# Patient Record
Sex: MALE | Race: WHITE | Employment: OTHER | URBAN - METROPOLITAN AREA
[De-identification: names, ages, dates, MRNs, and addresses within clinical notes are randomized per-mention and may not be internally consistent; named-entity substitution may affect disease eponyms.]

---

## 2017-06-08 ENCOUNTER — TELEPHONE (OUTPATIENT)
Dept: INTERNAL MEDICINE CLINIC | Age: 77
End: 2017-06-08

## 2017-06-08 NOTE — TELEPHONE ENCOUNTER
Advised pt last visit was 8/25/16 - MD has ordered labs for next visit - CBC, CMP and UA - no PSA. Last PSA was done 8/15/16 - insurance covers yearly - too soon. Advised lab slip at .   Pt asked to see if MD feels he needs PSA now - reviewed with pt his last one was normal. Will forward to MD.

## 2017-06-08 NOTE — TELEPHONE ENCOUNTER
Pt requesting a labslip, to include a psa test.  He has an appt scheduled with Dr. Laurel Michel on 6/20.   Would like to come get the bloodwork on Monday or Tues am.

## 2017-06-12 ENCOUNTER — HOSPITAL ENCOUNTER (OUTPATIENT)
Dept: LAB | Age: 77
Discharge: HOME | End: 2017-06-12
Payer: MEDICARE

## 2017-06-12 PROCEDURE — 80053 COMPREHEN METABOLIC PANEL: CPT

## 2017-06-12 PROCEDURE — 85025 COMPLETE CBC W/AUTO DIFF WBC: CPT

## 2017-06-12 PROCEDURE — 81001 URINALYSIS AUTO W/SCOPE: CPT

## 2017-07-26 ENCOUNTER — HOSPITAL ENCOUNTER (OUTPATIENT)
Dept: LAB | Age: 77
Discharge: HOME | End: 2017-07-26

## 2017-09-21 ENCOUNTER — HOSPITAL ENCOUNTER (OUTPATIENT)
Dept: LAB | Age: 77
Discharge: HOME | End: 2017-09-21

## 2017-10-23 ENCOUNTER — TELEPHONE (OUTPATIENT)
Dept: INTERNAL MEDICINE CLINIC | Age: 77
End: 2017-10-23

## 2017-10-23 NOTE — TELEPHONE ENCOUNTER
Spoke with pt - advised his last PSA was back on 8/15/16 - note on labs was MD to review at next visit. He was last seen on 6/20/17 - pt does not remember if MD discussed results or not. MD had printed PSA lab slip at that visit.  It appears he was to have it repeated mid august - would be a year from last. Will forward to MD.

## 2017-10-25 ENCOUNTER — HOSPITAL ENCOUNTER (OUTPATIENT)
Dept: LAB | Age: 77
Discharge: HOME | End: 2017-10-25
Payer: MEDICARE

## 2017-10-25 PROCEDURE — 84153 ASSAY OF PSA TOTAL: CPT

## 2017-10-25 PROCEDURE — 36415 COLL VENOUS BLD VENIPUNCTURE: CPT

## 2017-10-30 ENCOUNTER — TELEPHONE (OUTPATIENT)
Dept: INTERNAL MEDICINE CLINIC | Age: 77
End: 2017-10-30

## 2017-10-30 NOTE — TELEPHONE ENCOUNTER
[phone] pt's wife calling and wonders which of the pneumonia shots dr Alvarado Media would recommend that he have done.

## 2017-11-01 DIAGNOSIS — Z23 ENCOUNTER FOR IMMUNIZATION: Primary | ICD-10-CM

## 2017-11-01 NOTE — TELEPHONE ENCOUNTER
Spoke with pt's wife Chelsea Mota (on [facility]) advised her per our records he is is up to date with pneumovax 23 but needs the Prevnar 13. Asked her if she would like us to mail a Rx to her so pt can get at his pharmacy. She said yes and also send her My Chart message with this information also - done. Printed and mailed Rx.

## 2018-01-31 ENCOUNTER — OFFICE VISIT (OUTPATIENT)
Dept: INTERNAL MEDICINE CLINIC | Age: 78
End: 2018-01-31

## 2018-01-31 VITALS
WEIGHT: 184.6 LBS | RESPIRATION RATE: 18 BRPM | HEIGHT: 72 IN | OXYGEN SATURATION: 96 % | SYSTOLIC BLOOD PRESSURE: 123 MMHG | HEART RATE: 81 BPM | DIASTOLIC BLOOD PRESSURE: 76 MMHG | BODY MASS INDEX: 25 KG/M2 | TEMPERATURE: 97.9 F

## 2018-01-31 DIAGNOSIS — R04.0 EPISTAXIS: ICD-10-CM

## 2018-01-31 DIAGNOSIS — S63.501A SPRAIN OF RIGHT WRIST, INITIAL ENCOUNTER: ICD-10-CM

## 2018-01-31 DIAGNOSIS — Z23 NEED FOR TDAP VACCINATION: ICD-10-CM

## 2018-01-31 DIAGNOSIS — R09.81 CHRONIC NASAL CONGESTION: ICD-10-CM

## 2018-01-31 DIAGNOSIS — I78.0 HHT (HEREDITARY HEMORRHAGIC TELANGIECTASIA) (HCC): ICD-10-CM

## 2018-01-31 DIAGNOSIS — Z23 ENCOUNTER FOR IMMUNIZATION: ICD-10-CM

## 2018-01-31 DIAGNOSIS — D50.0 IRON DEFICIENCY ANEMIA DUE TO CHRONIC BLOOD LOSS: ICD-10-CM

## 2018-01-31 DIAGNOSIS — H90.3 SENSORINEURAL HEARING LOSS (SNHL) OF BOTH EARS: ICD-10-CM

## 2018-01-31 DIAGNOSIS — Z13.31 SCREENING FOR DEPRESSION: ICD-10-CM

## 2018-01-31 DIAGNOSIS — Z00.00 MEDICARE ANNUAL WELLNESS VISIT, SUBSEQUENT: Primary | ICD-10-CM

## 2018-01-31 DIAGNOSIS — Z78.9 ACTIVE ADVANCE DIRECTIVE ON FILE: ICD-10-CM

## 2018-01-31 NOTE — MR AVS SNAPSHOT
[address] [address] 
[phone] Patient: Letitia Prabhakar MRN: [id number] YWS:[birth year] Visit Information Date & Time Provider Department Dept. Phone Encounter #  
 1/31/2018  3:45 PM Acosta Hough, [address] [place] [address] Internal Medicine [phone] [id number] Follow-up Instructions Return in about 6 months (around 7/31/2018), or if symptoms worsen or fail to improve. Upcoming Health Maintenance Date Due DTaP/Tdap/Td series (1 - Tdap) 5/6/1960 GLAUCOMA SCREENING Q2Y 12/11/2017 MEDICARE YEARLY EXAM 2/1/2019 Allergies as of 1/31/2018  Review Complete On: 1/31/2018 By: Acosta Hough MD  
 No Known Allergies Current Immunizations  Reviewed on 1/31/2018 Name Date Influenza High Dose Vaccine PF 11/6/2017, 10/15/2015 Pneumococcal Conjugate (PCV-13) 11/6/2017 Pneumococcal Vaccine (Unspecified Type) 1/1/2010 Zoster Vaccine, Live 1/1/2010 Reviewed by Sunday Chappell on 1/31/2018 at  4:27 PM  
 Reviewed by Sunday Chappell on 1/31/2018 at  4:27 PM  
 Reviewed by Sunday Chappell on 1/31/2018 at  4:28 PM  
 Reviewed by Acosta Hough MD on 1/31/2018 at  4:51 PM  
You Were Diagnosed With   
  
 Codes Comments Medicare annual wellness visit, subsequent    -  Primary ICD-10-CM: Z00.00 ICD-9-CM: V70.0 Need for Tdap vaccination     ICD-10-CM: L95 ICD-9-CM: V06.1 Active advance directive on file     ICD-10-CM: Z78.9 ICD-9-CM: V49.89 HHT (hereditary hemorrhagic telangiectasia) ([facility] [address]     ICD-10-CM: I78.0 ICD-9-CM: 448.0 Epistaxis     ICD-10-CM: R04.0 ICD-9-CM: 784.7 Iron deficiency anemia due to chronic blood loss     ICD-10-CM: D50.0 ICD-9-CM: 280.0 Chronic nasal congestion     ICD-10-CM: R09.81 ICD-9-CM: 478.19 Sensorineural hearing loss (SNHL) of both ears     ICD-10-CM: H90.3 ICD-9-CM: 389.18   
 Sprain of right wrist, initial encounter     ICD-10-CM: P59.024E ICD-9-CM: 842.00 Encounter for immunization     ICD-10-CM: S54 ICD-9-CM: V03.89 Screening for depression     ICD-10-CM: Z13.89 ICD-9-CM: V79.0 Vitals BP Pulse Temp Resp Height(growth percentile) Weight(growth percentile) 123/76 (BP 1 Location: Right arm, BP Patient Position: Sitting) 81 97.9 °F (36.6 °C) (Oral) 18 5' 11.5\" (1.816 m) 184 lb 9.6 oz (83.7 kg) SpO2 BMI Smoking Status 96% 25.39 kg/m2 Former Smoker BMI and BSA Data Body Mass Index Body Surface Area  
 25.39 kg/m 2 2.05 m 2 Preferred Pharmacy Pharmacy Name Phone [address] [address] [phone] Your Updated Medication List  
  
   
This list is accurate as of: 18  5:07 PM.  Always use your most recent med list.  
  
  
  
  
 diph,Pertuss(Acell),Tet Vac-PF 2 Lf-(2.5-5-3-5 mcg)-5Lf/0.5 mL susp Commonly known as:  ADACEL  
0.5 mL by IntraMUSCular route once for 1 dose. ferrous sulfate 325 mg (65 mg iron) tablet Take 1 Tab by mouth Daily (before breakfast). mineral oil-hydrophil petrolat ointment Commonly known as:  AQUAPHOR Apply  to affected area as needed for Dry Skin. MULTIVITAMIN PO Take  by mouth daily. TIMOLOL MALEATE OP Apply  to eye. 1 drop to each eye daily  
  
 varicella-zoster recombinant (PF) 50 mcg/0.5 mL Susr injection Commonly known as:  SHINGRIX (PF)  
0.5 mL by IntraMUSCular route once for 1 dose. Repeat in 2 to 6 months Prescriptions Printed Refills diph,Pertuss,Acell,,Tet Vac-PF (ADACEL) 2 Lf-(2.5-5-3-5 mcg)-5Lf/0.5 mL susp 0 Si.5 mL by IntraMUSCular route once for 1 dose. Class: Print Route: IntraMUSCular  
 varicella-zoster recombinant, PF, (SHINGRIX, PF,) 50 mcg/0.5 mL susr injection 1 Si.5 mL by IntraMUSCular route once for 1 dose. Repeat in 2 to 6 months Class: Print Route: IntraMUSCular We Performed the Following [facility] [[id number] [facility]] REFERRAL TO ENT-OTOLARYNGOLOGY [XIB56 Custom] Follow-up Instructions Return in about 6 months (around 7/31/2018), or if symptoms worsen or fail to improve. Referral Information Referral ID Referred By Referred To [id number] Mil JACOME MD [facility] [address] [place] [address] Phone: [phone] Fax: [phone] Visits Status Start Date End Date 1 New Request 1/31/18 1/31/19 If your referral has a status of pending review or denied, additional information will be sent to support the outcome of this decision. Patient Instructions Wrist Sprain: Care Instructions Your Care Instructions Your wrist hurts because you have stretched or torn ligaments, which connect the bones in your wrist. 
Wrist sprains usually take from 2 to 10 weeks to heal, but some take longer. Usually, the more pain you have, the more severe your wrist sprain is and the longer it will take to heal. You can heal faster and regain strength in your wrist with good home treatment. Follow-up care is a key part of your treatment and safety. Be sure to make and go to all appointments, and call your doctor if you are having problems. It's also a good idea to know your test results and keep a list of the medicines you take. How can you care for yourself at home? · Prop up your arm on a pillow when you ice it or anytime you sit or lie down for the next 3 days. Try to keep your wrist above the level of your heart. This will help reduce swelling. · Put ice or cold packs on your wrist for 10 to 20 minutes at a time. Try to do this every 1 to 2 hours for the next 3 days (when you are awake) or until the swelling goes down. Put a thin cloth between the ice pack and your skin.  
· After 2 or 3 days, if your swelling is gone, apply a heating pad set on low or a warm cloth to your wrist. This helps keep your wrist flexible. Some doctors suggest that you go back and forth between hot and cold. · If you have an elastic bandage, keep it on for the next 24 to 36 hours. The bandage should be snug but not so tight that it causes numbness or tingling. To rewrap the wrist, wrap the bandage around the hand a few times, beginning at the fingers. Then wrap it around the hand between the thumb and index finger, ending by circling the wrist several times. · If your doctor gave you a splint or brace, wear it as directed to protect your wrist until it has healed. · Take pain medicines exactly as directed. ¨ If the doctor gave you a prescription medicine for pain, take it as prescribed. ¨ If you are not taking a prescription pain medicine, ask your doctor if you can take an over-the-counter medicine. · Try not to use your injured wrist and hand. When should you call for help? Call your doctor now or seek immediate medical care if: 
? · Your hand or fingers are cool or pale or change color. ? Watch closely for changes in your health, and be sure to contact your doctor if: 
? · Your pain gets worse. ? · Your wrist has not improved after 1 week. Where can you learn more? Go to http://rhonda-win.info/. Enter G541 in the search box to learn more about \"Wrist Sprain: Care Instructions. \" Current as of: March 21, 2017 Content Version: 11.4 © 2256-9527 [employer]. Care instructions adapted under license by [employer] (which disclaims liability or warranty for this information). If you have questions about a medical condition or this instruction, always ask your healthcare professional. [address] [address] any warranty or liability for your use of this information. Wrist Sprain: Rehab Exercises Your Care Instructions Here are some examples of typical rehabilitation exercises for your condition. Start each exercise slowly. Ease off the exercise if you start to have pain. Your doctor or your physical or occupational therapist will tell you when you can start these exercises and which ones will work best for you. How to do the exercises Resisted wrist extension 1. Sit leaning forward with your legs slightly spread. Then place your forearm on your thigh with your affected hand and wrist in front of your knee. 2. Grasp one end of an exercise band with your palm down. Step on the other end. 
3. Slowly bend your wrist upward for a count of 2. Then lower your wrist slowly to a count of 5. 
4. Repeat 8 to 12 times. Resisted wrist flexion 1. Sit leaning forward with your legs slightly spread. Then place your forearm on your thigh with your affected hand and wrist in front of your knee. 2. Grasp one end of an exercise band with your palm up. Step on the other end. 
3. Slowly bend your wrist upward for a count of 2. Then lower your wrist slowly to a count of 5. 
4. Repeat 8 to 12 times. Resisted radial deviation 1. Sit leaning forward with your legs slightly spread. Then place your forearm on your thigh with your affected hand and wrist in front of your knee. 2. Grasp one end of an exercise band with your hand facing toward your other thigh. Step on the other end. 
3. Slowly bend your wrist upward for a count of 2. Then lower your wrist slowly to a count of 5. 
4. Repeat 8 to 12 times. Resisted ulnar deviation 1. Sit leaning forward with your legs slightly spread. Then place your forearm on your thigh with your affected hand and wrist by the inside of your knee. 2. Grasp one end of an exercise band with your palm down. Step on the other end with the foot opposite the hand holding the band. 3. Slowly bend your wrist outward and toward your knee for a count of 2. Then slowly move your wrist back to the starting position to a count of 5. 
4. Repeat 8 to 12 times. Resisted forearm pronation 1. Sit leaning forward with your legs slightly spread. Then place your forearm on your thigh with your affected hand and wrist in front of your knee. 2. Grasp one end of an exercise band with your palm up. Step on the other end. 3. Keeping your wrist straight, roll your palm inward toward your thigh for a count of 2. Then slowly move your wrist back to the starting position to a count of 5. 
4. Repeat 8 to 12 times. Resisted supination 1. Sit leaning forward with your legs slightly spread. Then place your forearm on your thigh with your affected hand and wrist in front of your knee. 2. Grasp one end of an exercise band with your palm down. Step on the other end. 3. Keeping your wrist straight, roll your palm outward and away from your thigh for a count of 2. Then slowly move your wrist back to the starting position to a count of 5. 
4. Repeat 8 to 12 times. Follow-up care is a key part of your treatment and safety. Be sure to make and go to all appointments, and call your doctor if you are having problems. It's also a good idea to know your test results and keep a list of the medicines you take. Where can you learn more? Go to http://rhonda-win.info/. Enter S110 in the search box to learn more about \"Wrist Sprain: Rehab Exercises. \" Current as of: March 21, 2017 Content Version: 11.4 © 9711-1452 [employer]. Care instructions adapted under license by [employer] (which disclaims liability or warranty for this information). If you have questions about a medical condition or this instruction, always ask your healthcare professional. [address] [address] any warranty or liability for your use of this information. Medicare Wellness Visit, Male The best way to live healthy is to have a healthy lifestyle by eating a well-balanced diet, exercising regularly, limiting alcohol and stopping smoking. Regular physical exams and screening tests are another way to keep healthy. Preventive exams provided by your health care provider can find health problems before they become diseases or illnesses. Preventive services including immunizations, screening tests, monitoring and exams can help you take care of your own health. All people over age 72 should have a pneumovax  and and a prevnar shot to prevent pneumonia. These are once in a lifetime unless you and your provider decide differently. All people over 65 should have a yearly flu shot and a tetanus vaccine every 10 years. Screening for diabetes mellitus with a blood sugar test should be done every year. Glaucoma is a disease of the eye due to increased ocular pressure that can lead to blindness and it should be done every year by an eye professional. 
 
Cardiovascular screening tests that check for elevated lipids (fatty part of blood) which can lead to heart disease and strokes should be done every 5 years. Colorectal screening that evaluates for blood or polyps in your colon should be done yearly as a stool test or every five years as a flexible sigmoidoscope or every 10 years as a colonoscopy up to age 76. Men up to age 76 may need a screening blood test for prostate cancer at certain intervals, depending on their personal and family history. This decision is between the patient and his provider. If you have been a smoker or had family history of abdominal aortic aneurysms, you and your provider may decide to schedule an ultrasound test of your aorta. Hepatitis C screening is also recommended for anyone born between [birth year] through [address]. A shingles vaccine is also recommended once in a lifetime after age 61. Your Medicare Wellness Exam is recommended annually. Here is a list of your current Health Maintenance items with a due date: 
Health Maintenance Due Topic Date Due  
 DTaP/Tdap/Td  (1 - Tdap) 05[birth year]06/1960  Glaucoma Screening   12/11/2017 Introducing [facility]! Dear Cordell Morris: Thank you for requesting a Force-A account. Our records indicate that you already have an active Force-A account. You can access your account anytime at [email or web address] Did you know that you can access your hospital and ER discharge instructions at any time in Force-A? You can also review all of your test results from your hospital stay or ER visit. Additional Information If you have questions, please visit the Frequently Asked Questions section of the Force-A website at [email or web address] Remember, Force-A is NOT to be used for urgent needs. For medical emergencies, dial 911. Now available from your iPhone and Android! Please provide this summary of care documentation to your next provider. Your primary care clinician is listed as GIANFRANCO JACOME. If you have any questions after today's visit, please call [phone].

## 2018-01-31 NOTE — PATIENT INSTRUCTIONS
Wrist Sprain: Care Instructions  Your Care Instructions    Your wrist hurts because you have stretched or torn ligaments, which connect the bones in your wrist.  Wrist sprains usually take from 2 to 10 weeks to heal, but some take longer. Usually, the more pain you have, the more severe your wrist sprain is and the longer it will take to heal. You can heal faster and regain strength in your wrist with good home treatment. Follow-up care is a key part of your treatment and safety. Be sure to make and go to all appointments, and call your doctor if you are having problems. It's also a good idea to know your test results and keep a list of the medicines you take. How can you care for yourself at home? · Prop up your arm on a pillow when you ice it or anytime you sit or lie down for the next 3 days. Try to keep your wrist above the level of your heart. This will help reduce swelling. · Put ice or cold packs on your wrist for 10 to 20 minutes at a time. Try to do this every 1 to 2 hours for the next 3 days (when you are awake) or until the swelling goes down. Put a thin cloth between the ice pack and your skin. · After 2 or 3 days, if your swelling is gone, apply a heating pad set on low or a warm cloth to your wrist. This helps keep your wrist flexible. Some doctors suggest that you go back and forth between hot and cold. · If you have an elastic bandage, keep it on for the next 24 to 36 hours. The bandage should be snug but not so tight that it causes numbness or tingling. To rewrap the wrist, wrap the bandage around the hand a few times, beginning at the fingers. Then wrap it around the hand between the thumb and index finger, ending by circling the wrist several times. · If your doctor gave you a splint or brace, wear it as directed to protect your wrist until it has healed. · Take pain medicines exactly as directed. ¨ If the doctor gave you a prescription medicine for pain, take it as prescribed.   ¨ If you are not taking a prescription pain medicine, ask your doctor if you can take an over-the-counter medicine. · Try not to use your injured wrist and hand. When should you call for help? Call your doctor now or seek immediate medical care if:  ? · Your hand or fingers are cool or pale or change color. ? Watch closely for changes in your health, and be sure to contact your doctor if:  ? · Your pain gets worse. ? · Your wrist has not improved after 1 week. Where can you learn more? Go to http://rhonda-win.info/. Enter G541 in the search box to learn more about \"Wrist Sprain: Care Instructions. \"  Current as of: March 21, 2017  Content Version: 11.4  © 3539-9906 [employer]. Care instructions adapted under license by [employer] (which disclaims liability or warranty for this information). If you have questions about a medical condition or this instruction, always ask your healthcare professional. [place] [address] any warranty or liability for your use of this information. Wrist Sprain: Rehab Exercises  Your Care Instructions  Here are some examples of typical rehabilitation exercises for your condition. Start each exercise slowly. Ease off the exercise if you start to have pain. Your doctor or your physical or occupational therapist will tell you when you can start these exercises and which ones will work best for you. How to do the exercises  Resisted wrist extension    1. Sit leaning forward with your legs slightly spread. Then place your forearm on your thigh with your affected hand and wrist in front of your knee. 2. Grasp one end of an exercise band with your palm down. Step on the other end.  3. Slowly bend your wrist upward for a count of 2. Then lower your wrist slowly to a count of 5.  4. Repeat 8 to 12 times. Resisted wrist flexion    1. Sit leaning forward with your legs slightly spread.  Then place your forearm on your thigh with your affected hand and wrist in front of your knee. 2. Grasp one end of an exercise band with your palm up. Step on the other end.  3. Slowly bend your wrist upward for a count of 2. Then lower your wrist slowly to a count of 5.  4. Repeat 8 to 12 times. Resisted radial deviation    1. Sit leaning forward with your legs slightly spread. Then place your forearm on your thigh with your affected hand and wrist in front of your knee. 2. Grasp one end of an exercise band with your hand facing toward your other thigh. Step on the other end.  3. Slowly bend your wrist upward for a count of 2. Then lower your wrist slowly to a count of 5.  4. Repeat 8 to 12 times. Resisted ulnar deviation    1. Sit leaning forward with your legs slightly spread. Then place your forearm on your thigh with your affected hand and wrist by the inside of your knee. 2. Grasp one end of an exercise band with your palm down. Step on the other end with the foot opposite the hand holding the band. 3. Slowly bend your wrist outward and toward your knee for a count of 2. Then slowly move your wrist back to the starting position to a count of 5.  4. Repeat 8 to 12 times. Resisted forearm pronation    1. Sit leaning forward with your legs slightly spread. Then place your forearm on your thigh with your affected hand and wrist in front of your knee. 2. Grasp one end of an exercise band with your palm up. Step on the other end. 3. Keeping your wrist straight, roll your palm inward toward your thigh for a count of 2. Then slowly move your wrist back to the starting position to a count of 5.  4. Repeat 8 to 12 times. Resisted supination    1. Sit leaning forward with your legs slightly spread. Then place your forearm on your thigh with your affected hand and wrist in front of your knee. 2. Grasp one end of an exercise band with your palm down. Step on the other end.   3. Keeping your wrist straight, roll your palm outward and away from your thigh for a count of 2. Then slowly move your wrist back to the starting position to a count of 5.  4. Repeat 8 to 12 times. Follow-up care is a key part of your treatment and safety. Be sure to make and go to all appointments, and call your doctor if you are having problems. It's also a good idea to know your test results and keep a list of the medicines you take. Where can you learn more? Go to http://rhonda-win.info/. Enter S110 in the search box to learn more about \"Wrist Sprain: Rehab Exercises. \"  Current as of: March 21, 2017  Content Version: 11.4  © 5719-0687 [employer]. Care instructions adapted under license by [employer] (which disclaims liability or warranty for this information). If you have questions about a medical condition or this instruction, always ask your healthcare professional. [place] [address] any warranty or liability for your use of this information. Medicare Wellness Visit, Male    The best way to live healthy is to have a healthy lifestyle by eating a well-balanced diet, exercising regularly, limiting alcohol and stopping smoking. Regular physical exams and screening tests are another way to keep healthy. Preventive exams provided by your health care provider can find health problems before they become diseases or illnesses. Preventive services including immunizations, screening tests, monitoring and exams can help you take care of your own health. All people over age 72 should have a pneumovax  and and a prevnar shot to prevent pneumonia. These are once in a lifetime unless you and your provider decide differently. All people over 65 should have a yearly flu shot and a tetanus vaccine every 10 years. Screening for diabetes mellitus with a blood sugar test should be done every year.     Glaucoma is a disease of the eye due to increased ocular pressure that can lead to blindness and it should be done every year by an eye professional.    Cardiovascular screening tests that check for elevated lipids (fatty part of blood) which can lead to heart disease and strokes should be done every 5 years. Colorectal screening that evaluates for blood or polyps in your colon should be done yearly as a stool test or every five years as a flexible sigmoidoscope or every 10 years as a colonoscopy up to age 76. Men up to age 76 may need a screening blood test for prostate cancer at certain intervals, depending on their personal and family history. This decision is between the patient and his provider. If you have been a smoker or had family history of abdominal aortic aneurysms, you and your provider may decide to schedule an ultrasound test of your aorta. Hepatitis C screening is also recommended for anyone born between [birth year] through [address]. A shingles vaccine is also recommended once in a lifetime after age 61. Your Medicare Wellness Exam is recommended annually.     Here is a list of your current Health Maintenance items with a due date:  Health Maintenance Due   Topic Date Due    DTaP/Tdap/Td  (1 - Tdap) 05/06/1960    Glaucoma Screening   12/11/2017

## 2018-01-31 NOTE — PROGRESS NOTES
This is a Subsequent Medicare Annual Wellness Exam (AWV) (Performed 12 months after IPPE or effective date of Medicare Part B enrollment)    I have reviewed the patient's medical history in detail and updated the computerized patient record. History     Past Medical History:   Diagnosis Date    HHT (hereditary hemorrhagic telangiectasia) ([facility] [address]       Past Surgical History:   Procedure Laterality Date    CHEST SURGERY PROCEDURE UNLISTED      excision of AVM    HX APPENDECTOMY      HX COLONOSCOPY  2014    due 23    HX TONSILLECTOMY       Current Outpatient Prescriptions   Medication Sig Dispense Refill    diph,Pertuss,Acell,,Tet Vac-PF (ADACEL) 2 Lf-(2.5-5-3-5 mcg)-5Lf/0.5 mL susp 0.5 mL by IntraMUSCular route once for 1 dose. 0.5 mL 0    varicella-zoster recombinant, PF, (SHINGRIX, PF,) 50 mcg/0.5 mL susr injection 0.5 mL by IntraMUSCular route once for 1 dose. Repeat in 2 to 6 months 0.5 mL 1    MULTIVITAMIN PO Take  by mouth daily.  mineral oil-hydrophil petrolat (AQUAPHOR) ointment Apply  to affected area as needed for Dry Skin.  TIMOLOL MALEATE OP Apply  to eye. 1 drop to each eye daily      ferrous sulfate 325 mg (65 mg iron) tablet Take 1 Tab by mouth Daily (before breakfast).  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 Functional Ability and Level of Safety:   Hearing Loss  The patient needs further evaluation. Activities of Daily Living  The home contains: no safety equipment. Patient does total self care    Fall Risk  Fall Risk Assessment, last 12 mths 1/31/2018   Able to walk? Yes   Fall in past 12 months? No       Abuse Screen  Patient is not abused    Cognitive Screening   Evaluation of Cognitive Function:  Has your family/caregiver stated any concerns about your memory: no  Normal    Patient Care Team   Patient Care Team:  Moriah Paul MD as PCP - General (Internal Medicine)  Margaret Brooke MD (Ophthalmology)    Assessment/Plan   Education and counseling provided:  Are appropriate based on today's review and evaluation    Diagnoses and all orders for this visit:    1. Need for Tdap vaccination  -     diph,Pertuss,Acell,,Tet Vac-PF (ADACEL) 2 Lf-(2.5-5-3-5 mcg)-5Lf/0.5 mL susp; 0.5 mL by IntraMUSCular route once for 1 dose. 2. Active advance directive on file    3. HHT (hereditary hemorrhagic telangiectasia) ([facility])    4. Epistaxis  -     REFERRAL TO ENT-OTOLARYNGOLOGY    5. Iron deficiency anemia due to chronic blood loss    6. Chronic nasal congestion    7. Sensorineural hearing loss (SNHL) of both ears  -     REFERRAL TO ENT-OTOLARYNGOLOGY    8. Sprain of right wrist, initial encounter    9. Encounter for immunization  -     varicella-zoster recombinant, PF, (SHINGRIX, PF,) 50 mcg/0.5 mL susr injection; 0.5 mL by IntraMUSCular route once for 1 dose. Repeat in 2 to 6 months    10. Medicare annual wellness visit, subsequent    11.  Screening for depression  -     Depression Screen Annual        Health Maintenance Due   Topic Date Due    DTaP/Tdap/Td series (1 - Tdap) 05/06/1960    GLAUCOMA SCREENING Q2Y  12/11/2017

## 2018-01-31 NOTE — PROGRESS NOTES
HISTORY OF PRESENT ILLNESS  Any Schmidt is a 65 y.o. male. CHANTELL Donato is seen today for a Medicare Wellness Visit, CPE and follow up of chronic problems. Preventive medicine. Fully reviewed today. He is due for a complete physical examination and select laboratory studies. He is up to date with PSA and most vaccinations. See Assessment & Plan for full update. For Medicare Wellness Visit, see attached note. Chronic medical problems are reviewed. 1. Nosebleeds. Pending consultation with ENT specialist down in [facility] who specializes in his unique condition, HHT. See below. 2. Anemia. Check CBC. 3. Hearing loss. See above regarding ENT referral.   4. HHT. He has seen a pulmonary specialist at the [address] [place] [address] who specializes in this condition. He has had a full evaluation and we put a request in for records. He will return in March. New problem reviewed, right hand injury. He slipped and had an extension type injury a day ago. He has full range of motion but I suspect he has a sprain. I gave him some information regarding exercises. If symptoms should persist, refer to ortho. Upper respiratory infection. This sounds to have been viral and is resolving. MedDATA/gwo           Past Medical History:   Diagnosis Date    HHT (hereditary hemorrhagic telangiectasia) ([facility] [address]      Current Outpatient Prescriptions   Medication Sig    MULTIVITAMIN PO Take  by mouth daily.  mineral oil-hydrophil petrolat (AQUAPHOR) ointment Apply  to affected area as needed for Dry Skin.  TIMOLOL MALEATE OP Apply  to eye. 1 drop to each eye daily    ferrous sulfate 325 mg (65 mg iron) tablet Take 1 Tab by mouth Daily (before breakfast). No current facility-administered medications for this visit. Review of Systems   Constitutional: Negative for weight loss. HENT: Positive for congestion, hearing loss and nosebleeds. Respiratory: Negative.     Cardiovascular: Negative for chest pain, palpitations, leg swelling and PND. Gastrointestinal: Negative. Genitourinary: Negative. Musculoskeletal: Positive for joint pain. Negative for myalgias. Neurological: Negative for tremors and focal weakness. Physical Exam   Constitutional: He is oriented to person, place, and time. He appears well-developed and well-nourished. No distress. HENT:   Head: Normocephalic and atraumatic. Right Ear: Tympanic membrane, external ear and ear canal normal.   Left Ear: Tympanic membrane, external ear and ear canal normal.   Eyes: EOM are normal. Pupils are equal, round, and reactive to light. Right eye exhibits no discharge. Left eye exhibits no discharge. Neck: Normal range of motion. Neck supple. Carotid bruit is not present. No thyromegaly present. Cardiovascular: Normal rate, regular rhythm, normal heart sounds and intact distal pulses. Exam reveals no gallop and no friction rub. No murmur heard. Pulmonary/Chest: Effort normal and breath sounds normal. No respiratory distress. He has no wheezes. He has no rales. Abdominal: Soft. Bowel sounds are normal. He exhibits no distension and no mass. There is no tenderness. There is no rebound and no guarding. Genitourinary: Rectal exam shows no mass and no tenderness. Prostate is enlarged. Prostate is not tender. Musculoskeletal: Normal range of motion. He exhibits no edema or tenderness. Lymphadenopathy:     He has no cervical adenopathy. Neurological: He is alert and oriented to person, place, and time. He has normal reflexes. Skin: Skin is warm and dry. No rash noted. Psychiatric: He has a normal mood and affect. His behavior is normal.   Nursing note and vitals reviewed. ASSESSMENT and PLAN  Diagnoses and all orders for this visit:    1. Medicare annual wellness visit, subsequent    2.  Need for Tdap vaccination  -     diph,Pertuss,Acell,,Tet Vac-PF (ADACEL) 2 Lf-(2.5-5-3-5 mcg)-5Lf/0.5 mL susp; 0.5 mL by IntraMUSCular route once for 1 dose. 3. Active advance directive on file    4. HHT (hereditary hemorrhagic telangiectasia) ([facility] [address]- See pulmonologist as directed/discussed     5. Epistaxis  -     REFERRAL TO ENT-OTOLARYNGOLOGY    6. Iron deficiency anemia due to chronic blood loss- follow    7. Chronic nasal congestion- See specialists  as directed. 8. Sensorineural hearing loss (SNHL) of both ears  -     REFERRAL TO ENT-OTOLARYNGOLOGY    9. Sprain of right wrist, initial encounter- stretching, ACE wrap, See patient instructions     10. Encounter for immunization  -     varicella-zoster recombinant, PF, (SHINGRIX, PF,) 50 mcg/0.5 mL susr injection; 0.5 mL by IntraMUSCular route once for 1 dose. Repeat in 2 to 6 months    11.  Screening for depression  -     Depression Screen Annual

## 2018-02-02 ENCOUNTER — TELEPHONE (OUTPATIENT)
Dept: INTERNAL MEDICINE CLINIC | Age: 78
End: 2018-02-02

## 2018-02-06 NOTE — TELEPHONE ENCOUNTER
Pt states he has \"taken care of it. \" Asked if there was anything else I could help him with - he said no.

## 2018-03-14 ENCOUNTER — TELEPHONE (OUTPATIENT)
Dept: INTERNAL MEDICINE CLINIC | Age: 78
End: 2018-03-14

## 2018-03-14 NOTE — TELEPHONE ENCOUNTER
Please call to discuss lab work
Pt. Asked if Dr Anthony Arango has received records from Dr Albino Moscoso. , states he has been treating him since Jan and has done a brain scan and labs. Informed I do not see any records and will have Linda contact his office again for records.
none

## 2018-05-01 ENCOUNTER — HOSPITAL ENCOUNTER (OUTPATIENT)
Dept: LAB | Age: 78
Discharge: HOME | End: 2018-05-01

## 2018-05-23 ENCOUNTER — HOSPITAL ENCOUNTER (OUTPATIENT)
Dept: LAB | Age: 78
Discharge: HOME | End: 2018-05-23

## 2018-05-23 ENCOUNTER — TELEPHONE (OUTPATIENT)
Dept: INTERNAL MEDICINE CLINIC | Age: 78
End: 2018-05-23

## 2018-05-23 DIAGNOSIS — G47.30 SLEEP APNEA, UNSPECIFIED TYPE: Primary | ICD-10-CM

## 2018-05-23 NOTE — TELEPHONE ENCOUNTER
Lisset Alegria (Self) [phone]     Phone# above is wife, Cristin's #. Patient requesting callback concerning pneumonia vaccine and shingles vaccine.

## 2018-05-23 NOTE — TELEPHONE ENCOUNTER
Spoke with pt's wife Sreekanth Ask (on HIPAA) she requesting MD send in new shingles vaccine to pharmacy. She also called to get appt with Dr Vicente Wooten for sleep study (pt's snoring has increased) but no appt available until September. She wants pt to see this doctor - wants to see if MD can give referral and we call to get him in sooner?  Will forward to MD.

## 2018-05-23 NOTE — TELEPHONE ENCOUNTER
Called Dr Jayla Wilson office - spoke with Zen Leon - scheduled appt for 8/28/18 at 11 am (first available) - [facility] location. They will mail pt a welcome packet with all information on appt. Also placed pt on their cancellation list.     Advised pt's wife Giovanni Cheema that MD has approved both request. Sent Shigrix to pharmacy. Also advised her that Dr Daron Mann no longer works there. Advised of appt scheduled. She feels he needs sooner appt. Who else does MD recommend? Advised her to call [facility] - number given. Advised her to cancel appt with Dr Jayla Wilson if she chooses another practice.

## 2018-06-19 ENCOUNTER — HOSPITAL ENCOUNTER (EMERGENCY)
Age: 78
Discharge: HOME | End: 2018-06-19
Attending: EMERGENCY MEDICINE
Payer: MEDICARE

## 2018-06-19 VITALS
BODY MASS INDEX: 24.52 KG/M2 | WEIGHT: 181 LBS | SYSTOLIC BLOOD PRESSURE: 124 MMHG | HEIGHT: 72 IN | DIASTOLIC BLOOD PRESSURE: 68 MMHG | HEART RATE: 84 BPM | OXYGEN SATURATION: 97 % | RESPIRATION RATE: 16 BRPM | TEMPERATURE: 98.3 F

## 2018-06-19 DIAGNOSIS — S81.802S WOUND OF LEFT LOWER EXTREMITY, SEQUELA: Primary | ICD-10-CM

## 2018-06-19 PROCEDURE — 99282 EMERGENCY DEPT VISIT SF MDM: CPT

## 2018-06-19 RX ORDER — CIPROFLOXACIN 500 MG/1
TABLET ORAL 2 TIMES DAILY
COMMUNITY
End: 2018-07-17 | Stop reason: ALTCHOICE

## 2018-06-19 NOTE — ED TRIAGE NOTES
Pt reports have skin cancer removed from the left mid shin on May 23rd. Presents with redness, swelling, open wound with discolored drainage.

## 2018-06-19 NOTE — ED PROVIDER NOTES
HPI Comments: Patient is a very pleasant 76-year-old male who presents the emergency department with a chronic wound on his left shin. Patient had an area of carcinoma surgically removed from that area with an elliptical incision that was subsequently sutured closed. After removal of the stitches approximately a week later the wound opened up and since that time he has had a chronic wound. He has been placed on antibiotics initially, doxycycline, and subsequently Cipro for a cellulitis that he developed after wound dehiscence. The wound has been healing adequately. Patient not only does local wound care, takes antibiotics, but is also using Silvadene cream as prescribed by his physician. He has no acute complaints, he simply wanted a wound check. He denies fever or chills. There is been no increased drainage. There is no increased pain. He has no symptoms distal to the wound. The history is provided by the patient. Past Medical History:   Diagnosis Date    HHT (hereditary hemorrhagic telangiectasia) ([facility] [address]        Past Surgical History:   Procedure Laterality Date    CHEST SURGERY PROCEDURE UNLISTED      excision of AVM    HX APPENDECTOMY      HX COLONOSCOPY  2014    due 22    HX TONSILLECTOMY           Family History:   Problem Relation Age of Onset    Heart Disease Father     Cancer Other      colon, lung       Social History     Social History    Marital status:      Spouse name: N/A    Number of children: N/A    Years of education: N/A     Occupational History    Not on file. Social History Main Topics    Smoking status: Former Smoker    Smokeless tobacco: Never Used    Alcohol use 0.0 oz/week     0 Standard drinks or equivalent per week      Comment: one drink per week    Drug use: No    Sexual activity: Not on file     Other Topics Concern    Not on file     Social History Narrative         ALLERGIES: Review of patient's allergies indicates no known allergies.     Review of Systems   Constitutional: Negative. Negative for appetite change, fever and unexpected weight change. HENT: Negative. Negative for ear pain, hearing loss, nosebleeds, rhinorrhea, sore throat and trouble swallowing. Respiratory: Negative. Negative for cough, chest tightness and shortness of breath. Cardiovascular: Negative. Negative for chest pain and palpitations. Gastrointestinal: Negative. Negative for abdominal distention, abdominal pain, blood in stool and vomiting. Endocrine: Negative. Genitourinary: Negative for dysuria and hematuria. Musculoskeletal: Negative. Negative for back pain and myalgias. Skin: Negative. Negative for rash. Allergic/Immunologic: Negative. Neurological: Negative. Negative for dizziness, syncope, weakness and numbness. Hematological: Negative. Psychiatric/Behavioral: Negative. All other systems reviewed and are negative. Vitals:    06/19/18 1619   BP: 124/68   Pulse: 84   Resp: 16   Temp: (P) 98.3 °F (36.8 °C)   SpO2: 97%   Weight: 82.1 kg (181 lb)   Height: 6' (1.829 m)            Physical Exam   Constitutional: He is oriented to person, place, and time. He appears well-developed and well-nourished. No distress. HENT:   Head: Normocephalic and atraumatic. Right Ear: External ear normal.   Left Ear: External ear normal.   Nose: Nose normal.   Mouth/Throat: Oropharynx is clear and moist.   Eyes: Conjunctivae and EOM are normal. Pupils are equal, round, and reactive to light. Neck: Normal range of motion. Neck supple. No JVD present. No thyromegaly present. Cardiovascular: Normal rate, regular rhythm, normal heart sounds and intact distal pulses. No murmur heard. Pulmonary/Chest: Effort normal and breath sounds normal. No respiratory distress. He has no wheezes. He has no rales. Abdominal: Soft. Bowel sounds are normal. He exhibits no distension. There is no tenderness. Musculoskeletal: Normal range of motion.  He exhibits no edema. Neurological: He is alert and oriented to person, place, and time. No cranial nerve deficit. Skin: Skin is warm and dry. No rash noted. Left shin wound is 2 cm x 1 cm. It is well granulated. The tissue margins appear healthy. There is no surrounding cellulitis. There is no induration or abscess. He is neurovascularly intact distally. Psychiatric: He has a normal mood and affect. His behavior is normal. Thought content normal.   Vitals reviewed. MDM  Number of Diagnoses or Management Options  Diagnosis management comments: Assessment and plan: Healing wound. No acute signs of infection or complication. I recommend that he contact his primary care physician and get a referral to a wound healing clinic for further management of his chronic wound. There is no acute complication. Will discharge home. The patient understands and agrees with plan. He is to continue current care.       Risk of Complications, Morbidity, and/or Mortality  Presenting problems: low  Diagnostic procedures: low  Management options: low    Patient Progress  Patient progress: stable        ED Course       Procedures

## 2018-06-21 ENCOUNTER — TELEPHONE (OUTPATIENT)
Dept: INTERNAL MEDICINE CLINIC | Age: 78
End: 2018-06-21

## 2018-06-21 NOTE — TELEPHONE ENCOUNTER
Kermit Nino (Self) [phone]     Pt has app[facility]ng [facility]/18 and they need a copy of last office notes faxed to them. Fax number is [phone]) and phone is [phone].

## 2018-06-22 ENCOUNTER — TELEPHONE (OUTPATIENT)
Dept: INTERNAL MEDICINE CLINIC | Age: 78
End: 2018-06-22

## 2018-06-22 NOTE — TELEPHONE ENCOUNTER
Pt had some medical test done in [place] - Dr Xiomy Doe,   [address] wants to know if you have received the records for these test.  Advise - [phone]

## 2018-06-26 NOTE — TELEPHONE ENCOUNTER
Spoke with pt to find out when test were done. He states back in January 2018. Advised him records are scanned in chart back in April. He just wanted to make sure MD had them.

## 2018-07-17 ENCOUNTER — HOSPITAL ENCOUNTER (OUTPATIENT)
Dept: LAB | Age: 78
Discharge: HOME | End: 2018-07-17
Payer: MEDICARE

## 2018-07-17 ENCOUNTER — OFFICE VISIT (OUTPATIENT)
Dept: INTERNAL MEDICINE CLINIC | Age: 78
End: 2018-07-17

## 2018-07-17 VITALS
OXYGEN SATURATION: 96 % | HEIGHT: 73 IN | DIASTOLIC BLOOD PRESSURE: 70 MMHG | SYSTOLIC BLOOD PRESSURE: 118 MMHG | BODY MASS INDEX: 23.86 KG/M2 | RESPIRATION RATE: 17 BRPM | HEART RATE: 65 BPM | TEMPERATURE: 97.9 F | WEIGHT: 180 LBS

## 2018-07-17 DIAGNOSIS — I78.0 HHT (HEREDITARY HEMORRHAGIC TELANGIECTASIA) (HCC): Primary | ICD-10-CM

## 2018-07-17 DIAGNOSIS — R04.0 EPISTAXIS: ICD-10-CM

## 2018-07-17 DIAGNOSIS — D50.0 IRON DEFICIENCY ANEMIA DUE TO CHRONIC BLOOD LOSS: ICD-10-CM

## 2018-07-17 DIAGNOSIS — C44.92 SCCA (SQUAMOUS CELL CARCINOMA) OF SKIN: ICD-10-CM

## 2018-07-17 PROCEDURE — 85025 COMPLETE CBC W/AUTO DIFF WBC: CPT

## 2018-07-17 PROCEDURE — 80053 COMPREHEN METABOLIC PANEL: CPT

## 2018-07-17 PROCEDURE — 36415 COLL VENOUS BLD VENIPUNCTURE: CPT

## 2018-07-17 NOTE — PROGRESS NOTES
HISTORY OF PRESENT ILLNESS  Giovanna Sarkar is a 77 y.o. male. HPI Damian Hinson is seen today for follow up of nosebleeds and other concerns. 1. HHT. He has had some increase in nose bleeds. He sees an ENT specialist down in [facility]. He has a visit coming up there and he would like to get a baseline hemoglobin which I think is a good idea. 2. Skin cancer. This was over the left shin. It has been treated and gradually resolving. Review of Systems notable for some left hip pain secondary to gait changes. He does not feel the need for intervention at this time. Review of Systems   HENT: Positive for nosebleeds. Gastrointestinal: Negative for blood in stool and melena. Skin: Positive for rash. Endo/Heme/Allergies: Bruises/bleeds easily. Physical Exam   Constitutional: No distress. Neck: Carotid bruit is not present. Cardiovascular: Normal rate and regular rhythm. Exam reveals no gallop and no friction rub. No murmur heard. Pulmonary/Chest: Effort normal and breath sounds normal. No respiratory distress. Musculoskeletal: He exhibits no edema. Skin:        Nursing note and vitals reviewed. ASSESSMENT and PLAN  Diagnoses and all orders for this visit:    1. HHT (hereditary hemorrhagic telangiectasia) ([facility] [address]  -     CBC WITH AUTOMATED DIFF  -     METABOLIC PANEL, COMPREHENSIVE    2. Iron deficiency anemia due to chronic blood loss  -     CBC WITH AUTOMATED DIFF  -     METABOLIC PANEL, COMPREHENSIVE    3. Epistaxis  -     CBC WITH AUTOMATED DIFF  -     METABOLIC PANEL, COMPREHENSIVE    4.  SCCA (squamous cell carcinoma) of skin- See dermatologist as discussed/directed

## 2018-07-17 NOTE — MR AVS SNAPSHOT
[address] 
[phone] Patient: Cullen Parker MRN: [id number] WIR:[birth year] Visit Information Date & Time Provider Department Dept. Phone Encounter #  
 7/17/2018  9:35 AM Siobhan Watson MD [facility] Internal Medicine [phone] [id number] Follow-up Instructions Return if symptoms worsen or fail to improve. Upcoming Health Maintenance Date Due DTaP/Tdap/Td series (1 - Tdap) 5/6/1960 Influenza Age 5 to Adult 8/1/2018 MEDICARE YEARLY EXAM 2/1/2019 GLAUCOMA SCREENING Q2Y 10/26/2019 Allergies as of 7/17/2018  Review Complete On: 7/17/2018 By: Jorge Hartmann No Known Allergies Current Immunizations  Reviewed on 1/31/2018 Name Date Influenza High Dose Vaccine PF 11/6/2017, 10/15/2015 Pneumococcal Conjugate (PCV-13) 11/6/2017 Pneumococcal Vaccine (Unspecified Type) 1/1/2010 Zoster Vaccine, Live 1/1/2010 Not reviewed this visit You Were Diagnosed With   
  
 Codes Comments [facility]    -  Primary ICD-10-CM: I78.0 ICD-9-CM: 448.0 Iron deficiency anemia due to chronic blood loss     ICD-10-CM: D50.0 ICD-9-CM: 280.0 Epistaxis     ICD-10-CM: R04.0 ICD-9-CM: 784.7 SCCA (squamous cell carcinoma) of skin     ICD-10-CM: C44.92 
ICD-9-CM: 173.92 Vitals BP Pulse Temp Resp Height(growth percentile) Weight(growth percentile) 118/70 (BP 1 Location: Right arm, BP Patient Position: Sitting) 65 97.9 °F (36.6 °C) (Oral) 17 6' 1\" (1.854 m) 180 lb (81.6 kg) SpO2 BMI Smoking Status 96% 23.75 kg/m2 Former Smoker Vitals History BMI and BSA Data Body Mass Index Body Surface Area  
 23.75 kg/m 2 2.05 m 2 Preferred Pharmacy Pharmacy Name Phone [id number] [place] [address] [phone] Your Updated Medication List  
  
   
 This list is accurate as of 7/17/18 10:04 AM.  Always use your most recent med list.  
  
  
  
  
 ferrous sulfate 325 mg (65 mg iron) tablet Take 1 Tab by mouth Daily (before breakfast). mineral oil-hydrophil petrolat ointment Commonly known as:  AQUAPHOR Apply  to affected area as needed for Dry Skin. MULTIVITAMIN PO Take  by mouth daily. TIMOLOL MALEATE OP Apply  to eye. 1 drop to each eye daily We Performed the Following CBC WITH AUTOMATED DIFF [69463 CPT(R)] METABOLIC PANEL, COMPREHENSIVE [42333 CPT(R)] Follow-up Instructions Return if symptoms worsen or fail to improve. Introducing [facility] & [facility] SERVICES! Dear Chante Rangel: Thank you for requesting a [employer] account. Our records indicate that you already have an active [employer] account. You can access your account anytime at [email or web address] Did you know that you can access your hospital and ER discharge instructions at any time in [employer]? You can also review all of your test results from your hospital stay or ER visit. Additional Information If you have questions, please visit the Frequently Asked Questions section of the [employer] website at [email or web address] Remember, [employer] is NOT to be used for urgent needs. For medical emergencies, dial 911. Now available from your iPhone and Android! Please provide this summary of care documentation to your next provider. Your primary care clinician is listed as GIANFRANCO JACOME. If you have any questions after today's visit, please call [phone].

## 2018-07-18 LAB
ALBUMIN SERPL-MCNC: 4.1 G/DL (ref 3.5–4.8)
ALBUMIN/GLOB SERPL: 1.5 {RATIO} (ref 1.2–2.2)
ALP SERPL-CCNC: 59 IU/L (ref 39–117)
ALT SERPL-CCNC: 13 IU/L (ref 0–44)
AST SERPL-CCNC: 16 IU/L (ref 0–40)
BASOPHILS # BLD AUTO: 0 X10E3/UL (ref 0–0.2)
BASOPHILS NFR BLD AUTO: 1 %
BILIRUB SERPL-MCNC: 0.4 MG/DL (ref 0–1.2)
BUN SERPL-MCNC: 15 MG/DL (ref 8–27)
BUN/CREAT SERPL: 15 (ref 10–24)
CALCIUM SERPL-MCNC: 9 MG/DL (ref 8.6–10.2)
CHLORIDE SERPL-SCNC: 102 MMOL/L (ref 96–106)
CO2 SERPL-SCNC: 26 MMOL/L (ref 20–29)
CREAT SERPL-MCNC: 1 MG/DL (ref 0.76–1.27)
EOSINOPHIL # BLD AUTO: 0.2 X10E3/UL (ref 0–0.4)
EOSINOPHIL NFR BLD AUTO: 4 %
ERYTHROCYTE [DISTWIDTH] IN BLOOD BY AUTOMATED COUNT: 14.9 % (ref 12.3–15.4)
GLOBULIN SER CALC-MCNC: 2.7 G/DL (ref 1.5–4.5)
GLUCOSE SERPL-MCNC: 115 MG/DL (ref 65–99)
HCT VFR BLD AUTO: 38.8 % (ref 37.5–51)
HGB BLD-MCNC: 12 G/DL (ref 13–17.7)
IMM GRANULOCYTES # BLD: 0 X10E3/UL (ref 0–0.1)
IMM GRANULOCYTES NFR BLD: 0 %
LYMPHOCYTES # BLD AUTO: 0.7 X10E3/UL (ref 0.7–3.1)
LYMPHOCYTES NFR BLD AUTO: 13 %
MCH RBC QN AUTO: 26.1 PG (ref 26.6–33)
MCHC RBC AUTO-ENTMCNC: 30.9 G/DL (ref 31.5–35.7)
MCV RBC AUTO: 85 FL (ref 79–97)
MONOCYTES # BLD AUTO: 0.5 X10E3/UL (ref 0.1–0.9)
MONOCYTES NFR BLD AUTO: 9 %
NEUTROPHILS # BLD AUTO: 3.7 X10E3/UL (ref 1.4–7)
NEUTROPHILS NFR BLD AUTO: 73 %
PLATELET # BLD AUTO: 241 X10E3/UL (ref 150–379)
POTASSIUM SERPL-SCNC: 4.5 MMOL/L (ref 3.5–5.2)
PROT SERPL-MCNC: 6.8 G/DL (ref 6–8.5)
RBC # BLD AUTO: 4.59 X10E6/UL (ref 4.14–5.8)
SODIUM SERPL-SCNC: 141 MMOL/L (ref 134–144)
WBC # BLD AUTO: 5 X10E3/UL (ref 3.4–10.8)

## 2018-09-05 ENCOUNTER — OFFICE VISIT (OUTPATIENT)
Dept: INTERNAL MEDICINE CLINIC | Age: 78
End: 2018-09-05

## 2018-09-05 VITALS
DIASTOLIC BLOOD PRESSURE: 74 MMHG | BODY MASS INDEX: 24.28 KG/M2 | SYSTOLIC BLOOD PRESSURE: 138 MMHG | HEIGHT: 73 IN | TEMPERATURE: 97.9 F | HEART RATE: 72 BPM | WEIGHT: 183.2 LBS | OXYGEN SATURATION: 98 % | RESPIRATION RATE: 18 BRPM

## 2018-09-05 DIAGNOSIS — I87.303 STASIS EDEMA OF BOTH LOWER EXTREMITIES: ICD-10-CM

## 2018-09-05 DIAGNOSIS — R04.0 EPISTAXIS: ICD-10-CM

## 2018-09-05 DIAGNOSIS — I78.0 HHT (HEREDITARY HEMORRHAGIC TELANGIECTASIA) (HCC): Primary | ICD-10-CM

## 2018-09-05 DIAGNOSIS — C44.92 SCCA (SQUAMOUS CELL CARCINOMA) OF SKIN: ICD-10-CM

## 2018-09-05 NOTE — MR AVS SNAPSHOT
[address] [place] [address] 
[phone] Patient: Saúl Ferguson MRN: [id number] ASU:[birth year] Visit Information Date & Time Provider Department Dept. Phone Encounter #  
 9/5/2018  1:40 PM Uriah Crespo, [address] [facility] Internal Medicine [phone] [id number] Follow-up Instructions Return if symptoms worsen or fail to improve. Upcoming Health Maintenance Date Due DTaP/Tdap/Td series (1 - Tdap) 5/6/1960 Influenza Age 5 to Adult 8/1/2018 MEDICARE YEARLY EXAM 2/1/2019 GLAUCOMA SCREENING Q2Y 10/26/2019 Allergies as of 9/5/2018  Review Complete On: 9/5/2018 By: Uriah Crespo MD  
 No Known Allergies Current Immunizations  Reviewed on 1/31/2018 Name Date Influenza High Dose Vaccine PF 11/6/2017, 10/15/2015 Pneumococcal Conjugate (PCV-13) 11/6/2017 Pneumococcal Vaccine (Unspecified Type) 1/1/2010 Zoster Vaccine, Live 1/1/2010 Not reviewed this visit You Were Diagnosed With   
  
 Codes Comments [facility]    -  Primary ICD-10-CM: I78.0 ICD-9-CM: 448.0 SCCA (squamous cell carcinoma) of skin     ICD-10-CM: C44.92 
ICD-9-CM: 173.92 Epistaxis     ICD-10-CM: R04.0 ICD-9-CM: 784.7 Stasis edema of both lower extremities     ICD-10-CM: I87.303 ICD-9-CM: 459.30 Vitals BP Pulse Temp Resp Height(growth percentile) Weight(growth percentile) 138/74 (BP 1 Location: Right arm, BP Patient Position: Sitting) 72 97.9 °F (36.6 °C) (Oral) 18 6' 1\" (1.854 m) 183 lb 3.2 oz (83.1 kg) SpO2 BMI Smoking Status 98% 24.17 kg/m2 Former Smoker Vitals History BMI and BSA Data Body Mass Index Body Surface Area  
 24.17 kg/m 2 2.07 m 2 Preferred Pharmacy Pharmacy Name Phone [address] [place] [address] [phone] Your Updated Medication List  
  
   
 This list is accurate as of 9/5/18  2:33 PM.  Always use your most recent med list.  
  
  
  
  
 ferrous sulfate 325 mg (65 mg iron) tablet Take 1 Tab by mouth Daily (before breakfast). mineral oil-hydrophil petrolat ointment Commonly known as:  AQUAPHOR Apply  to affected area as needed for Dry Skin. MULTIVITAMIN PO Take  by mouth as needed. TIMOLOL MALEATE OP Apply  to eye. 1 drop to each eye daily Follow-up Instructions Return if symptoms worsen or fail to improve. Introducing hospitals & [facility] SERVICES! Dear Viet Parks: Thank you for requesting a [employer] account. Our records indicate that you already have an active [employer] account. You can access your account anytime at [email or web address] Did you know that you can access your hospital and ER discharge instructions at any time in [employer]? You can also review all of your test results from your hospital stay or ER visit. Additional Information If you have questions, please visit the Frequently Asked Questions section of the [employer] website at [email or web address]/. Remember, [employer] is NOT to be used for urgent needs. For medical emergencies, dial 911. Now available from your iPhone and Android! Please provide this summary of care documentation to your next provider. Your primary care clinician is listed as GIANFRANCO JACOME. If you have any questions after today's visit, please call [phone].

## 2018-09-05 NOTE — PROGRESS NOTES
HISTORY OF PRESENT ILLNESS  Gatito Isbell is a 77 y.o. male. CHANTELL Pierson is seen today accompanied by his wife for follow up of MRI abnormalities found by specialists as well as review of leg swelling. White matter changes on brain MRI. I have reviewed the MRI report which they kindly brought with them. I reassured them this is a fairly typical finding in older individuals and it does not reflect dementia changes or actual strokes. Reviewed with him prevention of worsening neurologic changes is based in part of a healthy lifestyle, control of blood pressure and other factors. Wound. He has a slow healing wound from a skin biopsy site. He will follow up with dermatology. It does appear to be slowly healing, not actively infected. Leg edema. Advised compression hose. I wanted him to check with his dermatologist first before putting hose on the left lower extremity where the wound is. The wound would have to be covered with a band-aid. HHT. 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 Past Medical History:   Diagnosis Date    HHT (hereditary hemorrhagic telangiectasia) ([facility] [address]      Current Outpatient Prescriptions   Medication Sig    MULTIVITAMIN PO Take  by mouth as needed.  mineral oil-hydrophil petrolat (AQUAPHOR) ointment Apply  to affected area as needed for Dry Skin.  TIMOLOL MALEATE OP Apply  to eye. 1 drop to each eye daily    ferrous sulfate 325 mg (65 mg iron) tablet Take 1 Tab by mouth Daily (before breakfast). (Patient taking differently: Take 325 mg by mouth as needed.)     No current facility-administered medications for this visit. Review of Systems   Constitutional: Negative for weight loss. Respiratory: Negative. Cardiovascular: Positive for leg swelling. Negative for chest pain, palpitations and PND. Musculoskeletal: Negative for myalgias. Neurological: Negative for focal weakness. Physical Exam   Constitutional: No distress. Cardiovascular: Normal rate and regular rhythm. Exam reveals no gallop and no friction rub. No murmur heard. Pulmonary/Chest: Effort normal and breath sounds normal.   Musculoskeletal: He exhibits edema. Mild bilateral lower extremity edema - stasis   Skin:        Nursing note and vitals reviewed. ASSESSMENT and PLAN  Diagnoses and all orders for this visit:    1. HHT (hereditary hemorrhagic telangiectasia) ([facility] [address]- See specialists  as directed. 2. SCCA (squamous cell carcinoma) of skin- Proceed with plan as discussed , See dermatologist as discussed/directed     3. Epistaxis- See specialists  as directed.      4. Stasis edema of both lower extremities- Proceed with plan as discussed     Plan was reviewed with patient and family, understanding expressed

## 2018-09-05 NOTE — PROGRESS NOTES
Chief Complaint   Patient presents with    Results     MRI results. from [facility], wants to know if he needs a follow up     Wound Check     left lower leg. had squamous cell removed and it is not healing    Leg Swelling     fabby. lower legs     Visit Vitals    /74 (BP 1 Location: Right arm, BP Patient Position: Sitting)    Pulse 72    Temp 97.9 °F (36.6 °C) (Oral)    Resp 18    Ht 6' 1\" (1.854 m)    Wt 183 lb 3.2 oz (83.1 kg)    SpO2 98%    BMI 24.17 kg/m2     1. Have you been to the ER, urgent care clinic since your last visit? Hospitalized since your last visit? No    2. Have you seen or consulted any other health care providers outside of the [address] [place] [address] since your last visit? Include any pap smears or colon screening. Yes. Dr. Akua Mi worked on nose in July.

## 2018-09-21 ENCOUNTER — TELEPHONE (OUTPATIENT)
Dept: WOUND CARE | Age: 78
End: 2018-09-21

## 2018-09-24 ENCOUNTER — HOSPITAL ENCOUNTER (OUTPATIENT)
Dept: WOUND CARE | Age: 78
Discharge: HOME | End: 2018-09-24
Payer: MEDICARE

## 2018-09-24 VITALS
HEIGHT: 72 IN | WEIGHT: 180 LBS | SYSTOLIC BLOOD PRESSURE: 137 MMHG | BODY MASS INDEX: 24.38 KG/M2 | TEMPERATURE: 97.8 F | RESPIRATION RATE: 16 BRPM | DIASTOLIC BLOOD PRESSURE: 81 MMHG | HEART RATE: 71 BPM

## 2018-09-24 PROBLEM — S81.802A WOUND OF LOWER EXTREMITY, LEFT, INITIAL ENCOUNTER: Chronic | Status: ACTIVE | Noted: 2018-09-24

## 2018-09-24 PROCEDURE — 11042 DBRDMT SUBQ TIS 1ST 20SQCM/<: CPT

## 2018-09-24 PROCEDURE — 74011000250 HC RX REV CODE- 250: Performed by: SURGERY

## 2018-09-24 PROCEDURE — 99204 OFFICE O/P NEW MOD 45 MIN: CPT

## 2018-09-24 RX ORDER — LIDOCAINE HYDROCHLORIDE 20 MG/ML
JELLY TOPICAL AS NEEDED
Status: DISCONTINUED | OUTPATIENT
Start: 2018-09-24 | End: 2018-09-28 | Stop reason: HOSPADM

## 2018-09-24 RX ORDER — SILVER SULFADIAZINE 10 G/1000G
CREAM TOPICAL DAILY
COMMUNITY
End: 2019-03-22 | Stop reason: ALTCHOICE

## 2018-09-24 RX ADMIN — LIDOCAINE HYDROCHLORIDE: 20 JELLY TOPICAL at 13:12

## 2018-09-24 NOTE — H&P
Assessment:  
 
Left lower extremity wound Plan:  
 
Debridement performed in the office Dressing: Promogran, Aquacel, roll gauze, tape. Change every other day. Stop soaking wound Return to clinic 1 week Signed By: Deangelo Figueredo MD 
[facility] Office:  [phone] September 24, 2018 Wound Care H&P Subjective:  
  
I was asked to see Mirza Liu by Dr. Francis Kapadia for management of a chronic left lower extremity wound. The patient is a 77 y.o. 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 He underwent local excision 5/2018 and had a dehiscence of the incision when the sutures were removed. He was told his margins were negative. The wound has not healed. Current wound care includes silvadene and Aquaphor and soaking the wound in a water vinegar solution. Past Medical History:  
Diagnosis Date  HHT (hereditary hemorrhagic telangiectasia) ([facility] [address] Past Surgical History:  
Procedure Laterality Date  CHEST SURGERY PROCEDURE UNLISTED    
 excision of AVM  HX APPENDECTOMY  HX COLONOSCOPY  2014  
 due 23  
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  
 sclerotheropy  HX TONSILLECTOMY Family History Problem Relation Age of Onset  Heart Disease Father  Cancer Other   
  colon, lung Social History Social History  Marital status:  Spouse name: N/A  
 Number of children: N/A  
 Years of education: N/A Social History Main Topics  Smoking status: Former Smoker  Smokeless tobacco: Never Used  Alcohol use 0.0 oz/week  
  0 Standard drinks or equivalent per week Comment: one drink per week  Drug use: No  
 Sexual activity: Yes  
  Partners: Female Other Topics Concern  None Social History Narrative Current Outpatient Prescriptions Medication Sig  
 silver sulfADIAZINE (SILVADENE) 1 % topical cream Apply  to affected area daily.  MULTIVITAMIN PO Take  by mouth as needed.  mineral oil-hydrophil petrolat (AQUAPHOR) ointment Apply  to affected area as needed for Dry Skin.  TIMOLOL MALEATE OP Apply  to eye. 1 drop to each eye daily  ferrous sulfate 325 mg (65 mg iron) tablet Take 1 Tab by mouth Daily (before breakfast). (Patient taking differently: Take 325 mg by mouth as needed.) Current Facility-Administered Medications Medication Dose Route Frequency  lidocaine (XYLOCAINE) 2 % jelly   Topical PRN No Known Allergies Review of Systems:   
 []     Unable to obtain  ROS due to  []    mental status change  []    sedated   []    intubated 
 [x]    Total of 12 system negative, unless specified below or in HPI: 
Constitutional: negative fever, negative chills, negative weight loss Eyes:   negative visual changes ENT:   negative sore throat, tongue or lip swelling Respiratory:  negative cough, negative dyspnea Cards:  negative for chest pain, palpitations, lower extremity edema GI:   negative for nausea, vomiting, diarrhea, and abdominal pain :  negative for frequency, dysuria Integument:  negative for rash and pruritus Heme:  negative for easy bruising and gum/nose bleeding Musculoskel: negative for myalgias,  back pain and muscle weakness Neuro:  negative for headaches, dizziness, vertigo Psych:  negative for feelings of anxiety, depression Objective:  
 
 Patient Vitals for the past 8 hrs: 
 BP Temp Pulse Resp Height Weight  
18 1309 137/81 97.8 °F (36.6 °C) 71 16 6' (1.829 m) 81.6 kg (180 lb) Temp (24hrs), Av.8 °F (36.6 °C), Min:97.8 °F (36.6 °C), Max:97.8 °F (36.6 °C) Physical Exam: 
General:  Alert, cooperative, no distress, appears stated age. Eyes:  Conjunctivae/corneas clear. PERRL, EOMs intact. Nose: Nares normal. Septum midline. Mucosa normal. No drainage or sinus tenderness.   
Mouth/Throat: Lips, mucosa, and tongue normal. Teeth and gums normal.  
 Neck: Supple, symmetrical, trachea midline, no adenopathy, thyroid: no enlargment/tenderness/nodules, no carotid bruit and no JVD. Back:   Symmetric, no curvature. ROM normal. No CVA tenderness. Lungs:   Clear to auscultation bilaterally. Heart:  Regular rate and rhythm, S1, S2 normal, no murmur, click, rub or gallop. Abdomen:   Soft, non-tender. Bowel sounds normal. No masses,  No organomegaly. Extremities: Extremities normal, atraumatic, no cyanosis or edema. Pulses: 2+ and symmetric all extremities. Skin:  Left anterior shin: 1.7 cm x 1.3 cm x 0.1 cm ulcer with slough at the base WOUND POA CONDITIONS Wound Leg Lower Left; Anterior (Active) DRESSING STATUS Clean, dry, and intact 9/24/2018  1:12 PM  
Wound Length (cm) 1.7 cm 9/24/2018  1:12 PM  
Wound Width (cm) 1.3 cm 9/24/2018  1:12 PM  
Wound Depth (cm) 0.1 9/24/2018  1:12 PM  
Wound Surface area (cm^2) 2.21 cm^2 9/24/2018  1:12 PM  
Condition of [facility] 9/24/2018  1:12 PM  
Drainage Amount  None 9/24/2018  1:12 PM  
Wound Odor None 9/24/2018  1:12 PM  
Cleansing and Cleansing Agents  Normal saline 9/24/2018  1:12 PM  
Number of days:0 Lymph nodes: Cervical, supraclavicular, and axillary nodes normal.  
 
Labs: No results for input(s): WBC, HGB, HCT, PLT, HGBEXT, HCTEXT, PLTEXT in the last 72 hours. No results for input(s): NA, K, CL, CO2, GLU, BUN, CREA, CA, MG, PHOS, ALB, TBIL, TBILI, SGOT, ALT in the last 72 hours. No results for input(s): INR in the last 72 hours. No lab exists for component: INREXT Date: 09/24/18 
   
Pre-operative Diagnosis: Chronic left lower extremity wound 
   
Post-operative Diagnosis: Chronic left lower extremity wound 
   
Procedure: Sharp Excisional Debridement of chronic left lower extremity wound through the subcutaneous layer (CPT 40733) 
   
Surgeon: Ko Crain MD 
   
Assistant: None 
   
Anesthesia: topical  
   
Estimated Blood Loss: 2 cc Complication: None 
   
 Findings: 1.7 cm x 1.3 cm x 0.1 cm. Debridement was down to the level of the subcutaneous layer. Post-debridement wound measurements were 1.8 cm x 1.4 cm x 0.2 cm.  
   
Specimen: None 
   
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   
   
Procedure: After informed consent was obtained. A time-out was performed verifying the correct patient, procedure, allergies, laterality 
   
Using sharp dissection with a #5 currette scalpel the ulcer was excised down to healthy bleeding tissue. The ulcer was debrided down to the level of the subcutaneous layer. The total area debrided measured were 1.8 cm x 1.4 cm x 0.2 cm down to the level of the subcutaneous layer. The cavity was irrigated with sterile saline. Hemostasis was obtained with pressure. An appropriate dressing was applied. 
   
All sponge and instrument counts were correct x 2. The patient tolerated the procedure well.  There were no immediate complications. 
Lenore Rodriguez MD

## 2018-09-24 NOTE — WOUND CARE
09/24/18 1312 Wound Leg Lower Left; Anterior Date First Assessed/Time First Assessed: 09/24/18 1311   POA: Yes  Wound Type: Other  Location: Leg Lower  Orientation: Left; Anterior DRESSING STATUS Clean, dry, and intact Wound Length (cm) 1.7 cm Wound Width (cm) 1.3 cm Wound Depth (cm) 0.1 Wound Surface area (cm^2) 2.21 cm^2 Condition of [facility] Drainage Amount  None Wound Odor None Cleansing and Cleansing Agents  Normal saline

## 2018-10-01 ENCOUNTER — HOSPITAL ENCOUNTER (OUTPATIENT)
Dept: WOUND CARE | Age: 78
Discharge: HOME | End: 2018-10-01
Payer: MEDICARE

## 2018-10-01 VITALS
DIASTOLIC BLOOD PRESSURE: 81 MMHG | SYSTOLIC BLOOD PRESSURE: 131 MMHG | TEMPERATURE: 98.4 F | RESPIRATION RATE: 16 BRPM | HEART RATE: 64 BPM

## 2018-10-01 PROCEDURE — 11042 DBRDMT SUBQ TIS 1ST 20SQCM/<: CPT

## 2018-10-01 PROCEDURE — 74011000250 HC RX REV CODE- 250: Performed by: SURGERY

## 2018-10-01 RX ORDER — LIDOCAINE 40 MG/G
CREAM TOPICAL
Status: COMPLETED | OUTPATIENT
Start: 2018-10-01 | End: 2018-10-01

## 2018-10-01 RX ADMIN — LIDOCAINE: 4 CREAM TOPICAL at 13:16

## 2018-10-01 NOTE — PROGRESS NOTES
Wound Center Progress Note Subjective: Chief Complaint: 
Julien Candelaria is a 77 y.o. 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 Referred by: HPI:   Patient doing well, wound care going well. Has stopped soaking wound in vinegar. He denies fevers, chills, nausea, emesis, wound drainage or wound pain. Review of Systems: A comprehensive review of systems was negative except for that written in the History of Present Illness. Objective:  
 
Physical Exam:  
General: Alert, NAD, Psych: Oriented, calm, cooperative Visit Vitals  /81 (BP 1 Location: Right arm)  Pulse 64  Temp 98.4 °F (36.9 °C)  Resp 16 WOUND POA CONDITIONS Wound Leg Lower Left; Anterior (Active) Wound Length (cm) 1.4 cm 10/1/2018  1:13 PM  
Wound Width (cm) 0.9 cm 10/1/2018  1:13 PM  
Wound Depth (cm) 0.2 10/1/2018  1:13 PM  
Wound Surface area (cm^2) 1.26 cm^2 10/1/2018  1:13 PM  
Change in Wound Size % 42.99 10/1/2018  1:13 PM  
Condition of Base Slough;[place] 10/1/2018  1:13 PM  
Condition of Edges Open 10/1/2018  1:13 PM  
Drainage Amount  Small  10/1/2018  1:13 PM  
Drainage Color Serosanguinous 10/1/2018  1:13 PM  
Wound Odor None 10/1/2018  1:13 PM  
Periwound Skin Condition Intact 10/1/2018  1:13 PM  
Cleansing and Cleansing Agents  Normal saline 10/1/2018  1:13 PM  
Number of days:7  
   
   
 
 
Skin: Slough at wound base, no cellulitis or erythema Data Review: No results found for this or any previous visit (from the past 24 hour(s)). Assessment:  
 
77 y.o. male with a left lower extremity wound Plan:  
 
Dressing: endoform, gauze, single tubi Frequency: every other day Patient understood and agrees with plan. Questions answered. Weekly visits and serial debridements also discussed. Follow up in 2 weeks. Past Medical History:  
Diagnosis Date  HHT (hereditary hemorrhagic telangiectasia) ([facility] [address] Past Surgical History: Procedure Laterality Date  CHEST SURGERY PROCEDURE UNLISTED    
 excision of AVM  HX APPENDECTOMY  HX COLONOSCOPY  2014  
 due 23  
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  
 sclerotheropy  HX TONSILLECTOMY Family History Problem Relation Age of Onset  Heart Disease Father  Cancer Other   
  colon, lung Social History Substance Use Topics  Smoking status: Former Smoker  Smokeless tobacco: Never Used  Alcohol use 0.0 oz/week  
  0 Standard drinks or equivalent per week Comment: one drink per week Prior to Admission medications Medication Sig Start Date End Date Taking? Authorizing Provider  
silver sulfADIAZINE (SILVADENE) 1 % topical cream Apply  to affected area daily. Historical Provider MULTIVITAMIN PO Take  by mouth as needed. Historical Provider  
mineral oil-hydrophil petrolat (AQUAPHOR) ointment Apply  to affected area as needed for Dry Skin. Historical Provider TIMOLOL MALEATE OP Apply  to eye. 1 drop to each eye daily    Historical Provider  
ferrous sulfate 325 mg (65 mg iron) tablet Take 1 Tab by mouth Daily (before breakfast). Patient taking differently: Take 325 mg by mouth as needed. 2/1/16   Renee Chavarria MD  
 
No Known Allergies Date: 10/01/18 
   
Pre-operative Diagnosis: Chronic left lower extremity wound 
   
Post-operative Diagnosis: Chronic left lower extremity wound 
   
Procedure: Sharp Excisional Debridement of chronic left lower extremity wound through the subcutaneous layer (CPT 21054) 
   
Surgeon: Bernie Oliver MD 
   
Assistant: None 
   
Anesthesia: topical  
   
Estimated Blood Loss: 2 cc 
  
Complication: None 
   
Findings: 1.4 cm x 0.9 cm x 0.2 cm. Debridement was down to the level of the subcutaneous layer.  Post-debridement wound measurements were 1.5 cm x 1.0 cm x 0.3 cm.  
   
Specimen: None 
   
Indication: This is a 77year-old male with a chronic left lower extremity ulcer with slough at the base.   
    
Procedure: After informed consent was obtained. A time-out was performed verifying the correct patient, procedure, allergies, laterality 
   
Using sharp dissection with a #5 currette scalpel the ulcer was excised down to healthy bleeding tissue. The ulcer was debrided down to the level of the subcutaneous layer. The total area debrided measured were 1.5 cm x 1.0 cm x 0.3 down to the level of the subcutaneous layer. The cavity was irrigated with sterile saline. Hemostasis was obtained with pressure. An appropriate dressing was applied. 
   
All sponge and instrument counts were correct x 2. The patient tolerated the procedure well. There were no immediate complications. 
Navi Major MD 
Signed By: Mell Johnson MD   
 October 1, 2018

## 2018-10-01 NOTE — WOUND CARE
10/01/18 1313 Wound Leg Lower Left; Anterior Date First Assessed/Time First Assessed: 09/24/18 1311   POA: Yes  Wound Type: Other  Location: Leg Lower  Orientation: Left; Anterior Wound Length (cm) 1.4 cm Wound Width (cm) 0.9 cm Wound Depth (cm) 0.2 Condition of Base Slough;Pink Condition of Edges Open Drainage Amount  Small Drainage Color Serosanguinous Wound Odor None Periwound Skin Condition Intact Cleansing and Cleansing Agents  Normal saline Visit Vitals  /81 (BP 1 Location: Right arm)  Pulse 64  Temp 98.4 °F (36.9 °C)  Resp 16

## 2018-10-15 ENCOUNTER — HOSPITAL ENCOUNTER (OUTPATIENT)
Dept: WOUND CARE | Age: 78
Discharge: HOME | End: 2018-10-15
Payer: MEDICARE

## 2018-10-15 VITALS
TEMPERATURE: 98.1 F | DIASTOLIC BLOOD PRESSURE: 66 MMHG | RESPIRATION RATE: 16 BRPM | HEART RATE: 72 BPM | SYSTOLIC BLOOD PRESSURE: 113 MMHG

## 2018-10-15 PROCEDURE — 11042 DBRDMT SUBQ TIS 1ST 20SQCM/<: CPT

## 2018-10-15 PROCEDURE — 74011000250 HC RX REV CODE- 250: Performed by: SURGERY

## 2018-10-15 RX ORDER — LIDOCAINE HYDROCHLORIDE 20 MG/ML
JELLY TOPICAL
Status: COMPLETED | OUTPATIENT
Start: 2018-10-15 | End: 2018-10-15

## 2018-10-15 RX ADMIN — LIDOCAINE HYDROCHLORIDE: 20 JELLY TOPICAL at 13:22

## 2018-10-15 NOTE — PROGRESS NOTES
Wound CenterProgress Note Subjective: Chief Complaint: 
Aubrey Webster is a 77 y.o. 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 Referred by: HPI:   Patient doing well, wound care going well. He denies fevers, chills, nausea, emesis, wound drainage or wound pain. Review of Systems: A comprehensive review of systems was negative except for that written in the History of Present Illness. Objective:  
 
Physical Exam:  
General: Alert, NAD, Psych: Oriented, calm, cooperative Visit Vitals  /66  Pulse 72  Temp 98.1 °F (36.7 °C)  Resp 16 WOUND POA CONDITIONS Wound Leg Lower Left; Anterior (Active) Wound Length (cm) 1.2 cm 10/15/2018  1:15 PM  
Wound Width (cm) 0.8 cm 10/15/2018  1:15 PM  
Wound Depth (cm) 0.1 10/15/2018  1:15 PM  
Wound Surface area (cm^2) 0.96 cm^2 10/15/2018  1:15 PM  
Change in Wound Size % 56.56 10/15/2018  1:15 PM  
Condition of Base [place];Slough 10/15/2018  1:15 PM  
Condition of Edges Rolled/curled 10/15/2018  1:15 PM  
Drainage Amount  Small  10/15/2018  1:15 PM  
Drainage Color Serous; Yellow 10/15/2018  1:15 PM  
Wound Odor None 10/15/2018  1:15 PM  
Periwound Skin Condition Intact 10/15/2018  1:15 PM  
Cleansing and Cleansing Agents  Normal saline 10/15/2018  1:15 PM  
Dressing Type Applied Collagens/cell matrix 10/1/2018  1:15 PM  
Procedure Tolerated Well 10/1/2018  1:15 PM  
Number of days:21 Skin: Slough at wound base, no cellulitis or erythema Data Review: No results found for this or any previous visit (from the past 24 hour(s)). Assessment:  
 
77 y.o. male with a left lower extremity wound Plan:  
 
Dressing: Iodoform, gauze, roll gauze single tubi Frequency: every other day Patient understood and agrees with plan. Questions answered. Weekly visits and serial debridements also discussed. Follow up in 2 weeks. Past Medical History:  
Diagnosis Date  HHT (hereditary hemorrhagic telangiectasia) ([facility] [address] Past Surgical History:  
Procedure Laterality Date  CHEST SURGERY PROCEDURE UNLISTED    
 excision of AVM  HX APPENDECTOMY  HX COLONOSCOPY  2014  
 due 23  
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  
 sclerotheropy  HX TONSILLECTOMY Family History Problem Relation Age of Onset  Heart Disease Father  Cancer Other   
  colon, lung Social History Substance Use Topics  Smoking status: Former Smoker  Smokeless tobacco: Never Used  Alcohol use 0.0 oz/week  
  0 Standard drinks or equivalent per week Comment: one drink per week Prior to Admission medications Medication Sig Start Date End Date Taking? Authorizing Provider  
silver sulfADIAZINE (SILVADENE) 1 % topical cream Apply  to affected area daily. Historical Provider MULTIVITAMIN PO Take  by mouth as needed. Historical Provider  
mineral oil-hydrophil petrolat (AQUAPHOR) ointment Apply  to affected area as needed for Dry Skin. Historical Provider TIMOLOL MALEATE OP Apply  to eye. 1 drop to each eye daily    Historical Provider  
ferrous sulfate 325 mg (65 mg iron) tablet Take 1 Tab by mouth Daily (before breakfast). Patient taking differently: Take 325 mg by mouth as needed. 2/1/16   Kaitlin Floyd MD  
 
No Known Allergies Date: 10/15/18 
   
Pre-operative Diagnosis: Chronic left lower extremity wound 
   
Post-operative Diagnosis: Chronic left lower extremity wound 
   
Procedure: Sharp Excisional Debridement of chronic left lower extremity wound through the subcutaneous layer (CPT 16649) 
   
Surgeon: Daphnie Christiansen MD 
   
Assistant: None 
   
Anesthesia: topical  
   
Estimated Blood Loss: 2 cc 
  
Complication: None 
   
Findings: 1.2 cm x 0.8 cm x 0.1 cm left anterior shin ulcer with slough. Debridement was down to the level of the subcutaneous layer.  Post-debridement wound measurements were 1.2 cm x 0.8 cm x 0.2 cm.  
   
 Arley  is a 77year-old male with a chronic left lower extremity ulcer with slough at the base.   
   
Procedure: After informed consent was obtained. A time-out was performed verifying the correct patient, procedure, allergies, laterality 
   
Using sharp dissection with a #5 currette scalpel the ulcer was excised down to healthy bleeding tissue. The ulcer was debrided down to the level of the subcutaneous layer. The total area debrided measured were 1.2 cm x 0.8 cm x 0.2 down to the level of the subcutaneous layer. The cavity was irrigated with sterile saline. Hemostasis was obtained with pressure. An appropriate dressing was applied. 
   
All sponge and instrument counts were correct x 2. The patient tolerated the procedure well. There were no immediate complications. 
Desi Bro MD 
Signed By: Armando Wolfe MD   
 October 15, 2018

## 2018-10-15 NOTE — WOUND CARE
10/15/18 1315 Wound Leg Lower Left; Anterior Date First Assessed/Time First Assessed: 09/24/18 1311   POA: Yes  Wound Type: Other  Location: Leg Lower  Orientation: Left; Anterior Wound Length (cm) 1.2 cm Wound Width (cm) 0.8 cm Wound Depth (cm) 0.1 Wound Surface area (cm^2) 0.96 cm^2 Change in Wound Size % 56.56 Condition of Base Pink;Slough Condition of Edges Rolled/curled Drainage Amount  Small Drainage Color Serous; Yellow Wound Odor None Periwound Skin Condition Intact Cleansing and Cleansing Agents  Normal saline Visit Vitals  /66  Pulse 72  Temp 98.1 °F (36.7 °C)  Resp 16

## 2018-10-22 ENCOUNTER — TELEPHONE (OUTPATIENT)
Dept: INTERNAL MEDICINE CLINIC | Age: 78
End: 2018-10-22

## 2018-10-22 NOTE — TELEPHONE ENCOUNTER
Pt states he is going to [facility] on 10/29/18 for 2 procedures. States Dr Gerardo Sanchez know about this. He wanted to have is A1C checked - requesting lab slip. He states he did not need any other labs.  Will forward to MD.

## 2018-10-23 DIAGNOSIS — R73.01 IFG (IMPAIRED FASTING GLUCOSE): ICD-10-CM

## 2018-10-23 DIAGNOSIS — D50.8 OTHER IRON DEFICIENCY ANEMIA: Primary | ICD-10-CM

## 2018-10-24 ENCOUNTER — TELEPHONE (OUTPATIENT)
Dept: WOUND CARE | Age: 78
End: 2018-10-24

## 2018-10-25 ENCOUNTER — HOSPITAL ENCOUNTER (OUTPATIENT)
Dept: LAB | Age: 78
Discharge: HOME | End: 2018-10-25
Payer: MEDICARE

## 2018-10-25 ENCOUNTER — HOSPITAL ENCOUNTER (OUTPATIENT)
Dept: WOUND CARE | Age: 78
Discharge: HOME | End: 2018-10-25
Payer: MEDICARE

## 2018-10-25 VITALS
TEMPERATURE: 98 F | DIASTOLIC BLOOD PRESSURE: 67 MMHG | HEART RATE: 71 BPM | SYSTOLIC BLOOD PRESSURE: 118 MMHG | RESPIRATION RATE: 16 BRPM

## 2018-10-25 PROBLEM — L97.926 NON-PRESSURE CHRONIC ULCER OF LEFT LOWER LEG WITH BONE INVOLVEMENT WITHOUT EVIDENCE OF NECROSIS (HCC): Status: ACTIVE | Noted: 2018-10-25

## 2018-10-25 PROCEDURE — 74011000250 HC RX REV CODE- 250: Performed by: FAMILY MEDICINE

## 2018-10-25 PROCEDURE — 11044 DBRDMT BONE 1ST 20 SQ CM/<: CPT

## 2018-10-25 PROCEDURE — 88304 TISSUE EXAM BY PATHOLOGIST: CPT

## 2018-10-25 RX ORDER — LIDOCAINE HYDROCHLORIDE 20 MG/ML
JELLY TOPICAL
Status: COMPLETED | OUTPATIENT
Start: 2018-10-25 | End: 2018-10-25

## 2018-10-25 RX ADMIN — LIDOCAINE HYDROCHLORIDE: 20 JELLY TOPICAL at 10:33

## 2018-10-25 NOTE — WOUND CARE
10/25/18 1030 Wound Leg Lower Left; Anterior Date First Assessed/Time First Assessed: 09/24/18 1311   POA: Yes  Wound Type: Other  Location: Leg Lower  Orientation: Left; Anterior DRESSING STATUS Clean, dry, and intact DRESSING TYPE Collagens/cell matrix;Gauze Wound Length (cm) 1.2 cm Wound Width (cm) 0.6 cm Wound Depth (cm) 0.2 Wound Surface area (cm^2) 0.72 cm^2 Change in Wound Size % 67.42 Condition of Base Pink;Slough Condition of Edges Rolled/curled Drainage Amount  Small Drainage Color Serous Wound Odor None Periwound Skin Condition Intact Cleansing and Cleansing Agents  Normal saline Visit Vitals /67 Pulse 71 Temp 98 °F (36.7 °C) Resp 16

## 2018-10-25 NOTE — PROGRESS NOTES
Wound Center Progress Note / Procedure Note Chief Complaint: 
Kittie Severance is a 77 y.o. 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 
  
  
HPI:   Patient doing well, wound care going well. He denies fevers, chills, nausea, emesis, wound drainage or wound pain. Out of town next week so seeing me for a couple weeks, otherwise seen by Dr Mendoza Sites 
  
 
Review of Systems: A comprehensive review of systems was negative except for that written in the History of Present Illness. Wound caused by: post surgical 
Current wound care:iodoform / Gordan Glance Offloading wound: n/a Appetite: good Wound associated pain:no Diabetic: no 
Smoker: no 
 
Past Medical History:  
Diagnosis Date  HHT (hereditary hemorrhagic telangiectasia) ([facility] [address] Past Surgical History:  
Procedure Laterality Date  CHEST SURGERY PROCEDURE UNLISTED    
 excision of AVM  HX APPENDECTOMY  HX COLONOSCOPY  2014  
 due 23  
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  
 sclerotheropy  HX TONSILLECTOMY Family History Problem Relation Age of Onset  Heart Disease Father  Cancer Other   
     colon, lung Social History Tobacco Use  Smoking status: Former Smoker  Smokeless tobacco: Never Used Substance Use Topics  Alcohol use: Yes Alcohol/week: 0.0 oz  
  Comment: one drink per week Prior to Admission medications Medication Sig Start Date End Date Taking? Authorizing Provider  
silver sulfADIAZINE (SILVADENE) 1 % topical cream Apply  to affected area daily. Provider, Historical  
MULTIVITAMIN PO Take  by mouth as needed. Provider, Historical  
mineral oil-hydrophil petrolat (AQUAPHOR) ointment Apply  to affected area as needed for Dry Skin. Provider, Historical  
TIMOLOL MALEATE OP Apply  to eye. 1 drop to each eye daily    Provider, Historical  
ferrous sulfate 325 mg (65 mg iron) tablet Take 1 Tab by mouth Daily (before breakfast). Patient taking differently: Take 325 mg by mouth as needed. 2/1/16   Rex Izquierdo MD  
 
No Known Allergies HgbA1c:n/a Advance Care plan: discussed, has Pneumonia vaccine:yes BMI:nml 
Current meds documented in chart Pain: 0 Elder maltreatment screen: neg;  
Smoking: no 
Blood pressure: noted below Objective:  
 
Physical Exam:  
 
Visit Vitals /67 Pulse 71 Temp 98 °F (36.7 °C) Resp 16 General: well developed, well nourished,  NAD. Hygiene good Psych: cooperative. No anxiety or depression. Normal mood and affect. Neuro: alert and oriented to person/place/situation. Otherwise nonfocal. 
Derm: Normal  turgor for age, dry skin HEENT: Normocephalic, atraumatic. EOMI. Conjunctiva clear. No scleral icterus. Neck: Normal range of motion. No masses. Lower extremities: color normal; temperature normal. Hair growth is  present. Calves are supple, nontender, approximately equally sized in comparison. Capillary refill <3 sec Focussed Lower Extremity Exam: 
Vascular exam: 
Left lower extremity: mild  edema, foot warm, (edema due to folding sock down to near ankle DP pulse : 1+ 
PT pulse: 1+ Right lower extremity: no  edema, foot warm,  
DP pulse : 1+ 
PT pulse: 1+ Ulcer Description:  
Etiology: post surgical 
Location: L lower leg anterior Measurement: see nursing notes 
full thickness, small granular, mostly necrotic Data Review: No results found for this or any previous visit (from the past 24 hour(s)). Assessment/Plan  
 
77 y.o. male with chronic left lower extremity anterior ulcer. S/p excision SCC/ wound dehiscence Necrotic 
- debrided as noted below Post debridement bone visible Needs : 
Serial debridement- debrided today- see note below Good local wound care - change to endoform, dry gauze, roll gauze Leave on all week if possible. Do not get wet Edema management - tubigrip x 1 layer Nutrition optimization - discussed increasing protein and  
 Good Diabetic control Follow up with me in 1 week if poss or 2 weeks with Keyona Arthurrs - 
 
Ulcer assessment: Due to presence of necrotic tissue/slough within the wound bed, ulcer requires debridement. 
  
Procedure: Debridement:  
The indication for debridement was reviewed with patient. Risks of procedure (bleeding, infection, pain) were discussed with patient and consent signed once a month. Questions were answered 
  
Periosteal and bone excisional debridement Indication: to remove necrotic tissue/ vitalized &devitalized tissue/ through skin &subcutaneous and muscle/fascia/periosteal/bone  layer of wound bed;  
Consent in chart Anesthesia: Topical 2% lidocaine jelly Instrument:  curette Residual Necrosis: Present and scored Bleeding: <1ml Hemostasis: Pressure ; Patient tolerated procedure well Procedural Pain: 0 Post - procedural pain:0 
  
Post debridement measurements: 1.3x 0.7 x 0.5 cm Surface area debrided: <20 sq. Cm Sample sent to pathology Signed By: Nader Hernandez MD   
 October 25, 2018

## 2018-10-31 ENCOUNTER — TELEPHONE (OUTPATIENT)
Dept: WOUND CARE | Age: 78
End: 2018-10-31

## 2018-11-01 ENCOUNTER — HOSPITAL ENCOUNTER (OUTPATIENT)
Dept: WOUND CARE | Age: 78
Discharge: HOME | End: 2018-11-01
Payer: MEDICARE

## 2018-11-01 VITALS
RESPIRATION RATE: 16 BRPM | SYSTOLIC BLOOD PRESSURE: 94 MMHG | HEART RATE: 102 BPM | DIASTOLIC BLOOD PRESSURE: 58 MMHG | TEMPERATURE: 98.6 F

## 2018-11-01 PROCEDURE — 74011000250 HC RX REV CODE- 250: Performed by: FAMILY MEDICINE

## 2018-11-01 PROCEDURE — 11043 DBRDMT MUSC&/FSCA 1ST 20/<: CPT

## 2018-11-01 RX ORDER — LIDOCAINE HYDROCHLORIDE 20 MG/ML
JELLY TOPICAL ONCE
Status: COMPLETED | OUTPATIENT
Start: 2018-11-01 | End: 2018-11-01

## 2018-11-01 RX ADMIN — LIDOCAINE HYDROCHLORIDE: 20 JELLY TOPICAL at 15:50

## 2018-11-01 NOTE — PROGRESS NOTES
Wound CenterProgress Note / Procedure Note Chief Complaint: 
Carisa Edwards is a 77 y.o. 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 
  
  
HPI:   Patient doing well, wound care going well. He denies fevers, chills, nausea, emesis, wound pain. Review of Systems: A comprehensive review of systems was negative except for that written in the History of Present Illness. Wound caused by: post surgical 
Offloading wound: n/a Appetite: good Wound associated pain:no Diabetic: no 
Smoker: no 
 
Past Medical History:  
Diagnosis Date  HHT (hereditary hemorrhagic telangiectasia) ([facility] [address] Past Surgical History:  
Procedure Laterality Date  CHEST SURGERY PROCEDURE UNLISTED    
 excision of AVM  HX APPENDECTOMY  HX COLONOSCOPY  2014  
 due 22  
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  
 sclerotheropy  HX TONSILLECTOMY Family History Problem Relation Age of Onset  Heart Disease Father  Cancer Other   
     colon, lung Social History Tobacco Use  Smoking status: Former Smoker  Smokeless tobacco: Never Used Substance Use Topics  Alcohol use: Yes Alcohol/week: 0.0 oz  
  Comment: one drink per week Prior to Admission medications Medication Sig Start Date End Date Taking? Authorizing Provider  
silver sulfADIAZINE (SILVADENE) 1 % topical cream Apply  to affected area daily. Provider, Historical  
MULTIVITAMIN PO Take  by mouth as needed. Provider, Historical  
mineral oil-hydrophil petrolat (AQUAPHOR) ointment Apply  to affected area as needed for Dry Skin. Provider, Historical  
TIMOLOL MALEATE OP Apply  to eye. 1 drop to each eye daily    Provider, Historical  
ferrous sulfate 325 mg (65 mg iron) tablet Take 1 Tab by mouth Daily (before breakfast). Patient taking differently: Take 325 mg by mouth as needed. 2/1/16   Lashawn Izquierdo MD  
 
No Known Allergies HgbA1c:n/a 
 Advance Care plan: discussed, has Pneumonia vaccine:yes BMI:nml 
Current meds documented in chart Pain: 0 Elder maltreatment screen: neg;  
Smoking: no 
Blood pressure: noted below Objective:  
 
Physical Exam:  
 
Visit Vitals BP 94/58 (BP 1 Location: Right arm, BP Patient Position: At rest;Sitting) Pulse (!) 102 Temp 98.6 °F (37 °C) Resp 16 General: well developed, well nourished,  NAD. Hygiene good Psych: cooperative. No anxiety or depression. Normal mood and affect. Neuro: alert and oriented to person/place/situation. Otherwise nonfocal. 
Derm: Normal  turgor for age, dry skin HEENT: Normocephalic, atraumatic. EOMI. Conjunctiva clear. No scleral icterus. Neck: Normal range of motion. No masses. Lower extremities: color normal; temperature normal. Hair growth is  present. Calves are supple, nontender, approximately equally sized in comparison. Capillary refill <3 sec Focussed Lower Extremity Exam: 
Vascular exam: 
Left lower extremity: mild  edema, foot warm, (edema due to folding sock down to near ankle DP pulse : 1+ 
PT pulse: 1+ Right lower extremity: no  edema, foot warm,  
DP pulse : 1+ 
PT pulse: 1+ Ulcer Description:  
Etiology: post surgical 
Location: L lower leg anterior Measurement: see nursing notes 
full thickness, small granular, mostly necrotic Data Review:  
Biopsy- non specific 
fibrcollageanous tissue Assessment/Plan  
 
77 y.o. male with chronic left lower extremity anterior ulcer. S/p excision SCC/ wound dehiscence Necrotic 
- debrided as noted below Post debridement bone visible Needs : 
Serial debridement- debrided today- see note below Good local wound care - hydrogel/ endoform, HBR dry gauze Leave on all week if possible. Do not get wet Edema management - tubigrip x 1 layer Nutrition optimization - discussed increasing protein and  
Good Diabetic control Follow up with me in 1 week - 
 
 Ulcer assessment: Due to presence of necrotic tissue/slough within the wound bed, ulcer requires debridement. 
  
Procedure: Debridement:  
The indication for debridement was reviewed with patient. Risks of procedure (bleeding, infection, pain) were discussed with patient and consent signed once a month. Questions were answered 
  
Periosteal /muscle/fascua excisional debridement Indication: to remove necrotic tissue/ vitalized &devitalized tissue/ through skin &subcutaneous and muscle/fascia/periosteal/  layer of wound bed;  
Consent in chart Anesthesia: Topical 2% lidocaine jelly Instrument:  curette Residual Necrosis: Present and scored Bleeding: <1ml Hemostasis: Pressure ; Patient tolerated procedure well Procedural Pain: 0 Post - procedural pain:0 
  
Post debridement measurements: 1.2x 0.6 x 0.5 cm Surface area debrided: <20 sq. Cm Signed By: Marilou Ramos MD   
 November 1, 2018

## 2018-11-05 ENCOUNTER — HOSPITAL ENCOUNTER (OUTPATIENT)
Dept: LAB | Age: 78
Discharge: HOME | End: 2018-11-05
Payer: MEDICARE

## 2018-11-05 PROCEDURE — 85025 COMPLETE CBC W/AUTO DIFF WBC: CPT

## 2018-11-05 PROCEDURE — 83036 HEMOGLOBIN GLYCOSYLATED A1C: CPT

## 2018-11-05 PROCEDURE — 36415 COLL VENOUS BLD VENIPUNCTURE: CPT

## 2018-11-06 LAB
BASOPHILS # BLD AUTO: 0.1 X10E3/UL (ref 0–0.2)
BASOPHILS NFR BLD AUTO: 1 %
EOSINOPHIL # BLD AUTO: 0.4 X10E3/UL (ref 0–0.4)
EOSINOPHIL NFR BLD AUTO: 7 %
ERYTHROCYTE [DISTWIDTH] IN BLOOD BY AUTOMATED COUNT: 17 % (ref 12.3–15.4)
EST. AVERAGE GLUCOSE BLD GHB EST-MCNC: 114 MG/DL
HBA1C MFR BLD: 5.6 % (ref 4.8–5.6)
HCT VFR BLD AUTO: 36.8 % (ref 37.5–51)
HGB BLD-MCNC: 11.6 G/DL (ref 13–17.7)
IMM GRANULOCYTES # BLD: 0 X10E3/UL (ref 0–0.1)
IMM GRANULOCYTES NFR BLD: 0 %
LYMPHOCYTES # BLD AUTO: 0.8 X10E3/UL (ref 0.7–3.1)
LYMPHOCYTES NFR BLD AUTO: 16 %
MCH RBC QN AUTO: 25.8 PG (ref 26.6–33)
MCHC RBC AUTO-ENTMCNC: 31.5 G/DL (ref 31.5–35.7)
MCV RBC AUTO: 82 FL (ref 79–97)
MONOCYTES # BLD AUTO: 0.6 X10E3/UL (ref 0.1–0.9)
MONOCYTES NFR BLD AUTO: 12 %
NEUTROPHILS # BLD AUTO: 3.2 X10E3/UL (ref 1.4–7)
NEUTROPHILS NFR BLD AUTO: 64 %
PLATELET # BLD AUTO: 223 X10E3/UL (ref 150–379)
RBC # BLD AUTO: 4.5 X10E6/UL (ref 4.14–5.8)
WBC # BLD AUTO: 5 X10E3/UL (ref 3.4–10.8)

## 2018-11-07 ENCOUNTER — TELEPHONE (OUTPATIENT)
Dept: WOUND CARE | Age: 78
End: 2018-11-07

## 2018-11-09 ENCOUNTER — HOSPITAL ENCOUNTER (OUTPATIENT)
Dept: WOUND CARE | Age: 78
Discharge: HOME | End: 2018-11-09
Payer: MEDICARE

## 2018-11-09 VITALS
RESPIRATION RATE: 16 BRPM | HEART RATE: 80 BPM | SYSTOLIC BLOOD PRESSURE: 122 MMHG | TEMPERATURE: 97.7 F | DIASTOLIC BLOOD PRESSURE: 70 MMHG

## 2018-11-09 PROCEDURE — 11043 DBRDMT MUSC&/FSCA 1ST 20/<: CPT

## 2018-11-09 PROCEDURE — 74011000250 HC RX REV CODE- 250: Performed by: EMERGENCY MEDICINE

## 2018-11-09 RX ADMIN — Medication: at 17:00

## 2018-11-09 NOTE — WOUND CARE
11/09/18 1558 Wound Leg Lower Left; Anterior Date First Assessed/Time First Assessed: 09/24/18 1311   POA: Yes  Wound Type: Other  Location: Leg Lower  Orientation: Left; Anterior DRESSING STATUS Removed Wound Length (cm) 1.2 cm Wound Width (cm) 0.6 cm Wound Depth (cm) 0.2 Wound Surface area (cm^2) 0.72 cm^2 Change in Wound Size % 67.42 Condition of [facility] Condition of Edges Rolled/curled Drainage Amount  Scant Drainage Color Serous Wound Odor None Periwound Skin Condition Intact Cleansing and Cleansing Agents  Normal saline Visit Vitals /70 (BP 1 Location: Right leg, BP Patient Position: At rest;Sitting) Pulse 80 Temp 97.7 °F (36.5 °C) Resp 16

## 2018-11-09 NOTE — PROGRESS NOTES
I have noted, and reviewed today's data for this patient in [facility] and concur with same. The focused physical exam, other physical findings, Medical history, Review of Symptoms and Medications today remains unchanged except as noted below. Patient notes today: I'm ok, getting better than I was. Doing dressings still once per week. . 
Lesion/Wound, focused exam on Presentation today: LLE pretib ulcer with pink border, central dense exudate, slough through to fascial layer. Procedure: Wound # wound dehiscence. . 
Procedure name: sharp debridement. Anaesthesia: Lidocaine; topical   
Description: using a sharp curette I removed all adherent debris and slough to expose a clean bleeding fascial base. Yolanda Blackwood Maximum Level/depth of debridement: fascia. Post debridement dimensions changed as noted:  
 Depth, add 3.0 mm;  
 Width, add 0.0 mm Length, add 0.0 mm. Blood Loss: 2 CCs. Bleeding abated post treatment . Post Procedure Condition/ Diagnosis: healing. Follow up and Future plans today: return 2-3 weeks, . Specimens: 0. Patient Counseled regarding/Discussed: as above, good progress. Clinical Considerations: alert to infection, trauma but otherwise just protect and continue.

## 2018-11-13 ENCOUNTER — HOSPITAL ENCOUNTER (OUTPATIENT)
Dept: WOUND CARE | Age: 78
Discharge: HOME | End: 2018-11-13
Payer: MEDICARE

## 2018-11-13 VITALS — SYSTOLIC BLOOD PRESSURE: 136 MMHG | TEMPERATURE: 97.9 F | DIASTOLIC BLOOD PRESSURE: 70 MMHG | RESPIRATION RATE: 16 BRPM

## 2018-11-13 PROCEDURE — 11043 DBRDMT MUSC&/FSCA 1ST 20/<: CPT

## 2018-11-13 PROCEDURE — 74011000250 HC RX REV CODE- 250: Performed by: EMERGENCY MEDICINE

## 2018-11-13 RX ADMIN — Medication: at 16:00

## 2018-11-13 NOTE — WOUND CARE
11/13/18 1529 Wound Leg Lower Left; Anterior Date First Assessed/Time First Assessed: 09/24/18 1311   POA: Yes  Wound Type: Other  Location: Leg Lower  Orientation: Left; Anterior DRESSING STATUS Removed DRESSING TYPE (aquacel ag gauze diamante) Wound Length (cm) 1.2 cm Wound Width (cm) 0.6 cm Wound Depth (cm) 0.21 Wound Surface area (cm^2) 0.72 cm^2 Change in Wound Size % 67.42 Drainage Amount  Scant Drainage Color Serous Wound Odor None Periwound Skin Condition Intact Cleansing and Cleansing Agents  Normal saline Visit Vitals /70 (BP 1 Location: Left arm, BP Patient Position: At rest;Sitting) Temp 97.9 °F (36.6 °C) Resp 16

## 2018-11-13 NOTE — PROGRESS NOTES
I have noted, and reviewed today's data for this patient in [facility] and concur with same. The focused physical exam, other physical findings, Medical history, Review of Symptoms and Medications today remains unchanged except as noted below. Patient notes today: I'm doing Ok. . 
Lesion/Wound, focused exam on Presentation today: LLE pretib ulcer with slough in the base, pink edges, . Procedure: Wound # RLE wound ulcer. Procedure name: sharp debridement. Anaesthesia: Lidocaine; topical   
Description: using a sharp curette I removed adherent debris and slough from the ulcer base to effect a clean bleeding base . Maximum Level/depth of debridement: fascia. Post debridement dimensions changed as noted:  
 Depth, add 1.0 mm;  
 Width, add 0.0 mm Length, add 0.0 mm. Blood Loss: 1 CCs. Bleeding abated post treatment . Post Procedure Condition/ Diagnosis: good progress at skin edges. . 
Follow up and Future plans today: return 3 weeks, continue dressing, may hold off the Hydrafera if no drainage, use Baby shampoo to clean wound. Specimens: 0. Patient Counseled regarding/Discussed: good progress, . Clinical Considerations: alert to pressure effect continue to stir with collagen.

## 2018-11-14 NOTE — WOUND CARE
Discharge Condition: stable Ambulatory Status: ambulatory Discharge Destination: home Transportation: personal car Accompanied by:  self

## 2018-11-16 ENCOUNTER — TELEPHONE (OUTPATIENT)
Dept: INTERNAL MEDICINE CLINIC | Age: 78
End: 2018-11-16

## 2018-11-16 NOTE — TELEPHONE ENCOUNTER
Florian Acevedo (Self) [phone] (M)     Pt calling for lab results.   Would like a call, but wants a copy mailed as well

## 2018-11-27 ENCOUNTER — HOSPITAL ENCOUNTER (OUTPATIENT)
Dept: WOUND CARE | Age: 78
Discharge: HOME | End: 2018-11-27
Payer: MEDICARE

## 2018-11-27 ENCOUNTER — TELEPHONE (OUTPATIENT)
Dept: INTERNAL MEDICINE CLINIC | Age: 78
End: 2018-11-27

## 2018-11-27 VITALS
HEART RATE: 79 BPM | RESPIRATION RATE: 16 BRPM | DIASTOLIC BLOOD PRESSURE: 63 MMHG | SYSTOLIC BLOOD PRESSURE: 104 MMHG | TEMPERATURE: 97.9 F

## 2018-11-27 PROCEDURE — 11042 DBRDMT SUBQ TIS 1ST 20SQCM/<: CPT

## 2018-11-27 PROCEDURE — 74011000250 HC RX REV CODE- 250: Performed by: EMERGENCY MEDICINE

## 2018-11-27 RX ADMIN — Medication: at 09:00

## 2018-11-27 NOTE — WOUND CARE
11/27/18 7524 Wound Leg Lower Left; Anterior Date First Assessed/Time First Assessed: 09/24/18 1311   POA: Yes  Wound Type: Other  Location: Leg Lower  Orientation: Left; Anterior DRESSING STATUS Removed Wound Length (cm) 0.9 cm Wound Width (cm) 0.5 cm Wound Depth (cm) 0.2 Wound Surface area (cm^2) 0.45 cm^2 Change in Wound Size % 79.64 Condition of Base Pink 
(yellow fibrinous membrane) Condition of Edges Rolled/curled Drainage Amount  Scant Drainage Color Serous Wound Odor None Periwound Skin Condition Erythema, blanchable Cleansing and Cleansing Agents  Normal saline Visit Vitals /63 Pulse 79 Temp 97.9 °F (36.6 °C) Resp 16

## 2018-11-27 NOTE — PROGRESS NOTES
I have noted, and reviewed today's data for this patient in [facility] and concur with same. The focused physical exam, other physical findings, Medical history, Review of Symptoms and Medications today remains unchanged except as noted below. Patient notes today: I'm better. Lesion/Wound, focused exam on Presentation today: LLE anterior mid pretib ulcer with slough and biofilm adherent to ulcer bed, edges are pink and showing advancing edge. . 
 
Procedure: Wound # LLE ulcer. Procedure name: sharp debridement. Anaesthesia: Lidocaine; topical   
Description: using a sharp curette I removed all adherent debris and slough to effect a clean bleeding base. Maximum Level/depth of debridement: subQ. Post debridement dimensions changed as noted:  
 Depth, add 1.0 mm;  
 Width, add 0.0 mm Length, add 0.0 mm. Blood Loss: 1 CCs. Bleeding abated post treatment . Post Procedure Condition/ Diagnosis: good progress now, . Follow up and Future plans today: continue same X 2 weeks, then will begin to remove the collagen dressing. Specimens: 0. Patient Counseled regarding/Discussed: good progress now. Clinical Considerations: alert to pressure and nutrition . Verito Boland

## 2018-11-30 NOTE — TELEPHONE ENCOUNTER
Spoke with pt - requesting copy of immunization records. He knows he needs to get flu and shingles vaccines. Mailed records to pt.

## 2018-12-05 ENCOUNTER — TELEPHONE (OUTPATIENT)
Dept: WOUND CARE | Age: 78
End: 2018-12-05

## 2018-12-06 ENCOUNTER — APPOINTMENT (OUTPATIENT)
Dept: WOUND CARE | Age: 78
End: 2018-12-06

## 2018-12-11 ENCOUNTER — HOSPITAL ENCOUNTER (OUTPATIENT)
Dept: WOUND CARE | Age: 78
Discharge: HOME | End: 2018-12-11
Payer: MEDICARE

## 2018-12-11 VITALS
RESPIRATION RATE: 18 BRPM | TEMPERATURE: 98.4 F | SYSTOLIC BLOOD PRESSURE: 102 MMHG | HEART RATE: 66 BPM | DIASTOLIC BLOOD PRESSURE: 63 MMHG

## 2018-12-11 PROCEDURE — 11042 DBRDMT SUBQ TIS 1ST 20SQCM/<: CPT

## 2018-12-11 PROCEDURE — 74011000250 HC RX REV CODE- 250: Performed by: EMERGENCY MEDICINE

## 2018-12-11 RX ADMIN — Medication: at 09:00

## 2018-12-21 ENCOUNTER — HOSPITAL ENCOUNTER (OUTPATIENT)
Dept: WOUND CARE | Age: 78
Discharge: HOME | End: 2018-12-21
Payer: MEDICARE

## 2018-12-21 VITALS
RESPIRATION RATE: 16 BRPM | HEART RATE: 70 BPM | DIASTOLIC BLOOD PRESSURE: 72 MMHG | TEMPERATURE: 97.7 F | SYSTOLIC BLOOD PRESSURE: 121 MMHG

## 2018-12-21 PROCEDURE — 11042 DBRDMT SUBQ TIS 1ST 20SQCM/<: CPT

## 2018-12-21 PROCEDURE — 74011000250 HC RX REV CODE- 250: Performed by: EMERGENCY MEDICINE

## 2018-12-21 RX ORDER — LIDOCAINE HYDROCHLORIDE 20 MG/ML
JELLY TOPICAL
Status: COMPLETED | OUTPATIENT
Start: 2018-12-21 | End: 2018-12-21

## 2018-12-21 RX ADMIN — LIDOCAINE HYDROCHLORIDE: 20 JELLY TOPICAL at 08:19

## 2018-12-21 NOTE — PROGRESS NOTES
I have noted, and reviewed today's data for this patient in [facility] and concur with same. The focused physical exam, other physical findings, Medical history, Review of Symptoms and Medications today remains unchanged except as noted below. Patient notes today: doing [id number] Sudha Rodriges getting better  . Lesion/Wound, focused exam on Presentation today: LLE pretib ulcer much smaller, slough over central ulcer base pink edges  . Procedure:   Wound # LLE radiation injury. Procedure name: sharp debridement. Anaesthesia: Lidocaine; topical    Description: using a sharp curette I removed all adherent debris and slough to effect a clean bleeding base. Maximum Level/depth of debridement: subQ. Post debridement dimensions changed as noted:    Depth, add 1.0 mm;    Width, add 0.0 mm   Length, add 0.0 mm. Blood Loss: 1 CCs. Bleeding abated post treatment . Post Procedure Condition/ Diagnosis: very good progress now. Follow up and Future plans today: continue ibrahima, . Specimens: 0. Patient Counseled regarding/Discussed: good progress. .  Clinical Considerations: alert to pressure, .

## 2018-12-21 NOTE — WOUND CARE
12/21/18 0813   Wound Leg Lower Left; Anterior   Date First Assessed/Time First Assessed: 09/24/18 1311   POA: Yes  Wound Type: Other  Location: Leg Lower  Orientation: Left; Anterior   DRESSING STATUS Clean, dry, and intact   Wound Length (cm) 1 cm   Wound Width (cm) 0.5 cm   Wound Depth (cm) 0.1   Wound Surface area (cm^2) 0.5 cm^2   Change in Wound Size % 77.38   Condition of Base Granulation   Condition of Edges Rolled/curled   Drainage Amount  Scant   Drainage Color Serous   Wound Odor None   Periwound Skin Condition Intact   Cleansing and Cleansing Agents  Normal saline       Visit Vitals  /72   Pulse 70   Temp 97.7 °F (36.5 °C)   Resp 16

## 2019-01-11 ENCOUNTER — HOSPITAL ENCOUNTER (OUTPATIENT)
Dept: WOUND CARE | Age: 79
Discharge: HOME | End: 2019-01-11
Payer: MEDICARE

## 2019-01-11 VITALS
TEMPERATURE: 98.4 F | SYSTOLIC BLOOD PRESSURE: 110 MMHG | HEART RATE: 78 BPM | RESPIRATION RATE: 16 BRPM | DIASTOLIC BLOOD PRESSURE: 72 MMHG

## 2019-01-11 PROCEDURE — 74011000250 HC RX REV CODE- 250: Performed by: EMERGENCY MEDICINE

## 2019-01-11 PROCEDURE — 11042 DBRDMT SUBQ TIS 1ST 20SQCM/<: CPT

## 2019-01-11 RX ORDER — LIDOCAINE HYDROCHLORIDE 20 MG/ML
JELLY TOPICAL
Status: COMPLETED | OUTPATIENT
Start: 2019-01-11 | End: 2019-01-11

## 2019-01-11 RX ADMIN — LIDOCAINE HYDROCHLORIDE: 20 JELLY TOPICAL at 08:33

## 2019-01-11 NOTE — PROGRESS NOTES
I have noted, and reviewed today's data for this patient in [facility] and concur with same. The focused physical exam, other physical findings, Medical history, Review of Symptoms and Medications today remains unchanged except as noted below. Patient notes today: I'm better. . 
Lesion/Wound, focused exam on Presentation today: LLE ulcer much smaller, edges pink, but supple, slough over ulcer base. Procedure: Wound # post op wound. Procedure name: sharp debridement. Anaesthesia: Lidocaine; topical   
Description: using a sharp curette I removed all adherent debris and slough to effect a clean bleeding base. Maximum Level/depth of debridement: subQ. Post debridement dimensions changed as noted:  
 Depth, add 1.0 mm;  
 Width, add 0.0 mm Length, add 0.0 mm. Blood Loss: 1 CCs. Bleeding abated post treatment . Post Procedure Condition/ Diagnosis: good progress, no pain. Follow up and Future plans today: RTC 3 weeks. Specimens: 0. Patient Counseled regarding/Discussed: as above, very close to closed but this has been slow and tenuous, so will continue with collagen and local care. Clinical Considerations: alert to any trauma.

## 2019-01-11 NOTE — WOUND CARE
01/11/19 0830 Wound Leg Lower Left; Anterior Date First Assessed/Time First Assessed: 09/24/18 1311   POA: Yes  Wound Type: Other  Location: Leg Lower  Orientation: Left; Anterior DRESSING STATUS Clean, dry, and intact DRESSING TYPE Collagens/cell matrix;Dry dressing Wound Length (cm) 0.7 cm Wound Width (cm) 0.4 cm Wound Depth (cm) 0.2 Wound Surface area (cm^2) 0.28 cm^2 Change in Wound Size % 87.33 Condition of Base Granulation Condition of Edges Rolled/curled Drainage Amount  Scant Drainage Color Serous Wound Odor None Periwound Skin Condition Intact Cleansing and Cleansing Agents  Normal saline Visit Vitals /72 (BP 1 Location: Right arm) Pulse 78 Temp 98.4 °F (36.9 °C) Resp 16

## 2019-02-01 ENCOUNTER — HOSPITAL ENCOUNTER (OUTPATIENT)
Dept: WOUND CARE | Age: 79
Discharge: HOME | End: 2019-02-01
Payer: MEDICARE

## 2019-02-01 VITALS
SYSTOLIC BLOOD PRESSURE: 137 MMHG | TEMPERATURE: 98.5 F | RESPIRATION RATE: 16 BRPM | DIASTOLIC BLOOD PRESSURE: 84 MMHG | HEART RATE: 65 BPM

## 2019-02-01 PROCEDURE — 74011000250 HC RX REV CODE- 250: Performed by: EMERGENCY MEDICINE

## 2019-02-01 RX ORDER — LIDOCAINE HYDROCHLORIDE 20 MG/ML
JELLY TOPICAL ONCE
Status: COMPLETED | OUTPATIENT
Start: 2019-02-01 | End: 2019-02-01

## 2019-02-01 RX ADMIN — LIDOCAINE HYDROCHLORIDE: 20 JELLY TOPICAL at 08:25

## 2019-02-01 NOTE — WOUND CARE
02/01/19 0820 Wound Leg Lower Left; Anterior Date First Assessed/Time First Assessed: 09/24/18 1311   POA: Yes  Wound Type: Other  Location: Leg Lower  Orientation: Left; Anterior Dressing Status  Clean, dry, and intact Wound Length (cm) 0.1 cm Wound Width (cm) 0.1 cm Wound Depth (cm) 0.1 Wound Surface area (cm^2) 0.01 cm^2 Change in Wound Size % 99.55 Condition of Base Epithelializing Drainage Amount  None Wound Odor None Cleansing and Cleansing Agents  Normal saline Blood pressure 137/84, pulse 65, temperature 98.5 °F (36.9 °C), resp. rate 16.

## 2019-03-22 ENCOUNTER — OFFICE VISIT (OUTPATIENT)
Dept: INTERNAL MEDICINE CLINIC | Age: 79
End: 2019-03-22

## 2019-03-22 VITALS
SYSTOLIC BLOOD PRESSURE: 117 MMHG | RESPIRATION RATE: 19 BRPM | OXYGEN SATURATION: 99 % | WEIGHT: 186.2 LBS | HEART RATE: 67 BPM | DIASTOLIC BLOOD PRESSURE: 74 MMHG | TEMPERATURE: 97.7 F | BODY MASS INDEX: 25.22 KG/M2 | HEIGHT: 72 IN

## 2019-03-22 DIAGNOSIS — E78.3 MIXED HYPERGLYCERIDEMIA: ICD-10-CM

## 2019-03-22 DIAGNOSIS — D50.0 IRON DEFICIENCY ANEMIA DUE TO CHRONIC BLOOD LOSS: ICD-10-CM

## 2019-03-22 DIAGNOSIS — Z23 ENCOUNTER FOR IMMUNIZATION: ICD-10-CM

## 2019-03-22 DIAGNOSIS — Z13.31 SCREENING FOR DEPRESSION: ICD-10-CM

## 2019-03-22 DIAGNOSIS — Z00.00 MEDICARE ANNUAL WELLNESS VISIT, SUBSEQUENT: Primary | ICD-10-CM

## 2019-03-22 DIAGNOSIS — Z12.5 SCREENING FOR PROSTATE CANCER: ICD-10-CM

## 2019-03-22 DIAGNOSIS — I78.0 HHT (HEREDITARY HEMORRHAGIC TELANGIECTASIA) (HCC): ICD-10-CM

## 2019-03-22 DIAGNOSIS — R73.01 IFG (IMPAIRED FASTING GLUCOSE): ICD-10-CM

## 2019-03-22 RX ORDER — DOXYCYCLINE 100 MG/1
100 CAPSULE ORAL 2 TIMES DAILY
COMMUNITY
End: 2019-05-16 | Stop reason: ALTCHOICE

## 2019-03-22 NOTE — PROGRESS NOTES
This is the Subsequent Medicare Annual Wellness Exam, performed 12 months or more after the Initial AWV or the last Subsequent AWV I have reviewed the patient's medical history in detail and updated the computerized patient record. History Past Medical History:  
Diagnosis Date  HHT (hereditary hemorrhagic telangiectasia) ([facility] [address] Past Surgical History:  
Procedure Laterality Date  CHEST SURGERY PROCEDURE UNLISTED    
 excision of AVM  HX APPENDECTOMY  HX COLONOSCOPY  2014  
 due 23  
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  
 sclerotheropy  HX TONSILLECTOMY Current Outpatient Medications Medication Sig Dispense Refill  doxycycline (MONODOX) 100 mg capsule Take 100 mg by mouth two (2) times a day.  mineral oil-hydrophil petrolat (AQUAPHOR) ointment Apply  to affected area as needed for Dry Skin.  TIMOLOL MALEATE OP Apply  to eye. 1 drop to each eye daily  ferrous sulfate 325 mg (65 mg iron) tablet Take 1 Tab by mouth Daily (before breakfast). (Patient taking differently: Take 325 mg by mouth as needed.) 30 Tab 11  
 silver sulfADIAZINE (SILVADENE) 1 % topical cream Apply  to affected area daily.  MULTIVITAMIN PO Take  by mouth as needed. No Known Allergies Family History Problem Relation Age of Onset  Heart Disease Mother  Cancer Other   
     colon, lung Social History Tobacco Use  Smoking status: Former Smoker  Smokeless tobacco: Never Used Substance Use Topics  Alcohol use: Yes Alcohol/week: 0.0 oz  
  Comment: one drink per week Patient Active Problem List  
Diagnosis Code  HHT (hereditary hemorrhagic telangiectasia) (HCC) I78.0  Benign neoplasm of colon D12.6  Epistaxis R04.0  Iron deficiency anemia D50.9  Chronic nasal congestion R09.81  
 Advance directive discussed with patient Z70.80  
 Active advance directive on file Z78.9  SCCA (squamous cell carcinoma) of skin C44.92  
 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  
 
3 most recent PHQ Screens 3/22/2019 Little interest or pleasure in doing things Not at all Feeling down, depressed, irritable, or hopeless Not at all Total Score PHQ 2 0 Alcohol Risk Factor Screening:  
One per wk Functional Ability and Level of Safety:  
Hearing Loss The patient wears hearing aids. Activities of Daily Living The home contains: no safety equipment. Patient does total self care Fall Risk Fall Risk Assessment, last 12 mths 3/22/2019 Able to walk? Yes Fall in past 12 months? No  
 
 
Abuse Screen Patient is not abused Cognitive Screening Evaluation of Cognitive Function: 
Has your family/caregiver stated any concerns about your memory: no 
Normal 
 
Patient Care Team  
Patient Care Team: 
Meagan Fernando MD as PCP - General (Internal Medicine) Caitlin Navas MD (Ophthalmology) Assessment/Plan Education and counseling provided: 
Are appropriate based on today's review and evaluation Diagnoses and all orders for this visit: 
 
1. Medicare annual wellness visit, subsequent 2. Encounter for immunization 
-     diphtheria-pertussis, acellular,-tetanus (BOOSTRIX TDAP) 2.5-8-5 Lf-mcg-Lf/0.5mL susp susp; 0.5 mL by IntraMUSCular route once for 1 dose. 
-     varicella-zoster recombinant, PF, (SHINGRIX, PF,) 50 mcg/0.5 mL susr injection; 0.5 mL by IntraMUSCular route once for 1 dose. 3. Screening for depression 
-     Jesus Hathaway Other orders 
-     CBC WITH AUTOMATED DIFF 
-     METABOLIC PANEL, COMPREHENSIVE 
-     PSA SCREENING (SCREENING) -     LIPID PANEL 
-     UA/M W/RFLX CULTURE, ROUTINE 
-     HEMOGLOBIN A1C WITH EAG Health Maintenance Due Topic Date Due  
 DTaP/Tdap/Td series (1 - Tdap) 05/06/1960  Shingrix Vaccine Age 50> (1 of 2) 05/06/1989  Influenza Age 5 to Adult  08/01/2018

## 2019-03-22 NOTE — PATIENT INSTRUCTIONS
Medicare Wellness Visit, Male The best way to live healthy is to have a lifestyle where you eat a well-balanced diet, exercise regularly, limit alcohol use, and quit all forms of tobacco/nicotine, if applicable. Regular preventive services are another way to keep healthy. Preventive services (vaccines, screening tests, monitoring & exams) can help personalize your care plan, which helps you manage your own care. Screening tests can find health problems at the earliest stages, when they are easiest to treat. [facility] follows the current, evidence-based guidelines published by the [employer] Hector Naveen ([facility]) when recommending preventive services for our patients. Because we follow these guidelines, sometimes recommendations change over time as research supports it. (For example, a prostate screening blood test is no longer routinely recommended for men with no symptoms.) Of course, you and your doctor may decide to screen more often for some diseases, based on your risk and co-morbidities (chronic disease you are already diagnosed with). Preventive services for you include: - Medicare offers their members a free annual wellness visit, which is time for you and your primary care provider to discuss and plan for your preventive service needs. Take advantage of this benefit every year! 
-All adults over age 72 should receive the recommended pneumonia vaccines. Current USPSTF guidelines recommend a series of two vaccines for the best pneumonia protection.  
-All adults should have a flu vaccine yearly and an ECG.  All adults age 61 and older should receive a shingles vaccine once in their lifetime.   
-All adults age 38-68 who are overweight should have a diabetes screening test once every three years.  
-Other screening tests & preventive services for persons with diabetes include: an eye exam to screen for diabetic retinopathy, a kidney function test, a foot exam, and stricter control over your cholesterol.  
-Cardiovascular screening for adults with routine risk involves an electrocardiogram (ECG) at intervals determined by the provider.  
-Colorectal cancer screening should be done for adults age 54-65 with no increased risk factors for colorectal cancer. There are a number of acceptable methods of screening for this type of cancer. Each test has its own benefits and drawbacks. Discuss with your provider what is most appropriate for you during your annual wellness visit. The different tests include: colonoscopy (considered the best screening method), a fecal occult blood test, a fecal DNA test, and sigmoidoscopy. 
-All adults born between [facility] should be screened once for Hepatitis C. 
-An Abdominal Aortic Aneurysm (AAA) Screening is recommended for men age 73-68 who has ever smoked in their lifetime. Here is a list of your current Health Maintenance items (your personalized list of preventive services) with a due date: 
Health Maintenance Due Topic Date Due  
 DTaP/Tdap/Td  (1 - Tdap) 05/06/1960  Shingles Vaccine (1 of 2) 05/06/1989  Flu Vaccine  08/01/2018

## 2019-03-22 NOTE — PROGRESS NOTES
HISTORY OF PRESENT ILLNESS Letitia Edwards is a 77 y.o. male. HPI Subjective:  Waylon Herrera is seen today for a Medicare wellness visit and follow up of chronic problems. 1. Medicare wellness visit. See attached note. He is due for labs, shingles vaccine and TDAP booster. He is up to date with other vaccinations, including flu vaccine. Colonoscopy is not indicated due to age. 2. Chronic problems are reviewed. a. HHT. Up to date with specialist follow up in August, [address]. He denies any inappropriate bleeding. He does have chronic anemia, however, and is due for recheck. Review of Systems:  Notable for healing of the left lower extremity wound following skin cancer surgery. Review of Systems Constitutional: Negative for weight loss. HENT: Positive for hearing loss and nosebleeds. Respiratory: Negative. Cardiovascular: Negative for chest pain, palpitations, leg swelling and PND. Gastrointestinal: Negative. Genitourinary: Negative. Musculoskeletal: Negative for myalgias. Neurological: Negative for focal weakness. Physical Exam  
Constitutional: He is oriented to person, place, and time. He appears well-developed and well-nourished. No distress. HENT:  
Head: Normocephalic and atraumatic. Right Ear: Tympanic membrane, external ear and ear canal normal.  
Left Ear: Tympanic membrane, external ear and ear canal normal.  
Eyes: Pupils are equal, round, and reactive to light. EOM are normal. Right eye exhibits no discharge. Left eye exhibits no discharge. Neck: Normal range of motion. Neck supple. Carotid bruit is not present. No thyromegaly present. Cardiovascular: Normal rate, regular rhythm, normal heart sounds and intact distal pulses. Exam reveals no gallop and no friction rub. No murmur heard. Pulmonary/Chest: Effort normal and breath sounds normal. No respiratory distress. He has no wheezes. He has no rales. Abdominal: Soft. Bowel sounds are normal. He exhibits no distension and no mass. There is no tenderness. There is no rebound and no guarding. Musculoskeletal: Normal range of motion. He exhibits edema. He exhibits no tenderness. Mild bilateral lower extremity edema Lymphadenopathy:  
  He has no cervical adenopathy. Neurological: He is alert and oriented to person, place, and time. He has normal reflexes. Skin: Skin is warm and dry. Petechiae noted. No rash noted. Psychiatric: He has a normal mood and affect. His behavior is normal.  
Nursing note and vitals reviewed. ASSESSMENT and PLAN Diagnoses and all orders for this visit: 
 
1. Medicare annual wellness visit, subsequent 2. Encounter for immunization 
-     diphtheria-pertussis, acellular,-tetanus (BOOSTRIX TDAP) 2.5-8-5 Lf-mcg-Lf/0.5mL susp susp; 0.5 mL by IntraMUSCular route once for 1 dose. 
-     varicella-zoster recombinant, PF, (SHINGRIX, PF,) 50 mcg/0.5 mL susr injection; 0.5 mL by IntraMUSCular route once for 1 dose. 3. Screening for depression 
-     [facility]HT (hereditary hemorrhagic telangiectasia) ([facility] [address] 
-     CBC WITH AUTOMATED DIFF 
-     METABOLIC PANEL, COMPREHENSIVE 5. Iron deficiency anemia due to chronic blood loss- follow 6. Mixed hyperglyceridemia -     LIPID PANEL 7. Screening for prostate cancer -     PSA SCREENING (SCREENING) 8.  IFG (impaired fasting glucose) 
-     UA/M W/RFLX CULTURE, ROUTINE 
-     HEMOGLOBIN A1C WITH EAG

## 2019-04-05 ENCOUNTER — TELEPHONE (OUTPATIENT)
Dept: INTERNAL MEDICINE CLINIC | Age: 79
End: 2019-04-05

## 2019-04-05 ENCOUNTER — HOSPITAL ENCOUNTER (OUTPATIENT)
Dept: LAB | Age: 79
Discharge: HOME | End: 2019-04-05
Payer: MEDICARE

## 2019-04-05 PROCEDURE — 87086 URINE CULTURE/COLONY COUNT: CPT

## 2019-04-05 PROCEDURE — 80061 LIPID PANEL: CPT

## 2019-04-05 PROCEDURE — 84153 ASSAY OF PSA TOTAL: CPT

## 2019-04-05 PROCEDURE — 36415 COLL VENOUS BLD VENIPUNCTURE: CPT

## 2019-04-05 PROCEDURE — 85025 COMPLETE CBC W/AUTO DIFF WBC: CPT

## 2019-04-05 PROCEDURE — 80053 COMPREHEN METABOLIC PANEL: CPT

## 2019-04-05 PROCEDURE — 83036 HEMOGLOBIN GLYCOSYLATED A1C: CPT

## 2019-04-05 PROCEDURE — 81001 URINALYSIS AUTO W/SCOPE: CPT

## 2019-04-05 NOTE — TELEPHONE ENCOUNTER
Spoke with pt -wanted to know when he had labs done? He said today. Advised him to let me know when I call him with results next week to mail them.

## 2019-04-07 LAB
ALBUMIN SERPL-MCNC: 4.3 G/DL (ref 3.5–4.8)
ALBUMIN/GLOB SERPL: 1.8 {RATIO} (ref 1.2–2.2)
ALP SERPL-CCNC: 56 IU/L (ref 39–117)
ALT SERPL-CCNC: 14 IU/L (ref 0–44)
APPEARANCE UR: CLEAR
AST SERPL-CCNC: 17 IU/L (ref 0–40)
BACTERIA #/AREA URNS HPF: NORMAL /[HPF]
BACTERIA UR CULT: NORMAL
BASOPHILS # BLD AUTO: 0 X10E3/UL (ref 0–0.2)
BASOPHILS NFR BLD AUTO: 1 %
BILIRUB SERPL-MCNC: 0.5 MG/DL (ref 0–1.2)
BILIRUB UR QL STRIP: NEGATIVE
BUN SERPL-MCNC: 15 MG/DL (ref 8–27)
BUN/CREAT SERPL: 14 (ref 10–24)
CALCIUM SERPL-MCNC: 9.3 MG/DL (ref 8.6–10.2)
CASTS URNS QL MICRO: NORMAL /LPF
CHLORIDE SERPL-SCNC: 105 MMOL/L (ref 96–106)
CHOLEST SERPL-MCNC: 152 MG/DL (ref 100–199)
CO2 SERPL-SCNC: 27 MMOL/L (ref 20–29)
COLOR UR: YELLOW
CREAT SERPL-MCNC: 1.07 MG/DL (ref 0.76–1.27)
EOSINOPHIL # BLD AUTO: 0.2 X10E3/UL (ref 0–0.4)
EOSINOPHIL NFR BLD AUTO: 4 %
EPI CELLS #/AREA URNS HPF: NORMAL /HPF (ref 0–10)
ERYTHROCYTE [DISTWIDTH] IN BLOOD BY AUTOMATED COUNT: 16 % (ref 12.3–15.4)
EST. AVERAGE GLUCOSE BLD GHB EST-MCNC: 114 MG/DL
GLOBULIN SER CALC-MCNC: 2.4 G/DL (ref 1.5–4.5)
GLUCOSE SERPL-MCNC: 86 MG/DL (ref 65–99)
GLUCOSE UR QL: NEGATIVE
HBA1C MFR BLD: 5.6 % (ref 4.8–5.6)
HCT VFR BLD AUTO: 38 % (ref 37.5–51)
HDLC SERPL-MCNC: 47 MG/DL
HGB BLD-MCNC: 12 G/DL (ref 13–17.7)
HGB UR QL STRIP: NEGATIVE
IMM GRANULOCYTES # BLD AUTO: 0 X10E3/UL (ref 0–0.1)
IMM GRANULOCYTES NFR BLD AUTO: 0 %
KETONES UR QL STRIP: NEGATIVE
LDLC SERPL CALC-MCNC: 86 MG/DL (ref 0–99)
LEUKOCYTE ESTERASE UR QL STRIP: ABNORMAL
LYMPHOCYTES # BLD AUTO: 0.8 X10E3/UL (ref 0.7–3.1)
LYMPHOCYTES NFR BLD AUTO: 17 %
MCH RBC QN AUTO: 26 PG (ref 26.6–33)
MCHC RBC AUTO-ENTMCNC: 31.6 G/DL (ref 31.5–35.7)
MCV RBC AUTO: 82 FL (ref 79–97)
MICRO URNS: ABNORMAL
MONOCYTES # BLD AUTO: 0.5 X10E3/UL (ref 0.1–0.9)
MONOCYTES NFR BLD AUTO: 10 %
MUCOUS THREADS URNS QL MICRO: PRESENT
NEUTROPHILS # BLD AUTO: 3.2 X10E3/UL (ref 1.4–7)
NEUTROPHILS NFR BLD AUTO: 68 %
NITRITE UR QL STRIP: NEGATIVE
PH UR STRIP: 6.5 [PH] (ref 5–7.5)
PLATELET # BLD AUTO: 189 X10E3/UL (ref 150–379)
POTASSIUM SERPL-SCNC: 5.2 MMOL/L (ref 3.5–5.2)
PROT SERPL-MCNC: 6.7 G/DL (ref 6–8.5)
PROT UR QL STRIP: NEGATIVE
PSA SERPL-MCNC: 2.5 NG/ML (ref 0–4)
RBC # BLD AUTO: 4.61 X10E6/UL (ref 4.14–5.8)
RBC #/AREA URNS HPF: NORMAL /HPF (ref 0–2)
SODIUM SERPL-SCNC: 144 MMOL/L (ref 134–144)
SP GR UR: 1.01 (ref 1–1.03)
TRIGL SERPL-MCNC: 96 MG/DL (ref 0–149)
URINALYSIS REFLEX, 377202: ABNORMAL
UROBILINOGEN UR STRIP-MCNC: 0.2 MG/DL (ref 0.2–1)
VLDLC SERPL CALC-MCNC: 19 MG/DL (ref 5–40)
WBC # BLD AUTO: 4.8 X10E3/UL (ref 3.4–10.8)
WBC #/AREA URNS HPF: NORMAL /HPF (ref 0–5)

## 2019-04-08 ENCOUNTER — TELEPHONE (OUTPATIENT)
Dept: INTERNAL MEDICINE CLINIC | Age: 79
End: 2019-04-08

## 2019-04-09 NOTE — TELEPHONE ENCOUNTER
Advised pt will mail his lab results to him as requested. He states wanted to know his results. Advised him MD sent him a My Chart message back on 4/7/19. He has not seen it. Read to him. He also wanted to schedule an appt to see MD. Has had a knot over his right eyebrow \"that looks veiny\" for about a month. Concerned with it now.  Scheduled him on 4/11/19 at 4:50 pm. Will forward to MD.

## 2019-04-11 ENCOUNTER — OFFICE VISIT (OUTPATIENT)
Dept: INTERNAL MEDICINE CLINIC | Age: 79
End: 2019-04-11

## 2019-04-11 VITALS
WEIGHT: 186.25 LBS | OXYGEN SATURATION: 98 % | TEMPERATURE: 98.3 F | HEART RATE: 69 BPM | HEIGHT: 72 IN | SYSTOLIC BLOOD PRESSURE: 115 MMHG | BODY MASS INDEX: 25.23 KG/M2 | DIASTOLIC BLOOD PRESSURE: 75 MMHG | RESPIRATION RATE: 18 BRPM

## 2019-04-11 DIAGNOSIS — L98.9 FACIAL SKIN LESION: Primary | ICD-10-CM

## 2019-04-11 NOTE — PROGRESS NOTES
HISTORY OF PRESENT ILLNESS  Rex Bob is a 77 y.o. male. Rash    The history is provided by the patient (has a new facial lesion, right temple). This is a new problem. Episode onset: noticed one month ago. The problem has not changed since onset. There has been no fever. The rash is present on the face. The patient is experiencing no pain. Pertinent negatives include no blisters, no itching, no pain and no weeping. He has tried nothing for the symptoms. Other   The history is provided by the patient (requests referrals for ADD assessment, nutritional counseling- names provided for each.). Review of Systems   Skin: Positive for rash. Negative for itching. Physical Exam   HENT:   Head:           ASSESSMENT and PLAN  Diagnoses and all orders for this visit:    1.  Facial skin lesion  -     REFERRAL TO PLASTIC SURGERY

## 2019-04-12 ENCOUNTER — TELEPHONE (OUTPATIENT)
Dept: INTERNAL MEDICINE CLINIC | Age: 79
End: 2019-04-12

## 2019-04-12 NOTE — TELEPHONE ENCOUNTER
Faxed pt's office note and labs to Dr PICKERING [facility] office [phone]. Confirmation received.

## 2019-04-26 ENCOUNTER — TELEPHONE (OUTPATIENT)
Dept: INTERNAL MEDICINE CLINIC | Age: 79
End: 2019-04-26

## 2019-04-26 NOTE — TELEPHONE ENCOUNTER
Pt wants to talk with Nurse -  Subject is ankle and proceed your having on your forehead with Dr Jeffrey Erickson.   Has questions - [phone]

## 2019-04-28 ENCOUNTER — TELEPHONE (OUTPATIENT)
Dept: INTERNAL MEDICINE CLINIC | Age: 79
End: 2019-04-28

## 2019-04-30 NOTE — TELEPHONE ENCOUNTER
Spoke with pt - we had discussed symptoms of chest pain and sob in prior call - he does not have any symptoms but swollen legs. MD recommends he elevate legs when possible, wear compression hose - he states he has a pair, avoid salt intake and ov if worsens. He states he will come in if worsens.

## 2019-04-30 NOTE — TELEPHONE ENCOUNTER
Spoke with pt - states he has bilateral swelling in lower extremities - more on left leg x1 year. Saw he has cpe back in March - asked if he discussed this with MD at appt? He did not think about it. Offered to schedule an appt - he declined. Just wanted MD to know about it. He has appt with Dr Lizandro Mckeon on 5/20/19 for for biopsy of lesion on forehead.  Will forward to MD.

## 2019-05-16 ENCOUNTER — TELEPHONE (OUTPATIENT)
Dept: INTERNAL MEDICINE CLINIC | Age: 79
End: 2019-05-16

## 2019-05-16 ENCOUNTER — OFFICE VISIT (OUTPATIENT)
Dept: INTERNAL MEDICINE CLINIC | Age: 79
End: 2019-05-16

## 2019-05-16 VITALS
DIASTOLIC BLOOD PRESSURE: 64 MMHG | SYSTOLIC BLOOD PRESSURE: 126 MMHG | TEMPERATURE: 98.8 F | OXYGEN SATURATION: 96 % | BODY MASS INDEX: 24.58 KG/M2 | WEIGHT: 181.5 LBS | RESPIRATION RATE: 18 BRPM | HEIGHT: 72 IN | HEART RATE: 85 BPM

## 2019-05-16 DIAGNOSIS — R94.31 ABNORMAL EKG: Primary | ICD-10-CM

## 2019-05-16 NOTE — TELEPHONE ENCOUNTER
Dr Dayron Brenner - [phone]  FAX  [phone]  Attn: [address] for nurse  Have sent onver EKG for Dr Georgia Pope to look at - Needs to know if Pt is okay for surgery Monday 5/20  Advise ASAP

## 2019-05-16 NOTE — TELEPHONE ENCOUNTER
Spoke with patient per staff message from Dr. Snehal Azar. Appointment was scheduled for tomorrow Friday, May 17, 2019 11:40 AM. Patient thanked me and stated he would come in tomorrow.      Called back Dr. Johnson Staff office and left a  for a call back from Sangeetha Arnold

## 2019-05-16 NOTE — PROGRESS NOTES
HISTORY OF PRESENT ILLNESS  Maddie Kat is a [de-identified] y.o. male. Irregular Heart Beat    The history is provided by the patient (possible A-fib seen on preop ecg done at [facility]). This is a new problem. The problem has been resolved (ecg here is normal). Pertinent negatives include no chest pain, no exertional chest pressure, no irregular heartbeat, no PND and no dizziness. His past medical history does not include heart disease, atrial fibrillation or SVT. Review of Systems   Constitutional: Negative for weight loss. Respiratory: Negative. Cardiovascular: Negative for chest pain, palpitations, leg swelling and PND. Musculoskeletal: Negative for myalgias. Neurological: Negative for dizziness and focal weakness. Physical Exam   Constitutional: No distress. Cardiovascular: Normal rate and regular rhythm. Exam reveals no gallop and no friction rub. No murmur heard. Pulmonary/Chest: Effort normal and breath sounds normal.   Musculoskeletal: He exhibits edema. Mild bilateral lower extremity edema    Nursing note and vitals reviewed. ASSESSMENT and PLAN  Diagnoses and all orders for this visit:    1.  Abnormal EKG - suspect artifact  -     AMB POC EKG ROUTINE W/ 12 LEADS, INTER & REP- essentially normal, his baseline  - No risk for surgery based on ecg status

## 2019-05-16 NOTE — TELEPHONE ENCOUNTER
Aimee/Dr. Di Monsalve office (Self) [phone]     Please call (extension [phone]) and let Dr. Di Monsalve office know if pt is cleared or what the plan is- pt and hospital \"have been calling repeatedly threatening to cancel surgery\"

## 2019-05-16 NOTE — TELEPHONE ENCOUNTER
Called and spoke with Abhijeet Carrillo, let her know that the patient was just seen and had a re-peat EKG done and that Dr. Yolis Ferrer stated it looked normal. I let her know I would be faxing this over asap. She thanked me and had no further questions.

## 2019-12-10 ENCOUNTER — TELEPHONE (OUTPATIENT)
Dept: INTERNAL MEDICINE CLINIC | Age: 79
End: 2019-12-10

## 2019-12-10 NOTE — TELEPHONE ENCOUNTER
Patient explained he has a place on left hand middle finger near nail. Looks like a boil, red, very slight drainage, sensitive. Antibiotic was not effective. Seen by dermatologist, cryotherapy. Patient would like recommendation to hand specialist for further eval, discomfort in finger.

## 2019-12-10 NOTE — TELEPHONE ENCOUNTER
Informed patient per provider recommendation Dr Marlon Leon and Dr Елена Ghotra, hand specialist. Pt verbalized understanding.

## 2019-12-10 NOTE — TELEPHONE ENCOUNTER
Pt wants a dr's referral for a hand dr mancilla said when you call him   [phone] he will explain

## 2020-01-29 ENCOUNTER — TELEPHONE (OUTPATIENT)
Dept: INTERNAL MEDICINE CLINIC | Age: 80
End: 2020-01-29

## 2020-01-29 ENCOUNTER — ANESTHESIA EVENT (OUTPATIENT)
Dept: ENDOSCOPY | Age: 80
End: 2020-01-29
Payer: MEDICARE

## 2020-01-29 ENCOUNTER — HOSPITAL ENCOUNTER (OUTPATIENT)
Age: 80
Setting detail: OUTPATIENT SURGERY
Discharge: HOME | End: 2020-01-29
Attending: SPECIALIST | Admitting: SPECIALIST
Payer: MEDICARE

## 2020-01-29 ENCOUNTER — ANESTHESIA (OUTPATIENT)
Dept: ENDOSCOPY | Age: 80
End: 2020-01-29
Payer: MEDICARE

## 2020-01-29 VITALS
HEART RATE: 77 BPM | TEMPERATURE: 97.7 F | BODY MASS INDEX: 24.52 KG/M2 | HEIGHT: 72 IN | OXYGEN SATURATION: 98 % | SYSTOLIC BLOOD PRESSURE: 109 MMHG | DIASTOLIC BLOOD PRESSURE: 59 MMHG | WEIGHT: 181 LBS | RESPIRATION RATE: 14 BRPM

## 2020-01-29 PROCEDURE — 76040000019: Performed by: SPECIALIST

## 2020-01-29 PROCEDURE — 88305 TISSUE EXAM BY PATHOLOGIST: CPT

## 2020-01-29 PROCEDURE — 76060000031 HC ANESTHESIA FIRST 0.5 HR: Performed by: SPECIALIST

## 2020-01-29 PROCEDURE — 74011250636 HC RX REV CODE- 250/636: Performed by: NURSE ANESTHETIST, CERTIFIED REGISTERED

## 2020-01-29 PROCEDURE — 74011000250 HC RX REV CODE- 250: Performed by: NURSE ANESTHETIST, CERTIFIED REGISTERED

## 2020-01-29 PROCEDURE — 74011250637 HC RX REV CODE- 250/637: Performed by: SPECIALIST

## 2020-01-29 PROCEDURE — 77030021593 HC FCPS BIOP ENDOSC BSC -A: Performed by: SPECIALIST

## 2020-01-29 RX ORDER — LIDOCAINE HYDROCHLORIDE 20 MG/ML
INJECTION, SOLUTION EPIDURAL; INFILTRATION; INTRACAUDAL; PERINEURAL AS NEEDED
Status: DISCONTINUED | OUTPATIENT
Start: 2020-01-29 | End: 2020-01-29 | Stop reason: HOSPADM

## 2020-01-29 RX ORDER — FLUMAZENIL 0.1 MG/ML
0.2 INJECTION INTRAVENOUS
Status: DISCONTINUED | OUTPATIENT
Start: 2020-01-29 | End: 2020-01-29 | Stop reason: HOSPADM

## 2020-01-29 RX ORDER — SODIUM CHLORIDE 0.9 % (FLUSH) 0.9 %
5-40 SYRINGE (ML) INJECTION EVERY 8 HOURS
Status: DISCONTINUED | OUTPATIENT
Start: 2020-01-29 | End: 2020-01-29 | Stop reason: HOSPADM

## 2020-01-29 RX ORDER — SODIUM CHLORIDE 9 MG/ML
INJECTION, SOLUTION INTRAVENOUS
Status: DISCONTINUED | OUTPATIENT
Start: 2020-01-29 | End: 2020-01-29 | Stop reason: HOSPADM

## 2020-01-29 RX ORDER — MIDAZOLAM HYDROCHLORIDE 1 MG/ML
.25-5 INJECTION, SOLUTION INTRAMUSCULAR; INTRAVENOUS
Status: DISCONTINUED | OUTPATIENT
Start: 2020-01-29 | End: 2020-01-29 | Stop reason: HOSPADM

## 2020-01-29 RX ORDER — EPINEPHRINE 0.1 MG/ML
1 INJECTION INTRACARDIAC; INTRAVENOUS
Status: DISCONTINUED | OUTPATIENT
Start: 2020-01-29 | End: 2020-01-29 | Stop reason: HOSPADM

## 2020-01-29 RX ORDER — NALOXONE HYDROCHLORIDE 0.4 MG/ML
0.4 INJECTION, SOLUTION INTRAMUSCULAR; INTRAVENOUS; SUBCUTANEOUS
Status: DISCONTINUED | OUTPATIENT
Start: 2020-01-29 | End: 2020-01-29 | Stop reason: HOSPADM

## 2020-01-29 RX ORDER — PROPOFOL 10 MG/ML
INJECTION, EMULSION INTRAVENOUS AS NEEDED
Status: DISCONTINUED | OUTPATIENT
Start: 2020-01-29 | End: 2020-01-29 | Stop reason: HOSPADM

## 2020-01-29 RX ORDER — SODIUM CHLORIDE 0.9 % (FLUSH) 0.9 %
5-40 SYRINGE (ML) INJECTION AS NEEDED
Status: DISCONTINUED | OUTPATIENT
Start: 2020-01-29 | End: 2020-01-29 | Stop reason: HOSPADM

## 2020-01-29 RX ORDER — FENTANYL CITRATE 50 UG/ML
12.5-5 INJECTION, SOLUTION INTRAMUSCULAR; INTRAVENOUS
Status: DISCONTINUED | OUTPATIENT
Start: 2020-01-29 | End: 2020-01-29 | Stop reason: HOSPADM

## 2020-01-29 RX ORDER — DEXTROMETHORPHAN/PSEUDOEPHED 2.5-7.5/.8
1.2 DROPS ORAL
Status: DISCONTINUED | OUTPATIENT
Start: 2020-01-29 | End: 2020-01-29 | Stop reason: HOSPADM

## 2020-01-29 RX ORDER — ATROPINE SULFATE 0.1 MG/ML
0.5 INJECTION INTRAVENOUS
Status: DISCONTINUED | OUTPATIENT
Start: 2020-01-29 | End: 2020-01-29 | Stop reason: HOSPADM

## 2020-01-29 RX ORDER — SODIUM CHLORIDE 9 MG/ML
50 INJECTION, SOLUTION INTRAVENOUS CONTINUOUS
Status: DISCONTINUED | OUTPATIENT
Start: 2020-01-29 | End: 2020-01-29 | Stop reason: HOSPADM

## 2020-01-29 RX ADMIN — SODIUM CHLORIDE: 900 INJECTION, SOLUTION INTRAVENOUS at 08:21

## 2020-01-29 RX ADMIN — PROPOFOL 20 MG: 10 INJECTION, EMULSION INTRAVENOUS at 08:58

## 2020-01-29 RX ADMIN — LIDOCAINE HYDROCHLORIDE 50 MG: 20 INJECTION, SOLUTION EPIDURAL; INFILTRATION; INTRACAUDAL; PERINEURAL at 08:43

## 2020-01-29 RX ADMIN — PROPOFOL 80 MG: 10 INJECTION, EMULSION INTRAVENOUS at 08:43

## 2020-01-29 RX ADMIN — PROPOFOL 20 MG: 10 INJECTION, EMULSION INTRAVENOUS at 08:47

## 2020-01-29 RX ADMIN — PROPOFOL 20 MG: 10 INJECTION, EMULSION INTRAVENOUS at 08:44

## 2020-01-29 RX ADMIN — PROPOFOL 30 MG: 10 INJECTION, EMULSION INTRAVENOUS at 08:52

## 2020-01-29 RX ADMIN — PROPOFOL 20 MG: 10 INJECTION, EMULSION INTRAVENOUS at 08:55

## 2020-01-29 RX ADMIN — PROPOFOL 20 MG: 10 INJECTION, EMULSION INTRAVENOUS at 08:49

## 2020-01-29 RX ADMIN — PROPOFOL 30 MG: 10 INJECTION, EMULSION INTRAVENOUS at 08:45

## 2020-01-29 NOTE — ROUTINE PROCESS
Ander Cast 
1940 
[id number] Situation: 
Verbal report received from: HOME Veronica, RN Procedure: Procedure(s): 
COLONOSCOPY 
ENDOSCOPIC POLYPECTOMY Background: 
 
Preoperative diagnosis: FAMILY HISTORY OF COLON CANCER, PERSONAL HISTORY OF COLONIC POLYPS Postoperative diagnosis: 1. 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  
2. Severe Diverticulosis 3. 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 Beryl Sacks Assistant(s): Endoscopy Technician-1: Leobardo Broderick Endoscopy RN-1: Sujit Salter RN Specimens:  
ID Type Source Tests Collected by Time Destination 1 : Cecal polyp Preservative Cecum  Alexander Jackson MD 1/29/2020 7712 Pathology H. Pylori  no Assessment: 
Intra-procedure medications Anesthesia gave intra-procedure sedation and medications, see anesthesia flow sheet yes Intravenous fluids: NS@ Victor Manuel File Vital signs stable Abdominal assessment: round and soft Recommendation: 
Discharge patient per MD order. Family or Friend Permission to share finding with family or friend yes

## 2020-01-29 NOTE — ANESTHESIA POSTPROCEDURE EVALUATION
Post-Anesthesia Evaluation and Assessment    Patient: Arturo Pollock MRN: [id number]  SSN: [id number]    YOB: 1940  Age: [de-identified] y.o. Sex: male      I have evaluated the patient and they are stable and ready for discharge from the PACU. Cardiovascular Function/Vital Signs  Visit Vitals  BP 93/62   Pulse 79   Temp 36.5 °C (97.7 °F)   Resp 17   Ht 6' (1.829 m)   Wt 82.1 kg (181 lb)   SpO2 99%   BMI 24.55 kg/m²       Patient is status post MAC anesthesia for Procedure(s):  COLONOSCOPY  ENDOSCOPIC POLYPECTOMY. Nausea/Vomiting: None    Postoperative hydration reviewed and adequate. Pain:  Pain Scale 1: Numeric (0 - 10) (01/29/20 0920)  Pain Intensity 1: 0 (01/29/20 0920)   Managed    Neurological Status: At baseline    Mental Status, Level of Consciousness: Alert and  oriented to person, place, and time    Pulmonary Status:   O2 Device: Room air (01/29/20 0920)   Adequate oxygenation and airway patent    Complications related to anesthesia: None    Post-anesthesia assessment completed. No concerns    Signed By: Kassy Lamb MD     January 29, 2020              Procedure(s):  COLONOSCOPY  ENDOSCOPIC POLYPECTOMY. MAC    <BSHSIANPOST>    Vitals Value Taken Time   /60 1/29/2020  9:30 AM   Temp 36.5 °C (97.7 °F) 1/29/2020  9:17 AM   Pulse 77 1/29/2020  9:30 AM   Resp 15 1/29/2020  9:30 AM   SpO2 99 % 1/29/2020  9:30 AM   Vitals shown include unvalidated device data.

## 2020-01-29 NOTE — ANESTHESIA PREPROCEDURE EVALUATION
Relevant Problems   No relevant active problems       Anesthetic History   No history of anesthetic complications            Review of Systems / Medical History  Patient summary reviewed, nursing notes reviewed and pertinent labs reviewed    Pulmonary  Within defined limits                 Neuro/Psych   Within defined limits           Cardiovascular                  Exercise tolerance: >4 METS     GI/Hepatic/Renal                Endo/Other             Other Findings              Physical Exam    Airway  Mallampati: I  TM Distance: > 6 cm  Neck ROM: normal range of motion   Mouth opening: Normal     Cardiovascular    Rhythm: regular  Rate: normal         Dental  No notable dental hx       Pulmonary  Breath sounds clear to auscultation               Abdominal         Other Findings            Anesthetic Plan    ASA: 2  Anesthesia type: MAC          Induction: Intravenous  Anesthetic plan and risks discussed with: Patient

## 2020-01-29 NOTE — PROGRESS NOTES

## 2020-01-29 NOTE — DISCHARGE INSTRUCTIONS
Tito Deleon  [id number]  1940    COLON DISCHARGE INSTRUCTIONS  Discomfort:  Redness at IV site- apply warm compress to area; if redness or soreness persist- contact your physician  There may be a slight amount of blood passed from the rectum  Gaseous discomfort- walking, belching will help relieve any discomfort  You may not operate a vehicle for 12 hours  You may not engage in an occupation involving machinery or appliances for rest of today  You may not drink alcoholic beverages for at least 12 hours  Avoid making any critical decisions for at least 24 hour  DIET:   Regular diet. - however -  remember your colon is empty and a heavy meal will produce gas. Avoid these foods:  vegetables, fried / greasy foods, carbonated drinks for today. ACTIVITY:  You may resume your normal daily activities it is recommended that you spend the remainder of the day resting -  avoid any strenuous activity. CALL M.D. ANY SIGN OF:  Increasing pain, nausea, vomiting  Abdominal distension (swelling)  New increased bleeding (oral or rectal)  Fever (chills)  Pain in chest area  Bloody discharge from nose or mouth  Shortness of breath    You may  take any Advil, Aspirin, Ibuprofen, Motrin, Aleve, Goodys, or any similar pain or arthritis products or Tylenol as needed for pain. Follow-up Instructions:   Call Dr. Chapin Spencer  Results of procedure / biopsy in 10-14days   Office telephone for problems or questions [phone]      - Await pathology.      - Due to age and benign findings no need brown routine surveillance colon exam in the future     - Please see Dr. Stephanie Lind or urology about enlarged nodular prostate

## 2020-01-29 NOTE — PROCEDURES
Colonoscopy Procedure Note    Indications:   Family history of coloretal cancer (screening only), Personal history of colon polyps (screening only)  Referring Physician: Urmila Garcia MD   Anesthesia/Sedation:MAC  Endoscopist:  Dr. Teresa Alonzo  Assistant:  Endoscopy Technician-1: Jeanna Regalado  Endoscopy RN-1: Crystal Holland RN  Surgical Assistant: None  Implants: None    Preoperative diagnosis: FAMILY HISTORY OF COLON CANCER, PERSONAL HISTORY OF COLONIC POLYPS    Postoperative diagnosis: 1. 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 Severe Diverticulosis  3. 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 Risks of perforation, hemorrhage, adverse drug reaction, and aspiration were discussed. The patient was placed in the left lateral decubitus position. Based on the pre-procedure assessment, including review of the patient's medical history, medications, allergies, and review of systems, he had been deemed to be an appropriate candidate for moderate sedation; he was therefore sedated with the medications listed above. The patient was monitored continuously with ECG tracing, pulse oximetry, blood pressure monitoring, and direct observations. A rectal examination was performed. The [id number] was inserted into the rectum and advanced under direct vision to the cecum, which was identified by the ileocecal valve and appendiceal orifice. The quality of the colonic preparation was excellent. A careful inspection was made as the colonoscope was withdrawn, including a retroflexed view of the rectum; findings and interventions are described below. Findings:   Rectum: 3/4 nodular prostate  Sigmoid: severe diverticulosis;   Descending Colon: normal  Transverse Colon: normal  Ascending Colon: normal  Cecum: 2 mm polyp removed with cold forceps  Terminal Ileum: not intubated    Specimens:     see above    EBL: None    Complications: None; patient tolerated the procedure well. Recommendations:     - Await pathology.      - Due to age and benign findings no need brown routine surveillance colon exam in the future     - Please see Dr. Kumar Huber or urology about enlarged nodular prostate    Signed By: Laura Jarquin MD                        January 29, 2020

## 2020-01-30 NOTE — TELEPHONE ENCOUNTER
Patient went for colonoscopy yesterday. Wanted provider to know results. Was told to see PCP about enlarged nodular prostate. Would like to discuss in person.

## 2020-01-31 ENCOUNTER — OFFICE VISIT (OUTPATIENT)
Dept: INTERNAL MEDICINE CLINIC | Age: 80
End: 2020-01-31

## 2020-01-31 ENCOUNTER — HOSPITAL ENCOUNTER (OUTPATIENT)
Dept: LAB | Age: 80
Discharge: HOME | End: 2020-01-31
Payer: MEDICARE

## 2020-01-31 VITALS
TEMPERATURE: 97.9 F | HEART RATE: 68 BPM | RESPIRATION RATE: 16 BRPM | OXYGEN SATURATION: 97 % | HEIGHT: 72 IN | BODY MASS INDEX: 24.73 KG/M2 | WEIGHT: 182.6 LBS | SYSTOLIC BLOOD PRESSURE: 130 MMHG | DIASTOLIC BLOOD PRESSURE: 70 MMHG

## 2020-01-31 DIAGNOSIS — R39.89 ABNORMAL PROSTATE EXAM: Primary | ICD-10-CM

## 2020-01-31 DIAGNOSIS — R97.20 INCREASED PROSTATE SPECIFIC ANTIGEN (PSA) VELOCITY: ICD-10-CM

## 2020-01-31 DIAGNOSIS — I78.0 HHT (HEREDITARY HEMORRHAGIC TELANGIECTASIA) (HCC): ICD-10-CM

## 2020-01-31 PROCEDURE — 85025 COMPLETE CBC W/AUTO DIFF WBC: CPT

## 2020-01-31 PROCEDURE — 84153 ASSAY OF PSA TOTAL: CPT

## 2020-01-31 PROCEDURE — 36415 COLL VENOUS BLD VENIPUNCTURE: CPT

## 2020-01-31 NOTE — PROGRESS NOTES
Chief Complaint   Patient presents with    Results     discuss colonscopy     Reviewed record in preparation for visit and have obtained necessary documentation. Identified pt with two pt identifiers(name and ). Health Maintenance Due   Topic    DTaP/Tdap/Td series (1 - Tdap)    Shingrix Vaccine Age 49> (1 of 2)    Influenza Age 5 to Adult     GLAUCOMA SCREENING Q2Y          Chief Complaint   Patient presents with    Results     discuss colonscopy        Wt Readings from Last 3 Encounters:   20 182 lb 9.6 oz (82.8 kg)   20 181 lb (82.1 kg)   19 181 lb 8 oz (82.3 kg)     Temp Readings from Last 3 Encounters:   20 97.9 °F (36.6 °C) (Oral)   20 97.7 °F (36.5 °C)   19 98.8 °F (37.1 °C) (Oral)     BP Readings from Last 3 Encounters:   20 130/70   20 109/59   19 126/64     Pulse Readings from Last 3 Encounters:   20 68   20 77   19 85           Learning Assessment:  :     Learning Assessment 2018   PRIMARY LEARNER Patient Patient   PRIMARY LANGUAGE ENGLISH ENGLISH   LEARNER PREFERENCE PRIMARY DEMONSTRATION DEMONSTRATION   ANSWERED BY patient self   RELATIONSHIP SELF SELF       Depression Screening:  :     3 most recent PHQ Screens 2020   Little interest or pleasure in doing things Not at all   Feeling down, depressed, irritable, or hopeless Not at all   Total Score PHQ 2 0       Fall Risk Assessment:  :     Fall Risk Assessment, last 12 mths 2020   Able to walk? Yes   Fall in past 12 months? No       Abuse Screening:  :     Abuse Screening Questionnaire 3/22/2019   Do you ever feel afraid of your partner? N   Are you in a relationship with someone who physically or mentally threatens you? N   Is it safe for you to go home?  Y       Coordination of Care Questionnaire:  :     1) Have you been to an emergency room, urgent care clinic since your last visit? no   Hospitalized since your last visit? no             2) Have you seen or consulted any other health care providers outside of [address] [place] [address] since your last visit? yes  (Include any pap smears or colon screenings in this section.)    3) Do you have an Advance Directive on file? yes    4) Are you interested in receiving information on Advance Directives? NO      Patient is accompanied by spouse I have received verbal consent from Ander Wallis to discuss any/all medical information while they are present in the room. Reviewed record  In preparation for visit and have obtained necessary documentation.

## 2020-02-01 LAB
BASOPHILS # BLD AUTO: 0 X10E3/UL (ref 0–0.2)
BASOPHILS NFR BLD AUTO: 1 %
EOSINOPHIL # BLD AUTO: 0.3 X10E3/UL (ref 0–0.4)
EOSINOPHIL NFR BLD AUTO: 5 %
ERYTHROCYTE [DISTWIDTH] IN BLOOD BY AUTOMATED COUNT: 13 % (ref 11.6–15.4)
HCT VFR BLD AUTO: 39.7 % (ref 37.5–51)
HGB BLD-MCNC: 13.3 G/DL (ref 13–17.7)
IMM GRANULOCYTES # BLD AUTO: 0 X10E3/UL (ref 0–0.1)
IMM GRANULOCYTES NFR BLD AUTO: 0 %
LYMPHOCYTES # BLD AUTO: 0.7 X10E3/UL (ref 0.7–3.1)
LYMPHOCYTES NFR BLD AUTO: 14 %
MCH RBC QN AUTO: 30.6 PG (ref 26.6–33)
MCHC RBC AUTO-ENTMCNC: 33.5 G/DL (ref 31.5–35.7)
MCV RBC AUTO: 92 FL (ref 79–97)
MONOCYTES # BLD AUTO: 0.6 X10E3/UL (ref 0.1–0.9)
MONOCYTES NFR BLD AUTO: 11 %
NEUTROPHILS # BLD AUTO: 3.7 X10E3/UL (ref 1.4–7)
NEUTROPHILS NFR BLD AUTO: 69 %
PLATELET # BLD AUTO: 193 X10E3/UL (ref 150–450)
PSA SERPL-MCNC: 2.7 NG/ML (ref 0–4)
RBC # BLD AUTO: 4.34 X10E6/UL (ref 4.14–5.8)
WBC # BLD AUTO: 5.3 X10E3/UL (ref 3.4–10.8)

## 2020-02-01 NOTE — PROGRESS NOTES
Send all psa measurements to Dr Omar Rush at [facility]

## 2020-02-02 NOTE — PROGRESS NOTES
HISTORY OF PRESENT ILLNESS  Nida Zamora is a [de-identified] y.o. male. HPI Subjective:  Elmer Drew is seen today for evaluation of an abnormal prostate exam.  He is accompanied by his wife. When he had his colonoscopy with Dr. Zakiya Kellogg an abnormal prostate exam was reported to Ms. Willard. He suggested further evaluation. Elmer Drew has had a fairly normal prostate lab test for his age. Will plan on rechecking this today. He has been referred to Dr. Hugo Frederick. Review of Systems:  Notable for nosebleeds and he does have a history of HHT. Will check a CBC with his PSA. We counseled for 15 minutes regarding possible prostate cancer and the need for follow up with a specialist.  Decisions on treatment would depend on the outcome of the evaluation. They seem comfortable with this, as well as being somewhat reassured. We counseled for 15 minutes regarding above, including further evaluation for prostate abnormalities. 15-minute visit overall. Greater than 50% of the visit was spent in counseling. Review of Systems   Constitutional: Negative for weight loss. HENT: Positive for nosebleeds. Genitourinary: Positive for frequency. Negative for dysuria and hematuria. Musculoskeletal:        Negative for bone pain        Physical Exam  Vitals signs and nursing note reviewed. Skin:     General: Skin is warm and dry. Neurological:      Mental Status: He is alert. Psychiatric:         Behavior: Behavior normal.         ASSESSMENT and PLAN  Diagnoses and all orders for this visit:    1. Abnormal prostate exam  -     PSA, DIAGNOSTIC (PROSTATE SPECIFIC AG)    2. HHT (hereditary hemorrhagic telangiectasia) ([facility] [address]  -     CBC WITH AUTOMATED DIFF    3.  Increased prostate specific antigen (PSA) velocity  -     PSA, DIAGNOSTIC (PROSTATE SPECIFIC AG)    Plan was reviewed with patient and family, understanding expressed

## 2020-02-03 ENCOUNTER — DOCUMENTATION ONLY (OUTPATIENT)
Dept: INTERNAL MEDICINE CLINIC | Age: 80
End: 2020-02-03

## 2020-02-03 ENCOUNTER — TELEPHONE (OUTPATIENT)
Dept: INTERNAL MEDICINE CLINIC | Age: 80
End: 2020-02-03

## 2020-02-03 NOTE — TELEPHONE ENCOUNTER
Informed patient per provider result note. Understanding verbalized. Patient states Dr Vijay Vieyra told wife diverticulitis in intestines. Last flare up, prescribed dicyclomine 20 mg. Would like a refill to have on hand.

## 2020-02-03 NOTE — TELEPHONE ENCOUNTER
----- Message from Nico Quintanilla MD sent at 2/1/2020  3:01 PM EST -----  Call- PSA has gone up just a bit, cbc is normal  See letter

## 2020-02-04 RX ORDER — DICYCLOMINE HYDROCHLORIDE 10 MG/1
20 CAPSULE ORAL
Qty: 30 CAP | Refills: 1 | Status: SHIPPED | OUTPATIENT
Start: 2020-02-04 | End: 2020-06-27

## 2020-02-04 NOTE — H&P
Pre-endoscopy H and P for Colonoscopy    The patient was seen and examined. Date of last colonoscopy: 2014, Polyps  Yes      The airway was assessed and documented. The problem list, past medical history, and medications were reviewed. 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 ([facility] L97.925     Social History     Socioeconomic History    Marital status:      Spouse name: Not on file    Number of children: Not on file    Years of education: Not on file    Highest education level: Not on file   Occupational History    Not on file   Social Needs    Financial resource strain: Not on file    Food insecurity:     Worry: Not on file     Inability: Not on file    Transportation needs:     Medical: Not on file     Non-medical: Not on file   Tobacco Use    Smoking status: Former Smoker    Smokeless tobacco: Never Used   Substance and Sexual Activity    Alcohol use:  Yes     Alcohol/week: 0.0 standard drinks     Comment: one drink per week    Drug use: No    Sexual activity: Yes     Partners: Female   Lifestyle    Physical activity:     Days per week: Not on file     Minutes per session: Not on file    Stress: Not on file   Relationships    Social connections:     Talks on phone: Not on file     Gets together: Not on file     Attends Mandaen service: Not on file     Active member of club or organization: Not on file     Attends meetings of clubs or organizations: Not on file     Relationship status: Not on file  Intimate partner violence:     Fear of current or ex partner: Not on file     Emotionally abused: Not on file     Physically abused: Not on file     Forced sexual activity: Not on file   Other Topics Concern    Not on file   Social History Narrative    Not on file     Past Medical History:   Diagnosis Date    HHT (hereditary hemorrhagic telangiectasia) ([facility] [address]      The patient has a family history of colon ca    Prior to Admission Medications   Prescriptions Last Dose Informant Patient Reported? Taking? TIMOLOL MALEATE OP   Yes No   Sig: Apply  to eye. 1 drop to each eye daily   ferrous sulfate 325 mg (65 mg iron) tablet 1/22/2020 at Unknown time  No Yes   Sig: Take 1 Tab by mouth Daily (before breakfast). Patient taking differently: Take 325 mg by mouth as needed. Facility-Administered Medications: None         The review of systems is:  negative for shortness of breath or chest pain      The heart, lungs and mental status were satisfactory for the administration of MAC sedation and for the procedure. Mallampati score: See Anesthesia. I discussed with the patient the objectives, risks, consequences and alternatives to the procedure. Plan: Endoscopic procedure with MAC sedation.     Edgar Ponce MD  2/4/2020  1:09 PM

## 2020-02-04 NOTE — TELEPHONE ENCOUNTER
Left message for patient to return call, and let then know medication they requested has been sent to their pharmacy.

## 2020-05-18 ENCOUNTER — TELEPHONE (OUTPATIENT)
Dept: INTERNAL MEDICINE CLINIC | Age: 80
End: 2020-05-18

## 2020-05-18 DIAGNOSIS — I78.0 HHT (HEREDITARY HEMORRHAGIC TELANGIECTASIA) (HCC): ICD-10-CM

## 2020-05-18 DIAGNOSIS — R39.89 ABNORMAL PROSTATE EXAM: Primary | ICD-10-CM

## 2020-05-18 DIAGNOSIS — R97.20 INCREASED PROSTATE SPECIFIC ANTIGEN (PSA) VELOCITY: ICD-10-CM

## 2020-05-18 DIAGNOSIS — E78.3 MIXED HYPERGLYCERIDEMIA: ICD-10-CM

## 2020-05-21 ENCOUNTER — HOSPITAL ENCOUNTER (OUTPATIENT)
Dept: LAB | Age: 80
Discharge: HOME | End: 2020-05-21
Payer: MEDICARE

## 2020-05-21 ENCOUNTER — TELEPHONE (OUTPATIENT)
Dept: INTERNAL MEDICINE CLINIC | Age: 80
End: 2020-05-21

## 2020-05-21 DIAGNOSIS — N40.0 BPH WITHOUT OBSTRUCTION/LOWER URINARY TRACT SYMPTOMS: Primary | ICD-10-CM

## 2020-05-21 PROCEDURE — 83036 HEMOGLOBIN GLYCOSYLATED A1C: CPT

## 2020-05-21 NOTE — TELEPHONE ENCOUNTER
Pt went to [facility] to have his lab done today and the tech told him to have a urine test at the same time  A sample was given and  Was told to call the office to fax a lab order for the urine test fax # [phone] any question call pt  At [phone]

## 2020-05-22 ENCOUNTER — TELEPHONE (OUTPATIENT)
Dept: INTERNAL MEDICINE CLINIC | Age: 80
End: 2020-05-22

## 2020-05-22 LAB
ALBUMIN SERPL-MCNC: 4.2 G/DL (ref 3.6–4.6)
ALBUMIN/GLOB SERPL: 1.8 {RATIO} (ref 1.2–2.2)
ALP SERPL-CCNC: 51 IU/L (ref 39–117)
ALT SERPL-CCNC: 15 IU/L (ref 0–44)
APPEARANCE UR: CLEAR
AST SERPL-CCNC: 16 IU/L (ref 0–40)
BASOPHILS # BLD AUTO: 0 X10E3/UL (ref 0–0.2)
BASOPHILS NFR BLD AUTO: 1 %
BILIRUB SERPL-MCNC: 0.5 MG/DL (ref 0–1.2)
BILIRUB UR QL STRIP: NEGATIVE
BUN SERPL-MCNC: 14 MG/DL (ref 8–27)
BUN/CREAT SERPL: 13 (ref 10–24)
CALCIUM SERPL-MCNC: 8.8 MG/DL (ref 8.6–10.2)
CHLORIDE SERPL-SCNC: 103 MMOL/L (ref 96–106)
CHOLEST SERPL-MCNC: 142 MG/DL (ref 100–199)
CO2 SERPL-SCNC: 25 MMOL/L (ref 20–29)
COLOR UR: YELLOW
CREAT SERPL-MCNC: 1.08 MG/DL (ref 0.76–1.27)
EOSINOPHIL # BLD AUTO: 0.2 X10E3/UL (ref 0–0.4)
EOSINOPHIL NFR BLD AUTO: 5 %
ERYTHROCYTE [DISTWIDTH] IN BLOOD BY AUTOMATED COUNT: 16.1 % (ref 11.6–15.4)
GLOBULIN SER CALC-MCNC: 2.4 G/DL (ref 1.5–4.5)
GLUCOSE SERPL-MCNC: 103 MG/DL (ref 65–99)
GLUCOSE UR QL: NEGATIVE
HCT VFR BLD AUTO: 35.7 % (ref 37.5–51)
HDLC SERPL-MCNC: 46 MG/DL
HGB BLD-MCNC: 11.1 G/DL (ref 13–17.7)
HGB UR QL STRIP: NEGATIVE
IMM GRANULOCYTES # BLD AUTO: 0 X10E3/UL (ref 0–0.1)
IMM GRANULOCYTES NFR BLD AUTO: 0 %
KETONES UR QL STRIP: NEGATIVE
LDLC SERPL CALC-MCNC: 86 MG/DL (ref 0–99)
LEUKOCYTE ESTERASE UR QL STRIP: NEGATIVE
LYMPHOCYTES # BLD AUTO: 0.5 X10E3/UL (ref 0.7–3.1)
LYMPHOCYTES NFR BLD AUTO: 13 %
MCH RBC QN AUTO: 27 PG (ref 26.6–33)
MCHC RBC AUTO-ENTMCNC: 31.1 G/DL (ref 31.5–35.7)
MCV RBC AUTO: 87 FL (ref 79–97)
MICRO URNS: NORMAL
MONOCYTES # BLD AUTO: 0.5 X10E3/UL (ref 0.1–0.9)
MONOCYTES NFR BLD AUTO: 11 %
NEUTROPHILS # BLD AUTO: 2.8 X10E3/UL (ref 1.4–7)
NEUTROPHILS NFR BLD AUTO: 70 %
NITRITE UR QL STRIP: NEGATIVE
PH UR STRIP: 6.5 [PH] (ref 5–7.5)
PLATELET # BLD AUTO: 220 X10E3/UL (ref 150–450)
POTASSIUM SERPL-SCNC: 4.4 MMOL/L (ref 3.5–5.2)
PROT SERPL-MCNC: 6.6 G/DL (ref 6–8.5)
PROT UR QL STRIP: NEGATIVE
PSA SERPL-MCNC: 2.7 NG/ML (ref 0–4)
RBC # BLD AUTO: 4.11 X10E6/UL (ref 4.14–5.8)
SODIUM SERPL-SCNC: 139 MMOL/L (ref 134–144)
SP GR UR: 1.01 (ref 1–1.03)
TRIGL SERPL-MCNC: 52 MG/DL (ref 0–149)
UROBILINOGEN UR STRIP-MCNC: 0.2 MG/DL (ref 0.2–1)
VLDLC SERPL CALC-MCNC: 10 MG/DL (ref 5–40)
WBC # BLD AUTO: 4.1 X10E3/UL (ref 3.4–10.8)

## 2020-05-22 NOTE — TELEPHONE ENCOUNTER
Spoke with Zaria Schultz at [facility], adding test to existing order per provider.     ADD ON A1C- IFG

## 2020-05-26 LAB
EST. AVERAGE GLUCOSE BLD GHB EST-MCNC: 103 MG/DL
HBA1C MFR BLD: 5.2 % (ref 4.8–5.6)
SPECIMEN STATUS REPORT, ROLRST: NORMAL

## 2020-06-27 RX ORDER — DICYCLOMINE HYDROCHLORIDE 10 MG/1
CAPSULE ORAL
Qty: 30 CAP | Refills: 11 | Status: SHIPPED | OUTPATIENT
Start: 2020-06-27 | End: 2020-07-13 | Stop reason: SDUPTHER

## 2020-07-14 RX ORDER — DICYCLOMINE HYDROCHLORIDE 10 MG/1
CAPSULE ORAL
Qty: 30 CAP | Refills: 11 | Status: SHIPPED | OUTPATIENT
Start: 2020-07-14 | End: 2020-11-05 | Stop reason: DRUGHIGH

## 2020-07-27 ENCOUNTER — OFFICE VISIT (OUTPATIENT)
Dept: INTERNAL MEDICINE CLINIC | Age: 80
End: 2020-07-27

## 2020-07-27 VITALS
HEART RATE: 75 BPM | SYSTOLIC BLOOD PRESSURE: 107 MMHG | RESPIRATION RATE: 20 BRPM | OXYGEN SATURATION: 99 % | DIASTOLIC BLOOD PRESSURE: 66 MMHG | HEIGHT: 73 IN | BODY MASS INDEX: 24.18 KG/M2 | TEMPERATURE: 98.2 F | WEIGHT: 182.4 LBS

## 2020-07-27 DIAGNOSIS — M79.89 LEG SWELLING: Primary | ICD-10-CM

## 2020-07-27 NOTE — PROGRESS NOTES
Pt states has been experiencing bilateral lower leg swelling for 3-4 months. More in left leg. States has worsened in past 2 months. He uses compression stockings at night. Here for further evaluation.

## 2020-07-27 NOTE — PROGRESS NOTES
HISTORY OF PRESENT ILLNESS  Kavita Rothman is a 79 y.o. male. Leg Swelling   The history is provided by the patient and spouse. This is a chronic problem. Episode onset: worsenening gradually past 5 mos. The problem occurs daily. The problem has been gradually worsening. Pertinent negatives include no chest pain and no shortness of breath. The symptoms are aggravated by standing (sitting, tends to sit at the computer a lot during the day. ). Relieved by: improved after sleep. He has tried nothing for the symptoms. Review of Systems   Constitutional: Negative for chills and fever. Respiratory: Negative for shortness of breath. Cardiovascular: Positive for leg swelling. Negative for chest pain. Physical Exam  Vitals signs and nursing note reviewed. Constitutional:       General: He is not in acute distress. Cardiovascular:      Rate and Rhythm: Normal rate and regular rhythm. Pulses:           Dorsalis pedis pulses are 2+ on the right side and 2+ on the left side. Posterior tibial pulses are 2+ on the right side and 2+ on the left side. Heart sounds: No murmur. No friction rub. No gallop. Comments: Stasis changes  Pulmonary:      Effort: Pulmonary effort is normal.      Breath sounds: Normal breath sounds. Musculoskeletal:      Right lower le+ Edema present. Left lower le+ Edema present. ASSESSMENT and PLAN  Diagnoses and all orders for this visit:    1. Leg swelling  - Conservative measures reviewed. See patient instructions   - Advised frequent walk breaks and some regular exercise.   - If worsening consider mild diuretic    Plan was reviewed with patient and family, understanding expressed

## 2020-11-05 ENCOUNTER — OFFICE VISIT (OUTPATIENT)
Dept: INTERNAL MEDICINE CLINIC | Age: 80
End: 2020-11-05
Payer: MEDICARE

## 2020-11-05 VITALS
HEART RATE: 65 BPM | BODY MASS INDEX: 24.01 KG/M2 | SYSTOLIC BLOOD PRESSURE: 130 MMHG | RESPIRATION RATE: 16 BRPM | WEIGHT: 182 LBS | OXYGEN SATURATION: 98 % | DIASTOLIC BLOOD PRESSURE: 70 MMHG | TEMPERATURE: 98 F

## 2020-11-05 DIAGNOSIS — G47.10 HYPERSOMNOLENCE: ICD-10-CM

## 2020-11-05 DIAGNOSIS — K58.1 IRRITABLE BOWEL SYNDROME WITH CONSTIPATION: Primary | ICD-10-CM

## 2020-11-05 DIAGNOSIS — H61.23 HEARING LOSS OF BOTH EARS DUE TO CERUMEN IMPACTION: ICD-10-CM

## 2020-11-05 DIAGNOSIS — I78.0 HHT (HEREDITARY HEMORRHAGIC TELANGIECTASIA) (HCC): ICD-10-CM

## 2020-11-05 PROBLEM — L97.926 NON-PRESSURE CHRONIC ULCER OF LEFT LOWER LEG WITH BONE INVOLVEMENT WITHOUT EVIDENCE OF NECROSIS (HCC): Status: RESOLVED | Noted: 2018-10-25 | Resolved: 2020-11-05

## 2020-11-05 PROCEDURE — G8427 DOCREV CUR MEDS BY ELIG CLIN: HCPCS | Performed by: INTERNAL MEDICINE

## 2020-11-05 PROCEDURE — G8536 NO DOC ELDER MAL SCRN: HCPCS | Performed by: INTERNAL MEDICINE

## 2020-11-05 PROCEDURE — 99214 OFFICE O/P EST MOD 30 MIN: CPT | Performed by: INTERNAL MEDICINE

## 2020-11-05 PROCEDURE — 1101F PT FALLS ASSESS-DOCD LE1/YR: CPT | Performed by: INTERNAL MEDICINE

## 2020-11-05 PROCEDURE — G8420 CALC BMI NORM PARAMETERS: HCPCS | Performed by: INTERNAL MEDICINE

## 2020-11-05 PROCEDURE — G8510 SCR DEP NEG, NO PLAN REQD: HCPCS | Performed by: INTERNAL MEDICINE

## 2020-11-05 PROCEDURE — G0463 HOSPITAL OUTPT CLINIC VISIT: HCPCS | Performed by: INTERNAL MEDICINE

## 2020-11-05 PROCEDURE — 69209 REMOVE IMPACTED EAR WAX UNI: CPT | Performed by: INTERNAL MEDICINE

## 2020-11-05 RX ORDER — DICYCLOMINE HYDROCHLORIDE 20 MG/1
20 TABLET ORAL
Qty: 30 TAB | Refills: 3 | Status: SHIPPED | OUTPATIENT
Start: 2020-11-05 | End: 2021-03-16

## 2020-11-05 NOTE — PROGRESS NOTES
HISTORY OF PRESENT ILLNESS  Nathalie Silveira is a 79 y.o. male. HPI Sixto Wooten is seen today for evaluation of ear fullness as well as for follow up of some chronic problems. 1. Ear fullness. He went to his audiologist who diagnosed wax impaction and he was referred here for treatment. He has had decreased hearing. Of note, he had wax impaction but also a foreign body in one ear, it was the tip of a hearing aid ear piece. He tolerated treatment for all this well. 2. Chronic problems reviewed. IBS. He requests a change of his Bentyl to 20 mg qd as it seems to be more helpful. Anemia. Due for recheck of labs. Prostate abnormality. MRI is pending. He follows up with Dr. Jermaine Welch. Hypersomnia. This is a new problem. I wonder if he might have sleep apnea. Symptoms have been present for three to four months where he will feel very fatigued later in the day. Past Medical History:   Diagnosis Date    HHT (hereditary hemorrhagic telangiectasia) ([facility]          Review of Systems   Constitutional: Positive for malaise/fatigue. Negative for chills and fever. HENT: Positive for hearing loss and nosebleeds. Gastrointestinal: Positive for abdominal pain. Physical Exam  Vitals signs and nursing note reviewed. HENT:      Right Ear: There is impacted cerumen. Left Ear: There is impacted cerumen. Ears:     Skin:     General: Skin is warm and dry. Neurological:      Mental Status: He is alert. Psychiatric:         Behavior: Behavior normal.         ASSESSMENT and PLAN  Diagnoses and all orders for this visit:    1. Irritable bowel syndrome with constipation  -     dicyclomine (BENTYL) 20 mg tablet; Take 1 Tab by mouth three (3) times daily as needed for Abdominal Cramps. New dose , new directions    2. HHT (hereditary hemorrhagic telangiectasia) ([facility]  -     CBC WITH AUTOMATED DIFF; Future    3. Hypersomnolence  -     SLEEP MEDICINE REFERRAL    4.  Hearing loss of both ears due to cerumen impaction  -     REMOVAL IMPACTED CERUMEN IRRIGATION/LVG FIDENCIOAT

## 2020-11-05 NOTE — PROGRESS NOTES
Verified name and birth date for privacy precautions. Chart reviewed in preparation for today's visit. Chief Complaint   Patient presents with    Ear Fullness          Health Maintenance Due   Topic    DTaP/Tdap/Td series (1 - Tdap)    Shingrix Vaccine Age 49> (1 of 2)    GLAUCOMA SCREENING Q2Y     Medicare Yearly Exam     Flu Vaccine (1)         Wt Readings from Last 3 Encounters:   11/05/20 182 lb (82.6 kg)   07/27/20 182 lb 6.4 oz (82.7 kg)   01/31/20 182 lb 9.6 oz (82.8 kg)     Temp Readings from Last 3 Encounters:   11/05/20 98 °F (36.7 °C) (Temporal)   07/27/20 98.2 °F (36.8 °C) (Temporal)   01/31/20 97.9 °F (36.6 °C) (Oral)     BP Readings from Last 3 Encounters:   11/05/20 130/70   07/27/20 107/66   01/31/20 130/70     Pulse Readings from Last 3 Encounters:   11/05/20 65   07/27/20 75   01/31/20 68         Learning Assessment:  :     Learning Assessment 1/31/2018 9/18/2014   PRIMARY LEARNER Patient Patient   PRIMARY LANGUAGE ENGLISH ENGLISH   LEARNER PREFERENCE PRIMARY DEMONSTRATION DEMONSTRATION   ANSWERED BY patient self   RELATIONSHIP SELF SELF       Depression Screening:  :     3 most recent PHQ Screens 11/5/2020   Little interest or pleasure in doing things Not at all   Feeling down, depressed, irritable, or hopeless Not at all   Total Score PHQ 2 0       Fall Risk Assessment:  :     Fall Risk Assessment, last 12 mths 11/5/2020   Able to walk? Yes   Fall in past 12 months? No   Fall with injury? -   Number of falls in past 12 months -   Fall Risk Score -       Abuse Screening:  :     Abuse Screening Questionnaire 11/5/2020 3/22/2019   Do you ever feel afraid of your partner? N N   Are you in a relationship with someone who physically or mentally threatens you? N N   Is it safe for you to go home?  Gregg Galloway

## 2020-11-24 ENCOUNTER — OFFICE VISIT (OUTPATIENT)
Dept: INTERNAL MEDICINE CLINIC | Age: 80
End: 2020-11-24
Payer: MEDICARE

## 2020-11-24 VITALS
HEIGHT: 73 IN | BODY MASS INDEX: 24.01 KG/M2 | RESPIRATION RATE: 16 BRPM | TEMPERATURE: 97.6 F | HEART RATE: 64 BPM | SYSTOLIC BLOOD PRESSURE: 120 MMHG | OXYGEN SATURATION: 97 % | WEIGHT: 181.2 LBS | DIASTOLIC BLOOD PRESSURE: 78 MMHG

## 2020-11-24 DIAGNOSIS — H61.23 IMPACTED CERUMEN OF BOTH EARS: ICD-10-CM

## 2020-11-24 DIAGNOSIS — I78.0 HHT (HEREDITARY HEMORRHAGIC TELANGIECTASIA) (HCC): Primary | ICD-10-CM

## 2020-11-24 LAB
BASOPHILS # BLD: 0.1 K/UL (ref 0–0.1)
BASOPHILS NFR BLD: 2 % (ref 0–1)
DIFFERENTIAL METHOD BLD: ABNORMAL
EOSINOPHIL # BLD: 0.2 K/UL (ref 0–0.4)
EOSINOPHIL NFR BLD: 6 % (ref 0–7)
ERYTHROCYTE [DISTWIDTH] IN BLOOD BY AUTOMATED COUNT: 19.7 % (ref 11.5–14.5)
HCT VFR BLD AUTO: 34.1 % (ref 36.6–50.3)
HGB BLD-MCNC: 10.3 G/DL (ref 12.1–17)
IMM GRANULOCYTES # BLD AUTO: 0 K/UL (ref 0–0.04)
IMM GRANULOCYTES NFR BLD AUTO: 0 % (ref 0–0.5)
LYMPHOCYTES # BLD: 0.6 K/UL (ref 0.8–3.5)
LYMPHOCYTES NFR BLD: 16 % (ref 12–49)
MCH RBC QN AUTO: 24.8 PG (ref 26–34)
MCHC RBC AUTO-ENTMCNC: 30.2 G/DL (ref 30–36.5)
MCV RBC AUTO: 82 FL (ref 80–99)
MONOCYTES # BLD: 0.5 K/UL (ref 0–1)
MONOCYTES NFR BLD: 12 % (ref 5–13)
NEUTS SEG # BLD: 2.4 K/UL (ref 1.8–8)
NEUTS SEG NFR BLD: 64 % (ref 32–75)
NRBC # BLD: 0 K/UL (ref 0–0.01)
NRBC BLD-RTO: 0 PER 100 WBC
PLATELET # BLD AUTO: 190 K/UL (ref 150–400)
PMV BLD AUTO: 10.3 FL (ref 8.9–12.9)
RBC # BLD AUTO: 4.16 M/UL (ref 4.1–5.7)
RBC MORPH BLD: ABNORMAL
WBC # BLD AUTO: 3.8 K/UL (ref 4.1–11.1)

## 2020-11-24 PROCEDURE — 99213 OFFICE O/P EST LOW 20 MIN: CPT | Performed by: NURSE PRACTITIONER

## 2020-11-24 PROCEDURE — 1101F PT FALLS ASSESS-DOCD LE1/YR: CPT | Performed by: NURSE PRACTITIONER

## 2020-11-24 PROCEDURE — G8432 DEP SCR NOT DOC, RNG: HCPCS | Performed by: NURSE PRACTITIONER

## 2020-11-24 PROCEDURE — G8427 DOCREV CUR MEDS BY ELIG CLIN: HCPCS | Performed by: NURSE PRACTITIONER

## 2020-11-24 PROCEDURE — G8536 NO DOC ELDER MAL SCRN: HCPCS | Performed by: NURSE PRACTITIONER

## 2020-11-24 PROCEDURE — G0463 HOSPITAL OUTPT CLINIC VISIT: HCPCS | Performed by: NURSE PRACTITIONER

## 2020-11-24 PROCEDURE — G8420 CALC BMI NORM PARAMETERS: HCPCS | Performed by: NURSE PRACTITIONER

## 2020-11-24 NOTE — PROGRESS NOTES
HISTORY OF PRESENT ILLNESS  Valarie Suggs is a 79 y.o. male. Patient presents for impacted cerumen. He was advised by audiologist to get ears cleaned out. Patient also developed severe nosebleed while at office. Patient states it is common for him to have one nosebleed per day due to [address]. He has had many treatments in the past for prevention of nosebleeds. No ear pain. Visit Vitals  /78 (BP 1 Location: Right arm, BP Patient Position: Sitting)   Pulse 64   Temp 97.6 °F (36.4 °C) (Temporal)   Resp 16   Ht 6' 1\" (1.854 m)   Wt 181 lb 3.2 oz (82.2 kg)   SpO2 97%   BMI 23.91 kg/m²       HPI    Review of Systems   HENT: Positive for hearing loss and nosebleeds. Physical Exam  Constitutional:       Appearance: Normal appearance. HENT:      Right Ear: Decreased hearing noted. Left Ear: Decreased hearing noted. Ears:      Comments: Moderate cerumen of left ear-easily removed by lighted curette, very mild bleeding noted to canal after procedure    Mild cerumen of right ear- removed easily with lighted curette     Nose:      Right Nostril: Epistaxis present. Comments: Contained with gauze and pressure applied at bridge of nose after 20 minutes  Neurological:      General: No focal deficit present. Mental Status: He is alert and oriented to person, place, and time. Psychiatric:         Mood and Affect: Mood normal.         Behavior: Behavior normal.         ASSESSMENT and PLAN    ICD-10-CM ICD-9-CM    1. HHT (hereditary hemorrhagic telangiectasia) ([facility])  I78.0 448.0 CBC WITH AUTOMATED DIFF      CBC WITH AUTOMATED DIFF      CBC WITH AUTOMATED DIFF   2.  Impacted cerumen of both ears  H61.23 380.4      Orders Placed This Encounter    CBC WITH AUTOMATED DIFF   lab reordered for PCP  Follow up with ENT if nosebleeds worsen or do not improve

## 2020-11-29 NOTE — PROGRESS NOTES
Call- his red cell count is down a bit further. If he is taking iron every day , increase to bid. If not taking, then restart every day . Melanie cbc in one month.  Find out where he'd like to do lab and mail/leave at [facility] appropriate form

## 2020-11-30 DIAGNOSIS — D50.0 IRON DEFICIENCY ANEMIA DUE TO CHRONIC BLOOD LOSS: Primary | ICD-10-CM

## 2020-11-30 NOTE — PROGRESS NOTES
Spoke with patient and gave him his lab results. Patient will come in in 1 month to recheck the lab.

## 2020-12-11 ENCOUNTER — TELEPHONE (OUTPATIENT)
Dept: INTERNAL MEDICINE CLINIC | Age: 80
End: 2020-12-11

## 2020-12-11 NOTE — TELEPHONE ENCOUNTER
Gave patient the name of OTC Debrox ear drops, use per package instructions. Patient verbalized understanding.

## 2021-02-01 ENCOUNTER — HOSPITAL ENCOUNTER (OUTPATIENT)
Dept: LAB | Age: 81
Discharge: HOME | End: 2021-02-01
Payer: MEDICARE

## 2021-02-01 PROCEDURE — 36415 COLL VENOUS BLD VENIPUNCTURE: CPT

## 2021-02-01 PROCEDURE — 85025 COMPLETE CBC W/AUTO DIFF WBC: CPT

## 2021-02-02 LAB
BASOPHILS # BLD AUTO: 0.1 X10E3/UL (ref 0–0.2)
BASOPHILS NFR BLD AUTO: 1 %
EOSINOPHIL # BLD AUTO: 0.2 X10E3/UL (ref 0–0.4)
EOSINOPHIL NFR BLD AUTO: 3 %
ERYTHROCYTE [DISTWIDTH] IN BLOOD BY AUTOMATED COUNT: 14.9 % (ref 11.6–15.4)
HCT VFR BLD AUTO: 31.3 % (ref 37.5–51)
HGB BLD-MCNC: 9.7 G/DL (ref 13–17.7)
IMM GRANULOCYTES # BLD AUTO: 0 X10E3/UL (ref 0–0.1)
IMM GRANULOCYTES NFR BLD AUTO: 0 %
LYMPHOCYTES # BLD AUTO: 0.7 X10E3/UL (ref 0.7–3.1)
LYMPHOCYTES NFR BLD AUTO: 11 %
MCH RBC QN AUTO: 25.2 PG (ref 26.6–33)
MCHC RBC AUTO-ENTMCNC: 31 G/DL (ref 31.5–35.7)
MCV RBC AUTO: 81 FL (ref 79–97)
MONOCYTES # BLD AUTO: 0.6 X10E3/UL (ref 0.1–0.9)
MONOCYTES NFR BLD AUTO: 10 %
NEUTROPHILS # BLD AUTO: 4.7 X10E3/UL (ref 1.4–7)
NEUTROPHILS NFR BLD AUTO: 75 %
PLATELET # BLD AUTO: 219 X10E3/UL (ref 150–450)
RBC # BLD AUTO: 3.85 X10E6/UL (ref 4.14–5.8)
WBC # BLD AUTO: 6.2 X10E3/UL (ref 3.4–10.8)

## 2021-02-02 NOTE — PROGRESS NOTES
Advised pt his blood count is still low. MD wants him to schedule telemedicine visit. Pt is not able to do virtual visit.  Scheduled for telephone encounter on 2/4/21 at 1 pm. Will forward to MD.

## 2021-02-04 ENCOUNTER — VIRTUAL VISIT (OUTPATIENT)
Dept: INTERNAL MEDICINE CLINIC | Age: 81
End: 2021-02-04
Payer: MEDICARE

## 2021-02-04 DIAGNOSIS — I78.0 HHT (HEREDITARY HEMORRHAGIC TELANGIECTASIA) (HCC): ICD-10-CM

## 2021-02-04 DIAGNOSIS — C61 PROSTATE CANCER (HCC): ICD-10-CM

## 2021-02-04 DIAGNOSIS — E53.8 B12 DEFICIENCY: ICD-10-CM

## 2021-02-04 DIAGNOSIS — D50.0 IRON DEFICIENCY ANEMIA DUE TO CHRONIC BLOOD LOSS: Primary | ICD-10-CM

## 2021-02-04 PROCEDURE — 99442 PR PHYS/QHP TELEPHONE EVALUATION 11-20 MIN: CPT | Performed by: INTERNAL MEDICINE

## 2021-02-04 NOTE — PROGRESS NOTES
HISTORY OF PRESENT ILLNESS Lexie Concepcion is a 79 y.o. male. HPI Dictation on: 02/04/2021  1:40 PM by: Josefa Manzanares [[id number]] Review of Systems Constitutional: Negative for chills, fever and weight loss. HENT: Positive for nosebleeds. Respiratory: Negative. Cardiovascular: Negative for chest pain, palpitations, leg swelling and PND. Gastrointestinal: Negative for blood in stool and melena. Musculoskeletal: Negative for myalgias. Neurological: Negative for focal weakness. Physical Exam- NA, sounds comfortable ASSESSMENT and PLAN Diagnoses and all orders for this visit: 
 
1. Iron deficiency anemia due to chronic blood loss 
-     ferrous sulfate (SLOW FE) 142 mg (45 mg iron) ER tablet; Take one every other day, a/w bid every other day 
-     IRON PROFILE; Future -     CBC WITH AUTOMATED DIFF; Future 2. Prostate cancer ([facility] [address] 3. [facility] 4. B12 deficiency 
-     VITAMIN B12; Future Lexie Concepcion is a 79 y.o. male evaluated via telephone on 2/4/2021. Assessment & Plan:  
 
 
Subjective:  
 
Since last visit: Yes No: N/A. I communicated with the patient and/or health care decision maker today regarding: anemia Consent: 
Lexie Concepcion, who was evaluated through a synchronous (real-time) audio only encounter, and/or his healthcare decision maker, is aware that it is a billable service, with coverage as determined by his insurance carrier. He provided verbal consent to proceed: Yes, and patient identification was verified. It was conducted pursuant to the emergency declaration under the [address] [place] [address], [address] [place] [address] authority and the [employer] General Act. A caregiver was present when appropriate. Ability to conduct physical exam was limited. I was in the office. The patient was at home. I affirm this is a Patient Initiated Episode with an Established Patient who has not had a related appointment within my department in the past 7 days or scheduled within the next 24 hours. Total Time: minutes: 11-20 minutes Lashaun Regalado MD

## 2021-02-04 NOTE — Clinical Note
HISTORY OF PRESENT ILLNESS Julia Carrion is a 79 y.o. male. HPI Laith Arvizu is seen today, a telephone encounter to review recent lab work showing worsening anemia. His wife is present for the telephone encounter. Laith Arvizu has longstanding anemia of iron deficiency felt to be secondary to nose bleeds. These nose bleeds have worsened. They occur in the setting of his genetic disease HHT. He follows up with an ENT specialist at the [address] [place] [address] and will be seeing him for more treatments later this month. He is taking iron daily. If he takes bid for too long, he will get diarrhea. We have decided on a plan of alternating one every other day alternating with bid every other day. He will let me know if this is not tolerated. We will also get him in for some labs to confirm the level of iron deficiency as well as a rule out of B-12 deficiency. He denies any significant shortness of breath or any real symptoms associated with anemia. He denies any GI symptoms such as rectal bleeding. Of note, his colonoscopy in January 2020 was unremarkable. We counseled regarding treatment and workup of the anemia as above. We will get iron and B-12 levels now and a CBC in one month. He also notes that he has been diagnosed with prostate cancer and will be starting radiation therapy later this month. He will let me know if he needs anything else and we will decide on how to proceed regarding his anemia based on follow up lab tests. We did briefly discuss the possibility of IV iron if he cannot take enough iron by mouth. Review of Systems Constitutional: Negative for chills, fever and weight loss. HENT: Positive for nosebleeds. Respiratory: Negative. Cardiovascular: Negative for chest pain, palpitations, leg swelling and PND. Gastrointestinal: Negative for blood in stool and melena. Musculoskeletal: Negative for myalgias. Neurological: Negative for focal weakness. Physical Exam- NA, sounds comfortable ASSESSMENT and PLAN Diagnoses and all orders for this visit: 
 
1. Iron deficiency anemia due to chronic blood loss 
-     ferrous sulfate (SLOW FE) 142 mg (45 mg iron) ER tablet; Take one every other day, a/w bid every other day 
-     IRON PROFILE; Future -     CBC WITH AUTOMATED DIFF; Future 2. Prostate cancer ([facility] [address] 3. HHT (hereditary hemorrhagic telangiectasia) ([facility] [address] [facility] 4. B12 deficiency 
-     VITAMIN B12; Future Lexie Concepcion is a 79 y.o. male evaluated via telephone on 2/4/2021. Assessment & Plan:  
 
 
Subjective:  
 
Since last visit: Yes No: N/A. I communicated with the patient and/or health care decision maker today regarding: anemia Consent: 
Lexie Concepcion, who was evaluated through a synchronous (real-time) audio only encounter, and/or his healthcare decision maker, is aware that it is a billable service, with coverage as determined by his insurance carrier. He provided verbal consent to proceed: Yes, and patient identification was verified. It was conducted pursuant to the emergency declaration under the [facility], [address] [place] [address] authority and the [facility] and [facility] General Act. A caregiver was present when appropriate. Ability to conduct physical exam was limited. I was in the office. The patient was at home. I affirm this is a Patient Initiated Episode with an Established Patient who has not had a related appointment within my department in the past 7 days or scheduled within the next 24 hours. Total Time: minutes: 11-20 minutes Anna Barajas MD

## 2021-02-05 ENCOUNTER — TELEPHONE (OUTPATIENT)
Dept: INTERNAL MEDICINE CLINIC | Age: 81
End: 2021-02-05

## 2021-02-05 NOTE — TELEPHONE ENCOUNTER
Patient has a question about his Iron Supplement. He asks that Dr. Olive Payne or nurse please give him a call to discuss to make sure he is taking it properly.                Kirstin Mckee (Self) [phone] Angel Williamson

## 2021-02-05 NOTE — PROGRESS NOTES
Nigel Yee is seen today, a telephone encounter to review recent lab work showing worsening anemia. His wife is present for the telephone encounter. Nigel Yee has longstanding anemia of iron deficiency felt to be secondary to nose bleeds. These nose bleeds have worsened. They occur in the setting of his genetic disease HHT. He follows up with an ENT specialist at the [address] [place] [address] and will be seeing him for more treatments later this month. He is taking iron daily. If he takes bid for too long, he will get diarrhea. We have decided on a plan of alternating one every other day alternating with bid every other day. He will let me know if this is not tolerated. We will also get him in for some labs to confirm the level of iron deficiency as well as a rule out of B-12 deficiency. He denies any significant shortness of breath or any real symptoms associated with anemia. He denies any GI symptoms such as rectal bleeding. Of note, his colonoscopy in January 2020 was unremarkable. We counseled regarding treatment and workup of the anemia as above. We will get iron and B-12 levels now and a CBC in one month. He also notes that he has been diagnosed with prostate cancer and will be starting radiation therapy later this month. He will let me know if he needs anything else and we will decide on how to proceed regarding his anemia based on follow up lab tests. We did briefly discuss the possibility of IV iron if he cannot take enough iron by mouth.

## 2021-02-09 NOTE — TELEPHONE ENCOUNTER
Advised pt MD said the instructions I gave him is correct. He states he understand how to take it now. MD has also added extra testing to his original lab order. monthly or less

## 2021-02-09 NOTE — TELEPHONE ENCOUNTER
Chief complaint:  Follow-up for acute kidney injury on a baseline of stage 4 chronic kidney disease.    Subjective:   He was admitted for worsening edema and shortness of breath and 7 lb of fluid weight gain.  He was treated with IV Lasix over the past 2 days with good improvement in his symptoms.  He is less short of breath.  He thinks his edema has improved.    Reviewed:  Labs, notes, vital signs, procedures    Visit Vitals  /60 (BP Location: RUE - Right upper extremity, Patient Position: Semi-Dorado's)   Pulse (!) 56   Temp 97.8 °F (36.6 °C) (Oral)   Resp 16   Ht 5' 8\" (1.727 m)   Wt 74.2 kg   SpO2 97%   BMI 24.87 kg/m²         Intake/Output Summary (Last 24 hours) at 7/15/2020 0923  Last data filed at 7/15/2020 0824  Gross per 24 hour   Intake 790 ml   Output 1050 ml   Net -260 ml       Physical Exam:  Alert, in no distress  No JVD  Lungs clear, without wheezes or rales  CV RRR  Without M/G/R  abd soft, NT, normal BS  Legs without edema  Skin: no rash    Recent Labs   Lab 07/15/20  0542 07/14/20  0405 07/13/20  1425 07/13/20  1417   SODIUM 141 141  --  140   POTASSIUM 3.8 3.7  --  4.1   CHLORIDE 102 105  --  106   CO2 31 31  --  31   BUN 74* 79*  --  81*   CREATININE 1.91* 2.19* 2.30* 2.25*   GLUCOSE 90 95  --  94   ALBUMIN  --   --   --  3.0*   AST  --   --   --  24   GPT  --   --   --  19   ALKPT  --   --   --  222*   BILIRUBIN  --   --   --  1.1*     Recent Labs   Lab 07/15/20  0542 07/14/20  0405 07/13/20  1417   WBC 4.7 4.6 4.6   HGB 11.0* 10.9* 10.5*   HCT 35.3* 35.0* 33.3*   * 134* 133*     Recent Labs   Lab 07/14/20  0405 07/13/20  1417   PT 12.7* 12.3*   PTT  --  31   INR 1.2 1.2       Urine Panel  Lab Results   Component Value Date    UKET Negative 07/13/2020    USPG 1.011 07/13/2020    UPROT Negative 07/13/2020    UWBC Small (A) 07/13/2020    URBC Negative 07/13/2020    UBILI Negative 07/13/2020    UPH 6.5 07/13/2020    UBACTR NONE SEEN 09/30/2013       Impression:   1. Stage 4 chronic  Spoke with pt - he wanted to clarify how to take his iron supplement. He was confused. The directions have take one every other day, a/w bid every other day. I told pt this meant he would take one tab every other day and on the other days he would take 2 tabs. Example J-K-D-Sunday take one tab and Tue-Thur- Sat take 2 tabs. This was since he had issues taking 2 daily. Will forward to MD for clarification. kidney disease due to aging and hypertensive nephrosclerosis and a component of cardiorenal syndrome.  2. Acute kidney injury:  This has improved and he is basically back to his baseline creatinine of 1.9.  3. Heart failure and pulmonary hypertension as well as cardiac amyloidosis.  He has symptomatically improved on the higher dose diuretic, although his weight has not significantly changed in his urine output was a under one liter.     Plan:  Continue the current dose of IV Lasix, consider transitioning to oral Bumetanide by tomorrow with a dose of 2 mg in the morning 1 mg in the evening.  On 2 mg b.i.d. his renal function worsened, and on 2 mg once a day he gained fluid weight.  Reinforce fluid restriction  Low-salt diet  No need for dialysis at this point

## 2021-02-10 ENCOUNTER — TELEPHONE (OUTPATIENT)
Dept: NEUROLOGY | Age: 81
End: 2021-02-10

## 2021-02-15 ENCOUNTER — OFFICE VISIT (OUTPATIENT)
Dept: NEUROLOGY | Age: 81
End: 2021-02-15
Payer: MEDICARE

## 2021-02-15 DIAGNOSIS — F43.21 ADJUSTMENT DISORDER WITH DEPRESSED MOOD: ICD-10-CM

## 2021-02-15 DIAGNOSIS — F02.80 LATE ONSET ALZHEIMER'S DISEASE WITHOUT BEHAVIORAL DISTURBANCE (HCC): Primary | ICD-10-CM

## 2021-02-15 DIAGNOSIS — F09 MILD COGNITIVE DISORDER: Primary | ICD-10-CM

## 2021-02-15 DIAGNOSIS — R41.89 COGNITIVE DECLINE: ICD-10-CM

## 2021-02-15 DIAGNOSIS — Z85.46 HISTORY OF PROSTATE CANCER: ICD-10-CM

## 2021-02-15 DIAGNOSIS — G30.1 LATE ONSET ALZHEIMER'S DISEASE WITHOUT BEHAVIORAL DISTURBANCE (HCC): Primary | ICD-10-CM

## 2021-02-15 DIAGNOSIS — R41.3 SHORT-TERM MEMORY LOSS: ICD-10-CM

## 2021-02-15 DIAGNOSIS — F32.0 MILD MAJOR DEPRESSION (HCC): ICD-10-CM

## 2021-02-15 DIAGNOSIS — F41.8 ANXIETY ABOUT HEALTH: ICD-10-CM

## 2021-02-15 DIAGNOSIS — R90.89 ABNORMAL BRAIN MRI: ICD-10-CM

## 2021-02-15 PROCEDURE — 96137 PSYCL/NRPSYC TST PHY/QHP EA: CPT | Performed by: CLINICAL NEUROPSYCHOLOGIST

## 2021-02-15 PROCEDURE — 96132 NRPSYC TST EVAL PHYS/QHP 1ST: CPT | Performed by: CLINICAL NEUROPSYCHOLOGIST

## 2021-02-15 PROCEDURE — 96139 PSYCL/NRPSYC TST TECH EA: CPT | Performed by: CLINICAL NEUROPSYCHOLOGIST

## 2021-02-15 PROCEDURE — 96133 NRPSYC TST EVAL PHYS/QHP EA: CPT | Performed by: CLINICAL NEUROPSYCHOLOGIST

## 2021-02-15 PROCEDURE — 96136 PSYCL/NRPSYC TST PHY/QHP 1ST: CPT | Performed by: CLINICAL NEUROPSYCHOLOGIST

## 2021-02-15 PROCEDURE — 90791 PSYCH DIAGNOSTIC EVALUATION: CPT | Performed by: CLINICAL NEUROPSYCHOLOGIST

## 2021-02-15 PROCEDURE — 96138 PSYCL/NRPSYC TECH 1ST: CPT | Performed by: CLINICAL NEUROPSYCHOLOGIST

## 2021-02-15 NOTE — LETTER
2/16/2021 Patient: Chrissy Calix YOB: 1940 Date of Visit: 2/15/2021 Samantha Anand MD 
[address] [facility] [address] [facility] [address] [id number] [address] [employer] Dear Samantha Anand MD, Thank you for referring Mr. Shanda Farah to [facility] [address] for evaluation. My notes for this consultation are attached. If you have questions, please do not hesitate to call me. I look forward to following your patient along with you. Sincerely, Bibi Campbell PsyD

## 2021-02-15 NOTE — PROGRESS NOTES
[address] [facility],5Th Floor  [address]   [place] [address] [facility]   [place]   [phone] Office   [phone] Fax      Neuropsychology    Initial Diagnostic Interview Note      Referral:  Charmaine Duong MD    Isidoro Phillip is a 79 y.o. right handed   male  who was unaccompanied to the initial clinical interview on 2/15/21 . Please refer to his medical records for details pertaining to his history. At the start of the appointment, I reviewed the patient's [facility] Epic Chart (including Media scanned in from previous providers) for the active Problem List, all pertinent Past Medical Hx, medications, recent radiologic and laboratory findings. In addition, I reviewed pt's documented Immunization Record and Encounter History. He completed college at [employer] in [address] [place] [address] without history of previously diagnosed LD and/or receipt of special education services. 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 No known stroke, meningitis/encephalitis, BRIAN Fever, Lupus, Lyme, TBI, sz, etc.  He has noticed a progressive decline in short term memory over about the last year. He will see a person's face and can't remember their name. Things like that. Forgets the content of conversations. Misplaces things. Wife notices. She is being evaluated for memory problems, also. Stays sharp but feels the decline. No known stroke, meningitis/encephalitis, BRIAN Fever, Lupus, Lyme, TBI. No problems with driving. He is independent for medications, finances, day-to-day chores, etc. He has [address]  Sleep is very good. Appetite is fine. He does have iron deficiency also. No depression in the history. As he gets older, though, he has noticed he is not contributing as much and some mild mood changes - anxiety, low energy level. Hemoglobin below 10 .  Never had a transfusion or infusion. No counseling or psychiatrist.     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 Neuropsychological Mental Status Exam (NMSE):      Historian: Good  Praxis: No UE apraxia  R/L Orientation: Intact to self and to other  Dress: within normal limits   Weight: within normal limits   Appearance/Hygiene: within normal limits   Gait: within normal limits   Assistive Devices: hearing aid  Mood: within normal limits   Affect: within normal limits   Comprehension: within normal limits   Thought Process: within normal limits   Expressive Language: within normal limits   Receptive Language: within normal limits   Motor:  No cognitive or motor perseveration  ETOH: Denied  Tobacco: Denied  Illicit: Denied  SI/HI: Denied  Psychosis: Denied  Insight: Within normal limits  Judgment: Within normal limits  Other Psych:      Past Medical History:   Diagnosis Date    HHT (hereditary hemorrhagic telangiectasia) (HCC)        Past Surgical History:   Procedure Laterality Date    CHEST SURGERY PROCEDURE UNLISTED      excision of AVM    COLONOSCOPY N/A 1/29/2020    COLON[facility]COPY    HX APPENDECTOMY      HX COLONOSCOPY  2014    due 22    HX OTHER SURGICAL  07/2018    sclerotheropy    HX TONSILLECTOMY         No Known Allergies    Family History   Problem Relation Age of Onset    Heart Disease Mother     Cancer Other         colon, lung       Social History     Tobacco Use    Smoking status: Former Smoker    Smokeless tobacco: Never Used   Substance Use Topics    Alcohol use:  Yes     Alcohol/week: 0.0 standard drinks     Comment: one drink per week    Drug use: No       Current Outpatient Medications   Medication Sig Dispense Refill    ferrous sulfate (SLOW FE) 142 mg (45 mg iron) ER tablet Take one every other day, a/w bid every other day 45 Tab 5    dicyclomine (BENTYL) 20 mg tablet Take 1 Tab by mouth three (3) times daily as needed for Abdominal Cramps. New dose , new directions 30 Tab 3    TIMOLOL MALEATE OP Apply  to eye. 1 drop to each eye twice daily. Plan:  Obtain authorization for testing from insurance company. Report to follow once testing, scoring, and interpretation completed. ? Organic based neurocognitive issues versus mood disorder or combination of same. ? Problems organic, functional, or both? This note will not be viewable in [address]

## 2021-02-16 ENCOUNTER — TELEPHONE (OUTPATIENT)
Dept: INTERNAL MEDICINE CLINIC | Age: 81
End: 2021-02-16

## 2021-02-16 NOTE — PROGRESS NOTES
[address] [facility],5Th Floor  [address]   [place] [address] [facility], [facility]   [phone] Office   [phone] Fax      Neuropsychological Evaluation Report    Referral:  Mack Caal MD    Nemesio Roman is a 79 y.o. right handed   male  who was unaccompanied to the initial clinical interview on 2/15/21 . Please refer to his medical records for details pertaining to his history. At the start of the appointment, I reviewed the patient's [facility] Epic Chart (including Media scanned in from previous providers) for the active Problem List, all pertinent Past Medical Hx, medications, recent radiologic and laboratory findings. In addition, I reviewed pt's documented Immunization Record and Encounter History. He completed college at [employer] in [address] [place] [address] without history of previously diagnosed LD and/or receipt of special education services. 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 No known stroke, meningitis/encephalitis, BRIAN Fever, Lupus, Lyme, TBI, sz, etc.  He has noticed a progressive decline in short term memory over about the last year. He will see a person's face and can't remember their name. Things like that. Forgets the content of conversations. Misplaces things. Wife notices. She is being evaluated for memory problems, also. Stays sharp but feels the decline. No known stroke, meningitis/encephalitis, BRIAN Fever, Lupus, Lyme, TBI. No problems with driving. He is independent for medications, finances, day-to-day chores, etc. He has [address]  Sleep is very good. Appetite is fine. He does have iron deficiency also. No depression in the history. As he gets older, though, he has noticed he is not contributing as much and some mild mood changes - anxiety, low energy level. Hemoglobin below 10 . Never had a transfusion or infusion.   No counseling or psychiatrist.     Reports feeling very frustrated and concerned with \"the way this country is going\" referring to a trend towards the left politically and this is quite bothersome and emotionally detrimental for the patient. 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 Neuropsychological Mental Status Exam (NMSE):      Historian: Good  Praxis: No UE apraxia  R/L Orientation: Intact to self and to other  Dress: within normal limits   Weight: within normal limits   Appearance/Hygiene: within normal limits   Gait: within normal limits   Assistive Devices: hearing aid  Mood: within normal limits   Affect: within normal limits   Comprehension: within normal limits   Thought Process: within normal limits   Expressive Language: within normal limits   Receptive Language: within normal limits   Motor:  No cognitive or motor perseveration  ETOH: Denied  Tobacco: Denied  Illicit: Denied  SI/HI: Denied  Psychosis: Denied  Insight: Within normal limits  Judgment: Within normal limits  Other Psych:      Past Medical History:   Diagnosis Date    HHT (hereditary hemorrhagic telangiectasia) (HCC)        Past Surgical History:   Procedure Laterality Date    CHEST SURGERY PROCEDURE UNLISTED      excision of AVM    COLONOSCOPY N/A 1/29/2020    COLON[facility]COPY performed by Mili Johnson MD at [facility] ENDOSCOPY    HX APPENDECTOMY      HX COLONOSCOPY  2014    due 22    HX OTHER SURGICAL  07/2018    sclerotheropy    HX TONSILLECTOMY         No Known Allergies    Family History   Problem Relation Age of Onset    Heart Disease Mother     Cancer Other         colon, lung       Social History     Tobacco Use    Smoking status: Former Smoker    Smokeless tobacco: Never Used   Substance Use Topics    Alcohol use:  Yes     Alcohol/week: 0.0 standard drinks     Comment: one drink per week    Drug use: No       Current Outpatient Medications   Medication Sig Dispense Refill    ferrous sulfate (SLOW FE) 142 mg (45 mg iron) ER tablet Take one every other day, a/w bid every other day 45 Tab 5    dicyclomine (BENTYL) 20 mg tablet Take 1 Tab by mouth three (3) times daily as needed for Abdominal Cramps. New dose , new directions 30 Tab 3    TIMOLOL MALEATE OP Apply  to eye. 1 drop to each eye twice daily. Plan:  Obtain authorization for testing from insurance company. Report to follow once testing, scoring, and interpretation completed. ? Organic based neurocognitive issues versus mood disorder or combination of same. ? Problems organic, functional, or both? This note will not be viewable in [address] Neuropsychological Test Results  Patient Testing 2/15/21 Report Completed 2/16/21  A Psychometrist Assisted w/ portions of this evaluation while under my direct  supervision    The following evaluation procedures/tests were administered:      Neuropsychologist Administered/Interpreted:  Neuropsychological Mental Status Exam, Revised Memory & Behavior Checklist,  Mini Mental Status Exam, Clock Drawing Test, Test Of Premorbid Functioning, Lorraine-Melzack Pain Questionnaire, History Taking  & Clinical Interview With The Patient, , NAZ, CPT-III, Review Of Available Records. Psychometrist Administered under Neuropsychologist Supervision & Neuropsychologist Interpreted:  Verbal Fluency Tests, Zachary & Zachary  Revised, Trailmaking Test Parts A & B, Wechsler Adult Intelligence Scale - IV, [place] [employer]  3, Grooved Pegboard, Beck Depression Inventory  II, Beck Anxiety Inventory. Test Findings:  Test Findings:  Note:  The patients raw data have been compared with currently available norms which include demographic corrections for age, gender, and/or education. Sometimes, the patients scores are compared to demographically similar individuals as close to the patients age, education level, etc., as possible.   \"Average\" is viewed as being +/- 1 standard deviation (SD) from the stated mean for a particular test score. \"Low average\" is viewed as being between 1 and 2 SD below the mean, and above average is viewed as being 1 and 2 SD above the mean. Scores falling in the borderline range (between 1-1/2 and 2 SD below the mean) are viewed with particular attention as to whether they are normal or abnormal neurocognitive test scores. Other methods of inference in analyzing the test data are also utilized, including the pattern and range of scores in the profile, bilateral motor functions, and the presence, if any, of pathognomonic signs. Behaviorally, the patient was friendly and cooperative and appeared motivated to perform well during this examination. Within this context, the results of this evaluation are viewed as a valid reflection of the patients actual neurocognitive and emotional status. His MMSE score of 28/30 correct was normal.  In this regard,backwards spelling was 4/5 correct. Recall 2/3. Clock drawing was normal.        His structured word list fluency, as assessed by the FAS Test, was within the moderately to severely impaired range with a T score 23. Category fluency was mildly impaired with a T score 36. Confrontation naming, as assessed by the [facility], was within the below average range with a T score of 40. This pattern of performance is indicative of a patient who is at increased risk for day-to-day problems with verbal fluency and confrontation naming is normal.     The patient was administered the [facility] Continuous Performance Test  III,a computer administered test of sustained attention, and review of the subscales within this instrument revealed mild concerns for inattentiveness without impulsivity. This pattern of performance is indicative of a patient who is at increased risk for day-to-day problems with sustained visual attention/concentration.        The patient is not showing problems with working memory capacity (70th %ile) or processing speed (30th %ile) on the WAIS-IV. His Verbal Comprehension Index score of 105 was within the average range. His Perceptual Reasoning Index score of 90 was average. These scores do not reflect a decline in functioning based on an assessment of premorbid functioning. The patient was administered the [place] Verbal Learning Test  - 3 and generated an impaired range (and flat) learning curve over five repeated auditory word list learning trials. An interference trial was within the borderline range. Free and cued, short and long delayed recall were  impaired. Recognition recall was low average. Forced choice recall was normal, suggesting good effort. This pattern of performance is indicative of a patient who is at increased risk for day-to-day problems with auditory memory. Simple timed visual motor sequencing (Trailmaking Test Part A) was within the above average range with a T score of 55. His performance on a similar, but more complex task of timed visual motor sequencing (Trailmaking Test Part B) was within the below average range with a T score of 41. He made only 1 sequencing error on this latter completed test.  Taken together, this pattern performance is not indicative of a patient who is at increased risk for day-to-day problems with executive functioning. Fine motor dexterity was within the mildly to moderately impaired range for his dominant hand (T = 32) and average for his nondominant hand (T = 46). Neurologic correlation is indicated. The patient rated his current level of pain as \"0/5- No Pain\" on the Lorraine-Melzack Pain Questionnaire. His Ibarra Depression Inventory- II score of 16 was within the mildly depressed range. His Ibarra Anxiety Inventory score of 3 reflected minimal anxiety. The patient was administered the Personality Assessment Inventory and generated a valid profile for interpretation.   Within this context, this is a fairly normal profile, though he has modest, unpretentious, and retiring. Others may view him as passive, [place], and unassuming. He is likely to be self-conscious in social interactions. Impressions & Recommendations: This patient generated an abnormal range Neuropsychological Evaluation with respect to neurocognitive functioning. In this regard, he is showing problems with verbal fluency,  sustained attention, auditory memory, and dominant handed motor dexterity. The latter potentially reflects a lateralized issue for which neurologic correlation is indicated. Concurrently, his performances across all other neurocognitive domains assessed, including mental status, confrontation naming, verbal comprehension, perceptual reasoning, working memory, processing speed, auditory learning, executive functioning, and nondominant handed fine motor dexterity remain normal.  These important neurocognitive strengths will serve to mask underlying neurocognitive deficits at times. From an emotional standpoint, the patient reports a mild level of depression. He also reports stress related to politics. In my opinion, this profile is consistent with an evolving organic process that is currently at a mildlevel of severity. This is likely AD but a combination of AD and vascular dementia is also possible. On a scale of 1-10, this is a 1.5. In addition to continued medical care, my recommendations include consideration for memory management medication as well as consideration for an appropriate medication for attention if this is not medically contraindicated.   From an emotional standpoint, there is mild depression and anxiety/stressthis appears more so functional than organic in etiology to mefor which I recommend a referral to psychotherapy/counseling to assist.  Should psychotherapy not assessed in mitigating significantly the mood concerns, then consider psychiatric medication management if this is not medically contraindicated. Certainly, we do not wish to inundate him with pills. At the same time, this memory issue has been caught relatively early on, and I hope that efforts now will help prevent, reduce a more rapid deterioration of neurocognitive functioning over time. In the interim, the patient should be encouraged to remain as mentally, socially, and physically active as possible. The patient's generated cognitive difficulties are significant to the degree whereby it is my opinion that he should not live independently without appropriate supervision for those domains with a heavy memory emphasis. This includes medication management supervision and supervision of financial dealings. I am not currently concerned about competency/capacity nor my concerns about driving safety, but we will need to monitor these issues over time. Yearly neurocognitive evaluations will be a wise idea. Consider updated neuroimaging to rule out a left temporal lobe issue or changes in previously seen white matter disease, or other issue. Baseline now established. Clinical correlation is, of course, indicated. I will discuss these findings with the patient and family when they follow up with me in the near future. A follow up Neuropsychological Evaluation is indicated on a prn basis. DIAGNOSES: Dementia - Mild      Depression - Mild     The above information is based upon information currently available to me. If there is any additional information of which I am currently unaware, I would be more than happy to review it upon having it made available to me. Thank you for the opportunity to see this interesting individual.     Sincerely,       Neville Brice. Lizzie Lackey PsyD, EdS      Attachments:  IQ Test Results (In Media Section Of This EMR)    Cc: Max Bhardwaj MD    Time Documentation:    96076*4 31120*2     80417 x 1  22821 x 5 Test Administration/Data Gathering By Technician: (3 hours).  51108 x 1 (first 30 minutes), 29555 x 5 (each additional 30 minutes)    96132 x 1  96133 x 1 Testing Evaluation Services by Neuropsychologist (1 hour, 50 minutes) 96132 x 1 (first hour), 96133 x 1 (50 minutes)    Definitions:      34870/77056:  Neurobehavioral Status Exam, Clinical interview. Clinical assessment of thinking, reasoning and judgment, by neuropsychologist, both face to face time with patient and time interpreting those test results and reporting, first and subsequent hours)    19141/15919: Neuropsychological Test Administration by Technician/Psychometrist, first 30 minutes and each additional 30 minutes. The above includes: Record review. Review of history provided by patient. Review of collaborative information. Testing by Clinician. Review of raw data. Scoring. Report writing of individual tests administered by Clinician. Integration of individual tests administered by psychometrist with NSE/testing by clinician, review of records/history/collaborative information, case Conceptualization, treatment planning, clinical decision making, report writing, coordination Of Care. Psychometry test codes as time spent by psychometrist administering and scoring neurocognitive/psychological tests under supervision of neuropsychologist.  Integral services including scoring of raw data, data interpretation, case conceptualization, report writing etcetera were initiated after the patient finished testing/raw data collected and was completed on the date the report was signed.

## 2021-02-16 NOTE — TELEPHONE ENCOUNTER
Spoke with pt's wife Kate Espinal. Advised returning call but did not see where wee called pt today. She states Dr Teresa Cruz called pt last evening. They missed his call. Advised MD of this. Dr Teresa Cruz wants to know which number is best to reach them this evening between 5:30-7 pm? She states their home phone.  Will forward to MD.

## 2021-02-17 ENCOUNTER — TELEPHONE (OUTPATIENT)
Dept: INTERNAL MEDICINE CLINIC | Age: 81
End: 2021-02-17

## 2021-02-17 DIAGNOSIS — D46.4 REFRACTORY ANEMIA (HCC): Primary | ICD-10-CM

## 2021-02-17 NOTE — TELEPHONE ENCOUNTER
Attempted to reach patient at both home and cell phone, unable to reach him.  Left voicemail for him to return my call regarding referral.

## 2021-02-17 NOTE — TELEPHONE ENCOUNTER
Reviewed labs, nl Fe and B 12. Advise See hematologist as directed to see if additional therapy is needed.  They will call to schedule

## 2021-02-17 NOTE — TELEPHONE ENCOUNTER
Patient verified by two patient identifiers called and states that he spoke with specialists's office this morning to schedule and appointment per Dr. Lisa Rust, and in order to be scheduled with Dr. Mahin Grey office, PCP must put in a referral to Dr. Jessica Hart. Explained to patient I will forward to PCP since I am unable to place these orders. Since specialist is in the [facility], he should hear from specialist once PCP has placed order, and if not he can follow up with specialist and our office. He expressed understanding and gratitude for this information.

## 2021-02-19 ENCOUNTER — TELEPHONE (OUTPATIENT)
Dept: INTERNAL MEDICINE CLINIC | Age: 81
End: 2021-02-19

## 2021-02-19 NOTE — PROGRESS NOTES
[facility] [place] at [facility]  [address] [facility], [address] [facility]: [phone]  F: [phone]      Reason for Visit:   Chrissy Calix is a 79 y.o. male who is seen in consultation at the request of Dr. Efrain Jorgensen for evaluation of anemia. History of Present Illness:   Chrissy Calix is a pleasant 79 y.o. male who presents today for evaluation of anemia. He has a recent history of prostate cancer and is planning to start definitive radiation with Dr. Efrain Jorgensen in a few weeks. He has a history of HHT, following with specialists at [facility]. Had embolization for a pulmonary AVM several years ago. Sees ENT there several times a year for procedures to address epistaxis, most recently on 2/12/2021. He reports increased fatigue recently, in the past several months. No dizziness or dyspnea. He has also had worsening epistaxis in the past few months, lasting up to 40 minutes. Much improved since his recent procedure. He uses topical abx as well as Aquaphor on his nose. Takes dicyclomine as well. Takes oral iron about 3 times a week, causes some diarrhea. Stopped this about 3-4 days ago. He has never required IV iron or blood transfusions. He is accompanied by his wife. His wife is Mahesh Watkins, a realtor who helped me with the purchase of my current home. Three of his four children have HHT. Review of systems was obtained and pertinent findings reviewed above. Past medical history, social history, family history, medications, and allergies are located in the electronic medical record. Physical Exam:     Visit Vitals  Pulse 85   Temp 97.4 °F (36.3 °C) (Oral)   Resp 16   Ht 6' 1\" (1.854 m)   Wt 186 lb (84.4 kg)   SpO2 99%   BMI 24.54 kg/m²     General: no distress  Eyes: anicteric sclerae  HENT: oropharynx clear.   Numerous telangiectasias over lips and gums  Neck: supple  Lymphatic: no cervical, supraclavicular, or inguinal adenopathy  Respiratory: normal respiratory effort  CV: no peripheral edema  GI: soft, nontender, nondistended, no masses  Skin: no rashes, no ecchymoses, no petechiae    Results:     Lab Results   Component Value Date/Time    WBC 4.6 02/05/2021 12:50 PM    HGB 9.3 (L) 02/05/2021 12:50 PM    HCT 31.7 (L) 02/05/2021 12:50 PM    PLATELET 817 14/57/6402 12:50 PM    MCV 86.6 02/05/2021 12:50 PM    ABS. NEUTROPHILS 3.3 02/05/2021 12:50 PM     Lab Results   Component Value Date/Time    Sodium 139 05/21/2020 08:15 AM    Potassium 4.4 05/21/2020 08:15 AM    Chloride 103 05/21/2020 08:15 AM    CO2 25 05/21/2020 08:15 AM    Glucose 103 (H) 05/21/2020 08:15 AM    BUN 14 05/21/2020 08:15 AM    Creatinine 1.08 05/21/2020 08:15 AM    GFR est AA 74 05/21/2020 08:15 AM    GFR est non-AA 64 05/21/2020 08:15 AM    Calcium 8.8 05/21/2020 08:15 AM     Lab Results   Component Value Date/Time    Bilirubin, total 0.5 05/21/2020 08:15 AM    ALT (SGPT) 15 05/21/2020 08:15 AM    Alk. phosphatase 51 05/21/2020 08:15 AM    Protein, total 6.6 05/21/2020 08:15 AM    Albumin 4.2 05/21/2020 08:15 AM     Lab Results   Component Value Date/Time    Iron % saturation 57 (H) 02/05/2021 12:50 PM    TIBC 388 02/05/2021 12:50 PM    Vitamin B12 355 02/05/2021 12:50 PM    Methylmalonic acid 300 02/05/2021 12:50 PM    TSH 3.040 08/20/2014 08:46 AM       Records from radiation oncology reviewed and summarized above. Test results above have been reviewed. Assessment:   1) Anemia, normocytic  Presumably due to iron deficiency based on his history of epistaxis and HHT. However, recent iron saturation was high at 57%. I recommend repeating this fasting, and checking ferritin. I will also evaluate for other causes of anemia. If we confirm iron deficiency, then he has failed PO iron and I will set him up for injectafer 750mg IV weekly x2 doses. 2) HHT  Managed at [facility]. I will request records.   He is s/p treatment for pulmonary AVMs as well as epistaxis. He is receiving regular imaging surveillance there. I discussed systemic therapy options such as tamoxifen or Avastin, as well as topical therapy such as estradiol, and encouraged him to discuss these options with his physicians in [place], given his recent worsening symptoms. 3) Epistaxis  Secondary to HHT. Recently followed up with ENT in [place], s/p local therapy. 4) Prostate cancer  Bone scan pending. Tentative plan is for definitive radiation with Dr. Carolyn Burnett. Ok to proceed from hematology perspective.     Plan:     · Fasting labs tomorrow: CBC, CMP, retic, iron profile, ferritin, folate, LD, haptoglobin, SPEP  · I will call with results    Signed By: Saim Schwartz MD

## 2021-02-19 NOTE — TELEPHONE ENCOUNTER
Pt called back - I believe he needs the referral? I do not see how to print. ADV I would let Soo Wheat know and call him back.

## 2021-02-19 NOTE — TELEPHONE ENCOUNTER
----- Message from Sanjeev Metz sent at 2/18/2021  3:25 PM EST -----  Regarding: Soniya/Telephone  General Message/Vendor Calls    Caller's first and last name: Keesha Veronica      Reason for call: health questions      Callback required yes/no and why:  yes    Best contact number(s):[phone]      Details to clarify the request: please call patient back as soon as possible      Sanjeev Metz

## 2021-02-22 ENCOUNTER — TELEPHONE (OUTPATIENT)
Dept: INTERNAL MEDICINE CLINIC | Age: 81
End: 2021-02-22

## 2021-02-22 NOTE — TELEPHONE ENCOUNTER
Patient returned call from Friday, but said he has gotten things figured out.   He has an appt with Dr. Fadi Nguyen, hematologist, tomorrow at 3pm.

## 2021-02-23 ENCOUNTER — OFFICE VISIT (OUTPATIENT)
Dept: ONCOLOGY | Age: 81
End: 2021-02-23
Payer: MEDICARE

## 2021-02-23 VITALS
OXYGEN SATURATION: 99 % | TEMPERATURE: 97.4 F | RESPIRATION RATE: 16 BRPM | BODY MASS INDEX: 24.65 KG/M2 | WEIGHT: 186 LBS | HEIGHT: 73 IN | HEART RATE: 85 BPM

## 2021-02-23 DIAGNOSIS — R68.89 OTHER GENERAL SYMPTOMS AND SIGNS: ICD-10-CM

## 2021-02-23 DIAGNOSIS — D64.9 ANEMIA, UNSPECIFIED TYPE: Primary | ICD-10-CM

## 2021-02-23 PROCEDURE — 99204 OFFICE O/P NEW MOD 45 MIN: CPT | Performed by: INTERNAL MEDICINE

## 2021-02-23 PROCEDURE — G8536 NO DOC ELDER MAL SCRN: HCPCS | Performed by: INTERNAL MEDICINE

## 2021-02-23 PROCEDURE — G0463 HOSPITAL OUTPT CLINIC VISIT: HCPCS | Performed by: INTERNAL MEDICINE

## 2021-02-23 PROCEDURE — G8432 DEP SCR NOT DOC, RNG: HCPCS | Performed by: INTERNAL MEDICINE

## 2021-02-23 PROCEDURE — G8420 CALC BMI NORM PARAMETERS: HCPCS | Performed by: INTERNAL MEDICINE

## 2021-02-23 PROCEDURE — G8427 DOCREV CUR MEDS BY ELIG CLIN: HCPCS | Performed by: INTERNAL MEDICINE

## 2021-02-23 PROCEDURE — 1101F PT FALLS ASSESS-DOCD LE1/YR: CPT | Performed by: INTERNAL MEDICINE

## 2021-02-23 RX ORDER — DOXYCYCLINE 100 MG/1
CAPSULE ORAL
COMMUNITY
Start: 2020-12-16 | End: 2021-02-23 | Stop reason: ALTCHOICE

## 2021-02-24 ENCOUNTER — HOSPITAL ENCOUNTER (OUTPATIENT)
Dept: LAB | Age: 81
Discharge: HOME | End: 2021-02-24
Payer: MEDICARE

## 2021-02-24 PROCEDURE — 83010 ASSAY OF HAPTOGLOBIN QUANT: CPT

## 2021-02-24 PROCEDURE — 36415 COLL VENOUS BLD VENIPUNCTURE: CPT

## 2021-02-24 PROCEDURE — 86334 IMMUNOFIX E-PHORESIS SERUM: CPT

## 2021-02-24 PROCEDURE — 84443 ASSAY THYROID STIM HORMONE: CPT

## 2021-02-24 PROCEDURE — 85045 AUTOMATED RETICULOCYTE COUNT: CPT

## 2021-02-24 PROCEDURE — 82746 ASSAY OF FOLIC ACID SERUM: CPT

## 2021-02-24 PROCEDURE — 83615 LACTATE (LD) (LDH) ENZYME: CPT

## 2021-02-24 PROCEDURE — 80053 COMPREHEN METABOLIC PANEL: CPT

## 2021-02-24 PROCEDURE — 82607 VITAMIN B-12: CPT

## 2021-02-24 PROCEDURE — 82728 ASSAY OF FERRITIN: CPT

## 2021-02-24 PROCEDURE — 83550 IRON BINDING TEST: CPT

## 2021-02-26 ENCOUNTER — TELEPHONE (OUTPATIENT)
Dept: ONCOLOGY | Age: 81
End: 2021-02-26

## 2021-02-26 DIAGNOSIS — I78.0 HHT (HEREDITARY HEMORRHAGIC TELANGIECTASIA) (HCC): Primary | ICD-10-CM

## 2021-02-26 DIAGNOSIS — D50.8 OTHER IRON DEFICIENCY ANEMIA: ICD-10-CM

## 2021-02-26 RX ORDER — SODIUM CHLORIDE 9 MG/ML
10 INJECTION INTRAMUSCULAR; INTRAVENOUS; SUBCUTANEOUS AS NEEDED
Status: CANCELLED | OUTPATIENT
Start: 2021-03-10

## 2021-02-26 RX ORDER — SODIUM CHLORIDE 0.9 % (FLUSH) 0.9 %
10 SYRINGE (ML) INJECTION AS NEEDED
Status: CANCELLED | OUTPATIENT
Start: 2021-03-10

## 2021-02-26 RX ORDER — HEPARIN 100 UNIT/ML
300-500 SYRINGE INTRAVENOUS AS NEEDED
Status: CANCELLED
Start: 2021-03-17

## 2021-02-26 RX ORDER — EPINEPHRINE 1 MG/ML
0.3 INJECTION, SOLUTION, CONCENTRATE INTRAVENOUS AS NEEDED
Status: CANCELLED | OUTPATIENT
Start: 2021-03-10

## 2021-02-26 RX ORDER — DIPHENHYDRAMINE HYDROCHLORIDE 50 MG/ML
50 INJECTION, SOLUTION INTRAMUSCULAR; INTRAVENOUS AS NEEDED
Status: CANCELLED
Start: 2021-03-10

## 2021-02-26 RX ORDER — DIPHENHYDRAMINE HYDROCHLORIDE 50 MG/ML
50 INJECTION, SOLUTION INTRAMUSCULAR; INTRAVENOUS AS NEEDED
Status: CANCELLED
Start: 2021-03-17

## 2021-02-26 RX ORDER — ACETAMINOPHEN 325 MG/1
650 TABLET ORAL AS NEEDED
Status: CANCELLED
Start: 2021-03-17

## 2021-02-26 RX ORDER — HEPARIN 100 UNIT/ML
300-500 SYRINGE INTRAVENOUS AS NEEDED
Status: CANCELLED
Start: 2021-03-10

## 2021-02-26 RX ORDER — ONDANSETRON 2 MG/ML
8 INJECTION INTRAMUSCULAR; INTRAVENOUS AS NEEDED
Status: CANCELLED | OUTPATIENT
Start: 2021-03-17

## 2021-02-26 RX ORDER — HYDROCORTISONE SODIUM SUCCINATE 100 MG/2ML
100 INJECTION, POWDER, FOR SOLUTION INTRAMUSCULAR; INTRAVENOUS AS NEEDED
Status: CANCELLED | OUTPATIENT
Start: 2021-03-10

## 2021-02-26 RX ORDER — ALBUTEROL SULFATE 0.83 MG/ML
2.5 SOLUTION RESPIRATORY (INHALATION) AS NEEDED
Status: CANCELLED
Start: 2021-03-17

## 2021-02-26 RX ORDER — DIPHENHYDRAMINE HYDROCHLORIDE 50 MG/ML
25 INJECTION, SOLUTION INTRAMUSCULAR; INTRAVENOUS AS NEEDED
Status: CANCELLED
Start: 2021-03-10

## 2021-02-26 RX ORDER — HYDROCORTISONE SODIUM SUCCINATE 100 MG/2ML
100 INJECTION, POWDER, FOR SOLUTION INTRAMUSCULAR; INTRAVENOUS AS NEEDED
Status: CANCELLED | OUTPATIENT
Start: 2021-03-17

## 2021-02-26 RX ORDER — ACETAMINOPHEN 325 MG/1
650 TABLET ORAL AS NEEDED
Status: CANCELLED
Start: 2021-03-10

## 2021-02-26 RX ORDER — EPINEPHRINE 1 MG/ML
0.3 INJECTION, SOLUTION, CONCENTRATE INTRAVENOUS AS NEEDED
Status: CANCELLED | OUTPATIENT
Start: 2021-03-17

## 2021-02-26 RX ORDER — ONDANSETRON 2 MG/ML
8 INJECTION INTRAMUSCULAR; INTRAVENOUS AS NEEDED
Status: CANCELLED | OUTPATIENT
Start: 2021-03-10

## 2021-02-26 RX ORDER — SODIUM CHLORIDE 0.9 % (FLUSH) 0.9 %
10 SYRINGE (ML) INJECTION AS NEEDED
Status: CANCELLED | OUTPATIENT
Start: 2021-03-17

## 2021-02-26 RX ORDER — ALBUTEROL SULFATE 0.83 MG/ML
2.5 SOLUTION RESPIRATORY (INHALATION) AS NEEDED
Status: CANCELLED
Start: 2021-03-10

## 2021-02-26 RX ORDER — SODIUM CHLORIDE 9 MG/ML
10 INJECTION INTRAMUSCULAR; INTRAVENOUS; SUBCUTANEOUS AS NEEDED
Status: CANCELLED | OUTPATIENT
Start: 2021-03-17

## 2021-02-26 RX ORDER — DIPHENHYDRAMINE HYDROCHLORIDE 50 MG/ML
25 INJECTION, SOLUTION INTRAMUSCULAR; INTRAVENOUS AS NEEDED
Status: CANCELLED
Start: 2021-03-17

## 2021-02-26 NOTE — TELEPHONE ENCOUNTER
[address] at [facility] [address]  [phone]    02/26/21- Informed patient's wife of lab results and Dr. Mone Oliveira recommendation to proceed with 2 weekly doses of IV iron. Will repeat labs in 2-3 months and follow up with Dr. Khurram Muhammad at that time. Patient's wife verbalized understanding, no further questions or concerns.

## 2021-02-26 NOTE — TELEPHONE ENCOUNTER
[employer] at [facility]  [phone]    Labs reviewed, scanned into EMR. HGB is 10. Iron sat and ferritin are low, confirming iron deficiency. I recommend we move forward with Injectafer 750mg IV weekly x2 doses, as discussed at his recent appt. Repeat labs in 2-3 months and follow up with me after.

## 2021-03-01 LAB
ALBUMIN SERPL ELPH-MCNC: 3.8 G/DL (ref 2.9–4.4)
ALBUMIN SERPL-MCNC: 4.1 G/DL (ref 3.6–4.6)
ALBUMIN/GLOB SERPL: 1.6 {RATIO} (ref 0.7–1.7)
ALBUMIN/GLOB SERPL: 1.9 {RATIO} (ref 1.2–2.2)
ALP SERPL-CCNC: 59 IU/L (ref 39–117)
ALPHA1 GLOB SERPL ELPH-MCNC: 0.2 G/DL (ref 0–0.4)
ALPHA2 GLOB SERPL ELPH-MCNC: 0.5 G/DL (ref 0.4–1)
ALT SERPL-CCNC: 18 IU/L (ref 0–44)
AST SERPL-CCNC: 19 IU/L (ref 0–40)
B-GLOBULIN SERPL ELPH-MCNC: 0.8 G/DL (ref 0.7–1.3)
BASOPHILS # BLD AUTO: 0.1 X10E3/UL (ref 0–0.2)
BASOPHILS NFR BLD AUTO: 2 %
BILIRUB SERPL-MCNC: 0.4 MG/DL (ref 0–1.2)
BUN SERPL-MCNC: 17 MG/DL (ref 8–27)
BUN/CREAT SERPL: 16 (ref 10–24)
CALCIUM SERPL-MCNC: 8.8 MG/DL (ref 8.6–10.2)
CHLORIDE SERPL-SCNC: 104 MMOL/L (ref 96–106)
CO2 SERPL-SCNC: 21 MMOL/L (ref 20–29)
CREAT SERPL-MCNC: 1.07 MG/DL (ref 0.76–1.27)
EOSINOPHIL # BLD AUTO: 0.2 X10E3/UL (ref 0–0.4)
EOSINOPHIL NFR BLD AUTO: 6 %
ERYTHROCYTE [DISTWIDTH] IN BLOOD BY AUTOMATED COUNT: 15.3 % (ref 11.6–15.4)
FERRITIN SERPL-MCNC: 13 NG/ML (ref 30–400)
FOLATE SERPL-MCNC: 14 NG/ML
GAMMA GLOB SERPL ELPH-MCNC: 1 G/DL (ref 0.4–1.8)
GLOBULIN SER CALC-MCNC: 2.2 G/DL (ref 1.5–4.5)
GLOBULIN SER-MCNC: 2.5 G/DL (ref 2.2–3.9)
GLUCOSE SERPL-MCNC: 104 MG/DL (ref 65–99)
HAPTOGLOB SERPL-MCNC: 144 MG/DL (ref 38–329)
HCT VFR BLD AUTO: 32.9 % (ref 37.5–51)
HGB BLD-MCNC: 10 G/DL (ref 13–17.7)
IGA SERPL-MCNC: 163 MG/DL (ref 61–437)
IGG SERPL-MCNC: 926 MG/DL (ref 603–1613)
IGM SERPL-MCNC: 73 MG/DL (ref 15–143)
IMM GRANULOCYTES # BLD AUTO: 0 X10E3/UL (ref 0–0.1)
IMM GRANULOCYTES NFR BLD AUTO: 0 %
INTERPRETATION SERPL IEP-IMP: NORMAL
IRON SATN MFR SERPL: 5 % (ref 15–55)
IRON SERPL-MCNC: 18 UG/DL (ref 38–169)
LDH SERPL-CCNC: 175 IU/L (ref 121–224)
LYMPHOCYTES # BLD AUTO: 0.6 X10E3/UL (ref 0.7–3.1)
LYMPHOCYTES NFR BLD AUTO: 16 %
M PROTEIN SERPL ELPH-MCNC: NORMAL G/DL
MCH RBC QN AUTO: 25.2 PG (ref 26.6–33)
MCHC RBC AUTO-ENTMCNC: 30.4 G/DL (ref 31.5–35.7)
MCV RBC AUTO: 83 FL (ref 79–97)
MONOCYTES # BLD AUTO: 0.5 X10E3/UL (ref 0.1–0.9)
MONOCYTES NFR BLD AUTO: 13 %
NEUTROPHILS # BLD AUTO: 2.3 X10E3/UL (ref 1.4–7)
NEUTROPHILS NFR BLD AUTO: 63 %
PATH INTERP BLD-IMP: ABNORMAL
PATH REV BLD -IMP: ABNORMAL
PATHOLOGIST NAME: ABNORMAL
PLATELET # BLD AUTO: 221 X10E3/UL (ref 150–450)
PLEASE NOTE:, 149534: NORMAL
POTASSIUM SERPL-SCNC: 4.5 MMOL/L (ref 3.5–5.2)
PROT SERPL-MCNC: 6.3 G/DL (ref 6–8.5)
RBC # BLD AUTO: 3.97 X10E6/UL (ref 4.14–5.8)
RETICS/RBC NFR AUTO: 1.3 % (ref 0.6–2.6)
SODIUM SERPL-SCNC: 139 MMOL/L (ref 134–144)
TIBC SERPL-MCNC: 357 UG/DL (ref 250–450)
TSH SERPL DL<=0.005 MIU/L-ACNC: 1.76 UIU/ML (ref 0.45–4.5)
UIBC SERPL-MCNC: 339 UG/DL (ref 111–343)
VIT B12 SERPL-MCNC: 391 PG/ML (ref 232–1245)
WBC # BLD AUTO: 3.7 X10E3/UL (ref 3.4–10.8)

## 2021-03-02 ENCOUNTER — HOSPITAL ENCOUNTER (OUTPATIENT)
Dept: NUCLEAR MEDICINE | Age: 81
Discharge: HOME | End: 2021-03-02
Attending: RADIOLOGY
Payer: MEDICARE

## 2021-03-02 PROCEDURE — 78306 BONE IMAGING WHOLE BODY: CPT

## 2021-03-10 ENCOUNTER — HOSPITAL ENCOUNTER (OUTPATIENT)
Dept: INFUSION THERAPY | Age: 81
Discharge: HOME | End: 2021-03-10
Payer: MEDICARE

## 2021-03-10 VITALS
RESPIRATION RATE: 18 BRPM | DIASTOLIC BLOOD PRESSURE: 50 MMHG | HEIGHT: 72 IN | SYSTOLIC BLOOD PRESSURE: 93 MMHG | HEART RATE: 58 BPM | WEIGHT: 187.6 LBS | OXYGEN SATURATION: 100 % | TEMPERATURE: 97.9 F | BODY MASS INDEX: 25.41 KG/M2

## 2021-03-10 DIAGNOSIS — D50.8 OTHER IRON DEFICIENCY ANEMIA: Primary | ICD-10-CM

## 2021-03-10 PROCEDURE — 74011250636 HC RX REV CODE- 250/636: Performed by: NURSE PRACTITIONER

## 2021-03-10 PROCEDURE — 96365 THER/PROPH/DIAG IV INF INIT: CPT

## 2021-03-10 RX ADMIN — SODIUM CHLORIDE 250 ML: 900 INJECTION, SOLUTION INTRAVENOUS at 11:00

## 2021-03-10 RX ADMIN — SODIUM CHLORIDE 750 MG: 900 INJECTION, SOLUTION INTRAVENOUS at 10:30

## 2021-03-10 NOTE — PROGRESS NOTES
OUTPATIENT INFUSION CENTER    DISCHARGE INSTRUCTIONS FOR:    IRON INFUSIONS - INCLUDING VENOFER, FERRLECIT, AND INFED    You should continue to take your usual home medications unless otherwise instructed by your physician. Drink plenty of fluids and eat your usual diet. All medications have the potential to cause side effects. Your physician can instruct you regarding any necessary treatment for side effects. Some possible side effects of iron infusions may include the following:     - Urinary changes;   - Mild muscle cramping;   - Mild nausea, stomach pain, diarrhea or constipation;   - Mild skin itching, mild pain at IV site. Signs/Symptoms of an allergic reaction may require immediate medical attention. These may include one or more of the following:       Skin redness, itching, swelling, blistering, weeping, crusting, rash or hives. Wheezing, chest tightness, cough, or shortness of breath;   Swelling of the face, eyelids, lips, tongue, or throat;  Severe headache, seizures or tremor;  Stuffy nose, runny nose, sneezing;   Red (bloodshot), itchy, swollen, or watery eyes;  Stomach  pain, nausea, vomiting, diarrhea or bloody diarrhea; Chest pain or tightness, increased heart rate, palpitations, changes in blood pressure which can cause dizziness, unusual feelings of weakness or fatigue;  Back ache or pain around waist;  Painful urination, increase or decrease in amount of urine, blood in urine. Contact your physicians office with any questions or concerns regarding your treatment.     Julia Carrion, Signature: _____________________________________ 3/10/2021  Julio Cruz RN

## 2021-03-10 NOTE — PROGRESS NOTES
[facility] Progress Note    Date: March 10, 2021    Name: Lanre Martin    MRN: [id number]         : 1940    Mr. Bharti Camacho Arrived ambulatory and in no distress for [facility]. Assessment was completed, no acute issues at this time, no new complaints voiced. 24 G PIV established to right arm, + blood return. Covid questionnaire completed. 1. Do you have any symptoms of COVID-19? SOB, coughing, fever, or generally not feeling well - no    2. Have you been exposed to COVID-19 recently? - no    3. Have you had any recent contact with family/friend that has a pending COVID test? - no      Mr. Nallely Kat vitals were reviewed. Visit Vitals  BP (!) 90/55 (BP 1 Location: Right arm, BP Patient Position: Sitting)   Pulse 65   Temp 97.9 °F (36.6 °C)   Resp 18   Ht 6' (1.829 m)   Wt 85.1 kg (187 lb 9.6 oz)   SpO2 100%   BMI 25.44 kg/m²     Medications:  Medications Administered     ferric carboxymaltose (INJECTAFER) 750 mg in 0.9% sodium chloride 250 mL, overfill volume 25 mL IVPB     Admin Date  03/10/2021 Action  Given Dose  750 mg Rate  870 mL/hr Route  IntraVENous Administered By  Clayton Franklin RN          sodium chloride 0.9 % bolus infusion 250 mL     Admin Date  03/10/2021 Action  New Bag Dose  250 mL Rate  500 mL/hr Route  IntraVENous Administered By  Clayton Franklin RN                Patient monitored for 30 minutes post transfusion. Information regarding Iron infusions and discharge instructions given to patient. Patient's blood pressure 90/55 on admission. After iron infusion Blood pressure 88/52. MD office notified of low blood pressure. Patient received a 250cc Bolus due to low Blood pressure. At discharge, blood pressure 93/50. Called MD office. MD office said patient can be discharged and to stay hydrated at home. Patient asymptomatic.      Mr. Bharti Camacho tolerated treatment well and was discharged from [place] [address] in stable condition at 1200pm.   PIV flushed & removed. He is to return on March 17 at 1130am for his next appointment.     Alda Coats RN  March 10, 2021

## 2021-03-16 ENCOUNTER — HOSPITAL ENCOUNTER (OUTPATIENT)
Dept: RADIATION THERAPY | Age: 81
Discharge: HOME | End: 2021-03-16

## 2021-03-16 DIAGNOSIS — K58.1 IRRITABLE BOWEL SYNDROME WITH CONSTIPATION: ICD-10-CM

## 2021-03-16 RX ORDER — DICYCLOMINE HYDROCHLORIDE 20 MG/1
TABLET ORAL
Qty: 30 TAB | Refills: 3 | Status: SHIPPED | OUTPATIENT
Start: 2021-03-16 | End: 2021-07-22

## 2021-03-17 ENCOUNTER — HOSPITAL ENCOUNTER (OUTPATIENT)
Dept: INFUSION THERAPY | Age: 81
Discharge: HOME | End: 2021-03-17
Payer: MEDICARE

## 2021-03-17 VITALS
HEART RATE: 94 BPM | SYSTOLIC BLOOD PRESSURE: 124 MMHG | DIASTOLIC BLOOD PRESSURE: 64 MMHG | RESPIRATION RATE: 18 BRPM | TEMPERATURE: 97.3 F | OXYGEN SATURATION: 100 %

## 2021-03-17 DIAGNOSIS — D50.8 OTHER IRON DEFICIENCY ANEMIA: Primary | ICD-10-CM

## 2021-03-17 PROCEDURE — 74011250636 HC RX REV CODE- 250/636: Performed by: NURSE PRACTITIONER

## 2021-03-17 PROCEDURE — 96365 THER/PROPH/DIAG IV INF INIT: CPT

## 2021-03-17 RX ORDER — SODIUM CHLORIDE 0.9 % (FLUSH) 0.9 %
10 SYRINGE (ML) INJECTION AS NEEDED
Status: DISPENSED | OUTPATIENT
Start: 2021-03-17 | End: 2021-03-17

## 2021-03-17 RX ORDER — SODIUM CHLORIDE 9 MG/ML
10 INJECTION INTRAMUSCULAR; INTRAVENOUS; SUBCUTANEOUS AS NEEDED
Status: ACTIVE | OUTPATIENT
Start: 2021-03-17 | End: 2021-03-17

## 2021-03-17 RX ORDER — HEPARIN 100 UNIT/ML
300-500 SYRINGE INTRAVENOUS AS NEEDED
Status: ACTIVE | OUTPATIENT
Start: 2021-03-17 | End: 2021-03-17

## 2021-03-17 RX ADMIN — SODIUM CHLORIDE 750 MG: 900 INJECTION, SOLUTION INTRAVENOUS at 12:20

## 2021-03-17 NOTE — PROGRESS NOTES
[facility] Progress Note    Date: 2021    Name: Leslye Hinojosa    MRN: [id number]         : 1940    Mr. Ladonna Cali Arrived ambulatory and in no distress for Dose 2 of 2  for Injectafer. Assessment was completed, patient reports fatigue. 24 G PIV established to L arm without difficulty. Upon starting saline, IV infiltrated and new 24 G IV placed to R arm, + blood return. .      Mr. Ajit Rangel vitals were reviewed. Patient Vitals for the past 12 hrs:   Temp Pulse Resp BP SpO2   21 1237 97.3 °F (36.3 °C) 94 18 124/64 100 %   21 1151 98.5 °F (36.9 °C) 96 18 109/68 100 %         Medications:  NS Kriste Caroli IV      Mr. Ladonna Cali tolerated treatment well and was discharged from [place] [address] in stable condition . PIV flushed & removed. Discharge instructions reviewed with patient, verbalized understanding. Patient politely declined to stay 30 minutes post infusion for monitoring. No further appointments needed at this time.      Dianelys Reis RN  2021

## 2021-03-23 ENCOUNTER — OFFICE VISIT (OUTPATIENT)
Dept: NEUROLOGY | Age: 81
End: 2021-03-23
Payer: MEDICARE

## 2021-03-23 ENCOUNTER — HOSPITAL ENCOUNTER (OUTPATIENT)
Dept: RADIATION THERAPY | Age: 81
Discharge: HOME | End: 2021-03-23
Payer: MEDICARE

## 2021-03-23 DIAGNOSIS — F02.80 LATE ONSET ALZHEIMER'S DISEASE WITHOUT BEHAVIORAL DISTURBANCE (HCC): Primary | ICD-10-CM

## 2021-03-23 DIAGNOSIS — F32.0 MILD MAJOR DEPRESSION (HCC): ICD-10-CM

## 2021-03-23 DIAGNOSIS — R90.89 ABNORMAL BRAIN MRI: ICD-10-CM

## 2021-03-23 DIAGNOSIS — Z85.46 HISTORY OF PROSTATE CANCER: ICD-10-CM

## 2021-03-23 DIAGNOSIS — G30.1 LATE ONSET ALZHEIMER'S DISEASE WITHOUT BEHAVIORAL DISTURBANCE (HCC): Primary | ICD-10-CM

## 2021-03-23 PROCEDURE — 77300 RADIATION THERAPY DOSE PLAN: CPT

## 2021-03-23 PROCEDURE — 90832 PSYTX W PT 30 MINUTES: CPT | Performed by: CLINICAL NEUROPSYCHOLOGIST

## 2021-03-23 PROCEDURE — 77338 DESIGN MLC DEVICE FOR IMRT: CPT

## 2021-03-23 PROCEDURE — 77301 RADIOTHERAPY DOSE PLAN IMRT: CPT

## 2021-03-23 NOTE — PROGRESS NOTES
Prior to seeing the patient I reviewed the records, including the previously completed report, the records in [place], and any updated visits from other providers since I saw the patient last.      Today, I engaged in a psychoeducational and supportive and cognitive/behavioral psychotherapy session with the patient and spouse. I provided psychotherapy in the form of psychoeducation and support with respect to the results of the recent Neuropsychological Evaluation, including discussing individual tests as well as patient's areas of neurocognitive strength versus weakness. We discussed, in detail, the following: This patient generated an abnormal range Neuropsychological Evaluation with respect to neurocognitive functioning. In this regard, he is showing problems with verbal fluency,  sustained attention, auditory memory, and dominant handed motor dexterity. The latter potentially reflects a lateralized issue for which neurologic correlation is indicated. Concurrently, his performances across all other neurocognitive domains assessed, including mental status, confrontation naming, verbal comprehension, perceptual reasoning, working memory, processing speed, auditory learning, executive functioning, and nondominant handed fine motor dexterity remain normal.  These important neurocognitive strengths will serve to mask underlying neurocognitive deficits at times. From an emotional standpoint, the patient reports a mild level of depression. He also reports stress related to politics.                 In my opinion, this profile is consistent with an evolving organic process that is currently at a mildlevel of severity. This is likely AD but a combination of AD and vascular dementia is also possible. On a scale of 1-10, this is a 1.5.   In addition to continued medical care, my recommendations include consideration for memory management medication as well as consideration for an appropriate medication for attention if this is not medically contraindicated. From an emotional standpoint, there is mild depression and anxiety/stressthis appears more so functional than organic in etiology to mefor which I recommend a referral to psychotherapy/counseling to assist.  Should psychotherapy not assessed in mitigating significantly the mood concerns, then consider psychiatric medication management if this is not medically contraindicated. Certainly, we do not wish to inundate him with pills. At the same time, this memory issue has been caught relatively early on, and I hope that efforts now will help prevent, reduce a more rapid deterioration of neurocognitive functioning over time. In the interim, the patient should be encouraged to remain as mentally, socially, and physically active as possible.                   The patient's generated cognitive difficulties are significant to the degree whereby it is my opinion that he should not live independently without appropriate supervision for those domains with a heavy memory emphasis. This includes medication management supervision and supervision of financial dealings. I am not currently concerned about competency/capacity nor my concerns about driving safety, but we will need to monitor these issues over time. Yearly neurocognitive evaluations will be a wise idea. Consider updated neuroimaging to rule out a left temporal lobe issue or changes in previously seen white matter disease, or other issue. Baseline now established. Clinical correlation is, of course, indicated.                 I will discuss these findings with the patient and family when they follow up with me in the near future.   A follow up Neuropsychological Evaluation is indicated on a prn basis.       DIAGNOSES: Dementia - Mild                                                 Depression - Mild      Education was provided regarding my diagnostic impressions, and we discussed treatment plan/options. I also answered numerous questions related to the clinical findings, including discussing various methods to improve cognition and mood. Counseling provided regarding mood and cognition. CBT and supportive psychotherapy techniques were utilized. Supportive/Cognitive Behavioral/Solution Focused psychotherapy provided  Discussed rational versus irrational thinking patterns and their consequences. Discussed healthy/adaptive and unhealthy/maladaptive coping. Starts radiation tomorrow, stage 3 starts radiation. Discussed dementia, and mood and encouraged to stop watching [place] news and getting wound up and go take a walk and calm down as much as possible to protect memory and also to talk to PCP about medication for memory and will follow yearly. Tips and stratgies discussed and emotional support provided.       The patient had the following concerns which I deferred to their referring provider: meds      Time spent today: 20

## 2021-03-24 ENCOUNTER — HOSPITAL ENCOUNTER (OUTPATIENT)
Dept: RADIATION THERAPY | Age: 81
Discharge: HOME | End: 2021-03-24
Payer: MEDICARE

## 2021-03-24 PROCEDURE — 77385 HC IMRT TRMT DLVR SMPL: CPT

## 2021-03-25 ENCOUNTER — HOSPITAL ENCOUNTER (OUTPATIENT)
Dept: RADIATION THERAPY | Age: 81
Discharge: HOME | End: 2021-03-25
Payer: MEDICARE

## 2021-03-25 PROCEDURE — 77385 HC IMRT TRMT DLVR SMPL: CPT

## 2021-03-26 ENCOUNTER — HOSPITAL ENCOUNTER (OUTPATIENT)
Dept: RADIATION THERAPY | Age: 81
Discharge: HOME | End: 2021-03-26
Payer: MEDICARE

## 2021-03-26 PROCEDURE — 77385 HC IMRT TRMT DLVR SMPL: CPT

## 2021-03-29 ENCOUNTER — HOSPITAL ENCOUNTER (OUTPATIENT)
Dept: RADIATION THERAPY | Age: 81
Discharge: HOME | End: 2021-03-29
Payer: MEDICARE

## 2021-03-29 PROCEDURE — 77385 HC IMRT TRMT DLVR SMPL: CPT

## 2021-03-30 ENCOUNTER — HOSPITAL ENCOUNTER (OUTPATIENT)
Dept: RADIATION THERAPY | Age: 81
Discharge: HOME | End: 2021-03-30
Payer: MEDICARE

## 2021-03-30 PROCEDURE — 77385 HC IMRT TRMT DLVR SMPL: CPT

## 2021-03-30 PROCEDURE — 77336 RADIATION PHYSICS CONSULT: CPT

## 2021-03-31 ENCOUNTER — HOSPITAL ENCOUNTER (OUTPATIENT)
Dept: RADIATION THERAPY | Age: 81
Discharge: HOME | End: 2021-03-31
Payer: MEDICARE

## 2021-03-31 PROCEDURE — 77385 HC IMRT TRMT DLVR SMPL: CPT

## 2021-04-01 ENCOUNTER — HOSPITAL ENCOUNTER (OUTPATIENT)
Dept: RADIATION THERAPY | Age: 81
Discharge: HOME | End: 2021-04-01
Payer: MEDICARE

## 2021-04-01 PROCEDURE — 77385 HC IMRT TRMT DLVR SMPL: CPT

## 2021-04-02 ENCOUNTER — HOSPITAL ENCOUNTER (OUTPATIENT)
Dept: RADIATION THERAPY | Age: 81
Discharge: HOME | End: 2021-04-02
Payer: MEDICARE

## 2021-04-02 PROCEDURE — 77385 HC IMRT TRMT DLVR SMPL: CPT

## 2021-04-05 ENCOUNTER — HOSPITAL ENCOUNTER (OUTPATIENT)
Dept: RADIATION THERAPY | Age: 81
Discharge: HOME | End: 2021-04-05
Payer: MEDICARE

## 2021-04-05 PROCEDURE — 77385 HC IMRT TRMT DLVR SMPL: CPT

## 2021-04-06 ENCOUNTER — HOSPITAL ENCOUNTER (OUTPATIENT)
Dept: RADIATION THERAPY | Age: 81
Discharge: HOME | End: 2021-04-06
Payer: MEDICARE

## 2021-04-06 PROCEDURE — 77336 RADIATION PHYSICS CONSULT: CPT

## 2021-04-06 PROCEDURE — 77385 HC IMRT TRMT DLVR SMPL: CPT

## 2021-04-07 ENCOUNTER — HOSPITAL ENCOUNTER (OUTPATIENT)
Dept: RADIATION THERAPY | Age: 81
Discharge: HOME | End: 2021-04-07
Payer: MEDICARE

## 2021-04-07 PROCEDURE — 77385 HC IMRT TRMT DLVR SMPL: CPT

## 2021-04-08 ENCOUNTER — TELEPHONE (OUTPATIENT)
Dept: INTERNAL MEDICINE CLINIC | Age: 81
End: 2021-04-08

## 2021-04-08 ENCOUNTER — HOSPITAL ENCOUNTER (OUTPATIENT)
Dept: RADIATION THERAPY | Age: 81
Discharge: HOME | End: 2021-04-08
Payer: MEDICARE

## 2021-04-08 PROCEDURE — 77385 HC IMRT TRMT DLVR SMPL: CPT

## 2021-04-08 NOTE — TELEPHONE ENCOUNTER
Spoke with pt - he wanted to see if Dr Climmie Najjar is up to date with his care with his other doctors - Dr Shannon Chen, Dr Jahaira Weiss, Dr Tonia Sena and Dr Hermes Liao?  Pt requested appt to talk with MD. Schedule appt 4/13/21 at 8:10 am. Will forward to MD.

## 2021-04-09 ENCOUNTER — HOSPITAL ENCOUNTER (OUTPATIENT)
Dept: RADIATION THERAPY | Age: 81
Discharge: HOME | End: 2021-04-09
Payer: MEDICARE

## 2021-04-09 PROCEDURE — 77385 HC IMRT TRMT DLVR SMPL: CPT

## 2021-04-12 ENCOUNTER — HOSPITAL ENCOUNTER (OUTPATIENT)
Dept: RADIATION THERAPY | Age: 81
Discharge: HOME | End: 2021-04-12
Payer: MEDICARE

## 2021-04-12 PROCEDURE — 77385 HC IMRT TRMT DLVR SMPL: CPT

## 2021-04-13 ENCOUNTER — HOSPITAL ENCOUNTER (OUTPATIENT)
Dept: RADIATION THERAPY | Age: 81
Discharge: HOME | End: 2021-04-13
Payer: MEDICARE

## 2021-04-13 ENCOUNTER — OFFICE VISIT (OUTPATIENT)
Dept: INTERNAL MEDICINE CLINIC | Age: 81
End: 2021-04-13
Payer: MEDICARE

## 2021-04-13 VITALS
TEMPERATURE: 97.6 F | DIASTOLIC BLOOD PRESSURE: 69 MMHG | RESPIRATION RATE: 20 BRPM | BODY MASS INDEX: 23.94 KG/M2 | HEART RATE: 65 BPM | OXYGEN SATURATION: 99 % | WEIGHT: 180.6 LBS | SYSTOLIC BLOOD PRESSURE: 118 MMHG | HEIGHT: 73 IN

## 2021-04-13 DIAGNOSIS — G31.84 MCI (MILD COGNITIVE IMPAIRMENT): Primary | ICD-10-CM

## 2021-04-13 DIAGNOSIS — C61 PROSTATE CANCER (HCC): ICD-10-CM

## 2021-04-13 DIAGNOSIS — D46.4 REFRACTORY ANEMIA (HCC): ICD-10-CM

## 2021-04-13 PROCEDURE — 77385 HC IMRT TRMT DLVR SMPL: CPT

## 2021-04-13 PROCEDURE — 99214 OFFICE O/P EST MOD 30 MIN: CPT | Performed by: INTERNAL MEDICINE

## 2021-04-13 PROCEDURE — G8427 DOCREV CUR MEDS BY ELIG CLIN: HCPCS | Performed by: INTERNAL MEDICINE

## 2021-04-13 PROCEDURE — G8420 CALC BMI NORM PARAMETERS: HCPCS | Performed by: INTERNAL MEDICINE

## 2021-04-13 PROCEDURE — G8510 SCR DEP NEG, NO PLAN REQD: HCPCS | Performed by: INTERNAL MEDICINE

## 2021-04-13 PROCEDURE — 77336 RADIATION PHYSICS CONSULT: CPT

## 2021-04-13 PROCEDURE — G0463 HOSPITAL OUTPT CLINIC VISIT: HCPCS | Performed by: INTERNAL MEDICINE

## 2021-04-13 PROCEDURE — 1101F PT FALLS ASSESS-DOCD LE1/YR: CPT | Performed by: INTERNAL MEDICINE

## 2021-04-13 PROCEDURE — G8536 NO DOC ELDER MAL SCRN: HCPCS | Performed by: INTERNAL MEDICINE

## 2021-04-13 RX ORDER — DOXYCYCLINE 100 MG/1
100 CAPSULE ORAL DAILY
COMMUNITY
Start: 2021-03-16 | End: 2021-10-07 | Stop reason: ALTCHOICE

## 2021-04-13 RX ORDER — DONEPEZIL HYDROCHLORIDE 5 MG/1
5 TABLET, FILM COATED ORAL
Qty: 30 TAB | Refills: 5 | Status: SHIPPED | OUTPATIENT
Start: 2021-04-13 | End: 2021-08-02 | Stop reason: SINTOL

## 2021-04-13 NOTE — PROGRESS NOTES
HISTORY OF PRESENT ILLNESS  Stephany Pinto is a 79 y.o. male. HPI  Assessment. Glen Mendoza is seen today to review several ongoing medical problems. 1. Prostate cancer. Up to date with specialist follow up. 2. Memory loss. He self-referred himself to Dr. Valerie Sharma. I reviewed the evaluation which revealed mild cognitive impairment. It was advised that he try some treatment. I reviewed the treatment options and I think he is a candidate for Aricept. Given ongoing radiation therapy and possible associated bowel issues, I have advised he start the medication in one month and come to see me in two months. He agrees with this plan. 3. Anemia. He is up to date with hematology follow up. Review of Systems   Constitutional: Negative for chills and fever. Gastrointestinal: Positive for diarrhea. At times   Psychiatric/Behavioral: Positive for memory loss. Physical Exam  Vitals signs and nursing note reviewed. Constitutional:       General: He is not in acute distress. Cardiovascular:      Rate and Rhythm: Normal rate and regular rhythm. Heart sounds: No murmur. No friction rub. No gallop. Pulmonary:      Effort: Pulmonary effort is normal.      Breath sounds: Normal breath sounds. ASSESSMENT and PLAN  Diagnoses and all orders for this visit:    1. MCI (mild cognitive impairment)  -     donepeziL (ARICEPT) 5 mg tablet; Take 1 Tab by mouth nightly. 2. Prostate cancer ([address]    3.  Refractory anemia (HCC)

## 2021-04-14 ENCOUNTER — HOSPITAL ENCOUNTER (OUTPATIENT)
Dept: RADIATION THERAPY | Age: 81
Discharge: HOME | End: 2021-04-14
Payer: MEDICARE

## 2021-04-14 PROCEDURE — 77385 HC IMRT TRMT DLVR SMPL: CPT

## 2021-04-14 RX ORDER — TAMSULOSIN HYDROCHLORIDE 0.4 MG/1
0.4 CAPSULE ORAL
Qty: 90 CAP | Refills: 5 | Status: SHIPPED | OUTPATIENT
Start: 2021-04-14 | End: 2021-08-02

## 2021-04-15 ENCOUNTER — HOSPITAL ENCOUNTER (OUTPATIENT)
Dept: RADIATION THERAPY | Age: 81
Discharge: HOME | End: 2021-04-15
Payer: MEDICARE

## 2021-04-15 PROCEDURE — 77385 HC IMRT TRMT DLVR SMPL: CPT

## 2021-04-16 ENCOUNTER — HOSPITAL ENCOUNTER (OUTPATIENT)
Dept: RADIATION THERAPY | Age: 81
Discharge: HOME | End: 2021-04-16
Payer: MEDICARE

## 2021-04-16 PROCEDURE — 77385 HC IMRT TRMT DLVR SMPL: CPT

## 2021-04-19 ENCOUNTER — HOSPITAL ENCOUNTER (OUTPATIENT)
Dept: RADIATION THERAPY | Age: 81
Discharge: HOME | End: 2021-04-19
Payer: MEDICARE

## 2021-04-19 PROCEDURE — 77385 HC IMRT TRMT DLVR SMPL: CPT

## 2021-04-20 ENCOUNTER — HOSPITAL ENCOUNTER (OUTPATIENT)
Dept: RADIATION THERAPY | Age: 81
Discharge: HOME | End: 2021-04-20
Payer: MEDICARE

## 2021-04-20 PROCEDURE — 77385 HC IMRT TRMT DLVR SMPL: CPT

## 2021-04-20 PROCEDURE — 77336 RADIATION PHYSICS CONSULT: CPT

## 2021-04-21 ENCOUNTER — HOSPITAL ENCOUNTER (OUTPATIENT)
Dept: RADIATION THERAPY | Age: 81
Discharge: HOME | End: 2021-04-21
Payer: MEDICARE

## 2021-04-21 PROCEDURE — 77385 HC IMRT TRMT DLVR SMPL: CPT

## 2021-04-22 ENCOUNTER — HOSPITAL ENCOUNTER (OUTPATIENT)
Dept: RADIATION THERAPY | Age: 81
Discharge: HOME | End: 2021-04-22
Payer: MEDICARE

## 2021-04-22 PROCEDURE — 77385 HC IMRT TRMT DLVR SMPL: CPT

## 2021-04-23 ENCOUNTER — HOSPITAL ENCOUNTER (OUTPATIENT)
Dept: RADIATION THERAPY | Age: 81
Discharge: HOME | End: 2021-04-23
Payer: MEDICARE

## 2021-04-23 PROCEDURE — 77385 HC IMRT TRMT DLVR SMPL: CPT

## 2021-04-26 ENCOUNTER — HOSPITAL ENCOUNTER (OUTPATIENT)
Dept: RADIATION THERAPY | Age: 81
Discharge: HOME | End: 2021-04-26
Payer: MEDICARE

## 2021-04-26 PROCEDURE — 77385 HC IMRT TRMT DLVR SMPL: CPT

## 2021-04-27 ENCOUNTER — HOSPITAL ENCOUNTER (OUTPATIENT)
Dept: RADIATION THERAPY | Age: 81
Discharge: HOME | End: 2021-04-27
Payer: MEDICARE

## 2021-04-27 PROCEDURE — 77336 RADIATION PHYSICS CONSULT: CPT

## 2021-04-27 PROCEDURE — 77385 HC IMRT TRMT DLVR SMPL: CPT

## 2021-04-28 ENCOUNTER — HOSPITAL ENCOUNTER (OUTPATIENT)
Dept: RADIATION THERAPY | Age: 81
Discharge: HOME | End: 2021-04-28
Payer: MEDICARE

## 2021-04-28 PROCEDURE — 77385 HC IMRT TRMT DLVR SMPL: CPT

## 2021-04-29 ENCOUNTER — HOSPITAL ENCOUNTER (OUTPATIENT)
Dept: RADIATION THERAPY | Age: 81
Discharge: HOME | End: 2021-04-29
Payer: MEDICARE

## 2021-04-29 PROCEDURE — 77385 HC IMRT TRMT DLVR SMPL: CPT

## 2021-04-30 ENCOUNTER — HOSPITAL ENCOUNTER (OUTPATIENT)
Dept: RADIATION THERAPY | Age: 81
Discharge: HOME | End: 2021-04-30
Payer: MEDICARE

## 2021-04-30 PROCEDURE — 77385 HC IMRT TRMT DLVR SMPL: CPT

## 2021-05-04 ENCOUNTER — TELEPHONE (OUTPATIENT)
Dept: INTERNAL MEDICINE CLINIC | Age: 81
End: 2021-05-04

## 2021-05-04 NOTE — TELEPHONE ENCOUNTER
Spoke with pt's wife - she states pt has appt with Dr Anshul Cazares on 5/19/21. Wants to schedule an appt with Dr Wilbur Yin after for CPE. Transferred to Romina Nieto  To schedule.

## 2021-05-13 LAB
BASOPHILS # BLD AUTO: 0 X10E3/UL (ref 0–0.2)
BASOPHILS NFR BLD AUTO: 1 %
EOSINOPHIL # BLD AUTO: 0.1 X10E3/UL (ref 0–0.4)
EOSINOPHIL NFR BLD AUTO: 3 %
ERYTHROCYTE [DISTWIDTH] IN BLOOD BY AUTOMATED COUNT: 16.8 % (ref 11.6–15.4)
FERRITIN SERPL-MCNC: 22 NG/ML (ref 30–400)
HCT VFR BLD AUTO: 31.4 % (ref 37.5–51)
HGB BLD-MCNC: 10.3 G/DL (ref 13–17.7)
IMM GRANULOCYTES # BLD AUTO: 0 X10E3/UL (ref 0–0.1)
IMM GRANULOCYTES NFR BLD AUTO: 0 %
IRON SATN MFR SERPL: 12 % (ref 15–55)
IRON SERPL-MCNC: 41 UG/DL (ref 38–169)
LYMPHOCYTES # BLD AUTO: 0.5 X10E3/UL (ref 0.7–3.1)
LYMPHOCYTES NFR BLD AUTO: 13 %
MCH RBC QN AUTO: 29.1 PG (ref 26.6–33)
MCHC RBC AUTO-ENTMCNC: 32.8 G/DL (ref 31.5–35.7)
MCV RBC AUTO: 89 FL (ref 79–97)
MONOCYTES # BLD AUTO: 0.5 X10E3/UL (ref 0.1–0.9)
MONOCYTES NFR BLD AUTO: 14 %
NEUTROPHILS # BLD AUTO: 2.6 X10E3/UL (ref 1.4–7)
NEUTROPHILS NFR BLD AUTO: 69 %
PLATELET # BLD AUTO: 174 X10E3/UL (ref 150–450)
RBC # BLD AUTO: 3.54 X10E6/UL (ref 4.14–5.8)
TIBC SERPL-MCNC: 347 UG/DL (ref 250–450)
UIBC SERPL-MCNC: 306 UG/DL (ref 111–343)
WBC # BLD AUTO: 3.7 X10E3/UL (ref 3.4–10.8)

## 2021-05-13 NOTE — PROGRESS NOTES
[facility] [place] at [facility]  [address] [facility], [address] [facility] [address] [place]: [phone]  F: [phone]      Reason for Visit:   Dayle Bamberger is a 79 y.o. male who is seen for follow up of anemia. History of Present Illness:   He received injectafer x2 doses in 3/2021 and tolerated this well. Energy level has improved quite a bit after this. Perhaps worsening in the past few days. He continues with significant epistaxis issues. Some gum/tongue bleeding. No blood in stool. No dizziness or dyspnea. He has a history of HHT, following with specialists at [facility]). Had embolization for a pulmonary AVM several years ago. Sees ENT there several times a year for procedures to address epistaxis, most recently on 2/12/2021. He is accompanied by his wife. His wife is James Paredes, a realtor who helped me with the purchase of my current home. Three of his four children have HHT. Review of systems was obtained and pertinent findings reviewed above. Past medical history, social history, family history, medications, and allergies are located in the electronic medical record. Physical Exam:     Visit Vitals  /65 (BP 1 Location: Right arm, BP Patient Position: Sitting, BP Cuff Size: Large adult)   Pulse 75   Temp 97 °F (36.1 °C) (Temporal)   Resp 20   SpO2 100%     General: no distress  Respiratory: normal respiratory effort  CV: no peripheral edema  Skin: no rashes; no ecchymoses; no petechiae      Results:     Lab Results   Component Value Date/Time    WBC 3.7 05/12/2021 02:00 PM    HGB 10.3 (L) 05/12/2021 02:00 PM    HCT 31.4 (L) 05/12/2021 02:00 PM    PLATELET 672 48/79/5530 02:00 PM    MCV 89 05/12/2021 02:00 PM    ABS.  NEUTROPHILS 2.6 05/12/2021 02:00 PM     Lab Results   Component Value Date/Time    Sodium 139 02/24/2021 08:18 AM    Potassium 4.5 02/24/2021 08:18 AM    Chloride 104 02/24/2021 08:18 AM CO2 21 02/24/2021 08:18 AM    Glucose 104 (H) 02/24/2021 08:18 AM    BUN 17 02/24/2021 08:18 AM    Creatinine 1.07 02/24/2021 08:18 AM    GFR est AA 75 02/24/2021 08:18 AM    GFR est non-AA 65 02/24/2021 08:18 AM    Calcium 8.8 02/24/2021 08:18 AM     Lab Results   Component Value Date/Time    Bilirubin, total 0.4 02/24/2021 08:18 AM    ALT (SGPT) 18 02/24/2021 08:18 AM    Alk. phosphatase 59 02/24/2021 08:18 AM    Protein, total 6.3 02/24/2021 08:18 AM    Albumin 4.1 02/24/2021 08:18 AM     Lab Results   Component Value Date/Time    Reticulocyte count 1.3 02/24/2021 08:18 AM    Iron % saturation 12 (L) 05/12/2021 02:00 PM    TIBC 347 05/12/2021 02:00 PM    Ferritin 22 (L) 05/12/2021 02:00 PM    Vitamin B12 391 02/24/2021 08:18 AM    Folate 14.0 02/24/2021 08:18 AM    Methylmalonic acid 300 02/05/2021 12:50 PM    Haptoglobin 144 02/24/2021 08:18 AM     02/24/2021 08:18 AM    TSH 1.760 02/24/2021 08:18 AM    M-Terell Not Observed 02/24/2021 08:18 AM     HGB (g/dL)   Date Value   05/12/2021 10.3 (L)   02/24/2021 10.0 (L)   02/05/2021 9.3 (L)   02/01/2021 9.7 (L)   11/24/2020 10.3 (L)   05/21/2020 11.1 (L)   01/31/2020 13.3   04/05/2019 12.0 (L)   11/05/2018 11.6 (L)   07/17/2018 12.0 (L)   06/12/2017 14.7   08/15/2016 12.1 (L)   01/26/2016 11.4 (L)   10/30/2015 11.9 (L)   04/02/2015 12.7   10/07/2014 12.1 (L)   08/20/2014 10.1 (L)     Ferritin (ng/mL)   Date Value   05/12/2021 22 (L)   02/24/2021 13 (L)           Records from radiation oncology reviewed and summarized above. Test results above have been reviewed. Assessment:   1) Iron deficiency anemia  He is s/p injectafer x2 doses in 3/2021. Unfortunately, we have seen minimal improvement in his anemia or his iron deficiency with treatment. This is concerning for ongoing blood loss, likely due to his HHT. I recommend we repeat injectafer now and continue to monitor. He may need to have this on a frequent basis as long as his bleeding persists.   He should continue PO iron as tolerated. 2) HHT  Managed at [facility]. He is s/p treatment for pulmonary AVMs as well as epistaxis. He is receiving regular imaging surveillance there. I have discussed systemic therapy options such as tamoxifen or Avastin, as well as topical therapy such as estradiol. Defer management to his physicians in [place]. 3) Epistaxis  Secondary to HHT. Recently followed up with ENT in [place], s/p local therapy. 4) Prostate cancer  He is now s/p radiation with Dr. Tana Khanna completed 4/2021. He will follow with radiation oncology and urology, and I remain available if needed should he develop recurrence.     Plan:     Injectafer 750mg IV weekly for 2 doses  Labs in 2 months: CBC, iron profile, ferritin ([facility])  Return to see me in 2 months    Signed By: Fletcher Templeton MD

## 2021-05-18 ENCOUNTER — TELEPHONE (OUTPATIENT)
Dept: ONCOLOGY | Age: 81
End: 2021-05-18

## 2021-05-19 ENCOUNTER — OFFICE VISIT (OUTPATIENT)
Dept: ONCOLOGY | Age: 81
End: 2021-05-19
Payer: MEDICARE

## 2021-05-19 VITALS
SYSTOLIC BLOOD PRESSURE: 101 MMHG | HEART RATE: 75 BPM | OXYGEN SATURATION: 100 % | RESPIRATION RATE: 20 BRPM | DIASTOLIC BLOOD PRESSURE: 65 MMHG | TEMPERATURE: 97 F

## 2021-05-19 DIAGNOSIS — I78.0 HHT (HEREDITARY HEMORRHAGIC TELANGIECTASIA) (HCC): Primary | ICD-10-CM

## 2021-05-19 DIAGNOSIS — D50.8 OTHER IRON DEFICIENCY ANEMIA: ICD-10-CM

## 2021-05-19 PROCEDURE — G8427 DOCREV CUR MEDS BY ELIG CLIN: HCPCS | Performed by: INTERNAL MEDICINE

## 2021-05-19 PROCEDURE — 99214 OFFICE O/P EST MOD 30 MIN: CPT | Performed by: INTERNAL MEDICINE

## 2021-05-19 PROCEDURE — G8432 DEP SCR NOT DOC, RNG: HCPCS | Performed by: INTERNAL MEDICINE

## 2021-05-19 PROCEDURE — G8420 CALC BMI NORM PARAMETERS: HCPCS | Performed by: INTERNAL MEDICINE

## 2021-05-19 PROCEDURE — 1101F PT FALLS ASSESS-DOCD LE1/YR: CPT | Performed by: INTERNAL MEDICINE

## 2021-05-19 PROCEDURE — G8536 NO DOC ELDER MAL SCRN: HCPCS | Performed by: INTERNAL MEDICINE

## 2021-05-19 PROCEDURE — G0463 HOSPITAL OUTPT CLINIC VISIT: HCPCS | Performed by: INTERNAL MEDICINE

## 2021-05-19 RX ORDER — GLUCOSAMINE SULFATE 1500 MG
POWDER IN PACKET (EA) ORAL DAILY
COMMUNITY

## 2021-05-19 RX ORDER — ASCORBIC ACID 250 MG
TABLET ORAL
COMMUNITY

## 2021-05-19 NOTE — PROGRESS NOTES
Cara Davila is a 79 y.o. male follow up for anemia. 1. Have you been to the ER, urgent care clinic since your last visit? Hospitalized since your last visit?no     2. Have you seen or consulted any other health care providers outside of the [address] [place] [address] since your last visit? Include any pap smears or colon screening.  no

## 2021-05-25 ENCOUNTER — OFFICE VISIT (OUTPATIENT)
Dept: INTERNAL MEDICINE CLINIC | Age: 81
End: 2021-05-25
Payer: MEDICARE

## 2021-05-25 VITALS
WEIGHT: 182 LBS | OXYGEN SATURATION: 99 % | HEART RATE: 47 BPM | HEIGHT: 73 IN | TEMPERATURE: 98.2 F | DIASTOLIC BLOOD PRESSURE: 65 MMHG | SYSTOLIC BLOOD PRESSURE: 104 MMHG | BODY MASS INDEX: 24.12 KG/M2 | RESPIRATION RATE: 16 BRPM

## 2021-05-25 DIAGNOSIS — C61 PROSTATE CANCER (HCC): ICD-10-CM

## 2021-05-25 DIAGNOSIS — E53.8 B12 DEFICIENCY: ICD-10-CM

## 2021-05-25 DIAGNOSIS — Z13.31 SCREENING FOR DEPRESSION: ICD-10-CM

## 2021-05-25 DIAGNOSIS — Z00.00 MEDICARE ANNUAL WELLNESS VISIT, SUBSEQUENT: Primary | ICD-10-CM

## 2021-05-25 DIAGNOSIS — I78.0 HHT (HEREDITARY HEMORRHAGIC TELANGIECTASIA) (HCC): ICD-10-CM

## 2021-05-25 DIAGNOSIS — D46.4 REFRACTORY ANEMIA (HCC): ICD-10-CM

## 2021-05-25 DIAGNOSIS — E78.00 ELEVATED LDL CHOLESTEROL LEVEL: ICD-10-CM

## 2021-05-25 DIAGNOSIS — G31.84 MCI (MILD COGNITIVE IMPAIRMENT): ICD-10-CM

## 2021-05-25 PROCEDURE — G8510 SCR DEP NEG, NO PLAN REQD: HCPCS | Performed by: INTERNAL MEDICINE

## 2021-05-25 PROCEDURE — G8536 NO DOC ELDER MAL SCRN: HCPCS | Performed by: INTERNAL MEDICINE

## 2021-05-25 PROCEDURE — G8427 DOCREV CUR MEDS BY ELIG CLIN: HCPCS | Performed by: INTERNAL MEDICINE

## 2021-05-25 PROCEDURE — 1101F PT FALLS ASSESS-DOCD LE1/YR: CPT | Performed by: INTERNAL MEDICINE

## 2021-05-25 PROCEDURE — G8420 CALC BMI NORM PARAMETERS: HCPCS | Performed by: INTERNAL MEDICINE

## 2021-05-25 PROCEDURE — G0439 PPPS, SUBSEQ VISIT: HCPCS | Performed by: INTERNAL MEDICINE

## 2021-05-25 RX ORDER — FERROUS SULFATE 137(45) MG
TABLET, EXTENDED RELEASE ORAL
COMMUNITY

## 2021-05-25 NOTE — PROGRESS NOTES
HISTORY OF PRESENT ILLNESS  Mitchael Bloch is a 79 y.o. male. HPI  Assessment: Heidi Patel is seen today for a Medicare Wellness Visit and review of chronic problems. He is due for labs and Tdap booster. He is up to date with most vaccinations. See attached Wellness Visit Note for further details. Chronic problems are reviewed. He is up to date with specialists for radiation oncology, anemia and HHT. He is interested in starting Aricept for mild cognitive impairment. Review of Systems   Constitutional: Negative for chills, fever and weight loss. HENT: Positive for nosebleeds. Respiratory: Negative. Cardiovascular: Negative for chest pain, palpitations, leg swelling and PND. Gastrointestinal: Negative. Genitourinary: Positive for frequency. Musculoskeletal: Negative for myalgias. Neurological: Negative for focal weakness. Psychiatric/Behavioral: Positive for memory loss. Physical Exam  Vitals and nursing note reviewed. Constitutional:       General: He is not in acute distress. Appearance: He is well-developed. HENT:      Head: Normocephalic and atraumatic. Right Ear: Tympanic membrane, ear canal and external ear normal.      Left Ear: Tympanic membrane, ear canal and external ear normal.   Eyes:      General:         Right eye: No discharge. Left eye: No discharge. Pupils: Pupils are equal, round, and reactive to light. Neck:      Thyroid: No thyromegaly. Vascular: No carotid bruit. Cardiovascular:      Rate and Rhythm: Normal rate and regular rhythm. Heart sounds: Normal heart sounds. No murmur heard. No friction rub. No gallop. Pulmonary:      Effort: Pulmonary effort is normal. No respiratory distress. Breath sounds: Normal breath sounds. No wheezing or rales. Abdominal:      General: Bowel sounds are normal. There is no distension. Palpations: Abdomen is soft. There is no mass. Tenderness:  There is no abdominal tenderness. There is no guarding or rebound. Musculoskeletal:         General: No tenderness. Normal range of motion. Cervical back: Normal range of motion and neck supple. Right lower le+ Edema present. Left lower le+ Edema present. Lymphadenopathy:      Cervical: No cervical adenopathy. Skin:     General: Skin is warm and dry. Findings: No rash. Neurological:      Mental Status: He is alert and oriented to person, place, and time. Deep Tendon Reflexes: Reflexes are normal and symmetric. Psychiatric:         Behavior: Behavior normal.         ASSESSMENT and PLAN  Diagnoses and all orders for this visit:    1. Medicare annual wellness visit, subsequent    2. Screening for depression  -     DEPRESSION SCREEN ANNUAL    3. [facility]  -     METABOLIC PANEL, BASIC; Future  -     HEPATIC FUNCTION PANEL; Future  -     METABOLIC PANEL, COMPREHENSIVE; Future    4. Refractory anemia (HCC)    5. MCI (mild cognitive impairment)    6. HHT (hereditary hemorrhagic telangiectasia) ([facility]    7. B12 deficiency    8. Elevated LDL cholesterol level  -     LIPID PANEL; Future  -     TSH 3RD GENERATION; Future  -     LIPID PANEL; Future  -     TSH 3RD GENERATION;  Future

## 2021-05-25 NOTE — PROGRESS NOTES
This is the Subsequent Medicare Annual Wellness Exam, performed 12 months or more after the Initial AWV or the last Subsequent AWV    I have reviewed the patient's medical history in detail and updated the computerized patient record. Assessment/Plan   Education and counseling provided:  Are appropriate based on today's review and evaluation    1. Medicare annual wellness visit, subsequent  2. Screening for depression  -     DEPRESSION SCREEN ANNUAL       Depression Risk Factor Screening     3 most recent PHQ Screens 5/25/2021   Little interest or pleasure in doing things Not at all   Feeling down, depressed, irritable, or hopeless Not at all   Total Score PHQ 2 0       Alcohol Risk Screen    Do you average more than 1 drink per night or more than 7 drinks a week: No- rare    In the past three months have you have had more than 4 drinks containing alcohol on one occasion: No        Functional Ability and Level of Safety    Hearing: The patient wears hearing aids. Activities of Daily Living: The home contains: no safety equipment. Patient does total self care      Ambulation: with mild difficulty     Fall Risk:  Fall Risk Assessment, last 12 mths 5/25/2021   Able to walk? Yes   Fall in past 12 months? 0   Do you feel unsteady? 0   Are you worried about falling 0   Number of falls in past 12 months -   Fall with injury?  -      Abuse Screen:  Patient is not abused       Cognitive Screening    Has your family/caregiver stated any concerns about your memory: yes - wife     Cognitive Screening: Abnormal - MMSE (Mini Mental Status Exam)  Previous visit  Health Maintenance Due     Health Maintenance Due   Topic Date Due    DTaP/Tdap/Td series (1 - Tdap) Never done       Patient Care Team   Patient Care Team:  Hanna Emery MD as PCP - General (Internal Medicine)  Hanna Emery MD as PCP - REHABILITATION [facility] Empaneled Provider  Kerrie Escalona MD (Ophthalmology)  Lucila Oneil MD (Urology)  Jessica Deni Mccormack MD (Hematology and Oncology)  Betina Fair MD (Radiation Oncology)    History     Patient Active Problem List   Diagnosis Code    HHT (hereditary hemorrhagic telangiectasia) ([facility] I78.0    Benign neoplasm of colon D12.6    Epistaxis R04.0    Iron deficiency anemia D50.9    Chronic nasal congestion R09.81    Advance directive discussed with patient Z70.80    Active advance directive on file Z78.9    SCCA (squamous cell carcinoma) of skin C44.92    Wound of lower extremity, left, initial encounter S81.942O     Past Medical History:   Diagnosis Date    HHT (hereditary hemorrhagic telangiectasia) ([facility]       Past Surgical History:   Procedure Laterality Date    COLONOSCOPY N/A 1/29/2020    COLONOSCOPY performed [facility]eban [facility]ajal [facility] at [facility]    HX APPENDECTOMY      HX COLONOSCOPY  2014    due 19    HX OTHER SURGICAL  07/2018    sclerotheropy    HX TONSILLECTOMY      ND CHEST SURGERY PROCEDURE UNLISTED      excision of AVM     Current Outpatient Medications   Medication Sig Dispense Refill    ferrous sulfate (Slow Fe) 142 mg (45 mg iron) ER tablet Take  by mouth Daily (before breakfast).  cholecalciferol (Vitamin D3) 25 mcg (1,000 unit) cap Take  by mouth daily.  ascorbic acid, vitamin C, (Vitamin C) 250 mg tablet Take  by mouth.  doxycycline (VIBRAMYCIN) 100 mg capsule Take 100 mg by mouth daily.  dicyclomine (BENTYL) 20 mg tablet TAKE 1 TABLET BY MOUTH 3 TIMES DAILY AS NEEDED FOR ABDOMINAL CRAMPS (NEW DOSE/NEW DIRECTIONS) 30 Tab 3    TIMOLOL MALEATE OP Apply  to eye. 1 drop to each eye twice daily.  tamsulosin (Flomax) 0.4 mg capsule Take 1 Cap by mouth daily (after dinner). (Patient not taking: Reported on 5/25/2021) 90 Cap 5    donepeziL (ARICEPT) 5 mg tablet Take 1 Tab by mouth nightly.  (Patient not taking: Reported on 5/25/2021) 30 Tab 5     No Known Allergies    Family History   Problem Relation Age of Onset    Heart Disease Mother    Rahel Noe Cancer Other         colon, lung     Social History     Tobacco Use    Smoking status: Former Smoker    Smokeless tobacco: Never Used   Substance Use Topics    Alcohol use:  Yes     Alcohol/week: 0.0 standard drinks     Comment: one drink per week         Librado Black MD

## 2021-05-25 NOTE — PROGRESS NOTES
Verified name and birth date for privacy precautions. Chart reviewed in preparation for today's visit. Chief Complaint   Patient presents with   [facility] Maintenance Due   Topic    DTaP/Tdap/Td series (1 - Tdap)    Medicare Yearly Exam          Wt Readings from Last 3 Encounters:   05/25/21 182 lb (82.6 kg)   04/13/21 180 lb 9.6 oz (81.9 kg)   03/10/21 187 lb 9.6 oz (85.1 kg)     Temp Readings from Last 3 Encounters:   05/25/21 98.2 °F (36.8 °C) (Temporal)   05/19/21 97 °F (36.1 °C) (Temporal)   04/13/21 97.6 °F (36.4 °C) (Temporal)     BP Readings from Last 3 Encounters:   05/25/21 104/65   05/19/21 101/65   04/13/21 118/69     Pulse Readings from Last 3 Encounters:   05/25/21 (!) 47   05/19/21 75   04/13/21 65         Learning Assessment:  :     Learning Assessment 1/31/2018 9/18/2014   PRIMARY LEARNER Patient Patient   PRIMARY LANGUAGE ENGLISH ENGLISH   LEARNER PREFERENCE PRIMARY DEMONSTRATION DEMONSTRATION   ANSWERED BY patient self   RELATIONSHIP SELF SELF       Depression Screening:  :     3 most recent PHQ Screens 5/25/2021   Little interest or pleasure in doing things Not at all   Feeling down, depressed, irritable, or hopeless Not at all   Total Score PHQ 2 0       Fall Risk Assessment:  :     Fall Risk Assessment, last 12 mths 5/25/2021   Able to walk? Yes   Fall in past 12 months? 0   Do you feel unsteady? 0   Are you worried about falling 0   Number of falls in past 12 months -   Fall with injury? -       Abuse Screening:  :     Abuse Screening Questionnaire 5/25/2021 11/5/2020 3/22/2019   Do you ever feel afraid of your partner? N N N   Are you in a relationship with someone who physically or mentally threatens you? N N N   Is it safe for you to go home?  Rip

## 2021-05-25 NOTE — PATIENT INSTRUCTIONS
Medicare Wellness Visit, Male The best way to live healthy is to have a lifestyle where you eat a well-balanced diet, exercise regularly, limit alcohol use, and quit all forms of tobacco/nicotine, if applicable. Regular preventive services are another way to keep healthy. Preventive services (vaccines, screening tests, monitoring & exams) can help personalize your care plan, which helps you manage your own care. Screening tests can find health problems at the earliest stages, when they are easiest to treat. [facility] follows the current, evidence-based guidelines published by the [facility] Naveen ([facility]) when recommending preventive services for our patients. Because we follow these guidelines, sometimes recommendations change over time as research supports it. (For example, a prostate screening blood test is no longer routinely recommended for men with no symptoms). Of course, you and your doctor may decide to screen more often for some diseases, based on your risk and co-morbidities (chronic disease you are already diagnosed with). Preventive services for you include: - Medicare offers their members a free annual wellness visit, which is time for you and your primary care provider to discuss and plan for your preventive service needs. Take advantage of this benefit every year! 
-All adults over age 72 should receive the recommended pneumonia vaccines. Current USPSTF guidelines recommend a series of two vaccines for the best pneumonia protection.  
-All adults should have a flu vaccine yearly and tetanus vaccine every 10 years. 
-All adults age 48 and older should receive the shingles vaccines (series of two vaccines).       
-All adults age 38-68 who are overweight should have a diabetes screening test once every three years.  
-Other screening tests & preventive services for persons with diabetes include: an eye exam to screen for diabetic retinopathy, a kidney function test, a foot exam, and stricter control over your cholesterol.  
-Cardiovascular screening for adults with routine risk involves an electrocardiogram (ECG) at intervals determined by the provider.  
-Colorectal cancer screening should be done for adults age 54-65 with no increased risk factors for colorectal cancer. There are a number of acceptable methods of screening for this type of cancer. Each test has its own benefits and drawbacks. Discuss with your provider what is most appropriate for you during your annual wellness visit. The different tests include: colonoscopy (considered the best screening method), a fecal occult blood test, a fecal DNA test, and sigmoidoscopy. 
-All adults born between [facility] should be screened once for Hepatitis C. 
-An Abdominal Aortic Aneurysm (AAA) Screening is recommended for men age 73-68 who has ever smoked in their lifetime. Here is a list of your current Health Maintenance items (your personalized list of preventive services) with a due date: 
Health Maintenance Due Topic Date Due  
 DTaP/Tdap/Td  (1 - Tdap) Never done

## 2021-05-27 RX ORDER — EPINEPHRINE 1 MG/ML
0.3 INJECTION, SOLUTION, CONCENTRATE INTRAVENOUS AS NEEDED
Status: CANCELLED | OUTPATIENT
Start: 2021-06-11

## 2021-05-27 RX ORDER — ALBUTEROL SULFATE 0.83 MG/ML
2.5 SOLUTION RESPIRATORY (INHALATION) AS NEEDED
Status: CANCELLED
Start: 2021-06-04

## 2021-05-27 RX ORDER — SODIUM CHLORIDE 9 MG/ML
10 INJECTION INTRAMUSCULAR; INTRAVENOUS; SUBCUTANEOUS AS NEEDED
Status: CANCELLED | OUTPATIENT
Start: 2021-06-11

## 2021-05-27 RX ORDER — SODIUM CHLORIDE 0.9 % (FLUSH) 0.9 %
10 SYRINGE (ML) INJECTION AS NEEDED
Status: CANCELLED | OUTPATIENT
Start: 2021-06-04

## 2021-05-27 RX ORDER — ACETAMINOPHEN 325 MG/1
650 TABLET ORAL AS NEEDED
Status: CANCELLED
Start: 2021-06-04

## 2021-05-27 RX ORDER — HYDROCORTISONE SODIUM SUCCINATE 100 MG/2ML
100 INJECTION, POWDER, FOR SOLUTION INTRAMUSCULAR; INTRAVENOUS AS NEEDED
Status: CANCELLED | OUTPATIENT
Start: 2021-06-11

## 2021-05-27 RX ORDER — SODIUM CHLORIDE 9 MG/ML
10 INJECTION INTRAMUSCULAR; INTRAVENOUS; SUBCUTANEOUS AS NEEDED
Status: CANCELLED | OUTPATIENT
Start: 2021-06-04

## 2021-05-27 RX ORDER — HYDROCORTISONE SODIUM SUCCINATE 100 MG/2ML
100 INJECTION, POWDER, FOR SOLUTION INTRAMUSCULAR; INTRAVENOUS AS NEEDED
Status: CANCELLED | OUTPATIENT
Start: 2021-06-04

## 2021-05-27 RX ORDER — SODIUM CHLORIDE 0.9 % (FLUSH) 0.9 %
10 SYRINGE (ML) INJECTION AS NEEDED
Status: CANCELLED | OUTPATIENT
Start: 2021-06-11

## 2021-05-27 RX ORDER — DIPHENHYDRAMINE HYDROCHLORIDE 50 MG/ML
25 INJECTION, SOLUTION INTRAMUSCULAR; INTRAVENOUS AS NEEDED
Status: CANCELLED
Start: 2021-06-04

## 2021-05-27 RX ORDER — ACETAMINOPHEN 325 MG/1
650 TABLET ORAL AS NEEDED
Status: CANCELLED
Start: 2021-06-11

## 2021-05-27 RX ORDER — DIPHENHYDRAMINE HYDROCHLORIDE 50 MG/ML
50 INJECTION, SOLUTION INTRAMUSCULAR; INTRAVENOUS AS NEEDED
Status: CANCELLED
Start: 2021-06-04

## 2021-05-27 RX ORDER — EPINEPHRINE 1 MG/ML
0.3 INJECTION, SOLUTION, CONCENTRATE INTRAVENOUS AS NEEDED
Status: CANCELLED | OUTPATIENT
Start: 2021-06-04

## 2021-05-27 RX ORDER — ONDANSETRON 2 MG/ML
8 INJECTION INTRAMUSCULAR; INTRAVENOUS AS NEEDED
Status: CANCELLED | OUTPATIENT
Start: 2021-06-11

## 2021-05-27 RX ORDER — ONDANSETRON 2 MG/ML
8 INJECTION INTRAMUSCULAR; INTRAVENOUS AS NEEDED
Status: CANCELLED | OUTPATIENT
Start: 2021-06-04

## 2021-05-27 RX ORDER — ALBUTEROL SULFATE 0.83 MG/ML
2.5 SOLUTION RESPIRATORY (INHALATION) AS NEEDED
Status: CANCELLED
Start: 2021-06-11

## 2021-05-27 RX ORDER — DIPHENHYDRAMINE HYDROCHLORIDE 50 MG/ML
25 INJECTION, SOLUTION INTRAMUSCULAR; INTRAVENOUS AS NEEDED
Status: CANCELLED
Start: 2021-06-11

## 2021-05-27 RX ORDER — DIPHENHYDRAMINE HYDROCHLORIDE 50 MG/ML
50 INJECTION, SOLUTION INTRAMUSCULAR; INTRAVENOUS AS NEEDED
Status: CANCELLED
Start: 2021-06-11

## 2021-05-27 RX ORDER — HEPARIN 100 UNIT/ML
300-500 SYRINGE INTRAVENOUS AS NEEDED
Status: CANCELLED | OUTPATIENT
Start: 2021-06-11

## 2021-05-27 RX ORDER — HEPARIN 100 UNIT/ML
300-500 SYRINGE INTRAVENOUS AS NEEDED
Status: CANCELLED
Start: 2021-06-04

## 2021-06-04 ENCOUNTER — HOSPITAL ENCOUNTER (OUTPATIENT)
Dept: INFUSION THERAPY | Age: 81
Discharge: HOME | End: 2021-06-04
Payer: MEDICARE

## 2021-06-04 ENCOUNTER — DOCUMENTATION ONLY (OUTPATIENT)
Dept: WOUND CARE | Age: 81
End: 2021-06-04

## 2021-06-04 VITALS
TEMPERATURE: 96.7 F | SYSTOLIC BLOOD PRESSURE: 123 MMHG | HEIGHT: 73 IN | WEIGHT: 181.3 LBS | RESPIRATION RATE: 16 BRPM | DIASTOLIC BLOOD PRESSURE: 68 MMHG | OXYGEN SATURATION: 100 % | HEART RATE: 60 BPM | BODY MASS INDEX: 24.03 KG/M2

## 2021-06-04 DIAGNOSIS — D50.8 OTHER IRON DEFICIENCY ANEMIA: Primary | ICD-10-CM

## 2021-06-04 PROCEDURE — 96365 THER/PROPH/DIAG IV INF INIT: CPT

## 2021-06-04 PROCEDURE — 74011250636 HC RX REV CODE- 250/636: Performed by: NURSE PRACTITIONER

## 2021-06-04 RX ADMIN — FERRIC CARBOXYMALTOSE INJECTION 750 MG: 50 INJECTION, SOLUTION INTRAVENOUS at 11:16

## 2021-06-04 NOTE — PROGRESS NOTES
Patient's spouse LVM on 6/3/2021 to schedule appt for wound care, return call today and LVM for spouse to call office to have pt scheduled

## 2021-06-11 ENCOUNTER — HOSPITAL ENCOUNTER (OUTPATIENT)
Dept: INFUSION THERAPY | Age: 81
Discharge: HOME | End: 2021-06-11
Payer: MEDICARE

## 2021-06-11 VITALS
WEIGHT: 183.9 LBS | BODY MASS INDEX: 24.37 KG/M2 | OXYGEN SATURATION: 100 % | DIASTOLIC BLOOD PRESSURE: 73 MMHG | TEMPERATURE: 96.9 F | RESPIRATION RATE: 18 BRPM | HEIGHT: 73 IN | SYSTOLIC BLOOD PRESSURE: 133 MMHG | HEART RATE: 66 BPM

## 2021-06-11 DIAGNOSIS — D50.8 OTHER IRON DEFICIENCY ANEMIA: Primary | ICD-10-CM

## 2021-06-11 PROCEDURE — 74011250636 HC RX REV CODE- 250/636: Performed by: NURSE PRACTITIONER

## 2021-06-11 PROCEDURE — 96365 THER/PROPH/DIAG IV INF INIT: CPT

## 2021-06-11 RX ORDER — HEPARIN 100 UNIT/ML
300-500 SYRINGE INTRAVENOUS AS NEEDED
Status: DISCONTINUED | OUTPATIENT
Start: 2021-06-11 | End: 2021-06-12 | Stop reason: HOSPADM

## 2021-06-11 RX ORDER — SODIUM CHLORIDE 9 MG/ML
10 INJECTION INTRAMUSCULAR; INTRAVENOUS; SUBCUTANEOUS AS NEEDED
Status: DISCONTINUED | OUTPATIENT
Start: 2021-06-11 | End: 2021-06-12 | Stop reason: HOSPADM

## 2021-06-11 RX ORDER — SODIUM CHLORIDE 0.9 % (FLUSH) 0.9 %
10 SYRINGE (ML) INJECTION AS NEEDED
Status: DISCONTINUED | OUTPATIENT
Start: 2021-06-11 | End: 2021-06-12 | Stop reason: HOSPADM

## 2021-06-11 RX ADMIN — FERRIC CARBOXYMALTOSE INJECTION 750 MG: 50 INJECTION, SOLUTION INTRAVENOUS at 16:40

## 2021-06-11 NOTE — PROGRESS NOTES
Outpatient Infusion Center Short Visit Progress Note     Patient admitted to [facility] for [facility] ambulatory in stable condition. Assessment completed. No new concerns voiced. Covid Screening      1. Do you have any symptoms of COVID-19? SOB, coughing, fever, or generally not feeling well ? NO  2. Have you been exposed to COVID-19 recently? NO  3. Have you had any recent contact with family/friend that has a pending COVID test? NO    Vital Signs:  Visit Vitals  /73   Pulse 66   Temp 96.9 °F (36.1 °C)   Resp 18   Ht 6' 1\" (1.854 m)   Wt 83.4 kg (183 lb 14.4 oz)   SpO2 100%   BMI 24.26 kg/m²         Left wrist PIV #24 with positive blood return. Medications:  Medications Administered     ferric carboxymaltose (INJECTAFER) 750 mg in 0.9% sodium chloride 250 mL, overfill volume 25 mL IVPB     Admin Date  06/11/2021 Action  New Bag Dose  750 mg Rate  870 mL/hr Route  IntraVENous Administered By  Callie Rodriguez, TYLER                 Patient tolerated treatment well. Patient discharged from [place] [address] ambulatory in no distress. Patient aware of next appointment.     Future Appointments   Date Time Provider Jacqueline Ribeiro   7/22/2021  8:30 AM Nury Lopez MD [facility] AMB   12/2/2021  9:30 AM Yuly Izquierdo MD [facility] AMB

## 2021-06-17 LAB
ALBUMIN SERPL-MCNC: 4.1 G/DL (ref 3.6–4.6)
ALBUMIN/GLOB SERPL: 1.7 {RATIO} (ref 1.2–2.2)
ALP SERPL-CCNC: 65 IU/L (ref 48–121)
ALT SERPL-CCNC: 25 IU/L (ref 0–44)
AST SERPL-CCNC: 25 IU/L (ref 0–40)
BASOPHILS # BLD AUTO: 0.1 X10E3/UL (ref 0–0.2)
BASOPHILS NFR BLD AUTO: 1 %
BILIRUB SERPL-MCNC: 0.3 MG/DL (ref 0–1.2)
BUN SERPL-MCNC: 18 MG/DL (ref 8–27)
BUN/CREAT SERPL: 19 (ref 10–24)
CALCIUM SERPL-MCNC: 8.8 MG/DL (ref 8.6–10.2)
CHLORIDE SERPL-SCNC: 104 MMOL/L (ref 96–106)
CHOLEST SERPL-MCNC: 137 MG/DL (ref 100–199)
CO2 SERPL-SCNC: 24 MMOL/L (ref 20–29)
CREAT SERPL-MCNC: 0.96 MG/DL (ref 0.76–1.27)
EOSINOPHIL # BLD AUTO: 0.3 X10E3/UL (ref 0–0.4)
EOSINOPHIL NFR BLD AUTO: 6 %
ERYTHROCYTE [DISTWIDTH] IN BLOOD BY AUTOMATED COUNT: 17.1 % (ref 11.6–15.4)
FERRITIN SERPL-MCNC: 1062 NG/ML (ref 30–400)
GLOBULIN SER CALC-MCNC: 2.4 G/DL (ref 1.5–4.5)
GLUCOSE SERPL-MCNC: 96 MG/DL (ref 65–99)
HCT VFR BLD AUTO: 35.7 % (ref 37.5–51)
HDLC SERPL-MCNC: 51 MG/DL
HGB BLD-MCNC: 11.5 G/DL (ref 13–17.7)
IMM GRANULOCYTES # BLD AUTO: 0 X10E3/UL (ref 0–0.1)
IMM GRANULOCYTES NFR BLD AUTO: 0 %
IRON SATN MFR SERPL: 30 % (ref 15–55)
IRON SERPL-MCNC: 80 UG/DL (ref 38–169)
LDLC SERPL CALC-MCNC: 60 MG/DL (ref 0–99)
LYMPHOCYTES # BLD AUTO: 0.5 X10E3/UL (ref 0.7–3.1)
LYMPHOCYTES NFR BLD AUTO: 10 %
MCH RBC QN AUTO: 30.3 PG (ref 26.6–33)
MCHC RBC AUTO-ENTMCNC: 32.2 G/DL (ref 31.5–35.7)
MCV RBC AUTO: 94 FL (ref 79–97)
MONOCYTES # BLD AUTO: 0.5 X10E3/UL (ref 0.1–0.9)
MONOCYTES NFR BLD AUTO: 9 %
NEUTROPHILS # BLD AUTO: 3.7 X10E3/UL (ref 1.4–7)
NEUTROPHILS NFR BLD AUTO: 74 %
PLATELET # BLD AUTO: 173 X10E3/UL (ref 150–450)
POTASSIUM SERPL-SCNC: 4.2 MMOL/L (ref 3.5–5.2)
PROT SERPL-MCNC: 6.5 G/DL (ref 6–8.5)
RBC # BLD AUTO: 3.8 X10E6/UL (ref 4.14–5.8)
SODIUM SERPL-SCNC: 139 MMOL/L (ref 134–144)
TIBC SERPL-MCNC: 265 UG/DL (ref 250–450)
TRIGL SERPL-MCNC: 151 MG/DL (ref 0–149)
TSH SERPL DL<=0.005 MIU/L-ACNC: 2.29 UIU/ML (ref 0.45–4.5)
UIBC SERPL-MCNC: 185 UG/DL (ref 111–343)
VLDLC SERPL CALC-MCNC: 26 MG/DL (ref 5–40)
WBC # BLD AUTO: 5 X10E3/UL (ref 3.4–10.8)

## 2021-06-18 ENCOUNTER — TELEPHONE (OUTPATIENT)
Dept: ONCOLOGY | Age: 81
End: 2021-06-18

## 2021-06-18 DIAGNOSIS — I78.0 HHT (HEREDITARY HEMORRHAGIC TELANGIECTASIA) (HCC): ICD-10-CM

## 2021-06-18 DIAGNOSIS — D64.9 ANEMIA, UNSPECIFIED TYPE: Primary | ICD-10-CM

## 2021-06-18 DIAGNOSIS — D50.8 OTHER IRON DEFICIENCY ANEMIA: ICD-10-CM

## 2021-06-18 NOTE — TELEPHONE ENCOUNTER
[facility]  [phone]    06/18/21- Informed pt of the following below per - he verbalized understanding. He confirmed he will have labs repeated just a couple days prior to 7/22 appointment. New lab slip faxed to [facility]- per his request.      ----- Message from Lissette Hendrix MD sent at 6/17/2021  1:14 PM EDT -----  Please call patient and inform that results look ok, better. I had intended for labs to be drawn a few days before next visit. Need to get him a new lab slip to do this.

## 2021-07-19 ENCOUNTER — TELEPHONE (OUTPATIENT)
Dept: INTERNAL MEDICINE CLINIC | Age: 81
End: 2021-07-19

## 2021-07-19 ENCOUNTER — TRANSCRIBE ORDER (OUTPATIENT)
Dept: GENERAL RADIOLOGY | Age: 81
End: 2021-07-19

## 2021-07-19 ENCOUNTER — HOSPITAL ENCOUNTER (OUTPATIENT)
Dept: GENERAL RADIOLOGY | Age: 81
Discharge: HOME | End: 2021-07-19
Attending: FAMILY MEDICINE
Payer: MEDICARE

## 2021-07-19 DIAGNOSIS — M54.6 PAIN IN THORACIC SPINE: ICD-10-CM

## 2021-07-19 DIAGNOSIS — M54.6 PAIN IN THORACIC SPINE: Primary | ICD-10-CM

## 2021-07-19 PROCEDURE — 72100 X-RAY EXAM L-S SPINE 2/3 VWS: CPT

## 2021-07-19 NOTE — TELEPHONE ENCOUNTER
Rosalba Pisano (Self) [phone] (M)     Pt requesting a call back regarding some questions he has? Patient states we MAY NOT respond via [employer].

## 2021-07-20 LAB
BASOPHILS # BLD AUTO: 0 X10E3/UL (ref 0–0.2)
BASOPHILS NFR BLD AUTO: 1 %
EOSINOPHIL # BLD AUTO: 0.2 X10E3/UL (ref 0–0.4)
EOSINOPHIL NFR BLD AUTO: 6 %
ERYTHROCYTE [DISTWIDTH] IN BLOOD BY AUTOMATED COUNT: 15 % (ref 11.6–15.4)
FERRITIN SERPL-MCNC: 150 NG/ML (ref 30–400)
HCT VFR BLD AUTO: 39.1 % (ref 37.5–51)
HGB BLD-MCNC: 12.5 G/DL (ref 13–17.7)
IMM GRANULOCYTES # BLD AUTO: 0 X10E3/UL (ref 0–0.1)
IMM GRANULOCYTES NFR BLD AUTO: 0 %
IRON SATN MFR SERPL: 22 % (ref 15–55)
IRON SERPL-MCNC: 61 UG/DL (ref 38–169)
LYMPHOCYTES # BLD AUTO: 0.5 X10E3/UL (ref 0.7–3.1)
LYMPHOCYTES NFR BLD AUTO: 14 %
MCH RBC QN AUTO: 30.3 PG (ref 26.6–33)
MCHC RBC AUTO-ENTMCNC: 32 G/DL (ref 31.5–35.7)
MCV RBC AUTO: 95 FL (ref 79–97)
MONOCYTES # BLD AUTO: 0.4 X10E3/UL (ref 0.1–0.9)
MONOCYTES NFR BLD AUTO: 12 %
NEUTROPHILS # BLD AUTO: 2.1 X10E3/UL (ref 1.4–7)
NEUTROPHILS NFR BLD AUTO: 67 %
PLATELET # BLD AUTO: 163 X10E3/UL (ref 150–450)
RBC # BLD AUTO: 4.12 X10E6/UL (ref 4.14–5.8)
TIBC SERPL-MCNC: 277 UG/DL (ref 250–450)
UIBC SERPL-MCNC: 216 UG/DL (ref 111–343)
WBC # BLD AUTO: 3.2 X10E3/UL (ref 3.4–10.8)

## 2021-07-20 NOTE — PROGRESS NOTES
[facility]  [address], [address]  Zeinab Rose: [phone]  F: [phone]      Reason for Visit:   Sarai Sullivan is a 79 y.o. male who is seen for follow up of anemia. History of Present Illness:   He received additional IV iron in June. Tolerated this well. His main complaint is lower back pain. Had an xray recently. Epistaxis seems to be better lately, after additional sclerotherapy a few weeks ago in [place]. He has a history of HHT, following with specialists at [facility]). Had embolization for a pulmonary AVM several years ago. Sees ENT there several times a year for procedures to address epistaxis, most recently around 6/2021. He is accompanied by his wife. His wife is Katie Flores, a realtor who helped me with the purchase of my current home. Three of his four children have HHT. Review of systems was obtained and pertinent findings reviewed above. Past medical history, social history, family history, medications, and allergies are located in the electronic medical record. Physical Exam:     Visit Vitals  Pulse 67   Temp 97.2 °F (36.2 °C) (Temporal)   Resp 18   Ht 6' 1\" (1.854 m)   Wt 180 lb (81.6 kg)   SpO2 99%   BMI 23.75 kg/m²     General: no distress  Respiratory: normal respiratory effort  CV: no peripheral edema  Skin: no rashes; no ecchymoses; no petechiae      Results:     Lab Results   Component Value Date/Time    WBC 3.2 (L) 07/19/2021 01:11 PM    HGB 12.5 (L) 07/19/2021 01:11 PM    HCT 39.1 07/19/2021 01:11 PM    PLATELET 987 71/93/2449 01:11 PM    MCV 95 07/19/2021 01:11 PM    ABS.  NEUTROPHILS 2.1 07/19/2021 01:11 PM     Lab Results   Component Value Date/Time    Sodium 139 06/16/2021 02:37 PM    Potassium 4.2 06/16/2021 02:37 PM    Chloride 104 06/16/2021 02:37 PM    CO2 24 06/16/2021 02:37 PM    Glucose 96 06/16/2021 02:37 PM    BUN 18 06/16/2021 02:37 PM Creatinine 0.96 06/16/2021 02:37 PM    GFR est AA 85 06/16/2021 02:37 PM    GFR est non-AA 73 06/16/2021 02:37 PM    Calcium 8.8 06/16/2021 02:37 PM     Lab Results   Component Value Date/Time    Bilirubin, total 0.3 06/16/2021 02:37 PM    ALT (SGPT) 25 06/16/2021 02:37 PM    Alk. phosphatase 65 06/16/2021 02:37 PM    Protein, total 6.5 06/16/2021 02:37 PM    Albumin 4.1 06/16/2021 02:37 PM     Lab Results   Component Value Date/Time    Reticulocyte count 1.3 02/24/2021 08:18 AM    Iron % saturation 22 07/19/2021 01:11 PM    TIBC 277 07/19/2021 01:11 PM    Ferritin 150 07/19/2021 01:11 PM    Vitamin B12 391 02/24/2021 08:18 AM    Folate 14.0 02/24/2021 08:18 AM    Methylmalonic acid 300 02/05/2021 12:50 PM    Haptoglobin 144 02/24/2021 08:18 AM     02/24/2021 08:18 AM    TSH 2.290 06/16/2021 02:37 PM    M-Terell Not Observed 02/24/2021 08:18 AM     HGB (g/dL)   Date Value   07/19/2021 12.5 (L)   06/16/2021 11.5 (L)   05/12/2021 10.3 (L)   02/24/2021 10.0 (L)   02/05/2021 9.3 (L)   02/01/2021 9.7 (L)   11/24/2020 10.3 (L)   05/21/2020 11.1 (L)   01/31/2020 13.3   04/05/2019 12.0 (L)   11/05/2018 11.6 (L)   07/17/2018 12.0 (L)   06/12/2017 14.7   08/15/2016 12.1 (L)   01/26/2016 11.4 (L)   10/30/2015 11.9 (L)   04/02/2015 12.7   10/07/2014 12.1 (L)   08/20/2014 10.1 (L)     Ferritin (ng/mL)   Date Value   07/19/2021 150   06/16/2021 1,062 (H)   05/12/2021 22 (L)   02/24/2021 13 (L)         Assessment:   1) Iron deficiency anemia  He is s/p injectafer x2 doses in 3/2021 and again in 6/2021. HGB improving now after second round of therapy, up to 12.5 on recent labs. Ferritin has climbed as well to 150. The cause of his iron deficiency is blood loss related to his HHT. He is likely to have recurrence. He should continue oral iron as tolerated. We will monitor his labs and give additional IV iron as needed. 2) HHT  Managed at [facility].  He is s/p treatment for pulmonary AVMs as well as epistaxis. He is receiving regular imaging surveillance there. I have discussed systemic therapy options such as tamoxifen or Avastin, as well as topical therapy such as estradiol. Defer management to his physicians in [place]. 3) Epistaxis  Secondary to HHT. Recently followed up with ENT in [place], s/p local therapy. Bleeding improved lately. 4) Prostate cancer  He is now s/p radiation with Dr. Debbie Ramirez completed 4/2021. He will follow with radiation oncology and urology, and I remain available if needed should he develop recurrence.     Plan:     Labs in 3 months: CBC, iron profile, ferritin ([facility])  Return to see me in 3 months    Signed By: Jeanne Dangelo MD

## 2021-07-20 NOTE — TELEPHONE ENCOUNTER
Spoke with pt - state he has hx of back pain. But recently he has been feeling as if he's \"tilting to one side. \" He feels his body is out of alignment. He saw Dr Van Maynard yesterday and had x-rays done - in chart. Pt wants Dr Dana Arauz to recommend someone to see who can help manipulate his back.  Advised will forward message to MD.

## 2021-07-20 NOTE — TELEPHONE ENCOUNTER
Left detailed message on pt's vm. MD has referred him to Dr Codey Warner [phone] or Dr Karma Claudio [phone] for osteopathic evaluation. Given numbers so he may schedule. Advised him take a disc of his xrays. If he has any questions to call office.

## 2021-07-21 DIAGNOSIS — K58.1 IRRITABLE BOWEL SYNDROME WITH CONSTIPATION: ICD-10-CM

## 2021-07-22 ENCOUNTER — OFFICE VISIT (OUTPATIENT)
Dept: ONCOLOGY | Age: 81
End: 2021-07-22
Payer: MEDICARE

## 2021-07-22 VITALS
OXYGEN SATURATION: 99 % | HEIGHT: 73 IN | TEMPERATURE: 97.2 F | HEART RATE: 67 BPM | BODY MASS INDEX: 23.86 KG/M2 | WEIGHT: 180 LBS | RESPIRATION RATE: 18 BRPM

## 2021-07-22 DIAGNOSIS — I78.0 HHT (HEREDITARY HEMORRHAGIC TELANGIECTASIA) (HCC): Primary | ICD-10-CM

## 2021-07-22 DIAGNOSIS — D50.8 OTHER IRON DEFICIENCY ANEMIA: ICD-10-CM

## 2021-07-22 PROCEDURE — G8432 DEP SCR NOT DOC, RNG: HCPCS | Performed by: INTERNAL MEDICINE

## 2021-07-22 PROCEDURE — G8427 DOCREV CUR MEDS BY ELIG CLIN: HCPCS | Performed by: INTERNAL MEDICINE

## 2021-07-22 PROCEDURE — G8536 NO DOC ELDER MAL SCRN: HCPCS | Performed by: INTERNAL MEDICINE

## 2021-07-22 PROCEDURE — 1101F PT FALLS ASSESS-DOCD LE1/YR: CPT | Performed by: INTERNAL MEDICINE

## 2021-07-22 PROCEDURE — 99214 OFFICE O/P EST MOD 30 MIN: CPT | Performed by: INTERNAL MEDICINE

## 2021-07-22 PROCEDURE — G8420 CALC BMI NORM PARAMETERS: HCPCS | Performed by: INTERNAL MEDICINE

## 2021-07-22 PROCEDURE — G0463 HOSPITAL OUTPT CLINIC VISIT: HCPCS | Performed by: INTERNAL MEDICINE

## 2021-07-22 RX ORDER — DICYCLOMINE HYDROCHLORIDE 20 MG/1
TABLET ORAL
Qty: 30 TABLET | Refills: 3 | Status: SHIPPED | OUTPATIENT
Start: 2021-07-22 | End: 2021-10-28

## 2021-07-22 RX ORDER — CYCLOBENZAPRINE HCL 5 MG
TABLET ORAL
COMMUNITY
Start: 2021-07-19 | End: 2021-08-02

## 2021-07-22 NOTE — PROGRESS NOTES
Hunt July is a 79 y.o. male follow up for anemia. 1. Have you been to the ER, urgent care clinic since your last visit? Hospitalized since your last visit?no     2. Have you seen or consulted any other health care providers outside of the [address] [place] [address] since your last visit? Include any pap smears or colon screening.  no

## 2021-07-27 ENCOUNTER — TRANSCRIBE ORDER (OUTPATIENT)
Dept: SCHEDULING | Age: 81
End: 2021-07-27

## 2021-07-27 DIAGNOSIS — M85.80 OSTEOPENIA: Primary | ICD-10-CM

## 2021-07-28 ENCOUNTER — TRANSCRIBE ORDER (OUTPATIENT)
Dept: SCHEDULING | Age: 81
End: 2021-07-28

## 2021-07-28 DIAGNOSIS — M85.80 OTHER SPECIFIED DISORDERS OF BONE DENSITY AND STRUCTURE, UNSPECIFIED SITE: Primary | ICD-10-CM

## 2021-08-02 ENCOUNTER — OFFICE VISIT (OUTPATIENT)
Dept: NEUROLOGY | Age: 81
End: 2021-08-02
Payer: MEDICARE

## 2021-08-02 VITALS
HEIGHT: 73 IN | OXYGEN SATURATION: 99 % | RESPIRATION RATE: 14 BRPM | DIASTOLIC BLOOD PRESSURE: 64 MMHG | HEART RATE: 92 BPM | BODY MASS INDEX: 23.86 KG/M2 | SYSTOLIC BLOOD PRESSURE: 122 MMHG | WEIGHT: 180 LBS

## 2021-08-02 DIAGNOSIS — G30.1 LATE ONSET ALZHEIMER'S DISEASE WITHOUT BEHAVIORAL DISTURBANCE (HCC): Primary | ICD-10-CM

## 2021-08-02 DIAGNOSIS — F02.80 LATE ONSET ALZHEIMER'S DISEASE WITHOUT BEHAVIORAL DISTURBANCE (HCC): Primary | ICD-10-CM

## 2021-08-02 PROCEDURE — 99204 OFFICE O/P NEW MOD 45 MIN: CPT | Performed by: NURSE PRACTITIONER

## 2021-08-02 PROCEDURE — G8536 NO DOC ELDER MAL SCRN: HCPCS | Performed by: NURSE PRACTITIONER

## 2021-08-02 PROCEDURE — G8432 DEP SCR NOT DOC, RNG: HCPCS | Performed by: NURSE PRACTITIONER

## 2021-08-02 PROCEDURE — 1101F PT FALLS ASSESS-DOCD LE1/YR: CPT | Performed by: NURSE PRACTITIONER

## 2021-08-02 PROCEDURE — G8427 DOCREV CUR MEDS BY ELIG CLIN: HCPCS | Performed by: NURSE PRACTITIONER

## 2021-08-02 PROCEDURE — G8420 CALC BMI NORM PARAMETERS: HCPCS | Performed by: NURSE PRACTITIONER

## 2021-08-02 RX ORDER — MEMANTINE HYDROCHLORIDE 10 MG/1
10 TABLET ORAL 2 TIMES DAILY
Qty: 60 TABLET | Refills: 2 | Status: SHIPPED | OUTPATIENT
Start: 2021-08-02 | End: 2021-12-06

## 2021-08-02 RX ORDER — MEMANTINE HYDROCHLORIDE 5 MG-10 MG
KIT ORAL
Qty: 49 TABLET | Refills: 0 | Status: SHIPPED | OUTPATIENT
Start: 2021-08-02 | End: 2021-08-16

## 2021-08-02 NOTE — PROGRESS NOTES
[facility],[address]  [address]   [place] [address] [place] [address] Drive   [phone] Office   [phone] Fax           Date:  21     Name:  Gabrielle Barba  :  1940  MRN:  [id number]     PCP:  Michele Barrett MD    Chief Complaint   Patient presents with   Serina Juarez Other     PCP put him on this for memory, however he was having intestinal issues and would like another medication other than the Aricept      HISTORY OF PRESENT ILLNESS: Danny Gupta is a 79 y.o., right handed, , male who presents today for initial consultation of dementia. He has seen Dr. Emily Cervantes for neurocognitive assessment which demonstrated an early stage dementia. This was felt to be very mild at this point. His PCP, Michele Barrett MD, started him on Aricept for management of this issue but he developed diarrhea. This did resolve when he discontinued the medication. He is here today to discuss other potential treatment options. Current Outpatient Medications   Medication Sig    dicyclomine (BENTYL) 20 mg tablet TAKE 1 TABLET BY MOUTH 3 TIMES DAILY AS NEEDED FOR ABDOMINAL CRAMPS (NEW DOSE/NEW DIRECTIONS)    ferrous sulfate (Slow Fe) 142 mg (45 mg iron) ER tablet Take  by mouth Daily (before breakfast).  cholecalciferol (Vitamin D3) 25 mcg (1,000 unit) cap Take  by mouth daily.  ascorbic acid, vitamin C, (Vitamin C) 250 mg tablet Take  by mouth.  doxycycline (VIBRAMYCIN) 100 mg capsule Take 100 mg by mouth daily.  TIMOLOL MALEATE OP Apply  to eye. 1 drop to each eye twice daily. No current facility-administered medications for this visit.      No Known Allergies  Past Medical History:   Diagnosis Date    HHT (hereditary hemorrhagic telangiectasia) ([facility] [address]      Past Surgical History:   Procedure Laterality Date    COLONOSCOPY N/A 2020    COLON[facility]COPY performed by Angelito Felix MD at [facility] END[facility]COPY  HX APPENDECTOMY      HX COLONOSCOPY  2014    due 23    HX OTHER SURGICAL  07/2018    sclerotheropy    HX TONSILLECTOMY      [place] CHEST SURGERY PROCEDURE UNLISTED      excision of AVM     Social History     Socioeconomic History    Marital status:      Spouse name: Not on file    Number of children: Not on file    Years of education: Not on file    Highest education level: Not on file   Occupational History    Not on file   Tobacco Use    Smoking status: Former Smoker    Smokeless tobacco: Never Used   Substance and Sexual Activity    Alcohol use: Yes     Alcohol/week: 0.0 standard drinks     Comment: one drink per week    Drug use: No    Sexual activity: Yes     Partners: Female   Other Topics Concern    Not on file   Social History Narrative    Not on file     Social Determinants of Health     Financial Resource Strain:     Difficulty of Paying Living Expenses:    Food Insecurity:     Worried About Running Out of Food in the Last Year:     [address] in the Last Year:    Transportation Needs:     Lack of Transportation (Medical):      Lack of Transportation (Non-Medical):    Physical Activity:     Days of Exercise per Week:     Minutes of Exercise per Session:    Stress:     Feeling of Stress :    Social Connections:     Frequency of Communication with Friends and Family:     Frequency of Social Gatherings with Friends and Family:     Attends Yazidi Services:     Active Member of Clubs or Organizations:     Attends Club or Organization Meetings:     Marital Status:    Intimate Partner Violence:     Fear of Current or Ex-Partner:     Emotionally Abused:     Physically Abused:     Sexually Abused:      Family History   Problem Relation Age of Onset    Heart Disease Mother     Cancer Other         colon, lung       PHYSICAL EXAMINATION:    Visit Vitals  /64   Pulse 92   Resp 14   Ht 6' 1\" (1.854 m)   Wt 81.6 kg (180 lb)   SpO2 99%   BMI 23.75 kg/m²     General: Well defined, nourished, and groomed individual in no acute distress. Neck: Supple, nontender, no bruits, no pain with resistance to active range of motion. Heart: Regular rate and rhythm, no murmurs, rub, or gallop. Normal S1S2. Lungs:  Clear to auscultation bilaterally with equal chest expansion, no cough, no wheeze  Musculoskeletal:  Extremities revealed no edema and had full range of motion of joints. Psych:  Good mood and bright affect    NEUROLOGICAL EXAMINATION:     Mental Status:   Alert and oriented to person, place, and time with recent and remote memory intact. Attention span and concentration are normal. Speech is fluent with a full fund of knowledge. Cranial Nerves:  I: smell Not tested   II: visual fields Full to confrontation   II: pupils Equal, round, reactive to light   II: optic disc No papilledema   III,VII: ptosis none   III,IV,VI: extraocular muscles  Full ROM   V: mastication normal   V: facial light touch sensation  normal   VII: facial muscle function   symmetric   VIII: hearing symmetric   IX: soft palate elevation  normal   XI: trapezius strength  5/5   XI: sternocleidomastoid strength 5/5   XI: neck flexion strength  5/5   XII: tongue  midline     Motor Examination: Normal tone, bulk, and strength, 5/5 muscle strength throughout. Coordination:  Finger to nose and rapid arm movement testing was normal.   No resting or intention tremor  Gait and Station:  Steady while walking. Normal arm swing. No Rhomberg or pronator drift. No muscle wasting or fasiculations noted. Reflexes:  DTRs 2+ throughout. ASSESSMENT AND PLAN    ICD-10-CM ICD-9-CM    1. Late onset Alzheimer's disease without behavioral disturbance ([facility])  G30.1 331.0 memantine (Namenda Titration Sorin) 5-10 mg DsPk    F02.80 294.10 memantine (Namenda) 10 mg tablet     Late onset dementia in very early stages. It is not unusual for Aricept to cause diarrhea. This did resolve when this was discontinued.  Will start Namenda for memory management today. Purpose and potential side effects were discussed and he has verbalized understanding. As this medication can cause some issues with constipation, it may be possible to restart the Aricept once he is on the maintenance dose as the side effects will often cancel one another out. He did have some questions today about attention deficit and potentially treating this as well. In this case, Strattera may be appropriate. Follow up in six weeks. Emma Parkinson Shed

## 2021-08-02 NOTE — PROGRESS NOTES
Chief Complaint   Patient presents with    Other     PCP put him on this for memory, however he was having intestinal issues and would like another medication other than the Aricept      Visit Vitals  /64   Pulse 92   Resp 14   Ht 6' 1\" (1.854 m)   Wt 81.6 kg (180 lb)   SpO2 99%   BMI 23.75 kg/m²

## 2021-08-11 ENCOUNTER — TRANSCRIBE ORDER (OUTPATIENT)
Dept: SCHEDULING | Age: 81
End: 2021-08-11

## 2021-08-11 DIAGNOSIS — Z85.46 PERSONAL HISTORY OF MALIGNANT NEOPLASM OF PROSTATE: ICD-10-CM

## 2021-08-11 DIAGNOSIS — Z92.29 HISTORY OF DRUG THERAPY: Primary | ICD-10-CM

## 2021-08-11 DIAGNOSIS — Z79.899 LONG-TERM USE OF HIGH-RISK MEDICATION: Primary | ICD-10-CM

## 2021-08-18 ENCOUNTER — TRANSCRIBE ORDER (OUTPATIENT)
Dept: MAMMOGRAPHY | Age: 81
End: 2021-08-18

## 2021-08-18 ENCOUNTER — HOSPITAL ENCOUNTER (OUTPATIENT)
Dept: MAMMOGRAPHY | Age: 81
Discharge: HOME | End: 2021-08-18
Attending: FAMILY MEDICINE
Payer: MEDICARE

## 2021-08-18 DIAGNOSIS — C61 PROSTATE CANCER (HCC): Primary | ICD-10-CM

## 2021-08-18 DIAGNOSIS — C61 PROSTATE CANCER (HCC): ICD-10-CM

## 2021-08-18 DIAGNOSIS — M85.80 OSTEOPENIA DUE TO CANCER THERAPY: Primary | ICD-10-CM

## 2021-08-18 PROCEDURE — 77080 DXA BONE DENSITY AXIAL: CPT

## 2021-09-27 ENCOUNTER — TELEPHONE (OUTPATIENT)
Dept: ONCOLOGY | Age: 81
End: 2021-09-27

## 2021-09-27 NOTE — TELEPHONE ENCOUNTER
[facility] at [place] [address]  [phone]    09/27/21- Phone call placed to pt wife to remind pt to have labs drawn prior to his follow up appointment with . Pt wife verbalized understanding.

## 2021-10-01 NOTE — PROGRESS NOTES
[facility] [place] [facility]  [address]  [place]: [phone]  F: [phone]      Reason for Visit:   Isabella Sharma is a 79 y.o. male who is seen for follow up of anemia. History of Present Illness:   He is overall feeling fairly well. Main complaints relate to his lupron side effects. Continues with epistaxis intermittently. Did not get as much benefit from the last sclerotherapy treatment at [facility]. No bleeding from elsewhere. He has a history of HHT, following with specialists at [facility]). Had embolization for a pulmonary AVM several years ago. Sees ENT there several times a year for procedures to address epistaxis, most recently around 6/2021. He is accompanied by his wife. His wife is Ghulam Jimenez, a realtor who helped me with the purchase of my current home. Three of his four children have HHT. Review of systems was obtained and pertinent findings reviewed above. Past medical history, social history, family history, medications, and allergies are located in the electronic medical record. Physical Exam:     Visit Vitals  /76 (BP 1 Location: Left upper arm, BP Patient Position: Sitting, BP Cuff Size: Large adult)   Pulse 98   Temp 97.6 °F (36.4 °C) (Temporal)   Resp 18   Ht 6' 1\" (1.854 m)   Wt 183 lb (83 kg)   SpO2 99%   BMI 24.14 kg/m²     General: no distress  Respiratory: normal respiratory effort  CV: no peripheral edema  Skin: no rashes; no ecchymoses; no petechiae      Results:     Lab Results   Component Value Date/Time    WBC 3.5 10/04/2021 10:15 AM    HGB 12.2 (L) 10/04/2021 10:15 AM    HCT 37.7 10/04/2021 10:15 AM    PLATELET 049 23/17/6470 10:15 AM    MCV 96 10/04/2021 10:15 AM    ABS.  NEUTROPHILS 2.3 10/04/2021 10:15 AM     Lab Results   Component Value Date/Time    Sodium 139 06/16/2021 02:37 PM    Potassium 4.2 06/16/2021 02:37 PM    Chloride 104 06/16/2021 02:37 PM    CO2 24 06/16/2021 02:37 PM    Glucose 96 06/16/2021 02:37 PM    BUN 18 06/16/2021 02:37 PM    Creatinine 0.96 06/16/2021 02:37 PM    GFR est AA 85 06/16/2021 02:37 PM    GFR est non-AA 73 06/16/2021 02:37 PM    Calcium 8.8 06/16/2021 02:37 PM     Lab Results   Component Value Date/Time    Bilirubin, total 0.3 06/16/2021 02:37 PM    ALT (SGPT) 25 06/16/2021 02:37 PM    Alk. phosphatase 65 06/16/2021 02:37 PM    Protein, total 6.5 06/16/2021 02:37 PM    Albumin 4.1 06/16/2021 02:37 PM     Lab Results   Component Value Date/Time    Reticulocyte count 1.3 02/24/2021 08:18 AM    Iron % saturation 41 10/04/2021 10:15 AM    TIBC 362 10/04/2021 10:15 AM    Ferritin 38 10/04/2021 10:15 AM    Vitamin B12 391 02/24/2021 08:18 AM    Folate 14.0 02/24/2021 08:18 AM    Methylmalonic acid 300 02/05/2021 12:50 PM    Haptoglobin 144 02/24/2021 08:18 AM     02/24/2021 08:18 AM    TSH 2.290 06/16/2021 02:37 PM    M-Terell Not Observed 02/24/2021 08:18 AM     HGB (g/dL)   Date Value   10/04/2021 12.2 (L)   07/19/2021 12.5 (L)   06/16/2021 11.5 (L)   05/12/2021 10.3 (L)   02/24/2021 10.0 (L)   02/05/2021 9.3 (L)   02/01/2021 9.7 (L)   11/24/2020 10.3 (L)   05/21/2020 11.1 (L)   01/31/2020 13.3   04/05/2019 12.0 (L)   11/05/2018 11.6 (L)   07/17/2018 12.0 (L)   06/12/2017 14.7   08/15/2016 12.1 (L)   01/26/2016 11.4 (L)   10/30/2015 11.9 (L)   04/02/2015 12.7   10/07/2014 12.1 (L)   08/20/2014 10.1 (L)     Ferritin (ng/mL)   Date Value   10/04/2021 38   07/19/2021 150   06/16/2021 1,062 (H)   05/12/2021 22 (L)   02/24/2021 13 (L)         Assessment:   1) Iron deficiency anemia  He is s/p injectafer x2 doses in 3/2021 and again in 6/2021. HGB subsequently improved. Recent labs continue to look pretty good. HGB holding steady at 12.2. Iron saturation normal.  However, ferritin trending down. I suspect he will become iron deficient again in the coming months.     The cause of his iron deficiency is blood loss related to his HHT. He is likely to have recurrence. He should continue oral iron as tolerated. We will monitor his labs and give additional IV iron as needed. 2) HHT  Managed at [facility]. He is s/p treatment for pulmonary AVMs as well as epistaxis. He is receiving regular imaging surveillance there. I have discussed systemic therapy options such as tamoxifen or Avastin, as well as topical therapy such as estradiol. Defer management to his physicians in [place]. 3) Epistaxis  Secondary to HHT. Recently followed up with ENT in [place], s/p local therapy. Bleeding improved lately. 4) Prostate cancer  He is now s/p radiation with Dr. Carolyn Burnett completed 4/2021. Currently on ADT. He will follow with radiation oncology and urology, and I remain available if needed should he develop recurrence.     Plan:     Increase PO iron as tolerated  Labs in 3 months: CBC, iron profile, ferritin ([facility])  Return to see me in 3 months    Signed By: Sami Schwartz MD

## 2021-10-05 LAB
BASOPHILS # BLD AUTO: 0 X10E3/UL (ref 0–0.2)
BASOPHILS NFR BLD AUTO: 1 %
EOSINOPHIL # BLD AUTO: 0.2 X10E3/UL (ref 0–0.4)
EOSINOPHIL NFR BLD AUTO: 6 %
ERYTHROCYTE [DISTWIDTH] IN BLOOD BY AUTOMATED COUNT: 12.5 % (ref 11.6–15.4)
FERRITIN SERPL-MCNC: 38 NG/ML (ref 30–400)
HCT VFR BLD AUTO: 37.7 % (ref 37.5–51)
HGB BLD-MCNC: 12.2 G/DL (ref 13–17.7)
IMM GRANULOCYTES # BLD AUTO: 0 X10E3/UL (ref 0–0.1)
IMM GRANULOCYTES NFR BLD AUTO: 0 %
IRON SATN MFR SERPL: 41 % (ref 15–55)
IRON SERPL-MCNC: 148 UG/DL (ref 38–169)
LYMPHOCYTES # BLD AUTO: 0.5 X10E3/UL (ref 0.7–3.1)
LYMPHOCYTES NFR BLD AUTO: 14 %
MCH RBC QN AUTO: 31 PG (ref 26.6–33)
MCHC RBC AUTO-ENTMCNC: 32.4 G/DL (ref 31.5–35.7)
MCV RBC AUTO: 96 FL (ref 79–97)
MONOCYTES # BLD AUTO: 0.5 X10E3/UL (ref 0.1–0.9)
MONOCYTES NFR BLD AUTO: 14 %
NEUTROPHILS # BLD AUTO: 2.3 X10E3/UL (ref 1.4–7)
NEUTROPHILS NFR BLD AUTO: 65 %
PLATELET # BLD AUTO: 172 X10E3/UL (ref 150–450)
RBC # BLD AUTO: 3.93 X10E6/UL (ref 4.14–5.8)
TIBC SERPL-MCNC: 362 UG/DL (ref 250–450)
UIBC SERPL-MCNC: 214 UG/DL (ref 111–343)
WBC # BLD AUTO: 3.5 X10E3/UL (ref 3.4–10.8)

## 2021-10-07 ENCOUNTER — OFFICE VISIT (OUTPATIENT)
Dept: ONCOLOGY | Age: 81
End: 2021-10-07
Payer: MEDICARE

## 2021-10-07 VITALS
DIASTOLIC BLOOD PRESSURE: 76 MMHG | SYSTOLIC BLOOD PRESSURE: 114 MMHG | TEMPERATURE: 97.6 F | HEIGHT: 73 IN | OXYGEN SATURATION: 99 % | BODY MASS INDEX: 24.25 KG/M2 | WEIGHT: 183 LBS | RESPIRATION RATE: 18 BRPM | HEART RATE: 98 BPM

## 2021-10-07 DIAGNOSIS — D50.8 OTHER IRON DEFICIENCY ANEMIA: ICD-10-CM

## 2021-10-07 DIAGNOSIS — I78.0 HHT (HEREDITARY HEMORRHAGIC TELANGIECTASIA) (HCC): Primary | ICD-10-CM

## 2021-10-07 PROCEDURE — 99214 OFFICE O/P EST MOD 30 MIN: CPT | Performed by: INTERNAL MEDICINE

## 2021-10-07 PROCEDURE — G0463 HOSPITAL OUTPT CLINIC VISIT: HCPCS | Performed by: INTERNAL MEDICINE

## 2021-10-07 PROCEDURE — G8432 DEP SCR NOT DOC, RNG: HCPCS | Performed by: INTERNAL MEDICINE

## 2021-10-07 PROCEDURE — G8420 CALC BMI NORM PARAMETERS: HCPCS | Performed by: INTERNAL MEDICINE

## 2021-10-07 PROCEDURE — G8536 NO DOC ELDER MAL SCRN: HCPCS | Performed by: INTERNAL MEDICINE

## 2021-10-07 PROCEDURE — G8427 DOCREV CUR MEDS BY ELIG CLIN: HCPCS | Performed by: INTERNAL MEDICINE

## 2021-10-07 PROCEDURE — 1101F PT FALLS ASSESS-DOCD LE1/YR: CPT | Performed by: INTERNAL MEDICINE

## 2021-10-07 NOTE — PROGRESS NOTES
Pranav Parrish is a 79 y.o. male follow up for anemia. 1. Have you been to the ER, urgent care clinic since your last visit? Hospitalized since your last visit?no     2. Have you seen or consulted any other health care providers outside of the [address] [place] [address] since your last visit? Include any pap smears or colon screening.  no

## 2021-10-27 DIAGNOSIS — K58.1 IRRITABLE BOWEL SYNDROME WITH CONSTIPATION: ICD-10-CM

## 2021-10-28 RX ORDER — DICYCLOMINE HYDROCHLORIDE 20 MG/1
TABLET ORAL
Qty: 30 TABLET | Refills: 3 | Status: SHIPPED | OUTPATIENT
Start: 2021-10-28 | End: 2022-06-21 | Stop reason: SDUPTHER

## 2021-12-06 ENCOUNTER — OFFICE VISIT (OUTPATIENT)
Dept: NEUROLOGY | Age: 81
End: 2021-12-06
Payer: MEDICARE

## 2021-12-06 VITALS — HEART RATE: 64 BPM | SYSTOLIC BLOOD PRESSURE: 122 MMHG | OXYGEN SATURATION: 97 % | DIASTOLIC BLOOD PRESSURE: 80 MMHG

## 2021-12-06 DIAGNOSIS — G30.1 LATE ONSET ALZHEIMER'S DISEASE WITHOUT BEHAVIORAL DISTURBANCE (HCC): Primary | ICD-10-CM

## 2021-12-06 DIAGNOSIS — F02.80 LATE ONSET ALZHEIMER'S DISEASE WITHOUT BEHAVIORAL DISTURBANCE (HCC): Primary | ICD-10-CM

## 2021-12-06 PROCEDURE — G8432 DEP SCR NOT DOC, RNG: HCPCS | Performed by: NURSE PRACTITIONER

## 2021-12-06 PROCEDURE — G8420 CALC BMI NORM PARAMETERS: HCPCS | Performed by: NURSE PRACTITIONER

## 2021-12-06 PROCEDURE — G8536 NO DOC ELDER MAL SCRN: HCPCS | Performed by: NURSE PRACTITIONER

## 2021-12-06 PROCEDURE — 99213 OFFICE O/P EST LOW 20 MIN: CPT | Performed by: NURSE PRACTITIONER

## 2021-12-06 PROCEDURE — G8427 DOCREV CUR MEDS BY ELIG CLIN: HCPCS | Performed by: NURSE PRACTITIONER

## 2021-12-06 PROCEDURE — 1101F PT FALLS ASSESS-DOCD LE1/YR: CPT | Performed by: NURSE PRACTITIONER

## 2021-12-06 RX ORDER — PENICILLIN V POTASSIUM 500 MG/1
TABLET, FILM COATED ORAL
COMMUNITY
Start: 2021-12-01 | End: 2022-07-20

## 2021-12-06 RX ORDER — MEMANTINE HYDROCHLORIDE 10 MG/1
10 TABLET ORAL 2 TIMES DAILY
Qty: 60 TABLET | Refills: 2
Start: 2021-12-06 | End: 2022-01-31 | Stop reason: SDUPTHER

## 2021-12-06 NOTE — PROGRESS NOTES
[facility],5Th Floor  [address]   [address] [place] [place] [address] Drive   [phone] Office   [phone] Fax           Date:  21     Name:  Heidi Fontaine  :  1940  MRN:  [id number]     PCP:  Radha Gregorio MD    Chief Complaint   Patient presents with    Follow-up     memory loss     HISTORY OF PRESENT ILLNESS: Tayla Lerma is a 79 y.o., right handed, , male who presents today for follow up of dementia. At his last office visit, he was to start Namenda and titrate to 10mg twice a day. He was to follow up in six weeks but cancelled his follow up in September. During that time, he elected to not take the medication right away due to his other treatments for prostate cancer. He indicates that he has the titration pack as well as the normal prescription and came today to touch base and essentially start over. Recap from LOV:  Late onset dementia in very early stages. It is not unusual for Aricept to cause diarrhea. This did resolve when this was discontinued. Will start Namenda for memory management today. Purpose and potential side effects were discussed and he has verbalized understanding. As this medication can cause some issues with constipation, it may be possible to restart the Aricept once he is on the maintenance dose as the side effects will often cancel one another out. He did have some questions today about attention deficit and potentially treating this as well. In this case, Strattera may be appropriate. Follow up in six weeks.       Current Outpatient Medications   Medication Sig    penicillin v potassium (VEETID) 500 mg tablet TAKE 4 TABLETS BY MOUTH 1 HOUR BEFORE APPOINTMENT    dicyclomine (BENTYL) 20 mg tablet TAKE 1 TABLET BY MOUTH 3 TIMES DAILY AS NEEDED FOR ABDOMINAL CRAMPS (NEW DOSE/NEW DIRECTIONS)    ferrous sulfate (Slow Fe) 142 mg (45 mg iron) ER tablet Take  by mouth Daily (before breakfast).  cholecalciferol (Vitamin D3) 25 mcg (1,000 unit) cap Take  by mouth daily.  ascorbic acid, vitamin C, (Vitamin C) 250 mg tablet Take  by mouth.  TIMOLOL MALEATE OP Apply  to eye. 1 drop to each eye twice daily. No current facility-administered medications for this visit. No Known Allergies  Past Medical History:   Diagnosis Date    HHT (hereditary hemorrhagic telangiectasia) (HCC)      Past Surgical History:   Procedure Laterality Date    COLONOSCOPY N/A 1/29/2020    [facility]yes [facility] at [facility]COPY    HX APPENDECTOMY      HX COLONOSCOPY  2014    due 23    HX OTHER SURGICAL  07/2018    sclerotheropy    HX TONSILLECTOMY      [place] CHEST SURGERY PROCEDURE UNLISTED      excision of AVM     Social History     Socioeconomic History    Marital status:      Spouse name: Not on file    Number of children: Not on file    Years of education: Not on file    Highest education level: Not on file   Occupational History    Not on file   Tobacco Use    Smoking status: Former Smoker    Smokeless tobacco: Never Used   Substance and Sexual Activity    Alcohol use: Yes     Alcohol/week: 0.0 standard drinks     Comment: one drink per week    Drug use: No    Sexual activity: Yes     Partners: Female   Other Topics Concern    Not on file   Social History Narrative    Not on file     Social Determinants of Health     Financial Resource Strain:     Difficulty of Paying Living Expenses: Not on file   Food Insecurity:     Worried About Running Out of Food in the Last Year: Not on file    [place] of Food in the Last Year: Not on file   Transportation Needs:     Lack of Transportation (Medical): Not on file    Lack of Transportation (Non-Medical):  Not on file   Physical Activity:     Days of Exercise per Week: Not on file    Minutes of Exercise per Session: Not on file   Stress:     Feeling of Stress : Not on file   Social Connections:     Frequency of Communication with Friends and Family: Not on file    Frequency of Social Gatherings with Friends and Family: Not on file    Attends Gnosticist Services: Not on file    Active Member of Clubs or Organizations: Not on file    Attends Club or Organization Meetings: Not on file    Marital Status: Not on file   Intimate Partner Violence:     Fear of Current or Ex-Partner: Not on file    Emotionally Abused: Not on file    Physically Abused: Not on file    Sexually Abused: Not on file   Housing Stability:     Unable to Pay for Housing in the Last Year: Not on file    Number of [place] in the Last Year: Not on file    Unstable Housing in the Last Year: Not on file     Family History   Problem Relation Age of Onset    Heart Disease Mother     Cancer Other         colon, lung       PHYSICAL EXAMINATION:    Visit Vitals  /80 (BP 1 Location: Left arm, BP Patient Position: Sitting, BP Cuff Size: Adult)   Pulse 64   SpO2 97%     General:  Well defined, nourished, and groomed individual in no acute distress. Neck: Supple, nontender, no bruits, no pain with resistance to active range of motion. Heart: Regular rate and rhythm, no murmurs, rub, or gallop. Normal S1S2. Lungs:  Clear to auscultation bilaterally with equal chest expansion, no cough, no wheeze  Musculoskeletal:  Extremities revealed no edema and had full range of motion of joints. Psych:  Good mood and bright affect    NEUROLOGICAL EXAMINATION:     Mental Status:   Alert and oriented to person, place, and time with recent and remote memory intact. Attention span and concentration are normal. Speech is fluent with a full fund of knowledge.       Cranial Nerves:  I: smell Not tested   II: visual fields Full to confrontation   II: pupils Equal, round, reactive to light   II: optic disc No papilledema   III,VII: ptosis none   III,IV,VI: extraocular muscles  Full ROM   V: mastication normal   V: facial light touch sensation  normal   VII: facial muscle function   symmetric   VIII: hearing symmetric   IX: soft palate elevation  normal   XI: trapezius strength  5/5   XI: sternocleidomastoid strength 5/5   XI: neck flexion strength  5/5   XII: tongue  midline     Motor Examination: Normal tone, bulk, and strength, 5/5 muscle strength throughout. Coordination:  Finger to nose and rapid arm movement testing was normal.   No resting or intention tremor  Gait and Station:  Steady while walking. Normal arm swing. No Rhomberg or pronator drift. No muscle wasting or fasiculations noted. Reflexes:  DTRs 2+ throughout. ASSESSMENT AND PLAN    ICD-10-CM ICD-9-CM    1. 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 Follow up in 6 weeks. Emma Huerta Milder

## 2021-12-06 NOTE — PROGRESS NOTES
Would like to talk about Titration pax   Pt said that every thing has been going well   Can tell that he cant remember as much   Lara Yanes that he has been writing down everything to help memory

## 2021-12-31 ENCOUNTER — HOSPITAL ENCOUNTER (EMERGENCY)
Age: 81
Discharge: HOME | End: 2021-12-31
Attending: STUDENT IN AN ORGANIZED HEALTH CARE EDUCATION/TRAINING PROGRAM
Payer: MEDICARE

## 2021-12-31 VITALS
HEART RATE: 91 BPM | WEIGHT: 185.19 LBS | SYSTOLIC BLOOD PRESSURE: 113 MMHG | DIASTOLIC BLOOD PRESSURE: 58 MMHG | HEIGHT: 71 IN | BODY MASS INDEX: 25.93 KG/M2 | OXYGEN SATURATION: 100 % | RESPIRATION RATE: 18 BRPM | TEMPERATURE: 97.9 F

## 2021-12-31 DIAGNOSIS — Z20.822 SUSPECTED COVID-19 VIRUS INFECTION: Primary | ICD-10-CM

## 2021-12-31 LAB — SARS-COV-2, COV2: NORMAL

## 2021-12-31 PROCEDURE — 99282 EMERGENCY DEPT VISIT SF MDM: CPT

## 2021-12-31 PROCEDURE — U0005 INFEC AGEN DETEC AMPLI PROBE: HCPCS

## 2021-12-31 RX ORDER — ACETAMINOPHEN 500 MG
1000 TABLET ORAL
COMMUNITY

## 2021-12-31 NOTE — ED TRIAGE NOTES
Pt presents to ED with c/o 3 day hx of cough runny nose and diarrhea. He states exposure to COVID. He appears in NAD.

## 2021-12-31 NOTE — ED PROVIDER NOTES
Patient is an 24-year-old male presenting today with concern for Covid. He reports 3 days of sore throat, cough, congestion. No shortness of breath or chest pain. No fevers. He was exposed about 8 days ago. He is vaccinated. Past Medical History:   Diagnosis Date    HHT (hereditary hemorrhagic telangiectasia) ([facility] [address]        Past Surgical History:   Procedure Laterality Date    COLONOSCOPY N/A 1/29/2020    COLONOSCOPY performed by Anastasia Barth MD at [facility] ENDOSCOPY    HX APPENDECTOMY      HX COLONOSCOPY  2014    due 23    HX OTHER SURGICAL  07/2018    sclerotheropy    HX TONSILLECTOMY      [place] CHEST SURGERY PROCEDURE UNLISTED      excision of AVM         Family History:   Problem Relation Age of Onset    Heart Disease Mother     Cancer Other         colon, lung       Social History     Socioeconomic History    Marital status:      Spouse name: Not on file    Number of children: Not on file    Years of education: Not on file    Highest education level: Not on file   Occupational History    Not on file   Tobacco Use    Smoking status: Former Smoker    Smokeless tobacco: Never Used   Substance and Sexual Activity    Alcohol use: Yes     Alcohol/week: 0.0 standard drinks     Comment: one drink per week    Drug use: No    Sexual activity: Yes     Partners: Female   Other Topics Concern    Not on file   Social History Narrative    Not on file     Social Determinants of Health     Financial Resource Strain:     Difficulty of Paying Living Expenses: Not on file   Food Insecurity:     Worried About Running Out of Food in the Last Year: Not on file    Bisi of Food in the Last Year: Not on file   Transportation Needs:     Lack of Transportation (Medical): Not on file    Lack of Transportation (Non-Medical):  Not on file   Physical Activity:     Days of Exercise per Week: Not on file    Minutes of Exercise per Session: Not on file   Stress:     Feeling of Stress : Not on file   Social Connections:     Frequency of Communication with Friends and Family: Not on file    Frequency of Social Gatherings with Friends and Family: Not on file    Attends Yarsani Services: Not on file    Active Member of Clubs or Organizations: Not on file    Attends Club or Organization Meetings: Not on file    Marital Status: Not on file   Intimate Partner Violence:     Fear of Current or Ex-Partner: Not on file    Emotionally Abused: Not on file    Physically Abused: Not on file    Sexually Abused: Not on file   Housing Stability:     Unable to Pay for Housing in the Last Year: Not on file    Number of [place] in the Last Year: Not on file    Unstable Housing in the Last Year: Not on file         ALLERGIES: Patient has no known allergies. Review of Systems   Constitutional: Negative for chills and fever. HENT: Positive for congestion and sore throat. Eyes: Negative for pain and redness. Respiratory: Positive for cough. Negative for shortness of breath. Cardiovascular: Negative for chest pain and palpitations. Gastrointestinal: Negative for abdominal pain, diarrhea, nausea and vomiting. Genitourinary: Negative for frequency and hematuria. Musculoskeletal: Negative for back pain and neck pain. Skin: Negative for rash and wound. Neurological: Positive for headaches. Negative for dizziness. Hematological: Does not bruise/bleed easily. Vitals:    12/31/21 0833   BP: (!) 113/58   Pulse: 91   Resp: 18   Temp: 97.9 °F (36.6 °C)   SpO2: 100%   Weight: 84 kg (185 lb 3 oz)   Height: 5' 11\" (1.803 m)            Physical Exam  Vitals and nursing note reviewed. Constitutional:       General: He is not in acute distress. Appearance: He is well-developed. HENT:      Head: Normocephalic and atraumatic. Eyes:      Conjunctiva/sclera: Conjunctivae normal.      Pupils: Pupils are equal, round, and reactive to light.    Cardiovascular:      Rate and Rhythm: Normal rate and regular rhythm. Heart sounds: Normal heart sounds. No murmur heard. No friction rub. No gallop. Comments: Equal radial, dp, and pt pulses  Pulmonary:      Effort: Pulmonary effort is normal. No respiratory distress. Breath sounds: Normal breath sounds. No wheezing or rales. Abdominal:      General: Bowel sounds are normal. There is no distension. Palpations: Abdomen is soft. Tenderness: There is no abdominal tenderness. There is no guarding or rebound. Musculoskeletal:         General: Normal range of motion. Cervical back: Normal range of motion and neck supple. Skin:     General: Skin is warm and dry. Capillary Refill: Capillary refill takes less than 2 seconds. Findings: No rash. Neurological:      Mental Status: He is alert and oriented to person, place, and time. MDM       Procedures            79 y.o. male presents with covid concerns. VS stable. No distress. Lungs clear. Will test and dc.     Fletcher Lucas, DO

## 2022-01-01 NOTE — PATIENT INSTRUCTIONS
Leg and Ankle Edema: Care Instructions Your Care Instructions Swelling in the legs, ankles, and feet is called edema. It is common after you sit or stand for a while. Long plane flights or car rides often cause swelling in the legs and feet. You may also have swelling if you have to stand for long periods of time at your job. Problems with the veins in the legs (varicose veins) and changes in hormones can also cause swelling. Sometimes the swelling in the ankles and feet is caused by a more serious problem, such as heart failure, infection, blood clots, or liver or kidney disease. Follow-up care is a key part of your treatment and safety. Be sure to make and go to all appointments, and call your doctor if you are having problems. It's also a good idea to know your test results and keep a list of the medicines you take. How can you care for yourself at home? · If your doctor gave you medicine, take it as prescribed. Call your doctor if you think you are having a problem with your medicine. · Whenever you are resting, raise your legs up. Try to keep the swollen area higher than the level of your heart. · Take breaks from standing or sitting in one position. ? Walk around to increase the blood flow in your lower legs. ? Move your feet and ankles often while you stand, or tighten and relax your leg muscles. · Wear support stockings. Put them on in the morning, before swelling gets worse. · Eat a balanced diet. Lose weight if you need to. · Limit the amount of salt (sodium) in your diet. Salt holds fluid in the body and may increase swelling. When should you call for help? [id number] anytime you think you may need emergency care. For example, call if: 
· You have symptoms of a blood clot in your lung (called a pulmonary embolism). These may include: 
? Sudden chest pain. ? Trouble breathing. ? Coughing up blood. Call your doctor now or seek immediate medical care if: · You have signs of a blood clot, such as: 
? Pain in your calf, back of the knee, thigh, or groin. ? Redness and swelling in your leg or groin. · You have symptoms of infection, such as: 
? Increased pain, swelling, warmth, or redness. ? Red streaks or pus. ? A fever. Watch closely for changes in your health, and be sure to contact your doctor if: 
· Your swelling is getting worse. · You have new or worsening pain in your legs. · You do not get better as expected. Where can you learn more? Go to http://rhonda-win.info/ Enter Y894 in the search box to learn more about \"Leg and Ankle Edema: Care Instructions. \" Current as of: June 26, 2019               Content Version: 12.5 © 1949-7081 [employer]. Care instructions adapted under license by [employer] (which disclaims liability or warranty for this information). If you have questions about a medical condition or this instruction, always ask your healthcare professional. [place] [address] any warranty or liability for your use of this information. Specialty Care (Immediate)...

## 2022-01-02 LAB
SARS-COV-2, XPLCVT: DETECTED
SOURCE, COVRS: ABNORMAL

## 2022-01-03 ENCOUNTER — PATIENT OUTREACH (OUTPATIENT)
Dept: CASE MANAGEMENT | Age: 82
End: 2022-01-03

## 2022-01-03 NOTE — PROGRESS NOTES
Patient contacted regarding COVID-19 diagnosis. Discussed COVID-19 related testing which was available at this time. Test results were positive. Patient informed of results, if available? yes. Ambulatory Care Manager contacted the patient by telephone to perform post discharge assessment. Call within 2 business days of discharge: No Verified name and  with patient as identifiers. Provided introduction to self, and explanation of the CTN/ACM role, and reason for call due to risk factors for infection and/or exposure to COVID-19. Symptoms reviewed with patient who verbalized the following symptoms: no new symptoms and no worsening symptoms      Due to no new or worsening symptoms encounter was not routed to provider for escalation. Discussed follow-up appointments. If no appointment was previously scheduled, appointment scheduling offered:  no. [facility] follow up appointment(s):   Future Appointments   Date Time Provider Jacqueline Ribeiro   2022  8:30 AM Silvano Lopez MD [facility] BS AMB   2022 11:00 AM Britany Graff NP [facility] BS AMB     Non-BS follow up appointment(s): none    Interventions to address risk factors: Education of patient/family/caregiver/guardian to support self-management-covid-19     Advance Care Planning:   Does patient have an Advance Directive: not on file. Educated patient about risk for severe COVID-19 due to risk factors according to CDC guidelines. ACM reviewed discharge instructions, medical action plan and red flag symptoms with the patient who verbalized understanding. Discussed COVID vaccination status: no. Education provided on COVID-19 vaccination as appropriate. Discussed exposure protocols and quarantine with CDC Guidelines. Patient was given an opportunity to verbalize any questions and concerns and agrees to contact ACM or health care provider for questions related to their healthcare.     Reviewed and educated patient on any new and changed medications related to discharge diagnosis     Was patient discharged with a pulse oximeter? no Discussed and confirmed pulse oximeter discharge instructions and when to notify provider or seek emergency care. ACM provided contact information. Plan for follow-up call in 5-7 days based on severity of symptoms and risk factors.

## 2022-01-10 ENCOUNTER — PATIENT OUTREACH (OUTPATIENT)
Dept: CASE MANAGEMENT | Age: 82
End: 2022-01-10

## 2022-01-10 NOTE — PROGRESS NOTES
Patient resolved from Transition of Care episode on 1/10/22. ACM/CTN was unsuccessful at contacting this patient today. Patient/family was provided the following resources and education related to COVID-19 during the initial call:                         Signs, symptoms and red flags related to COVID-19            CDC exposure and quarantine guidelines            [facility]it exposure contact - [phone]            Contact for their local Department of Health                 Patient has not had any additional ED or hospital visits. No further outreach scheduled with this CTN/ACM. Episode of Care resolved. Patient has this CTN/ACM contact information if future needs arise.

## 2022-01-17 ENCOUNTER — TELEPHONE (OUTPATIENT)
Dept: ONCOLOGY | Age: 82
End: 2022-01-17

## 2022-01-17 NOTE — TELEPHONE ENCOUNTER
[address] at [facility]  [phone]    01/17/22- Phone call placed to pt to remind pt to have labs drawn prior to his follow up appointment with .  left for patient.

## 2022-01-18 NOTE — PROGRESS NOTES
[facility] [place]  [address] [facility], [address] [facility] [address] [facility]: [phone]  F: [phone]      Reason for Visit:   Alexis Bae is a 79 y.o. male who is seen for follow up of anemia. History of Present Illness:   He had COVID19 back in December. He went to the ED, but was able to manage his symptoms at home. Now fully recovered. He continues with frequent nose bleeds, worse with the cold air. No bleeding from elsewhere. He remains on oral iron once or twice a day, tolerating this ok. He has a history of HHT, following with specialists at [facility]). Had embolization for a pulmonary AVM several years ago. Sees ENT there several times a year for procedures to address epistaxis, most recently around 6/2021. He is accompanied by his wife. His wife is Tramaine Hicks, a realtor who helped me with the purchase of my current home. Three of his four children have HHT. Review of systems was obtained and pertinent findings reviewed above. Past medical history, social history, family history, medications, and allergies are located in the electronic medical record. Physical Exam:     Visit Vitals  /66 (BP 1 Location: Right arm, BP Patient Position: Sitting, BP Cuff Size: Large adult) Comment: . Pulse 78   Temp 97.8 °F (36.6 °C) (Temporal)   Resp (!) 78   Ht 5' 11\" (1.803 m)   Wt 186 lb (84.4 kg)   SpO2 99%   BMI 25.94 kg/m²     General: no distress  Respiratory: normal respiratory effort  CV: no peripheral edema  Skin: no rashes; no ecchymoses; no petechiae      Results:     Lab Results   Component Value Date/Time    WBC 3.6 01/24/2022 04:23 PM    HGB 12.1 (L) 01/24/2022 04:23 PM    HCT 35.9 (L) 01/24/2022 04:23 PM    PLATELET 688 87/85/4436 04:23 PM    MCV 91 01/24/2022 04:23 PM    ABS.  NEUTROPHILS 2.1 01/24/2022 04:23 PM     Lab Results   Component Value Date/Time    Sodium 139 06/16/2021 02:37 PM    Potassium 4.2 06/16/2021 02:37 PM    Chloride 104 06/16/2021 02:37 PM    CO2 24 06/16/2021 02:37 PM    Glucose 96 06/16/2021 02:37 PM    BUN 18 06/16/2021 02:37 PM    Creatinine 0.96 06/16/2021 02:37 PM    GFR est AA 85 06/16/2021 02:37 PM    GFR est non-AA 73 06/16/2021 02:37 PM    Calcium 8.8 06/16/2021 02:37 PM     Lab Results   Component Value Date/Time    Bilirubin, total 0.3 06/16/2021 02:37 PM    ALT (SGPT) 25 06/16/2021 02:37 PM    Alk. phosphatase 65 06/16/2021 02:37 PM    Protein, total 6.5 06/16/2021 02:37 PM    Albumin 4.1 06/16/2021 02:37 PM     Lab Results   Component Value Date/Time    Reticulocyte count 1.3 02/24/2021 08:18 AM    Iron % saturation 18 01/24/2022 04:23 PM    TIBC 310 01/24/2022 04:23 PM    Ferritin 34 01/24/2022 04:23 PM    Vitamin B12 391 02/24/2021 08:18 AM    Folate 14.0 02/24/2021 08:18 AM    Methylmalonic acid 300 02/05/2021 12:50 PM    Haptoglobin 144 02/24/2021 08:18 AM     02/24/2021 08:18 AM    TSH 2.290 06/16/2021 02:37 PM    M-Terell Not Observed 02/24/2021 08:18 AM     HGB (g/dL)   Date Value   01/24/2022 12.1 (L)   10/04/2021 12.2 (L)   07/19/2021 12.5 (L)   06/16/2021 11.5 (L)   05/12/2021 10.3 (L)   02/24/2021 10.0 (L)   02/05/2021 9.3 (L)   02/01/2021 9.7 (L)   11/24/2020 10.3 (L)   05/21/2020 11.1 (L)   01/31/2020 13.3   04/05/2019 12.0 (L)   11/05/2018 11.6 (L)   07/17/2018 12.0 (L)   06/12/2017 14.7   08/15/2016 12.1 (L)   01/26/2016 11.4 (L)   10/30/2015 11.9 (L)   04/02/2015 12.7   10/07/2014 12.1 (L)   08/20/2014 10.1 (L)     Ferritin (ng/mL)   Date Value   01/24/2022 34   10/04/2021 38   07/19/2021 150   06/16/2021 1,062 (H)   05/12/2021 22 (L)   02/24/2021 13 (L)         Assessment:   1) Iron deficiency anemia  He is s/p injectafer x2 doses in 3/2021 and again in 6/2021. HGB subsequently improved. His labs remain stable. HGB holding steady.   Iron levels remain normal and essentially unchanged from last check, though near the lower limit of normal.  We will hold off on further IV iron for now and continue to monitor. The cause of his iron deficiency is blood loss related to his HHT. He is likely to have recurrence. He should continue oral iron as tolerated. We will monitor his labs and give additional IV iron as needed. 2) HHT  Managed at [facility]. He is s/p treatment for pulmonary AVMs as well as epistaxis. He is receiving regular imaging surveillance there. I have discussed systemic therapy options such as tamoxifen or Avastin, as well as topical therapy such as estradiol. Defer management to his physicians in [place]. 3) Epistaxis  Secondary to HHT. Follows with ENT in [place], s/p local therapy. Bleeding worsening lately. He plans to follow up with ENT for another treatment. 4) Prostate cancer  He is now s/p radiation with Dr. Jerrica Rachel completed 4/2021. Currently on ADT, due again 2/2022. He will follow with radiation oncology and urology, and I remain available if needed should he develop recurrence.     Plan:     Continue PO iron as tolerated  Labs in 4 months: CBC, iron profile, ferritin ([facility])  Return to see me in 4 months    Signed By: Skyler Lawson MD

## 2022-01-25 LAB
BASOPHILS # BLD AUTO: 0 X10E3/UL (ref 0–0.2)
BASOPHILS NFR BLD AUTO: 1 %
EOSINOPHIL # BLD AUTO: 0.3 X10E3/UL (ref 0–0.4)
EOSINOPHIL NFR BLD AUTO: 8 %
ERYTHROCYTE [DISTWIDTH] IN BLOOD BY AUTOMATED COUNT: 13.1 % (ref 11.6–15.4)
FERRITIN SERPL-MCNC: 34 NG/ML (ref 30–400)
HCT VFR BLD AUTO: 35.9 % (ref 37.5–51)
HGB BLD-MCNC: 12.1 G/DL (ref 13–17.7)
IMM GRANULOCYTES # BLD AUTO: 0 X10E3/UL (ref 0–0.1)
IMM GRANULOCYTES NFR BLD AUTO: 0 %
IRON SATN MFR SERPL: 18 % (ref 15–55)
IRON SERPL-MCNC: 57 UG/DL (ref 38–169)
LYMPHOCYTES # BLD AUTO: 0.7 X10E3/UL (ref 0.7–3.1)
LYMPHOCYTES NFR BLD AUTO: 18 %
MCH RBC QN AUTO: 30.8 PG (ref 26.6–33)
MCHC RBC AUTO-ENTMCNC: 33.7 G/DL (ref 31.5–35.7)
MCV RBC AUTO: 91 FL (ref 79–97)
MONOCYTES # BLD AUTO: 0.5 X10E3/UL (ref 0.1–0.9)
MONOCYTES NFR BLD AUTO: 15 %
NEUTROPHILS # BLD AUTO: 2.1 X10E3/UL (ref 1.4–7)
NEUTROPHILS NFR BLD AUTO: 58 %
PLATELET # BLD AUTO: 170 X10E3/UL (ref 150–450)
RBC # BLD AUTO: 3.93 X10E6/UL (ref 4.14–5.8)
TIBC SERPL-MCNC: 310 UG/DL (ref 250–450)
UIBC SERPL-MCNC: 253 UG/DL (ref 111–343)
WBC # BLD AUTO: 3.6 X10E3/UL (ref 3.4–10.8)

## 2022-01-26 ENCOUNTER — OFFICE VISIT (OUTPATIENT)
Dept: ONCOLOGY | Age: 82
End: 2022-01-26
Payer: MEDICARE

## 2022-01-26 VITALS
SYSTOLIC BLOOD PRESSURE: 118 MMHG | RESPIRATION RATE: 78 BRPM | HEIGHT: 71 IN | DIASTOLIC BLOOD PRESSURE: 66 MMHG | WEIGHT: 186 LBS | OXYGEN SATURATION: 99 % | BODY MASS INDEX: 26.04 KG/M2 | TEMPERATURE: 97.8 F | HEART RATE: 78 BPM

## 2022-01-26 DIAGNOSIS — D50.8 OTHER IRON DEFICIENCY ANEMIA: ICD-10-CM

## 2022-01-26 DIAGNOSIS — I78.0 HHT (HEREDITARY HEMORRHAGIC TELANGIECTASIA) (HCC): Primary | ICD-10-CM

## 2022-01-26 PROCEDURE — G8536 NO DOC ELDER MAL SCRN: HCPCS | Performed by: INTERNAL MEDICINE

## 2022-01-26 PROCEDURE — G8419 CALC BMI OUT NRM PARAM NOF/U: HCPCS | Performed by: INTERNAL MEDICINE

## 2022-01-26 PROCEDURE — 1101F PT FALLS ASSESS-DOCD LE1/YR: CPT | Performed by: INTERNAL MEDICINE

## 2022-01-26 PROCEDURE — G8432 DEP SCR NOT DOC, RNG: HCPCS | Performed by: INTERNAL MEDICINE

## 2022-01-26 PROCEDURE — 99214 OFFICE O/P EST MOD 30 MIN: CPT | Performed by: INTERNAL MEDICINE

## 2022-01-26 PROCEDURE — G0463 HOSPITAL OUTPT CLINIC VISIT: HCPCS | Performed by: INTERNAL MEDICINE

## 2022-01-26 PROCEDURE — G8427 DOCREV CUR MEDS BY ELIG CLIN: HCPCS | Performed by: INTERNAL MEDICINE

## 2022-01-26 NOTE — PROGRESS NOTES
Narinder Day is a 79 y.o. male follow up for anemia. 1. Have you been to the ER, urgent care clinic since your last visit? Hospitalized since your last visit?no    2. Have you seen or consulted any other health care providers outside of the [address] [place] [address] since your last visit? Include any pap smears or colon screening.  no

## 2022-01-31 ENCOUNTER — OFFICE VISIT (OUTPATIENT)
Dept: NEUROLOGY | Age: 82
End: 2022-01-31
Payer: MEDICARE

## 2022-01-31 VITALS
SYSTOLIC BLOOD PRESSURE: 108 MMHG | DIASTOLIC BLOOD PRESSURE: 70 MMHG | HEART RATE: 91 BPM | RESPIRATION RATE: 17 BRPM | OXYGEN SATURATION: 97 %

## 2022-01-31 DIAGNOSIS — R19.7 DIARRHEA, UNSPECIFIED TYPE: ICD-10-CM

## 2022-01-31 DIAGNOSIS — G30.1 LATE ONSET ALZHEIMER'S DISEASE WITHOUT BEHAVIORAL DISTURBANCE (HCC): Primary | ICD-10-CM

## 2022-01-31 DIAGNOSIS — F02.80 LATE ONSET ALZHEIMER'S DISEASE WITHOUT BEHAVIORAL DISTURBANCE (HCC): Primary | ICD-10-CM

## 2022-01-31 PROCEDURE — 1101F PT FALLS ASSESS-DOCD LE1/YR: CPT | Performed by: NURSE PRACTITIONER

## 2022-01-31 PROCEDURE — G8432 DEP SCR NOT DOC, RNG: HCPCS | Performed by: NURSE PRACTITIONER

## 2022-01-31 PROCEDURE — G8536 NO DOC ELDER MAL SCRN: HCPCS | Performed by: NURSE PRACTITIONER

## 2022-01-31 PROCEDURE — G8427 DOCREV CUR MEDS BY ELIG CLIN: HCPCS | Performed by: NURSE PRACTITIONER

## 2022-01-31 PROCEDURE — 99214 OFFICE O/P EST MOD 30 MIN: CPT | Performed by: NURSE PRACTITIONER

## 2022-01-31 PROCEDURE — G8419 CALC BMI OUT NRM PARAM NOF/U: HCPCS | Performed by: NURSE PRACTITIONER

## 2022-01-31 RX ORDER — MEMANTINE HYDROCHLORIDE 10 MG/1
10 TABLET ORAL 2 TIMES DAILY
Qty: 60 TABLET | Refills: 2 | Status: SHIPPED | OUTPATIENT
Start: 2022-01-31 | End: 2022-03-01

## 2022-01-31 NOTE — PROGRESS NOTES
[address] [facility],[address]  [address]   [address] [facility] [address] [facility] [place] [address] [address]   [phone] Office   [phone] Fax           Date:  22     Name:  Viky Vasquez  :  1940  MRN:  [id number]     PCP:  Lindsay Winston MD    Chief Complaint   Patient presents with    Follow-up     HISTORY OF PRESENT ILLNESS: Elvie Crawford is a 79 y.o., right handed, , male who presents today for follow up of dementia. At his last office visit, he was to start Namenda and titrate to 10mg twice a day. He is taking this now without difficulty. He has not noted any change in memory citing having good days and bad days. He has the most difficulty with remembering names but otherwise seems to be doing well. He does continue to have issues with diarrhea and thought that perhaps it was the Namenda. Recap from [address] [place] [address]:  Will start Namenda for memory management as previously planned. Follow up in 6 weeks. Current Outpatient Medications   Medication Sig    acetaminophen (TYLENOL) 500 mg tablet Take 1,000 mg by mouth every six (6) hours as needed for Pain.  penicillin v potassium (VEETID) 500 mg tablet TAKE 4 TABLETS BY MOUTH 1 HOUR BEFORE APPOINTMENT    memantine (Namenda) 10 mg tablet Take 1 Tablet by mouth two (2) times a day.  dicyclomine (BENTYL) 20 mg tablet TAKE 1 TABLET BY MOUTH 3 TIMES DAILY AS NEEDED FOR ABDOMINAL CRAMPS (NEW DOSE/NEW DIRECTIONS)    ferrous sulfate (Slow Fe) 142 mg (45 mg iron) ER tablet Take  by mouth Daily (before breakfast).  cholecalciferol (Vitamin D3) 25 mcg (1,000 unit) cap Take  by mouth daily.  ascorbic acid, vitamin C, (Vitamin C) 250 mg tablet Take  by mouth.  TIMOLOL MALEATE OP Apply  to eye. 1 drop to each eye twice daily. No current facility-administered medications for this visit.      No Known Allergies  Past Medical History:   Diagnosis Date    HHT (hereditary hemorrhagic telangiectasia) [facility])      Past Surgical History:   Procedure Laterality Date    COLONOSCOPY N/A 1/29/2020    COLON[facility]COPY [facility] Estrella Fox [facility] at [facility] ENDOSCOPY    HX APPENDECTOMY      HX COLONOSCOPY  2014    due 23    HX OTHER SURGICAL  07/2018    sclerotheropy    HX TONSILLECTOMY      OK CHEST SURGERY PROCEDURE UNLISTED      excision of AVM     Social History     Socioeconomic History    Marital status:      Spouse name: Not on file    Number of children: Not on file    Years of education: Not on file    Highest education level: Not on file   Occupational History    Not on file   Tobacco Use    Smoking status: Former Smoker    Smokeless tobacco: Never Used   Substance and Sexual Activity    Alcohol use: Yes     Alcohol/week: 0.0 standard drinks     Comment: one drink per week    Drug use: No    Sexual activity: Yes     Partners: Female   Other Topics Concern    Not on file   Social History Narrative    Not on file     Social Determinants of Health     Financial Resource Strain:     Difficulty of Paying Living Expenses: Not on file   Food Insecurity:     Worried About Running Out of Food in the Last Year: Not on file    Bisi of Food in the Last Year: Not on file   Transportation Needs:     Lack of Transportation (Medical): Not on file    Lack of Transportation (Non-Medical):  Not on file   Physical Activity:     Days of Exercise per Week: Not on file    Minutes of Exercise per Session: Not on file   Stress:     Feeling of Stress : Not on file   Social Connections:     Frequency of Communication with Friends and Family: Not on file    Frequency of Social Gatherings with Friends and Family: Not on file    Attends Zoroastrianism Services: Not on file    Active Member of Clubs or Organizations: Not on file    Attends Club or Organization Meetings: Not on file    Marital Status: Not on file   Intimate Partner Violence:     Fear of Current or Ex-Partner: Not on file    Emotionally Abused: Not on file    Physically Abused: Not on file    Sexually Abused: Not on file   Housing Stability:     Unable to Pay for Housing in the Last Year: Not on file    Number of Places Lived in the Last Year: Not on file    Unstable Housing in the Last Year: Not on file     Family History   Problem Relation Age of Onset    Heart Disease Mother     Cancer Other         colon, lung       PHYSICAL EXAMINATION:    Visit Vitals  /70 (BP 1 Location: Left arm, BP Patient Position: Sitting, BP Cuff Size: Adult)   Pulse 91   Resp 17   SpO2 97%     General:  Well defined, nourished, and groomed individual in no acute distress. Neck: Supple, nontender, no bruits, no pain with resistance to active range of motion. Heart: Regular rate and rhythm, no murmurs, rub, or gallop. Normal S1S2. Lungs:  Clear to auscultation bilaterally with equal chest expansion, no cough, no wheeze  Musculoskeletal:  Extremities revealed no edema and had full range of motion of joints. Psych:  Good mood and bright affect    NEUROLOGICAL EXAMINATION:     Mental Status:   Alert and oriented to person, place, and time with recent and remote memory intact. Attention span and concentration are normal. Speech is fluent with a full fund of knowledge. Cranial Nerves:  I: smell Not tested   II: visual fields Full to confrontation   II: pupils Equal, round, reactive to light   II: optic disc No papilledema   III,VII: ptosis none   III,IV,VI: extraocular muscles  Full ROM   V: mastication normal   V: facial light touch sensation  normal   VII: facial muscle function   symmetric   VIII: hearing symmetric   IX: soft palate elevation  normal   XI: trapezius strength  5/5   XI: sternocleidomastoid strength 5/5   XI: neck flexion strength  5/5   XII: tongue  midline     Motor Examination: Normal tone, bulk, and strength, 5/5 muscle strength throughout.     Coordination:  Finger to nose and rapid arm movement testing was normal.   No resting or intention tremor  Gait and Station:  Steady while walking. Normal arm swing. No Rhomberg or pronator drift. No muscle wasting or fasiculations noted. Reflexes:  DTRs 2+ throughout. ASSESSMENT AND PLAN    ICD-10-CM ICD-9-CM    1. Late onset Alzheimer's disease without behavioral disturbance (HCC)  G30.1 331.0 memantine (Namenda) 10 mg tablet    F02.80 294.10    2. Diarrhea, unspecified type  R19.7 787.91       Late onset Alzheimer's type dementia now on Namenda 10 mg twice daily. He does continue to have complaints of diarrhea which is explained, should not be a side effect of the Namenda as this is significantly more likely to cause constipation. Recommended that if he continues to have that issue that he needs to follow-up with his primary care physician. For now, we will hold off on trying to start any additional cholinesterase inhibitors until this other issue is resolved. He will continue to stay mentally, socially, and physically active. Follow-up in 4 months or sooner if needed. Emma Chowdary

## 2022-01-31 NOTE — PROGRESS NOTES
Pt said that everything has been well  No energy and an overall weak feeling  Will sleep around 7-8 hours a night  Thinks that the medication memantine is giving him diarrhea

## 2022-02-25 ENCOUNTER — APPOINTMENT (OUTPATIENT)
Dept: GENERAL RADIOLOGY | Age: 82
End: 2022-02-25
Attending: EMERGENCY MEDICINE
Payer: MEDICARE

## 2022-02-25 ENCOUNTER — HOSPITAL ENCOUNTER (EMERGENCY)
Age: 82
Discharge: HOME | End: 2022-02-25
Attending: EMERGENCY MEDICINE
Payer: MEDICARE

## 2022-02-25 VITALS
HEIGHT: 72 IN | OXYGEN SATURATION: 99 % | DIASTOLIC BLOOD PRESSURE: 64 MMHG | WEIGHT: 182 LBS | TEMPERATURE: 98.1 F | BODY MASS INDEX: 24.65 KG/M2 | SYSTOLIC BLOOD PRESSURE: 123 MMHG | HEART RATE: 78 BPM | RESPIRATION RATE: 18 BRPM

## 2022-02-25 DIAGNOSIS — S80.02XA CONTUSION OF LEFT KNEE, INITIAL ENCOUNTER: Primary | ICD-10-CM

## 2022-02-25 DIAGNOSIS — T14.8XXA BRUISING: ICD-10-CM
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
## 2022-02-25 PROCEDURE — 90471 IMMUNIZATION ADMIN: CPT

## 2022-02-25 PROCEDURE — 74011250636 HC RX REV CODE- 250/636: Performed by: EMERGENCY MEDICINE

## 2022-02-25 PROCEDURE — 90715 TDAP VACCINE 7 YRS/> IM: CPT | Performed by: EMERGENCY MEDICINE

## 2022-02-25 PROCEDURE — 73562 X-RAY EXAM OF KNEE 3: CPT

## 2022-02-25 PROCEDURE — 99284 EMERGENCY DEPT VISIT MOD MDM: CPT

## 2022-02-25 RX ADMIN — TETANUS TOXOID, REDUCED DIPHTHERIA TOXOID AND ACELLULAR PERTUSSIS VACCINE, ADSORBED 0.5 ML: 5; 2.5; 8; 8; 2.5 SUSPENSION INTRAMUSCULAR at 14:25

## 2022-02-25 NOTE — DISCHARGE INSTRUCTIONS
Thank you for allowing us to provide you with medical care today. We realize that you have many choices for your emergency care needs. We thank you for choosing [facility]. Please choose us in the future for any continued health care needs. We hope we addressed all of your medical concerns. We strive to provide excellent quality care in the Emergency Department. Anything less than excellent does not meet our expectations. The exam and treatment you received in the Emergency Department were for an emergent problem and are not intended as complete care. It is important that you follow up with a doctor, nurse practitioner, or [address] [place] [address] assistant for ongoing care. If your symptoms worsen or you do not improve as expected and you are unable to reach your usual health care provider, you should return to the Emergency Department. We are available 24 hours a day. Take this sheet with you when you go to your follow-up visit. If you have any problem arranging the follow-up visit, contact the Emergency Department immediately. Make an appointment your family doctor for follow up of this visit. Return to the ER if you are unable to be seen in a timely manner.

## 2022-02-25 NOTE — ED NOTES
Pt given discharge instructions by Dr Patience Retana pt ambulates to [facility] in stable condition

## 2022-02-25 NOTE — ED PROVIDER NOTES
Please note that this dictation was completed with Gigg[employer], the computer voice recognition software.  Quite often unanticipated grammatical, syntax, homophones, and other interpretive errors are inadvertently transcribed by the computer software.  Please disregard these errors.  Please excuse any errors that have escaped final proofreading. 76-year-old male past medical history markable for hereditary hemorrhagic telangiectasia since the ER POV complaining of \"injured my left knee. I was shopping with my wife going to [facility] she had gone I had gotten somehow the car was running to catch up with her when I tripped and fell fell on my outstretched hands and scraped both knees. I tore my pants had an abrasion on my right knee. Has had increased bruising and pain to the left knee since that time and thought he would come get it checked out today. I been walking on it okay though it still looks to be a little bit swollen. I had no headaches no vision changes no difficulty eating or sleeping.   Elizabeth had normal bladder bowel habits.'    pt denies HA, vison changes, diff swallowing, CP, SOB, Abd pain, F/Ch, N/V, D/Cons or other current systemic complaints    Social/ PSH reviewed in EMR    EMR Chart Reviewed               Past Medical History:   Diagnosis Date    HHT (hereditary hemorrhagic telangiectasia) ([facility] [address]        Past Surgical History:   Procedure Laterality Date    COLONOSCOPY N/A 1/29/2020    [facility] Socorro [facility]    HX APPENDECTOMY      HX COLONOSCOPY  2014    due 23    HX OTHER SURGICAL  07/2018    sclerotheropy    HX TONSILLECTOMY      [place] CHEST SURGERY PROCEDURE UNLISTED      excision of AVM         Family History:   Problem Relation Age of Onset    Heart Disease Mother     Cancer Other         colon, lung       Social History     Socioeconomic History    Marital status:      Spouse name: Not on file    Number of children: Not on file    Years of education: Not on file    Highest education level: Not on file   Occupational History    Not on file   Tobacco Use    Smoking status: Former Smoker    Smokeless tobacco: Never Used   Substance and Sexual Activity    Alcohol use: Yes     Alcohol/week: 0.0 standard drinks     Comment: one drink per week    Drug use: No    Sexual activity: Yes     Partners: Female   Other Topics Concern    Not on file   Social History Narrative    Not on file     Social Determinants of Health     Financial Resource Strain:     Difficulty of Paying Living Expenses: Not on file   Food Insecurity:     Worried About Running Out of Food in the Last Year: Not on file    Bisi of Food in the Last Year: Not on file   Transportation Needs:     Lack of Transportation (Medical): Not on file    Lack of Transportation (Non-Medical): Not on file   Physical Activity:     Days of Exercise per Week: Not on file    Minutes of Exercise per Session: Not on file   Stress:     Feeling of Stress : Not on file   Social Connections:     Frequency of Communication with Friends and Family: Not on file    Frequency of Social Gatherings with Friends and Family: Not on file    Attends Rastafarian Services: Not on file    Active Member of [address] [place] [address] Shared Performance or Organizations: Not on file    Attends Club or Organization Meetings: Not on file    Marital Status: Not on file   Intimate Partner Violence:     Fear of Current or Ex-Partner: Not on file    Emotionally Abused: Not on file    Physically Abused: Not on file    Sexually Abused: Not on file   Housing Stability:     Unable to Pay for Housing in the Last Year: Not on file    Number of [place] in the Last Year: Not on file    Unstable Housing in the Last Year: Not on file         ALLERGIES: Patient has no known allergies. Review of Systems   Musculoskeletal: Positive for arthralgias and myalgias. Negative for gait problem. Skin: Positive for color change.    All other systems reviewed and are negative. There were no vitals filed for this visit. Physical Exam  Vitals and nursing note reviewed. Constitutional:       General: He is not in acute distress. Appearance: Normal appearance. He is well-developed. He is not ill-appearing, toxic-appearing or diaphoretic. Comments: NAD, AxOx4, speaking in complete sentences    gcs = 15       HENT:      Head: Normocephalic and atraumatic. Mouth/Throat:      Pharynx: No oropharyngeal exudate. Eyes:      General:         Right eye: No discharge. Left eye: No discharge. Conjunctiva/sclera: Conjunctivae normal.      Pupils: Pupils are equal, round, and reactive to light. Cardiovascular:      Rate and Rhythm: Normal rate and regular rhythm. Heart sounds: No murmur heard. No friction rub. No gallop. Pulmonary:      Effort: Pulmonary effort is normal. No respiratory distress. Breath sounds: Normal breath sounds. No wheezing or rales. Chest:      Chest wall: No tenderness. Abdominal:      General: Bowel sounds are normal. There is no distension. Palpations: Abdomen is soft. There is no mass. Tenderness: There is no abdominal tenderness. There is no guarding or rebound. Musculoskeletal:         General: Swelling and signs of injury present. No tenderness or deformity. Normal range of motion. Cervical back: Normal range of motion and neck supple. Right lower leg: No edema. Left lower leg: No edema. Comments: R knee - noted healing abrasions; no cellulitis; redness/ ttp      L knee - noted min medial swelling/ bruising/ skin intact; FROM/ no crepitus; pt has distal motor/ CV/ Sensation grossly intact L foot      Pt had central/ paraspinal C/T/L spines, Upper/Lower ext long bones, Abdomen,  Pelvis, bilateral snuff boxes/ bilateral snuff boxes/ hands /feet and all joints palpated and tolerated well (except as above) ;  Pt has motor/ CV / Sensation grossly intact to all extremities; Lymphadenopathy:      Cervical: No cervical adenopathy. Skin:     General: Skin is warm and dry. Capillary Refill: Capillary refill takes less than 2 seconds. Coloration: Skin is pale. Findings: Bruising present. No erythema or rash. Neurological:      General: No focal deficit present. Mental Status: He is alert and oriented to person, place, and time. Cranial Nerves: No cranial nerve deficit. Motor: No weakness. Coordination: Coordination normal.      Gait: Gait normal.          MDM       Procedures    2:19 PM  Claire Bishop Acevedo's  results have been reviewed with him. He has been counseled regarding his diagnosis. He verbally conveys understanding and agreement of the signs, symptoms, diagnosis, treatment and prognosis and additionally agrees to Call/ Arrange follow up as recommended with Dr. Chris Mccartney MD in 24 - 48 hours. He also agrees with the care-plan and conveys that all of his questions have been answered. I have also put together some discharge instructions for him that include: 1) educational information regarding their diagnosis, 2) how to care for their diagnosis at home, as well a 3) list of reasons why they would want to return to the ED prior to their follow-up appointment, should their condition change or for concerns.

## 2022-02-25 NOTE — ED TRIAGE NOTES
On Monday of this week the pt fell landing on both hands and knees. The pt had pain to (L) knee still after 1 week. The pt reports that the swelling has gown down but still present.

## 2022-02-25 NOTE — ED NOTES
The pt fell on asphalt while walking on Monday. The pt landed on bilateral hands and knees. The pt reports (L) knee pain after falling. The pt reports that the swelling is down but still present with pain on ROM. The pt denies pain to hands or (R) knee. The pt has small abrasion with scab to (R) knee. The pt denies any syncope. The pt reports that the fall happened cause by his shoe getting stuck and not going forward while running.

## 2022-02-28 PROBLEM — S80.02XA CONTUSION OF LEFT KNEE: Status: ACTIVE | Noted: 2022-02-28

## 2022-03-01 ENCOUNTER — OFFICE VISIT (OUTPATIENT)
Dept: ORTHOPEDIC SURGERY | Age: 82
End: 2022-03-01
Payer: MEDICARE

## 2022-03-01 VITALS — HEIGHT: 72 IN | WEIGHT: 182 LBS | BODY MASS INDEX: 24.65 KG/M2

## 2022-03-01 DIAGNOSIS — L02.416 CELLULITIS AND ABSCESS OF LEFT LEG: ICD-10-CM

## 2022-03-01 DIAGNOSIS — L03.116 CELLULITIS AND ABSCESS OF LEFT LEG: ICD-10-CM

## 2022-03-01 DIAGNOSIS — S80.02XA CONTUSION OF LEFT KNEE, INITIAL ENCOUNTER: Primary | ICD-10-CM

## 2022-03-01 PROCEDURE — 99204 OFFICE O/P NEW MOD 45 MIN: CPT | Performed by: ORTHOPAEDIC SURGERY

## 2022-03-01 PROCEDURE — G8536 NO DOC ELDER MAL SCRN: HCPCS | Performed by: ORTHOPAEDIC SURGERY

## 2022-03-01 PROCEDURE — G8427 DOCREV CUR MEDS BY ELIG CLIN: HCPCS | Performed by: ORTHOPAEDIC SURGERY

## 2022-03-01 PROCEDURE — 1101F PT FALLS ASSESS-DOCD LE1/YR: CPT | Performed by: ORTHOPAEDIC SURGERY

## 2022-03-01 PROCEDURE — G8432 DEP SCR NOT DOC, RNG: HCPCS | Performed by: ORTHOPAEDIC SURGERY

## 2022-03-01 PROCEDURE — G8420 CALC BMI NORM PARAMETERS: HCPCS | Performed by: ORTHOPAEDIC SURGERY

## 2022-03-01 RX ORDER — CEPHALEXIN 500 MG/1
500 CAPSULE ORAL 4 TIMES DAILY
Qty: 30 CAPSULE | Refills: 0 | Status: SHIPPED | OUTPATIENT
Start: 2022-03-01 | End: 2022-03-03 | Stop reason: ALTCHOICE

## 2022-03-01 NOTE — PROGRESS NOTES
ASSESSMENT/PLAN:  Below is the assessment and plan developed based on review of pertinent history, physical exam, labs, studies, and medications. 1. Contusion of left knee, initial encounter  -     XR KNEES BI STAND; Future  2. Cellulitis and abscess of left leg  -     cephALEXin (Keflex) 500 mg capsule; Take 1 Capsule by mouth four (4) times daily. , Normal, Disp-30 Capsule, R-0      Return if symptoms worsen or fail to improve. In discussion with the patient, we considered the numerus possible diagnoses that could be contributing to their present symptoms. We also deliberated on the extensive management options that must be considered to treat their current condition. We reviewed their accessible prior medical records, diagnostic tests, and current health and employment information. We considered how these symptoms were affecting the patient´s activities of daily living as well as employment and fitness activities. The patient had various questions regarding the possible risks, benefits, complications, morbidity and mortality regarding their diagnosis and treatment options. The patients´ comorbidities were considered, and I advocated that they consider maximizing lifestyle modification through nutrition and exercise to aid in addressing their symptoms. Shared decision making yielded an understanding to move forward with conservation treatment preferences. The patient expressed understanding that if conservative management fails to alleviate the present symptoms they will return to office for re-evaluation and consideration of additional diagnostic tests and potential surgical options.      In the interim, we have recommended ice, elevation, and anti-inflammatory medications along with a physician directed home exercise program. We discussed the risks and common side effects of anti-inflammatory medications and instructed the patient to discontinue the medication and contact us if they experienced any side effects. The patient was encouraged to discuss the possible side effects with their family physician or pharmacist prior to initiating any new medications. After a long discussion regarding treatment options, we have decided to prescribe an oral medication. We discussed the risks and common side effects of the medication and instructed the patient to discontinue the medication and contact us if they experienced any side effects. We also encouraged the patient to discuss the possible side effects with their family physician or pharmacist prior to initiating any new medications. I had a long discussion Mr. Francoise Mejia regarding the fact that I did not think he was elevating his leg above his heart. We talked of the fact that he had a edema within the ankle. I recommend that he wear his compression stocking. I did prescribe him some Keflex for his cellulitis. I think it will resolve with proper elevation if he is able to achieve this. Otherwise I am happy to see him back. SUBJECTIVE/OBJECTIVE:  Kailey Cazares (: 1940) is a 79 y.o. male, patient,here for evaluation of the Knee Pain (left knee)  . 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 I was shopping with my wife going to [facility] she had gone I had gotten somehow the car was running to catch up with her when I tripped and fell fell on my outstretched hands and scraped both knees. I tore my pants had an abrasion on my right knee. Has had increased bruising and pain to the left knee since that time and thought he would come get it checked out today. I been walking on it okay though it still looks to be a little bit swollen. I had no headaches no vision changes no difficulty eating or sleeping. Elizabeth had normal bladder bowel habits. Since his injury, he has had more swelling distally within his leg.   Reports he has had some erythema or warmth. He denies any fever chills or night sweats. He reports he has a history of previous squamous cell cancer excision. He reports his knee is actually feeling better. Physical Exam    Examination of the left knee reveals he does have some fluid within the prepatellar bursa. There is no intra-articular effusion. Does have some bruising. He has some pitting edema distally within the ankle. There is some cellulitis distally. He is neurovascular intact distally. There is no drainage. Imagin views of the left knee performed in the office today show no fracture or dislocation. He does have some patellofemoral arthritis. EXAM: XR KNEE LT 3 V     INDICATION: trauma/ pain.     COMPARISON: None.     FINDINGS: Three views of the left knee demonstrate no fracture or other acute  osseous or articular abnormality. There is a significant erosive change in the  posterior patella. There is no effusion. Nonspecific soft tissue swelling is  most prominent medially. Some vascular calcification is seen.     IMPRESSION     Nonspecific medial soft tissue swelling  Chondromalacia patella    No Known Allergies    Current Outpatient Medications   Medication Sig    cephALEXin (Keflex) 500 mg capsule Take 1 Capsule by mouth four (4) times daily.  acetaminophen (TYLENOL) 500 mg tablet Take 1,000 mg by mouth every six (6) hours as needed for Pain.  penicillin v potassium (VEETID) 500 mg tablet TAKE 4 TABLETS BY MOUTH 1 HOUR BEFORE APPOINTMENT    dicyclomine (BENTYL) 20 mg tablet TAKE 1 TABLET BY MOUTH 3 TIMES DAILY AS NEEDED FOR ABDOMINAL CRAMPS (NEW DOSE/NEW DIRECTIONS)    ferrous sulfate (Slow Fe) 142 mg (45 mg iron) ER tablet Take  by mouth Daily (before breakfast).  cholecalciferol (Vitamin D3) 25 mcg (1,000 unit) cap Take  by mouth daily.  ascorbic acid, vitamin C, (Vitamin C) 250 mg tablet Take  by mouth.  TIMOLOL MALEATE OP Apply  to eye. 1 drop to each eye twice daily.      No current facility-administered medications for this visit. Past Medical History:   Diagnosis Date    Cancer [facility])     HHT [facility]hered[facility] hemorrhagic telangie[facility]asia) [facility] [address]     Prostate cancer [facility])        Past Surgical History:   Procedure Laterality Date    COLONOSCOPY N/A 1/29/2020    COLON[facility]COPY performed by Kodak Oneil MD at [facility] ENDOSCOPY    HX APPENDECTOMY      HX COLONOSCOPY  2014    due 23    HX OTHER SURGICAL  07/2018    sclerotheropy    HX TONSILLECTOMY      [place] CHEST SURGERY PROCEDURE UNLISTED      excision of AVM       Family History   Problem Relation Age of Onset    Heart Disease Mother     Cancer Other         colon, lung       Social History     Socioeconomic History    Marital status:      Spouse name: Not on file    Number of children: Not on file    Years of education: Not on file    Highest education level: Not on file   Occupational History    Not on file   Tobacco Use    Smoking status: Former Smoker    Smokeless tobacco: Never Used   Vaping Use    Vaping Use: Never used   Substance and Sexual Activity    Alcohol use: Yes     Alcohol/week: 0.0 standard drinks     Comment: one drink per week    Drug use: No    Sexual activity: Yes     Partners: Female   Other Topics Concern    Not on file   Social History Narrative    Not on file     Social Determinants of Health     Financial Resource Strain:     Difficulty of Paying Living Expenses: Not on file   Food Insecurity:     Worried About Running Out of Food in the Last Year: Not on file    Bisi of Food in the Last Year: Not on file   Transportation Needs:     Lack of Transportation (Medical): Not on file    Lack of Transportation (Non-Medical):  Not on file   Physical Activity:     Days of Exercise per Week: Not on file    Minutes of Exercise per Session: Not on file   Stress:     Feeling of Stress : Not on file   Social Connections:     Frequency of Communication with Friends and Family: Not on file    Frequency of Social Gatherings with Friends and Family: Not on file    Attends Protestant Services: Not on file    Active Member of Clubs or Organizations: Not on file    Attends Club or Organization Meetings: Not on file    Marital Status: Not on file   Intimate Partner Violence:     Fear of Current or Ex-Partner: Not on file    Emotionally Abused: Not on file    Physically Abused: Not on file    Sexually Abused: Not on file   Housing Stability:     Unable to Pay for Housing in the Last Year: Not on file    Number of [place] in the Last Year: Not on file    Unstable Housing in the Last Year: Not on file       Review of Systems    No flowsheet data found. Vitals:  Ht 6' (1.829 m)   Wt 182 lb (82.6 kg)   BMI 24.68 kg/m²    Body mass index is 24.68 kg/m². An electronic signature was used to authenticate this note.   -- Karen Berger MD

## 2022-03-03 ENCOUNTER — TELEPHONE (OUTPATIENT)
Dept: INTERNAL MEDICINE CLINIC | Age: 82
End: 2022-03-03

## 2022-03-03 ENCOUNTER — OFFICE VISIT (OUTPATIENT)
Dept: INTERNAL MEDICINE CLINIC | Age: 82
End: 2022-03-03
Payer: MEDICARE

## 2022-03-03 VITALS
DIASTOLIC BLOOD PRESSURE: 77 MMHG | RESPIRATION RATE: 16 BRPM | HEIGHT: 72 IN | TEMPERATURE: 97.5 F | WEIGHT: 191 LBS | SYSTOLIC BLOOD PRESSURE: 113 MMHG | HEART RATE: 87 BPM | BODY MASS INDEX: 25.87 KG/M2 | OXYGEN SATURATION: 98 %

## 2022-03-03 DIAGNOSIS — R60.0 BILATERAL LEG EDEMA: Primary | ICD-10-CM

## 2022-03-03 DIAGNOSIS — R60.0 BILATERAL LEG EDEMA: ICD-10-CM

## 2022-03-03 LAB
ALBUMIN SERPL-MCNC: 3.6 G/DL (ref 3.5–5)
ALBUMIN/GLOB SERPL: 1.2 {RATIO} (ref 1.1–2.2)
ALP SERPL-CCNC: 65 U/L (ref 45–117)
ALT SERPL-CCNC: 28 U/L (ref 12–78)
ANION GAP SERPL CALC-SCNC: 3 MMOL/L (ref 5–15)
AST SERPL-CCNC: 15 U/L (ref 15–37)
BASOPHILS # BLD: 0 K/UL (ref 0–0.1)
BASOPHILS NFR BLD: 1 % (ref 0–1)
BILIRUB SERPL-MCNC: 0.4 MG/DL (ref 0.2–1)
BUN SERPL-MCNC: 18 MG/DL (ref 6–20)
BUN/CREAT SERPL: 18 (ref 12–20)
CALCIUM SERPL-MCNC: 8.7 MG/DL (ref 8.5–10.1)
CHLORIDE SERPL-SCNC: 107 MMOL/L (ref 97–108)
CO2 SERPL-SCNC: 30 MMOL/L (ref 21–32)
CREAT SERPL-MCNC: 0.99 MG/DL (ref 0.7–1.3)
DIFFERENTIAL METHOD BLD: ABNORMAL
EOSINOPHIL # BLD: 0.3 K/UL (ref 0–0.4)
EOSINOPHIL NFR BLD: 8 % (ref 0–7)
ERYTHROCYTE [DISTWIDTH] IN BLOOD BY AUTOMATED COUNT: 13.6 % (ref 11.5–14.5)
GLOBULIN SER CALC-MCNC: 3.1 G/DL (ref 2–4)
GLUCOSE SERPL-MCNC: 107 MG/DL (ref 65–100)
HCT VFR BLD AUTO: 36 % (ref 36.6–50.3)
HGB BLD-MCNC: 11.2 G/DL (ref 12.1–17)
IMM GRANULOCYTES # BLD AUTO: 0 K/UL (ref 0–0.04)
IMM GRANULOCYTES NFR BLD AUTO: 0 % (ref 0–0.5)
LYMPHOCYTES # BLD: 0.5 K/UL (ref 0.8–3.5)
LYMPHOCYTES NFR BLD: 13 % (ref 12–49)
MCH RBC QN AUTO: 30.4 PG (ref 26–34)
MCHC RBC AUTO-ENTMCNC: 31.1 G/DL (ref 30–36.5)
MCV RBC AUTO: 97.6 FL (ref 80–99)
MONOCYTES # BLD: 0.4 K/UL (ref 0–1)
MONOCYTES NFR BLD: 11 % (ref 5–13)
NEUTS SEG # BLD: 2.3 K/UL (ref 1.8–8)
NEUTS SEG NFR BLD: 67 % (ref 32–75)
NRBC # BLD: 0 K/UL (ref 0–0.01)
NRBC BLD-RTO: 0 PER 100 WBC
PLATELET # BLD AUTO: 184 K/UL (ref 150–400)
PMV BLD AUTO: 10.3 FL (ref 8.9–12.9)
POTASSIUM SERPL-SCNC: 4.4 MMOL/L (ref 3.5–5.1)
PROT SERPL-MCNC: 6.7 G/DL (ref 6.4–8.2)
RBC # BLD AUTO: 3.69 M/UL (ref 4.1–5.7)
RBC MORPH BLD: ABNORMAL
SODIUM SERPL-SCNC: 140 MMOL/L (ref 136–145)
TSH SERPL DL<=0.05 MIU/L-ACNC: 1.67 UIU/ML (ref 0.36–3.74)
WBC # BLD AUTO: 3.5 K/UL (ref 4.1–11.1)

## 2022-03-03 PROCEDURE — G0463 HOSPITAL OUTPT CLINIC VISIT: HCPCS | Performed by: INTERNAL MEDICINE

## 2022-03-03 PROCEDURE — G8510 SCR DEP NEG, NO PLAN REQD: HCPCS | Performed by: INTERNAL MEDICINE

## 2022-03-03 PROCEDURE — G8536 NO DOC ELDER MAL SCRN: HCPCS | Performed by: INTERNAL MEDICINE

## 2022-03-03 PROCEDURE — 1101F PT FALLS ASSESS-DOCD LE1/YR: CPT | Performed by: INTERNAL MEDICINE

## 2022-03-03 PROCEDURE — 99214 OFFICE O/P EST MOD 30 MIN: CPT | Performed by: INTERNAL MEDICINE

## 2022-03-03 PROCEDURE — G8427 DOCREV CUR MEDS BY ELIG CLIN: HCPCS | Performed by: INTERNAL MEDICINE

## 2022-03-03 PROCEDURE — G8419 CALC BMI OUT NRM PARAM NOF/U: HCPCS | Performed by: INTERNAL MEDICINE

## 2022-03-03 RX ORDER — FUROSEMIDE 20 MG/1
20 TABLET ORAL DAILY
Qty: 30 TABLET | Refills: 5 | Status: SHIPPED | OUTPATIENT
Start: 2022-03-03 | End: 2022-06-09 | Stop reason: ALTCHOICE

## 2022-03-03 NOTE — PROGRESS NOTES
Verified name and birth date for privacy precautions. Chart reviewed in preparation for today's visit. Chief Complaint   Patient presents with    Leg Swelling          Health Maintenance Due   Topic    COVID-19 Vaccine (3 - Booster for Pfizer series)    Flu Vaccine (1)         Wt Readings from Last 3 Encounters:   03/03/22 191 lb (86.6 kg)   03/01/22 182 lb (82.6 kg)   02/25/22 182 lb (82.6 kg)     Temp Readings from Last 3 Encounters:   03/03/22 97.5 °F (36.4 °C) (Temporal)   02/25/22 98.1 °F (36.7 °C)   01/26/22 97.8 °F (36.6 °C) (Temporal)     BP Readings from Last 3 Encounters:   03/03/22 113/77   02/25/22 123/64   01/31/22 108/70     Pulse Readings from Last 3 Encounters:   03/03/22 87   02/25/22 78   01/31/22 91         Learning Assessment:  :     Learning Assessment 1/31/2018 9/18/2014   PRIMARY LEARNER Patient Patient   PRIMARY LANGUAGE ENGLISH ENGLISH   LEARNER PREFERENCE PRIMARY DEMONSTRATION DEMONSTRATION   ANSWERED BY patient self   RELATIONSHIP SELF SELF       Depression Screening:  :     3 most recent PHQ Screens 3/3/2022   Little interest or pleasure in doing things Not at all   Feeling down, depressed, irritable, or hopeless Not at all   Total Score PHQ 2 0       Fall Risk Assessment:  :     Fall Risk Assessment, last 12 mths 3/3/2022   Able to walk? Yes   Fall in past 12 months? 1   Do you feel unsteady? 0   Are you worried about falling 0   Number of falls in past 12 months 1   Fall with injury? 0       Abuse Screening:  :     Abuse Screening Questionnaire 3/3/2022 5/25/2021 11/5/2020 3/22/2019   Do you ever feel afraid of your partner? N N N N   Are you in a relationship with someone who physically or mentally threatens you? N N N N   Is it safe for you to go home?  Sole Monsalve

## 2022-03-03 NOTE — TELEPHONE ENCOUNTER
----- Message from Aurelio Jerez sent at 3/3/2022  9:30 AM EST -----  Subject: Message to Provider    QUESTIONS  Information for Provider? To Víctor Johnson please contact Pt at the number below   he did call for you at 9:29  ---------------------------------------------------------------------------  --------------  [address]  What is the best way for the office to contact you? OK to leave message on   voicemail  Preferred Call Back Phone Number? [phone]  ---------------------------------------------------------------------------  --------------  SCRIPT ANSWERS  Relationship to Patient?  Self

## 2022-03-03 NOTE — TELEPHONE ENCOUNTER
Spoke with pt - states he slipped on pavement last month. Seen in ER 2/25/22 left knee injury. He has been experiencing bilateral leg swelling. Worse in left leg. He is wearing compression hose. He saw Dr Michael Alcantara 3/1/22 for left knee pain. Had x-rays done. Pt still concerned with retaining fluid in his legs. Scheduled appt today 3/3/22 at 4 pm with MD for further evaluation.  Les forward to MD.

## 2022-03-03 NOTE — PROGRESS NOTES
HISTORY OF PRESENT ILLNESS  Meme Rose is a 79 y.o. male. Leg Swelling  The history is provided by the patient and spouse (has been dealing with this on own, has worsened with recent knee injury). This is a chronic problem. The problem has been gradually worsening. Pertinent negatives include no chest pain and no shortness of breath. The symptoms are aggravated by standing. The symptoms are relieved by rest and sleep. He has tried rest (compression hose) for the symptoms. The treatment provided mild relief. Leg Pain   The history is provided by the patient. This is a new problem. The problem has been gradually worsening. The pain is present in the left lower leg. Quality: tighntness with swelling. The pain is mild. Associated symptoms include limited range of motion and stiffness. The symptoms are aggravated by standing and contact. There has been a history of trauma (fell on left knee, has seen ortho). Review of Systems   Constitutional: Negative for chills and fever. Respiratory: Negative for shortness of breath. Cardiovascular: Positive for leg swelling. Negative for chest pain. Musculoskeletal: Positive for joint pain and stiffness. Physical Exam  Vitals and nursing note reviewed. Constitutional:       General: He is not in acute distress. Cardiovascular:      Rate and Rhythm: Normal rate and regular rhythm. Heart sounds: Murmur heard. Systolic murmur is present. No friction rub. No gallop. Pulmonary:      Effort: Pulmonary effort is normal.      Breath sounds: Normal breath sounds. Musculoskeletal:      Right lower le+ Edema present. Left lower le+ Edema present. ASSESSMENT and PLAN  Diagnoses and all orders for this visit:    1. Bilateral leg edema  -     DUPLEX LOWER EXT VENOUS BILAT; Future  -     furosemide (LASIX) 20 mg tablet; Take 1 Tablet by mouth daily.  -     METABOLIC PANEL, COMPREHENSIVE;  Future  -     CBC WITH AUTOMATED DIFF; Future  -     TSH 3RD GENERATION;  Future

## 2022-03-04 ENCOUNTER — HOSPITAL ENCOUNTER (OUTPATIENT)
Dept: ULTRASOUND IMAGING | Age: 82
Discharge: HOME | End: 2022-03-04
Attending: INTERNAL MEDICINE
Payer: MEDICARE

## 2022-03-04 DIAGNOSIS — R60.0 BILATERAL LEG EDEMA: ICD-10-CM

## 2022-03-04 PROCEDURE — 93970 EXTREMITY STUDY: CPT

## 2022-03-08 ENCOUNTER — TELEPHONE (OUTPATIENT)
Dept: ONCOLOGY | Age: 82
End: 2022-03-08

## 2022-03-08 NOTE — TELEPHONE ENCOUNTER
Patient called and stated that he would like a call back from team to discuss the nose bleeds he has been having and how frequent they have been happening.  Please advise       CB# [phone]

## 2022-03-08 NOTE — TELEPHONE ENCOUNTER
[address] [facility]  at [place]  [phone]    03/08/22- Returned patient's call, no answer, voicemail left requesting a return call when available. 8:58 AM- Patient stated he's having more frequent nose bleeds. He's going out of town next Thursday for a wedding in [place], then planning to get sclerotherapy with his ENT in [place] for nosebleeds. He inquired about having labs checked prior to leaving town to see if IV iron is needed. Will discuss with Dr. Pedro Saldana and call patient back. Okay to leave voicemail per patient. 9:10 AM- Spoke to patient, Dr. Pedro Saldana agrees he should have labs drawn ASAP to determine if he needs IV iron. May be able to get 1 dose in before he leaves [facility]. Patient verbalized understanding and will have labs done today.

## 2022-03-09 ENCOUNTER — TELEPHONE (OUTPATIENT)
Dept: ONCOLOGY | Age: 82
End: 2022-03-09

## 2022-03-09 DIAGNOSIS — D50.8 OTHER IRON DEFICIENCY ANEMIA: Primary | ICD-10-CM

## 2022-03-09 LAB
BASOPHILS # BLD AUTO: 0.1 X10E3/UL (ref 0–0.2)
BASOPHILS NFR BLD AUTO: 1 %
EOSINOPHIL # BLD AUTO: 0.2 X10E3/UL (ref 0–0.4)
EOSINOPHIL NFR BLD AUTO: 5 %
ERYTHROCYTE [DISTWIDTH] IN BLOOD BY AUTOMATED COUNT: 12.8 % (ref 11.6–15.4)
FERRITIN SERPL-MCNC: 55 NG/ML (ref 30–400)
HCT VFR BLD AUTO: 34.9 % (ref 37.5–51)
HGB BLD-MCNC: 11.3 G/DL (ref 13–17.7)
IMM GRANULOCYTES # BLD AUTO: 0 X10E3/UL (ref 0–0.1)
IMM GRANULOCYTES NFR BLD AUTO: 0 %
IRON SATN MFR SERPL: 14 % (ref 15–55)
IRON SERPL-MCNC: 46 UG/DL (ref 38–169)
LYMPHOCYTES # BLD AUTO: 0.7 X10E3/UL (ref 0.7–3.1)
LYMPHOCYTES NFR BLD AUTO: 15 %
MCH RBC QN AUTO: 30.2 PG (ref 26.6–33)
MCHC RBC AUTO-ENTMCNC: 32.4 G/DL (ref 31.5–35.7)
MCV RBC AUTO: 93 FL (ref 79–97)
MONOCYTES # BLD AUTO: 0.5 X10E3/UL (ref 0.1–0.9)
MONOCYTES NFR BLD AUTO: 11 %
NEUTROPHILS # BLD AUTO: 3 X10E3/UL (ref 1.4–7)
NEUTROPHILS NFR BLD AUTO: 68 %
PLATELET # BLD AUTO: 206 X10E3/UL (ref 150–450)
RBC # BLD AUTO: 3.74 X10E6/UL (ref 4.14–5.8)
TIBC SERPL-MCNC: 324 UG/DL (ref 250–450)
UIBC SERPL-MCNC: 278 UG/DL (ref 111–343)
WBC # BLD AUTO: 4.5 X10E3/UL (ref 3.4–10.8)

## 2022-03-09 RX ORDER — HYDROCORTISONE SODIUM SUCCINATE 100 MG/2ML
100 INJECTION, POWDER, FOR SOLUTION INTRAMUSCULAR; INTRAVENOUS AS NEEDED
Status: CANCELLED | OUTPATIENT
Start: 2022-03-11

## 2022-03-09 RX ORDER — HYDROCORTISONE SODIUM SUCCINATE 100 MG/2ML
100 INJECTION, POWDER, FOR SOLUTION INTRAMUSCULAR; INTRAVENOUS AS NEEDED
Status: CANCELLED | OUTPATIENT
Start: 2022-03-25

## 2022-03-09 RX ORDER — ACETAMINOPHEN 325 MG/1
650 TABLET ORAL AS NEEDED
Status: CANCELLED
Start: 2022-03-11

## 2022-03-09 RX ORDER — SODIUM CHLORIDE 9 MG/ML
10 INJECTION INTRAMUSCULAR; INTRAVENOUS; SUBCUTANEOUS AS NEEDED
Status: CANCELLED | OUTPATIENT
Start: 2022-03-11

## 2022-03-09 RX ORDER — ONDANSETRON 2 MG/ML
8 INJECTION INTRAMUSCULAR; INTRAVENOUS AS NEEDED
Status: CANCELLED | OUTPATIENT
Start: 2022-03-25

## 2022-03-09 RX ORDER — SODIUM CHLORIDE 9 MG/ML
10 INJECTION INTRAMUSCULAR; INTRAVENOUS; SUBCUTANEOUS AS NEEDED
Status: CANCELLED | OUTPATIENT
Start: 2022-03-25

## 2022-03-09 RX ORDER — DIPHENHYDRAMINE HYDROCHLORIDE 50 MG/ML
50 INJECTION, SOLUTION INTRAMUSCULAR; INTRAVENOUS AS NEEDED
Status: CANCELLED
Start: 2022-03-25

## 2022-03-09 RX ORDER — ONDANSETRON 2 MG/ML
8 INJECTION INTRAMUSCULAR; INTRAVENOUS AS NEEDED
Status: CANCELLED | OUTPATIENT
Start: 2022-03-11

## 2022-03-09 RX ORDER — EPINEPHRINE 1 MG/ML
0.3 INJECTION, SOLUTION, CONCENTRATE INTRAVENOUS AS NEEDED
Status: CANCELLED | OUTPATIENT
Start: 2022-03-25

## 2022-03-09 RX ORDER — HEPARIN 100 UNIT/ML
300-500 SYRINGE INTRAVENOUS AS NEEDED
Status: CANCELLED
Start: 2022-03-25

## 2022-03-09 RX ORDER — ACETAMINOPHEN 325 MG/1
650 TABLET ORAL AS NEEDED
Status: CANCELLED
Start: 2022-03-25

## 2022-03-09 RX ORDER — HEPARIN 100 UNIT/ML
300-500 SYRINGE INTRAVENOUS AS NEEDED
Status: CANCELLED
Start: 2022-03-11

## 2022-03-09 RX ORDER — DIPHENHYDRAMINE HYDROCHLORIDE 50 MG/ML
50 INJECTION, SOLUTION INTRAMUSCULAR; INTRAVENOUS AS NEEDED
Status: CANCELLED
Start: 2022-03-11

## 2022-03-09 RX ORDER — DIPHENHYDRAMINE HYDROCHLORIDE 50 MG/ML
25 INJECTION, SOLUTION INTRAMUSCULAR; INTRAVENOUS AS NEEDED
Status: CANCELLED
Start: 2022-03-25

## 2022-03-09 RX ORDER — EPINEPHRINE 1 MG/ML
0.3 INJECTION, SOLUTION, CONCENTRATE INTRAVENOUS AS NEEDED
Status: CANCELLED | OUTPATIENT
Start: 2022-03-11

## 2022-03-09 RX ORDER — SODIUM CHLORIDE 0.9 % (FLUSH) 0.9 %
10 SYRINGE (ML) INJECTION AS NEEDED
Status: CANCELLED | OUTPATIENT
Start: 2022-03-25

## 2022-03-09 RX ORDER — ALBUTEROL SULFATE 0.83 MG/ML
2.5 SOLUTION RESPIRATORY (INHALATION) AS NEEDED
Status: CANCELLED
Start: 2022-03-25

## 2022-03-09 RX ORDER — SODIUM CHLORIDE 9 MG/ML
25 INJECTION, SOLUTION INTRAVENOUS CONTINUOUS
Status: CANCELLED | OUTPATIENT
Start: 2022-03-11

## 2022-03-09 RX ORDER — ALBUTEROL SULFATE 0.83 MG/ML
2.5 SOLUTION RESPIRATORY (INHALATION) AS NEEDED
Status: CANCELLED
Start: 2022-03-11

## 2022-03-09 RX ORDER — SODIUM CHLORIDE 0.9 % (FLUSH) 0.9 %
10 SYRINGE (ML) INJECTION AS NEEDED
Status: CANCELLED | OUTPATIENT
Start: 2022-03-11

## 2022-03-09 RX ORDER — SODIUM CHLORIDE 9 MG/ML
25 INJECTION, SOLUTION INTRAVENOUS CONTINUOUS
Status: CANCELLED | OUTPATIENT
Start: 2022-03-25

## 2022-03-09 RX ORDER — DIPHENHYDRAMINE HYDROCHLORIDE 50 MG/ML
25 INJECTION, SOLUTION INTRAMUSCULAR; INTRAVENOUS AS NEEDED
Status: CANCELLED
Start: 2022-03-11

## 2022-03-09 NOTE — TELEPHONE ENCOUNTER
[address] at [place]  [phone]    03/09/22- Voicemail left for patient requesting a return call when available. 2:59 PM- Informed patient that per Dr. Fabian Plascencia, \"repeat labs reviewed. HGB down to 11.3 from 12.1 in January. Ferritin stable at 55, but iron saturation down to 14%.     Given these labs, and his more frequent nose bleeds, let's proceed with additional IV iron. Injectafer 750mg IV weekly x2. We can hopefully give at least one dose before he goes out of town, and the other dose when he returns. \"    Appointment scheduled 3/11 at 10:15 am for office visit then IV iron. Patient verbalized understanding, no further questions or concerns.

## 2022-03-09 NOTE — TELEPHONE ENCOUNTER
[address] at [place]  [phone]    Repeat labs reviewed. HGB down to 11.3 from 12.1 in January. Ferritin stable at 55, but iron saturation down to 14%. Given these labs, and his more frequent nose bleeds, let's proceed with additional IV iron. Injectafer 750mg IV weekly x2. We can hopefully give at least one dose before he goes out of town, and the other dose when he returns.

## 2022-03-11 ENCOUNTER — HOSPITAL ENCOUNTER (OUTPATIENT)
Dept: INFUSION THERAPY | Age: 82
Discharge: HOME | End: 2022-03-11
Payer: MEDICARE

## 2022-03-11 ENCOUNTER — OFFICE VISIT (OUTPATIENT)
Dept: ONCOLOGY | Age: 82
End: 2022-03-11
Payer: MEDICARE

## 2022-03-11 VITALS
WEIGHT: 187 LBS | SYSTOLIC BLOOD PRESSURE: 110 MMHG | OXYGEN SATURATION: 98 % | HEART RATE: 95 BPM | HEIGHT: 72 IN | DIASTOLIC BLOOD PRESSURE: 59 MMHG | RESPIRATION RATE: 16 BRPM | TEMPERATURE: 98.3 F | BODY MASS INDEX: 25.33 KG/M2

## 2022-03-11 VITALS
HEART RATE: 110 BPM | BODY MASS INDEX: 25.33 KG/M2 | DIASTOLIC BLOOD PRESSURE: 83 MMHG | HEIGHT: 72 IN | OXYGEN SATURATION: 98 % | RESPIRATION RATE: 18 BRPM | WEIGHT: 187 LBS | TEMPERATURE: 98 F | SYSTOLIC BLOOD PRESSURE: 128 MMHG

## 2022-03-11 DIAGNOSIS — D50.8 OTHER IRON DEFICIENCY ANEMIA: Primary | ICD-10-CM

## 2022-03-11 DIAGNOSIS — I78.0 HHT (HEREDITARY HEMORRHAGIC TELANGIECTASIA) (HCC): ICD-10-CM

## 2022-03-11 PROCEDURE — G8419 CALC BMI OUT NRM PARAM NOF/U: HCPCS | Performed by: INTERNAL MEDICINE

## 2022-03-11 PROCEDURE — G8427 DOCREV CUR MEDS BY ELIG CLIN: HCPCS | Performed by: INTERNAL MEDICINE

## 2022-03-11 PROCEDURE — 74011000250 HC RX REV CODE- 250: Performed by: NURSE PRACTITIONER

## 2022-03-11 PROCEDURE — G0463 HOSPITAL OUTPT CLINIC VISIT: HCPCS | Performed by: NURSE PRACTITIONER

## 2022-03-11 PROCEDURE — 99214 OFFICE O/P EST MOD 30 MIN: CPT | Performed by: INTERNAL MEDICINE

## 2022-03-11 PROCEDURE — 1101F PT FALLS ASSESS-DOCD LE1/YR: CPT | Performed by: INTERNAL MEDICINE

## 2022-03-11 PROCEDURE — 96365 THER/PROPH/DIAG IV INF INIT: CPT

## 2022-03-11 PROCEDURE — 74011250636 HC RX REV CODE- 250/636: Performed by: NURSE PRACTITIONER

## 2022-03-11 PROCEDURE — G8432 DEP SCR NOT DOC, RNG: HCPCS | Performed by: INTERNAL MEDICINE

## 2022-03-11 PROCEDURE — G8536 NO DOC ELDER MAL SCRN: HCPCS | Performed by: INTERNAL MEDICINE

## 2022-03-11 RX ORDER — SODIUM CHLORIDE 9 MG/ML
25 INJECTION, SOLUTION INTRAVENOUS CONTINUOUS
Status: DISPENSED | OUTPATIENT
Start: 2022-03-11 | End: 2022-03-11

## 2022-03-11 RX ORDER — HEPARIN 100 UNIT/ML
300-500 SYRINGE INTRAVENOUS AS NEEDED
Status: ACTIVE | OUTPATIENT
Start: 2022-03-11 | End: 2022-03-11

## 2022-03-11 RX ORDER — SODIUM CHLORIDE 0.9 % (FLUSH) 0.9 %
10 SYRINGE (ML) INJECTION AS NEEDED
Status: DISPENSED | OUTPATIENT
Start: 2022-03-11 | End: 2022-03-11

## 2022-03-11 RX ORDER — SODIUM CHLORIDE 9 MG/ML
10 INJECTION INTRAMUSCULAR; INTRAVENOUS; SUBCUTANEOUS AS NEEDED
Status: ACTIVE | OUTPATIENT
Start: 2022-03-11 | End: 2022-03-11

## 2022-03-11 RX ADMIN — SODIUM CHLORIDE 25 ML/HR: 9 INJECTION, SOLUTION INTRAVENOUS at 12:04

## 2022-03-11 RX ADMIN — Medication 10 ML: at 12:32

## 2022-03-11 RX ADMIN — FERRIC CARBOXYMALTOSE INJECTION 750 MG: 50 INJECTION, SOLUTION INTRAVENOUS at 12:05

## 2022-03-11 NOTE — PROGRESS NOTES
[facility] at [place] [address] [address] [facility]  Claudene Luke: [phone]  F: [phone]      Reason for Visit:   Carlene Carr is a 79 y.o. male who is seen for follow up of anemia. History of Present Illness:   Nose is bleeding mostly every day. Severe nose bleeds about once weekly. He has had more lately and worried that labs were dropping. Fatigue has been more of issue as of late. Remaining active. Taking oral iron once daily. He is tolerating this dose well. They are leaving for [facility]. They will be seeing Dr. Allie Garcia on Tuesday next week. He has a history of HHT, following with specialists at [facility]). Had embolization for a pulmonary AVM several years ago. Sees ENT there several times a year for procedures to address epistaxis. He is accompanied by his wife. His wife is Nikki Segovia, a realtor who helped me with the purchase of my current home. Three of his four children have HHT. Review of systems was obtained and pertinent findings reviewed above. Past medical history, social history, family history, medications, and allergies are located in the electronic medical record. Physical Exam:     Visit Vitals  /83 (BP 1 Location: Right arm, BP Patient Position: Sitting, BP Cuff Size: Large adult)   Pulse (!) 110   Temp 98 °F (36.7 °C) (Temporal)   Resp 18   Ht 6' (1.829 m)   Wt 187 lb (84.8 kg)   SpO2 98%   BMI 25.36 kg/m²     General: no distress  Respiratory: normal respiratory effort  CV: 2+ peripheral edema, mild erythema without tenderness to left shin  Skin: no rashes; no ecchymoses; no petechiae      Results:     Lab Results   Component Value Date/Time    WBC 4.5 03/08/2022 02:26 PM    HGB 11.3 (L) 03/08/2022 02:26 PM    HCT 34.9 (L) 03/08/2022 02:26 PM    PLATELET 844 19/72/3056 02:26 PM    MCV 93 03/08/2022 02:26 PM    ABS.  NEUTROPHILS 3.0 03/08/2022 02:26 PM     Lab Results   Component Value Date/Time    Sodium 140 03/03/2022 04:35 PM    Potassium 4.4 03/03/2022 04:35 PM    Chloride 107 03/03/2022 04:35 PM    CO2 30 03/03/2022 04:35 PM    Glucose 107 (H) 03/03/2022 04:35 PM    BUN 18 03/03/2022 04:35 PM    Creatinine 0.99 03/03/2022 04:35 PM    GFR est AA >60 03/03/2022 04:35 PM    GFR est non-AA >60 03/03/2022 04:35 PM    Calcium 8.7 03/03/2022 04:35 PM     Lab Results   Component Value Date/Time    Bilirubin, total 0.4 03/03/2022 04:35 PM    ALT (SGPT) 28 03/03/2022 04:35 PM    Alk. phosphatase 65 03/03/2022 04:35 PM    Protein, total 6.7 03/03/2022 04:35 PM    Albumin 3.6 03/03/2022 04:35 PM    Globulin 3.1 03/03/2022 04:35 PM     Lab Results   Component Value Date/Time    Reticulocyte count 1.3 02/24/2021 08:18 AM    Iron % saturation 14 (L) 03/08/2022 02:26 PM    TIBC 324 03/08/2022 02:26 PM    Ferritin 55 03/08/2022 02:26 PM    Vitamin B12 391 02/24/2021 08:18 AM    Folate 14.0 02/24/2021 08:18 AM    Methylmalonic acid 300 02/05/2021 12:50 PM    Haptoglobin 144 02/24/2021 08:18 AM     02/24/2021 08:18 AM    TSH 1.67 03/03/2022 04:35 PM    M-Terell Not Observed 02/24/2021 08:18 AM     HGB (g/dL)   Date Value   03/08/2022 11.3 (L)   03/03/2022 11.2 (L)   01/24/2022 12.1 (L)   10/04/2021 12.2 (L)   07/19/2021 12.5 (L)   06/16/2021 11.5 (L)   05/12/2021 10.3 (L)   02/24/2021 10.0 (L)   02/05/2021 9.3 (L)   02/01/2021 9.7 (L)   11/24/2020 10.3 (L)   05/21/2020 11.1 (L)   01/31/2020 13.3   04/05/2019 12.0 (L)   11/05/2018 11.6 (L)   07/17/2018 12.0 (L)   06/12/2017 14.7   08/15/2016 12.1 (L)   01/26/2016 11.4 (L)   10/30/2015 11.9 (L)   04/02/2015 12.7   10/07/2014 12.1 (L)   08/20/2014 10.1 (L)     Ferritin (ng/mL)   Date Value   03/08/2022 55   01/24/2022 34   10/04/2021 38   07/19/2021 150   06/16/2021 1,062 (H)   05/12/2021 22 (L)   02/24/2021 13 (L)         Assessment:   1) Iron deficiency anemia  He is s/p injectafer x2 doses in 3/2021 and again in 6/2021. HGB subsequently improved. Labs repeated since last visit on 3/8 showing worsening anemia, Hgb down to 11.3. Ferritin at 55. Repeating Injectafer today x 1 dose. 2nd dose will be given on 3/25 to allow time for his travel plans. Repeat labs in 3 months. Continue oral iron. The cause of his iron deficiency is blood loss related to his HHT. He is likely to have recurrence. He should continue oral iron as tolerated. We will monitor his labs and give additional IV iron as needed. 2) HHT  Managed at [facility]. He is s/p treatment for pulmonary AVMs as well as epistaxis. He is receiving regular imaging surveillance there. I have discussed systemic therapy options such as tamoxifen or Avastin, as well as topical therapy such as estradiol. Defer management to his physicians in [place]. 3) Epistaxis  Secondary to HHT. Follows with ENT in [place], s/p local therapy. Bleeding worsening lately. He plans to follow up with ENT for another treatment on 3/14/2022    4) Prostate cancer  He is now s/p radiation with Dr. Leopoldo Canterbury completed 4/2021. Last dose ADT on 2/2022, stopped due to fatigue. 5) Lower extremity edema  Present for >6 months per patient report. He is using compression stockings PRN. Encouraged him to wear these with upcoming long drive to [facility]. Mild erythema to left shin, advised them to monitor this due to increased risk of cellulitis with chronic leg edema. No s/s of infection currently. Plan:     Injectafer x 2, first dose today, 2nd on 3/25  Continue PO iron as tolerated  Labs in 3 months: CBC, iron profile, ferritin ([facility])  Return to see me in 3 months    I personally saw and evaluated the patient and performed the key components of medical decision making. The history, physical exam, and documentation were performed by Blanca Bess NP. I reviewed and verified the above documentation and modified it as needed.  Iron deficiency has recurred so we will proceed with additional injectafer. He has follow up with the [address] [place] [address] in [facility] soon and he will discuss additional therapy options with them.     Signed By: Nash Chávez MD

## 2022-03-11 NOTE — PROGRESS NOTES
[facility] Progress Note    Date: 2022    Name: Aaliyah Dhaliwal    MRN: [id number]         : 1940    Mr. Tawanda Farnsworth arrived ambulatory and in no distress for Dose 1 of 2  for Injectafer. Assessment was completed, no acute issues at this time, no new complaints voiced. 24 G PIV established to left arm without difficulty. No labs ordered today. Mr. Sundra Hammans vitals were reviewed. Visit Vitals  BP (!) 110/59   Pulse 95   Temp 98.3 °F (36.8 °C)   Resp 16   Ht 6' (1.829 m)   Wt 84.8 kg (187 lb)   SpO2 98%   BMI 25.36 kg/m²           Medications:  Medications Administered     0.9% sodium chloride infusion     Admin Date  2022 Action  New Bag Dose  25 mL/hr Rate  25 mL/hr Route  IntraVENous Administered By  Jose Manuel Morse RN          0.9% sodium chloride injection 10 mL     Admin Date  2022 Action  Given Dose  10 mL Route  IntraVENous Administered By  Jose Manuel Morse RN          ferric carboxymaltose (INJECTAFER) 750 mg in 0.9% sodium chloride 250 mL, overfill volume 25 mL IVPB     Admin Date  2022 Action  New Bag Dose  750 mg Rate  870 mL/hr Route  IntraVENous Administered By  Jose Manuel Morse RN                  Mr. Tawanda Farnsworth tolerated treatment well and was discharged from [place] [address] in stable condition . PIV flushed & removed. Discharge instructions reviewed with patient, verbalized understanding. Patient politely declined to stay 30 minutes post infusion for monitoring. He is to return on  at 1300 for his next appointment.     Stacia Aase, RN  2022

## 2022-03-13 NOTE — PROGRESS NOTES
Call-  1. The doppler had no clots. Proceed with plan as discussed   2.  Labs are stable , and I see he followed up with hematologist

## 2022-03-14 ENCOUNTER — TELEPHONE (OUTPATIENT)
Dept: INTERNAL MEDICINE CLINIC | Age: 82
End: 2022-03-14

## 2022-03-14 NOTE — TELEPHONE ENCOUNTER
Reason for call:  Pt stated that he was returning a call from [place]. Requesting call back.     Is this a new problem: yes     Date of last appointment:  3/3/2022     Can we respond via [facility]: no    Best call back number: [phone]

## 2022-03-14 NOTE — PROGRESS NOTES
Advised pt the followin. The doppler had no clots. Proceed with plan as discussed with MD.  2. Labs are stable.  MD saw he followed up with hematologist.

## 2022-03-16 ENCOUNTER — HOSPITAL ENCOUNTER (EMERGENCY)
Age: 82
Discharge: HOME | End: 2022-03-16
Attending: EMERGENCY MEDICINE
Payer: MEDICARE

## 2022-03-16 VITALS
RESPIRATION RATE: 18 BRPM | HEIGHT: 72 IN | SYSTOLIC BLOOD PRESSURE: 107 MMHG | DIASTOLIC BLOOD PRESSURE: 83 MMHG | TEMPERATURE: 98.9 F | HEART RATE: 86 BPM | WEIGHT: 192.46 LBS | OXYGEN SATURATION: 100 % | BODY MASS INDEX: 26.07 KG/M2

## 2022-03-16 DIAGNOSIS — S05.02XA ABRASION OF LEFT CORNEA, INITIAL ENCOUNTER: Primary | ICD-10-CM

## 2022-03-16 PROCEDURE — 99283 EMERGENCY DEPT VISIT LOW MDM: CPT

## 2022-03-16 PROCEDURE — 74011000250 HC RX REV CODE- 250: Performed by: EMERGENCY MEDICINE

## 2022-03-16 RX ORDER — ERYTHROMYCIN 5 MG/G
OINTMENT OPHTHALMIC
Qty: 1 G | Refills: 0 | Status: SHIPPED | OUTPATIENT
Start: 2022-03-16 | End: 2022-03-23

## 2022-03-16 RX ORDER — TETRACAINE HYDROCHLORIDE 5 MG/ML
1 SOLUTION OPHTHALMIC
Status: COMPLETED | OUTPATIENT
Start: 2022-03-16 | End: 2022-03-16

## 2022-03-16 RX ADMIN — TETRACAINE HYDROCHLORIDE 1 DROP: 5 SOLUTION OPHTHALMIC at 18:04

## 2022-03-16 RX ADMIN — FLUORESCEIN SODIUM 1 STRIP: 1 STRIP OPHTHALMIC at 18:03

## 2022-03-16 NOTE — DISCHARGE INSTRUCTIONS
Thank you for allowing us to provide you with medical care today. We realize that you have many choices for your emergency care needs. We thank you for choosing [facility]. Please choose us in the future for any continued health care needs. We hope we addressed all of your medical concerns. We strive to provide excellent quality care in the Emergency Department. Anything less than excellent does not meet our expectations. The exam and treatment you received in the Emergency Department were for an emergent problem and are not intended as complete care. It is important that you follow up with a doctor, nurse practitioner, or physician's assistant for ongoing care. If your symptoms worsen or you do not improve as expected and you are unable to reach your usual health care provider, you should return to the Emergency Department. We are available 24 hours a day. Take this sheet with you when you go to your follow-up visit. If you have any problem arranging the follow-up visit, contact the Emergency Department immediately. Make an appointment your family doctor for follow up of this visit. Return to the ER if you are unable to be seen in a timely manner.

## 2022-03-16 NOTE — ED TRIAGE NOTES
Patient is concerned he has something in his left   Eye, reports pain in minimal but uncomfortable and has episodes of foggy vision. Tried eye drops with no relief.

## 2022-03-16 NOTE — ED PROVIDER NOTES
79-year-old male history of cancer presents to the emergency department chief complaint of left eye irritation. He noticed some symptoms last night and all day today with some intermittent blurry vision. He has a foreign body sensation to the left eye. Does not recall any specific injury. He has tried eyedrops without relief    The history is provided by the patient. Eye Problem   This is a new problem. The current episode started yesterday. The problem occurs constantly. The problem has not changed since onset. The left eye is affected. The injury mechanism was none. The pain is mild. There is no history of trauma to the eye. Associated symptoms include blurred vision and pain. Pertinent negatives include no decreased vision, no discharge, no nausea, no vomiting, no weakness, no fever and no blindness. He has tried eye drops for the symptoms. The treatment provided no relief. 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) [facility] [address]     Prostate cancer [facility])        Past Surgical History:   Procedure Laterality Date    COLONOSCOPY N/A 1/29/2020    COLONOSCOPY performed by Kodak Oneil MD at [address] [place] [address] ENDOSCOPY    HX APPENDECTOMY      HX COLONOSCOPY  2014    due 23    HX OTHER SURGICAL  07/2018    sclerotheropy    HX TONSILLECTOMY      AL CHEST SURGERY PROCEDURE UNLISTED      excision of AVM         Family History:   Problem Relation Age of Onset    Heart Disease Mother     Cancer Other         colon, lung       Social History     Socioeconomic History    Marital status:      Spouse name: Not on file    Number of children: Not on file    Years of education: Not on file    Highest education level: Not on file   Occupational History    Not on file   Tobacco Use    Smoking status: Former Smoker    Smokeless tobacco: Never Used   Vaping Use    Vaping Use: Never used   Substance and Sexual Activity    Alcohol use:  Yes Alcohol/week: 0.0 standard drinks     Comment: one drink per week    Drug use: No    Sexual activity: Yes     Partners: Female   Other Topics Concern    Not on file   Social History Narrative    Not on file     Social Determinants of Health     Financial Resource Strain:     Difficulty of Paying Living Expenses: Not on file   Food Insecurity:     Worried About Running Out of Food in the Last Year: Not on file    Bisi of Food in the Last Year: Not on file   Transportation Needs:     Lack of Transportation (Medical): Not on file    Lack of Transportation (Non-Medical): Not on file   Physical Activity:     Days of Exercise per Week: Not on file    Minutes of Exercise per Session: Not on file   Stress:     Feeling of Stress : Not on file   Social Connections:     Frequency of Communication with Friends and Family: Not on file    Frequency of Social Gatherings with Friends and Family: Not on file    Attends Anabaptist Services: Not on file    Active Member of [address] [place] [address] or Organizations: Not on file    Attends Club or Organization Meetings: Not on file    Marital Status: Not on file   Intimate Partner Violence:     Fear of Current or Ex-Partner: Not on file    Emotionally Abused: Not on file    Physically Abused: Not on file    Sexually Abused: Not on file   Housing Stability:     Unable to Pay for Housing in the Last Year: Not on file    Number of [place] in the Last Year: Not on file    Unstable Housing in the Last Year: Not on file         ALLERGIES: Patient has no known allergies. Review of Systems   Constitutional: Negative for fatigue and fever. HENT: Negative for sneezing and sore throat. Eyes: Positive for blurred vision and pain. Negative for blindness and discharge. Respiratory: Negative for cough and shortness of breath. Cardiovascular: Negative for chest pain and leg swelling. Gastrointestinal: Negative for abdominal pain, diarrhea, nausea and vomiting. Genitourinary: Negative for difficulty urinating and dysuria. Musculoskeletal: Negative for arthralgias and myalgias. Skin: Negative for color change and rash. Neurological: Negative for weakness and headaches. Psychiatric/Behavioral: Negative for agitation and behavioral problems. Vitals:    03/16/22 1739   BP: 107/83   Pulse: 86   Resp: 18   Temp: 98.9 °F (37.2 °C)   SpO2: 100%   Weight: 87.3 kg (192 lb 7.4 oz)   Height: 6' (1.829 m)            Physical Exam  Vitals and nursing note reviewed. Constitutional:       General: He is not in acute distress. Appearance: Normal appearance. He is well-developed. He is not ill-appearing, toxic-appearing or diaphoretic. HENT:      Head: Normocephalic and atraumatic. Nose: Nose normal.      Mouth/Throat:      Mouth: Mucous membranes are moist.      Pharynx: Oropharynx is clear. Eyes:      General: Lids are everted, no foreign bodies appreciated. Extraocular Movements: Extraocular movements intact. Conjunctiva/sclera: Conjunctivae normal.      Pupils: Pupils are equal, round, and reactive to light. Left eye: Fluorescein uptake (Mild hazy anterior corneal fluorescein uptake on the left) present. Cardiovascular:      Rate and Rhythm: Normal rate and regular rhythm. Pulses: Normal pulses. Heart sounds: No murmur heard. Pulmonary:      Effort: Pulmonary effort is normal. No respiratory distress. Breath sounds: Normal breath sounds. No wheezing. Chest:      Chest wall: No mass or tenderness. Abdominal:      General: There is no distension. Palpations: Abdomen is soft. Tenderness: There is no abdominal tenderness. There is no guarding or rebound. Musculoskeletal:         General: No swelling, tenderness, deformity or signs of injury. Normal range of motion. Cervical back: Normal range of motion and neck supple. No rigidity. No muscular tenderness. Right lower leg: No tenderness. No edema. Left lower leg: No tenderness. No edema. Skin:     General: Skin is warm and dry. Capillary Refill: Capillary refill takes less than 2 seconds. Neurological:      General: No focal deficit present. Mental Status: He is alert and oriented to person, place, and time. Psychiatric:         Mood and Affect: Mood normal.         Behavior: Behavior normal.          MDM  Number of Diagnoses or Management Options  Diagnosis management comments: 79-year-old male presents as above left eye irritation. There is mild fluorescein uptake consistent with an abrasion. He is no history of trauma he is aware of, potentially skin from rubbing his eye. Will treat with Ilotycin, follow-up with primary care, return if needed.          Procedures

## 2022-03-18 PROBLEM — C44.92 SCCA (SQUAMOUS CELL CARCINOMA) OF SKIN: Status: ACTIVE | Noted: 2018-07-17

## 2022-03-19 PROBLEM — S81.802A WOUND OF LOWER EXTREMITY, LEFT, INITIAL ENCOUNTER: Status: ACTIVE | Noted: 2018-09-24

## 2022-03-19 PROBLEM — S80.02XA CONTUSION OF LEFT KNEE: Status: ACTIVE | Noted: 2022-02-28

## 2022-03-20 PROBLEM — Z78.9 ACTIVE ADVANCE DIRECTIVE ON FILE: Status: ACTIVE | Noted: 2018-01-31

## 2022-03-25 ENCOUNTER — HOSPITAL ENCOUNTER (OUTPATIENT)
Dept: INFUSION THERAPY | Age: 82
Discharge: HOME | End: 2022-03-25
Payer: MEDICARE

## 2022-03-25 VITALS
TEMPERATURE: 98.5 F | HEART RATE: 68 BPM | HEIGHT: 72 IN | SYSTOLIC BLOOD PRESSURE: 118 MMHG | BODY MASS INDEX: 25.4 KG/M2 | WEIGHT: 187.5 LBS | RESPIRATION RATE: 16 BRPM | OXYGEN SATURATION: 98 % | DIASTOLIC BLOOD PRESSURE: 70 MMHG

## 2022-03-25 DIAGNOSIS — D50.8 OTHER IRON DEFICIENCY ANEMIA: Primary | ICD-10-CM

## 2022-03-25 PROCEDURE — 96365 THER/PROPH/DIAG IV INF INIT: CPT

## 2022-03-25 PROCEDURE — 74011250636 HC RX REV CODE- 250/636: Performed by: NURSE PRACTITIONER

## 2022-03-25 RX ORDER — SODIUM CHLORIDE 9 MG/ML
25 INJECTION, SOLUTION INTRAVENOUS CONTINUOUS
Status: DISCONTINUED | OUTPATIENT
Start: 2022-03-25 | End: 2022-03-26 | Stop reason: HOSPADM

## 2022-03-25 RX ADMIN — SODIUM CHLORIDE 25 ML/HR: 9 INJECTION, SOLUTION INTRAVENOUS at 14:02

## 2022-03-25 RX ADMIN — FERRIC CARBOXYMALTOSE INJECTION 750 MG: 50 INJECTION, SOLUTION INTRAVENOUS at 14:21

## 2022-03-25 NOTE — PROGRESS NOTES
[facility] Short Visit Progress Note    8073 Patient admitted to [facility] ambulatory in stable condition. Assessment completed. No new concerns voiced. Peripheral IV 24 g established in right arm with positive blood return. Patient tolerated treatment well. No adverse reactions noted. Patient politely declined to stay post treatment observation for monitoring. Vital Signs:  Visit Vitals  /66 (BP 1 Location: Left arm, BP Patient Position: Sitting)   Pulse 74   Temp 98.5 °F (36.9 °C)   Resp 16   Ht 6' (1.829 m)   Wt 85 kg (187 lb 8 oz)   SpO2 98%   BMI 25.43 kg/m²     Patient Vitals for the past 12 hrs:   Temp Pulse Resp BP SpO2   03/25/22 1448  68 16 118/70    03/25/22 1347 98.5 °F (36.9 °C) 74 16 105/66 98 %     Medications:  Medications Administered     0.9% sodium chloride infusion     Admin Date  03/25/2022 Action  New Bag Dose  25 mL/hr Rate  25 mL/hr Route  IntraVENous Administered By  Remington Grullon, TYLER          ferric carboxymaltose (INJECTAFER) 750 mg in 0.9% sodium chloride 250 mL, overfill volume 25 mL IVPB     Admin Date  03/25/2022 Action  New Bag Dose  750 mg Rate  870 mL/hr Route  IntraVENous Administered By  Remington Grullon, TYLER              9003 Patient tolerated treatment well. PIV taken out with no issues, pressure applied, wrapped in 2x2 gauze and coban. Patient discharged from [place] [address] ambulatory in no distress at 1450. Patient aware of next appointment.     Future Appointments   Date Time Provider Jacqueline Ribeiro   5/26/2022 11:30 AM Dallas Baez  [facility] [place] [facility] [place] AM[place]   6/9/2022  9:30 AM Collins Lopez MD [facility] AMB

## 2022-04-05 RX ORDER — MUPIROCIN 20 MG/G
OINTMENT TOPICAL DAILY
Qty: 22 G | Refills: 0 | Status: SHIPPED | OUTPATIENT
Start: 2022-04-05 | End: 2022-04-27

## 2022-04-22 ENCOUNTER — TELEPHONE (OUTPATIENT)
Dept: INTERNAL MEDICINE CLINIC | Age: 82
End: 2022-04-22

## 2022-04-22 NOTE — TELEPHONE ENCOUNTER
Verified patient identity with two identifiers. Spoke with patient by phone. Pt calling to discuss getting colonoscopy (his old GI has retired) d/t having hard to have regular BM, pt is taking dicyclomine to relax bowel. He is having on BM a day on average. No abdominal pain, change in stools, or blood in stool. Family hx colon cancer. Will send to [facility] and get back with pt.

## 2022-04-22 NOTE — TELEPHONE ENCOUNTER
----- Message from Terry Copeland sent at 4/22/2022 12:28 PM EDT -----  Subject: Message to Provider    QUESTIONS  Information for Provider? pt would like a call back  ---------------------------------------------------------------------------  --------------  [address]  What is the best way for the office to contact you? OK to leave message on   voicemail  Preferred Call Back Phone Number? [phone]  ---------------------------------------------------------------------------  --------------  SCRIPT ANSWERS  Relationship to Patient?  Self

## 2022-04-27 ENCOUNTER — HOSPITAL ENCOUNTER (EMERGENCY)
Age: 82
Discharge: HOME | End: 2022-04-27
Attending: STUDENT IN AN ORGANIZED HEALTH CARE EDUCATION/TRAINING PROGRAM
Payer: MEDICARE

## 2022-04-27 ENCOUNTER — APPOINTMENT (OUTPATIENT)
Dept: GENERAL RADIOLOGY | Age: 82
End: 2022-04-27
Attending: STUDENT IN AN ORGANIZED HEALTH CARE EDUCATION/TRAINING PROGRAM
Payer: MEDICARE

## 2022-04-27 VITALS
DIASTOLIC BLOOD PRESSURE: 63 MMHG | HEIGHT: 72 IN | RESPIRATION RATE: 18 BRPM | TEMPERATURE: 98.8 F | SYSTOLIC BLOOD PRESSURE: 124 MMHG | OXYGEN SATURATION: 99 % | BODY MASS INDEX: 25.32 KG/M2 | HEART RATE: 73 BPM | WEIGHT: 186.95 LBS

## 2022-04-27 DIAGNOSIS — R07.89 CHEST WALL PAIN: Primary | ICD-10-CM
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
## 2022-04-27 DIAGNOSIS — I48.92 ATRIAL FLUTTER BY ELECTROCARDIOGRAM (HCC): ICD-10-CM

## 2022-04-27 LAB
ALBUMIN SERPL-MCNC: 3.7 G/DL (ref 3.5–5)
ALBUMIN/GLOB SERPL: 1.1 {RATIO} (ref 1.1–2.2)
ALP SERPL-CCNC: 67 U/L (ref 45–117)
ALT SERPL-CCNC: 24 U/L (ref 12–78)
ANION GAP SERPL CALC-SCNC: 9 MMOL/L (ref 5–15)
AST SERPL-CCNC: 18 U/L (ref 15–37)
ATRIAL RATE: 249 BPM
BASOPHILS # BLD: 0 K/UL (ref 0–0.1)
BASOPHILS NFR BLD: 1 % (ref 0–1)
BILIRUB SERPL-MCNC: 0.6 MG/DL (ref 0.2–1)
BUN SERPL-MCNC: 19 MG/DL (ref 6–20)
BUN/CREAT SERPL: 20 (ref 12–20)
CALCIUM SERPL-MCNC: 8.4 MG/DL (ref 8.5–10.1)
CALCULATED P AXIS, ECG09: 88 DEGREES
CALCULATED R AXIS, ECG10: 47 DEGREES
CALCULATED T AXIS, ECG11: 42 DEGREES
CHLORIDE SERPL-SCNC: 105 MMOL/L (ref 97–108)
CO2 SERPL-SCNC: 26 MMOL/L (ref 21–32)
CREAT SERPL-MCNC: 0.97 MG/DL (ref 0.7–1.3)
DIAGNOSIS, 93000: NORMAL
DIFFERENTIAL METHOD BLD: ABNORMAL
EOSINOPHIL # BLD: 0.2 K/UL (ref 0–0.4)
EOSINOPHIL NFR BLD: 5 % (ref 0–7)
ERYTHROCYTE [DISTWIDTH] IN BLOOD BY AUTOMATED COUNT: 13.5 % (ref 11.5–14.5)
GLOBULIN SER CALC-MCNC: 3.3 G/DL (ref 2–4)
GLUCOSE SERPL-MCNC: 101 MG/DL (ref 65–100)
HCT VFR BLD AUTO: 37.5 % (ref 36.6–50.3)
HGB BLD-MCNC: 11.9 G/DL (ref 12.1–17)
IMM GRANULOCYTES # BLD AUTO: 0 K/UL (ref 0–0.04)
IMM GRANULOCYTES NFR BLD AUTO: 0 % (ref 0–0.5)
LYMPHOCYTES # BLD: 0.4 K/UL (ref 0.8–3.5)
LYMPHOCYTES NFR BLD: 11 % (ref 12–49)
MCH RBC QN AUTO: 30.4 PG (ref 26–34)
MCHC RBC AUTO-ENTMCNC: 31.7 G/DL (ref 30–36.5)
MCV RBC AUTO: 95.9 FL (ref 80–99)
MONOCYTES # BLD: 0.4 K/UL (ref 0–1)
MONOCYTES NFR BLD: 11 % (ref 5–13)
NEUTS SEG # BLD: 2.9 K/UL (ref 1.8–8)
NEUTS SEG NFR BLD: 72 % (ref 32–75)
NRBC # BLD: 0 K/UL (ref 0–0.01)
NRBC BLD-RTO: 0 PER 100 WBC
PLATELET # BLD AUTO: 140 K/UL (ref 150–400)
PLATELET COMMENTS,PCOM: ABNORMAL
PMV BLD AUTO: 9.9 FL (ref 8.9–12.9)
POTASSIUM SERPL-SCNC: 4.3 MMOL/L (ref 3.5–5.1)
PROT SERPL-MCNC: 7 G/DL (ref 6.4–8.2)
Q-T INTERVAL, ECG07: 386 MS
QRS DURATION, ECG06: 110 MS
QTC CALCULATION (BEZET), ECG08: 422 MS
RBC # BLD AUTO: 3.91 M/UL (ref 4.1–5.7)
RBC MORPH BLD: ABNORMAL
RBC MORPH BLD: ABNORMAL
SODIUM SERPL-SCNC: 140 MMOL/L (ref 136–145)
TROPONIN-HIGH SENSITIVITY: 10 NG/L (ref 0–76)
VENTRICULAR RATE, ECG03: 72 BPM
WBC # BLD AUTO: 3.9 K/UL (ref 4.1–11.1)

## 2022-04-27 PROCEDURE — 85025 COMPLETE CBC W/AUTO DIFF WBC: CPT

## 2022-04-27 PROCEDURE — 84484 ASSAY OF TROPONIN QUANT: CPT

## 2022-04-27 PROCEDURE — 99285 EMERGENCY DEPT VISIT HI MDM: CPT

## 2022-04-27 PROCEDURE — 36415 COLL VENOUS BLD VENIPUNCTURE: CPT

## 2022-04-27 PROCEDURE — 93005 ELECTROCARDIOGRAM TRACING: CPT

## 2022-04-27 PROCEDURE — 71045 X-RAY EXAM CHEST 1 VIEW: CPT

## 2022-04-27 PROCEDURE — 80053 COMPREHEN METABOLIC PANEL: CPT

## 2022-04-27 RX ORDER — LIDOCAINE 50 MG/G
PATCH TOPICAL
Qty: 7 EACH | Refills: 0 | Status: SHIPPED | OUTPATIENT
Start: 2022-04-27 | End: 2022-07-20

## 2022-04-27 NOTE — ED TRIAGE NOTES
Pt reports walking a week ago and falling in some tall grass. Pt has had chest pain and left sided rib pain. Pt denies difficulty breathing. Has been coughing at night. Pt has history of left rib removal as a teenager.

## 2022-06-06 PROBLEM — C61 PROSTATE CANCER (HCC): Status: ACTIVE | Noted: 2022-06-06

## 2022-06-06 NOTE — PROGRESS NOTES
[facility] [place] at [facility]  [address] [facility] Parents: [phone]  F: [phone]      Reason for Visit:   Cody Valencia is a 79 y.o. male who is seen for follow up of anemia. History of Present Illness:   He continues with some fatigue. Nose bleeds persist, but better recently since his procedure with ENT in 3/2022. He has noticed some rectal bleeding and he is scheduled for a colonoscopy in August.    Taking oral iron once daily. He is tolerating this dose well. He has a history of HHT, following with specialists at [facility]). Had embolization for a pulmonary AVM several years ago. Sees ENT there several times a year for procedures to address epistaxis. He is accompanied by his wife. His wife is Milka Armenta, a realtor who helped me with the purchase of my current home. Three of his four children have HHT. Review of systems was obtained and pertinent findings reviewed above. Past medical history, social history, family history, medications, and allergies are located in the electronic medical record. Physical Exam:     Visit Vitals  /64 (BP 1 Location: Right arm, BP Patient Position: Sitting, BP Cuff Size: Large adult) Comment: . Pulse 70   Temp 97.6 °F (36.4 °C) (Temporal)   Resp 20   Ht 6' (1.829 m)   Wt 184 lb (83.5 kg)   SpO2 99%   BMI 24.95 kg/m²     General: no distress  Respiratory: normal respiratory effort  CV: 2+ peripheral edema, mild erythema without tenderness to left shin  Skin: no rashes; no ecchymoses; no petechiae      Results:     Lab Results   Component Value Date/Time    WBC 4.4 06/06/2022 02:23 PM    HGB 12.5 (L) 06/06/2022 02:23 PM    HCT 38.7 06/06/2022 02:23 PM    PLATELET 431 85/34/8396 02:23 PM    MCV 94 06/06/2022 02:23 PM    ABS.  NEUTROPHILS 3.0 06/06/2022 02:23 PM     Lab Results   Component Value Date/Time    Sodium 140 04/27/2022 10:31 AM Potassium 4.3 04/27/2022 10:31 AM    Chloride 105 04/27/2022 10:31 AM    CO2 26 04/27/2022 10:31 AM    Glucose 101 (H) 04/27/2022 10:31 AM    BUN 19 04/27/2022 10:31 AM    Creatinine 0.97 04/27/2022 10:31 AM    GFR est AA >60 04/27/2022 10:31 AM    GFR est non-AA >60 04/27/2022 10:31 AM    Calcium 8.4 (L) 04/27/2022 10:31 AM     Lab Results   Component Value Date/Time    Bilirubin, total 0.6 04/27/2022 10:31 AM    ALT (SGPT) 24 04/27/2022 10:31 AM    Alk. phosphatase 67 04/27/2022 10:31 AM    Protein, total 7.0 04/27/2022 10:31 AM    Albumin 3.7 04/27/2022 10:31 AM    Globulin 3.3 04/27/2022 10:31 AM     Lab Results   Component Value Date/Time    Reticulocyte count 1.3 02/24/2021 08:18 AM    Iron % saturation 19 06/06/2022 02:23 PM    TIBC 299 06/06/2022 02:23 PM    Ferritin 62 06/06/2022 02:23 PM    Vitamin B12 391 02/24/2021 08:18 AM    Folate 14.0 02/24/2021 08:18 AM    Methylmalonic acid 300 02/05/2021 12:50 PM    Haptoglobin 144 02/24/2021 08:18 AM     02/24/2021 08:18 AM    TSH 1.67 03/03/2022 04:35 PM    M-Terell Not Observed 02/24/2021 08:18 AM     HGB (g/dL)   Date Value   06/06/2022 12.5 (L)   04/27/2022 11.9 (L)   03/08/2022 11.3 (L)   03/03/2022 11.2 (L)   01/24/2022 12.1 (L)   10/04/2021 12.2 (L)   07/19/2021 12.5 (L)   06/16/2021 11.5 (L)   05/12/2021 10.3 (L)   02/24/2021 10.0 (L)   02/05/2021 9.3 (L)   02/01/2021 9.7 (L)   11/24/2020 10.3 (L)   05/21/2020 11.1 (L)   01/31/2020 13.3   04/05/2019 12.0 (L)   11/05/2018 11.6 (L)   07/17/2018 12.0 (L)   06/12/2017 14.7   08/15/2016 12.1 (L)   01/26/2016 11.4 (L)   10/30/2015 11.9 (L)   04/02/2015 12.7   10/07/2014 12.1 (L)   08/20/2014 10.1 (L)     Ferritin (ng/mL)   Date Value   06/06/2022 62   03/08/2022 55   01/24/2022 34   10/04/2021 38   07/19/2021 150   06/16/2021 1,062 (H)   05/12/2021 22 (L)   02/24/2021 13 (L)         Assessment:   1) Iron deficiency anemia  He is s/p injectafer x2 doses in 3/2021, 6/2021, and 3/2022.   HGB improved with each treatment. He is on oral iron as well. His HGB has improved with his most recent infusion. The cause of his iron deficiency is blood loss related to his HHT. He is likely to have recurrence. He should continue oral iron as tolerated. We will monitor his labs and give additional IV iron as needed. 2) HHT  Managed at [facility]. He is s/p treatment for pulmonary AVMs as well as epistaxis. He is receiving regular imaging surveillance there. I have discussed systemic therapy options such as tamoxifen or Avastin, as well as topical therapy such as estradiol. Defer management to his physicians in [place]. 3) Epistaxis  Secondary to HHT. Follows with ENT in [place], s/p local therapy. Improved recently. 4) Prostate cancer  He is now s/p radiation with Dr. Blaze Padilla completed 4/2021. Last dose ADT on 2/2022, stopped due to fatigue. 5) Lower extremity edema  Present for >6 months per patient report. He is using compression stockings PRN. Encouraged him to wear these with upcoming long drive to [facility]. Mild erythema to left shin, advised them to monitor this due to increased risk of cellulitis with chronic leg edema. No s/s of infection currently.      Plan:     Continue PO iron as tolerated  Labs in 3 months: CBC, iron profile, ferritin ([facility])  Return to see me in 3 months      Signed By: Mary Frances MD

## 2022-06-07 LAB
BASOPHILS # BLD AUTO: 0 X10E3/UL (ref 0–0.2)
BASOPHILS NFR BLD AUTO: 1 %
EOSINOPHIL # BLD AUTO: 0.2 X10E3/UL (ref 0–0.4)
EOSINOPHIL NFR BLD AUTO: 5 %
ERYTHROCYTE [DISTWIDTH] IN BLOOD BY AUTOMATED COUNT: 12.9 % (ref 11.6–15.4)
FERRITIN SERPL-MCNC: 62 NG/ML (ref 30–400)
HCT VFR BLD AUTO: 38.7 % (ref 37.5–51)
HGB BLD-MCNC: 12.5 G/DL (ref 13–17.7)
IMM GRANULOCYTES # BLD AUTO: 0 X10E3/UL (ref 0–0.1)
IMM GRANULOCYTES NFR BLD AUTO: 0 %
IRON SATN MFR SERPL: 19 % (ref 15–55)
IRON SERPL-MCNC: 57 UG/DL (ref 38–169)
LYMPHOCYTES # BLD AUTO: 0.6 X10E3/UL (ref 0.7–3.1)
LYMPHOCYTES NFR BLD AUTO: 14 %
MCH RBC QN AUTO: 30.4 PG (ref 26.6–33)
MCHC RBC AUTO-ENTMCNC: 32.3 G/DL (ref 31.5–35.7)
MCV RBC AUTO: 94 FL (ref 79–97)
MONOCYTES # BLD AUTO: 0.5 X10E3/UL (ref 0.1–0.9)
MONOCYTES NFR BLD AUTO: 12 %
NEUTROPHILS # BLD AUTO: 3 X10E3/UL (ref 1.4–7)
NEUTROPHILS NFR BLD AUTO: 68 %
PLATELET # BLD AUTO: 183 X10E3/UL (ref 150–450)
RBC # BLD AUTO: 4.11 X10E6/UL (ref 4.14–5.8)
TIBC SERPL-MCNC: 299 UG/DL (ref 250–450)
UIBC SERPL-MCNC: 242 UG/DL (ref 111–343)
WBC # BLD AUTO: 4.4 X10E3/UL (ref 3.4–10.8)

## 2022-06-09 ENCOUNTER — OFFICE VISIT (OUTPATIENT)
Dept: ONCOLOGY | Age: 82
End: 2022-06-09
Payer: MEDICARE

## 2022-06-09 VITALS
WEIGHT: 184 LBS | RESPIRATION RATE: 20 BRPM | SYSTOLIC BLOOD PRESSURE: 107 MMHG | HEIGHT: 72 IN | DIASTOLIC BLOOD PRESSURE: 64 MMHG | OXYGEN SATURATION: 99 % | TEMPERATURE: 97.6 F | HEART RATE: 70 BPM | BODY MASS INDEX: 24.92 KG/M2

## 2022-06-09 DIAGNOSIS — D50.8 OTHER IRON DEFICIENCY ANEMIA: ICD-10-CM

## 2022-06-09 DIAGNOSIS — C61 PROSTATE CANCER (HCC): ICD-10-CM

## 2022-06-09 DIAGNOSIS — I78.0 HHT (HEREDITARY HEMORRHAGIC TELANGIECTASIA) (HCC): Primary | ICD-10-CM

## 2022-06-09 PROCEDURE — G8536 NO DOC ELDER MAL SCRN: HCPCS | Performed by: INTERNAL MEDICINE

## 2022-06-09 PROCEDURE — G0463 HOSPITAL OUTPT CLINIC VISIT: HCPCS | Performed by: INTERNAL MEDICINE

## 2022-06-09 PROCEDURE — G8427 DOCREV CUR MEDS BY ELIG CLIN: HCPCS | Performed by: INTERNAL MEDICINE

## 2022-06-09 PROCEDURE — 1123F ACP DISCUSS/DSCN MKR DOCD: CPT | Performed by: INTERNAL MEDICINE

## 2022-06-09 PROCEDURE — G8420 CALC BMI NORM PARAMETERS: HCPCS | Performed by: INTERNAL MEDICINE

## 2022-06-09 PROCEDURE — 99214 OFFICE O/P EST MOD 30 MIN: CPT | Performed by: INTERNAL MEDICINE

## 2022-06-09 PROCEDURE — G8432 DEP SCR NOT DOC, RNG: HCPCS | Performed by: INTERNAL MEDICINE

## 2022-06-09 PROCEDURE — 1101F PT FALLS ASSESS-DOCD LE1/YR: CPT | Performed by: INTERNAL MEDICINE

## 2022-06-09 NOTE — PROGRESS NOTES
Awilda Brar is a 79 y.o. male follow up for anemia. 1. Have you been to the ER, urgent care clinic since your last visit? Hospitalized since your last visit?no    2. Have you seen or consulted any other health care providers outside of the [address] [place] [address] since your last visit? Include any pap smears or colon screening.  no

## 2022-06-10 ENCOUNTER — HOSPITAL ENCOUNTER (OUTPATIENT)
Dept: RADIATION THERAPY | Age: 82
Discharge: HOME | End: 2022-06-10

## 2022-06-20 DIAGNOSIS — K58.1 IRRITABLE BOWEL SYNDROME WITH CONSTIPATION: ICD-10-CM

## 2022-06-21 RX ORDER — DICYCLOMINE HYDROCHLORIDE 20 MG/1
TABLET ORAL
Qty: 30 TABLET | Refills: 3 | Status: SHIPPED | OUTPATIENT
Start: 2022-06-21

## 2022-06-24 ENCOUNTER — TELEPHONE (OUTPATIENT)
Dept: ONCOLOGY | Age: 82
End: 2022-06-24

## 2022-06-24 ENCOUNTER — TELEPHONE (OUTPATIENT)
Dept: INTERNAL MEDICINE CLINIC | Age: 82
End: 2022-06-24

## 2022-06-24 DIAGNOSIS — I78.0 HHT (HEREDITARY HEMORRHAGIC TELANGIECTASIA) (HCC): Primary | ICD-10-CM

## 2022-06-24 NOTE — TELEPHONE ENCOUNTER
Attempted to contact patient without success.  Unable to leave voicemail message requesting a call back to the office

## 2022-06-24 NOTE — TELEPHONE ENCOUNTER
Patients spouse called and stated that in order for her to schedule the patient an appointment at [facility] that they need a referral and 3 years worth of notes.      # [phone]

## 2022-06-24 NOTE — TELEPHONE ENCOUNTER
[address] at [place]  [phone]    06/24/22- Phone call returned to wife she reported she is trying to schedule a NP appointment with Aaliyah Whyte at [facility] per  recommendation but their office is needing new referral/ records sent over before a appointment can be made. Advised her this will be sent over- she will fu with them to schedule NP appointment. Records faxed to [phone]-pvf [facility] staff they will reach out to pt soon to schedule appointment.

## 2022-06-24 NOTE — TELEPHONE ENCOUNTER
----- Message from Umair Mo sent at 6/24/2022  2:58 PM EDT -----  Subject: Message to Provider    QUESTIONS  Information for Provider? Patient would like Paresh Veloz, Dr. Ana Rosa Recio nurse   to call him please. Told patient 24-48 hours to hear back. ---------------------------------------------------------------------------  --------------  Camille LIU  What is the best way for the office to contact you? OK to leave message on   voicemail  Preferred Call Back Phone Number? [phone]  ---------------------------------------------------------------------------  --------------  SCRIPT ANSWERS  Relationship to Patient?  Self

## 2022-06-27 NOTE — TELEPHONE ENCOUNTER
Patient called the office back and advised that he received the letter and wanted to know if pcp would stay local. I advised him that he wouldn't and told him there was 2 new doctors coming

## 2022-07-14 ENCOUNTER — TELEPHONE (OUTPATIENT)
Dept: ONCOLOGY | Age: 82
End: 2022-07-14

## 2022-07-14 NOTE — TELEPHONE ENCOUNTER
[address] at [facility]  [phone]    07/14/22- Attempted to call patient back, unable to get in contact and voicemail full. Will try again later.

## 2022-07-15 NOTE — TELEPHONE ENCOUNTER
Patient returned call, he is concerned about a nose bled he had \"a couple days ago. 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 He states this nose bleed was not like any other he has had before and that he has had many in the past.  He used terms like \"gusher\" and \"mother of all nose bleeds\". He states this nose bleed had clots and was going down his throat, lasted about 1 1/2 hours. During the bleeding he packed his nose with cotton balls,and pinched his nose. Ultimately his had to pack his nose with gauze. He stated this was scary. Before I could ask any additional questions patient stated someone had come in and he has to go.

## 2022-07-18 ENCOUNTER — TELEPHONE (OUTPATIENT)
Dept: INTERNAL MEDICINE CLINIC | Age: 82
End: 2022-07-18

## 2022-07-18 NOTE — TELEPHONE ENCOUNTER
[address] at [facility]  [phone]    07/18/22- Call placed to check in with patient, no answer, voicemail left requesting a return call when available.

## 2022-07-18 NOTE — TELEPHONE ENCOUNTER
----- Message from Canby sent at 7/18/2022  3:10 PM EDT -----  Subject: Message to Provider    QUESTIONS  Information for Provider? Pt is calling to see if his pcp nurse would call   him. Pt has some medical questions that he wants to be answered. Please   call pt back today when pcp nurse is available.   ---------------------------------------------------------------------------  --------------  [address]  [phone]; OK to leave message on voicemail  ---------------------------------------------------------------------------  --------------  SCRIPT ANSWERS  Relationship to Patient?  Self

## 2022-07-19 ENCOUNTER — TELEPHONE (OUTPATIENT)
Dept: INTERNAL MEDICINE CLINIC | Age: 82
End: 2022-07-19

## 2022-07-19 NOTE — TELEPHONE ENCOUNTER
Reason for call:    Patient returning [address] [facility] phone call.       Is this a new problem: yes     Date of last appointment:  3/3/2022     Can we respond via Quad/Graphicst: no    Best call back number:     Kika Read - [phone]

## 2022-07-19 NOTE — TELEPHONE ENCOUNTER
Spoke with pt's wife. Pt requesting appt with MD for bilat leg swelling. Pt states has been going on for several months. Wants to see MD before he leaves.  Scheduled for 7/27/22 at 4:30 pm.

## 2022-07-20 ENCOUNTER — HOSPITAL ENCOUNTER (EMERGENCY)
Age: 82
Discharge: HOME | End: 2022-07-20
Attending: EMERGENCY MEDICINE
Payer: MEDICARE

## 2022-07-20 VITALS
BODY MASS INDEX: 25.19 KG/M2 | RESPIRATION RATE: 16 BRPM | WEIGHT: 186 LBS | OXYGEN SATURATION: 98 % | HEART RATE: 83 BPM | TEMPERATURE: 98.6 F | SYSTOLIC BLOOD PRESSURE: 112 MMHG | DIASTOLIC BLOOD PRESSURE: 60 MMHG | HEIGHT: 72 IN

## 2022-07-20 DIAGNOSIS — D64.9 ANEMIA, UNSPECIFIED TYPE: ICD-10-CM

## 2022-07-20 DIAGNOSIS — R04.0 EPISTAXIS: Primary | ICD-10-CM

## 2022-07-20 LAB
ALBUMIN SERPL-MCNC: 3.3 G/DL (ref 3.5–5)
ALBUMIN/GLOB SERPL: 1.1 {RATIO} (ref 1.1–2.2)
ALP SERPL-CCNC: 61 U/L (ref 45–117)
ALT SERPL-CCNC: 21 U/L (ref 12–78)
ANION GAP SERPL CALC-SCNC: 7 MMOL/L (ref 5–15)
AST SERPL-CCNC: 14 U/L (ref 15–37)
BASOPHILS # BLD: 0 K/UL (ref 0–0.1)
BASOPHILS NFR BLD: 1 % (ref 0–1)
BILIRUB SERPL-MCNC: 0.3 MG/DL (ref 0.2–1)
BUN SERPL-MCNC: 15 MG/DL (ref 6–20)
BUN/CREAT SERPL: 16 (ref 12–20)
CALCIUM SERPL-MCNC: 8.6 MG/DL (ref 8.5–10.1)
CHLORIDE SERPL-SCNC: 105 MMOL/L (ref 97–108)
CO2 SERPL-SCNC: 29 MMOL/L (ref 21–32)
CREAT SERPL-MCNC: 0.94 MG/DL (ref 0.7–1.3)
DIFFERENTIAL METHOD BLD: ABNORMAL
EOSINOPHIL # BLD: 0.3 K/UL (ref 0–0.4)
EOSINOPHIL NFR BLD: 7 % (ref 0–7)
ERYTHROCYTE [DISTWIDTH] IN BLOOD BY AUTOMATED COUNT: 14.6 % (ref 11.5–14.5)
GLOBULIN SER CALC-MCNC: 3.1 G/DL (ref 2–4)
GLUCOSE SERPL-MCNC: 123 MG/DL (ref 65–100)
HCT VFR BLD AUTO: 30.3 % (ref 36.6–50.3)
HGB BLD-MCNC: 9.4 G/DL (ref 12.1–17)
IMM GRANULOCYTES # BLD AUTO: 0 K/UL (ref 0–0.04)
IMM GRANULOCYTES NFR BLD AUTO: 1 % (ref 0–0.5)
LYMPHOCYTES # BLD: 0.6 K/UL (ref 0.8–3.5)
LYMPHOCYTES NFR BLD: 14 % (ref 12–49)
MCH RBC QN AUTO: 30.3 PG (ref 26–34)
MCHC RBC AUTO-ENTMCNC: 31 G/DL (ref 30–36.5)
MCV RBC AUTO: 97.7 FL (ref 80–99)
MONOCYTES # BLD: 0.5 K/UL (ref 0–1)
MONOCYTES NFR BLD: 11 % (ref 5–13)
NEUTS SEG # BLD: 2.6 K/UL (ref 1.8–8)
NEUTS SEG NFR BLD: 66 % (ref 32–75)
NRBC # BLD: 0 K/UL (ref 0–0.01)
NRBC BLD-RTO: 0 PER 100 WBC
PLATELET # BLD AUTO: 158 K/UL (ref 150–400)
PMV BLD AUTO: 10 FL (ref 8.9–12.9)
POTASSIUM SERPL-SCNC: 4 MMOL/L (ref 3.5–5.1)
PROT SERPL-MCNC: 6.4 G/DL (ref 6.4–8.2)
RBC # BLD AUTO: 3.1 M/UL (ref 4.1–5.7)
SODIUM SERPL-SCNC: 141 MMOL/L (ref 136–145)
TROPONIN-HIGH SENSITIVITY: 7 NG/L (ref 0–76)
WBC # BLD AUTO: 4 K/UL (ref 4.1–11.1)

## 2022-07-20 PROCEDURE — 99284 EMERGENCY DEPT VISIT MOD MDM: CPT

## 2022-07-20 PROCEDURE — 80053 COMPREHEN METABOLIC PANEL: CPT

## 2022-07-20 PROCEDURE — 85025 COMPLETE CBC W/AUTO DIFF WBC: CPT

## 2022-07-20 PROCEDURE — 93005 ELECTROCARDIOGRAM TRACING: CPT

## 2022-07-20 PROCEDURE — 84484 ASSAY OF TROPONIN QUANT: CPT

## 2022-07-20 PROCEDURE — 36415 COLL VENOUS BLD VENIPUNCTURE: CPT

## 2022-07-20 NOTE — ED PROVIDER NOTES
79 y.o. male with a history of prostate cancer, HHT, who follows with hematology for chronic anemia secondary to frequent nosebleeds, presents to the emergency department stating that he has had several nosebleeds over the last few weeks and he feels more fatigued than he normally does. He states that his last nosebleed was last night but he denies any bleeding currently. He denies any chest pain, shortness of breath, nausea, vomiting, diarrhea, fever, chills, URI symptoms, or any other medical concerns other than believing that his hemoglobin is low. He denies any history of prior blood transfusions but states that he does get iron transfusions intermittently. Denies any blood in stool or melena. Past Medical History:   Diagnosis Date    Cancer ([facility] [address]     HHT (hereditary hemorrhagic telangiectasia) ([facility] [address]     Prostate cancer [facility])        Past Surgical History:   Procedure Laterality Date    COLONOSCOPY N/A 1/29/2020    COLONOSCOPY performed by James Smith MD at [facility] ENDOSCOPY    HX APPENDECTOMY      HX COLONOSCOPY  2014    due 22    HX OTHER SURGICAL  07/2018    sclerotheropy    HX TONSILLECTOMY      MS CHEST SURGERY PROCEDURE UNLISTED      excision of AVM         Family History:   Problem Relation Age of Onset    Heart Disease Mother     Cancer Other         colon, lung       Social History     Socioeconomic History    Marital status:      Spouse name: Not on file    Number of children: Not on file    Years of education: Not on file    Highest education level: Not on file   Occupational History    Not on file   Tobacco Use    Smoking status: Former    Smokeless tobacco: Never   Vaping Use    Vaping Use: Never used   Substance and Sexual Activity    Alcohol use:  Yes     Alcohol/week: 0.0 standard drinks     Comment: one drink per week    Drug use: No    Sexual activity: Yes     Partners: Female   Other Topics Concern    Not on file   Social History Narrative    Not on file     Social Determinants of Health     Financial Resource Strain: Not on file   Food Insecurity: Not on file   Transportation Needs: Not on file   Physical Activity: Not on file   Stress: Not on file   Social Connections: Not on file   Intimate Partner Violence: Not on file   Housing Stability: Not on file         ALLERGIES: Patient has no known allergies. Review of Systems   Constitutional:  Positive for fatigue. Negative for activity change, appetite change, chills and fever. HENT:  Positive for nosebleeds. Negative for congestion, rhinorrhea, sinus pain, sneezing and sore throat. Eyes:  Negative for photophobia and visual disturbance. Respiratory:  Negative for cough and shortness of breath. Cardiovascular:  Negative for chest pain. Gastrointestinal:  Negative for abdominal pain, blood in stool, constipation, diarrhea, nausea and vomiting. Genitourinary:  Negative for difficulty urinating, dysuria, flank pain, hematuria, penile pain and testicular pain. Musculoskeletal:  Negative for arthralgias, back pain, myalgias and neck pain. Skin:  Negative for rash and wound. Neurological:  Negative for syncope, weakness, light-headedness, numbness and headaches. Psychiatric/Behavioral:  Negative for self-injury and suicidal ideas. All other systems reviewed and are negative. Vitals:    07/20/22 1620 07/20/22 1645 07/20/22 1700 07/20/22 1715   BP: (!) 114/59 100/73 (!) 110/58 117/64   Pulse: 83      Resp: 16      Temp: 98.6 °F (37 °C)      SpO2: 98% 98% 98% 98%   Weight: 84.4 kg (186 lb)      Height: 6' (1.829 m)               Physical Exam  Vitals and nursing note reviewed. Constitutional:       General: He is not in acute distress. Appearance: Normal appearance. He is well-developed. He is not diaphoretic. Comments: Pleasant, no acute distress, ambulatory without difficulty. HENT:      Head: Normocephalic and atraumatic.       Nose: Nose normal.   Eyes:      Extraocular Movements: Extraocular movements intact. Conjunctiva/sclera: Conjunctivae normal.      Pupils: Pupils are equal, round, and reactive to light. Cardiovascular:      Rate and Rhythm: Normal rate and regular rhythm. Heart sounds: Normal heart sounds. Pulmonary:      Effort: Pulmonary effort is normal.      Breath sounds: Normal breath sounds. Abdominal:      General: There is no distension. Palpations: Abdomen is soft. Tenderness: There is no abdominal tenderness. Musculoskeletal:         General: No tenderness. Cervical back: Neck supple. Skin:     General: Skin is warm and dry. Neurological:      General: No focal deficit present. Mental Status: He is alert and oriented to person, place, and time. Cranial Nerves: No cranial nerve deficit. Sensory: No sensory deficit. Motor: No weakness. Coordination: Coordination normal.        MDM    50-year-old male with history of HHT and frequent nosebleeds presents with concern for possible anemia. Labs returned showing WBC 4.0, Hg 9.4, normal PLT, otherwise reassuring with unremarkable CMP and negative troponin. Not to the point of needing blood transfusion. Reassurance was given, he was recommended follow-up with his hematologist and return precautions were given for worsening or concerns. This plan was discussed with the patient at the bedside and he stated both understanding and agreement. Please note that this dictation was completed with [employer], the computer voice recognition software. Quite often unanticipated grammatical, syntax, homophones, and other interpretive errors are inadvertently transcribed by the computer software. Please disregard these errors. Please excuse any errors that have escaped final proofreading. Procedures  5:15 PM EKG shows atrial flutter with rate of 80 bpm with incomplete right bundle branch block but no acute ST or T wave abnormalities suggestive of ischemia.

## 2022-07-20 NOTE — ED TRIAGE NOTES
Pt reports increased fatigue  and no energy over the past few weeks; reports that he has had several nose bleeds last one was last night. Pt voiced no there systems; pt has h/o HHT and is concerned his hemoglobin is low.  Pt denied CP and shortness of breath
0

## 2022-07-20 NOTE — ED NOTES
Pt assessed, no active bleeding at this time, pt requesting hgb and iron levels be drawn to take to his hematologist, advised pt that Dr Martha Sarkar would decide what labs are appropriate for today's visit, pt verbalized understanding.

## 2022-07-21 LAB
ATRIAL RATE: 234 BPM
CALCULATED R AXIS, ECG10: 38 DEGREES
CALCULATED T AXIS, ECG11: 32 DEGREES
DIAGNOSIS, 93000: NORMAL
Q-T INTERVAL, ECG07: 376 MS
QRS DURATION, ECG06: 104 MS
QTC CALCULATION (BEZET), ECG08: 433 MS
VENTRICULAR RATE, ECG03: 80 BPM

## 2022-07-21 NOTE — TELEPHONE ENCOUNTER
[address] at [facility]  [phone]    Patient was in the ED yesterday with aflutter. CBC checked and HGB down to 9.4. He recently has had worsening epistaxis. I received a call earlier today from his EP cardiologist, inquiring about safety of anticoagulation or antiplatelet agents. They are considering cardioversion and/or watchmen procedure. I recommended postponing these procedures if possible, given the recent worsening epistaxis. I am concerned that he will not be able to safely tolerate anticoagulation at this time. This should preferably be delayed until after he follows up with ENT to address his epistaxis. I called the patient, no answer, left a message for them to call me back. Need to arrange IV iron infusions and ENT follow up.

## 2022-07-22 ENCOUNTER — TELEPHONE (OUTPATIENT)
Dept: ONCOLOGY | Age: 82
End: 2022-07-22

## 2022-07-22 RX ORDER — SODIUM CHLORIDE 9 MG/ML
10 INJECTION INTRAMUSCULAR; INTRAVENOUS; SUBCUTANEOUS AS NEEDED
Status: CANCELLED | OUTPATIENT
Start: 2022-08-03

## 2022-07-22 RX ORDER — EPINEPHRINE 1 MG/ML
0.3 INJECTION, SOLUTION, CONCENTRATE INTRAVENOUS AS NEEDED
Status: CANCELLED | OUTPATIENT
Start: 2022-07-27

## 2022-07-22 RX ORDER — DIPHENHYDRAMINE HYDROCHLORIDE 50 MG/ML
25 INJECTION, SOLUTION INTRAMUSCULAR; INTRAVENOUS AS NEEDED
Status: CANCELLED
Start: 2022-08-03

## 2022-07-22 RX ORDER — SODIUM CHLORIDE 9 MG/ML
10 INJECTION INTRAMUSCULAR; INTRAVENOUS; SUBCUTANEOUS AS NEEDED
Status: CANCELLED | OUTPATIENT
Start: 2022-07-27

## 2022-07-22 RX ORDER — DIPHENHYDRAMINE HYDROCHLORIDE 50 MG/ML
50 INJECTION, SOLUTION INTRAMUSCULAR; INTRAVENOUS AS NEEDED
Status: CANCELLED
Start: 2022-07-27

## 2022-07-22 RX ORDER — ALBUTEROL SULFATE 0.83 MG/ML
2.5 SOLUTION RESPIRATORY (INHALATION) AS NEEDED
Status: CANCELLED
Start: 2022-07-27

## 2022-07-22 RX ORDER — ALBUTEROL SULFATE 0.83 MG/ML
2.5 SOLUTION RESPIRATORY (INHALATION) AS NEEDED
Status: CANCELLED
Start: 2022-08-03

## 2022-07-22 RX ORDER — HEPARIN 100 UNIT/ML
300-500 SYRINGE INTRAVENOUS AS NEEDED
Status: CANCELLED
Start: 2022-08-03

## 2022-07-22 RX ORDER — ACETAMINOPHEN 325 MG/1
650 TABLET ORAL AS NEEDED
Status: CANCELLED
Start: 2022-07-27

## 2022-07-22 RX ORDER — SODIUM CHLORIDE 0.9 % (FLUSH) 0.9 %
10 SYRINGE (ML) INJECTION AS NEEDED
Status: CANCELLED | OUTPATIENT
Start: 2022-08-03

## 2022-07-22 RX ORDER — SODIUM CHLORIDE 0.9 % (FLUSH) 0.9 %
10 SYRINGE (ML) INJECTION AS NEEDED
Status: CANCELLED | OUTPATIENT
Start: 2022-07-27

## 2022-07-22 RX ORDER — SODIUM CHLORIDE 9 MG/ML
25 INJECTION, SOLUTION INTRAVENOUS CONTINUOUS
Status: CANCELLED | OUTPATIENT
Start: 2022-08-03

## 2022-07-22 RX ORDER — DIPHENHYDRAMINE HYDROCHLORIDE 50 MG/ML
50 INJECTION, SOLUTION INTRAMUSCULAR; INTRAVENOUS AS NEEDED
Status: CANCELLED
Start: 2022-08-03

## 2022-07-22 RX ORDER — HYDROCORTISONE SODIUM SUCCINATE 100 MG/2ML
100 INJECTION, POWDER, FOR SOLUTION INTRAMUSCULAR; INTRAVENOUS AS NEEDED
Status: CANCELLED | OUTPATIENT
Start: 2022-08-03

## 2022-07-22 RX ORDER — ONDANSETRON 2 MG/ML
8 INJECTION INTRAMUSCULAR; INTRAVENOUS AS NEEDED
Status: CANCELLED | OUTPATIENT
Start: 2022-07-27

## 2022-07-22 RX ORDER — SODIUM CHLORIDE 9 MG/ML
25 INJECTION, SOLUTION INTRAVENOUS CONTINUOUS
Status: CANCELLED | OUTPATIENT
Start: 2022-07-27

## 2022-07-22 RX ORDER — HYDROCORTISONE SODIUM SUCCINATE 100 MG/2ML
100 INJECTION, POWDER, FOR SOLUTION INTRAMUSCULAR; INTRAVENOUS AS NEEDED
Status: CANCELLED | OUTPATIENT
Start: 2022-07-27

## 2022-07-22 RX ORDER — DIPHENHYDRAMINE HYDROCHLORIDE 50 MG/ML
25 INJECTION, SOLUTION INTRAMUSCULAR; INTRAVENOUS AS NEEDED
Status: CANCELLED
Start: 2022-07-27

## 2022-07-22 RX ORDER — ONDANSETRON 2 MG/ML
8 INJECTION INTRAMUSCULAR; INTRAVENOUS AS NEEDED
Status: CANCELLED | OUTPATIENT
Start: 2022-08-03

## 2022-07-22 RX ORDER — ACETAMINOPHEN 325 MG/1
650 TABLET ORAL AS NEEDED
Status: CANCELLED
Start: 2022-08-03

## 2022-07-22 RX ORDER — EPINEPHRINE 1 MG/ML
0.3 INJECTION, SOLUTION, CONCENTRATE INTRAVENOUS AS NEEDED
Status: CANCELLED | OUTPATIENT
Start: 2022-08-03

## 2022-07-22 RX ORDER — HEPARIN 100 UNIT/ML
300-500 SYRINGE INTRAVENOUS AS NEEDED
Status: CANCELLED | OUTPATIENT
Start: 2022-07-27

## 2022-07-22 NOTE — TELEPHONE ENCOUNTER
[address] at [facility]  [phone]    The patient returned my call and we discussed further. He has been having more significant epistaxis recently. He thinks it has been about 3 months since his last procedure with ENT. He will be seeing Dr. Homa Childers at [address] on 9/8. I encouraged him to try to follow up with ENT sometime soon about his worsening epistaxis. He is also planning colonoscopy soon due to some rectal bleeding. I will get him set up for additional IV iron.

## 2022-07-27 ENCOUNTER — HOSPITAL ENCOUNTER (OUTPATIENT)
Dept: INFUSION THERAPY | Age: 82
Discharge: HOME | End: 2022-07-27
Payer: MEDICARE

## 2022-07-27 ENCOUNTER — OFFICE VISIT (OUTPATIENT)
Dept: INTERNAL MEDICINE CLINIC | Age: 82
End: 2022-07-27
Payer: MEDICARE

## 2022-07-27 VITALS
HEART RATE: 99 BPM | OXYGEN SATURATION: 97 % | HEIGHT: 73 IN | TEMPERATURE: 98.4 F | DIASTOLIC BLOOD PRESSURE: 72 MMHG | BODY MASS INDEX: 24.36 KG/M2 | RESPIRATION RATE: 20 BRPM | WEIGHT: 183.8 LBS | SYSTOLIC BLOOD PRESSURE: 117 MMHG

## 2022-07-27 VITALS
DIASTOLIC BLOOD PRESSURE: 62 MMHG | HEART RATE: 50 BPM | SYSTOLIC BLOOD PRESSURE: 109 MMHG | RESPIRATION RATE: 16 BRPM | OXYGEN SATURATION: 100 % | TEMPERATURE: 97.9 F

## 2022-07-27 DIAGNOSIS — I87.8 VENOUS STASIS: ICD-10-CM

## 2022-07-27 DIAGNOSIS — D50.8 OTHER IRON DEFICIENCY ANEMIA: Primary | ICD-10-CM

## 2022-07-27 DIAGNOSIS — K62.5 RECTAL BLEED: Primary | ICD-10-CM

## 2022-07-27 LAB
ERYTHROCYTE [DISTWIDTH] IN BLOOD BY AUTOMATED COUNT: 14.3 % (ref 11.5–14.5)
HCT VFR BLD AUTO: 32.4 % (ref 36.6–50.3)
HGB BLD-MCNC: 10 G/DL (ref 12.1–17)
MCH RBC QN AUTO: 30.3 PG (ref 26–34)
MCHC RBC AUTO-ENTMCNC: 30.9 G/DL (ref 30–36.5)
MCV RBC AUTO: 98.2 FL (ref 80–99)
NRBC # BLD: 0 K/UL (ref 0–0.01)
NRBC BLD-RTO: 0 PER 100 WBC
PLATELET # BLD AUTO: 186 K/UL (ref 150–400)
PMV BLD AUTO: 10.1 FL (ref 8.9–12.9)
RBC # BLD AUTO: 3.3 M/UL (ref 4.1–5.7)
WBC # BLD AUTO: 3.7 K/UL (ref 4.1–11.1)

## 2022-07-27 PROCEDURE — 96365 THER/PROPH/DIAG IV INF INIT: CPT

## 2022-07-27 PROCEDURE — 85027 COMPLETE CBC AUTOMATED: CPT

## 2022-07-27 PROCEDURE — 1101F PT FALLS ASSESS-DOCD LE1/YR: CPT | Performed by: INTERNAL MEDICINE

## 2022-07-27 PROCEDURE — G8427 DOCREV CUR MEDS BY ELIG CLIN: HCPCS | Performed by: INTERNAL MEDICINE

## 2022-07-27 PROCEDURE — G0463 HOSPITAL OUTPT CLINIC VISIT: HCPCS | Performed by: INTERNAL MEDICINE

## 2022-07-27 PROCEDURE — 36415 COLL VENOUS BLD VENIPUNCTURE: CPT

## 2022-07-27 PROCEDURE — 74011250636 HC RX REV CODE- 250/636: Performed by: NURSE PRACTITIONER

## 2022-07-27 PROCEDURE — 99214 OFFICE O/P EST MOD 30 MIN: CPT | Performed by: INTERNAL MEDICINE

## 2022-07-27 PROCEDURE — G8420 CALC BMI NORM PARAMETERS: HCPCS | Performed by: INTERNAL MEDICINE

## 2022-07-27 PROCEDURE — G8510 SCR DEP NEG, NO PLAN REQD: HCPCS | Performed by: INTERNAL MEDICINE

## 2022-07-27 PROCEDURE — G8536 NO DOC ELDER MAL SCRN: HCPCS | Performed by: INTERNAL MEDICINE

## 2022-07-27 RX ORDER — SODIUM CHLORIDE 9 MG/ML
10 INJECTION INTRAMUSCULAR; INTRAVENOUS; SUBCUTANEOUS AS NEEDED
Status: ACTIVE | OUTPATIENT
Start: 2022-07-27 | End: 2022-07-27

## 2022-07-27 RX ORDER — HEPARIN 100 UNIT/ML
300-500 SYRINGE INTRAVENOUS AS NEEDED
Status: ACTIVE | OUTPATIENT
Start: 2022-07-27 | End: 2022-07-27

## 2022-07-27 RX ORDER — POTASSIUM CHLORIDE 20 MEQ/1
20 TABLET, EXTENDED RELEASE ORAL DAILY
Qty: 30 TABLET | Refills: 1 | Status: SHIPPED | OUTPATIENT
Start: 2022-07-27

## 2022-07-27 RX ORDER — FUROSEMIDE 20 MG/1
20 TABLET ORAL DAILY
Qty: 30 TABLET | Refills: 1 | Status: SHIPPED | OUTPATIENT
Start: 2022-07-27 | End: 2022-08-18

## 2022-07-27 RX ORDER — SODIUM CHLORIDE 9 MG/ML
25 INJECTION, SOLUTION INTRAVENOUS CONTINUOUS
Status: DISPENSED | OUTPATIENT
Start: 2022-07-27 | End: 2022-07-27

## 2022-07-27 RX ORDER — SODIUM CHLORIDE 0.9 % (FLUSH) 0.9 %
10 SYRINGE (ML) INJECTION AS NEEDED
Status: DISPENSED | OUTPATIENT
Start: 2022-07-27 | End: 2022-07-27

## 2022-07-27 RX ADMIN — FERRIC CARBOXYMALTOSE INJECTION 750 MG: 50 INJECTION, SOLUTION INTRAVENOUS at 12:43

## 2022-07-27 NOTE — PROGRESS NOTES
Outpatient Infusion Center Progress Note     Pt admit to [facility] for injectafer infusion ambulatory in stable condition. Assessment completed. No new concerns voiced. PIV inserted L AC without difficulty, no labs ordered with treatment. Visit Vitals  /62   Pulse (!) 50   Temp 97.9 °F (36.6 °C)   Resp 16   SpO2 100%       Medications:  Medications Administered       ferric carboxymaltose (INJECTAFER) 750 mg in 0.9% sodium chloride 250 mL, overfill volume 25 mL IVPB       Admin Date  07/27/2022 Action  New Bag Dose  750 mg Rate  870 mL/hr Route  IntraVENous Administered By  Saint Mechanic, RN                       Pt tolerated treatment well. PIV flushed and discontinued per protocol. D/c home ambulatory in no distress. Pt aware of next appointment scheduled for 08/03 at 1130.     Recent Results (from the past 12 hour(s))   CBC W/O DIFF    Collection Time: 07/27/22 11:52 AM   Result Value Ref Range    WBC 3.7 (L) 4.1 - 11.1 K/uL    RBC 3.30 (L) 4.10 - 5.70 M/uL    HGB 10.0 (L) 12.1 - 17.0 g/dL    HCT 32.4 (L) 36.6 - 50.3 %    MCV 98.2 80.0 - 99.0 FL    MCH 30.3 26.0 - 34.0 PG    MCHC 30.9 30.0 - 36.5 g/dL    RDW 14.3 11.5 - 14.5 %    PLATELET 758 684 - 716 K/uL    MPV 10.1 8.9 - 12.9 FL    NRBC 0.0 0  WBC    ABSOLUTE NRBC 0.00 0.00 - 0.01 K/uL

## 2022-07-27 NOTE — PROGRESS NOTES
HISTORY OF PRESENT ILLNESS  Carlin Watson is a 79 y.o. male. Leg Swelling  The history is provided by the Patient. This is a chronic problem. The problem occurs daily. The problem has been gradually worsening. The symptoms are aggravated by standing. The symptoms are relieved by laying down. Treatments tried: compression hose. The treatment provided moderate relief. Review of Systems   HENT:  Positive for nosebleeds. Cardiovascular:  Positive for palpitations and leg swelling. Dx with a flutter, has seen cardiology . Not a candidate for anticoagulation due to HHT. Gastrointestinal:  Positive for blood in stool. COLONOSCOPY pending     Physical Exam  Vitals and nursing note reviewed. Cardiovascular:      Rate and Rhythm: Regular rhythm. FrequentExtrasystoles are present. Pulmonary:      Effort: Pulmonary effort is normal.      Breath sounds: Normal breath sounds. No wheezing or rales. Musculoskeletal:      Right lower le+ Edema present. Left lower le+ Edema present. Skin:     General: Skin is warm and dry. Neurological:      Mental Status: He is alert. Psychiatric:         Behavior: Behavior normal.       ASSESSMENT and PLAN  Diagnoses and all orders for this visit:    1. Rectal bleed  -     REFERRAL TO GASTROENTEROLOGY    2. Venous stasis  -     furosemide (LASIX) 20 mg tablet; Take 1 Tablet by mouth in the morning.  -     potassium chloride (K-DUR, KLOR-CON M20) 20 mEq tablet; Take 1 Tablet by mouth in the morning. 3. Atrial flutter- See cardiologist as directed.

## 2022-08-03 ENCOUNTER — HOSPITAL ENCOUNTER (OUTPATIENT)
Dept: INFUSION THERAPY | Age: 82
Discharge: HOME | End: 2022-08-03
Payer: MEDICARE

## 2022-08-03 VITALS
HEART RATE: 112 BPM | TEMPERATURE: 98.1 F | OXYGEN SATURATION: 99 % | SYSTOLIC BLOOD PRESSURE: 114 MMHG | DIASTOLIC BLOOD PRESSURE: 77 MMHG | RESPIRATION RATE: 17 BRPM

## 2022-08-03 DIAGNOSIS — D50.8 OTHER IRON DEFICIENCY ANEMIA: Primary | ICD-10-CM

## 2022-08-03 LAB
ERYTHROCYTE [DISTWIDTH] IN BLOOD BY AUTOMATED COUNT: 14.8 % (ref 11.5–14.5)
HCT VFR BLD AUTO: 34 % (ref 36.6–50.3)
HGB BLD-MCNC: 10.8 G/DL (ref 12.1–17)
MCH RBC QN AUTO: 31 PG (ref 26–34)
MCHC RBC AUTO-ENTMCNC: 31.8 G/DL (ref 30–36.5)
MCV RBC AUTO: 97.7 FL (ref 80–99)
NRBC # BLD: 0 K/UL (ref 0–0.01)
NRBC BLD-RTO: 0 PER 100 WBC
PLATELET # BLD AUTO: 170 K/UL (ref 150–400)
PMV BLD AUTO: 9.9 FL (ref 8.9–12.9)
RBC # BLD AUTO: 3.48 M/UL (ref 4.1–5.7)
WBC # BLD AUTO: 5.1 K/UL (ref 4.1–11.1)

## 2022-08-03 PROCEDURE — 74011250636 HC RX REV CODE- 250/636: Performed by: NURSE PRACTITIONER

## 2022-08-03 PROCEDURE — 36415 COLL VENOUS BLD VENIPUNCTURE: CPT

## 2022-08-03 PROCEDURE — 96365 THER/PROPH/DIAG IV INF INIT: CPT

## 2022-08-03 PROCEDURE — 85027 COMPLETE CBC AUTOMATED: CPT

## 2022-08-03 RX ORDER — SODIUM CHLORIDE 9 MG/ML
25 INJECTION, SOLUTION INTRAVENOUS CONTINUOUS
Status: DISPENSED | OUTPATIENT
Start: 2022-08-03 | End: 2022-08-03

## 2022-08-03 RX ADMIN — FERRIC CARBOXYMALTOSE INJECTION 750 MG: 50 INJECTION, SOLUTION INTRAVENOUS at 11:50

## 2022-08-03 RX ADMIN — SODIUM CHLORIDE 25 ML/HR: 9 INJECTION, SOLUTION INTRAVENOUS at 11:43

## 2022-08-03 NOTE — PROGRESS NOTES
[facility] Progress Note    Date: August 3, 2022    Name: Gianni Menchaca    MRN: [id number]         : 1940    Mr. Charly Zhou Arrived ambulatory and in no distress for Injectafer Regimen. Assessment was completed, no acute issues at this time, no new complaints voiced. PIV placed without difficulty, labs drawn & sent for processing. Please see [facility]. Covid questionnaire completed. Do you have any symptoms of COVID-19? SOB, coughing, fever, or generally not feeling well -no    2. Have you been exposed to COVID-19 recently? - no    3. Have you had any recent contact with family/friend that has a pending COVID test? - no    Mr. Dennis Polanco vitals were reviewed. Visit Vitals  /69   Pulse (!) 110   Temp 98.1 °F (36.7 °C)   Resp 18   SpO2 99%       Medications:  Medications Administered       0.9% sodium chloride infusion       Admin Date  2022 Action  New Bag Dose  25 mL/hr Rate  25 mL/hr Route  IntraVENous Administered By  Marizol Lozada RN              ferric carboxymaltose (INJECTAFER) 750 mg in 0.9% sodium chloride 250 mL, overfill volume 25 mL IVPB       Admin Date  2022 Action  New Bag Dose  750 mg Rate  870 mL/hr Route  IntraVENous Administered By  Marizol Lozada RN                        Mr. Charly Zhou tolerated treatment well and was discharged from [place] [address] in stable condition. PIV flushed and removed. Pt treatment completed and no more [facility] appt at this time.       Ghulam Laboy RN  August 3, 2022

## 2022-08-08 LAB
CREATININE, EXTERNAL: 1
INR, EXTERNAL: 1.1
PT, EXTERNAL: 12.7

## 2022-08-09 ENCOUNTER — APPOINTMENT (OUTPATIENT)
Dept: GENERAL RADIOLOGY | Age: 82
End: 2022-08-09
Attending: STUDENT IN AN ORGANIZED HEALTH CARE EDUCATION/TRAINING PROGRAM
Payer: MEDICARE

## 2022-08-09 ENCOUNTER — HOSPITAL ENCOUNTER (EMERGENCY)
Age: 82
Discharge: HOME | End: 2022-08-09
Attending: STUDENT IN AN ORGANIZED HEALTH CARE EDUCATION/TRAINING PROGRAM
Payer: MEDICARE

## 2022-08-09 ENCOUNTER — APPOINTMENT (OUTPATIENT)
Dept: CT IMAGING | Age: 82
End: 2022-08-09
Attending: STUDENT IN AN ORGANIZED HEALTH CARE EDUCATION/TRAINING PROGRAM
Payer: MEDICARE

## 2022-08-09 VITALS
OXYGEN SATURATION: 97 % | DIASTOLIC BLOOD PRESSURE: 70 MMHG | TEMPERATURE: 98.5 F | HEIGHT: 72 IN | BODY MASS INDEX: 23.7 KG/M2 | RESPIRATION RATE: 16 BRPM | SYSTOLIC BLOOD PRESSURE: 105 MMHG | HEART RATE: 72 BPM | WEIGHT: 175 LBS

## 2022-08-09 DIAGNOSIS — S20.212A CONTUSION OF LEFT CHEST WALL, INITIAL ENCOUNTER: ICD-10-CM

## 2022-08-09 DIAGNOSIS — S01.01XD LACERATION OF SCALP, SUBSEQUENT ENCOUNTER: ICD-10-CM

## 2022-08-09 DIAGNOSIS — S80.02XA TRAUMATIC ECCHYMOSIS OF LEFT KNEE, INITIAL ENCOUNTER: Primary | ICD-10-CM

## 2022-08-09 PROCEDURE — 73562 X-RAY EXAM OF KNEE 3: CPT

## 2022-08-09 PROCEDURE — 74011000250 HC RX REV CODE- 250: Performed by: STUDENT IN AN ORGANIZED HEALTH CARE EDUCATION/TRAINING PROGRAM

## 2022-08-09 PROCEDURE — 99284 EMERGENCY DEPT VISIT MOD MDM: CPT

## 2022-08-09 PROCEDURE — 70450 CT HEAD/BRAIN W/O DYE: CPT

## 2022-08-09 PROCEDURE — 71101 X-RAY EXAM UNILAT RIBS/CHEST: CPT

## 2022-08-09 RX ORDER — BACITRACIN 500 UNIT/G
1 PACKET (EA) TOPICAL
Status: COMPLETED | OUTPATIENT
Start: 2022-08-09 | End: 2022-08-09

## 2022-08-09 RX ADMIN — Medication 1 PACKET: at 11:30

## 2022-08-09 NOTE — ED PROVIDER NOTES
Date: 8/9/2022  Patient Name: Kaela Alcaraz    History of Presenting Illness     Chief Complaint   Patient presents with    Head Laceration       History Provided By: Patient    HPI: Kaela Alcaraz, 79 y.o. male  presents to the ED with cc of head injury. He states that yesterday he was walking down the stairs and tripped and fell down about 10 stairs inside his home. He struck his head as well and also struck his right knee. Has abrasions on his bilateral shins. He was seen at [facility] yesterday but he states he did not have any imaging performed. He did have laceration repair in the ED with 9 sutures. He has a history of HHT. He has not had associated epistaxis with this issue. Denies a large amount of external bleeding. Reviewed his recent blood work, his hemoglobin appears to be increasing. He denies severe headaches. Took some Tylenol for some chest soreness he also noticed earlier today which may have helped somewhat. The patient was was concerned did not have a complete evaluation at the other hospital.    There are no other complaints, changes, or physical findings at this time. PCP: Dwight Coleman MD    No current facility-administered medications on file prior to encounter. Current Outpatient Medications on File Prior to Encounter   Medication Sig Dispense Refill    furosemide (LASIX) 20 mg tablet Take 1 Tablet by mouth in the morning. 30 Tablet 1    potassium chloride (K-DUR, KLOR-CON M20) 20 mEq tablet Take 1 Tablet by mouth in the morning. 30 Tablet 1    dicyclomine (BENTYL) 20 mg tablet TAKE 1 TABLET BY MOUTH 3 TIMES DAILY AS NEEDED FOR ABDOMINAL CRAMPS (NEW DOSE/NEW DIRECTIONS) 30 Tablet 3    acetaminophen (TYLENOL) 500 mg tablet Take 1,000 mg by mouth every six (6) hours as needed for Pain. ferrous sulfate (Slow Fe) 142 mg (45 mg iron) ER tablet Take  by mouth Daily (before breakfast).       cholecalciferol (VITAMIN D3) 25 mcg (1,000 unit) cap Take  by mouth daily. ascorbic acid, vitamin C, (VITAMIN C) 250 mg tablet Take  by mouth. TIMOLOL MALEATE OP Apply  to eye. 1 drop to each eye twice daily. Past History     Past Medical History:  Past Medical History:   Diagnosis Date    Cancer ([address]     HHT (hereditary hemorrhagic telangiectasia) ([address]     Prostate cancer [facility])        Past Surgical History:  Past Surgical History:   Procedure Laterality Date    COLONOSCOPY N/A 1/29/2020    COLONOSCOPY performed by Carmelina Bahena MD at [facility] ENDOSCOPY    HX APPENDECTOMY      HX COLONOSCOPY  2014    due 22    HX OTHER SURGICAL  07/2018    sclerotheropy    HX TONSILLECTOMY      [place] CHEST SURGERY PROCEDURE UNLISTED      excision of AVM       Family History:  Family History   Problem Relation Age of Onset    Heart Disease Mother     Cancer Other         colon, lung       Social History:  Social History     Tobacco Use    Smoking status: Former    Smokeless tobacco: Never   Vaping Use    Vaping Use: Never used   Substance Use Topics    Alcohol use: Yes     Alcohol/week: 0.0 standard drinks     Comment: one drink per week    Drug use: No       Allergies:  No Known Allergies      Review of Systems   Review of Systems   Constitutional:  Negative for chills, fatigue and fever. HENT:  Negative for ear pain, sore throat and trouble swallowing. Eyes:  Negative for visual disturbance. Respiratory:  Negative for cough and shortness of breath. Cardiovascular:  Positive for chest pain. Gastrointestinal:  Negative for abdominal pain. Genitourinary:  Negative for dysuria. Musculoskeletal:  Positive for arthralgias. Negative for back pain. Skin:  Positive for wound. Negative for rash. Neurological:  Negative for light-headedness and headaches. Psychiatric/Behavioral:  Negative for confusion. All other systems reviewed and are negative. Physical Exam   Physical Exam  Vitals reviewed. Constitutional:       General: He is not in acute distress. Appearance: He is not toxic-appearing. HENT:      Head: Normocephalic. Comments: Frontal laceration repaired with a abrasion which appears to be oozing some serosanguineous material.  No obvious infection, induration or surrounding erythema. Wound edges appear to be well apposed. Eyes:      Pupils: Pupils are equal, round, and reactive to light. Neck:      Vascular: No JVD. Cardiovascular:      Rate and Rhythm: Normal rate and regular rhythm. Pulmonary:      Effort: Pulmonary effort is normal. No respiratory distress. Breath sounds: Normal breath sounds. Abdominal:      Palpations: Abdomen is soft. Musculoskeletal:      Cervical back: Normal, normal range of motion and neck supple. Thoracic back: Normal.      Lumbar back: Normal.      Right hip: No tenderness. Normal range of motion. Left hip: No tenderness. Normal range of motion. Right knee: Swelling present. Tenderness (anteromedial knee and prox lower leg, ecchymosis) present over the medial joint line, MCL, ACL and patellar tendon. No LCL laxity, MCL laxity, ACL laxity or PCL laxity. Normal meniscus and normal patellar mobility. Normal pulse. Left knee: No swelling. Normal pulse. Right lower leg: No edema. Left lower leg: No edema. Right foot: Normal pulse. Left foot: Normal pulse. Skin:     General: Skin is warm and dry. Capillary Refill: Capillary refill takes less than 2 seconds. Neurological:      General: No focal deficit present. Mental Status: He is alert and oriented to person, place, and time. Motor: No weakness. Psychiatric:         Mood and Affect: Mood normal.       Diagnostic Study Results     Labs -  No results found for this or any previous visit (from the past 72 hour(s)). Radiologic Studies -   XR RIBS LT W PA CXR MIN 3 V   Final Result   1. No evidence of acute rib fracture. Stable chronic deformity/postoperative   change involving the left fifth rib. XR KNEE RT 3 V   Final Result   1. Small joint effusion. No evidence of acute fracture. CT HEAD WO CONT   Final Result   1. No evidence of acute intracranial abnormality. Small left frontal scalp   hematoma with laceration. CT Results  (Last 48 hours)                 08/09/22 1056  CT HEAD WO CONT Final result    Impression:  1. No evidence of acute intracranial abnormality. Small left frontal scalp   hematoma with laceration. Narrative:  EXAM:  CT HEAD WO CONT       INDICATION:   hx of hereditary telangiectasia  frontal head injury       COMPARISON: None. TECHNIQUE: Unenhanced CT of the head was performed using 5 mm images. Brain and   bone windows were generated. CT dose reduction was achieved through use of a   standardized protocol tailored for this examination and automatic exposure   control for dose modulation. FINDINGS:   The ventricles are normal in size and position. Basilar cisterns are patent. No   midline shift. There is no evidence of acute infarct, hemorrhage, or extraaxial   fluid collection. Mild mucosal thickening in the left maxillary sinus. The mastoid air cells and   middle ears are clear. The orbital contents are within normal limits with   bilateral lens implants. Small left frontal scalp hematoma with laceration. CXR Results  (Last 48 hours)      None            Medical Decision Making   I am the first provider for this patient. I reviewed the vital signs, available nursing notes, past medical history, past surgical history, family history and social history. Vital Signs-Reviewed the patient's vital signs.   Patient Vitals for the past 12 hrs:   Temp Pulse Resp BP SpO2   08/09/22 1112 -- -- -- -- 100 %   08/09/22 1033 98.5 °F (36.9 °C) 72 16 129/70 100 %         Records Reviewed: Nursing Notes and Old Medical Records    Provider Notes (Medical Decision Making):   Patient was evaluated, he was concerned that they may have missed something when he was evaluated in the hospital yesterday. He had a fall yesterday. Does not appear to show fracture. Is ambulatory and pain is moderate. He has some ecchymosis over the right knee which is likely larger than it would normally be given his underlying HHT. He had a negative head CT. No persistent headache. No external bleeding. His hemoglobin was checked recently and was improved. His nontransfusion range. He will follow-up with his primary care doctors in a few days. ED Course:   Initial assessment performed. The patients presenting problems have been discussed, and they are in agreement with the care plan formulated and outlined with them. I have encouraged them to ask questions as they arise throughout their visit. ED Course as of 08/09/22 1203   Tue Aug 09, 2022   1038 CBC W/O DIFF (8/3/22 1207)(!)  Hb 10.8<-10.0 [NS]   1200 Tetanus was updated this year [NS]      ED Course User Index  [NS] Harriet Marino MD             Disposition:      The patient's results have been reviewed with His. He has been counseled regarding her diagnosis. He verbally conveys understanding and agreement of the signs, symptoms, diagnosis, treatment, and prognosis and additionally agrees to follow up as recommended with Dr. Kumar Green MD in 24-48 hours. He also agrees with the care-plan and conveys that all of His questions have been answered. I have also put together some discharge instructions for His that include: 1) educational information regarding their diagnosis, 2) how to care for their diagnosis at home, as well a 3) list of reasons why they would want to return to the ED prior to their follow-up appointment, should their condition change. DISCHARGE PLAN:  1. Current Discharge Medication List        2.    Follow-up Information       Follow up With Specialties Details Why Contact Info    Kumar Green MD Internal Medicine Physician Schedule an appointment as soon as possible for a visit  Call for a follow up appointment., For wound re-check in 7 days [address]  [address] [address]  [phone]            3. Return to ED if worse     Diagnosis     Clinical Impression:   1. Traumatic ecchymosis of left knee, initial encounter    2. Laceration of scalp, subsequent encounter    3. Contusion of left chest wall, initial encounter        Attestations:    Refugio Kanner, MD    Please note that this dictation was completed with [employer], the computer voice recognition software. Quite often unanticipated grammatical, syntax, homophones, and other interpretive errors are inadvertently transcribed by the computer software. Please disregard these errors. Please excuse any errors that have escaped final proofreading. Thank you.

## 2022-08-09 NOTE — ED NOTES
Pt was discharged and given instructions by Dr Jennifer Krause. Pt verbalized good understanding of all discharge instructions, and F/U care. All questions answered. Pt in stable condition on discharge.

## 2022-08-09 NOTE — DISCHARGE INSTRUCTIONS
Your sutures may be removed in 6 to 9 days. Please visit your primary care physician for a follow-up appointment for wound recheck.

## 2022-08-09 NOTE — ED TRIAGE NOTES
Pt reports that he had a fall yesterday down approx 7-8 steps and taken via EMS and evaluated by Chip. Pt has laceration to forehead and 9 stitches intact; pt c/o right leg swelling and pain; pt denied anticoagulants. States he does have a h/o HHT. Abrasions noted to Left lower ext and RLE; pt c/o left rib pain. Pt ambulated to room without difficulty.

## 2022-08-16 ENCOUNTER — APPOINTMENT (OUTPATIENT)
Dept: GENERAL RADIOLOGY | Age: 82
End: 2022-08-16
Attending: EMERGENCY MEDICINE
Payer: MEDICARE

## 2022-08-16 ENCOUNTER — APPOINTMENT (OUTPATIENT)
Dept: ULTRASOUND IMAGING | Age: 82
End: 2022-08-16
Attending: EMERGENCY MEDICINE
Payer: MEDICARE

## 2022-08-16 ENCOUNTER — HOSPITAL ENCOUNTER (EMERGENCY)
Age: 82
Discharge: HOME | End: 2022-08-16
Attending: EMERGENCY MEDICINE
Payer: MEDICARE

## 2022-08-16 VITALS
HEIGHT: 74 IN | RESPIRATION RATE: 16 BRPM | DIASTOLIC BLOOD PRESSURE: 57 MMHG | HEART RATE: 77 BPM | SYSTOLIC BLOOD PRESSURE: 118 MMHG | TEMPERATURE: 98.7 F | WEIGHT: 175.04 LBS | BODY MASS INDEX: 22.46 KG/M2 | OXYGEN SATURATION: 100 %

## 2022-08-16 DIAGNOSIS — R60.0 BILATERAL LOWER EXTREMITY EDEMA: Primary | ICD-10-CM

## 2022-08-16 PROCEDURE — 73590 X-RAY EXAM OF LOWER LEG: CPT

## 2022-08-16 PROCEDURE — 99284 EMERGENCY DEPT VISIT MOD MDM: CPT

## 2022-08-16 PROCEDURE — 73562 X-RAY EXAM OF KNEE 3: CPT

## 2022-08-16 PROCEDURE — 93970 EXTREMITY STUDY: CPT

## 2022-08-16 NOTE — ED PROVIDER NOTES
58-year-old male with history of prostate cancer, hereditary hemorrhagic telangiectasia presents to the emergency department about 1 week after recent fall with complaints of bilateral lower extremity edema, left greater than right with pain, particular when he ambulates. He does not think that he is currently on a water pill. No other pain or injury. No history of DVT or PE, and no shortness of breath. The history is provided by the patient, medical records and the spouse. Leg Pain   This is a new problem. The current episode started more than 2 days ago. The problem occurs constantly. The problem has been gradually worsening. The pain is present in the left lower leg and right lower leg. The quality of the pain is described as dull. The pain is moderate. He has tried nothing for the symptoms. There has been a history of trauma. Past Medical History:   Diagnosis Date    Cancer [facility])     [facility] hemorrhagic telangiectasia) [facility]     Prostate cancer [facility])        Past Surgical History:   Procedure Laterality Date    COLONOSCOPY N/A 1/29/2020    COLON[facility] performed by Alic[facility]inder MD at [facility] ENDOSCOPY    HX APPENDECTOMY      HX COLONOSCOPY  2014    due 22    HX OTHER SURGICAL  07/2018    sclerotheropy    HX TONSILLECTOMY      [place] CHEST SURGERY PROCEDURE UNLISTED      excision of AVM         Family History:   Problem Relation Age of Onset    Heart Disease Mother     Cancer Other         colon, lung       Social History     Socioeconomic History    Marital status:      Spouse name: Not on file    Number of children: Not on file    Years of education: Not on file    Highest education level: Not on file   Occupational History    Not on file   Tobacco Use    Smoking status: Former    Smokeless tobacco: Never   Vaping Use    Vaping Use: Never used   Substance and Sexual Activity    Alcohol use:  Yes     Alcohol/week: 0.0 standard drinks     Comment: one drink per week    Drug use: No    Sexual activity: Yes     Partners: Female   Other Topics Concern    Not on file   Social History Narrative    Not on file     Social Determinants of Health     Financial Resource Strain: Not on file   Food Insecurity: Not on file   Transportation Needs: Not on file   Physical Activity: Not on file   Stress: Not on file   Social Connections: Not on file   Intimate Partner Violence: Not on file   Housing Stability: Not on file         ALLERGIES: Patient has no known allergies. Review of Systems   Constitutional:  Negative for fatigue and fever. HENT:  Negative for sneezing and sore throat. Respiratory:  Negative for cough and shortness of breath. Cardiovascular:  Positive for leg swelling. Negative for chest pain. Gastrointestinal:  Negative for abdominal pain, diarrhea, nausea and vomiting. Genitourinary:  Negative for difficulty urinating and dysuria. Musculoskeletal:  Negative for arthralgias and myalgias. Skin:  Negative for color change and rash. Neurological:  Negative for weakness and headaches. Psychiatric/Behavioral:  Negative for agitation and behavioral problems. Vitals:    08/16/22 1033   BP: 115/63   Pulse: 77   Resp: 16   Temp: 98.7 °F (37.1 °C)   SpO2: 99%   Weight: 79.4 kg (175 lb 0.7 oz)   Height: 6' 2\" (1.88 m)            Physical Exam  Vitals and nursing note reviewed. Constitutional:       General: He is not in acute distress. Appearance: Normal appearance. He is well-developed. He is not ill-appearing, toxic-appearing or diaphoretic. HENT:      Head: Normocephalic and atraumatic. Nose: Nose normal.      Mouth/Throat:      Mouth: Mucous membranes are moist.      Pharynx: Oropharynx is clear. Eyes:      Extraocular Movements: Extraocular movements intact. Conjunctiva/sclera: Conjunctivae normal.      Pupils: Pupils are equal, round, and reactive to light. Cardiovascular:      Rate and Rhythm: Normal rate and regular rhythm. Pulses: Normal pulses. Heart sounds: No murmur heard. Pulmonary:      Effort: Pulmonary effort is normal. No respiratory distress. Breath sounds: Normal breath sounds. No wheezing. Chest:      Chest wall: No mass or tenderness. Abdominal:      General: There is no distension. Palpations: Abdomen is soft. Tenderness: There is no abdominal tenderness. There is no guarding or rebound. Musculoskeletal:         General: No swelling, tenderness, deformity or signs of injury. Normal range of motion. Cervical back: Normal range of motion and neck supple. No rigidity. No muscular tenderness. Right lower leg: No tenderness. Edema present. Left lower leg: No tenderness. Edema present. Comments: Bilateral lower extremity edema with some healing ecchymosis bilaterally, left greater than right about the knee   Skin:     General: Skin is warm and dry. Capillary Refill: Capillary refill takes less than 2 seconds. Neurological:      General: No focal deficit present. Mental Status: He is alert and oriented to person, place, and time. Psychiatric:         Mood and Affect: Mood normal.         Behavior: Behavior normal.        MDM  Number of Diagnoses or Management Options  Bilateral lower extremity edema  Diagnosis management comments: 25-year-old male presents as above with bilateral extremity edema after injury last week. No evidence of DVT. X-ray is reassuring. He has recently started on Lasix by his primary care doctor. Will recommend that he follows up with primary care for potential dosage adjustment, follow-up with primary care, return if needed.        Amount and/or Complexity of Data Reviewed  Tests in the radiology section of CPT®: reviewed           Procedures

## 2022-08-16 NOTE — ED TRIAGE NOTES
Pt c/o bilateral lower extremity swelling right greater than left; pt denied shortness of breath, CP, injury.  Pt has bruising noted to left leg from previous fall,

## 2022-08-18 ENCOUNTER — OFFICE VISIT (OUTPATIENT)
Dept: INTERNAL MEDICINE CLINIC | Age: 82
End: 2022-08-18
Payer: MEDICARE

## 2022-08-18 VITALS
WEIGHT: 174.2 LBS | RESPIRATION RATE: 20 BRPM | HEIGHT: 74 IN | BODY MASS INDEX: 22.36 KG/M2 | DIASTOLIC BLOOD PRESSURE: 70 MMHG | OXYGEN SATURATION: 97 % | TEMPERATURE: 98.6 F | HEART RATE: 76 BPM | SYSTOLIC BLOOD PRESSURE: 120 MMHG

## 2022-08-18 DIAGNOSIS — I87.8 VENOUS STASIS: Primary | ICD-10-CM

## 2022-08-18 DIAGNOSIS — S80.11XA HEMATOMA OF RIGHT LOWER EXTREMITY, INITIAL ENCOUNTER: ICD-10-CM

## 2022-08-18 DIAGNOSIS — S01.81XA LACERATION OF OTHER PART OF HEAD WITHOUT FOREIGN BODY, INITIAL ENCOUNTER: ICD-10-CM

## 2022-08-18 PROCEDURE — G8420 CALC BMI NORM PARAMETERS: HCPCS | Performed by: INTERNAL MEDICINE

## 2022-08-18 PROCEDURE — G8536 NO DOC ELDER MAL SCRN: HCPCS | Performed by: INTERNAL MEDICINE

## 2022-08-18 PROCEDURE — G8510 SCR DEP NEG, NO PLAN REQD: HCPCS | Performed by: INTERNAL MEDICINE

## 2022-08-18 PROCEDURE — G8427 DOCREV CUR MEDS BY ELIG CLIN: HCPCS | Performed by: INTERNAL MEDICINE

## 2022-08-18 PROCEDURE — 99214 OFFICE O/P EST MOD 30 MIN: CPT | Performed by: INTERNAL MEDICINE

## 2022-08-18 PROCEDURE — G0463 HOSPITAL OUTPT CLINIC VISIT: HCPCS | Performed by: INTERNAL MEDICINE

## 2022-08-18 PROCEDURE — 1101F PT FALLS ASSESS-DOCD LE1/YR: CPT | Performed by: INTERNAL MEDICINE

## 2022-08-18 RX ORDER — FUROSEMIDE 40 MG/1
40 TABLET ORAL DAILY
Qty: 30 TABLET | Refills: 0 | Status: SHIPPED | OUTPATIENT
Start: 2022-08-18

## 2022-08-18 NOTE — PROGRESS NOTES
HISTORY OF PRESENT ILLNESS  Crystal Glez is a 79 y.o. male. Head Pain   The history is provided by the Patient and spouse. This is a new problem. Episode onset: 10 d. The problem has been gradually improving. The headache is aggravated by a recent trauma (see ER notes, fell down the stairs). The pain is located in the Frontal region. The quality of the pain is described as dull. Pertinent negatives include no tingling. Treatments tried: received sutures, due for removal.   Leg Pain   The history is provided by the Patient (injured both knees, has focal swelling below the right knee, c/w hematoma). This is a new problem. The problem has been gradually improving. The pain is present in the right knee, left knee and right lower leg. The quality of the pain is described as aching. The pain is moderate. Associated symptoms include limited range of motion and stiffness. Pertinent negatives include no numbness and no tingling. There has been a history of trauma. Leg Swelling  The history is provided by the Patient and spouse. This is a chronic problem. Episode onset: 10 d. The problem occurs constantly. The problem has not changed since onset. Associated symptoms include headaches. Treatments tried: ER increase Lasix to 40 mg, 20 hadn't helped much. Review of Systems   Musculoskeletal:  Positive for stiffness. Neurological:  Positive for headaches. Negative for tingling and numbness. Physical Exam  Vitals and nursing note reviewed. Constitutional:       General: He is not in acute distress. HENT:      Head:      Comments: Irregular wound on crown, no sign of infection. About 10 sutures removed without complication and he tolerated this well. Reviewed local wound care  Musculoskeletal:      Right lower le+ Edema present. Left lower le+ Edema present. Legs:       Comments: Hematoma, no sign of infection   Skin:     General: Skin is warm and dry.    Neurological:      Mental Status: He is alert. Psychiatric:         Behavior: Behavior normal.       ASSESSMENT and PLAN  Diagnoses and all orders for this visit:    1. Venous stasis  -     furosemide (LASIX) 40 mg tablet; Take 1 Tablet by mouth daily. 2. Laceration of other part of head without foreign body, initial encounter  -     REMOVAL OF SUTURES    3.  Hematoma of right lower extremity, initial encounter  -  Elevate, apply heat

## 2022-08-18 NOTE — PATIENT INSTRUCTIONS
[9:40 AM] Julianne Saldana    Good Morning, [facility] has a newer male MD accepting new patients. Could be a good place to refer patients wanting a male MD.  Arsalan Hinson MD    Primary Care, Internal Medicine        [9:[address], [address] [facility] Internal Medicine [address], [address] JAMARCUS RICKETTS/ Ignacio Lopez [facility]  [address] [facility] in [place], [place]. [place] [address] is one of the many [address] [place] [address],[address] locations at [facility] providing. ..

## 2022-09-04 ENCOUNTER — HOSPITAL ENCOUNTER (EMERGENCY)
Age: 82
Discharge: HOME | End: 2022-09-04
Attending: EMERGENCY MEDICINE
Payer: MEDICARE

## 2022-09-04 VITALS
DIASTOLIC BLOOD PRESSURE: 70 MMHG | HEART RATE: 69 BPM | OXYGEN SATURATION: 98 % | TEMPERATURE: 98 F | WEIGHT: 177.69 LBS | HEIGHT: 72 IN | SYSTOLIC BLOOD PRESSURE: 123 MMHG | RESPIRATION RATE: 16 BRPM | BODY MASS INDEX: 24.07 KG/M2

## 2022-09-04 DIAGNOSIS — I83.891 BLEEDING FROM VARICOSE VEINS OF RIGHT LOWER EXTREMITY: Primary | ICD-10-CM

## 2022-09-04 PROCEDURE — 99282 EMERGENCY DEPT VISIT SF MDM: CPT

## 2022-09-04 NOTE — ED TRIAGE NOTES
Pt ambulated to the treatment area with a steady gait. 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 \" Right lower leg small circular spot seen no bleeding at this time.

## 2022-09-04 NOTE — ED PROVIDER NOTES
74-year-old male presents from home with a complaint of bleeding from his right lower extremity. Patient has superficial vein near his ankle on the right lower leg. Started bleeding this morning. Patient applied pressure with gauze eventually the bleeding stopped. He is presenting to find there is anything else that can be done. Denies any pain or injury. Does have chronic swelling of his lower extremities. Denies any lightheadedness or dizziness. No pain, numbness, tingling to his foot. No other complaints at this time. Past Medical History:   Diagnosis Date    Cancer ([facility] [address]     HHT (hereditary hemorrhagic telangiectasia) ([facility] [address]     Prostate cancer [facility])        Past Surgical History:   Procedure Laterality Date    COLONOSCOPY N/A 1/29/2020    COLONOSCOPY performed by Koby Segundo MD at [facility] ENDOSCOPY    HX APPENDECTOMY      HX COLONOSCOPY  2014    due 22    HX OTHER SURGICAL  07/2018    sclerotheropy    HX TONSILLECTOMY      MS CHEST SURGERY PROCEDURE UNLISTED      excision of AVM         Family History:   Problem Relation Age of Onset    Heart Disease Mother     Cancer Other         colon, lung       Social History     Socioeconomic History    Marital status:      Spouse name: Not on file    Number of children: Not on file    Years of education: Not on file    Highest education level: Not on file   Occupational History    Not on file   Tobacco Use    Smoking status: Former    Smokeless tobacco: Never   Vaping Use    Vaping Use: Never used   Substance and Sexual Activity    Alcohol use:  Yes     Alcohol/week: 0.0 standard drinks     Comment: one drink per week    Drug use: No    Sexual activity: Yes     Partners: Female   Other Topics Concern    Not on file   Social History Narrative    Not on file     Social Determinants of Health     Financial Resource Strain: Not on file   Food Insecurity: Not on file   Transportation Needs: Not on file   Physical Activity: Not on file   Stress: Not on file   Social Connections: Not on file   Intimate Partner Violence: Not on file   Housing Stability: Not on file         ALLERGIES: Patient has no known allergies. Review of Systems   Constitutional:  Negative for diaphoresis and fever. HENT:  Negative for facial swelling. Eyes:  Negative for visual disturbance. Respiratory:  Negative for cough. Cardiovascular:  Negative for chest pain. Gastrointestinal:  Negative for abdominal pain. Genitourinary:  Negative for dysuria. Musculoskeletal:  Negative for joint swelling. Skin:  Negative for rash. Neurological:  Negative for headaches. Hematological:  Negative for adenopathy. Psychiatric/Behavioral:  Negative for suicidal ideas. Vitals:    09/04/22 1039   BP: 123/70   Pulse: 69   Resp: 16   Temp: 98 °F (36.7 °C)   SpO2: 98%   Weight: 80.6 kg (177 lb 11.1 oz)   Height: 6' (1.829 m)            Physical Exam  Vitals and nursing note reviewed. Constitutional:       General: He is not in acute distress. Appearance: He is well-developed. He is not ill-appearing. HENT:      Head: Normocephalic and atraumatic. Eyes:      Pupils: Pupils are equal, round, and reactive to light. Cardiovascular:      Rate and Rhythm: Normal rate. Pulmonary:      Effort: Pulmonary effort is normal. No respiratory distress. Abdominal:      General: There is no distension. Musculoskeletal:         General: Normal range of motion. Cervical back: Normal range of motion and neck supple. Skin:     General: Skin is warm and dry. Neurological:      Mental Status: He is alert and oriented to person, place, and time. MDM  Number of Diagnoses or Management Options  Bleeding from varicose veins of right lower extremity  Diagnosis management comments: Assessment: Patient had a bleeding varicose vein. Bleeding was stopped with pressure. No active bleeding at this time. Vital signs stable. Wound was redressed in the ED by nursing.   Patient stable for discharge home. I given him information to follow-up with vascular surgery as needed. He should return to the ER with any worsening bleeding that is not controlled with pressure.            Procedures

## 2022-09-04 NOTE — ED NOTES
Pt was discharged and given instructions by Dr Nataliya Rosa . Pt and wife verbalized good understanding of all discharge instructions, and F/U care. All questions answered. Pt in stable condition on discharge.

## 2022-09-10 LAB
BASOPHILS # BLD AUTO: 0 X10E3/UL (ref 0–0.2)
BASOPHILS NFR BLD AUTO: 1 %
EOSINOPHIL # BLD AUTO: 0.3 X10E3/UL (ref 0–0.4)
EOSINOPHIL NFR BLD AUTO: 10 %
ERYTHROCYTE [DISTWIDTH] IN BLOOD BY AUTOMATED COUNT: 13.2 % (ref 11.6–15.4)
FERRITIN SERPL-MCNC: 172 NG/ML (ref 30–400)
HCT VFR BLD AUTO: 35.3 % (ref 37.5–51)
HGB BLD-MCNC: 11.3 G/DL (ref 13–17.7)
IMM GRANULOCYTES # BLD AUTO: 0 X10E3/UL (ref 0–0.1)
IMM GRANULOCYTES NFR BLD AUTO: 0 %
IRON SATN MFR SERPL: 17 % (ref 15–55)
IRON SERPL-MCNC: 44 UG/DL (ref 38–169)
LYMPHOCYTES # BLD AUTO: 0.4 X10E3/UL (ref 0.7–3.1)
LYMPHOCYTES NFR BLD AUTO: 12 %
MCH RBC QN AUTO: 30.8 PG (ref 26.6–33)
MCHC RBC AUTO-ENTMCNC: 32 G/DL (ref 31.5–35.7)
MCV RBC AUTO: 96 FL (ref 79–97)
MONOCYTES # BLD AUTO: 0.5 X10E3/UL (ref 0.1–0.9)
MONOCYTES NFR BLD AUTO: 14 %
NEUTROPHILS # BLD AUTO: 2.3 X10E3/UL (ref 1.4–7)
NEUTROPHILS NFR BLD AUTO: 63 %
PLATELET # BLD AUTO: 201 X10E3/UL (ref 150–450)
RBC # BLD AUTO: 3.67 X10E6/UL (ref 4.14–5.8)
TIBC SERPL-MCNC: 256 UG/DL (ref 250–450)
UIBC SERPL-MCNC: 212 UG/DL (ref 111–343)
WBC # BLD AUTO: 3.6 X10E3/UL (ref 3.4–10.8)

## 2022-09-15 ENCOUNTER — OFFICE VISIT (OUTPATIENT)
Dept: ONCOLOGY | Age: 82
End: 2022-09-15
Payer: MEDICARE

## 2022-09-15 VITALS
RESPIRATION RATE: 18 BRPM | SYSTOLIC BLOOD PRESSURE: 107 MMHG | BODY MASS INDEX: 24.11 KG/M2 | WEIGHT: 178 LBS | DIASTOLIC BLOOD PRESSURE: 59 MMHG | OXYGEN SATURATION: 98 % | TEMPERATURE: 97.2 F | HEIGHT: 72 IN | HEART RATE: 68 BPM

## 2022-09-15 DIAGNOSIS — I78.0 HHT (HEREDITARY HEMORRHAGIC TELANGIECTASIA) (HCC): Primary | ICD-10-CM

## 2022-09-15 DIAGNOSIS — D50.8 OTHER IRON DEFICIENCY ANEMIA: ICD-10-CM

## 2022-09-15 DIAGNOSIS — C61 PROSTATE CANCER (HCC): ICD-10-CM

## 2022-09-15 PROCEDURE — G8536 NO DOC ELDER MAL SCRN: HCPCS | Performed by: INTERNAL MEDICINE

## 2022-09-15 PROCEDURE — 99214 OFFICE O/P EST MOD 30 MIN: CPT | Performed by: INTERNAL MEDICINE

## 2022-09-15 PROCEDURE — 1123F ACP DISCUSS/DSCN MKR DOCD: CPT | Performed by: INTERNAL MEDICINE

## 2022-09-15 PROCEDURE — G0463 HOSPITAL OUTPT CLINIC VISIT: HCPCS | Performed by: INTERNAL MEDICINE

## 2022-09-15 PROCEDURE — G8420 CALC BMI NORM PARAMETERS: HCPCS | Performed by: INTERNAL MEDICINE

## 2022-09-15 PROCEDURE — G8427 DOCREV CUR MEDS BY ELIG CLIN: HCPCS | Performed by: INTERNAL MEDICINE

## 2022-09-15 PROCEDURE — 1101F PT FALLS ASSESS-DOCD LE1/YR: CPT | Performed by: INTERNAL MEDICINE

## 2022-09-15 PROCEDURE — G8510 SCR DEP NEG, NO PLAN REQD: HCPCS | Performed by: INTERNAL MEDICINE

## 2022-09-15 NOTE — PROGRESS NOTES
Teresa Lang is a 79 y.o. male  Chief Complaint   Patient presents with    Follow-up     anemia     1. Have you been to the ER, urgent care clinic since your last visit? Hospitalized since your last visit? Yes When: Around 8/15/22 Where: [facility] ER Reason for visit: Fall down the stairs    2. Have you seen or consulted any other health care providers outside of the [address] [place] [address] since your last visit? Include any pap smears or colon screening.  No

## 2022-12-05 ENCOUNTER — HOSPITAL ENCOUNTER (EMERGENCY)
Age: 82
Discharge: HOME | End: 2022-12-05
Attending: EMERGENCY MEDICINE
Payer: MEDICARE

## 2022-12-05 VITALS
OXYGEN SATURATION: 98 % | TEMPERATURE: 98.6 F | RESPIRATION RATE: 16 BRPM | HEART RATE: 82 BPM | DIASTOLIC BLOOD PRESSURE: 58 MMHG | SYSTOLIC BLOOD PRESSURE: 112 MMHG

## 2022-12-05 DIAGNOSIS — R04.0 EPISTAXIS: Primary | ICD-10-CM

## 2022-12-05 DIAGNOSIS — I78.0 HHT (HEREDITARY HEMORRHAGIC TELANGIECTASIA) (HCC): ICD-10-CM

## 2022-12-05 PROCEDURE — 74011250637 HC RX REV CODE- 250/637: Performed by: EMERGENCY MEDICINE

## 2022-12-05 PROCEDURE — 99282 EMERGENCY DEPT VISIT SF MDM: CPT

## 2022-12-05 RX ORDER — OXYMETAZOLINE HCL 0.05 %
2 SPRAY, NON-AEROSOL (ML) NASAL
Status: DISCONTINUED | OUTPATIENT
Start: 2022-12-05 | End: 2022-12-05

## 2022-12-05 RX ADMIN — PHENYLEPHRINE HYDROCHLORIDE 2 SPRAY: 1 SPRAY NASAL at 07:55

## 2022-12-05 NOTE — ED TRIAGE NOTES
Patient here with 2 severe nosebleeds last night. Pt's nose is currently packed with cotton by patient. Pt states he has a bleeding disorder.

## 2022-12-05 NOTE — ED NOTES
No further nose bleeding at this time. The patient was discharged home by Dr Stephy Diallo in stable condition. The patient is alert and oriented, in no respiratory distress. The patient's diagnosis, condition and treatment were explained. The patient expressed understanding. A discharge plan has been developed. A  was not involved in the process. Aftercare instructions were given. Pt ambulatory out of the ED with family.

## 2022-12-05 NOTE — ED PROVIDER NOTES
42-year-old male with a bleeding disorder presents today with nosebleed. He has packing in both nostrils and bleeding seems to be under control at this time. He has had nosebleeds off and on like this for many many years. He has not any nasal sprays up his notes morning and is otherwise stable in the ER. He does not sense any blood going down the back of his throat at this time and feels that the bleeding is under control. The history is provided by the patient. Epistaxis   This is a recurrent problem. The current episode started 1 to 2 hours ago. The problem has been rapidly improving. Associated with: clotting disorder (HHT-hereditary hemorrhagic telangectasia) The bleeding has been from both nares. He has tried nothing for the symptoms. His past medical history is significant for bleeding disorder. His past medical history does not include colds, sinus problems or frequent nosebleeds. Past Medical History:   Diagnosis Date    Cancer [facility])     HHT (hereditary hemorrhagic telangiectasia) ([facility] [address]     Prostate cancer [facility])        Past Surgical History:   Procedure Laterality Date    COLONOSCOPY N/A 1/29/2020    COLON[facility] performed [facility] Vickey Jasmine MD at [facility]OSCOPY    HX APPENDECTOMY      HX COLONOSCOPY  2014    due 22    HX OTHER SURGICAL  07/2018    sclerotheropy    HX TONSILLECTOMY      NE CHEST SURGERY PROCEDURE UNLISTED      excision of AVM         Family History:   Problem Relation Age of Onset    Heart Disease Mother     Cancer Other         colon, lung       Social History     Socioeconomic History    Marital status:      Spouse name: Not on file    Number of children: Not on file    Years of education: Not on file    Highest education level: Not on file   Occupational History    Not on file   Tobacco Use    Smoking status: Former    Smokeless tobacco: Never   Vaping Use    Vaping Use: Never used   Substance and Sexual Activity    Alcohol use:  Yes Alcohol/week: 0.0 standard drinks     Comment: one drink per week    Drug use: No    Sexual activity: Yes     Partners: Female   Other Topics Concern    Not on file   Social History Narrative    Not on file     Social Determinants of Health     Financial Resource Strain: Not on file   Food Insecurity: Not on file   Transportation Needs: Not on file   Physical Activity: Not on file   Stress: Not on file   Social Connections: Not on file   Intimate Partner Violence: Not on file   Housing Stability: Not on file         ALLERGIES: Patient has no known allergies. Review of Systems   Constitutional:  Negative for chills and fever. HENT:  Positive for nosebleeds. Negative for congestion, rhinorrhea, sneezing and sore throat. Eyes:  Negative for redness and visual disturbance. Respiratory:  Negative for shortness of breath. Cardiovascular:  Negative for chest pain and leg swelling. Gastrointestinal:  Negative for abdominal pain, nausea and vomiting. Genitourinary:  Negative for difficulty urinating and frequency. Musculoskeletal:  Negative for back pain, myalgias and neck stiffness. Skin:  Negative for rash. Neurological:  Negative for dizziness, syncope, weakness and headaches. Hematological:  Negative for adenopathy. All other systems reviewed and are negative. Vitals:    12/05/22 0717   BP: (!) 112/58   Pulse: 82   Resp: 16   Temp: 98.6 °F (37 °C)   SpO2: 98%            Physical Exam  Vitals and nursing note reviewed. Constitutional:       Appearance: Normal appearance. He is well-developed. HENT:      Head: Normocephalic and atraumatic. Nose:      Comments: No active bleeding at this time. Cotton balls were removed and noted that bleeding seems to be coming from the septal area of both nostrils. Cardiovascular:      Rate and Rhythm: Normal rate and regular rhythm. Pulses: Normal pulses. Heart sounds: Normal heart sounds.    Pulmonary:      Effort: Pulmonary effort is normal. No respiratory distress. Breath sounds: Normal breath sounds. Chest:      Chest wall: No tenderness. Abdominal:      General: Bowel sounds are normal.      Palpations: Abdomen is soft. Tenderness: There is no abdominal tenderness. There is no guarding or rebound. Musculoskeletal:      Cervical back: Full passive range of motion without pain, normal range of motion and neck supple. Skin:     General: Skin is warm and dry. Findings: No erythema or rash. Neurological:      Mental Status: He is alert and oriented to person, place, and time. Psychiatric:         Speech: Speech normal.         Behavior: Behavior normal.         Thought Content: Thought content normal.         Judgment: Judgment normal.        MDM  Number of Diagnoses or Management Options  Epistaxis  HHT (hereditary hemorrhagic telangiectasia) ([facility] [address]  Diagnosis management comments: Solitario-Synephrine nasal spray is applied to the nasal passages and patient tolerates it very well. No active bleeding noted at the time of the application. Solitario-Synephrine applied to both nostrils. Nosebleed is under control and has stopped. Patient is suitable for discharge to home.     Procedures

## 2022-12-07 ENCOUNTER — TELEPHONE (OUTPATIENT)
Dept: ONCOLOGY | Age: 82
End: 2022-12-07

## 2022-12-07 ENCOUNTER — HOSPITAL ENCOUNTER (EMERGENCY)
Age: 82
Discharge: HOME | End: 2022-12-07
Attending: EMERGENCY MEDICINE
Payer: MEDICARE

## 2022-12-07 VITALS
SYSTOLIC BLOOD PRESSURE: 131 MMHG | DIASTOLIC BLOOD PRESSURE: 67 MMHG | TEMPERATURE: 97.9 F | OXYGEN SATURATION: 98 % | RESPIRATION RATE: 18 BRPM | HEART RATE: 85 BPM

## 2022-12-07 DIAGNOSIS — S01.90XA CHRONIC WOUND OF HEAD: ICD-10-CM

## 2022-12-07 DIAGNOSIS — J01.80 ACUTE NON-RECURRENT SINUSITIS OF OTHER SINUS: Primary | ICD-10-CM

## 2022-12-07 PROCEDURE — 99283 EMERGENCY DEPT VISIT LOW MDM: CPT

## 2022-12-07 PROCEDURE — 74011250637 HC RX REV CODE- 250/637: Performed by: EMERGENCY MEDICINE

## 2022-12-07 RX ORDER — AMOXICILLIN AND CLAVULANATE POTASSIUM 875; 125 MG/1; MG/1
1 TABLET, FILM COATED ORAL
Status: COMPLETED | OUTPATIENT
Start: 2022-12-07 | End: 2022-12-07

## 2022-12-07 RX ORDER — AMOXICILLIN AND CLAVULANATE POTASSIUM 875; 125 MG/1; MG/1
1 TABLET, FILM COATED ORAL 2 TIMES DAILY
Qty: 14 TABLET | Refills: 0 | Status: SHIPPED | OUTPATIENT
Start: 2022-12-07 | End: 2022-12-09 | Stop reason: SDUPTHER

## 2022-12-07 RX ADMIN — AMOXICILLIN AND CLAVULANATE POTASSIUM 1 TABLET: 875; 125 TABLET, FILM COATED ORAL at 21:11

## 2022-12-07 NOTE — PROGRESS NOTES
Cancer [place] at [address] Come: [phone]  F: [phone]      Reason for Visit:   Chiquis Rizzo is a 79 y.o. male who is seen for follow up of anemia. History of Present Illness:   Started Tamiflu yesterday, seen in ED for cough and green sputum. Having some hemoptysis as well. Blood tinged mixed with mucus. No fever or chills currently. Appetite has been down. Nosebleeds have been an issue, seen in ED 12/11/2022 due to significant nose bleed. Has not had another since. No blood loss in urine or stool. He is accompanied by his wife. His wife is Minerva Alfred, a realtor who helped me with the purchase of my current home. Three of his four children have HHT. Review of systems was obtained and pertinent findings reviewed above. Past medical history, social history, family history, medications, and allergies are located in the electronic medical record. Physical Exam:     Visit Vitals  BP (!) 121/54 (BP 1 Location: Left upper arm, BP Patient Position: Sitting, BP Cuff Size: Large adult)   Pulse 91   Temp 98 °F (36.7 °C) (Temporal)   Resp 20   ECOG 2  General: no distress  Respiratory: normal respiratory effort  CV: 2+ peripheral edema, mild erythema without tenderness to left shin  Skin: no rashes; no ecchymoses; no petechiae      Results:     Lab Results   Component Value Date/Time    WBC 4.3 12/14/2022 03:12 PM    HGB 6.9 (L) 12/14/2022 03:12 PM    HCT 22.2 (L) 12/14/2022 03:12 PM    PLATELET 928 (L) 33/89/0310 03:12 PM    MCV 96.9 12/14/2022 03:12 PM    ABS.  NEUTROPHILS 4.0 12/06/2022 03:07 PM     Lab Results   Component Value Date/Time    Sodium 141 07/20/2022 05:23 PM    Potassium 4.0 07/20/2022 05:23 PM    Chloride 105 07/20/2022 05:23 PM    CO2 29 07/20/2022 05:23 PM    Glucose 123 (H) 07/20/2022 05:23 PM    BUN 15 07/20/2022 05:23 PM    Creatinine 0.94 07/20/2022 05:23 PM    GFR est AA >60 07/20/2022 05:23 PM GFR est non-AA >60 07/20/2022 05:23 PM    Calcium 8.6 07/20/2022 05:23 PM     Lab Results   Component Value Date/Time    Bilirubin, total 0.3 07/20/2022 05:23 PM    ALT (SGPT) 21 07/20/2022 05:23 PM    Alk. phosphatase 61 07/20/2022 05:23 PM    Protein, total 6.4 07/20/2022 05:23 PM    Albumin 3.3 (L) 07/20/2022 05:23 PM    Globulin 3.1 07/20/2022 05:23 PM     Lab Results   Component Value Date/Time    Reticulocyte count 1.3 02/24/2021 08:18 AM    Iron % saturation 10 (L) 12/06/2022 03:07 PM    TIBC 283 12/06/2022 03:07 PM    Ferritin 51 12/06/2022 03:07 PM    Vitamin B12 391 02/24/2021 08:18 AM    Folate 14.0 02/24/2021 08:18 AM    Methylmalonic acid 300 02/05/2021 12:50 PM    Haptoglobin 144 02/24/2021 08:18 AM     02/24/2021 08:18 AM    TSH 1.67 03/03/2022 04:35 PM    M-Terell Not Observed 02/24/2021 08:18 AM     HGB (g/dL)   Date Value   12/14/2022 6.9 (L)   12/06/2022 9.7 (L)   09/09/2022 11.3 (L)   08/03/2022 10.8 (L)   07/27/2022 10.0 (L)   07/20/2022 9.4 (L)   06/06/2022 12.5 (L)   04/27/2022 11.9 (L)   03/08/2022 11.3 (L)   03/03/2022 11.2 (L)   01/24/2022 12.1 (L)   10/04/2021 12.2 (L)   07/19/2021 12.5 (L)   06/16/2021 11.5 (L)   05/12/2021 10.3 (L)   02/24/2021 10.0 (L)   02/05/2021 9.3 (L)   02/01/2021 9.7 (L)   11/24/2020 10.3 (L)   05/21/2020 11.1 (L)   01/31/2020 13.3   04/05/2019 12.0 (L)   11/05/2018 11.6 (L)   07/17/2018 12.0 (L)   06/12/2017 14.7   08/15/2016 12.1 (L)   01/26/2016 11.4 (L)   10/30/2015 11.9 (L)   04/02/2015 12.7   10/07/2014 12.1 (L)   08/20/2014 10.1 (L)     Ferritin (ng/mL)   Date Value   12/06/2022 51   09/09/2022 172   06/06/2022 62   03/08/2022 55   01/24/2022 34   10/04/2021 38   07/19/2021 150   06/16/2021 1,062 (H)   05/12/2021 22 (L)   02/24/2021 13 (L)         Assessment:   1) Iron deficiency anemia  He is s/p injectafer x2 doses in 3/2021, 6/2021, 3/2022, and 8/2022. HGB improves with each treatment, but his iron deficiency subsequently recurs.   He is on oral iron as well. Anemia has recurred once again. We will proceed with additional IV iron today with Injectafer. The cause of his iron deficiency is blood loss related to his HHT. He is likely to have recurrence. He should continue oral iron as tolerated. We will monitor his labs and give additional IV iron as needed. 2) HHT  Managed at [facility]. He is s/p treatment for pulmonary AVMs as well as epistaxis. He is receiving regular imaging surveillance there. I have discussed systemic therapy options such as tamoxifen or Avastin, as well as topical therapy such as estradiol. Tranexamic acid is an additional option. I have been deferring to his physicians in [place]. He is also interested in finding an [facility] closer to home. He had an appointment to see Dr. Taryn Rosario at [facility] but he had to cancel this. He is considering rescheduling. 3) Epistaxis  Secondary to HHT. Follows with ENT in [place], s/p local therapy. He was getting some relief from nasal saline spray, but his symptoms are once again worsening recently. 4) Prostate cancer  He is now s/p radiation with Dr. Yuli Rivas completed 4/2021. Last dose ADT on 2/2022, stopped due to fatigue. 5) Afib  His cardiologist is considering cardioversion and watchman device, though this would require anticoagulation. Given his recent worsening epistaxis, this has been placed on hold. 6) Flu  Under therapy with Tamiflu. Encouraged rest and hydration. Plan:     Injectafer 750mg IV weekly for 2 doses  Continue PO iron as tolerated  Follow up with [address] [place] [address]  Labs in 2-3 months: CBC, iron profile, ferritin ([facility])  Return to see me in 2-3 months    I personally saw and evaluated the patient and performed the key components of medical decision making. The history, physical exam, and documentation were performed by Asha Bradford NP. I reviewed and verified the above documentation and modified it as needed.   HGB has fallen to 9.7 as of last week, we will proceed with IV iron today. Repeat CBC today given his recent significant epistaxis. Signed By: Skyler Lawson MD        ADDENDUM 12/14/2022: His CBC today returned with HGB down to 6.9, severe anemia, a significant drop from 9.7 last week and 11.3 in September. I called and discussed with the patient and his wife. I recommend transfusion of 1u pRBC tomorrow, he is agreeable. We discussed consideration of Tranexamic acid now. I am a bit hesitant given his cardiac issues and potential increased risk of stroke. As his epistaxis seems to have resolved, we will hold off for now. We discussed symptoms that should warrant urgent evaluation. I have encouraged him to follow up with ENT and to proceed with evaluation with the [address] [place] [address] regarding systemic therapy options. We will recheck his CBC next week when he returns for his second dose of injectafer. I have discussed with the patient and spouse the rationale for blood component transfusion; its benefits in treating or preventing fatigue, organ damage, or death; and its risk which includes mild transfusion reactions, rare risk of blood borne infection, or more serious but rare reactions. I have discussed the alternatives to transfusion, including the risk and consequences of not receiving transfusion. The patient and spouse had an opportunity to ask questions and had agreed to proceed with transfusion of blood components.

## 2022-12-07 NOTE — TELEPHONE ENCOUNTER
[address] at [facility]  [phone]    Labs reviewed, iron deficiency anemia has recurred. I recommend additional IV iron (Injectafer weekly x2). Perhaps we can give a dose when he is here next week for his visit.

## 2022-12-07 NOTE — TELEPHONE ENCOUNTER
[address] at [facility]  [phone]    12/07/22- Phone call placed to patient discuss results/MD recommendations. VM left. Labs reviewed, iron deficiency anemia has recurred. I recommend additional IV iron (Injectafer weekly x2). Perhaps we can give a dose when he is here next week for his visit. 12/08/22- Called pt unable to reach pt-left a v/m.

## 2022-12-08 ENCOUNTER — HOSPITAL ENCOUNTER (OUTPATIENT)
Dept: RADIATION THERAPY | Age: 82
Discharge: HOME | End: 2022-12-08

## 2022-12-08 NOTE — ED PROVIDER NOTES
History of hereditary hemorrhagic telangiectasia, prostate cancer. He presents accompanied by his wife with complaints of hoarseness, sore throat, mild cough with some yellow sputum, mild sinus congestion. He denies fever or dyspnea. Symptoms are moderate without relieving factors. He has tried gargling salt water with limited relief. No known sick contacts. He also has a wound on his head that happened during a fall several months ago. It has been slow to heal.  He continues to drain some nonpurulent fluid. Past Medical History:   Diagnosis Date    Cancer ([facility] [address]     HHT (hereditary hemorrhagic telangiectasia) ([facility] [address]     Prostate cancer [facility])        Past Surgical History:   Procedure Laterality Date    COLONOSCOPY N/A 1/29/2020    COLONOSCOPY performed by Omar Belcher MD at [facility] ENDOSCOPY    HX APPENDECTOMY      HX COLONOSCOPY  2014    due 22    HX OTHER SURGICAL  07/2018    sclerotheropy    HX TONSILLECTOMY      DE CHEST SURGERY PROCEDURE UNLISTED      excision of AVM         Family History:   Problem Relation Age of Onset    Heart Disease Mother     Cancer Other         colon, lung       Social History     Socioeconomic History    Marital status:      Spouse name: Not on file    Number of children: Not on file    Years of education: Not on file    Highest education level: Not on file   Occupational History    Not on file   Tobacco Use    Smoking status: Former    Smokeless tobacco: Never   Vaping Use    Vaping Use: Never used   Substance and Sexual Activity    Alcohol use:  Yes     Alcohol/week: 0.0 standard drinks     Comment: one drink per week    Drug use: No    Sexual activity: Yes     Partners: Female   Other Topics Concern    Not on file   Social History Narrative    Not on file     Social Determinants of Health     Financial Resource Strain: Not on file   Food Insecurity: Not on file   Transportation Needs: Not on file   Physical Activity: Not on file   Stress: Not on file   Social Connections: Not on file   Intimate Partner Violence: Not on file   Housing Stability: Not on file         ALLERGIES: Patient has no known allergies. Review of Systems   All other systems reviewed and are negative. Vitals:    12/07/22 2022   BP: 131/67   Pulse: 85   Resp: 18   Temp: 97.9 °F (36.6 °C)   SpO2: 98%            Physical Exam  Vitals and nursing note reviewed. Constitutional:       Appearance: He is well-developed. Comments: Hoarseness   HENT:      Head: Normocephalic. Comments: Chronic appearing wound frontal scalp region in the middle. No signs of infection. Mouth/Throat:      Pharynx: Posterior oropharyngeal erythema present. Eyes:      Conjunctiva/sclera: Conjunctivae normal.   Neck:      Trachea: No tracheal deviation. Cardiovascular:      Rate and Rhythm: Normal rate and regular rhythm. Heart sounds: Normal heart sounds. No murmur heard. No friction rub. No gallop. Pulmonary:      Effort: Pulmonary effort is normal.      Breath sounds: Normal breath sounds. Abdominal:      Palpations: Abdomen is soft. Tenderness: There is no abdominal tenderness. Musculoskeletal:         General: No deformity. Cervical back: Neck supple. Skin:     General: Skin is warm and dry. Neurological:      Mental Status: He is alert. Comments: oriented        MDM         Procedures    Assessment/plan: Respiratory infection. Likely viral but will cover with Augmentin. PCP follow-up. He also has a scalp wound that has been slow to heal.  Wound care referral.  Return precautions. Reassuring appearance/exam with stable vital signs.   Eloy Tracey MD  8:59 PM

## 2022-12-08 NOTE — ED TRIAGE NOTES
Pt rpts laryngitis, bilateral ear fullness, sore throat and yellow productive cough for one day. Recently seen at [facility] ER on 12/5/22 for nosebleeds. Pt also requesting evaluation of head wound injury from 3 mths ago that has intermittent bleeding.

## 2022-12-08 NOTE — ED NOTES
The patient was discharged home in stable condition. The patient is alert and oriented, in no respiratory distress. The patient's diagnosis, condition and treatment were explained. The patient expressed understanding. Prescription escribed. No work/school note given. A discharge plan has been developed. A  was not involved in the process. Aftercare instructions were given. Pt ambulatory out of the ED with family.

## 2022-12-09 RX ORDER — AMOXICILLIN AND CLAVULANATE POTASSIUM 875; 125 MG/1; MG/1
1 TABLET, FILM COATED ORAL 2 TIMES DAILY
Qty: 14 TABLET | Refills: 0 | Status: SHIPPED | OUTPATIENT
Start: 2022-12-09

## 2022-12-10 ENCOUNTER — HOSPITAL ENCOUNTER (EMERGENCY)
Age: 82
Discharge: HOME | End: 2022-12-11
Attending: STUDENT IN AN ORGANIZED HEALTH CARE EDUCATION/TRAINING PROGRAM | Admitting: STUDENT IN AN ORGANIZED HEALTH CARE EDUCATION/TRAINING PROGRAM
Payer: MEDICARE

## 2022-12-10 DIAGNOSIS — I78.0 HHT (HEREDITARY HEMORRHAGIC TELANGIECTASIA) (HCC): ICD-10-CM

## 2022-12-10 DIAGNOSIS — R04.0 EPISTAXIS: Primary | ICD-10-CM

## 2022-12-10 PROCEDURE — 99282 EMERGENCY DEPT VISIT SF MDM: CPT

## 2022-12-11 VITALS
SYSTOLIC BLOOD PRESSURE: 113 MMHG | TEMPERATURE: 98.4 F | DIASTOLIC BLOOD PRESSURE: 67 MMHG | HEART RATE: 87 BPM | HEIGHT: 74 IN | RESPIRATION RATE: 16 BRPM | WEIGHT: 180 LBS | BODY MASS INDEX: 23.1 KG/M2 | OXYGEN SATURATION: 99 %

## 2022-12-11 PROCEDURE — 74011250637 HC RX REV CODE- 250/637: Performed by: STUDENT IN AN ORGANIZED HEALTH CARE EDUCATION/TRAINING PROGRAM

## 2022-12-11 RX ADMIN — PHENYLEPHRINE HYDROCHLORIDE 2 SPRAY: 1 SPRAY NASAL at 00:45

## 2022-12-11 NOTE — ED TRIAGE NOTES
Pt presents with nose bleed that started about an hour ago.    Pt reports that he has a blood disorder called HHT     Pt is not on blood thinners

## 2022-12-11 NOTE — ED NOTES
Pt dc'd ambulatory with wife. No nasal bleeding noted. Verbalized understanding of dc instru and follow up. NAD.

## 2022-12-11 NOTE — ED PROVIDER NOTES
EMERGENCY DEPARTMENT HISTORY AND PHYSICAL EXAM      Date: 12/10/2022  Patient Name: Bismark Gayle    History of Presenting Illness     HPI: Bismark Gayle, 79 y.o. male with past medical history of HHT, presents to the ED with cc of nosebleed that began approximately 1 hr prior to onset of symptoms. Patient reports due to his history of HHT, he experiences frequent nosebleeds. Normally, he manages these by stuffing his nose with cotton and tissues, laying his head back, and waiting till bleeding stops. Today, he did the same, but nosebleed did not self resolve. He became concerned, and came to the ED for further management. Patient is not on any blood thinners. PCP: Celeste Peace MD    No current facility-administered medications on file prior to encounter. Current Outpatient Medications on File Prior to Encounter   Medication Sig Dispense Refill    amoxicillin-clavulanate (Augmentin) 875-125 mg per tablet Take 1 Tablet by mouth two (2) times a day. 14 Tablet 0    dicyclomine (BENTYL) 20 mg tablet TAKE 1 TABLET BY MOUTH 3 TIMES DAILY AS NEEDED FOR ABDOMINAL CRAMPS (NEW DOSE/NEW DIRECTIONS) 30 Tablet 3    acetaminophen (TYLENOL) 500 mg tablet Take 1,000 mg by mouth every six (6) hours as needed for Pain. ferrous sulfate (Slow Fe) 142 mg (45 mg iron) ER tablet Take  by mouth Daily (before breakfast). cholecalciferol (VITAMIN D3) 25 mcg (1,000 unit) cap Take  by mouth daily. ascorbic acid, vitamin C, (VITAMIN C) 250 mg tablet Take  by mouth. TIMOLOL MALEATE OP Apply  to eye. 1 drop to each eye twice daily.          Past History     Past Medical History:  Past Medical History:   Diagnosis Date    Cancer ([facility] [address]     HHT (hereditary hemorrhagic telangiectasia) ([facility] [address]     Prostate cancer [facility])        Past Surgical History:  Past Surgical History:   Procedure Laterality Date    COLONOSCOPY N/A 1/29/2020    COLONOSCOPY performed by Jerome Fan MD at [facility] ENDOSCOPY    HX APPENDECTOMY      HX COLONOSCOPY  2014    due 18    HX OTHER SURGICAL  07/2018    sclerotheropy    HX TONSILLECTOMY      [place] CHEST SURGERY PROCEDURE UNLISTED      excision of AVM       Family History:  Family History   Problem Relation Age of Onset    Heart Disease Mother     Cancer Other         colon, lung       Social History:  Social History     Tobacco Use    Smoking status: Former    Smokeless tobacco: Never   Vaping Use    Vaping Use: Never used   Substance Use Topics    Alcohol use: Yes     Alcohol/week: 0.0 standard drinks     Comment: one drink per week    Drug use: No       Allergies:  No Known Allergies      Review of Systems   Review of Systems   Constitutional:  Negative for chills and fever. HENT:  Positive for nosebleeds. Negative for congestion and rhinorrhea. Eyes:  Negative for visual disturbance. Respiratory:  Negative for chest tightness and shortness of breath. Gastrointestinal:  Negative for abdominal pain, diarrhea, nausea and vomiting. Genitourinary:  Negative for dysuria, flank pain and hematuria. Musculoskeletal:  Negative for back pain and neck pain. Skin:  Negative for rash. Allergic/Immunologic: Negative for immunocompromised state. Neurological:  Negative for dizziness, speech difficulty, weakness and headaches. Hematological:  Negative for adenopathy. Bruises/bleeds easily. Psychiatric/Behavioral:  Negative for dysphoric mood and suicidal ideas. Physical Exam   Physical Exam  Vitals and nursing note reviewed. Constitutional:       General: He is not in acute distress. Appearance: Normal appearance. He is not ill-appearing or toxic-appearing. HENT:      Head: Normocephalic and atraumatic. Nose:      Left Nostril: Epistaxis present. No foreign body, septal hematoma or occlusion. Comments: Slow oozing noted from left nare, appearing to emanate from anterior origin.       Mouth/Throat:      Mouth: Mucous membranes are moist.      Pharynx: Oropharynx is clear. Uvula midline. Comments: Dried blood noted in mouth. No active bleeding noted  Eyes:      Extraocular Movements: Extraocular movements intact. Pupils: Pupils are equal, round, and reactive to light. Cardiovascular:      Rate and Rhythm: Normal rate and regular rhythm. Pulses: Normal pulses. Pulmonary:      Effort: Pulmonary effort is normal. No respiratory distress. Breath sounds: Normal breath sounds. No stridor. No wheezing or rhonchi. Abdominal:      General: Abdomen is flat. There is no distension. Palpations: There is no mass. Tenderness: There is no abdominal tenderness. Musculoskeletal:         General: Normal range of motion. Cervical back: Normal range of motion and neck supple. Right lower leg: No edema. Left lower leg: No edema. Skin:     General: Skin is warm and dry. Neurological:      General: No focal deficit present. Mental Status: He is alert. Mental status is at baseline. Sensory: No sensory deficit. Motor: No weakness. Diagnostic Study Results     Labs -   No results found for this or any previous visit (from the past 24 hour(s)). Radiologic Studies -   No orders to display     CT Results  (Last 48 hours)      None          CXR Results  (Last 48 hours)      None            Medical Decision Making   ILilia MD-- am the first provider for this patient, and I am the attending of record for this patient encounter. I reviewed the vital signs, available nursing notes, past medical history, past surgical history, family history and social history. Vital Signs-Reviewed the patient's vital signs. No data found. Records Reviewed: Nursing Notes and Old Medical Records    Provider Notes (Medical Decision Making):   Patient's nose bleed appears to be anterior, and slowing down on my exam. Phenylephrine sprayed in left nare, nasal clamp applied.  After a period of observation in the ED- nose bleed completely resolved upon removal of nasal clamp. Discussed with the patient and wife at length regarding appropriate management of epistaxis-- including immediate nasal pressure application for 08-23 minutes, leaning head forward, and avoiding any activities that may increase intranasal pressure such as harsh coughing, sneezing, or coughing etc. Dispensed phenylephrine spray bottle to patient for home should symptoms recur. Patient and wife felt comfortable with plans for dc. Follow up with PCP and ENT. Return precautions discussed. ED Course:   Initial assessment performed. The patient's presenting problems have been discussed, and they are in agreement with the care plan formulated and outlined with them. I have encouraged them to ask questions as they arise throughout their visit. Jojo Acharya MD      Disposition:  DC      DISCHARGE PLAN:  1. Discharge Medication List as of 12/11/2022  2:07 AM        2. Follow-up Information       Follow up With Specialties Details Why Contact Info    Todd Almanzar MD Internal Medicine Physician Schedule an appointment as soon as possible for a visit  As needed [address] [place] [address]   [address] [phone]      [facility] EMERGENCY DEPT Emergency Medicine  If symptoms worsen, As needed [address] [place] [address]  [phone]          3. Return to ED if worse     Diagnosis     Clinical Impression:   1. Epistaxis    2. HHT (hereditary hemorrhagic telangiectasia) ([facility] [address]        Attestations:    Jojo Acharya MD    Please note that this dictation was completed with [employer], the computer voice recognition software. Quite often unanticipated grammatical, syntax, homophones, and other interpretive errors are inadvertently transcribed by the computer software. Please disregard these errors. Please excuse any errors that have escaped final proofreading. Thank you.

## 2022-12-12 ENCOUNTER — TELEPHONE (OUTPATIENT)
Dept: ONCOLOGY | Age: 82
End: 2022-12-12

## 2022-12-12 RX ORDER — SODIUM CHLORIDE 9 MG/ML
25 INJECTION, SOLUTION INTRAVENOUS CONTINUOUS
OUTPATIENT
Start: 2022-12-20

## 2022-12-12 RX ORDER — EPINEPHRINE 1 MG/ML
0.3 INJECTION, SOLUTION, CONCENTRATE INTRAVENOUS AS NEEDED
Status: CANCELLED | OUTPATIENT
Start: 2022-12-14

## 2022-12-12 RX ORDER — ACETAMINOPHEN 325 MG/1
650 TABLET ORAL AS NEEDED
Start: 2022-12-20

## 2022-12-12 RX ORDER — SODIUM CHLORIDE 9 MG/ML
10 INJECTION INTRAMUSCULAR; INTRAVENOUS; SUBCUTANEOUS AS NEEDED
OUTPATIENT
Start: 2022-12-20

## 2022-12-12 RX ORDER — ALBUTEROL SULFATE 0.83 MG/ML
2.5 SOLUTION RESPIRATORY (INHALATION) AS NEEDED
Start: 2022-12-20

## 2022-12-12 RX ORDER — DIPHENHYDRAMINE HYDROCHLORIDE 50 MG/ML
50 INJECTION, SOLUTION INTRAMUSCULAR; INTRAVENOUS AS NEEDED
Start: 2022-12-20

## 2022-12-12 RX ORDER — DIPHENHYDRAMINE HYDROCHLORIDE 50 MG/ML
25 INJECTION, SOLUTION INTRAMUSCULAR; INTRAVENOUS AS NEEDED
Status: CANCELLED
Start: 2022-12-14

## 2022-12-12 RX ORDER — HEPARIN 100 UNIT/ML
300-500 SYRINGE INTRAVENOUS AS NEEDED
Start: 2022-12-20

## 2022-12-12 RX ORDER — DIPHENHYDRAMINE HYDROCHLORIDE 50 MG/ML
50 INJECTION, SOLUTION INTRAMUSCULAR; INTRAVENOUS AS NEEDED
Status: CANCELLED
Start: 2022-12-14

## 2022-12-12 RX ORDER — SODIUM CHLORIDE 0.9 % (FLUSH) 0.9 %
10 SYRINGE (ML) INJECTION AS NEEDED
OUTPATIENT
Start: 2022-12-20

## 2022-12-12 RX ORDER — ONDANSETRON 2 MG/ML
8 INJECTION INTRAMUSCULAR; INTRAVENOUS AS NEEDED
Status: CANCELLED | OUTPATIENT
Start: 2022-12-14

## 2022-12-12 RX ORDER — SODIUM CHLORIDE 9 MG/ML
10 INJECTION INTRAMUSCULAR; INTRAVENOUS; SUBCUTANEOUS AS NEEDED
Status: CANCELLED | OUTPATIENT
Start: 2022-12-14

## 2022-12-12 RX ORDER — SODIUM CHLORIDE 0.9 % (FLUSH) 0.9 %
10 SYRINGE (ML) INJECTION AS NEEDED
Status: CANCELLED | OUTPATIENT
Start: 2022-12-14

## 2022-12-12 RX ORDER — DIPHENHYDRAMINE HYDROCHLORIDE 50 MG/ML
25 INJECTION, SOLUTION INTRAMUSCULAR; INTRAVENOUS AS NEEDED
Start: 2022-12-20

## 2022-12-12 RX ORDER — HEPARIN 100 UNIT/ML
300-500 SYRINGE INTRAVENOUS AS NEEDED
Status: CANCELLED | OUTPATIENT
Start: 2022-12-14

## 2022-12-12 RX ORDER — SODIUM CHLORIDE 9 MG/ML
25 INJECTION, SOLUTION INTRAVENOUS CONTINUOUS
Status: CANCELLED | OUTPATIENT
Start: 2022-12-14

## 2022-12-12 RX ORDER — ONDANSETRON 2 MG/ML
8 INJECTION INTRAMUSCULAR; INTRAVENOUS AS NEEDED
OUTPATIENT
Start: 2022-12-20

## 2022-12-12 RX ORDER — HYDROCORTISONE SODIUM SUCCINATE 100 MG/2ML
100 INJECTION, POWDER, FOR SOLUTION INTRAMUSCULAR; INTRAVENOUS AS NEEDED
OUTPATIENT
Start: 2022-12-20

## 2022-12-12 RX ORDER — ALBUTEROL SULFATE 0.83 MG/ML
2.5 SOLUTION RESPIRATORY (INHALATION) AS NEEDED
Status: CANCELLED
Start: 2022-12-14

## 2022-12-12 RX ORDER — ACETAMINOPHEN 325 MG/1
650 TABLET ORAL AS NEEDED
Status: CANCELLED
Start: 2022-12-14

## 2022-12-12 RX ORDER — EPINEPHRINE 1 MG/ML
0.3 INJECTION, SOLUTION, CONCENTRATE INTRAVENOUS AS NEEDED
OUTPATIENT
Start: 2022-12-20

## 2022-12-12 RX ORDER — HYDROCORTISONE SODIUM SUCCINATE 100 MG/2ML
100 INJECTION, POWDER, FOR SOLUTION INTRAMUSCULAR; INTRAVENOUS AS NEEDED
Status: CANCELLED | OUTPATIENT
Start: 2022-12-14

## 2022-12-12 NOTE — TELEPHONE ENCOUNTER
Patient called and stated that he would like to know if another hemoglobin order can be put in because he had a very bad nose bleed. Patient wanted to know if it was appropriate for him to get another one so there will be an accurate reading for his appointment on Wednesday. Requested a call back.      # [phone]

## 2022-12-12 NOTE — TELEPHONE ENCOUNTER
[address] at [facility]  [phone]    12/12/22- Returned patient's call, no answer, voicemail left requesting a return call when available. 12/13/22- Spoke to patient, stated he hasn't had any additional nose bleeds since yesterday. Informed him that Dr. Yessica De Jesus recommends IV iron based on previous lab work, and repeating CBC at his visit tomorrow. Reviewed appointment date/time with patient. He verbalized understanding, no further questions or concerns.

## 2022-12-13 ENCOUNTER — HOSPITAL ENCOUNTER (EMERGENCY)
Dept: GENERAL RADIOLOGY | Age: 82
Discharge: HOME | End: 2022-12-13
Attending: EMERGENCY MEDICINE
Payer: MEDICARE

## 2022-12-13 ENCOUNTER — HOSPITAL ENCOUNTER (EMERGENCY)
Age: 82
Discharge: HOME | End: 2022-12-13
Attending: EMERGENCY MEDICINE
Payer: MEDICARE

## 2022-12-13 VITALS
SYSTOLIC BLOOD PRESSURE: 111 MMHG | HEART RATE: 94 BPM | DIASTOLIC BLOOD PRESSURE: 55 MMHG | BODY MASS INDEX: 23.72 KG/M2 | TEMPERATURE: 98.8 F | WEIGHT: 184.75 LBS | RESPIRATION RATE: 16 BRPM | OXYGEN SATURATION: 98 %

## 2022-12-13 DIAGNOSIS — J10.1 INFLUENZA A: Primary | ICD-10-CM

## 2022-12-13 LAB
FLUAV AG NPH QL IA: POSITIVE
FLUBV AG NOSE QL IA: NEGATIVE

## 2022-12-13 PROCEDURE — 87804 INFLUENZA ASSAY W/OPTIC: CPT

## 2022-12-13 PROCEDURE — 99284 EMERGENCY DEPT VISIT MOD MDM: CPT

## 2022-12-13 PROCEDURE — 71045 X-RAY EXAM CHEST 1 VIEW: CPT

## 2022-12-13 PROCEDURE — 87635 SARS-COV-2 COVID-19 AMP PRB: CPT

## 2022-12-13 RX ORDER — OSELTAMIVIR PHOSPHATE 75 MG/1
75 CAPSULE ORAL 2 TIMES DAILY
Qty: 10 CAPSULE | Refills: 0 | Status: SHIPPED | OUTPATIENT
Start: 2022-12-13 | End: 2022-12-18

## 2022-12-13 NOTE — ED NOTES
Patient discharged by provider. D/C instructions given. Patient educated to take all medications as instructed for management at home. Patien verbalized understanding, verbalized no questions. Patient ambulated out of ER without difficulty, NAD.   Patient Vitals for the past 4 hrs:   Temp Pulse Resp BP SpO2   12/13/22 1114 98.8 °F (37.1 °C) 94 16 (!) 111/55 98 %

## 2022-12-13 NOTE — ED PROVIDER NOTES
HPI   The patient is an 50-year-old white male who presents emergency room with a history of a cough for approximately 1 week or so seen here prescribed antibiotics but without improvement. He states he was coughing all last night. He is concerned he might have pneumonia. He denies fever and chills at this time. 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    HX TONSILLECTOMY      [facility] CHEST SURG[facility] PROCEDURE UNLISTED      excision of AVM         Family History:   Problem Relation Age of Onset    Heart Disease Mother     Cancer Other         colon, lung       Social History     Socioeconomic History    Marital status:      Spouse name: Not on file    Number of children: Not on file    Years of education: Not on file    Highest education level: Not on file   Occupational History    Not on file   Tobacco Use    Smoking status: Former    Smokeless tobacco: Never   Vaping Use    Vaping Use: Never used   Substance and Sexual Activity    Alcohol use: Yes     Alcohol/week: 0.0 standard drinks     Comment: one drink per week    Drug use: No    Sexual activity: Yes     Partners: Female   Other Topics Concern    Not on file   Social History Narrative    Not on file     Social Determinants of Health     Financial Resource Strain: Not on file   Food Insecurity: Not on file   Transportation Needs: Not on file   Physical Activity: Not on file   Stress: Not on file   Social Connections: Not on file   Intimate Partner Violence: Not on file   Housing Stability: Not on file         ALLERGIES: Patient has no known allergies. Review of Systems   Constitutional: Negative.   Negative for chills and fever. HENT: Negative. Negative for trouble swallowing. Eyes:  Negative for visual disturbance. Respiratory:  Negative for cough, shortness of breath and wheezing. Cardiovascular:  Negative for chest pain, palpitations and leg swelling. Gastrointestinal:  Negative for abdominal pain, diarrhea, nausea and vomiting. Genitourinary:  Negative for dysuria, frequency and urgency. Musculoskeletal:  Negative for arthralgias. Skin:  Negative for wound. Neurological:  Negative for speech difficulty, weakness, numbness and headaches. Psychiatric/Behavioral:  Negative for agitation. All other systems reviewed and are negative. Vitals:    12/13/22 1114   BP: (!) 111/55   Pulse: 94   Resp: 16   Temp: 98.8 °F (37.1 °C)   SpO2: 98%   Weight: 83.8 kg (184 lb 11.9 oz)            Physical Exam  Vitals and nursing note reviewed. Constitutional:       Appearance: He is well-developed. HENT:      Head: Normocephalic and atraumatic. Mouth/Throat:      Pharynx: No oropharyngeal exudate. Eyes:      General: No scleral icterus. Conjunctiva/sclera: Conjunctivae normal.   Neck:      Thyroid: No thyromegaly. Cardiovascular:      Rate and Rhythm: Normal rate and regular rhythm. Heart sounds: Normal heart sounds. No murmur heard. No friction rub. No gallop. Pulmonary:      Effort: Pulmonary effort is normal. No respiratory distress. Breath sounds: Normal breath sounds. No stridor. No wheezing or rales. Abdominal:      General: Bowel sounds are normal.      Palpations: Abdomen is soft. Tenderness: There is no abdominal tenderness. There is no guarding or rebound. Musculoskeletal:         General: Normal range of motion. Cervical back: Neck supple. Lymphadenopathy:      Cervical: No cervical adenopathy. Skin:     General: Skin is warm and dry. Neurological:      Mental Status: He is alert and oriented to person, place, and time.         MDM     Amount and/or Complexity of Data Reviewed  Clinical lab tests: ordered and reviewed  Tests in the radiology section of CPT®: ordered and reviewed  Tests in the medicine section of CPT®: ordered and reviewed    Risk of Complications, Morbidity, and/or Mortality  Presenting problems: moderate  Diagnostic procedures: moderate  Management options: moderate           Procedures        Assessment and plan    the patient has acute influenza A without wheezing associated with it pulse ox is 96% on room air we will start Tamiflu I do not believe patient requires admission, as he has no O2 requirement chest x-ray was interpreted by myself to show chronic changes and unknown change from previous chest x-rays.

## 2022-12-13 NOTE — ED TRIAGE NOTES
Pt reports \"sick with congestion\", cough for about a week. Pt was prescribed augmentin, reports he continues to cough.  Has concern for pneumonia

## 2022-12-14 ENCOUNTER — HOSPITAL ENCOUNTER (OUTPATIENT)
Dept: INFUSION THERAPY | Age: 82
Discharge: HOME | End: 2022-12-14
Payer: MEDICARE

## 2022-12-14 ENCOUNTER — OFFICE VISIT (OUTPATIENT)
Dept: ONCOLOGY | Age: 82
End: 2022-12-14
Payer: MEDICARE

## 2022-12-14 VITALS
RESPIRATION RATE: 18 BRPM | SYSTOLIC BLOOD PRESSURE: 113 MMHG | TEMPERATURE: 98.7 F | DIASTOLIC BLOOD PRESSURE: 55 MMHG | HEART RATE: 110 BPM | OXYGEN SATURATION: 98 % | BODY MASS INDEX: 25.06 KG/M2 | HEIGHT: 72 IN | WEIGHT: 185 LBS

## 2022-12-14 VITALS
RESPIRATION RATE: 20 BRPM | DIASTOLIC BLOOD PRESSURE: 54 MMHG | TEMPERATURE: 98 F | HEART RATE: 91 BPM | SYSTOLIC BLOOD PRESSURE: 121 MMHG

## 2022-12-14 DIAGNOSIS — I78.0 HHT (HEREDITARY HEMORRHAGIC TELANGIECTASIA) (HCC): Primary | ICD-10-CM

## 2022-12-14 DIAGNOSIS — D50.8 OTHER IRON DEFICIENCY ANEMIA: ICD-10-CM

## 2022-12-14 DIAGNOSIS — D50.8 OTHER IRON DEFICIENCY ANEMIA: Primary | ICD-10-CM

## 2022-12-14 DIAGNOSIS — D50.0 IRON DEFICIENCY ANEMIA DUE TO CHRONIC BLOOD LOSS: ICD-10-CM

## 2022-12-14 LAB
ERYTHROCYTE [DISTWIDTH] IN BLOOD BY AUTOMATED COUNT: 14.1 % (ref 11.5–14.5)
HCT VFR BLD AUTO: 22.2 % (ref 36.6–50.3)
HGB BLD-MCNC: 6.9 G/DL (ref 12.1–17)
MCH RBC QN AUTO: 30.1 PG (ref 26–34)
MCHC RBC AUTO-ENTMCNC: 31.1 G/DL (ref 30–36.5)
MCV RBC AUTO: 96.9 FL (ref 80–99)
NRBC # BLD: 0 K/UL (ref 0–0.01)
NRBC BLD-RTO: 0 PER 100 WBC
PLATELET # BLD AUTO: 149 K/UL (ref 150–400)
PMV BLD AUTO: 10.2 FL (ref 8.9–12.9)
RBC # BLD AUTO: 2.29 M/UL (ref 4.1–5.7)
WBC # BLD AUTO: 4.3 K/UL (ref 4.1–11.1)

## 2022-12-14 PROCEDURE — G0463 HOSPITAL OUTPT CLINIC VISIT: HCPCS | Performed by: INTERNAL MEDICINE

## 2022-12-14 PROCEDURE — 85027 COMPLETE CBC AUTOMATED: CPT

## 2022-12-14 PROCEDURE — 36415 COLL VENOUS BLD VENIPUNCTURE: CPT

## 2022-12-14 PROCEDURE — G8536 NO DOC ELDER MAL SCRN: HCPCS | Performed by: INTERNAL MEDICINE

## 2022-12-14 PROCEDURE — 96365 THER/PROPH/DIAG IV INF INIT: CPT

## 2022-12-14 PROCEDURE — 74011250636 HC RX REV CODE- 250/636: Performed by: NURSE PRACTITIONER

## 2022-12-14 PROCEDURE — 1101F PT FALLS ASSESS-DOCD LE1/YR: CPT | Performed by: INTERNAL MEDICINE

## 2022-12-14 PROCEDURE — G8419 CALC BMI OUT NRM PARAM NOF/U: HCPCS | Performed by: INTERNAL MEDICINE

## 2022-12-14 PROCEDURE — G8427 DOCREV CUR MEDS BY ELIG CLIN: HCPCS | Performed by: INTERNAL MEDICINE

## 2022-12-14 PROCEDURE — 99215 OFFICE O/P EST HI 40 MIN: CPT | Performed by: INTERNAL MEDICINE

## 2022-12-14 PROCEDURE — G8432 DEP SCR NOT DOC, RNG: HCPCS | Performed by: INTERNAL MEDICINE

## 2022-12-14 PROCEDURE — 1123F ACP DISCUSS/DSCN MKR DOCD: CPT | Performed by: INTERNAL MEDICINE

## 2022-12-14 RX ORDER — AMOXICILLIN 500 MG/1
CAPSULE ORAL
COMMUNITY
Start: 2022-12-07 | End: 2022-12-14 | Stop reason: ALTCHOICE

## 2022-12-14 RX ORDER — SODIUM CHLORIDE 9 MG/ML
25 INJECTION, SOLUTION INTRAVENOUS CONTINUOUS
Status: DISCONTINUED | OUTPATIENT
Start: 2022-12-14 | End: 2022-12-15 | Stop reason: HOSPADM

## 2022-12-14 RX ADMIN — FERRIC CARBOXYMALTOSE INJECTION 750 MG: 50 INJECTION, SOLUTION INTRAVENOUS at 15:54

## 2022-12-14 RX ADMIN — SODIUM CHLORIDE 25 ML/HR: 9 INJECTION, SOLUTION INTRAVENOUS at 15:30

## 2022-12-14 NOTE — PROGRESS NOTES
Outpatient Infusion Center Short Visit Progress Note    Mr. Tawanda Farnsworth admitted to [facility] for [facility] ambulatory in stable condition. Assessment completed. Pt stated that he has flu + and d/c from ED on 12/13/22. MD aware. LAC 24G PIV placed with +bld return, lab drawn and sent to processing. Vital Signs:  Visit Vitals  BP (!) 113/55   Pulse (!) 110   Temp 98.7 °F (37.1 °C)   Resp 18   Ht 6' (1.829 m)   Wt 83.9 kg (185 lb)   SpO2 98%   BMI 25.09 kg/m²     Patient Vitals for the past 12 hrs:   Temp Pulse Resp BP SpO2   12/14/22 1622 98.7 °F (37.1 °C) (!) 110 18 (!) 113/55 98 %   12/14/22 1505 98.1 °F (36.7 °C) (!) 109 18 106/62 100 %           Lab Results:  Recent Results (from the past 12 hour(s))   CBC W/O DIFF    Collection Time: 12/14/22  3:12 PM   Result Value Ref Range    WBC 4.3 4.1 - 11.1 K/uL    RBC 2.29 (L) 4.10 - 5.70 M/uL    HGB 6.9 (L) 12.1 - 17.0 g/dL    HCT 22.2 (L) 36.6 - 50.3 %    MCV 96.9 80.0 - 99.0 FL    MCH 30.1 26.0 - 34.0 PG    MCHC 31.1 30.0 - 36.5 g/dL    RDW 14.1 11.5 - 14.5 %    PLATELET 953 (L) 220 - 400 K/uL    MPV 10.2 8.9 - 12.9 FL    NRBC 0.0 0  WBC    ABSOLUTE NRBC 0.00 0.00 - 0.01 K/uL             Medications:  Medications Administered       0.9% sodium chloride infusion       Admin Date  12/14/2022 Action  New Bag Dose  25 mL/hr Rate  25 mL/hr Route  IntraVENous Administered By  Agnes Cox, RN              ferric carboxymaltose (INJECTAFER) 750 mg in 0.9% sodium chloride 250 mL, overfill volume 25 mL IVPB       Admin Date  12/14/2022 Action  New Bag Dose  750 mg Rate  870 mL/hr Route  IntraVENous Administered By  Agnes Cox RN                      AVS provided. Patient tolerated treatment well. PIV flushed and removed per protocol. Patient discharged from [place] [address] ambulatory in no distress at 1630. Patient will be back on 12/20/22 of next appointment.     Morenita Castanon RN  December 14, 2022    Future Appointments   Date Time Provider Jacqueline Rbieiro 12/20/2022  1:30 PM SS INF2 CH4 <2H [facility]   3/22/2023  1:00 PM Latanya Lopez MD [facility] AMB

## 2022-12-14 NOTE — PROGRESS NOTES
Stephanie Putnams is a 79 y.o. male follow up for anemia and HHT. 1. Have you been to the ER, urgent care clinic since your last visit? Hospitalized since your last visit?no     2. Have you seen or consulted any other health care providers outside of the [address] [place] [address] since your last visit? Include any pap smears or colon screening.  no

## 2022-12-15 ENCOUNTER — HOSPITAL ENCOUNTER (OUTPATIENT)
Dept: INFUSION THERAPY | Age: 82
Discharge: HOME | End: 2022-12-15
Payer: MEDICARE

## 2022-12-15 VITALS
TEMPERATURE: 98.6 F | OXYGEN SATURATION: 96 % | RESPIRATION RATE: 18 BRPM | HEART RATE: 92 BPM | DIASTOLIC BLOOD PRESSURE: 70 MMHG | SYSTOLIC BLOOD PRESSURE: 119 MMHG

## 2022-12-15 LAB
COVID-19 RAPID TEST, COVR: NOT DETECTED
HISTORY CHECKED?,CKHIST: NORMAL
SOURCE, COVRS: NORMAL

## 2022-12-15 PROCEDURE — 77030013169 SET IV BLD ICUM -A

## 2022-12-15 PROCEDURE — 86900 BLOOD TYPING SEROLOGIC ABO: CPT

## 2022-12-15 PROCEDURE — 86923 COMPATIBILITY TEST ELECTRIC: CPT

## 2022-12-15 PROCEDURE — P9016 RBC LEUKOCYTES REDUCED: HCPCS

## 2022-12-15 PROCEDURE — 36430 TRANSFUSION BLD/BLD COMPNT: CPT

## 2022-12-15 PROCEDURE — 36415 COLL VENOUS BLD VENIPUNCTURE: CPT

## 2022-12-15 RX ORDER — SODIUM CHLORIDE 9 MG/ML
250 INJECTION, SOLUTION INTRAVENOUS AS NEEDED
Status: DISCONTINUED | OUTPATIENT
Start: 2022-12-15 | End: 2022-12-16 | Stop reason: HOSPADM

## 2022-12-15 NOTE — PROGRESS NOTES
Spoke with Geovanna at [facility]  to clarify in process COVID 19 Rapid test status from 12/13/2022. Per Geovanna result was negative and has been released.

## 2022-12-15 NOTE — PROGRESS NOTES
[facility] Progress Note    Date: December 15, 2022    Name: Aviva Rahman    MRN: [id number]         : 1940    Mr. Man De Anda Arrived ambulatory and in no distress for Blood Transfusion. Assessment was completed, Patient recently tested + for flu and has a cough. Patient placed on droplet precautions and given call bell and refreshments. MD aware of above symptoms. Right Arm PIV established with + blood return, type and cross drawn and sent for processing. Signs/symptoms of adverse blood reaction discussed with pt, voiced understanding. Mr. Rufina Mendes vitals were reviewed. Visit Vitals  BP (!) 99/59 (BP 1 Location: Right arm, BP Patient Position: Sitting)   Pulse 66   Temp 98.9 °F (37.2 °C)   Resp 18   SpO2 99%       Type and screen drawn and sent for processing. Labs used from yesterday. 1515:  1st unit PRBCs started and infusing without difficulty, observed x 15 minutes    SBAR given to Erwin [facility].     Maureen Mantilla RN  December 15, 2022

## 2022-12-15 NOTE — PROGRESS NOTES
Outpatient Infusion Center Progress Note        Date: December 15, 2022    Name: Narinder Day    MRN: [id number]         : 1940    SBAR taken from [address]. Patient + for flu, droplet precautions in place. 1728 : 1st unit PRBCs finished, followed by saline flush. Vital Signs:  Patient Vitals for the past 12 hrs:   Temp Pulse Resp BP SpO2   12/15/22 1530 99 °F (37.2 °C) 88 18 105/62 98 %   12/15/22 1455 99 °F (37.2 °C) 92 18 105/66 98 %   12/15/22 1244 98.9 °F (37.2 °C) 66 18 (!) 99/59 99 %      Patient tolerated treatment well and was discharged at 1750 in stable condition. PIV removed. Mr. Nkechi Morales is to return on 2022 at 1330 for his next appointment.        Future Appointments:  Future Appointments   Date Time Provider Jacqueline Ribeiro   2022  1[facility]   3/22/2023  1:00 PM Jorge Lopez MD [facility] AMB

## 2022-12-15 NOTE — PROGRESS NOTES
OUTPATIENT INFUSION CENTER    DISCHARGE INSTRUCTIONS FOR:  BLOOD TRANSFUSION    We hope you are feeling better after your blood transfusion. Some mild tenderness or slight bruising at your IV site is normal.  Avoid lifting or heavy use of that extremity for the rest of the day. Drink plenty of fluids, eat a normal diet and get some rest.    There are some important signs that you need to watch for in case you experience a delayed reaction to the blood you have received. Call your physician immediately if you develop any of the following symptoms:    1. Severe headache or backache;    2. Fever above 100 degrees;    3. Chills;    4. Difficulty breathing;    5.  Blood or red color in urine;    6. The feeling of weakness or constant fatigue;    7. Yellowing of the whites of your eyes or skin (jaundice). If your physician is not available, call or go to the nearest emergency room, or dial 911.     Danni Clark, Signature: ___________________________ 12/15/2022  Jane Mitchell RN

## 2022-12-16 LAB
ABO + RH BLD: NORMAL
BLD PROD TYP BPU: NORMAL
BLOOD GROUP ANTIBODIES SERPL: NORMAL
BPU ID: NORMAL
CROSSMATCH RESULT,%XM: NORMAL
SPECIMEN EXP DATE BLD: NORMAL
STATUS OF UNIT,%ST: NORMAL
UNIT DIVISION, %UDIV: 0

## 2022-12-17 ENCOUNTER — HOSPITAL ENCOUNTER (EMERGENCY)
Age: 82
Discharge: HOME | End: 2022-12-18
Attending: EMERGENCY MEDICINE
Payer: MEDICARE

## 2022-12-17 DIAGNOSIS — R04.0 EPISTAXIS: Primary | ICD-10-CM

## 2022-12-17 PROCEDURE — 99283 EMERGENCY DEPT VISIT LOW MDM: CPT

## 2022-12-18 VITALS
OXYGEN SATURATION: 100 % | SYSTOLIC BLOOD PRESSURE: 120 MMHG | WEIGHT: 183.64 LBS | TEMPERATURE: 98.8 F | BODY MASS INDEX: 24.87 KG/M2 | DIASTOLIC BLOOD PRESSURE: 71 MMHG | HEART RATE: 91 BPM | RESPIRATION RATE: 16 BRPM | HEIGHT: 72 IN

## 2022-12-18 LAB
ABO + RH BLD: NORMAL
BASOPHILS # BLD: 0 K/UL (ref 0–0.1)
BASOPHILS NFR BLD: 0 % (ref 0–1)
BLOOD GROUP ANTIBODIES SERPL: NORMAL
DIFFERENTIAL METHOD BLD: ABNORMAL
EOSINOPHIL # BLD: 0.2 K/UL (ref 0–0.4)
EOSINOPHIL NFR BLD: 2 % (ref 0–7)
ERYTHROCYTE [DISTWIDTH] IN BLOOD BY AUTOMATED COUNT: 14.6 % (ref 11.5–14.5)
HCT VFR BLD AUTO: 24.6 % (ref 36.6–50.3)
HGB BLD-MCNC: 7.7 G/DL (ref 12.1–17)
IMM GRANULOCYTES # BLD AUTO: 0 K/UL (ref 0–0.04)
IMM GRANULOCYTES NFR BLD AUTO: 0 % (ref 0–0.5)
LYMPHOCYTES # BLD: 0.5 K/UL (ref 0.8–3.5)
LYMPHOCYTES NFR BLD: 6 % (ref 12–49)
MCH RBC QN AUTO: 30.4 PG (ref 26–34)
MCHC RBC AUTO-ENTMCNC: 31.3 G/DL (ref 30–36.5)
MCV RBC AUTO: 97.2 FL (ref 80–99)
MONOCYTES # BLD: 0.8 K/UL (ref 0–1)
MONOCYTES NFR BLD: 9 % (ref 5–13)
NEUTS SEG # BLD: 6.9 K/UL (ref 1.8–8)
NEUTS SEG NFR BLD: 83 % (ref 32–75)
NRBC # BLD: 0 K/UL (ref 0–0.01)
NRBC BLD-RTO: 0 PER 100 WBC
PLATELET # BLD AUTO: 206 K/UL (ref 150–400)
PMV BLD AUTO: 10 FL (ref 8.9–12.9)
RBC # BLD AUTO: 2.53 M/UL (ref 4.1–5.7)
RBC MORPH BLD: ABNORMAL
SPECIMEN EXP DATE BLD: NORMAL
WBC # BLD AUTO: 8.4 K/UL (ref 4.1–11.1)

## 2022-12-18 PROCEDURE — 74011250637 HC RX REV CODE- 250/637

## 2022-12-18 PROCEDURE — 36415 COLL VENOUS BLD VENIPUNCTURE: CPT

## 2022-12-18 PROCEDURE — 86900 BLOOD TYPING SEROLOGIC ABO: CPT

## 2022-12-18 PROCEDURE — 85025 COMPLETE CBC W/AUTO DIFF WBC: CPT

## 2022-12-18 RX ORDER — OXYMETAZOLINE HCL 0.05 %
2 SPRAY, NON-AEROSOL (ML) NASAL 2 TIMES DAILY
Qty: 1 EACH | Refills: 0 | Status: SHIPPED | OUTPATIENT
Start: 2022-12-18 | End: 2022-12-21

## 2022-12-18 RX ADMIN — PHENYLEPHRINE HYDROCHLORIDE 1 SPRAY: 0.5 SPRAY NASAL at 00:15

## 2022-12-18 NOTE — ED PROVIDER NOTES
Mr Lisseth Rosa is a 50-year-old man with PMHx of HHT and recurrent epistaxis who presents to the ED with nosebleeds since 1800 today evening. He used cotton pads covered in Aquaphor for but this did not stop the bleeding. Did not use Afrin spray at home. Has previously received cauterization for nosebleeds 1 year ago. Receiving iron infusion and ferrous sulfate tablets for chronic anemia secondary to nosebleed. Diagnosed to have a influenza week ago,  currently on Tamiflu. Has productive cough with whitish sputum. Denies nose trauma, fever, chills, shortness of breath, and chest pain. Epistaxis        Past Medical History:   Diagnosis Date    Cancer ([facility] [address]     HHT (hereditary hemorrhagic telangiectasia) ([facility] [address]     Prostate cancer [facility])        Past Surgical History:   Procedure Laterality Date    COLONOSCOPY N/A 1/29/2020    COLONOSCOPY performed by Farhad Masterson MD at [facility] ENDOSCOPY    HX APPENDECTOMY      HX COLONOSCOPY  2014    due 22    HX OTHER SURGICAL  07/2018    sclerotheropy    HX TONSILLECTOMY      MA CHEST SURGERY PROCEDURE UNLISTED      excision of AVM         Family History:   Problem Relation Age of Onset    Heart Disease Mother     Cancer Other         colon, lung       Social History     Socioeconomic History    Marital status:      Spouse name: Not on file    Number of children: Not on file    Years of education: Not on file    Highest education level: Not on file   Occupational History    Not on file   Tobacco Use    Smoking status: Former    Smokeless tobacco: Never   Vaping Use    Vaping Use: Never used   Substance and Sexual Activity    Alcohol use:  Yes     Alcohol/week: 0.0 standard drinks     Comment: one drink per week    Drug use: No    Sexual activity: Yes     Partners: Female   Other Topics Concern    Not on file   Social History Narrative    Not on file     Social Determinants of Health     Financial Resource Strain: Not on file   Food Insecurity: Not on file Transportation Needs: Not on file   Physical Activity: Not on file   Stress: Not on file   Social Connections: Not on file   Intimate Partner Violence: Not on file   Housing Stability: Not on file         ALLERGIES: Patient has no known allergies. Review of Systems   Constitutional:  Negative for fever. HENT:  Positive for nosebleeds. Negative for sinus pressure and sore throat. Eyes:  Negative for discharge. Respiratory:  Positive for cough. Negative for shortness of breath. Cardiovascular:  Positive for leg swelling. Negative for chest pain. Chronic fabby lower limb edema   Gastrointestinal:  Negative for abdominal pain. Genitourinary:  Negative for dysuria. Musculoskeletal:  Negative for arthralgias. Skin:  Negative for rash. Neurological:  Negative for headaches. Hematological:  Bruises/bleeds easily. Psychiatric/Behavioral:  Negative for sleep disturbance. Vitals:    12/17/22 2347   BP: 111/68   Pulse: 95   Resp: 14   Temp: 98.8 °F (37.1 °C)   SpO2: 97%   Weight: 83.3 kg (183 lb 10.3 oz)   Height: 6' (1.829 m)            Physical Exam  Constitutional:       Appearance: Normal appearance. HENT:      Head: Normocephalic and atraumatic. Nose:      Comments: Epistaxis noted, cotton pad in right nostril     Mouth/Throat:      Mouth: Mucous membranes are moist.   Eyes:      Extraocular Movements: Extraocular movements intact. Conjunctiva/sclera: Conjunctivae normal.   Cardiovascular:      Rate and Rhythm: Normal rate and regular rhythm. Heart sounds: No murmur heard. Pulmonary:      Effort: Pulmonary effort is normal.      Breath sounds: Normal breath sounds. No wheezing. Abdominal:      General: Bowel sounds are normal.      Palpations: Abdomen is soft. Tenderness: There is no abdominal tenderness. Musculoskeletal:      Cervical back: Normal range of motion and neck supple. Right lower leg: Edema present. Left lower leg: Edema present. Comments: Chronic fabby lower limb non pitting edema   Skin:     General: Skin is warm and dry. Neurological:      General: No focal deficit present. Mental Status: He is alert and oriented to person, place, and time. Psychiatric:         Mood and Affect: Mood normal.         Behavior: Behavior normal.        MDM  Number of Diagnoses or Management Options  Epistaxis  Diagnosis management comments: Mr Samson Bitter here for epistaxis which is actively bleeding. H/o HHT and recurrent epistaxis. CBC, type and screen ordered. Hemodynamically stable. Ddx anterior epistaxis vs posterior epistaxis. Cotton swabs removed from nostrils, noted to have a small blood clot in right nostril. No posterior pharyngeal bleeding noted. Nasal phenylephrine sprayed on bilateral nostrils. Will reassess    @0145 No further epistaxis. Hemodynamically stable. Hgb 7.7, continue iron supplements. Stable for discharge with nasal clamps and phenylephrine spray. Follow up with oncology outpatient.        Amount and/or Complexity of Data Reviewed  Clinical lab tests: ordered  Decide to obtain previous medical records or to obtain history from someone other than the patient: yes           Procedures

## 2022-12-18 NOTE — ED TRIAGE NOTES
Pt presents with nose bleed since 6pm, reports bleeding slowed down a little bit.    Hx of HHT   PT is not on blood thinners    Denies pain or trauma

## 2022-12-20 ENCOUNTER — HOSPITAL ENCOUNTER (OUTPATIENT)
Dept: INFUSION THERAPY | Age: 82
Discharge: HOME | End: 2022-12-20
Payer: MEDICARE

## 2022-12-20 VITALS
OXYGEN SATURATION: 100 % | SYSTOLIC BLOOD PRESSURE: 117 MMHG | HEART RATE: 91 BPM | DIASTOLIC BLOOD PRESSURE: 58 MMHG | TEMPERATURE: 98.5 F | RESPIRATION RATE: 18 BRPM

## 2022-12-20 DIAGNOSIS — D50.8 OTHER IRON DEFICIENCY ANEMIA: Primary | ICD-10-CM

## 2022-12-20 LAB
ABO + RH BLD: NORMAL
BLD PROD TYP BPU: NORMAL
BLOOD GROUP ANTIBODIES SERPL: NORMAL
BPU ID: NORMAL
CROSSMATCH RESULT,%XM: NORMAL
ERYTHROCYTE [DISTWIDTH] IN BLOOD BY AUTOMATED COUNT: 15 % (ref 11.5–14.5)
HCT VFR BLD AUTO: 22.8 % (ref 36.6–50.3)
HGB BLD-MCNC: 7 G/DL (ref 12.1–17)
MCH RBC QN AUTO: 30 PG (ref 26–34)
MCHC RBC AUTO-ENTMCNC: 30.7 G/DL (ref 30–36.5)
MCV RBC AUTO: 97.9 FL (ref 80–99)
NRBC # BLD: 0 K/UL (ref 0–0.01)
NRBC BLD-RTO: 0 PER 100 WBC
PLATELET # BLD AUTO: 220 K/UL (ref 150–400)
PMV BLD AUTO: 9.8 FL (ref 8.9–12.9)
RBC # BLD AUTO: 2.33 M/UL (ref 4.1–5.7)
SPECIMEN EXP DATE BLD: NORMAL
STATUS OF UNIT,%ST: NORMAL
UNIT DIVISION, %UDIV: 0
WBC # BLD AUTO: 6 K/UL (ref 4.1–11.1)

## 2022-12-20 PROCEDURE — 86923 COMPATIBILITY TEST ELECTRIC: CPT

## 2022-12-20 PROCEDURE — 74011250636 HC RX REV CODE- 250/636: Performed by: NURSE PRACTITIONER

## 2022-12-20 PROCEDURE — 86900 BLOOD TYPING SEROLOGIC ABO: CPT

## 2022-12-20 PROCEDURE — 96365 THER/PROPH/DIAG IV INF INIT: CPT

## 2022-12-20 PROCEDURE — 36415 COLL VENOUS BLD VENIPUNCTURE: CPT

## 2022-12-20 PROCEDURE — 85027 COMPLETE CBC AUTOMATED: CPT

## 2022-12-20 RX ORDER — SODIUM CHLORIDE 9 MG/ML
25 INJECTION, SOLUTION INTRAVENOUS CONTINUOUS
Status: DISCONTINUED | OUTPATIENT
Start: 2022-12-20 | End: 2022-12-21 | Stop reason: HOSPADM

## 2022-12-20 RX ADMIN — FERRIC CARBOXYMALTOSE INJECTION 750 MG: 50 INJECTION, SOLUTION INTRAVENOUS at 14:08

## 2022-12-20 RX ADMIN — SODIUM CHLORIDE 25 ML/HR: 9 INJECTION, SOLUTION INTRAVENOUS at 14:07

## 2022-12-20 NOTE — PROGRESS NOTES
Outpatient Infusion Center Short Visit Progress Note    Mr. Annalisa Figueredo admitted to [facility] for injectafer ambulatory in stable condition. Assessment completed. No new concerns voiced. Had recent ED visit for nose bleed, per MD jacome to proceed with iron, patient states nose bleed was not too bad, has slightly more energy since blood transfusion. NO fevers noted some lingering coughing since having the flu. Vital Signs:  Visit Vitals  BP (!) 117/58   Pulse 91   Temp 98.5 °F (36.9 °C)   Resp 18   SpO2 100%         Left arm PIV with positive blood return. Lab Results:  Recent Results (from the past 12 hour(s))   TYPE & SCREEN    Collection Time: 12/20/22  1:39 PM   Result Value Ref Range    Crossmatch Expiration 12/23/2022,2359     ABO/Rh(D) O POSITIVE     Antibody screen NEG     Unit number [id number]     Blood component type  LR     Unit division 00     Status of unit REL FROM [facility]     Crossmatch result Compatible    CBC W/O DIFF    Collection Time: 12/20/22  1:39 PM   Result Value Ref Range    WBC 6.0 4.1 - 11.1 K/uL    RBC 2.33 (L) 4.10 - 5.70 M/uL    HGB 7.0 (L) 12.1 - 17.0 g/dL    HCT 22.8 (L) 36.6 - 50.3 %    MCV 97.9 80.0 - 99.0 FL    MCH 30.0 26.0 - 34.0 PG    MCHC 30.7 30.0 - 36.5 g/dL    RDW 15.0 (H) 11.5 - 14.5 %    PLATELET 345 536 - 071 K/uL    MPV 9.8 8.9 - 12.9 FL    NRBC 0.0 0  WBC    ABSOLUTE NRBC 0.00 0.00 - 0.01 K/uL           Medications:  Medications Administered       0.9% sodium chloride infusion       Admin Date  12/20/2022 Action  New Bag Dose  25 mL/hr Rate  25 mL/hr Route  IntraVENous Administered By  Arianne Juan              ferric carboxymaltose (INJECTAFER) 750 mg in 0.9% sodium chloride 250 mL, overfill volume 25 mL IVPB       Admin Date  12/20/2022 Action  New Bag Dose  750 mg Rate  870 mL/hr Route  IntraVENous Administered By  Arianne Juan                      Patient tolerated treatment well. Patient discharged from [facility] ambulatory in no distress. Patient aware of next appointment 12/22 at 0830 for labs and possible transfusion    Stacey Schneider  December 20, 2022    Future Appointments   Date Time Provider Jacqueline Ribeiro   12/22/2022  8:00 AM [facility]   3/22/2023  1:00 PM Jorge Lopez MD [facility] BS AMB

## 2022-12-21 ENCOUNTER — HOSPITAL ENCOUNTER (OUTPATIENT)
Dept: INFUSION THERAPY | Age: 82
End: 2022-12-21
Payer: MEDICARE

## 2022-12-21 RX ORDER — SODIUM CHLORIDE 9 MG/ML
5-40 INJECTION INTRAMUSCULAR; INTRAVENOUS; SUBCUTANEOUS AS NEEDED
Status: CANCELLED | OUTPATIENT
Start: 2022-12-22

## 2022-12-21 RX ORDER — SODIUM CHLORIDE 9 MG/ML
5-250 INJECTION, SOLUTION INTRAVENOUS AS NEEDED
Status: CANCELLED | OUTPATIENT
Start: 2022-12-22

## 2022-12-21 RX ORDER — SODIUM CHLORIDE 0.9 % (FLUSH) 0.9 %
5-40 SYRINGE (ML) INJECTION AS NEEDED
Status: CANCELLED | OUTPATIENT
Start: 2022-12-22

## 2022-12-21 RX ORDER — HEPARIN 100 UNIT/ML
500 SYRINGE INTRAVENOUS AS NEEDED
Status: CANCELLED
Start: 2022-12-22

## 2022-12-22 ENCOUNTER — APPOINTMENT (OUTPATIENT)
Dept: GENERAL RADIOLOGY | Age: 82
End: 2022-12-22
Attending: EMERGENCY MEDICINE
Payer: MEDICARE

## 2022-12-22 ENCOUNTER — HOSPITAL ENCOUNTER (EMERGENCY)
Age: 82
Discharge: HOME | End: 2022-12-22
Attending: EMERGENCY MEDICINE
Payer: MEDICARE

## 2022-12-22 ENCOUNTER — HOSPITAL ENCOUNTER (OUTPATIENT)
Dept: INFUSION THERAPY | Age: 82
Discharge: HOME | End: 2022-12-22
Payer: MEDICARE

## 2022-12-22 ENCOUNTER — TELEPHONE (OUTPATIENT)
Dept: ONCOLOGY | Age: 82
End: 2022-12-22

## 2022-12-22 VITALS
OXYGEN SATURATION: 99 % | SYSTOLIC BLOOD PRESSURE: 116 MMHG | HEART RATE: 85 BPM | TEMPERATURE: 98.2 F | DIASTOLIC BLOOD PRESSURE: 70 MMHG | RESPIRATION RATE: 20 BRPM

## 2022-12-22 VITALS
BODY MASS INDEX: 24.46 KG/M2 | OXYGEN SATURATION: 99 % | WEIGHT: 180.6 LBS | HEART RATE: 84 BPM | HEIGHT: 72 IN | RESPIRATION RATE: 20 BRPM | DIASTOLIC BLOOD PRESSURE: 54 MMHG | TEMPERATURE: 98.4 F | SYSTOLIC BLOOD PRESSURE: 111 MMHG

## 2022-12-22 DIAGNOSIS — C61 PROSTATE CANCER (HCC): Primary | ICD-10-CM

## 2022-12-22 DIAGNOSIS — R06.2 WHEEZING: ICD-10-CM

## 2022-12-22 DIAGNOSIS — R05.9 COUGH, UNSPECIFIED TYPE: Primary | ICD-10-CM

## 2022-12-22 LAB
ABO + RH BLD: NORMAL
BLOOD GROUP ANTIBODIES SERPL: NORMAL
ERYTHROCYTE [DISTWIDTH] IN BLOOD BY AUTOMATED COUNT: 15.3 % (ref 11.5–14.5)
HCT VFR BLD AUTO: 24.8 % (ref 36.6–50.3)
HGB BLD-MCNC: 7.5 G/DL (ref 12.1–17)
MCH RBC QN AUTO: 29.8 PG (ref 26–34)
MCHC RBC AUTO-ENTMCNC: 30.2 G/DL (ref 30–36.5)
MCV RBC AUTO: 98.4 FL (ref 80–99)
NRBC # BLD: 0 K/UL (ref 0–0.01)
NRBC BLD-RTO: 0 PER 100 WBC
PLATELET # BLD AUTO: 282 K/UL (ref 150–400)
PMV BLD AUTO: 10.1 FL (ref 8.9–12.9)
RBC # BLD AUTO: 2.52 M/UL (ref 4.1–5.7)
SPECIMEN EXP DATE BLD: NORMAL
WBC # BLD AUTO: 7.4 K/UL (ref 4.1–11.1)

## 2022-12-22 PROCEDURE — 85027 COMPLETE CBC AUTOMATED: CPT

## 2022-12-22 PROCEDURE — 36415 COLL VENOUS BLD VENIPUNCTURE: CPT

## 2022-12-22 PROCEDURE — 71046 X-RAY EXAM CHEST 2 VIEWS: CPT

## 2022-12-22 PROCEDURE — 94640 AIRWAY INHALATION TREATMENT: CPT

## 2022-12-22 PROCEDURE — 86900 BLOOD TYPING SEROLOGIC ABO: CPT

## 2022-12-22 PROCEDURE — 74011000250 HC RX REV CODE- 250: Performed by: EMERGENCY MEDICINE

## 2022-12-22 PROCEDURE — 99283 EMERGENCY DEPT VISIT LOW MDM: CPT

## 2022-12-22 RX ORDER — SODIUM CHLORIDE 0.9 % (FLUSH) 0.9 %
5-40 SYRINGE (ML) INJECTION AS NEEDED
OUTPATIENT
Start: 2022-12-27

## 2022-12-22 RX ORDER — SODIUM CHLORIDE 9 MG/ML
5-40 INJECTION INTRAMUSCULAR; INTRAVENOUS; SUBCUTANEOUS AS NEEDED
OUTPATIENT
Start: 2022-12-27

## 2022-12-22 RX ORDER — IPRATROPIUM BROMIDE AND ALBUTEROL SULFATE 2.5; .5 MG/3ML; MG/3ML
3 SOLUTION RESPIRATORY (INHALATION)
Status: COMPLETED | OUTPATIENT
Start: 2022-12-22 | End: 2022-12-22

## 2022-12-22 RX ORDER — SODIUM CHLORIDE 9 MG/ML
5-250 INJECTION, SOLUTION INTRAVENOUS AS NEEDED
OUTPATIENT
Start: 2022-12-27

## 2022-12-22 RX ORDER — SODIUM CHLORIDE 9 MG/ML
5-40 INJECTION INTRAMUSCULAR; INTRAVENOUS; SUBCUTANEOUS AS NEEDED
Status: ACTIVE | OUTPATIENT
Start: 2022-12-22 | End: 2022-12-22

## 2022-12-22 RX ORDER — HEPARIN 100 UNIT/ML
500 SYRINGE INTRAVENOUS AS NEEDED
Status: ACTIVE | OUTPATIENT
Start: 2022-12-22 | End: 2022-12-22

## 2022-12-22 RX ORDER — SODIUM CHLORIDE 0.9 % (FLUSH) 0.9 %
5-40 SYRINGE (ML) INJECTION AS NEEDED
Status: DISPENSED | OUTPATIENT
Start: 2022-12-22 | End: 2022-12-22

## 2022-12-22 RX ORDER — BENZONATATE 100 MG/1
200 CAPSULE ORAL
Qty: 40 CAPSULE | Refills: 0 | Status: SHIPPED | OUTPATIENT
Start: 2022-12-22 | End: 2022-12-29

## 2022-12-22 RX ORDER — HEPARIN 100 UNIT/ML
500 SYRINGE INTRAVENOUS AS NEEDED
OUTPATIENT
Start: 2022-12-27

## 2022-12-22 RX ORDER — ALBUTEROL SULFATE 90 UG/1
2 AEROSOL, METERED RESPIRATORY (INHALATION)
Qty: 18 G | Refills: 0 | Status: SHIPPED | OUTPATIENT
Start: 2022-12-22

## 2022-12-22 RX ORDER — SODIUM CHLORIDE 9 MG/ML
5-250 INJECTION, SOLUTION INTRAVENOUS AS NEEDED
Status: DISPENSED | OUTPATIENT
Start: 2022-12-22 | End: 2022-12-22

## 2022-12-22 RX ADMIN — IPRATROPIUM BROMIDE AND ALBUTEROL SULFATE 3 ML: .5; 3 SOLUTION RESPIRATORY (INHALATION) at 09:45

## 2022-12-22 NOTE — ED PROVIDER NOTES
Date of Service:  12/22/2022    Patient:  Elvie Crawford    Chief Complaint:  Cough       HPI:  Elvie Crawford is a 79 y.o.  male who presents for evaluation of cough. Patient with a recent diagnosis of influenza having a cough has been getting progressively worse over the last 3 to 4 days. States he was diagnosed for flu about 2 weeks ago. Patient notes cough congestion. The cough is productive, worse at night keeping him up at night. He denies systemic symptoms or other complaints       Past Medical History:   Diagnosis Date    Cancer ([facility] [address]     HHT (hereditary hemorrhagic telangiectasia) ([facility] [address]     Prostate cancer [facility])        Past Surgical History:   Procedure Laterality Date    COLONOSCOPY N/A 1/29/2020    COLONOSCOPY performed by Kaia Wheat MD at [facility] ENDOSCOPY    HX APPENDECTOMY      HX COLONOSCOPY  2014    due 22    HX OTHER SURGICAL  07/2018    sclerotheropy    HX TONSILLECTOMY      MD CHEST SURGERY PROCEDURE UNLISTED      excision of AVM         Family History:   Problem Relation Age of Onset    Heart Disease Mother     Cancer Other         colon, lung       Social History     Socioeconomic History    Marital status:      Spouse name: Not on file    Number of children: Not on file    Years of education: Not on file    Highest education level: Not on file   Occupational History    Not on file   Tobacco Use    Smoking status: Former    Smokeless tobacco: Never   Vaping Use    Vaping Use: Never used   Substance and Sexual Activity    Alcohol use:  Yes     Alcohol/week: 0.0 standard drinks     Comment: one drink per week    Drug use: No    Sexual activity: Yes     Partners: Female   Other Topics Concern    Not on file   Social History Narrative    Not on file     Social Determinants of Health     Financial Resource Strain: Not on file   Food Insecurity: Not on file   Transportation Needs: Not on file   Physical Activity: Not on file   Stress: Not on file   Social Connections: Not on file Intimate Partner Violence: Not on file   Housing Stability: Not on file         ALLERGIES: Patient has no known allergies. Review of Systems   All other systems reviewed and are negative. Vitals:    12/22/22 0929   BP: 116/70   Pulse: 85   Resp: 20   Temp: 98.2 °F (36.8 °C)   SpO2: 99%            Physical Exam  Vitals and nursing note reviewed. Constitutional:       Appearance: Normal appearance. HENT:      Head: Normocephalic and atraumatic. Nose: Nose normal.      Mouth/Throat:      Mouth: Mucous membranes are moist.   Eyes:      General: No scleral icterus. Cardiovascular:      Rate and Rhythm: Normal rate. Pulmonary:      Effort: Pulmonary effort is normal.      Breath sounds: Wheezing present. Abdominal:      General: Abdomen is flat. Musculoskeletal:         General: No deformity. Skin:     General: Skin is warm. Neurological:      Mental Status: He is alert and oriented to person, place, and time. Psychiatric:         Mood and Affect: Mood normal.        MDM     VITAL SIGNS:  Patient Vitals for the past 4 hrs:   Temp Pulse Resp BP SpO2   12/22/22 0929 98.2 °F (36.8 °C) 85 20 116/70 99 %         LABS:  Recent Results (from the past 6 hour(s))   CBC W/O DIFF    Collection Time: 12/22/22  8:16 AM   Result Value Ref Range    WBC 7.4 4.1 - 11.1 K/uL    RBC 2.52 (L) 4.10 - 5.70 M/uL    HGB 7.5 (L) 12.1 - 17.0 g/dL    HCT 24.8 (L) 36.6 - 50.3 %    MCV 98.4 80.0 - 99.0 FL    MCH 29.8 26.0 - 34.0 PG    MCHC 30.2 30.0 - 36.5 g/dL    RDW 15.3 (H) 11.5 - 14.5 %    PLATELET 687 634 - 559 K/uL    MPV 10.1 8.9 - 12.9 FL    NRBC 0.0 0  WBC    ABSOLUTE NRBC 0.00 0.00 - 0.01 K/uL   TYPE & SCREEN    Collection Time: 12/22/22  8:16 AM   Result Value Ref Range    Crossmatch Expiration 12/25/2022,2359     ABO/Rh(D) O POSITIVE     Antibody screen NEG         IMAGING:  XR CHEST PA LAT   Final Result      No evidence of acute process.                Medications During Visit:  Medications albuterol-ipratropium (DUO-NEB) 2.5 MG-0.5 MG/3 ML (3 mL Nebulization Given 12/22/22 9709)         DECISION MAKING:  Stephanie Hendrickson is a 79 y.o. male who comes in as above. Well-appearing patient. Post flu with continued cough. We will treat with medicines as below for symptomatic treatment. Have patient follow-up with PCP      IMPRESSION:  1. Cough, unspecified type    2. Wheezing        DISPOSITION:  Discharged      Current Discharge Medication List        START taking these medications    Details   albuterol (Ventolin HFA) 90 mcg/actuation inhaler Take 2 Puffs by inhalation every four (4) hours as needed for Wheezing. Qty: 18 g, Refills: 0  Start date: 12/22/2022      benzonatate (Tessalon Perles) 100 mg capsule Take 2 Capsules by mouth three (3) times daily as needed for Cough for up to 7 days. Qty: 40 Capsule, Refills: 0  Start date: 12/22/2022, End date: 12/29/2022              Follow-up Information       Follow up With Specialties Details Why Contact Info    Dung Albert MD Internal Medicine Physician Schedule an appointment as soon as possible for a visit   [address] [place] [facility] [phone]                The patient is asked to follow-up with their primary care provider in the next several days. They are to call tomorrow for an appointment. The patient is asked to return promptly for any increased concerns or worsening of symptoms. They can return to this emergency department or any other emergency department.       Procedures

## 2022-12-22 NOTE — PROGRESS NOTES
Outpatient Infusion Center Short Visit Progress Note    0800  Mr. Sanchez admitted to [facility] for labs and possible transfusion ambulatory in stable condition. Assessment completed. No new concerns voiced. #22 PIV placed in left arm, blood drawn and sent for processing. Vital Signs:  Visit Vitals  BP (!) 111/54 (BP 1 Location: Right upper arm, BP Patient Position: At rest;Sitting)   Pulse 84   Temp 98.4 °F (36.9 °C)   Resp 20   Ht 6' (1.829 m)   Wt 81.9 kg (180 lb 9.6 oz)   SpO2 99%   BMI 24.49 kg/m²           Lab Results:  Recent Results (from the past 12 hour(s))   CBC W/O DIFF    Collection Time: 12/22/22  8:16 AM   Result Value Ref Range    WBC 7.4 4.1 - 11.1 K/uL    RBC 2.52 (L) 4.10 - 5.70 M/uL    HGB 7.5 (L) 12.1 - 17.0 g/dL    HCT 24.8 (L) 36.6 - 50.3 %    MCV 98.4 80.0 - 99.0 FL    MCH 29.8 26.0 - 34.0 PG    MCHC 30.2 30.0 - 36.5 g/dL    RDW 15.3 (H) 11.5 - 14.5 %    PLATELET 607 264 - 086 K/uL    MPV 10.1 8.9 - 12.9 FL    NRBC 0.0 0  WBC    ABSOLUTE NRBC 0.00 0.00 - 0.01 K/uL         Per Dr. Lindsey Roldan, no transfusion needed today, but will recheck labs on 12/27/22. Patient discharged from [place] [address] ambulatory in no distress at 0850. PIV removed per protocol. Patient aware of next appointment.     Jose Parra, TYLER  December 22, 2022    Future Appointments   Date Time Provider Jacqueline Ribeiro   12/27/2022  8:45 AM SS INF7 CH1 LAB [facility]   3/16/2023  2:00 PM MD ROBINSON Sanches BS AMB   3/22/2023  1:00 PM Mark Lopez MD [facility] BS AMB

## 2022-12-22 NOTE — TELEPHONE ENCOUNTER
[address] at [facility]  [phone]    HGB earlier this week when here for injectafer was down to 7.0. Recheck today is up to 7.5. He is feeling well so we will hold on transfusion, recheck labs next week to ensure they are not dropping again.

## 2022-12-22 NOTE — ED TRIAGE NOTES
Patient presents ambulatory to treatment area with a steady gait. Patient states he was recently diagnosed with flu. States he has been unable to get rid of the cough and chest congestion. States he is up 2-3 hours each night with cough that is productive of yellow/green sputum. Denies recent fevers.

## 2022-12-27 ENCOUNTER — HOSPITAL ENCOUNTER (OUTPATIENT)
Dept: INFUSION THERAPY | Age: 82
Discharge: HOME | End: 2022-12-27
Payer: MEDICARE

## 2022-12-27 VITALS
HEART RATE: 66 BPM | RESPIRATION RATE: 18 BRPM | DIASTOLIC BLOOD PRESSURE: 62 MMHG | OXYGEN SATURATION: 100 % | SYSTOLIC BLOOD PRESSURE: 129 MMHG

## 2022-12-27 DIAGNOSIS — C61 PROSTATE CANCER (HCC): Primary | ICD-10-CM

## 2022-12-27 LAB
ABO + RH BLD: NORMAL
BLOOD GROUP ANTIBODIES SERPL: NORMAL
ERYTHROCYTE [DISTWIDTH] IN BLOOD BY AUTOMATED COUNT: 17.6 % (ref 11.5–14.5)
HCT VFR BLD AUTO: 28.1 % (ref 36.6–50.3)
HGB BLD-MCNC: 8.1 G/DL (ref 12.1–17)
MCH RBC QN AUTO: 29.9 PG (ref 26–34)
MCHC RBC AUTO-ENTMCNC: 28.8 G/DL (ref 30–36.5)
MCV RBC AUTO: 103.7 FL (ref 80–99)
NRBC # BLD: 0 K/UL (ref 0–0.01)
NRBC BLD-RTO: 0 PER 100 WBC
PLATELET # BLD AUTO: 317 K/UL (ref 150–400)
PMV BLD AUTO: 9.4 FL (ref 8.9–12.9)
RBC # BLD AUTO: 2.71 M/UL (ref 4.1–5.7)
SPECIMEN EXP DATE BLD: NORMAL
WBC # BLD AUTO: 3.2 K/UL (ref 4.1–11.1)

## 2022-12-27 PROCEDURE — 85027 COMPLETE CBC AUTOMATED: CPT

## 2022-12-27 PROCEDURE — 86900 BLOOD TYPING SEROLOGIC ABO: CPT

## 2022-12-27 PROCEDURE — 36415 COLL VENOUS BLD VENIPUNCTURE: CPT

## 2023-01-11 ENCOUNTER — TELEPHONE (OUTPATIENT)
Dept: INTERNAL MEDICINE CLINIC | Age: 83
End: 2023-01-11

## 2023-01-11 NOTE — TELEPHONE ENCOUNTER
Reason for call:  Patient is returning a call to [facility], please call back    Is this a new problem: yes     Date of last appointment:  8/18/2022     Can we respond via [email or web address]: no    Best call back number:   Stefany Raghavendra (Self) [phone] (Mobile)

## 2023-01-14 ENCOUNTER — APPOINTMENT (OUTPATIENT)
Dept: CT IMAGING | Age: 83
DRG: 392 | End: 2023-01-14
Attending: STUDENT IN AN ORGANIZED HEALTH CARE EDUCATION/TRAINING PROGRAM
Payer: MEDICARE

## 2023-01-14 ENCOUNTER — HOSPITAL ENCOUNTER (INPATIENT)
Age: 83
LOS: 1 days | Discharge: HOME | DRG: 392 | End: 2023-01-15
Attending: STUDENT IN AN ORGANIZED HEALTH CARE EDUCATION/TRAINING PROGRAM | Admitting: FAMILY MEDICINE
Payer: MEDICARE

## 2023-01-14 ENCOUNTER — APPOINTMENT (OUTPATIENT)
Dept: GENERAL RADIOLOGY | Age: 83
DRG: 392 | End: 2023-01-14
Attending: STUDENT IN AN ORGANIZED HEALTH CARE EDUCATION/TRAINING PROGRAM
Payer: MEDICARE

## 2023-01-14 DIAGNOSIS — D64.9 CHRONIC ANEMIA: ICD-10-CM

## 2023-01-14 DIAGNOSIS — K56.609 SMALL BOWEL OBSTRUCTION (HCC): Primary | ICD-10-CM

## 2023-01-14 LAB
ALBUMIN SERPL-MCNC: 3.6 G/DL (ref 3.5–5)
ALBUMIN/GLOB SERPL: 1.1 (ref 1.1–2.2)
ALP SERPL-CCNC: 77 U/L (ref 45–117)
ALT SERPL-CCNC: 29 U/L (ref 12–78)
ANION GAP SERPL CALC-SCNC: 9 MMOL/L (ref 5–15)
AST SERPL-CCNC: 18 U/L (ref 15–37)
BASOPHILS # BLD: 0 K/UL (ref 0–0.1)
BASOPHILS NFR BLD: 0 % (ref 0–1)
BILIRUB SERPL-MCNC: 0.6 MG/DL (ref 0.2–1)
BUN SERPL-MCNC: 22 MG/DL (ref 6–20)
BUN/CREAT SERPL: 22 (ref 12–20)
CALCIUM SERPL-MCNC: 8.7 MG/DL (ref 8.5–10.1)
CHLORIDE SERPL-SCNC: 104 MMOL/L (ref 97–108)
CO2 SERPL-SCNC: 26 MMOL/L (ref 21–32)
CREAT SERPL-MCNC: 1.01 MG/DL (ref 0.7–1.3)
DIFFERENTIAL METHOD BLD: ABNORMAL
EOSINOPHIL # BLD: 0 K/UL (ref 0–0.4)
EOSINOPHIL NFR BLD: 0 % (ref 0–7)
ERYTHROCYTE [DISTWIDTH] IN BLOOD BY AUTOMATED COUNT: 15.6 % (ref 11.5–14.5)
GLOBULIN SER CALC-MCNC: 3.4 G/DL (ref 2–4)
GLUCOSE SERPL-MCNC: 170 MG/DL (ref 65–100)
HCT VFR BLD AUTO: 35 % (ref 36.6–50.3)
HGB BLD-MCNC: 11 G/DL (ref 12.1–17)
IMM GRANULOCYTES # BLD AUTO: 0 K/UL
IMM GRANULOCYTES NFR BLD AUTO: 0 %
LIPASE SERPL-CCNC: 113 U/L (ref 73–393)
LYMPHOCYTES # BLD: 0.1 K/UL (ref 0.8–3.5)
LYMPHOCYTES NFR BLD: 1 % (ref 12–49)
MCH RBC QN AUTO: 31.1 PG (ref 26–34)
MCHC RBC AUTO-ENTMCNC: 31.4 G/DL (ref 30–36.5)
MCV RBC AUTO: 98.9 FL (ref 80–99)
MONOCYTES # BLD: 0.1 K/UL (ref 0–1)
MONOCYTES NFR BLD: 2 % (ref 5–13)
NEUTS SEG # BLD: 6.2 K/UL (ref 1.8–8)
NEUTS SEG NFR BLD: 97 % (ref 32–75)
NRBC # BLD: 0 K/UL (ref 0–0.01)
NRBC BLD-RTO: 0 PER 100 WBC
PLATELET # BLD AUTO: 176 K/UL (ref 150–400)
PMV BLD AUTO: 10.2 FL (ref 8.9–12.9)
POTASSIUM SERPL-SCNC: 4 MMOL/L (ref 3.5–5.1)
PROT SERPL-MCNC: 7 G/DL (ref 6.4–8.2)
RBC # BLD AUTO: 3.54 M/UL (ref 4.1–5.7)
RBC MORPH BLD: ABNORMAL
SODIUM SERPL-SCNC: 139 MMOL/L (ref 136–145)
TROPONIN-HIGH SENSITIVITY: 8 NG/L (ref 0–76)
WBC # BLD AUTO: 6.4 K/UL (ref 4.1–11.1)

## 2023-01-14 PROCEDURE — 71046 X-RAY EXAM CHEST 2 VIEWS: CPT

## 2023-01-14 PROCEDURE — 99285 EMERGENCY DEPT VISIT HI MDM: CPT

## 2023-01-14 PROCEDURE — 84484 ASSAY OF TROPONIN QUANT: CPT

## 2023-01-14 PROCEDURE — 36415 COLL VENOUS BLD VENIPUNCTURE: CPT

## 2023-01-14 PROCEDURE — 74011250636 HC RX REV CODE- 250/636: Performed by: STUDENT IN AN ORGANIZED HEALTH CARE EDUCATION/TRAINING PROGRAM

## 2023-01-14 PROCEDURE — 83690 ASSAY OF LIPASE: CPT

## 2023-01-14 PROCEDURE — 96360 HYDRATION IV INFUSION INIT: CPT

## 2023-01-14 PROCEDURE — 80053 COMPREHEN METABOLIC PANEL: CPT

## 2023-01-14 PROCEDURE — 85025 COMPLETE CBC W/AUTO DIFF WBC: CPT

## 2023-01-14 PROCEDURE — 93005 ELECTROCARDIOGRAM TRACING: CPT

## 2023-01-14 PROCEDURE — 74177 CT ABD & PELVIS W/CONTRAST: CPT

## 2023-01-14 PROCEDURE — 65270000029 HC RM PRIVATE

## 2023-01-14 PROCEDURE — 74011000636 HC RX REV CODE- 636: Performed by: STUDENT IN AN ORGANIZED HEALTH CARE EDUCATION/TRAINING PROGRAM

## 2023-01-14 PROCEDURE — 74011250636 HC RX REV CODE- 250/636: Performed by: EMERGENCY MEDICINE

## 2023-01-14 RX ORDER — HYDROMORPHONE HYDROCHLORIDE 1 MG/ML
0.5 INJECTION, SOLUTION INTRAMUSCULAR; INTRAVENOUS; SUBCUTANEOUS
Status: DISCONTINUED | OUTPATIENT
Start: 2023-01-14 | End: 2023-01-15 | Stop reason: SDUPTHER

## 2023-01-14 RX ADMIN — SODIUM CHLORIDE 1000 ML: 9 INJECTION, SOLUTION INTRAVENOUS at 14:39

## 2023-01-14 RX ADMIN — HYDROMORPHONE HYDROCHLORIDE 0.5 MG: 1 INJECTION, SOLUTION INTRAMUSCULAR; INTRAVENOUS; SUBCUTANEOUS at 23:45

## 2023-01-14 RX ADMIN — IOPAMIDOL 100 ML: 755 INJECTION, SOLUTION INTRAVENOUS at 15:58

## 2023-01-14 NOTE — ED TRIAGE NOTES
Pt presents o ED with c/o vomiting x 3 with numerous episodes of diarrhea since last night after drinking milk shake. He appears in NAD.

## 2023-01-14 NOTE — ED PROVIDER NOTES
25-year-old male presents ED for evaluation of diarrhea, nausea since yesterday. Patient reports that he had a chocolate milkshake that he prepared the morning and waited half the day to drink it. Since last night he has been having multiple episodes of diarrhea and nausea with several episodes of nonbloody nonbilious vomiting. Denies any chest pain or shortness of breath. Denies any abdominal pain. Patient does have a history of previous abdominal surgeries include an appendectomy, hernia repair. Diarrhea   Associated symptoms include diarrhea, nausea and vomiting. Pertinent negatives include no chest pain. Vomiting   Associated symptoms include diarrhea. Pertinent negatives include no abdominal pain. Past Medical History:   Diagnosis Date    Cancer ([facility] [address]     HHT (hereditary hemorrhagic telangiectasia) ([facility] [address]     Prostate cancer [facility])        Past Surgical History:   Procedure Laterality Date    COLONOSCOPY N/A 1/29/2020    COLONOSCOPY performed by Abraham Mccann MD at [facility] ENDOSCOPY    HX APPENDECTOMY      HX COLONOSCOPY  2014    due 22    HX OTHER SURGICAL  07/2018    sclerotheropy    HX TONSILLECTOMY      [place] UNLISTED PROCEDURE LUNGS & PLEURA      excision of AVM         Family History:   Problem Relation Age of Onset    Heart Disease Mother     Cancer Other         colon, lung       Social History     Socioeconomic History    Marital status:      Spouse name: Not on file    Number of children: Not on file    Years of education: Not on file    Highest education level: Not on file   Occupational History    Not on file   Tobacco Use    Smoking status: Former    Smokeless tobacco: Never   Vaping Use    Vaping Use: Never used   Substance and Sexual Activity    Alcohol use:  Yes     Alcohol/week: 0.0 standard drinks     Comment: one drink per week    Drug use: No    Sexual activity: Yes     Partners: Female   Other Topics Concern    Not on file   Social History Narrative    Not on file Social Determinants of Health     Financial Resource Strain: Not on file   Food Insecurity: Not on file   Transportation Needs: Not on file   Physical Activity: Not on file   Stress: Not on file   Social Connections: Not on file   Intimate Partner Violence: Not on file   Housing Stability: Not on file         ALLERGIES: Patient has no known allergies. Review of Systems   Respiratory:  Negative for shortness of breath. Cardiovascular:  Negative for chest pain. Gastrointestinal:  Positive for diarrhea, nausea and vomiting. Negative for abdominal pain. All other systems reviewed and are negative. Vitals:    01/14/23 1411   BP: (!) 119/58   Pulse: 93   Resp: 16   Temp: 99 °F (37.2 °C)   SpO2: 98%   Weight: 76 kg (167 lb 8.8 oz)   Height: 6' (1.829 m)            Physical Exam  Vitals and nursing note reviewed. Constitutional:       General: He is not in acute distress. Appearance: Normal appearance. He is not ill-appearing. HENT:      Head: Normocephalic and atraumatic. Right Ear: External ear normal.      Left Ear: External ear normal.      Nose: Nose normal.   Cardiovascular:      Rate and Rhythm: Normal rate and regular rhythm. Pulses: Normal pulses. Heart sounds: Normal heart sounds. Pulmonary:      Effort: Pulmonary effort is normal. No respiratory distress. Breath sounds: Normal breath sounds. No wheezing. Abdominal:      General: Abdomen is flat. Bowel sounds are normal.      Palpations: Abdomen is soft. Tenderness: There is no abdominal tenderness. There is no guarding. Genitourinary:     Comments: Deferred  Musculoskeletal:         General: No swelling. Normal range of motion. Skin:     General: Skin is warm and dry. Capillary Refill: Capillary refill takes less than 2 seconds. Findings: No rash. Neurological:      Mental Status: He is alert and oriented to person, place, and time.       Comments: Moving all extremities   Psychiatric: Mood and Affect: Mood normal.         Behavior: Behavior normal.      Comments: Has decision making capacity        Medical Decision Making  Amount and/or Complexity of Data Reviewed  Labs: ordered. Radiology: ordered. Decision-making details documented in ED Course. ECG/medicine tests: ordered. Risk  Prescription drug management. Decision regarding hospitalization. ED Course as of 01/14/23 1817   Sat Jan 14, 2023   1449 EKG performed at 1434 shows sinus rhythm with a first-degree AV block, MT interval 226, incomplete right bundle branch block, no STEMI. [WG]   1556 XR CHEST PA AND LATERAL [WG]   1557 Chest x-ray performed to evaluate for pneumonia in the presence of cough or my interpretation does not show any pneumonia. [WG]   56 CT Abdomen pelvis w  IMPRESSION  Mild proximal to mid small bowel distention with somewhat decompressed loops  distally concerning for early or partial obstruction likely related to adhesion  formation in the mid abdomen. Nodular infiltrates are noted in the lower lobes  bilaterally. [WG]   6085 Discussion with patient and patient's wife regarding CT findings of early versus partial bowel obstruction. Patient would like to leave as he has an appointment in [facility] on Tuesday 3 days from now to evaluate for esophageal cancer. Have explained that this diagnosis could develop into worsening full bowel obstruction. Have discussed all risks up to including sepsis, worsening obstruction, ischemia, infarction, death. I have offered admission for observation to be evaluated by the surgical team.  They would like to have a discussion between themselves and I will reevaluate. [WG]   [address] Patient's wife has convinced him to stay. Will contact general surgery [WG]   2756 Spoke to Dr Sheila Traylor general surgery regarding patient.   He recommended have the patient admitted to the hospital medicine team and that he will see them in consultation. [WG]      ED Course User Index  [WG] 196 Lawanda Yadav Dorethia Runner, DO Procedures    Perfect Serve Consult for Admission  6:20 PM    ED Room Number: YVO91/04  Patient Name and age:  Kvng Carrington 79 y.o.  male  Working Diagnosis:   1. Small bowel obstruction ([address]    2. Chronic anemia        COVID-19 Suspicion:  no  Sepsis present:  no  Reassessment needed: no  Code Status:  Full Code  Readmission: no  Isolation Requirements:  no  Recommended Level of Care:  med/surg  Department:[place] [facility] - [phone]  Other:  27-year-old male presents the ED for evaluation of vomiting diarrhea since yesterday. Nontender abdomen. Afebrile, reassuring vital signs. Initial concern was possible small bowel obstruction given previous surgeries, other differential includes volvulus, ileus, intra-abdominal infection. CT shows mild proximal to mid small bowel distention concerning for early or partial obstruction. Discussed with general surgery who have asked for medicine admission and they will see them in consultation. Otherwise reassuring laboratory studies including a CBC showing no leukocytosis, chronic anemia which is improving. Lipase is normal less likely pancreatitis, reassuring creatinine. Patient has been NPO.   Agrees to admission

## 2023-01-15 VITALS
DIASTOLIC BLOOD PRESSURE: 58 MMHG | HEIGHT: 72 IN | RESPIRATION RATE: 17 BRPM | WEIGHT: 167.55 LBS | TEMPERATURE: 98 F | OXYGEN SATURATION: 96 % | HEART RATE: 87 BPM | SYSTOLIC BLOOD PRESSURE: 104 MMHG | BODY MASS INDEX: 22.69 KG/M2

## 2023-01-15 LAB
ANION GAP SERPL CALC-SCNC: 6 MMOL/L (ref 5–15)
BUN SERPL-MCNC: 15 MG/DL (ref 6–20)
BUN/CREAT SERPL: 21 (ref 12–20)
CALCIUM SERPL-MCNC: 8 MG/DL (ref 8.5–10.1)
CHLORIDE SERPL-SCNC: 112 MMOL/L (ref 97–108)
CO2 SERPL-SCNC: 25 MMOL/L (ref 21–32)
CREAT SERPL-MCNC: 0.73 MG/DL (ref 0.7–1.3)
ERYTHROCYTE [DISTWIDTH] IN BLOOD BY AUTOMATED COUNT: 15.7 % (ref 11.5–14.5)
GLUCOSE SERPL-MCNC: 95 MG/DL (ref 65–100)
HCT VFR BLD AUTO: 30.6 % (ref 36.6–50.3)
HGB BLD-MCNC: 9.5 G/DL (ref 12.1–17)
MCH RBC QN AUTO: 30.7 PG (ref 26–34)
MCHC RBC AUTO-ENTMCNC: 31 G/DL (ref 30–36.5)
MCV RBC AUTO: 99 FL (ref 80–99)
NRBC # BLD: 0 K/UL (ref 0–0.01)
NRBC BLD-RTO: 0 PER 100 WBC
PLATELET # BLD AUTO: 151 K/UL (ref 150–400)
PMV BLD AUTO: 10 FL (ref 8.9–12.9)
POTASSIUM SERPL-SCNC: 3.7 MMOL/L (ref 3.5–5.1)
RBC # BLD AUTO: 3.09 M/UL (ref 4.1–5.7)
SODIUM SERPL-SCNC: 143 MMOL/L (ref 136–145)
WBC # BLD AUTO: 3.7 K/UL (ref 4.1–11.1)

## 2023-01-15 PROCEDURE — 85027 COMPLETE CBC AUTOMATED: CPT

## 2023-01-15 PROCEDURE — 74011250636 HC RX REV CODE- 250/636: Performed by: FAMILY MEDICINE

## 2023-01-15 PROCEDURE — 36415 COLL VENOUS BLD VENIPUNCTURE: CPT

## 2023-01-15 PROCEDURE — 74011000250 HC RX REV CODE- 250: Performed by: FAMILY MEDICINE

## 2023-01-15 PROCEDURE — 80048 BASIC METABOLIC PNL TOTAL CA: CPT

## 2023-01-15 RX ORDER — ACETAMINOPHEN 650 MG/1
650 SUPPOSITORY RECTAL
Status: DISCONTINUED | OUTPATIENT
Start: 2023-01-15 | End: 2023-01-15 | Stop reason: HOSPADM

## 2023-01-15 RX ORDER — SODIUM CHLORIDE 9 MG/ML
75 INJECTION, SOLUTION INTRAVENOUS CONTINUOUS
Status: DISCONTINUED | OUTPATIENT
Start: 2023-01-15 | End: 2023-01-15 | Stop reason: HOSPADM

## 2023-01-15 RX ORDER — ACETAMINOPHEN 325 MG/1
650 TABLET ORAL
Status: DISCONTINUED | OUTPATIENT
Start: 2023-01-15 | End: 2023-01-15 | Stop reason: HOSPADM

## 2023-01-15 RX ORDER — ENOXAPARIN SODIUM 100 MG/ML
40 INJECTION SUBCUTANEOUS DAILY
Status: DISCONTINUED | OUTPATIENT
Start: 2023-01-15 | End: 2023-01-15 | Stop reason: HOSPADM

## 2023-01-15 RX ORDER — SODIUM CHLORIDE 0.9 % (FLUSH) 0.9 %
5-40 SYRINGE (ML) INJECTION EVERY 8 HOURS
Status: DISCONTINUED | OUTPATIENT
Start: 2023-01-15 | End: 2023-01-15 | Stop reason: HOSPADM

## 2023-01-15 RX ORDER — MORPHINE SULFATE 2 MG/ML
2 INJECTION, SOLUTION INTRAMUSCULAR; INTRAVENOUS
Status: DISCONTINUED | OUTPATIENT
Start: 2023-01-15 | End: 2023-01-15 | Stop reason: HOSPADM

## 2023-01-15 RX ORDER — SODIUM CHLORIDE 0.9 % (FLUSH) 0.9 %
5-40 SYRINGE (ML) INJECTION AS NEEDED
Status: DISCONTINUED | OUTPATIENT
Start: 2023-01-15 | End: 2023-01-15 | Stop reason: HOSPADM

## 2023-01-15 RX ORDER — ONDANSETRON 4 MG/1
4 TABLET, ORALLY DISINTEGRATING ORAL
Status: DISCONTINUED | OUTPATIENT
Start: 2023-01-15 | End: 2023-01-15 | Stop reason: HOSPADM

## 2023-01-15 RX ORDER — POLYETHYLENE GLYCOL 3350 17 G/17G
17 POWDER, FOR SOLUTION ORAL DAILY PRN
Status: DISCONTINUED | OUTPATIENT
Start: 2023-01-15 | End: 2023-01-15 | Stop reason: HOSPADM

## 2023-01-15 RX ORDER — ONDANSETRON 2 MG/ML
4 INJECTION INTRAMUSCULAR; INTRAVENOUS
Status: DISCONTINUED | OUTPATIENT
Start: 2023-01-15 | End: 2023-01-15 | Stop reason: HOSPADM

## 2023-01-15 RX ORDER — TRANEXAMIC ACID 650 MG/1
650 TABLET ORAL 3 TIMES DAILY
COMMUNITY

## 2023-01-15 RX ADMIN — SODIUM CHLORIDE 75 ML/HR: 9 INJECTION, SOLUTION INTRAVENOUS at 02:51

## 2023-01-15 RX ADMIN — Medication 10 ML: at 02:51

## 2023-01-15 RX ADMIN — Medication 10 ML: at 14:00

## 2023-01-15 RX ADMIN — SODIUM CHLORIDE, PRESERVATIVE FREE 10 ML: 5 INJECTION INTRAVENOUS at 06:44

## 2023-01-15 RX ADMIN — Medication 10 ML: at 06:45

## 2023-01-15 NOTE — PROGRESS NOTES
TRANSFER - IN REPORT:    Verbal report received fromTYLER Pedro (name) on Ananya Ra  being received from [facility]-ER(unit) for routine progression of care      Report consisted of patients Situation, Background, Assessment and   Recommendations(SBAR). Information from the following report(s) SBAR and Kardex was reviewed with the receiving nurse. Opportunity for questions and clarification was provided. Assessment completed upon patients arrival to unit and care assumed.

## 2023-01-15 NOTE — PROGRESS NOTES
Problem: Falls - Risk of  Goal: *Absence of Falls  Outcome: Progressing Towards Goal  Note: Fall Risk Interventions:                                Problem: Patient Education: Go to Patient Education Activity  Goal: Patient/Family Education  Outcome: Progressing Towards Goal     Problem: Pain  Goal: *Control of Pain  Outcome: Progressing Towards Goal     Problem: Small Bowel Obstruction: Day 1  Goal: Off Pathway (Use only if patient is Off Pathway)  Outcome: Progressing Towards Goal  Goal: Activity/Safety  Outcome: Progressing Towards Goal  Goal: Consults, if ordered  Outcome: Progressing Towards Goal  Goal: Diagnostic Test/Procedures  Outcome: Progressing Towards Goal  Goal: Nutrition/Diet  Outcome: Progressing Towards Goal  Goal: Medications  Outcome: Progressing Towards Goal  Goal: Respiratory  Outcome: Progressing Towards Goal  Goal: Treatments/Interventions/Procedures  Outcome: Progressing Towards Goal  Goal: Psychosocial  Outcome: Progressing Towards Goal  Goal: *Optimal pain control at patient's stated goal  Outcome: Progressing Towards Goal  Goal: *Adequate urinary output (equal to or greater than 30 milliliters/hour)  Outcome: Progressing Towards Goal  Goal: *Hemodynamically stable  Outcome: Progressing Towards Goal  Goal: *Demonstrates progressive activity  Outcome: Progressing Towards Goal  Goal: *Absence of nausea/vomiting  Outcome: Progressing Towards Goal     Problem: Patient Education: Go to Patient Education Activity  Goal: Patient/Family Education  Outcome: Progressing Towards Goal

## 2023-01-15 NOTE — DISCHARGE INSTRUCTIONS
HOSPITALIST DISCHARGE INSTRUCTIONS  NAME: Joe Saucedo   :  1940   MRN:  [id number]     Date/Time:  1/15/2023 1:41 PM    ADMIT DATE: 2023     DISCHARGE DATE: 1/15/2023     ADMITTING DIAGNOSIS:  Acute gastroenteritis  Rule out small bowel obstruction    DISCHARGE DIAGNOSIS:  You were admitted for evaluation and treatment of the above. MEDICATIONS:    It is important that you take the medication exactly as they are prescribed. Keep your medication in the bottles provided by the pharmacist and keep a list of the medication names, dosages, and times to be taken in your wallet. Do not take other medications without consulting your doctor. DIET:  Regular    ACTIVITY:  As tolerated    OTHER INSTRUCTIONS:    Seek medical attention for recurrent severe abdominal pain, distension, nausea and vomiting. If you experience any of the following symptoms then please call your primary care physician or return to the emergency room if you cannot get hold of your doctor:  Fever, chills, nausea, vomiting, diarrhea, change in mentation, falling, bleeding, shortness of breath    Follow Up:  Please call and set up an appointment to see them in 1 week. Mark Urbina MD  [address]  [address] [facility]  [phone]    Follow up in 1 week(s)      Leighton Nassar NP  [address]   [address] [place] [address]  [phone]              Information obtained by :  I understand that if any problems occur once I am at home I am to contact my physician. I understand and acknowledge receipt of the instructions indicated above.                                                                                                                                            Physician's or R.N.'s Signature                                                                  Date/Time Patient or Representative Signature                                                          Date/Time                                                                                                                                              Patient or Representative Signature                                                          Date/Time      Signed By: Leanne Hernandez MD     January 15, 2023

## 2023-01-15 NOTE — ROUTINE PROCESS
Bedside and Verbal shift change report given to TYLER Bruner (oncoming nurse) by David Mojica (offgoing nurse). Report included the following information SBAR and Kardex.

## 2023-01-15 NOTE — DISCHARGE SUMMARY
Hospitalist Discharge Summary     Patient ID:  Tayla Juarez  [id number]  89 y.o.  1940    Admit date: 2023    Discharge date and time: 1/15/2023    Admission Diagnoses: Small bowel obstruction [facility]) [K56.609]      Discharge Diagnoses: Active Problems:    Small bowel obstruction (HCC) (2023)       Nausea, vomiting and diarrhea  Acute gastroenteritis vs food borne illness  Rule out small bowel obstruction  History of prostate cancer        Hospital Course:     Tayla Juarez  is a 79 y.o.  male with history of prostate cancer and previous abdominal surgery including appendectomy presenting with acute onset of abdominal pain nausea and vomiting. He also had 1 episode of diarrhea. He had previously eaten a chocolate milkshake which he thinks may have been . On evaluation in the ER he had a CT scan of the abdomen pelvis that showed a mild proximal to mid small bowel distention concerning for possible early obstruction. Patient was made n.p.o. and admitted to the hospitalist service for further evaluation and treatment of small bowel obstruction. He was provided with IV Zofran as needed nausea and IV morphine as needed pain. General surgery was consulted the following day. The patient's nausea and vomiting had resolved. Had no significant abdominal distention or tenderness. There was no clinical evidence of small bowel obstruction on exam.  Patient's diet was advanced and he tolerated without recurrence of symptoms. He was cleared by surgery for discharge home. Instructed to seek medical attention if his symptoms should recur.           PCP: Sally Obrien MD     Consults: General Surgery    Condition of patient at discharge: improved    Discharge Exam:     Visit Vitals  BP (!) 104/58 (BP 1 Location: Right upper arm, BP Patient Position: At rest)   Pulse 87   Temp 98 °F (36.7 °C)   Resp 17   Ht 6' (1.829 m)   Wt 76 kg (167 lb 8.8 oz)   SpO2 96%   BMI 22.72 kg/m² General:   No acute distress, resting comfortably in bed. Heart:  RRR, No MRG  Lungs:  CTAB. Non-labored breathing. Abdomen:  Soft . Nondistended. NTTP. BS present. Extremities:  No edema. Neuro:  Alert and interactive. No focal deficits. Disposition: home    Current Discharge Medication List        CONTINUE these medications which have NOT CHANGED    Details   tranexamic acid (LYSTEDA) 650 mg tab tablet Take 650 mg by mouth three (3) times daily. albuterol (Ventolin HFA) 90 mcg/actuation inhaler Take 2 Puffs by inhalation every four (4) hours as needed for Wheezing. Qty: 18 g, Refills: 0      dicyclomine (BENTYL) 20 mg tablet TAKE 1 TABLET BY MOUTH 3 TIMES DAILY AS NEEDED FOR ABDOMINAL CRAMPS (NEW DOSE/NEW DIRECTIONS)  Qty: 30 Tablet, Refills: 3    Associated Diagnoses: Irritable bowel syndrome with constipation      acetaminophen (TYLENOL) 500 mg tablet Take 1,000 mg by mouth every six (6) hours as needed for Pain. cholecalciferol (VITAMIN D3) 25 mcg (1,000 unit) cap Take  by mouth daily. ascorbic acid, vitamin C, (VITAMIN C) 250 mg tablet Take  by mouth. STOP taking these medications       TIMOLOL MALEATE OP Comments:   Reason for Stopping:         ferrous sulfate (Slow Fe) 142 mg (45 mg iron) ER tablet Comments:   Reason for Stopping:                I have reviewed discharge instructions with the patient and caregiver. The patient and caregiver verbalized understanding.      Approximate time spent in patient care on day of discharge: 45 mins    Signed:  Tobin Rosa MD  1/15/2023  1:42 PM

## 2023-01-15 NOTE — H&P
Austyn [address] [place] [address]  [address] [place] [address] [place], [address] [facility]  [phone]    [address] Adult  Hospitalist Group    History & Physical    Date of service: 2023    Patient name: Shirley Rodriguez  MRN: [id number]  YOB: 1940  Age: 79 y.o. Primary care provider:  Blaze Cameron MD     Source of Information: patient, medical records                                Chief complain: Nausea and vomiting    History of present illness  Shirley Rodriguez is a 79 y.o. male who presented with nausea and vomiting and diarrhea that started yesterday afternoon. He states that he woke up around 3 AM and could not go back to sleep so he made a chocolate milkshake and since then has having vomiting and diarrhea. He thinks that the chocolate milkshake may have been . He denies any chest pain or shortness of breath, fevers or chills, abdominal pain, pain or burning on urination, headaches, dizziness or lightheadedness, or blurry vision. He has had previous abdominal surgeries including appendectomy and hernia repair. He currently denies any nausea or abdominal pain. Past Medical History:   Diagnosis Date    Cancer ([facility] [address]     HHT (hereditary hemorrhagic telangiectasia) ([facility] [address]     Prostate cancer [facility])       Past Surgical History:   Procedure Laterality Date    COLONOSCOPY N/A 2020    COLONOSCOPY performed by Rabia Ponce MD at [facility] ENDOSCOPY    HX APPENDECTOMY      HX COLONOSCOPY      due 19    HX OTHER SURGICAL  2018    sclerotheropy    HX TONSILLECTOMY      PA UNLISTED PROCEDURE LUNGS & PLEURA      excision of AVM     Prior to Admission medications    Medication Sig Start Date End Date Taking? Authorizing Provider   albuterol (Ventolin HFA) 90 mcg/actuation inhaler Take 2 Puffs by inhalation every four (4) hours as needed for Wheezing.  22  Yes Alexander Pisano,    dicyclomine (BENTYL) 20 mg tablet TAKE 1 TABLET BY MOUTH 3 TIMES DAILY AS NEEDED FOR ABDOMINAL CRAMPS (NEW DOSE/NEW DIRECTIONS) 6/21/22  Yes Schutrumpf, Claudell Anda, MD   acetaminophen (TYLENOL) 500 mg tablet Take 1,000 mg by mouth every six (6) hours as needed for Pain. Yes Other, MD Dejah   cholecalciferol (VITAMIN D3) 25 mcg (1,000 unit) cap Take  by mouth daily. Yes Provider, Historical   ascorbic acid, vitamin C, (VITAMIN C) 250 mg tablet Take  by mouth. Yes Provider, Historical   TIMOLOL MALEATE OP Apply  to eye. 1 drop to each eye twice daily. Yes Provider, Historical   ferrous sulfate (Slow Fe) 142 mg (45 mg iron) ER tablet Take  by mouth Daily (before breakfast). Patient not taking: Reported on 1/15/2023    Provider, Historical     No Known Allergies   Family History   Problem Relation Age of Onset    Heart Disease Mother     Cancer Other         colon, lung         Social history    Social History     Tobacco Use   Smoking Status Former   Smokeless Tobacco Never       Social History     Substance and Sexual Activity   Alcohol Use Yes    Alcohol/week: 0.0 standard drinks    Comment: one drink per week       Code status  Code status discussed with the patient/caregivers. No Order    Review of systems    A comprehensive review of systems was negative except for that written in the History of Present Illness.        Physical Examination   Visit Vitals  /62 (BP 1 Location: Right upper arm, BP Patient Position: At rest)   Pulse 95   Temp 98.3 °F (36.8 °C)   Resp 17   Ht 6' (1.829 m)   Wt 76 kg (167 lb 8.8 oz)   SpO2 96%   BMI 22.72 kg/m²          O2 Device: None (Room air)    Gen:  awake, alert, NAD   HEENT:  Pink conjunctivae, PERRL, hearing intact to voice, moist mucous membranes  Neck:  Supple, without masses, thyroid non-tender  Resp:  No accessory muscle use, clear breath sounds without wheezes rales or rhonchi  Card:  No murmurs, normal S1, S2 without thrills, bruits or peripheral edema  Abd:  Soft, non-tender, non-distended, normoactive bowel sounds are present  Lymph:  No cervical adenopathy  Musc:  No cyanosis or clubbing  Skin:  No rashes   Neuro:  Cranial nerves 3-12 are grossly intact, follows commands appropriately  Psych:  Alert with good insight. Oriented to person, place, and time    Data Review    24 Hour Results:  Recent Results (from the past 24 hour(s))   EKG, 12 LEAD, INITIAL    Collection Time: 01/14/23  2:34 PM   Result Value Ref Range    Ventricular Rate 84 BPM    Atrial Rate 84 BPM    P-R Interval 226 ms    QRS Duration 106 ms    Q-T Interval 368 ms    QTC Calculation (Bezet) 434 ms    Calculated P Axis 81 degrees    Calculated R Axis 16 degrees    Calculated T Axis 53 degrees    Diagnosis       Sinus rhythm with 1st degree AV block  Incomplete right bundle branch block  Borderline ECG  When compared with ECG of 20-JUL-2022 17:15,  Sinus rhythm has replaced Atrial flutter     CBC WITH AUTOMATED DIFF    Collection Time: 01/14/23  2:41 PM   Result Value Ref Range    WBC 6.4 4.1 - 11.1 K/uL    RBC 3.54 (L) 4.10 - 5.70 M/uL    HGB 11.0 (L) 12.1 - 17.0 g/dL    HCT 35.0 (L) 36.6 - 50.3 %    MCV 98.9 80.0 - 99.0 FL    MCH 31.1 26.0 - 34.0 PG    MCHC 31.4 30.0 - 36.5 g/dL    RDW 15.6 (H) 11.5 - 14.5 %    PLATELET 842 418 - 373 K/uL    MPV 10.2 8.9 - 12.9 FL    NRBC 0.0 0.0  WBC    ABSOLUTE NRBC 0.00 0.00 - 0.01 K/uL    NEUTROPHILS 97 (H) 32 - 75 %    LYMPHOCYTES 1 (L) 12 - 49 %    MONOCYTES 2 (L) 5 - 13 %    EOSINOPHILS 0 0 - 7 %    BASOPHILS 0 0 - 1 %    IMMATURE GRANULOCYTES 0 %    ABS. NEUTROPHILS 6.2 1.8 - 8.0 K/UL    ABS. LYMPHOCYTES 0.1 (L) 0.8 - 3.5 K/UL    ABS. MONOCYTES 0.1 0.0 - 1.0 K/UL    ABS. EOSINOPHILS 0.0 0.0 - 0.4 K/UL    ABS. BASOPHILS 0.0 0.0 - 0.1 K/UL    ABS. IMM.  GRANS. 0.0 K/UL    DF MANUAL      RBC COMMENTS ANISOCYTOSIS  1+       METABOLIC PANEL, COMPREHENSIVE    Collection Time: 01/14/23  2:41 PM   Result Value Ref Range    Sodium 139 136 - 145 mmol/L    Potassium 4.0 3.5 - 5.1 mmol/L    Chloride 104 97 - 108 mmol/L    CO2 26 21 - 32 mmol/L    Anion gap 9 5 - 15 mmol/L    Glucose 170 (H) 65 - 100 mg/dL    BUN 22 (H) 6 - 20 MG/DL    Creatinine 1.01 0.70 - 1.30 MG/DL    BUN/Creatinine ratio 22 (H) 12 - 20      eGFR >60 >60 ml/min/1.73m2    Calcium 8.7 8.5 - 10.1 MG/DL    Bilirubin, total 0.6 0.2 - 1.0 MG/DL    ALT (SGPT) 29 12 - 78 U/L    AST (SGOT) 18 15 - 37 U/L    Alk. phosphatase 77 45 - 117 U/L    Protein, total 7.0 6.4 - 8.2 g/dL    Albumin 3.6 3.5 - 5.0 g/dL    Globulin 3.4 2.0 - 4.0 g/dL    A-G Ratio 1.1 1.1 - 2.2     LIPASE    Collection Time: 01/14/23  2:41 PM   Result Value Ref Range    Lipase 113 73 - 393 U/L   TROPONIN-HIGH SENSITIVITY    Collection Time: 01/14/23  2:41 PM   Result Value Ref Range    Troponin-High Sensitivity 8 0 - 76 ng/L     Recent Labs     01/14/23  1441   WBC 6.4   HGB 11.0*   HCT 35.0*        Recent Labs     01/14/23  1441      K 4.0      CO2 26   *   BUN 22*   CREA 1.01   CA 8.7   ALB 3.6   TBILI 0.6   ALT 29       Imaging  XR CHEST PA LAT    Result Date: 1/14/2023  Clinical history: Cough INDICATION:   Cough COMPARISON: 12/22/2022 FINDINGS: PA and lateral views of the chest are obtained. The cardiopericardial silhouette is stable. There is no pleural effusion, pneumothorax or focal consolidation present. Modulation) the right lower lobe and left upper lobe. Chronic interstitial and parenchymal changes. No significant interval change    CT ABD PELV W CONT    Result Date: 1/14/2023  EXAM: CT ABD PELV W CONT INDICATION: Nausea/diarrhea COMPARISON: 3/20/2006 IV CONTRAST: 100 mL of Isovue-370. ORAL CONTRAST: None TECHNIQUE: Following the uneventful intravenous administration of contrast, thin axial images were obtained through the abdomen and pelvis. Coronal and sagittal reconstructions were generated. CT dose reduction was achieved through use of a standardized protocol tailored for this examination and automatic exposure control for dose modulation.  FINDINGS: LOWER THORAX: Nodular infiltrates are noted in the lower lobes bilaterally. Embolization coils are again noted in the right lower lobe. LIVER: No mass. BILIARY TREE: Gallbladder is within normal limits. CBD is not dilated. SPLEEN: within normal limits. PANCREAS: No mass or ductal dilatation. ADRENALS: Unremarkable. KIDNEYS: No mass, calculus, or hydronephrosis. STOMACH: Unremarkable. SMALL BOWEL: Mild proximal to mid small bowel distention is noted with somewhat decompressed loops distally concerning for early or partial obstruction likely related to adhesion formation in the mid abdomen. COLON: No dilatation or wall thickening. Sigmoid diverticulosis. APPENDIX: Not visualized. PERITONEUM: No ascites or pneumoperitoneum. RETROPERITONEUM: No lymphadenopathy or aortic aneurysm. REPRODUCTIVE ORGANS: Unremarkable. URINARY BLADDER: No mass or calculus. BONES: Degenerative changes are seen in the lumbar spine. ABDOMINAL WALL: Evidence of bilateral inguinal hernia repair with concern for a small right inguinal recurrent hernia containing only fat. ADDITIONAL COMMENTS: N/A     Mild proximal to mid small bowel distention with somewhat decompressed loops distally concerning for early or partial obstruction likely related to adhesion formation in the mid abdomen. Nodular infiltrates are noted in the lower lobes bilaterally. Assessment and Plan     Small bowel obstruction  -Completely benign exam.  Question diagnosis of small bowel obstruction  -We will allow sips and chips for now, IV fluids, IV Zofran and IV morphine as needed  -Surgery consulted but doubt he will need surgery    Nausea/vomiting/diarrhea  -Could have been food poisoning from  milkshake  -IV fluids and supportive care        Diet: N.p.o. Activity: As tolerated  DVT prophylaxis: Lovenox  Isolation precautions: None       Signed by:  Anastasia Handley MD    January 15, 2023 at 12:54 AM

## 2023-01-15 NOTE — PROGRESS NOTES
1/15/2023  10:53 AM  Case management note    Reason for Admission:  small bowel obstruction    Patient came to hospital for diarrhea and vomiting. Patient is , drives and independent with ADL's. Patient had multiple ED visits in December for flu like symptoms. Patient states he may discharge today. Patient MD was not available much of 2022, due to his own family health issues. He has appointment with associate in March. I suggested to him to call this week to see if he can get post hospital appointment. [facility]                     RUR Score:          17%           Plan for utilizing home health:      patient is independent with ADL's and will most likely not qualify for home health services    PCP: First and Last name:  Blaze Cameron MD     Name of Practice:    Are you a current patient: Yes/No: yes   Approximate date of last visit: 8 months   Can you participate in a virtual visit with your PCP:                     Current Advanced Directive/Advance Care Plan: Full Code  AD on file      Healthcare Decision Maker:   Rolarangel Harrington spouse                              Transition of Care Plan:                    Home with family assistance  PCP follow up  CM to follow for discharge needs    Care Management Interventions  PCP Verified by CM:  Yes (Dr. Heaven Cedillo)  Support Systems: Spouse/Significant Other  Confirm Follow Up Transport: Family  The Plan for Transition of Care is Related to the Following Treatment Goals : small bowel obstruction  Discharge Location  Patient Expects to be Discharged to[de-identified] Home with family assistance  Michael Connolly

## 2023-01-15 NOTE — PROGRESS NOTES
The pt is discharged to home with wife as transport. Discharge instructions were provided along with the opportunity for questions and concerns. The pt verbalized understanding and was wc to the front by volunteer assistance to home with his wife.

## 2023-01-15 NOTE — CONSULTS
Surgery Consult    Subjective:      William Rivero is a 79 y.o. male who presents because of N/V/D. Was in usual state of health until he awoke at 3am yesterday with significant diarrhea followed by nausea and vomiting. Had drunk a milkshake just before bed around 11pm.  Went to OSH where CT raised question of SBO so consult was requested. Patient currently reports no further nausea and no further diarrhea and no abdominal pain. Past Medical History:   Diagnosis Date    Cancer ([facility] [address]     HHT (hereditary hemorrhagic telangiectasia) ([facility] [address]     Prostate cancer [facility])      Past Surgical History:   Procedure Laterality Date    COLONOSCOPY N/A 1/29/2020    COLONOSCOPY performed by Marques Vasquez MD at [facility] ENDOSCOPY    HX APPENDECTOMY      HX COLONOSCOPY  2014 due 22    HX OTHER SURGICAL  07/2018    sclerotheropy    HX TONSILLECTOMY      [place] UNLISTED PROCEDURE LUNGS & PLEURA      excision of AVM      Family History   Problem Relation Age of Onset    Heart Disease Mother     Cancer Other         colon, lung     Social History     Socioeconomic History    Marital status:    Tobacco Use    Smoking status: Former    Smokeless tobacco: Never   Vaping Use    Vaping Use: Never used   Substance and Sexual Activity    Alcohol use:  Yes     Alcohol/week: 0.0 standard drinks     Comment: one drink per week    Drug use: No    Sexual activity: Yes     Partners: Female      Current Facility-Administered Medications   Medication Dose Route Frequency Provider Last Rate Last Admin    sodium chloride (NS) flush 5-40 mL  5-40 mL IntraVENous Q8H Joyce Arzola MD   10 mL at 01/15/23 0645    sodium chloride (NS) flush 5-40 mL  5-40 mL IntraVENous PRN Joyce Arzola MD   10 mL at 01/15/23 0644    acetaminophen (TYLENOL) tablet 650 mg  650 mg Oral Q6H PRN Joyce Arzola MD        Or    acetaminophen (TYLENOL) suppository 650 mg  650 mg Rectal Q6H PRN Joyce Arzola MD        polyethylene glycol [facility]) packet 17 g  17 g Oral DAILY PRN Isa Del Castillo MD        ondansetron (ZOFRAN ODT) tablet 4 mg  4 mg Oral Q8H PRN Isa Del Castillo MD        Or    [facility]) [facility] 4 mg  4 mg IntraVENous Q6H PRN Isa Del Castillo MD        enoxaparin (LOVENOX) injection 40 mg  40 mg SubCUTAneous DAILY Isa Del Castillo MD        0.9% sodium chloride infusion  75 mL/hr IntraVENous CONTINUOUS Isa Del Castillo MD 75 mL/hr at 01/15/23 0251 75 mL/hr at 01/15/23 0251    morphine injection 2 mg  2 mg IntraVENous Q4H PRN Isa Del Castillo MD            No Known Allergies    Review of Systems:  A comprehensive review of systems was negative except for that written in the History of Present Illness. Objective:      Patient Vitals for the past 8 hrs:   BP Temp Pulse Resp SpO2   01/15/23 0802 112/60 98.4 °F (36.9 °C) 91 16 95 %   01/15/23 0328 107/60 98.3 °F (36.8 °C) 87 16 96 %       Temp (24hrs), Av.6 °F (37 °C), Min:98.3 °F (36.8 °C), Max:99.1 °F (37.3 °C)      Physical Exam:  GENERAL: alert, cooperative, no distress, EYE: negative, LUNG: clear to auscultation bilaterally, HEART: regular rate and rhythm, S1, S2 normal, no murmur, click, rub or gallop, ABDOMEN: soft, non-tender. Bowel sounds normal. No masses,  no organomegaly, EXTREMITIES:  extremities normal, atraumatic, no cyanosis or edema, SKIN: Normal., NEUROLOGIC: negative, PSYCHIATRIC: non focal    Assessment:     Hospital Problems  Date Reviewed: 2022            Codes Class Noted POA    Small bowel obstruction [facility]) ICD-10-CM: D23.902  ICD-9-CM: 560.9  2023 Unknown           Plan:     The patient has absolutely no clinical evidence for SBO. Would advance diet and discharge if tolerates.

## 2023-01-15 NOTE — ED NOTES
Bedside and Verbal shift change report given to [facility] (oncoming nurse) by Melody Downing (offgoing nurse). Report included the following information ED Summary, MAR, and Recent Results.

## 2023-01-16 ENCOUNTER — PATIENT OUTREACH (OUTPATIENT)
Dept: CASE MANAGEMENT | Age: 83
End: 2023-01-16

## 2023-01-16 LAB
ATRIAL RATE: 84 BPM
CALCULATED P AXIS, ECG09: 81 DEGREES
CALCULATED R AXIS, ECG10: 16 DEGREES
CALCULATED T AXIS, ECG11: 53 DEGREES
DIAGNOSIS, 93000: NORMAL
P-R INTERVAL, ECG05: 226 MS
Q-T INTERVAL, ECG07: 368 MS
QRS DURATION, ECG06: 106 MS
QTC CALCULATION (BEZET), ECG08: 434 MS
VENTRICULAR RATE, ECG03: 84 BPM

## 2023-01-16 NOTE — PROGRESS NOTES
Care Transitions Initial Call    Call within 2 business days of discharge: Yes     Patient: Jasen Brown Patient : 1940 MRN: [id number]    Last Discharge  Street       Date Complaint Diagnosis Description Type Department Provider    23 Diarrhea; Vomiting Small bowel obstruction ([facility] [address] . .. ED to Hosp-Admission (Discharged) (ADMIT) [id number] Anya De Leon MD; Andria Tolliver.. Was this an external facility discharge? No Discharge Facility: [facility]    Challenges to be reviewed by the provider   Additional needs identified to be addressed with provider: no         Method of communication with provider : none    Discussed COVID-19 related testing which was not done at this time. Advance Care Planning:   Does patient have an Advance Directive: yes, reviewed and current. Inpatient Readmission Risk score: Unplanned Readmit Risk Score: 17.4    Was this a readmission? no   Patient stated reason for the admission: \" nausea and vomiting\"    Patients top risk factors for readmission: medical condition-     Interventions to address risk factors: Education of patient/family/caregiver/guardian to support self-management-[facility] evaluation. Dehydration. Care Transition Nurse (CTN) contacted the patient by telephone to perform post hospital discharge assessment. Verified name and  with patient as identifiers. Provided introduction to self, and explanation of the CTN role. CTN reviewed discharge instructions, medical action plan and red flags with patient who verbalized understanding. Were discharge instructions available to patient? yes. Reviewed appropriate site of care based on symptoms and resources available to patient including: PCP and When to call 911. Patient given an opportunity to ask questions and does not have any further questions or concerns at this time. The patient agrees to contact the PCP office for questions related to their healthcare.      Medication reconciliation was performed with patient, who verbalizes understanding of administration of home medications. Advised obtaining a 90-day supply of all daily and as-needed medications. Referral to Pharm D needed: no     Home Health/Outpatient orders at discharge: none    Durable Medical Equipment ordered at discharge: None    Was patient discharged with a pulse oximeter? no    Discussed follow-up appointments. If no appointment was previously scheduled, appointment scheduling offered: yes. Is follow up appointment scheduled within 7 days of discharge? yes. [facility] follow up appointment(s):   Future Appointments   Date Time Provider Jacqueline Ribeiro   3/16/2023  2:00 PM MD ROIBNSON Fernandez AMB   3/22/2023  1:00 PM Sachi Lopez MD ON[facility] BS BRI     Non-[facility] follow up appointment(s): [facility] 1/17/2023    Plan for follow-up call in 3-5 days based on severity of symptoms and risk factors. Plan for next call:  [facility] visit, GI symptoms, PCP   CTN provided contact information for future needs. Goals Addressed                   This Visit's Progress     Attends follow-up appointments as directed. 1/16/2023  -Patient's PCP retired and does not wish to see new PCP assigned to patient by [facility]. Patient's wife prefers to call [address]. CTN provided contact information. -CTN will follow up in 3 days. SP       Prevent complications post hospitalization. 1/16/2023  -Patient to increase fluid intake to avoid dehydration. Continues to eat/drink normally. He continues to have diarrhea. To be seen by Artie Augustin on 1/17/2023  -CTN to follow up in 3 days.   SP

## 2023-01-19 ENCOUNTER — TELEPHONE (OUTPATIENT)
Dept: ONCOLOGY | Age: 83
End: 2023-01-19

## 2023-01-19 ENCOUNTER — PATIENT OUTREACH (OUTPATIENT)
Dept: CASE MANAGEMENT | Age: 83
End: 2023-01-19

## 2023-01-19 NOTE — PROGRESS NOTES
Care Transitions Follow Up Call    Challenges to be reviewed by the provider   Additional needs identified to be addressed with provider: no           Method of communication with provider : none    Care Transition Nurse (CTN) contacted the family by telephone to follow up after admission on 2023. Verified name and  with family as identifiers. Addressed changes since last contact: none  Follow up appointment completed? no.   Was follow up appointment scheduled within 7 days of discharge? no.     Advance Care Planning:   Does patient have an Advance Directive:  yes; reviewed and current     CTN reviewed discharge instructions, medical action plan and red flags with family and discussed any barriers to care and/or understanding of plan of care after discharge. Discussed appropriate site of care based on symptoms and resources available to patient including: PCP and Specialist. The family agrees to contact the PCP office for questions related to their healthcare. Patients top risk factors for readmission: PCP relationship   Interventions to address risk factors:  Attempted to schedule [facility] visit. Attempted to give list of [facility] providers accepting new patients. [address] follow up appointment(s):   Future Appointments   Date Time Provider Jacqueline Ribeiro   3/16/2023  2:00 PM Ramírez Maynard MD WEI BS AMB   3/22/2023  1:00 PM Lo Lopez MD [facility] BS AMB     Non-[facility] follow up appointment(s): none    CTN provided contact information for future needs. Plan for follow-up call in 7-10 days based on severity of symptoms and risk factors. Plan for next call:  PCP appointment       Goals Addressed                   This Visit's Progress     Attends follow-up appointments as directed. On track     2023  - continues to decline BOO with PCP office. Patient would like new PCP within the [facility] system that may be familiar with his rare hematology disorder.  CTN suggested patient contact Hematologist, Dr. Chey Plascencia, to request who he may suggest. Wife will inform CTN on PCP suggestions and CTN will assist patient with scheduling. -CTN to follow up in 1 week  SP  1/16/2023  -Patient's PCP retired and does not wish to see new PCP assigned to patient by [facility]. Patient's wife prefers to call [facility]. CTN provided contact information. -CTN will follow up in 3 days. SP       Prevent complications post hospitalization. On track     1/19/2023  - Patient spoke to Artie Augustin and they declined to see him. Denies signs of GI upset or dehydration. Will continue to increase fluid intake to 64 oz daily to maintain hydration. -CTN to follow up in 1 week  SP      1/16/2023  -Patient to increase fluid intake to avoid dehydration. Continues to eat/drink normally. He continues to have diarrhea. To be seen by Artie Augustin on 1/17/2023  -CTN to follow up in 3 days.   SP

## 2023-01-26 ENCOUNTER — PATIENT OUTREACH (OUTPATIENT)
Dept: CASE MANAGEMENT | Age: 83
End: 2023-01-26

## 2023-01-26 NOTE — PROGRESS NOTES
Physician Progress Note      PATIENT:               Whit Sifuentes  CSN #:                  [id number]  :                       1940  ADMIT DATE:       2023 2:03 PM  [address] [place] DATE:        1/15/2023 3:24 PM  RESPONDING  PROVIDER #:        Jeff Morales MD          QUERY TEXT:    Pt admitted with Nausea/vomiting/diarrhea and suspected SBO. Consideration was given to possible food poisoning & gastroenteritis. After study, can the patient's condition be further specified as: The medical record reflects the following:  Risk Factors: Nausea/vomiting/diarrhea    Clinical Indicators:  Patient was admitted with a suspected SBO. Imaging noted:  SMALL BOWEL: Mild proximal to mid small bowel distention is noted with somewhat decompressed loops distally concerning for early or partial obstruction likely related to adhesion formation in the mid abdomen. \"  Progress notes stated, \"Nausea/vomiting/diarrhea-Could have been food poisoning from  milkshake. \" Surgery consult noted, \"The patient has absolutely no clinical evidence for SBO. \" Consideration was given for gastroenteritis vs food borne illness. ABD CT:  IMPRESSION  Mild proximal to mid small bowel distention with somewhat decompressed loops  distally concerning for early or partial obstruction likely related to adhesion  formation in the mid abdomen. Treatment: NPO, IV Zofran & IV Morphine as needed for pain, Gen Sgy.  consult  Options provided:  -- SBO POA and resolved  -- Symptoms due to food poisoning  -- Symptoms due to gastroenteritis  -- Symptoms due to a combination of gastroenteritis and food poisoning  -- Symptoms due to a combination of gastroenteritis , food poisoning and SBO  -- Other - I will add my own diagnosis  -- Disagree - Not applicable / Not valid  -- Disagree - Clinically unable to determine / Unknown  -- Refer to Clinical Documentation Reviewer    PROVIDER RESPONSE TEXT:    This patient's symptoms due to gastroenteritis.     Query created by: Jennifer Arellano on 1/20/2023 8:51 AM      Electronically signed by:  Sumit Gaines MD 1/26/2023 1:41 PM

## 2023-01-27 NOTE — PROGRESS NOTES
Care Transitions Outreach Attempt  Attempted to reach patient for transitions of care follow up. Unable to reach patient. Patient: Rogerio Crockett Patient : 1940 MRN: [id number]    Last Discharge  Street       Date Complaint Diagnosis Description Type Department Provider    23 Diarrhea; Vomiting Small bowel obstruction ([facility] [address] . .. ED to Hosp-Admission (Discharged) (ADMIT) [id number] Love Carson MD; Marianne Ruiz...             Noted following upcoming appointments from discharge chart review:   [facility] follow up appointment(s):   Future Appointments   Date Time Provider Jacqueline Ribeiro   3/16/2023  2:00 PM Karrie Gill MD [facility] BS AMB   3/22/2023  1:00 PM Kush Lopez MD [facility] BS AMB

## 2023-02-02 ENCOUNTER — PATIENT OUTREACH (OUTPATIENT)
Dept: CASE MANAGEMENT | Age: 83
End: 2023-02-02

## 2023-02-03 NOTE — PROGRESS NOTES
Care Transitions Outreach Attempt    Attempted to reach patient for transitions of care follow up. Unable to reach patient. Patient: Jose Maria Minor Patient : 1940 MRN: [id number]    Last Discharge  Street       Date Complaint Diagnosis Description Type Department Provider    23 Diarrhea; Vomiting Small bowel obstruction ([facility] [address] . .. ED to Hosp-Admission (Discharged) (ADMIT) [id number] Johnnie Yu MD; Caroline Zhang...                 Noted following upcoming appointments from discharge chart review:   [facility]   Future Appointments   Date Time Provider Jacqueline Ribeiro   3/16/2023  2:00 PM Leslie Chapman MD WEIM BS AMB   3/22/2023  1:00 PM Stef Lopez MD [facility] BS AMB

## 2023-02-10 ENCOUNTER — PATIENT OUTREACH (OUTPATIENT)
Dept: CASE MANAGEMENT | Age: 83
End: 2023-02-10

## 2023-02-13 NOTE — PROGRESS NOTES
Patient has graduated from the Transitions of Care Coordination  program on 2/13/2023. Patient/family has the ability to self-manage at this time Care management goals have been completed. Patient was not referred to the [facility] team for further management. Goals Addressed                   This Visit's Progress     COMPLETED: Attends follow-up appointments as directed. 1/19/2023  - continues to decline BOO with PCP office. Patient would like new PCP within the [facility] system that may be familiar with his rare hematology disorder. CTN suggested patient contact Hematologist, Dr. Ariel Vance, to request who he may suggest. Wife will inform CTN on PCP suggestions and CTN will assist patient with scheduling. -CTN to follow up in 1 week  SP  1/16/2023  -Patient's PCP retired and does not wish to see new PCP assigned to patient by [facility]. Patient's wife prefers to call [facility]. CTN provided contact information. -CTN will follow up in 3 days. SP       COMPLETED: Prevent complications post hospitalization. 1/19/2023  - Patient spoke to Artie Augustin and they declined to see him. Denies signs of GI upset or dehydration. Will continue to increase fluid intake to 64 oz daily to maintain hydration. -CTN to follow up in 1 week  SP      1/16/2023  -Patient to increase fluid intake to avoid dehydration. Continues to eat/drink normally. He continues to have diarrhea. To be seen by Artie Augustin on 1/17/2023  -CTN to follow up in 3 days. SP              Patient has Care Transition Nurse's contact information for any further questions, concerns, or needs.   Patients upcoming visits:    Future Appointments   Date Time Provider Jacqueline Ribeiro   3/16/2023  2:00 PM MD ROBINSON Benoit BS AMB   3/22/2023  1:00 PM Juan C Lopez MD [facility] BS AMB

## 2023-03-14 ENCOUNTER — TELEPHONE (OUTPATIENT)
Dept: ONCOLOGY | Age: 83
End: 2023-03-14

## 2023-03-14 NOTE — TELEPHONE ENCOUNTER
[address] at [place]  [phone]    03/14/23- Phone call placed to pt to remind pt to have labs drawn prior to his follow up appointment with .  left for patient.

## 2023-03-15 NOTE — PROGRESS NOTES
[facility] at [facility]  [address] [facility]  Sarina Maldonado: [phone]  F: [phone]      Reason for Visit:   Rama Cuellar is a 79 y.o. male who is seen for follow up of anemia. History of Present Illness:   He established with a new PCP, Dr. Mohini Hinojosa, and he has been very happy with this change. He had a CXR which was abnormal, prompting a CT Chest which was done a few weeks ago at [facility].  He is having another scan done next week. He was hospitalized in January with nausea and vomiting, and imaging was concerning for possible small bowel obstruction. He was managed conservatively with rapid improvement in his symptoms and discharged home the following day. Nosebleeds have been better lately. He started tranexamic acid TID in January, prescribed by the [address] [place] [address] in [facility]. Last seen there in February by his recollection. He is unaccompanied today. His wife is Jj White, a realtor who helped me with the purchase of my current home. Three of his four children have HHT. Review of systems was obtained and pertinent findings reviewed above. Past medical history, social history, family history, medications, and allergies are located in the electronic medical record. Physical Exam:     Visit Vitals  /61 (BP 1 Location: Right arm, BP Patient Position: Sitting)   Pulse 73   Temp 97.7 °F (36.5 °C) (Temporal)   Resp 16   Ht 6' (1.829 m)   Wt 184 lb (83.5 kg)   SpO2 98%   BMI 24.95 kg/m²     General: no distress  Respiratory: normal respiratory effort  CV: no peripheral edema  Skin: no rashes; no ecchymoses; no petechiae      Results:     Lab Results   Component Value Date/Time    WBC 4.2 03/21/2023 02:22 PM    HGB 10.8 (L) 03/21/2023 02:22 PM    HCT 35.3 (L) 03/21/2023 02:22 PM    PLATELET 240 55/26/0757 02:22 PM    MCV 98.9 03/21/2023 02:22 PM    ABS.  NEUTROPHILS 3.0 03/21/2023 02:22 PM     Lab Results   Component Value Date/Time    Sodium 143 01/15/2023 06:46 AM    Potassium 3.7 01/15/2023 06:46 AM    Chloride 112 (H) 01/15/2023 06:46 AM    CO2 25 01/15/2023 06:46 AM    Glucose 95 01/15/2023 06:46 AM    BUN 15 01/15/2023 06:46 AM    Creatinine 0.73 01/15/2023 06:46 AM    GFR est AA >60 07/20/2022 05:23 PM    GFR est non-AA >60 07/20/2022 05:23 PM    Calcium 8.0 (L) 01/15/2023 06:46 AM     Lab Results   Component Value Date/Time    Bilirubin, total 0.6 01/14/2023 02:41 PM    ALT (SGPT) 29 01/14/2023 02:41 PM    Alk.  phosphatase 77 01/14/2023 02:41 PM    Protein, total 7.0 01/14/2023 02:41 PM    Albumin 3.6 01/14/2023 02:41 PM    Globulin 3.4 01/14/2023 02:41 PM     Lab Results   Component Value Date/Time    Reticulocyte count 1.3 02/24/2021 08:18 AM    Iron % saturation 13 (L) 03/21/2023 02:22 PM    TIBC 344 03/21/2023 02:22 PM    Ferritin 33 03/21/2023 02:22 PM    Vitamin B12 391 02/24/2021 08:18 AM    Folate 14.0 02/24/2021 08:18 AM    Methylmalonic acid 300 02/05/2021 12:50 PM    Haptoglobin 144 02/24/2021 08:18 AM     02/24/2021 08:18 AM    TSH 1.67 03/03/2022 04:35 PM    M-Terell Not Observed 02/24/2021 08:18 AM    Lipase 113 01/14/2023 02:41 PM     HGB (g/dL)   Date Value   03/21/2023 10.8 (L)   01/15/2023 9.5 (L)   01/14/2023 11.0 (L)   12/27/2022 8.1 (L)   12/22/2022 7.5 (L)   12/20/2022 7.0 (L)   12/18/2022 7.7 (L)   12/14/2022 6.9 (L)   12/06/2022 9.7 (L)   09/09/2022 11.3 (L)   08/03/2022 10.8 (L)   07/27/2022 10.0 (L)   07/20/2022 9.4 (L)   06/06/2022 12.5 (L)   04/27/2022 11.9 (L)   03/08/2022 11.3 (L)   03/03/2022 11.2 (L)   01/24/2022 12.1 (L)   10/04/2021 12.2 (L)   07/19/2021 12.5 (L)   06/16/2021 11.5 (L)   05/12/2021 10.3 (L)   02/24/2021 10.0 (L)   02/05/2021 9.3 (L)   02/01/2021 9.7 (L)   11/24/2020 10.3 (L)   05/21/2020 11.1 (L)   01/31/2020 13.3   04/05/2019 12.0 (L)   11/05/2018 11.6 (L)   07/17/2018 12.0 (L)   06/12/2017 14.7   08/15/2016 12.1 (L)   01/26/2016 11.4 (L)   10/30/2015 11.9 (L) 04/02/2015 12.7   10/07/2014 12.1 (L)   08/20/2014 10.1 (L)     Ferritin   Date Value   03/21/2023 33 NG/ML   12/06/2022 51 ng/mL   09/09/2022 172 ng/mL   06/06/2022 62 ng/mL   03/08/2022 55 ng/mL   01/24/2022 34 ng/mL   10/04/2021 38 ng/mL   07/19/2021 150 ng/mL   06/16/2021 1,062 ng/mL (H)   05/12/2021 22 ng/mL (L)   02/24/2021 13 ng/mL (L)       CTA Chest w/wo contrast 3/8/2023:  Lobulated enhancing vascular structure in LLL measuring up to 1.1cm compatible with pulmonary AVM. 1.7cm nodule in LLL area cannot exclude malignancy. Nodule in left apex with central embolization material.  Multiple additional association coils in RLL. Assessment:   1) Iron deficiency anemia  He is s/p injectafer x2 doses in 3/2021, 6/2021, 3/2022, 8/2022, 12/2022. HGB improves with each treatment, but his iron deficiency subsequently recurs. He is on oral iron as well. He still has iron deficiency anemia on current labs. We will proceed with additional IV iron and continue to monitor. The cause of his iron deficiency is blood loss related to his HHT. He is likely to have recurrence. He should continue oral iron as tolerated. We will monitor his labs and give additional IV iron as needed. 2) HHT  Managed at [facility]. He is s/p treatment for pulmonary AVMs as well as epistaxis. He recently started on Tranexamic Acid from his [address] in [facility], doing well with this. 3) Epistaxis  Secondary to HHT. Follows with ENT in [place], s/p local therapy. Improved since starting Tranexamic Acid, but not resolved. Still using nasal saline spray as well. 4) Prostate cancer  He is now s/p radiation with Dr. Curtis Stovall completed 4/2021. Last dose ADT on 2/2022, stopped due to fatigue. 5) Abnormal CT  CT with LLL AVM. Additionally a separate LLL nodule of uncertain etiology. Patient reports that additional imaging is shcheduled for next week, perhaps a PET?   His PCP is working this up and I remain on standby if needed. He likely will need IR evaluation (either here or with his physician in [facility]) to address his AVM.     Plan:     Injectafer 750mg IV weekly for 2 doses  Continue PO iron as tolerated  Follow up with [address] [place] [address]  Additional chest imaging pending  Labs in 3 months: CBC, iron profile, ferritin ([facility])  Return to see me in 3 months      Signed By: Debra Villegas MD

## 2023-03-22 ENCOUNTER — OFFICE VISIT (OUTPATIENT)
Dept: ONCOLOGY | Age: 83
End: 2023-03-22
Payer: MEDICARE

## 2023-03-22 VITALS
TEMPERATURE: 97.7 F | RESPIRATION RATE: 16 BRPM | SYSTOLIC BLOOD PRESSURE: 113 MMHG | BODY MASS INDEX: 24.92 KG/M2 | DIASTOLIC BLOOD PRESSURE: 61 MMHG | HEIGHT: 72 IN | OXYGEN SATURATION: 98 % | HEART RATE: 73 BPM | WEIGHT: 184 LBS

## 2023-03-22 DIAGNOSIS — I78.0 HHT (HEREDITARY HEMORRHAGIC TELANGIECTASIA) (HCC): Primary | ICD-10-CM

## 2023-03-22 DIAGNOSIS — D50.0 IRON DEFICIENCY ANEMIA DUE TO CHRONIC BLOOD LOSS: ICD-10-CM

## 2023-03-22 LAB
BASOPHILS # BLD: 0 K/UL (ref 0–0.1)
BASOPHILS NFR BLD: 1 % (ref 0–1)
DIFFERENTIAL METHOD BLD: ABNORMAL
EOSINOPHIL # BLD: 0.2 K/UL (ref 0–0.4)
EOSINOPHIL NFR BLD: 5 % (ref 0–7)
ERYTHROCYTE [DISTWIDTH] IN BLOOD BY AUTOMATED COUNT: 13.5 % (ref 11.5–14.5)
FERRITIN SERPL-MCNC: 33 NG/ML (ref 26–388)
HCT VFR BLD AUTO: 35.3 % (ref 36.6–50.3)
HGB BLD-MCNC: 10.8 G/DL (ref 12.1–17)
IMM GRANULOCYTES # BLD AUTO: 0 K/UL (ref 0–0.04)
IMM GRANULOCYTES NFR BLD AUTO: 0 % (ref 0–0.5)
IRON SATN MFR SERPL: 13 % (ref 20–50)
IRON SERPL-MCNC: 45 UG/DL (ref 35–150)
LYMPHOCYTES # BLD: 0.5 K/UL (ref 0.8–3.5)
LYMPHOCYTES NFR BLD: 12 % (ref 12–49)
MCH RBC QN AUTO: 30.3 PG (ref 26–34)
MCHC RBC AUTO-ENTMCNC: 30.6 G/DL (ref 30–36.5)
MCV RBC AUTO: 98.9 FL (ref 80–99)
MONOCYTES # BLD: 0.5 K/UL (ref 0–1)
MONOCYTES NFR BLD: 11 % (ref 5–13)
NEUTS SEG # BLD: 3 K/UL (ref 1.8–8)
NEUTS SEG NFR BLD: 71 % (ref 32–75)
NRBC # BLD: 0 K/UL (ref 0–0.01)
NRBC BLD-RTO: 0 PER 100 WBC
PLATELET # BLD AUTO: 194 K/UL (ref 150–400)
PMV BLD AUTO: 11.2 FL (ref 8.9–12.9)
RBC # BLD AUTO: 3.57 M/UL (ref 4.1–5.7)
RBC MORPH BLD: ABNORMAL
TIBC SERPL-MCNC: 344 UG/DL (ref 250–450)
WBC # BLD AUTO: 4.2 K/UL (ref 4.1–11.1)

## 2023-03-22 PROCEDURE — G8432 DEP SCR NOT DOC, RNG: HCPCS | Performed by: INTERNAL MEDICINE

## 2023-03-22 PROCEDURE — G8420 CALC BMI NORM PARAMETERS: HCPCS | Performed by: INTERNAL MEDICINE

## 2023-03-22 PROCEDURE — 1101F PT FALLS ASSESS-DOCD LE1/YR: CPT | Performed by: INTERNAL MEDICINE

## 2023-03-22 PROCEDURE — G8427 DOCREV CUR MEDS BY ELIG CLIN: HCPCS | Performed by: INTERNAL MEDICINE

## 2023-03-22 PROCEDURE — G8536 NO DOC ELDER MAL SCRN: HCPCS | Performed by: INTERNAL MEDICINE

## 2023-03-22 PROCEDURE — G0463 HOSPITAL OUTPT CLINIC VISIT: HCPCS | Performed by: INTERNAL MEDICINE

## 2023-03-22 PROCEDURE — 99214 OFFICE O/P EST MOD 30 MIN: CPT | Performed by: INTERNAL MEDICINE

## 2023-03-22 PROCEDURE — 1123F ACP DISCUSS/DSCN MKR DOCD: CPT | Performed by: INTERNAL MEDICINE

## 2023-03-22 NOTE — PROGRESS NOTES
Whitney Hall is a 79 y.o. male follow up for anemia. 1. Have you been to the ER, urgent care clinic since your last visit? Hospitalized since your last visit?no     2. Have you seen or consulted any other health care providers outside of the [address] [place] [address] since your last visit? Include any pap smears or colon screening.  no

## 2023-03-23 RX ORDER — ACETAMINOPHEN 325 MG/1
650 TABLET ORAL AS NEEDED
Start: 2023-03-28

## 2023-03-23 RX ORDER — SODIUM CHLORIDE 9 MG/ML
10 INJECTION INTRAVENOUS AS NEEDED
OUTPATIENT
Start: 2023-04-03

## 2023-03-23 RX ORDER — SODIUM CHLORIDE 0.9 % (FLUSH) 0.9 %
10 SYRINGE (ML) INJECTION AS NEEDED
OUTPATIENT
Start: 2023-03-28

## 2023-03-23 RX ORDER — SODIUM CHLORIDE 9 MG/ML
25 INJECTION, SOLUTION INTRAVENOUS CONTINUOUS
OUTPATIENT
Start: 2023-04-03

## 2023-03-23 RX ORDER — ACETAMINOPHEN 325 MG/1
650 TABLET ORAL AS NEEDED
Start: 2023-04-03

## 2023-03-23 RX ORDER — HYDROCORTISONE SODIUM SUCCINATE 100 MG/2ML
100 INJECTION, POWDER, FOR SOLUTION INTRAMUSCULAR; INTRAVENOUS AS NEEDED
OUTPATIENT
Start: 2023-04-03

## 2023-03-23 RX ORDER — DIPHENHYDRAMINE HYDROCHLORIDE 50 MG/ML
50 INJECTION, SOLUTION INTRAMUSCULAR; INTRAVENOUS AS NEEDED
Start: 2023-04-03

## 2023-03-23 RX ORDER — HEPARIN 100 UNIT/ML
300-500 SYRINGE INTRAVENOUS AS NEEDED
Start: 2023-04-03

## 2023-03-23 RX ORDER — DIPHENHYDRAMINE HYDROCHLORIDE 50 MG/ML
50 INJECTION, SOLUTION INTRAMUSCULAR; INTRAVENOUS AS NEEDED
Start: 2023-03-28

## 2023-03-23 RX ORDER — SODIUM CHLORIDE 9 MG/ML
10 INJECTION INTRAVENOUS AS NEEDED
OUTPATIENT
Start: 2023-03-28

## 2023-03-23 RX ORDER — ALBUTEROL SULFATE 0.83 MG/ML
2.5 SOLUTION RESPIRATORY (INHALATION) AS NEEDED
Start: 2023-03-28

## 2023-03-23 RX ORDER — HYDROCORTISONE SODIUM SUCCINATE 100 MG/2ML
100 INJECTION, POWDER, FOR SOLUTION INTRAMUSCULAR; INTRAVENOUS AS NEEDED
OUTPATIENT
Start: 2023-03-28

## 2023-03-23 RX ORDER — HEPARIN 100 UNIT/ML
300-500 SYRINGE INTRAVENOUS AS NEEDED
OUTPATIENT
Start: 2023-03-28

## 2023-03-23 RX ORDER — ONDANSETRON 2 MG/ML
8 INJECTION INTRAMUSCULAR; INTRAVENOUS AS NEEDED
OUTPATIENT
Start: 2023-04-03

## 2023-03-23 RX ORDER — DIPHENHYDRAMINE HYDROCHLORIDE 50 MG/ML
25 INJECTION, SOLUTION INTRAMUSCULAR; INTRAVENOUS AS NEEDED
Start: 2023-03-28

## 2023-03-23 RX ORDER — EPINEPHRINE 1 MG/ML
0.3 INJECTION, SOLUTION, CONCENTRATE INTRAVENOUS AS NEEDED
OUTPATIENT
Start: 2023-03-28

## 2023-03-23 RX ORDER — ONDANSETRON 2 MG/ML
8 INJECTION INTRAMUSCULAR; INTRAVENOUS AS NEEDED
OUTPATIENT
Start: 2023-03-28

## 2023-03-23 RX ORDER — EPINEPHRINE 1 MG/ML
0.3 INJECTION, SOLUTION, CONCENTRATE INTRAVENOUS AS NEEDED
OUTPATIENT
Start: 2023-04-03

## 2023-03-23 RX ORDER — SODIUM CHLORIDE 9 MG/ML
25 INJECTION, SOLUTION INTRAVENOUS CONTINUOUS
OUTPATIENT
Start: 2023-03-28

## 2023-03-23 RX ORDER — SODIUM CHLORIDE 0.9 % (FLUSH) 0.9 %
10 SYRINGE (ML) INJECTION AS NEEDED
OUTPATIENT
Start: 2023-04-03

## 2023-03-23 RX ORDER — DIPHENHYDRAMINE HYDROCHLORIDE 50 MG/ML
25 INJECTION, SOLUTION INTRAMUSCULAR; INTRAVENOUS AS NEEDED
Start: 2023-04-03

## 2023-03-23 RX ORDER — ALBUTEROL SULFATE 0.83 MG/ML
2.5 SOLUTION RESPIRATORY (INHALATION) AS NEEDED
Start: 2023-04-03

## 2023-04-19 ENCOUNTER — APPOINTMENT (OUTPATIENT)
Dept: GENERAL RADIOLOGY | Age: 83
End: 2023-04-19
Attending: EMERGENCY MEDICINE
Payer: MEDICARE

## 2023-04-19 ENCOUNTER — HOSPITAL ENCOUNTER (EMERGENCY)
Age: 83
Discharge: HOME | End: 2023-04-19
Attending: EMERGENCY MEDICINE
Payer: MEDICARE

## 2023-04-19 VITALS
HEIGHT: 72 IN | OXYGEN SATURATION: 99 % | HEART RATE: 91 BPM | DIASTOLIC BLOOD PRESSURE: 49 MMHG | SYSTOLIC BLOOD PRESSURE: 108 MMHG | TEMPERATURE: 98.6 F | WEIGHT: 175 LBS | BODY MASS INDEX: 23.7 KG/M2 | RESPIRATION RATE: 18 BRPM

## 2023-04-19 DIAGNOSIS — J06.9 VIRAL URI WITH COUGH: Primary | ICD-10-CM

## 2023-04-19 LAB
FLUAV AG NPH QL IA: NEGATIVE
FLUBV AG NOSE QL IA: NEGATIVE
SARS-COV-2 RDRP RESP QL NAA+PROBE: NOT DETECTED
SOURCE, COVRS: NORMAL

## 2023-04-19 PROCEDURE — 87804 INFLUENZA ASSAY W/OPTIC: CPT

## 2023-04-19 PROCEDURE — 87635 SARS-COV-2 COVID-19 AMP PRB: CPT

## 2023-04-19 PROCEDURE — 71045 X-RAY EXAM CHEST 1 VIEW: CPT

## 2023-04-19 PROCEDURE — 99284 EMERGENCY DEPT VISIT MOD MDM: CPT

## 2023-04-19 RX ORDER — BENZONATATE 100 MG/1
100 CAPSULE ORAL
Qty: 30 CAPSULE | Refills: 0 | Status: SHIPPED | OUTPATIENT
Start: 2023-04-19 | End: 2023-04-29

## 2023-04-19 RX ORDER — ALBUTEROL SULFATE 90 UG/1
2 AEROSOL, METERED RESPIRATORY (INHALATION)
Qty: 18 G | Refills: 1 | Status: SHIPPED | OUTPATIENT
Start: 2023-04-19

## 2023-04-19 RX ORDER — PREDNISONE 20 MG/1
60 TABLET ORAL DAILY
Qty: 15 TABLET | Refills: 0 | Status: SHIPPED | OUTPATIENT
Start: 2023-04-19 | End: 2023-04-24

## 2023-04-19 NOTE — ED NOTES
Discharge instructions and medication reviewed. Educated on inhaler use. Patient denies questions or concerns at this time. Patient left ambulatory from treatment area accompanied by wife with all personal belongings.

## 2023-04-19 NOTE — ED PROVIDER NOTES
28-year-old male who presents to the ER with a complaint of persistent cough, congestion for 3 weeks, runny nose and general malaise. The patient is concerned that he may be developing pneumonia as the phlegm is less than than usual and is not able to break up his secretions per usual.  He denies any fever and chills, headache, neck and back pain, nausea, vomiting, diarrhea, constipation, dysuria, hematuria, dizziness, extremity weakness or numbness, sick contact, skin rash or recent travel. Past Medical History:   Diagnosis Date    Cancer ([facility]     HHT (hereditary hemorrhagic telangiectasia) ([facility]     Prostate cancer [facility])        Past Surgical History:   Procedure Laterality Date    COLONOSCOPY N/A 1/29/2020    COLONOSCOPY performed by Pamela Dietz MD at [facility] ENDOSCOPY    HX APPENDECTOMY      HX COLONOSCOPY  2014    due 22    HX OTHER SURGICAL  07/2018    sclerotheropy    HX TONSILLECTOMY      NE UNLISTED PROCEDURE LUNGS & PLEURA      excision of AVM         Family History:   Problem Relation Age of Onset    Heart Disease Mother     Cancer Other         colon, lung       Social History     Socioeconomic History    Marital status:      Spouse name: Not on file    Number of children: Not on file    Years of education: Not on file    Highest education level: Not on file   Occupational History    Not on file   Tobacco Use    Smoking status: Former    Smokeless tobacco: Never   Vaping Use    Vaping Use: Never used   Substance and Sexual Activity    Alcohol use:  Yes     Alcohol/week: 0.0 standard drinks     Comment: one drink per week    Drug use: No    Sexual activity: Yes     Partners: Female   Other Topics Concern    Not on file   Social History Narrative    Not on file     Social Determinants of Health     Financial Resource Strain: Not on file   Food Insecurity: Not on file   Transportation Needs: Not on file   Physical Activity: Not on file   Stress: Not on file   Social Connections: Not on file Intimate Partner Violence: Not on file   Housing Stability: Not on file         ALLERGIES: Patient has no known allergies. Review of Systems   All other systems reviewed and are negative. Vitals:    04/19/23 1843 04/19/23 1844   BP:  (!) 140/77   Pulse:  91   Resp:  18   Temp:  98.6 °F (37 °C)   SpO2:  100%   Weight: 79.4 kg (175 lb)    Height: 6' (1.829 m)             Physical Exam  Vitals and nursing note reviewed. Exam conducted with a chaperone present. CONSTITUTIONAL: Well-appearing; well-nourished; in no apparent distress  HEAD: Normocephalic; atraumatic  EYES: PERRL; EOM intact; conjunctiva and sclera are clear bilaterally. ENT: No rhinorrhea; normal pharynx with no tonsillar hypertrophy; mucous membranes pink/moist, no erythema, no exudate. NECK: Supple; non-tender; no cervical lymphadenopathy  CARD: Normal S1, S2; no murmurs, rubs, or gallops. Regular rate and rhythm. RESP: Normal respiratory effort; breath sounds clear and equal bilaterally; no wheezes, rhonchi, or rales. ABD: Normal bowel sounds; non-distended; non-tender; no palpable organomegaly, no masses, no bruits. Back Exam: Normal inspection; no vertebral point tenderness, no CVA tenderness. Normal range of motion. EXT: Normal ROM in all four extremities; non-tender to palpation; no swelling or deformity; distal pulses are normal, no edema. SKIN: Warm; dry; no rash. NEURO:Alert and oriented x 3, coherent, ELIAZAR-XII grossly intact, sensory and motor are non-focal.        Medical Decision Making  Assessment: 79-year-old male with persistent viral URI with cough syndrome rule out pneumonia/COVID-19 and fiancé. The patient is hemodynamically stable. Plan: Chest x-ray/viral testing/education, reassurance, symptomatic treatment/ Monitor and Reevaluate. Amount and/or Complexity of Data Reviewed  Radiology: ordered. Risk  Prescription drug management.            Procedures    XRAY INTERPRETATION (ED MD)  Chest Xray  No acute process seen. Normal heart size. No bony abnormalities. No infiltrate. Ivana Plunkett MD 7:25 PM          Progress Note:   Pt has been reexamined by Dilma Persaud MD. Pt is feeling much better. Symptoms have improved. All available results have been reviewed with pt and any available family. Pt understands sx, dx, and tx in ED. Care plan has been outlined and questions have been answered. Pt is ready to go home. Will send home on viral URI with cough instruction. . Outpatient referral with PCP as needed. Written by Dilma Persaud MD,7:25 PM    .   .

## 2023-04-19 NOTE — ED TRIAGE NOTES
Pt c/o productive cough x 3 weeks (yellow, grey, and green sputum at times; pt denied CP, shortness of breath,sore throat,  fever.  OTC robitussin;

## 2023-04-21 ENCOUNTER — TELEPHONE (OUTPATIENT)
Dept: ONCOLOGY | Age: 83
End: 2023-04-21

## 2023-04-21 LAB
BASOPHILS # BLD: 0.1 K/UL (ref 0–0.1)
BASOPHILS NFR BLD: 1 % (ref 0–1)
COMMENT, HOLDF: NORMAL
DIFFERENTIAL METHOD BLD: ABNORMAL
EOSINOPHIL # BLD: 0.2 K/UL (ref 0–0.4)
EOSINOPHIL NFR BLD: 4 % (ref 0–7)
ERYTHROCYTE [DISTWIDTH] IN BLOOD BY AUTOMATED COUNT: 13.4 % (ref 11.5–14.5)
FERRITIN SERPL-MCNC: 21 NG/ML (ref 26–388)
HCT VFR BLD AUTO: 27.1 % (ref 36.6–50.3)
HGB BLD-MCNC: 8.2 G/DL (ref 12.1–17)
IMM GRANULOCYTES # BLD AUTO: 0 K/UL (ref 0–0.04)
IMM GRANULOCYTES NFR BLD AUTO: 0 % (ref 0–0.5)
IRON SATN MFR SERPL: 31 % (ref 20–50)
IRON SERPL-MCNC: 92 UG/DL (ref 35–150)
LYMPHOCYTES # BLD: 0.5 K/UL (ref 0.8–3.5)
LYMPHOCYTES NFR BLD: 9 % (ref 12–49)
MCH RBC QN AUTO: 30.5 PG (ref 26–34)
MCHC RBC AUTO-ENTMCNC: 30.3 G/DL (ref 30–36.5)
MCV RBC AUTO: 100.7 FL (ref 80–99)
MONOCYTES # BLD: 0.3 K/UL (ref 0–1)
MONOCYTES NFR BLD: 6 % (ref 5–13)
NEUTS SEG # BLD: 4.5 K/UL (ref 1.8–8)
NEUTS SEG NFR BLD: 80 % (ref 32–75)
NRBC # BLD: 0 K/UL (ref 0–0.01)
NRBC BLD-RTO: 0 PER 100 WBC
PLATELET # BLD AUTO: 178 K/UL (ref 150–400)
PMV BLD AUTO: 10.7 FL (ref 8.9–12.9)
RBC # BLD AUTO: 2.69 M/UL (ref 4.1–5.7)
RBC MORPH BLD: ABNORMAL
SAMPLES BEING HELD,HOLD: NORMAL
TIBC SERPL-MCNC: 298 UG/DL (ref 250–450)
WBC # BLD AUTO: 5.6 K/UL (ref 4.1–11.1)

## 2023-04-21 NOTE — TELEPHONE ENCOUNTER
[address] Rodriges at [facility]  [phone]    Labs from yesterday afternoon reviewed, HGB down to 8.2 and iron studies low. I received a message from his PCP that the patient's epistaxis recently worsened. I ordered injectafer when I saw him last month, but he did not show for these appointments. Call and check in, does he want to proceed now?

## 2023-04-21 NOTE — TELEPHONE ENCOUNTER
[address] Rodriges at [facility]  [phone]    04/21/23- Informed patient's wife that per Dr. Milka Castillo from yesterday afternoon reviewed, HGB down to 8.2 and iron studies low. I received a message from his PCP that the patient's epistaxis recently worsened. \"  Patient's wife reports one nose bleed, but it was pretty significant. She was agreeable to re-schedule IV iron for sometime soon. Will have our [address] call her back to schedule appointments. She was appreciative.

## 2023-04-23 DIAGNOSIS — I78.0 HHT (HEREDITARY HEMORRHAGIC TELANGIECTASIA) (HCC): Primary | ICD-10-CM

## 2023-04-23 DIAGNOSIS — D50.0 IRON DEFICIENCY ANEMIA DUE TO CHRONIC BLOOD LOSS: ICD-10-CM

## 2023-04-24 DIAGNOSIS — I78.0 HHT (HEREDITARY HEMORRHAGIC TELANGIECTASIA) (HCC): Primary | ICD-10-CM

## 2023-04-24 DIAGNOSIS — D50.0 IRON DEFICIENCY ANEMIA DUE TO CHRONIC BLOOD LOSS: ICD-10-CM

## 2023-04-28 ENCOUNTER — HOSPITAL ENCOUNTER (OUTPATIENT)
Dept: INFUSION THERAPY | Age: 83
Discharge: HOME | End: 2023-04-28
Payer: MEDICARE

## 2023-04-28 VITALS
HEIGHT: 72 IN | WEIGHT: 183.7 LBS | HEART RATE: 63 BPM | TEMPERATURE: 97.8 F | OXYGEN SATURATION: 100 % | RESPIRATION RATE: 18 BRPM | SYSTOLIC BLOOD PRESSURE: 108 MMHG | DIASTOLIC BLOOD PRESSURE: 54 MMHG | BODY MASS INDEX: 24.88 KG/M2

## 2023-04-28 DIAGNOSIS — D50.8 OTHER IRON DEFICIENCY ANEMIA: ICD-10-CM

## 2023-04-28 DIAGNOSIS — C61 PROSTATE CANCER (HCC): Primary | ICD-10-CM

## 2023-04-28 LAB
ABO + RH BLD: NORMAL
BLOOD GROUP ANTIBODIES SERPL: NORMAL
ERYTHROCYTE [DISTWIDTH] IN BLOOD BY AUTOMATED COUNT: 13.6 % (ref 11.5–14.5)
HCT VFR BLD AUTO: 28.4 % (ref 36.6–50.3)
HGB BLD-MCNC: 8.9 G/DL (ref 12.1–17)
MCH RBC QN AUTO: 29.8 PG (ref 26–34)
MCHC RBC AUTO-ENTMCNC: 31.3 G/DL (ref 30–36.5)
MCV RBC AUTO: 95 FL (ref 80–99)
NRBC # BLD: 0 K/UL (ref 0–0.01)
NRBC BLD-RTO: 0 PER 100 WBC
PLATELET # BLD AUTO: 213 K/UL (ref 150–400)
PMV BLD AUTO: 10.2 FL (ref 8.9–12.9)
RBC # BLD AUTO: 2.99 M/UL (ref 4.1–5.7)
SPECIMEN EXP DATE BLD: NORMAL
WBC # BLD AUTO: 5.2 K/UL (ref 4.1–11.1)

## 2023-04-28 PROCEDURE — 85027 COMPLETE CBC AUTOMATED: CPT

## 2023-04-28 PROCEDURE — 36415 COLL VENOUS BLD VENIPUNCTURE: CPT

## 2023-04-28 PROCEDURE — 86900 BLOOD TYPING SEROLOGIC ABO: CPT

## 2023-04-28 PROCEDURE — 74011250636 HC RX REV CODE- 250/636: Performed by: NURSE PRACTITIONER

## 2023-04-28 PROCEDURE — 96365 THER/PROPH/DIAG IV INF INIT: CPT

## 2023-04-28 RX ORDER — ALBUTEROL SULFATE 0.83 MG/ML
2.5 SOLUTION RESPIRATORY (INHALATION) AS NEEDED
Start: 2023-05-05

## 2023-04-28 RX ORDER — SODIUM CHLORIDE 0.9 % (FLUSH) 0.9 %
10 SYRINGE (ML) INJECTION AS NEEDED
Status: DISPENSED | OUTPATIENT
Start: 2023-04-28 | End: 2023-04-28

## 2023-04-28 RX ORDER — HEPARIN 100 UNIT/ML
300-500 SYRINGE INTRAVENOUS AS NEEDED
Status: ACTIVE | OUTPATIENT
Start: 2023-04-28 | End: 2023-04-28

## 2023-04-28 RX ORDER — SODIUM CHLORIDE 9 MG/ML
25 INJECTION, SOLUTION INTRAVENOUS CONTINUOUS
Status: DISPENSED | OUTPATIENT
Start: 2023-04-28 | End: 2023-04-28

## 2023-04-28 RX ORDER — ONDANSETRON 2 MG/ML
8 INJECTION INTRAMUSCULAR; INTRAVENOUS AS NEEDED
OUTPATIENT
Start: 2023-05-05

## 2023-04-28 RX ORDER — SODIUM CHLORIDE 9 MG/ML
10 INJECTION INTRAVENOUS AS NEEDED
Status: ACTIVE | OUTPATIENT
Start: 2023-04-28 | End: 2023-04-28

## 2023-04-28 RX ORDER — HYDROCORTISONE SODIUM SUCCINATE 100 MG/2ML
100 INJECTION, POWDER, FOR SOLUTION INTRAMUSCULAR; INTRAVENOUS AS NEEDED
OUTPATIENT
Start: 2023-05-05

## 2023-04-28 RX ORDER — SODIUM CHLORIDE 9 MG/ML
10 INJECTION INTRAVENOUS AS NEEDED
OUTPATIENT
Start: 2023-05-05

## 2023-04-28 RX ORDER — DIPHENHYDRAMINE HYDROCHLORIDE 50 MG/ML
50 INJECTION, SOLUTION INTRAMUSCULAR; INTRAVENOUS AS NEEDED
Start: 2023-05-05

## 2023-04-28 RX ORDER — HEPARIN 100 UNIT/ML
300-500 SYRINGE INTRAVENOUS AS NEEDED
Start: 2023-05-05

## 2023-04-28 RX ORDER — DIPHENHYDRAMINE HYDROCHLORIDE 50 MG/ML
25 INJECTION, SOLUTION INTRAMUSCULAR; INTRAVENOUS AS NEEDED
Start: 2023-05-05

## 2023-04-28 RX ORDER — SODIUM CHLORIDE 0.9 % (FLUSH) 0.9 %
10 SYRINGE (ML) INJECTION AS NEEDED
OUTPATIENT
Start: 2023-05-05

## 2023-04-28 RX ORDER — SODIUM CHLORIDE 9 MG/ML
25 INJECTION, SOLUTION INTRAVENOUS CONTINUOUS
OUTPATIENT
Start: 2023-05-05

## 2023-04-28 RX ORDER — EPINEPHRINE 1 MG/ML
0.3 INJECTION, SOLUTION, CONCENTRATE INTRAVENOUS AS NEEDED
OUTPATIENT
Start: 2023-05-05

## 2023-04-28 RX ORDER — ACETAMINOPHEN 325 MG/1
650 TABLET ORAL AS NEEDED
Start: 2023-05-05

## 2023-04-28 RX ADMIN — SODIUM CHLORIDE 25 ML/HR: 9 INJECTION, SOLUTION INTRAVENOUS at 11:50

## 2023-04-28 RX ADMIN — SODIUM CHLORIDE 750 MG: 900 INJECTION, SOLUTION INTRAVENOUS at 11:55

## 2023-04-28 NOTE — PROGRESS NOTES
Outpatient Infusion Center Progress Note        Date: 2023    Name: Ramana Ledesma    MRN: [id number]         : 1940    Mr. Harrison Ernst Arrived ambulatory and in no distress for Injectafer Regimen. Assessment was completed, no acute issues at this time, no new complaints voiced. 20 gauge peripheral IV was placed in the right forearm without difficulty, positive for blood return. Mr. Michael Nation vitals were reviewed. Patient Vitals for the past 12 hrs:   Temp Pulse Resp BP SpO2   23 1218 97.8 °F (36.6 °C) 63 18 (!) 108/54 100 %   23 1130 97.6 °F (36.4 °C) 67 18 115/61 100 %         Lab results were obtained and reviewed. Recent Results (from the past 12 hour(s))   CBC W/O DIFF    Collection Time: 23 11:35 AM   Result Value Ref Range    WBC 5.2 4.1 - 11.1 K/uL    RBC 2.99 (L) 4.10 - 5.70 M/uL    HGB 8.9 (L) 12.1 - 17.0 g/dL    HCT 28.4 (L) 36.6 - 50.3 %    MCV 95.0 80.0 - 99.0 FL    MCH 29.8 26.0 - 34.0 PG    MCHC 31.3 30.0 - 36.5 g/dL    RDW 13.6 11.5 - 14.5 %    PLATELET 199 846 - 805 K/uL    MPV 10.2 8.9 - 12.9 FL    NRBC 0.0 0  WBC    ABSOLUTE NRBC 0.00 0.00 - 0.01 K/uL       Medications received:  Medications Administered       0.9% sodium chloride infusion       Admin Date  2023 Action  New Bag Dose  25 mL/hr Rate  25 mL/hr Route  IntraVENous Administered By  Maximiliano Rodriguez RN              ferric carboxymaltose (INJECTAFER) 750 mg in 0.9% sodium chloride 250 mL, overfill volume 25 mL IVPB       Admin Date  2023 Action  New Bag Dose  750 mg Rate  870 mL/hr Route  IntraVENous Administered By  Maximiliano Rodriguez RN                    Mr. Harrison Ernst tolerated treatment well and was discharged from [place] [address] in stable condition at 1225. He is to return on  May 5, 2023 at 1130 for his next appointment.     Tiffanie Mckinley RN  2023    Future Appointments:  Future Appointments   Date Time Provider Jacqueline Ribeiro 5/5/2023 11:30 AM SS INF1 CH4 <2H [facility]   6/28/2023 10:15 AM Chito Lopez MD [facility] BS AMB

## 2023-05-05 ENCOUNTER — HOSPITAL ENCOUNTER (OUTPATIENT)
Dept: INFUSION THERAPY | Age: 83
Discharge: HOME | End: 2023-05-05
Payer: MEDICARE

## 2023-05-05 VITALS
BODY MASS INDEX: 24.54 KG/M2 | DIASTOLIC BLOOD PRESSURE: 83 MMHG | TEMPERATURE: 98.3 F | HEIGHT: 72 IN | HEART RATE: 71 BPM | RESPIRATION RATE: 16 BRPM | WEIGHT: 181.2 LBS | SYSTOLIC BLOOD PRESSURE: 117 MMHG

## 2023-05-05 DIAGNOSIS — D50.8 OTHER IRON DEFICIENCY ANEMIA: Primary | ICD-10-CM

## 2023-05-05 LAB
ABO + RH BLD: NORMAL
BLOOD GROUP ANTIBODIES SERPL: NORMAL
ERYTHROCYTE [DISTWIDTH] IN BLOOD BY AUTOMATED COUNT: 15.4 % (ref 11.5–14.5)
HCT VFR BLD AUTO: 30.2 % (ref 36.6–50.3)
HGB BLD-MCNC: 9.5 G/DL (ref 12.1–17)
MCH RBC QN AUTO: 30 PG (ref 26–34)
MCHC RBC AUTO-ENTMCNC: 31.5 G/DL (ref 30–36.5)
MCV RBC AUTO: 95.3 FL (ref 80–99)
NRBC # BLD: 0 K/UL (ref 0–0.01)
NRBC BLD-RTO: 0 PER 100 WBC
PLATELET # BLD AUTO: 194 K/UL (ref 150–400)
PMV BLD AUTO: 9.9 FL (ref 8.9–12.9)
RBC # BLD AUTO: 3.17 M/UL (ref 4.1–5.7)
SPECIMEN EXP DATE BLD: NORMAL
WBC # BLD AUTO: 4.5 K/UL (ref 4.1–11.1)

## 2023-05-05 PROCEDURE — 96374 THER/PROPH/DIAG INJ IV PUSH: CPT

## 2023-05-05 PROCEDURE — 74011250636 HC RX REV CODE- 250/636: Performed by: NURSE PRACTITIONER

## 2023-05-05 PROCEDURE — 86850 RBC ANTIBODY SCREEN: CPT

## 2023-05-05 PROCEDURE — 36415 COLL VENOUS BLD VENIPUNCTURE: CPT

## 2023-05-05 PROCEDURE — 74011000258 HC RX REV CODE- 258: Performed by: NURSE PRACTITIONER

## 2023-05-05 PROCEDURE — 85027 COMPLETE CBC AUTOMATED: CPT

## 2023-05-05 RX ORDER — SODIUM CHLORIDE 9 MG/ML
10 INJECTION INTRAVENOUS AS NEEDED
Status: ACTIVE | OUTPATIENT
Start: 2023-05-05 | End: 2023-05-05

## 2023-05-05 RX ORDER — HEPARIN 100 UNIT/ML
300-500 SYRINGE INTRAVENOUS AS NEEDED
Status: ACTIVE | OUTPATIENT
Start: 2023-05-05 | End: 2023-05-05

## 2023-05-05 RX ORDER — SODIUM CHLORIDE 9 MG/ML
25 INJECTION, SOLUTION INTRAVENOUS CONTINUOUS
Status: DISPENSED | OUTPATIENT
Start: 2023-05-05 | End: 2023-05-05

## 2023-05-05 RX ORDER — SODIUM CHLORIDE 0.9 % (FLUSH) 0.9 %
10 SYRINGE (ML) INJECTION AS NEEDED
Status: DISPENSED | OUTPATIENT
Start: 2023-05-05 | End: 2023-05-05

## 2023-05-05 RX ADMIN — SODIUM CHLORIDE 25 ML/HR: 900 INJECTION, SOLUTION INTRAVENOUS at 12:28

## 2023-05-05 RX ADMIN — FERRIC CARBOXYMALTOSE INJECTION 750 MG: 50 INJECTION, SOLUTION INTRAVENOUS at 12:30

## 2023-05-30 ENCOUNTER — TELEPHONE (OUTPATIENT)
Age: 83
End: 2023-05-30

## 2023-05-30 DIAGNOSIS — I78.0 HEREDITARY HEMORRHAGIC TELANGIECTASIA (HCC): ICD-10-CM

## 2023-05-30 DIAGNOSIS — D50.0 IRON DEFICIENCY ANEMIA SECONDARY TO BLOOD LOSS (CHRONIC): Primary | ICD-10-CM

## 2023-05-30 NOTE — TELEPHONE ENCOUNTER
Pt called stating he's been having nose bleeds and is needing to speak with someone about this.        CB# [phone]

## 2023-05-31 ENCOUNTER — TELEPHONE (OUTPATIENT)
Age: 83
End: 2023-05-31

## 2023-05-31 DIAGNOSIS — I78.0 HEREDITARY HEMORRHAGIC TELANGIECTASIA (HCC): ICD-10-CM

## 2023-05-31 DIAGNOSIS — D50.0 IRON DEFICIENCY ANEMIA SECONDARY TO BLOOD LOSS (CHRONIC): ICD-10-CM

## 2023-05-31 NOTE — TELEPHONE ENCOUNTER
[address]ertas at [facility]  [phone]    05/31/23- Returned call, patient is currently in the shower, his wife will have him call back when available. 11:18 AM- Voicemail left for patient requesting a return call when available. 12:09 PM- Spoke to patient, stated he's had larger and longer lasting nose bleeds recently. He requested to check labs to ensure anemia hasn't recurred. Confirmed he received injectafer on 4/28 and 5/5. Discussed with Dr. Marcell Moritz, okay to repeat lab work. Orders placed. Patient denies further questions or concerns at this time.

## 2023-06-01 ENCOUNTER — TELEPHONE (OUTPATIENT)
Age: 83
End: 2023-06-01

## 2023-06-01 LAB
BASOPHILS # BLD: 0 K/UL (ref 0–0.1)
BASOPHILS NFR BLD: 1 % (ref 0–1)
DIFFERENTIAL METHOD BLD: ABNORMAL
EOSINOPHIL # BLD: 0.2 K/UL (ref 0–0.4)
EOSINOPHIL NFR BLD: 6 % (ref 0–7)
ERYTHROCYTE [DISTWIDTH] IN BLOOD BY AUTOMATED COUNT: 15.6 % (ref 11.5–14.5)
FERRITIN SERPL-MCNC: 97 NG/ML (ref 26–388)
HCT VFR BLD AUTO: 31 % (ref 36.6–50.3)
HGB BLD-MCNC: 9.3 G/DL (ref 12.1–17)
IMM GRANULOCYTES # BLD AUTO: 0 K/UL (ref 0–0.04)
IMM GRANULOCYTES NFR BLD AUTO: 0 % (ref 0–0.5)
IRON SATN MFR SERPL: 15 % (ref 20–50)
IRON SERPL-MCNC: 43 UG/DL (ref 35–150)
LYMPHOCYTES # BLD: 0.5 K/UL (ref 0.8–3.5)
LYMPHOCYTES NFR BLD: 13 % (ref 12–49)
MCH RBC QN AUTO: 30.8 PG (ref 26–34)
MCHC RBC AUTO-ENTMCNC: 30 G/DL (ref 30–36.5)
MCV RBC AUTO: 102.6 FL (ref 80–99)
MONOCYTES # BLD: 0.4 K/UL (ref 0–1)
MONOCYTES NFR BLD: 12 % (ref 5–13)
NEUTS SEG # BLD: 2.6 K/UL (ref 1.8–8)
NEUTS SEG NFR BLD: 68 % (ref 32–75)
NRBC # BLD: 0 K/UL (ref 0–0.01)
NRBC BLD-RTO: 0 PER 100 WBC
PLATELET # BLD AUTO: 177 K/UL (ref 150–400)
PMV BLD AUTO: 10.8 FL (ref 8.9–12.9)
RBC # BLD AUTO: 3.02 M/UL (ref 4.1–5.7)
RBC MORPH BLD: ABNORMAL
RBC MORPH BLD: ABNORMAL
TIBC SERPL-MCNC: 295 UG/DL (ref 250–450)
WBC # BLD AUTO: 3.7 K/UL (ref 4.1–11.1)

## 2023-06-01 RX ORDER — EPINEPHRINE 1 MG/ML
0.3 INJECTION, SOLUTION, CONCENTRATE INTRAVENOUS PRN
OUTPATIENT
Start: 2023-06-05

## 2023-06-01 RX ORDER — ALBUTEROL SULFATE 90 UG/1
4 AEROSOL, METERED RESPIRATORY (INHALATION) PRN
OUTPATIENT
Start: 2023-06-05

## 2023-06-01 RX ORDER — ACETAMINOPHEN 325 MG/1
650 TABLET ORAL
OUTPATIENT
Start: 2023-06-05

## 2023-06-01 RX ORDER — ONDANSETRON 2 MG/ML
8 INJECTION INTRAMUSCULAR; INTRAVENOUS
OUTPATIENT
Start: 2023-06-05

## 2023-06-01 RX ORDER — SODIUM CHLORIDE 9 MG/ML
INJECTION, SOLUTION INTRAVENOUS CONTINUOUS
OUTPATIENT
Start: 2023-06-05

## 2023-06-01 RX ORDER — HEPARIN SODIUM (PORCINE) LOCK FLUSH IV SOLN 100 UNIT/ML 100 UNIT/ML
500 SOLUTION INTRAVENOUS PRN
OUTPATIENT
Start: 2023-06-05

## 2023-06-01 RX ORDER — FAMOTIDINE 10 MG/ML
20 INJECTION, SOLUTION INTRAVENOUS
OUTPATIENT
Start: 2023-06-05

## 2023-06-01 RX ORDER — SODIUM CHLORIDE 9 MG/ML
5-250 INJECTION, SOLUTION INTRAVENOUS PRN
OUTPATIENT
Start: 2023-06-05

## 2023-06-01 RX ORDER — DIPHENHYDRAMINE HYDROCHLORIDE 50 MG/ML
50 INJECTION INTRAMUSCULAR; INTRAVENOUS
OUTPATIENT
Start: 2023-06-05

## 2023-06-01 RX ORDER — SODIUM CHLORIDE 0.9 % (FLUSH) 0.9 %
5-40 SYRINGE (ML) INJECTION PRN
OUTPATIENT
Start: 2023-06-05

## 2023-06-01 NOTE — TELEPHONE ENCOUNTER
[address] at [place]  [phone]    06/01/23- Informed patient that per Dr. Justin Olea reviewed. Anemia persists, no improvement in HGB in the past month. Low iron saturation persists. Ferritin up a bit. With his significant epistaxis persisting, he would likely benefit from additional IV iron. We can set this up. Encourage him to follow up with his [facility] team in regards to the worsening epistaxis. \"    Patient verbalized understanding and would like to proceed with IV iron. Will have orders placed and [facility]  call to set that up.

## 2023-06-01 NOTE — TELEPHONE ENCOUNTER
[address]as at [facility]  [phone]    Labs reviewed. Anemia persists, no improvement in HGB in the past month. Low iron saturation persists. Ferritin up a bit. With his significant epistaxis persisting, he would likely benefit from additional IV iron. We can set this up. Encourage him to follow up with his [facility] team in regards to the worsening epistaxis.

## 2023-06-02 ENCOUNTER — TELEPHONE (OUTPATIENT)
Age: 83
End: 2023-06-02

## 2023-06-07 ENCOUNTER — HOSPITAL ENCOUNTER (OUTPATIENT)
Facility: HOSPITAL | Age: 83
Setting detail: INFUSION SERIES
End: 2023-06-07
Payer: MEDICARE

## 2023-06-07 VITALS
HEART RATE: 90 BPM | SYSTOLIC BLOOD PRESSURE: 107 MMHG | TEMPERATURE: 98.2 F | DIASTOLIC BLOOD PRESSURE: 73 MMHG | BODY MASS INDEX: 24.24 KG/M2 | HEIGHT: 72 IN | WEIGHT: 179 LBS | OXYGEN SATURATION: 98 %

## 2023-06-07 DIAGNOSIS — I78.0 HHT (HEREDITARY HEMORRHAGIC TELANGIECTASIA) (HCC): ICD-10-CM

## 2023-06-07 DIAGNOSIS — D50.9 IRON DEFICIENCY ANEMIA, UNSPECIFIED IRON DEFICIENCY ANEMIA TYPE: Primary | ICD-10-CM

## 2023-06-07 PROCEDURE — 96365 THER/PROPH/DIAG IV INF INIT: CPT

## 2023-06-07 PROCEDURE — 2580000003 HC RX 258: Performed by: INTERNAL MEDICINE

## 2023-06-07 PROCEDURE — 6360000002 HC RX W HCPCS: Performed by: INTERNAL MEDICINE

## 2023-06-07 RX ORDER — DIPHENHYDRAMINE HYDROCHLORIDE 50 MG/ML
50 INJECTION INTRAMUSCULAR; INTRAVENOUS
Status: CANCELLED | OUTPATIENT
Start: 2023-06-14

## 2023-06-07 RX ORDER — SODIUM CHLORIDE 9 MG/ML
5-250 INJECTION, SOLUTION INTRAVENOUS PRN
Status: CANCELLED | OUTPATIENT
Start: 2023-06-14

## 2023-06-07 RX ORDER — ALBUTEROL SULFATE 90 UG/1
4 AEROSOL, METERED RESPIRATORY (INHALATION) PRN
Status: CANCELLED | OUTPATIENT
Start: 2023-06-14

## 2023-06-07 RX ORDER — ACETAMINOPHEN 325 MG/1
650 TABLET ORAL
Status: CANCELLED | OUTPATIENT
Start: 2023-06-14

## 2023-06-07 RX ORDER — ONDANSETRON 2 MG/ML
8 INJECTION INTRAMUSCULAR; INTRAVENOUS
Status: CANCELLED | OUTPATIENT
Start: 2023-06-14

## 2023-06-07 RX ORDER — SODIUM CHLORIDE 9 MG/ML
5-250 INJECTION, SOLUTION INTRAVENOUS PRN
Status: DISCONTINUED | OUTPATIENT
Start: 2023-06-07 | End: 2023-06-08 | Stop reason: HOSPADM

## 2023-06-07 RX ORDER — EPINEPHRINE 1 MG/ML
0.3 INJECTION, SOLUTION, CONCENTRATE INTRAVENOUS PRN
Status: CANCELLED | OUTPATIENT
Start: 2023-06-14

## 2023-06-07 RX ORDER — SODIUM CHLORIDE 0.9 % (FLUSH) 0.9 %
5-40 SYRINGE (ML) INJECTION PRN
Status: CANCELLED | OUTPATIENT
Start: 2023-06-14

## 2023-06-07 RX ORDER — HEPARIN SODIUM (PORCINE) LOCK FLUSH IV SOLN 100 UNIT/ML 100 UNIT/ML
500 SOLUTION INTRAVENOUS PRN
Status: CANCELLED | OUTPATIENT
Start: 2023-06-14

## 2023-06-07 RX ORDER — SODIUM CHLORIDE 9 MG/ML
INJECTION, SOLUTION INTRAVENOUS CONTINUOUS
Status: CANCELLED | OUTPATIENT
Start: 2023-06-14

## 2023-06-07 RX ADMIN — SODIUM CHLORIDE 25 ML/HR: 900 INJECTION, SOLUTION INTRAVENOUS at 15:07

## 2023-06-07 RX ADMIN — FERRIC CARBOXYMALTOSE INJECTION 750 MG: 50 INJECTION, SOLUTION INTRAVENOUS at 15:08

## 2023-06-07 ASSESSMENT — PAIN SCALES - GENERAL: PAINLEVEL_OUTOF10: 0

## 2023-06-07 NOTE — PROGRESS NOTES
[facility] Progress Note    Date: June 7, 2023        1430: Mr. Mac Delaney arrived ambulatory and in no distress for [facility]. Assessment was completed, no acute issues or new complaints at this time. 24g PIV established to left arm without difficulty, positive blood return. Mr. Arlet Naranjo vitals were reviewed. Patient Vitals for the past 12 hrs:   Temp Pulse BP SpO2   06/07/23 1430 98.2 °F (36.8 °C) 90 107/73 98 %         Medications given. Medications Administered         0.9 % sodium chloride infusion Admin Date  06/07/2023 Action  New Bag Dose  25 mL/hr Rate  25 mL/hr Route  IntraVENous Administered By  Angle Martinez RN        ferric carboxymaltose (INJECTAFER) 750 mg in sodium chloride 0.9 % 250 mL IVPB Admin Date  06/07/2023 Action  New Bag Dose  750 mg Rate  870 mL/hr Route  IntraVENous Administered By  Angle Martinez RN                9083: Patient tolerated treatment well and was discharged from [facility] in stable condition. PIV flushed and removed. Patient is aware of next scheduled [facility] appointment on 06/14/23.       Angle Martinez RN  June 7, 2023

## 2023-06-14 ENCOUNTER — HOSPITAL ENCOUNTER (OUTPATIENT)
Facility: HOSPITAL | Age: 83
Setting detail: INFUSION SERIES
Discharge: HOME | End: 2023-06-14
Payer: MEDICARE

## 2023-06-14 VITALS
TEMPERATURE: 97.8 F | WEIGHT: 180 LBS | DIASTOLIC BLOOD PRESSURE: 67 MMHG | OXYGEN SATURATION: 99 % | SYSTOLIC BLOOD PRESSURE: 122 MMHG | BODY MASS INDEX: 24.38 KG/M2 | HEIGHT: 72 IN | HEART RATE: 64 BPM

## 2023-06-14 DIAGNOSIS — D50.9 IRON DEFICIENCY ANEMIA, UNSPECIFIED IRON DEFICIENCY ANEMIA TYPE: Primary | ICD-10-CM

## 2023-06-14 DIAGNOSIS — I78.0 HHT (HEREDITARY HEMORRHAGIC TELANGIECTASIA) (HCC): ICD-10-CM

## 2023-06-14 PROCEDURE — 96365 THER/PROPH/DIAG IV INF INIT: CPT

## 2023-06-14 PROCEDURE — 6360000002 HC RX W HCPCS: Performed by: INTERNAL MEDICINE

## 2023-06-14 PROCEDURE — 2580000003 HC RX 258: Performed by: INTERNAL MEDICINE

## 2023-06-14 RX ORDER — SODIUM CHLORIDE 9 MG/ML
5-250 INJECTION, SOLUTION INTRAVENOUS PRN
Status: CANCELLED | OUTPATIENT
Start: 2023-06-14

## 2023-06-14 RX ORDER — SODIUM CHLORIDE 9 MG/ML
5-250 INJECTION, SOLUTION INTRAVENOUS PRN
Status: DISCONTINUED | OUTPATIENT
Start: 2023-06-14 | End: 2023-06-15 | Stop reason: HOSPADM

## 2023-06-14 RX ORDER — ONDANSETRON 2 MG/ML
8 INJECTION INTRAMUSCULAR; INTRAVENOUS
Status: CANCELLED | OUTPATIENT
Start: 2023-06-14

## 2023-06-14 RX ORDER — ACETAMINOPHEN 325 MG/1
650 TABLET ORAL
Status: CANCELLED | OUTPATIENT
Start: 2023-06-14

## 2023-06-14 RX ORDER — SODIUM CHLORIDE 0.9 % (FLUSH) 0.9 %
5-40 SYRINGE (ML) INJECTION PRN
Status: CANCELLED | OUTPATIENT
Start: 2023-06-14

## 2023-06-14 RX ORDER — HEPARIN 100 UNIT/ML
500 SYRINGE INTRAVENOUS PRN
Status: CANCELLED | OUTPATIENT
Start: 2023-06-14

## 2023-06-14 RX ORDER — DIPHENHYDRAMINE HYDROCHLORIDE 50 MG/ML
50 INJECTION INTRAMUSCULAR; INTRAVENOUS
Status: CANCELLED | OUTPATIENT
Start: 2023-06-14

## 2023-06-14 RX ORDER — ALBUTEROL SULFATE 90 UG/1
4 AEROSOL, METERED RESPIRATORY (INHALATION) PRN
Status: CANCELLED | OUTPATIENT
Start: 2023-06-14

## 2023-06-14 RX ORDER — EPINEPHRINE 1 MG/ML
0.3 INJECTION, SOLUTION, CONCENTRATE INTRAVENOUS PRN
Status: CANCELLED | OUTPATIENT
Start: 2023-06-14

## 2023-06-14 RX ORDER — SODIUM CHLORIDE 9 MG/ML
INJECTION, SOLUTION INTRAVENOUS CONTINUOUS
Status: CANCELLED | OUTPATIENT
Start: 2023-06-14

## 2023-06-14 RX ADMIN — FERRIC CARBOXYMALTOSE INJECTION 750 MG: 50 INJECTION, SOLUTION INTRAVENOUS at 16:05

## 2023-06-14 RX ADMIN — SODIUM CHLORIDE 25 ML/HR: 900 INJECTION, SOLUTION INTRAVENOUS at 16:05

## 2023-06-14 ASSESSMENT — PAIN SCALES - GENERAL: PAINLEVEL_OUTOF10: 0

## 2023-06-14 NOTE — PROGRESS NOTES
[facility] Progress Note    Date: June 14, 2023         Pt arrived ambulatory to [facility] for Injectafer in stable condition. Assessment completed. PIV placed to right with positive blood return. After Injectafer started, IV infiltrated and new 24G started in left arm. Infiltrated IV removed, wrapped, and heat pack placed on arm. Patient Vitals for the past 12 hrs:   Temp Pulse BP SpO2   06/14/23 1630 -- 64 122/67 99 %   06/14/23 1450 97.8 °F (36.6 °C) 66 (!) 99/57 99 %       Medications Administered         0.9 % sodium chloride infusion Admin Date  06/14/2023 Action  New Bag Dose  25 mL/hr Rate  25 mL/hr Route  IntraVENous Administered By  Teagan Pace RN        ferric carboxymaltose (INJECTAFER) 750 mg in sodium chloride 0.9 % 250 mL IVPB Admin Date  06/14/2023 Action  New Bag Dose  750 mg Rate  870 mL/hr Route  IntraVENous Administered By  Teagan Pace RN                  9799: Tolerated treatment well, no adverse reactions noted. PIV flushed and removed. 2x2 and coban placed. D/Cd from [facility] ambulatory and in no distress. Patient is aware of next scheduled [facility] appointment.     Future Appointments   Date Time Provider Jacqueline Arenas   6/15/2023  2:30 PM Dillan Vang MD [facility] [place]   6/28/2023 10:15 AM Jeanne Francisco MD [facility] BS AMB           Teagan Pace RN  June 14, 2023

## 2023-06-15 ENCOUNTER — HOSPITAL ENCOUNTER (OUTPATIENT)
Facility: HOSPITAL | Age: 83
Discharge: HOME | End: 2023-06-18

## 2023-06-15 DIAGNOSIS — I78.0 HHT (HEREDITARY HEMORRHAGIC TELANGIECTASIA) (HCC): ICD-10-CM

## 2023-06-15 DIAGNOSIS — D50.0 IRON DEFICIENCY ANEMIA DUE TO CHRONIC BLOOD LOSS: ICD-10-CM

## 2023-06-16 LAB
ERYTHROCYTE [DISTWIDTH] IN BLOOD BY AUTOMATED COUNT: 14.6 % (ref 11.5–14.5)
FERRITIN SERPL-MCNC: 466 NG/ML (ref 26–388)
HCT VFR BLD AUTO: 34.1 % (ref 36.6–50.3)
HGB BLD-MCNC: 10.3 G/DL (ref 12.1–17)
IRON SATN MFR SERPL: 90 % (ref 20–50)
IRON SERPL-MCNC: 270 UG/DL (ref 35–150)
MCH RBC QN AUTO: 30.9 PG (ref 26–34)
MCHC RBC AUTO-ENTMCNC: 30.2 G/DL (ref 30–36.5)
MCV RBC AUTO: 102.4 FL (ref 80–99)
NRBC # BLD: 0 K/UL (ref 0–0.01)
NRBC BLD-RTO: 0 PER 100 WBC
PLATELET # BLD AUTO: 158 K/UL (ref 150–400)
PMV BLD AUTO: 10.9 FL (ref 8.9–12.9)
RBC # BLD AUTO: 3.33 M/UL (ref 4.1–5.7)
TIBC SERPL-MCNC: 300 UG/DL (ref 250–450)
WBC # BLD AUTO: 3.3 K/UL (ref 4.1–11.1)

## 2023-06-26 DIAGNOSIS — D50.0 IRON DEFICIENCY ANEMIA DUE TO CHRONIC BLOOD LOSS: ICD-10-CM

## 2023-06-26 DIAGNOSIS — I78.0 HHT (HEREDITARY HEMORRHAGIC TELANGIECTASIA) (HCC): ICD-10-CM

## 2023-06-27 LAB
BASOPHILS # BLD: 0 K/UL (ref 0–0.1)
BASOPHILS NFR BLD: 1 % (ref 0–1)
DIFFERENTIAL METHOD BLD: ABNORMAL
EOSINOPHIL # BLD: 0.3 K/UL (ref 0–0.4)
EOSINOPHIL NFR BLD: 7 % (ref 0–7)
ERYTHROCYTE [DISTWIDTH] IN BLOOD BY AUTOMATED COUNT: 15.3 % (ref 11.5–14.5)
FERRITIN SERPL-MCNC: 352 NG/ML (ref 26–388)
HCT VFR BLD AUTO: 39.4 % (ref 36.6–50.3)
HGB BLD-MCNC: 11.6 G/DL (ref 12.1–17)
IMM GRANULOCYTES # BLD AUTO: 0 K/UL (ref 0–0.04)
IMM GRANULOCYTES NFR BLD AUTO: 0 % (ref 0–0.5)
IRON SATN MFR SERPL: 21 % (ref 20–50)
IRON SERPL-MCNC: 62 UG/DL (ref 35–150)
LYMPHOCYTES # BLD: 0.6 K/UL (ref 0.8–3.5)
LYMPHOCYTES NFR BLD: 16 % (ref 12–49)
MCH RBC QN AUTO: 31.4 PG (ref 26–34)
MCHC RBC AUTO-ENTMCNC: 29.4 G/DL (ref 30–36.5)
MCV RBC AUTO: 106.8 FL (ref 80–99)
MONOCYTES # BLD: 0.4 K/UL (ref 0–1)
MONOCYTES NFR BLD: 11 % (ref 5–13)
NEUTS SEG # BLD: 2.4 K/UL (ref 1.8–8)
NEUTS SEG NFR BLD: 65 % (ref 32–75)
NRBC # BLD: 0 K/UL (ref 0–0.01)
NRBC BLD-RTO: 0 PER 100 WBC
PLATELET # BLD AUTO: 160 K/UL (ref 150–400)
PMV BLD AUTO: 11.1 FL (ref 8.9–12.9)
RBC # BLD AUTO: 3.69 M/UL (ref 4.1–5.7)
RBC MORPH BLD: ABNORMAL
TIBC SERPL-MCNC: 289 UG/DL (ref 250–450)
WBC # BLD AUTO: 3.7 K/UL (ref 4.1–11.1)

## 2023-06-28 ENCOUNTER — OFFICE VISIT (OUTPATIENT)
Age: 83
End: 2023-06-28
Payer: MEDICARE

## 2023-06-28 VITALS
DIASTOLIC BLOOD PRESSURE: 64 MMHG | TEMPERATURE: 96.7 F | BODY MASS INDEX: 24.76 KG/M2 | HEIGHT: 72 IN | SYSTOLIC BLOOD PRESSURE: 128 MMHG | WEIGHT: 182.8 LBS | HEART RATE: 59 BPM | OXYGEN SATURATION: 100 % | RESPIRATION RATE: 16 BRPM

## 2023-06-28 DIAGNOSIS — D50.0 IRON DEFICIENCY ANEMIA SECONDARY TO BLOOD LOSS (CHRONIC): Primary | ICD-10-CM

## 2023-06-28 DIAGNOSIS — C61 MALIGNANT NEOPLASM OF PROSTATE (HCC): ICD-10-CM

## 2023-06-28 PROCEDURE — G8427 DOCREV CUR MEDS BY ELIG CLIN: HCPCS | Performed by: INTERNAL MEDICINE

## 2023-06-28 PROCEDURE — 1036F TOBACCO NON-USER: CPT | Performed by: INTERNAL MEDICINE

## 2023-06-28 PROCEDURE — 1123F ACP DISCUSS/DSCN MKR DOCD: CPT | Performed by: INTERNAL MEDICINE

## 2023-06-28 PROCEDURE — 99214 OFFICE O/P EST MOD 30 MIN: CPT | Performed by: INTERNAL MEDICINE

## 2023-06-28 PROCEDURE — G8420 CALC BMI NORM PARAMETERS: HCPCS | Performed by: INTERNAL MEDICINE

## 2023-09-13 ENCOUNTER — TELEPHONE (OUTPATIENT)
Age: 83
End: 2023-09-13

## 2023-09-13 NOTE — TELEPHONE ENCOUNTER
Cody Lewis at [facility]  [phone]    09/13/23- Phone call placed to pt to remind pt to have labs drawn prior to his follow up appointment with . VM left for pt.

## 2023-09-23 LAB
BASOPHILS # BLD: 0.1 K/UL (ref 0–0.1)
BASOPHILS NFR BLD: 1 % (ref 0–1)
DIFFERENTIAL METHOD BLD: ABNORMAL
EOSINOPHIL # BLD: 0.1 K/UL (ref 0–0.4)
EOSINOPHIL NFR BLD: 2 % (ref 0–7)
ERYTHROCYTE [DISTWIDTH] IN BLOOD BY AUTOMATED COUNT: 13.3 % (ref 11.5–14.5)
FERRITIN SERPL-MCNC: 30 NG/ML (ref 26–388)
HCT VFR BLD AUTO: 30.1 % (ref 36.6–50.3)
HGB BLD-MCNC: 9.5 G/DL (ref 12.1–17)
IMM GRANULOCYTES # BLD AUTO: 0 K/UL (ref 0–0.04)
IMM GRANULOCYTES NFR BLD AUTO: 0 % (ref 0–0.5)
IRON SATN MFR SERPL: 14 % (ref 20–50)
IRON SERPL-MCNC: 44 UG/DL (ref 35–150)
LYMPHOCYTES # BLD: 0.6 K/UL (ref 0.8–3.5)
LYMPHOCYTES NFR BLD: 10 % (ref 12–49)
MCH RBC QN AUTO: 30.8 PG (ref 26–34)
MCHC RBC AUTO-ENTMCNC: 31.6 G/DL (ref 30–36.5)
MCV RBC AUTO: 97.7 FL (ref 80–99)
MONOCYTES # BLD: 0.8 K/UL (ref 0–1)
MONOCYTES NFR BLD: 13 % (ref 5–13)
NEUTS SEG # BLD: 4.2 K/UL (ref 1.8–8)
NEUTS SEG NFR BLD: 74 % (ref 32–75)
NRBC # BLD: 0 K/UL (ref 0–0.01)
NRBC BLD-RTO: 0 PER 100 WBC
PLATELET # BLD AUTO: 164 K/UL (ref 150–400)
PMV BLD AUTO: 11.1 FL (ref 8.9–12.9)
RBC # BLD AUTO: 3.08 M/UL (ref 4.1–5.7)
RBC MORPH BLD: ABNORMAL
TIBC SERPL-MCNC: 314 UG/DL (ref 250–450)
TIBC SERPL-MCNC: 322 UG/DL (ref 250–450)
WBC # BLD AUTO: 5.8 K/UL (ref 4.1–11.1)

## 2023-09-25 DIAGNOSIS — D50.0 IRON DEFICIENCY ANEMIA SECONDARY TO BLOOD LOSS (CHRONIC): ICD-10-CM

## 2023-09-27 ENCOUNTER — OFFICE VISIT (OUTPATIENT)
Age: 83
End: 2023-09-27
Payer: MEDICARE

## 2023-09-27 VITALS
RESPIRATION RATE: 20 BRPM | OXYGEN SATURATION: 98 % | DIASTOLIC BLOOD PRESSURE: 60 MMHG | HEART RATE: 67 BPM | SYSTOLIC BLOOD PRESSURE: 105 MMHG | TEMPERATURE: 98.2 F

## 2023-09-27 DIAGNOSIS — D50.0 IRON DEFICIENCY ANEMIA SECONDARY TO BLOOD LOSS (CHRONIC): Primary | ICD-10-CM

## 2023-09-27 PROCEDURE — 99214 OFFICE O/P EST MOD 30 MIN: CPT | Performed by: INTERNAL MEDICINE

## 2023-09-27 PROCEDURE — 1123F ACP DISCUSS/DSCN MKR DOCD: CPT | Performed by: INTERNAL MEDICINE

## 2023-09-27 PROCEDURE — G8427 DOCREV CUR MEDS BY ELIG CLIN: HCPCS | Performed by: INTERNAL MEDICINE

## 2023-09-27 PROCEDURE — 1036F TOBACCO NON-USER: CPT | Performed by: INTERNAL MEDICINE

## 2023-09-27 PROCEDURE — G8420 CALC BMI NORM PARAMETERS: HCPCS | Performed by: INTERNAL MEDICINE

## 2023-09-27 RX ORDER — FAMOTIDINE 10 MG/ML
20 INJECTION, SOLUTION INTRAVENOUS
OUTPATIENT
Start: 2023-10-04

## 2023-09-27 RX ORDER — ONDANSETRON 2 MG/ML
8 INJECTION INTRAMUSCULAR; INTRAVENOUS
OUTPATIENT
Start: 2023-10-04

## 2023-09-27 RX ORDER — HEPARIN SODIUM (PORCINE) LOCK FLUSH IV SOLN 100 UNIT/ML 100 UNIT/ML
500 SOLUTION INTRAVENOUS PRN
OUTPATIENT
Start: 2023-10-04

## 2023-09-27 RX ORDER — ALBUTEROL SULFATE 90 UG/1
4 AEROSOL, METERED RESPIRATORY (INHALATION) PRN
OUTPATIENT
Start: 2023-10-04

## 2023-09-27 RX ORDER — SODIUM CHLORIDE 9 MG/ML
5-250 INJECTION, SOLUTION INTRAVENOUS PRN
OUTPATIENT
Start: 2023-10-04

## 2023-09-27 RX ORDER — ACETAMINOPHEN 325 MG/1
650 TABLET ORAL
OUTPATIENT
Start: 2023-10-04

## 2023-09-27 RX ORDER — EPINEPHRINE 1 MG/ML
0.3 INJECTION, SOLUTION, CONCENTRATE INTRAVENOUS PRN
OUTPATIENT
Start: 2023-10-04

## 2023-09-27 RX ORDER — SODIUM CHLORIDE 0.9 % (FLUSH) 0.9 %
5-40 SYRINGE (ML) INJECTION PRN
OUTPATIENT
Start: 2023-10-04

## 2023-09-27 RX ORDER — DIPHENHYDRAMINE HYDROCHLORIDE 50 MG/ML
50 INJECTION INTRAMUSCULAR; INTRAVENOUS
OUTPATIENT
Start: 2023-10-04

## 2023-09-27 RX ORDER — SODIUM CHLORIDE 9 MG/ML
INJECTION, SOLUTION INTRAVENOUS CONTINUOUS
OUTPATIENT
Start: 2023-10-04

## 2023-09-27 NOTE — PROGRESS NOTES
Connor Casas is a 79 y.o. malel follow up for anemia and HH. 1. Have you been to the ER, urgent care clinic since your last visit? Hospitalized since your last visit?no    2. Have you seen or consulted any other health care providers outside of the [address] [place] [address] Avenue since your last visit? Include any pap smears or colon screening. no

## 2023-10-06 ENCOUNTER — HOSPITAL ENCOUNTER (OUTPATIENT)
Facility: HOSPITAL | Age: 83
Setting detail: INFUSION SERIES
End: 2023-10-06
Payer: MEDICARE

## 2023-10-06 VITALS
HEIGHT: 72 IN | WEIGHT: 189.3 LBS | HEART RATE: 61 BPM | BODY MASS INDEX: 25.64 KG/M2 | SYSTOLIC BLOOD PRESSURE: 107 MMHG | TEMPERATURE: 97.5 F | RESPIRATION RATE: 18 BRPM | DIASTOLIC BLOOD PRESSURE: 51 MMHG | OXYGEN SATURATION: 99 %

## 2023-10-06 DIAGNOSIS — D50.9 IRON DEFICIENCY ANEMIA, UNSPECIFIED IRON DEFICIENCY ANEMIA TYPE: Primary | ICD-10-CM

## 2023-10-06 DIAGNOSIS — I78.0 HHT (HEREDITARY HEMORRHAGIC TELANGIECTASIA) (HCC): ICD-10-CM

## 2023-10-06 PROCEDURE — 2580000003 HC RX 258: Performed by: NURSE PRACTITIONER

## 2023-10-06 PROCEDURE — 96365 THER/PROPH/DIAG IV INF INIT: CPT

## 2023-10-06 PROCEDURE — 6360000002 HC RX W HCPCS: Performed by: NURSE PRACTITIONER

## 2023-10-06 RX ORDER — ALBUTEROL SULFATE 90 UG/1
4 AEROSOL, METERED RESPIRATORY (INHALATION) PRN
Status: CANCELLED | OUTPATIENT
Start: 2023-10-13

## 2023-10-06 RX ORDER — SODIUM CHLORIDE 9 MG/ML
INJECTION, SOLUTION INTRAVENOUS CONTINUOUS
Status: CANCELLED | OUTPATIENT
Start: 2023-10-13

## 2023-10-06 RX ORDER — SODIUM CHLORIDE 9 MG/ML
5-250 INJECTION, SOLUTION INTRAVENOUS PRN
Status: DISCONTINUED | OUTPATIENT
Start: 2023-10-06 | End: 2023-10-07 | Stop reason: HOSPADM

## 2023-10-06 RX ORDER — HEPARIN 100 UNIT/ML
500 SYRINGE INTRAVENOUS PRN
Status: CANCELLED | OUTPATIENT
Start: 2023-10-13

## 2023-10-06 RX ORDER — EPINEPHRINE 1 MG/ML
0.3 INJECTION, SOLUTION, CONCENTRATE INTRAVENOUS PRN
Status: CANCELLED | OUTPATIENT
Start: 2023-10-13

## 2023-10-06 RX ORDER — SODIUM CHLORIDE 0.9 % (FLUSH) 0.9 %
5-40 SYRINGE (ML) INJECTION PRN
Status: DISCONTINUED | OUTPATIENT
Start: 2023-10-06 | End: 2023-10-07 | Stop reason: HOSPADM

## 2023-10-06 RX ORDER — SODIUM CHLORIDE 9 MG/ML
5-250 INJECTION, SOLUTION INTRAVENOUS PRN
Status: CANCELLED | OUTPATIENT
Start: 2023-10-13

## 2023-10-06 RX ORDER — ONDANSETRON 2 MG/ML
8 INJECTION INTRAMUSCULAR; INTRAVENOUS
Status: CANCELLED | OUTPATIENT
Start: 2023-10-13

## 2023-10-06 RX ORDER — SODIUM CHLORIDE 0.9 % (FLUSH) 0.9 %
5-40 SYRINGE (ML) INJECTION PRN
Status: CANCELLED | OUTPATIENT
Start: 2023-10-13

## 2023-10-06 RX ORDER — DIPHENHYDRAMINE HYDROCHLORIDE 50 MG/ML
50 INJECTION INTRAMUSCULAR; INTRAVENOUS
Status: CANCELLED | OUTPATIENT
Start: 2023-10-13

## 2023-10-06 RX ORDER — ACETAMINOPHEN 325 MG/1
650 TABLET ORAL
Status: CANCELLED | OUTPATIENT
Start: 2023-10-13

## 2023-10-06 RX ADMIN — SODIUM CHLORIDE 25 ML/HR: 9 INJECTION, SOLUTION INTRAVENOUS at 13:23

## 2023-10-06 RX ADMIN — FERRIC CARBOXYMALTOSE INJECTION 750 MG: 50 INJECTION, SOLUTION INTRAVENOUS at 13:23

## 2023-10-06 ASSESSMENT — PAIN SCALES - GENERAL: PAINLEVEL_OUTOF10: 0

## 2023-10-12 ENCOUNTER — TELEPHONE (OUTPATIENT)
Age: 83
End: 2023-10-12

## 2023-10-13 ENCOUNTER — HOSPITAL ENCOUNTER (OUTPATIENT)
Facility: HOSPITAL | Age: 83
Setting detail: INFUSION SERIES
End: 2023-10-13
Payer: MEDICARE

## 2023-10-13 VITALS
SYSTOLIC BLOOD PRESSURE: 103 MMHG | OXYGEN SATURATION: 98 % | BODY MASS INDEX: 25.33 KG/M2 | HEIGHT: 72 IN | RESPIRATION RATE: 18 BRPM | HEART RATE: 77 BPM | DIASTOLIC BLOOD PRESSURE: 53 MMHG | WEIGHT: 187 LBS | TEMPERATURE: 97.8 F

## 2023-10-13 DIAGNOSIS — D50.9 IRON DEFICIENCY ANEMIA, UNSPECIFIED IRON DEFICIENCY ANEMIA TYPE: Primary | ICD-10-CM

## 2023-10-13 DIAGNOSIS — I78.0 HHT (HEREDITARY HEMORRHAGIC TELANGIECTASIA) (HCC): ICD-10-CM

## 2023-10-13 PROCEDURE — 2580000003 HC RX 258: Performed by: NURSE PRACTITIONER

## 2023-10-13 PROCEDURE — 6360000002 HC RX W HCPCS: Performed by: NURSE PRACTITIONER

## 2023-10-13 PROCEDURE — 96365 THER/PROPH/DIAG IV INF INIT: CPT

## 2023-10-13 RX ORDER — ACETAMINOPHEN 325 MG/1
650 TABLET ORAL
OUTPATIENT
Start: 2023-10-13

## 2023-10-13 RX ORDER — SODIUM CHLORIDE 9 MG/ML
5-250 INJECTION, SOLUTION INTRAVENOUS PRN
Status: DISCONTINUED | OUTPATIENT
Start: 2023-10-13 | End: 2023-10-14 | Stop reason: HOSPADM

## 2023-10-13 RX ORDER — SODIUM CHLORIDE 9 MG/ML
5-250 INJECTION, SOLUTION INTRAVENOUS PRN
Status: CANCELLED | OUTPATIENT
Start: 2023-10-13

## 2023-10-13 RX ORDER — DIPHENHYDRAMINE HYDROCHLORIDE 50 MG/ML
50 INJECTION INTRAMUSCULAR; INTRAVENOUS
OUTPATIENT
Start: 2023-10-13

## 2023-10-13 RX ORDER — SODIUM CHLORIDE 9 MG/ML
INJECTION, SOLUTION INTRAVENOUS CONTINUOUS
OUTPATIENT
Start: 2023-10-13

## 2023-10-13 RX ORDER — EPINEPHRINE 1 MG/ML
0.3 INJECTION, SOLUTION, CONCENTRATE INTRAVENOUS PRN
OUTPATIENT
Start: 2023-10-13

## 2023-10-13 RX ORDER — SODIUM CHLORIDE 9 MG/ML
5-250 INJECTION, SOLUTION INTRAVENOUS PRN
OUTPATIENT
Start: 2023-10-13

## 2023-10-13 RX ORDER — HEPARIN 100 UNIT/ML
500 SYRINGE INTRAVENOUS PRN
OUTPATIENT
Start: 2023-10-13

## 2023-10-13 RX ORDER — ONDANSETRON 2 MG/ML
8 INJECTION INTRAMUSCULAR; INTRAVENOUS
OUTPATIENT
Start: 2023-10-13

## 2023-10-13 RX ORDER — SODIUM CHLORIDE 0.9 % (FLUSH) 0.9 %
5-40 SYRINGE (ML) INJECTION PRN
OUTPATIENT
Start: 2023-10-13

## 2023-10-13 RX ORDER — ALBUTEROL SULFATE 90 UG/1
4 AEROSOL, METERED RESPIRATORY (INHALATION) PRN
OUTPATIENT
Start: 2023-10-13

## 2023-10-13 RX ADMIN — FERRIC CARBOXYMALTOSE INJECTION 750 MG: 50 INJECTION, SOLUTION INTRAVENOUS at 11:53

## 2023-10-13 RX ADMIN — SODIUM CHLORIDE 25 ML/HR: 9 INJECTION, SOLUTION INTRAVENOUS at 11:49

## 2023-10-13 ASSESSMENT — PAIN SCALES - GENERAL: PAINLEVEL_OUTOF10: 0

## 2023-10-20 ENCOUNTER — TELEPHONE (OUTPATIENT)
Age: 83
End: 2023-10-20

## 2023-10-20 LAB
BASOPHILS # BLD: 0 K/UL (ref 0–0.1)
BASOPHILS NFR BLD: 1 % (ref 0–1)
DIFFERENTIAL METHOD BLD: ABNORMAL
EOSINOPHIL # BLD: 0.1 K/UL (ref 0–0.4)
EOSINOPHIL NFR BLD: 4 % (ref 0–7)
ERYTHROCYTE [DISTWIDTH] IN BLOOD BY AUTOMATED COUNT: 17 % (ref 11.5–14.5)
FERRITIN SERPL-MCNC: 385 NG/ML (ref 26–388)
HCT VFR BLD AUTO: 31.3 % (ref 36.6–50.3)
HGB BLD-MCNC: 9.3 G/DL (ref 12.1–17)
IMM GRANULOCYTES # BLD AUTO: 0 K/UL (ref 0–0.04)
IMM GRANULOCYTES NFR BLD AUTO: 0 % (ref 0–0.5)
IRON SATN MFR SERPL: 32 % (ref 20–50)
IRON SERPL-MCNC: 93 UG/DL (ref 35–150)
LYMPHOCYTES # BLD: 0.4 K/UL (ref 0.8–3.5)
LYMPHOCYTES NFR BLD: 14 % (ref 12–49)
MCH RBC QN AUTO: 30.1 PG (ref 26–34)
MCHC RBC AUTO-ENTMCNC: 29.7 G/DL (ref 30–36.5)
MCV RBC AUTO: 101.3 FL (ref 80–99)
MONOCYTES # BLD: 0.4 K/UL (ref 0–1)
MONOCYTES NFR BLD: 14 % (ref 5–13)
NEUTS SEG # BLD: 2 K/UL (ref 1.8–8)
NEUTS SEG NFR BLD: 67 % (ref 32–75)
NRBC # BLD: 0 K/UL (ref 0–0.01)
NRBC BLD-RTO: 0 PER 100 WBC
PLATELET # BLD AUTO: 173 K/UL (ref 150–400)
PLATELET COMMENT: ABNORMAL
PMV BLD AUTO: 10.5 FL (ref 8.9–12.9)
RBC # BLD AUTO: 3.09 M/UL (ref 4.1–5.7)
RBC MORPH BLD: ABNORMAL
TIBC SERPL-MCNC: 284 UG/DL (ref 250–450)
TIBC SERPL-MCNC: 288 UG/DL (ref 250–450)
WBC # BLD AUTO: 2.9 K/UL (ref 4.1–11.1)

## 2023-10-20 NOTE — TELEPHONE ENCOUNTER
Cody Lewis at [address] [place] [address]  [phone]    10/20/23-Patient called to report in the past 2 weeks he had 2-3 \" significant nose bleeds lasting 30 minutes each\". Last nose bleed was 2 days ago. Denies any SOB, lightheadedness, fatigue,no other bleeding or other symptoms. He is inquiring if he can have labs checked because he thinks he needs another BT. He has not followed up with [address] [place] [address] recently. Returned phone call will be made with provider recommendations. 10:06 AM- Attempted to call pt back- VM left. Needing to advise him the following below per [employer] R,NP. Have him get labs now. Since he does not report symptoms of dizziness, fatigue etc then I think ok to go to  lab or [facility]. He has orders in the system from the last visit with Dr. Jenny Goodwin. I also think he needs to call his ENT doctor to discuss management. They may want to prescribe afrin. If he develops more episodes of epistaxis over the weekend with associated dizziness, fatigue, SOB then he should go to ED, but since no sx currently ok to manage outpatient. 10:23 AM- Pt returned phone call informed him the following above. He verbalized understanding and will have labs checked today.

## 2023-10-24 ENCOUNTER — TELEPHONE (OUTPATIENT)
Age: 83
End: 2023-10-24

## 2023-11-17 ENCOUNTER — APPOINTMENT (OUTPATIENT)
Facility: HOSPITAL | Age: 83
End: 2023-11-17
Payer: MEDICARE

## 2023-11-17 ENCOUNTER — HOSPITAL ENCOUNTER (EMERGENCY)
Facility: HOSPITAL | Age: 83
Discharge: HOME | End: 2023-11-18
Attending: EMERGENCY MEDICINE
Payer: MEDICARE

## 2023-11-17 VITALS
BODY MASS INDEX: 24.41 KG/M2 | OXYGEN SATURATION: 97 % | RESPIRATION RATE: 18 BRPM | TEMPERATURE: 97.7 F | HEART RATE: 86 BPM | SYSTOLIC BLOOD PRESSURE: 153 MMHG | WEIGHT: 180 LBS | DIASTOLIC BLOOD PRESSURE: 103 MMHG

## 2023-11-17 DIAGNOSIS — S89.91XA INJURY OF RIGHT KNEE, INITIAL ENCOUNTER: ICD-10-CM

## 2023-11-17 DIAGNOSIS — S00.81XA ABRASION OF FACE, INITIAL ENCOUNTER: ICD-10-CM

## 2023-11-17 DIAGNOSIS — S02.2XXA CLOSED FRACTURE OF NASAL BONE, INITIAL ENCOUNTER: ICD-10-CM
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
## 2023-11-17 DIAGNOSIS — S09.90XA INJURY OF HEAD, INITIAL ENCOUNTER: ICD-10-CM

## 2023-11-17 DIAGNOSIS — S51.811A SKIN TEAR OF RIGHT FOREARM WITHOUT COMPLICATION, INITIAL ENCOUNTER: ICD-10-CM

## 2023-11-17 DIAGNOSIS — S00.83XA CONTUSION OF FOREHEAD, INITIAL ENCOUNTER: ICD-10-CM

## 2023-11-17 PROCEDURE — 6360000002 HC RX W HCPCS: Performed by: EMERGENCY MEDICINE

## 2023-11-17 PROCEDURE — 99284 EMERGENCY DEPT VISIT MOD MDM: CPT

## 2023-11-17 PROCEDURE — 90714 TD VACC NO PRESV 7 YRS+ IM: CPT | Performed by: EMERGENCY MEDICINE

## 2023-11-17 PROCEDURE — 6370000000 HC RX 637 (ALT 250 FOR IP): Performed by: EMERGENCY MEDICINE

## 2023-11-17 PROCEDURE — 70486 CT MAXILLOFACIAL W/O DYE: CPT

## 2023-11-17 PROCEDURE — 90471 IMMUNIZATION ADMIN: CPT | Performed by: EMERGENCY MEDICINE

## 2023-11-17 PROCEDURE — 73562 X-RAY EXAM OF KNEE 3: CPT

## 2023-11-17 PROCEDURE — 70450 CT HEAD/BRAIN W/O DYE: CPT

## 2023-11-17 RX ORDER — TETANUS AND DIPHTHERIA TOXOIDS ADSORBED 2; 2 [LF]/.5ML; [LF]/.5ML
0.5 INJECTION INTRAMUSCULAR
Status: COMPLETED | OUTPATIENT
Start: 2023-11-17 | End: 2023-11-17

## 2023-11-17 RX ORDER — ACETAMINOPHEN 500 MG
1000 TABLET ORAL
Status: COMPLETED | OUTPATIENT
Start: 2023-11-17 | End: 2023-11-17

## 2023-11-17 RX ADMIN — ACETAMINOPHEN 1000 MG: 500 TABLET ORAL at 23:48

## 2023-11-17 RX ADMIN — TETANUS AND DIPHTHERIA TOXOIDS ADSORBED 0.5 ML: 2; 2 INJECTION INTRAMUSCULAR at 23:46

## 2023-11-17 ASSESSMENT — PAIN DESCRIPTION - LOCATION: LOCATION: HEAD

## 2023-11-17 ASSESSMENT — PAIN DESCRIPTION - ORIENTATION: ORIENTATION: ANTERIOR

## 2023-11-17 ASSESSMENT — PAIN DESCRIPTION - DESCRIPTORS: DESCRIPTORS: ACHING

## 2023-11-17 ASSESSMENT — PAIN SCALES - GENERAL: PAINLEVEL_OUTOF10: 3

## 2023-11-18 RX ORDER — GINSENG 100 MG
CAPSULE ORAL
Status: DISCONTINUED | OUTPATIENT
Start: 2023-11-18 | End: 2023-11-18 | Stop reason: HOSPADM

## 2023-11-18 NOTE — ED PROVIDER NOTES
[facility] EMERGENCY DEPT  EMERGENCY DEPARTMENT ENCOUNTER      Pt Name: Kamryn De León  MRN: [id number]  [facility] 1940  Date of evaluation: 11/17/2023  Provider: Kimberley Bean MD    CHIEF COMPLAINT       Chief Complaint   Patient presents with    Fall    Abrasion    Head Injury       EMERGENCY DEPARTMENT COURSE and DIFFERENTIAL DIAGNOSIS/MDM:   Medical Decision Making  79-year-old male with significant past medical history of HHT with pulmonary AVMs and recurrent epistaxis presenting ER status post trip and fall in which she hit his head face injured his right forearm and right knee. Patient denies any loss of consciousness no nausea or vomiting no numbness no weakness. Patient has swelling of the nose and abrasions of the forehead and nose. Patient also has a skin tear of the right forearm but denies any pain or tenderness of the arm able to use it normally and has skin tear of the right knee with some mild swelling but normal range of motion and able to ambulate normally. Patient denies any preceding symptoms to the fall reports that something slipped he was on a shoe and while walking tripped and fell. No chest pain no abdominal pain no neck or back pain. On exam patient does have contusion and abrasions to the forehead and nose with mild swelling of the nose patient had epistaxis which was resolved after packing the nose at home with cotton balls. No dental pain. No chest pain or abdominal pain patient has a skin tear on the right forearm that is 6 cm long with skin that was avulsed off but no bony tenderness or swelling neurovascular intact. Right knee mildly swollen with 3 cm skin tear. Neurovascular intact x-rays negative of the knee and CT head max face showing nondisplaced nasal fracture but no other acute abnormalities. Wounds cleaned and skin tears bandaged. Updated tetanus.   Patient stable for discharge    Amount and/or Complexity of Data Reviewed  Radiology: ordered and independent

## 2023-11-26 ENCOUNTER — HOSPITAL ENCOUNTER (EMERGENCY)
Facility: HOSPITAL | Age: 83
Discharge: HOME | End: 2023-11-27
Attending: EMERGENCY MEDICINE
Payer: MEDICARE

## 2023-11-26 DIAGNOSIS — R04.0 EPISTAXIS: Primary | ICD-10-CM

## 2023-11-26 DIAGNOSIS — I78.0 HEREDITARY HEMORRHAGIC TELANGIECTASIA (HCC): ICD-10-CM

## 2023-11-26 PROCEDURE — 30901 CONTROL OF NOSEBLEED: CPT

## 2023-11-26 PROCEDURE — 2500000003 HC RX 250 WO HCPCS: Performed by: EMERGENCY MEDICINE

## 2023-11-26 PROCEDURE — 99283 EMERGENCY DEPT VISIT LOW MDM: CPT

## 2023-11-26 RX ORDER — TRANEXAMIC ACID 100 MG/ML
100 INJECTION, SOLUTION INTRAVENOUS ONCE
Status: COMPLETED | OUTPATIENT
Start: 2023-11-26 | End: 2023-11-26

## 2023-11-26 RX ADMIN — TRANEXAMIC ACID 100 MG: 100 INJECTION, SOLUTION INTRAVENOUS at 23:55

## 2023-11-26 ASSESSMENT — PAIN - FUNCTIONAL ASSESSMENT: PAIN_FUNCTIONAL_ASSESSMENT: NONE - DENIES PAIN

## 2023-11-27 ENCOUNTER — TELEPHONE (OUTPATIENT)
Age: 83
End: 2023-11-27

## 2023-11-27 VITALS
OXYGEN SATURATION: 99 % | TEMPERATURE: 98.2 F | BODY MASS INDEX: 24.68 KG/M2 | WEIGHT: 182 LBS | HEART RATE: 96 BPM | DIASTOLIC BLOOD PRESSURE: 65 MMHG | RESPIRATION RATE: 16 BRPM | SYSTOLIC BLOOD PRESSURE: 128 MMHG

## 2023-11-27 DIAGNOSIS — D50.0 IRON DEFICIENCY ANEMIA SECONDARY TO BLOOD LOSS (CHRONIC): ICD-10-CM

## 2023-11-27 DIAGNOSIS — D50.0 IRON DEFICIENCY ANEMIA SECONDARY TO BLOOD LOSS (CHRONIC): Primary | ICD-10-CM

## 2023-11-27 PROCEDURE — 30901 CONTROL OF NOSEBLEED: CPT

## 2023-11-27 PROCEDURE — 6370000000 HC RX 637 (ALT 250 FOR IP): Performed by: EMERGENCY MEDICINE

## 2023-11-27 RX ORDER — OXYMETAZOLINE HYDROCHLORIDE 0.05 G/100ML
2 SPRAY NASAL
Status: COMPLETED | OUTPATIENT
Start: 2023-11-27 | End: 2023-11-27

## 2023-11-27 RX ADMIN — Medication 2 SPRAY: at 01:04

## 2023-11-27 NOTE — TELEPHONE ENCOUNTER
Cody Lewis at [facility]  [phone]    11/27/23- Patient's wife stated patient has had significant nose bleeds recently and would like to have labs checked. He's waiting to hear from ENT about having his nose packed as well. Informed patient's wife that an order was placed to have labs done beside our office. She verbalized understanding and was appreciative.

## 2023-11-27 NOTE — TELEPHONE ENCOUNTER
PT reports he has had several nosebleeds lately and would like the team to order blood work if possible.     CB# [phone]

## 2023-11-27 NOTE — ED TRIAGE NOTES
Pt ambulates to treatment area without any gait disturbances. Pt states that 30 min ago his nose started bleeding, a lot. Pt states that he has HHT.

## 2023-11-28 ENCOUNTER — TELEPHONE (OUTPATIENT)
Age: 83
End: 2023-11-28

## 2023-11-28 LAB
BASOPHILS # BLD: 0 K/UL (ref 0–0.1)
BASOPHILS NFR BLD: 1 % (ref 0–1)
DIFFERENTIAL METHOD BLD: ABNORMAL
EOSINOPHIL # BLD: 0.2 K/UL (ref 0–0.4)
EOSINOPHIL NFR BLD: 5 % (ref 0–7)
ERYTHROCYTE [DISTWIDTH] IN BLOOD BY AUTOMATED COUNT: 14.3 % (ref 11.5–14.5)
FERRITIN SERPL-MCNC: 31 NG/ML (ref 26–388)
HCT VFR BLD AUTO: 27.8 % (ref 36.6–50.3)
HGB BLD-MCNC: 8.4 G/DL (ref 12.1–17)
IMM GRANULOCYTES # BLD AUTO: 0 K/UL (ref 0–0.04)
IMM GRANULOCYTES NFR BLD AUTO: 0 % (ref 0–0.5)
IRON SATN MFR SERPL: 11 % (ref 20–50)
IRON SERPL-MCNC: 36 UG/DL (ref 35–150)
LYMPHOCYTES # BLD: 0.5 K/UL (ref 0.8–3.5)
LYMPHOCYTES NFR BLD: 13 % (ref 12–49)
MCH RBC QN AUTO: 30.4 PG (ref 26–34)
MCHC RBC AUTO-ENTMCNC: 30.2 G/DL (ref 30–36.5)
MCV RBC AUTO: 100.7 FL (ref 80–99)
MONOCYTES # BLD: 0.5 K/UL (ref 0–1)
MONOCYTES NFR BLD: 14 % (ref 5–13)
NEUTS SEG # BLD: 2.6 K/UL (ref 1.8–8)
NEUTS SEG NFR BLD: 67 % (ref 32–75)
NRBC # BLD: 0 K/UL (ref 0–0.01)
NRBC BLD-RTO: 0 PER 100 WBC
PLATELET # BLD AUTO: 206 K/UL (ref 150–400)
PMV BLD AUTO: 11 FL (ref 8.9–12.9)
RBC # BLD AUTO: 2.76 M/UL (ref 4.1–5.7)
RBC MORPH BLD: ABNORMAL
TIBC SERPL-MCNC: 327 UG/DL (ref 250–450)
WBC # BLD AUTO: 3.8 K/UL (ref 4.1–11.1)

## 2023-11-28 NOTE — TELEPHONE ENCOUNTER
Cody Lewis at [facility]  [phone]    11/28/23- Informed patient's wife that per Dr. Darrell Langley reviewed. HGB lower and iron levels low. Ferritin was up to 385 after IV iron, but back down to 31 now. Recommend repeating his Injectafer x2 doses. \"    Will schedule patient for his first dose with office visit next week. She verbalized understanding and was appreciative.

## 2023-11-28 NOTE — TELEPHONE ENCOUNTER
Cody Lewis at [address] [place] [address]  [phone]    Labs reviewed. HGB lower and iron levels low. Ferritin was up to 385 after IV iron, but back down to 31 now. Recommend repeating his Injectafer x2 doses.

## 2023-11-29 RX ORDER — ONDANSETRON 2 MG/ML
8 INJECTION INTRAMUSCULAR; INTRAVENOUS
OUTPATIENT
Start: 2023-12-06

## 2023-11-29 RX ORDER — DIPHENHYDRAMINE HYDROCHLORIDE 50 MG/ML
50 INJECTION INTRAMUSCULAR; INTRAVENOUS
OUTPATIENT
Start: 2023-12-06

## 2023-11-29 RX ORDER — EPINEPHRINE 1 MG/ML
0.3 INJECTION, SOLUTION, CONCENTRATE INTRAVENOUS PRN
OUTPATIENT
Start: 2023-12-06

## 2023-11-29 RX ORDER — HEPARIN SODIUM (PORCINE) LOCK FLUSH IV SOLN 100 UNIT/ML 100 UNIT/ML
500 SOLUTION INTRAVENOUS PRN
OUTPATIENT
Start: 2023-12-06

## 2023-11-29 RX ORDER — FAMOTIDINE 10 MG/ML
20 INJECTION, SOLUTION INTRAVENOUS
OUTPATIENT
Start: 2023-12-06

## 2023-11-29 RX ORDER — SODIUM CHLORIDE 0.9 % (FLUSH) 0.9 %
5-40 SYRINGE (ML) INJECTION PRN
OUTPATIENT
Start: 2023-12-06

## 2023-11-29 RX ORDER — ACETAMINOPHEN 325 MG/1
650 TABLET ORAL
OUTPATIENT
Start: 2023-12-06

## 2023-11-29 RX ORDER — ALBUTEROL SULFATE 90 UG/1
4 AEROSOL, METERED RESPIRATORY (INHALATION) PRN
OUTPATIENT
Start: 2023-12-06

## 2023-11-29 RX ORDER — SODIUM CHLORIDE 9 MG/ML
5-250 INJECTION, SOLUTION INTRAVENOUS PRN
OUTPATIENT
Start: 2023-12-06

## 2023-11-29 RX ORDER — SODIUM CHLORIDE 9 MG/ML
INJECTION, SOLUTION INTRAVENOUS CONTINUOUS
OUTPATIENT
Start: 2023-12-06

## 2023-12-05 LAB
BASOPHILS # BLD: 0 K/UL (ref 0–0.1)
BASOPHILS NFR BLD: 1 % (ref 0–1)
DIFFERENTIAL METHOD BLD: ABNORMAL
EOSINOPHIL # BLD: 0.2 K/UL (ref 0–0.4)
EOSINOPHIL NFR BLD: 6 % (ref 0–7)
ERYTHROCYTE [DISTWIDTH] IN BLOOD BY AUTOMATED COUNT: 13.7 % (ref 11.5–14.5)
FERRITIN SERPL-MCNC: 20 NG/ML (ref 26–388)
HCT VFR BLD AUTO: 27 % (ref 36.6–50.3)
HGB BLD-MCNC: 8 G/DL (ref 12.1–17)
IMM GRANULOCYTES # BLD AUTO: 0 K/UL (ref 0–0.04)
IMM GRANULOCYTES NFR BLD AUTO: 0 % (ref 0–0.5)
IRON SATN MFR SERPL: 5 % (ref 20–50)
IRON SERPL-MCNC: 18 UG/DL (ref 35–150)
LYMPHOCYTES # BLD: 0.4 K/UL (ref 0.8–3.5)
LYMPHOCYTES NFR BLD: 12 % (ref 12–49)
MCH RBC QN AUTO: 29.7 PG (ref 26–34)
MCHC RBC AUTO-ENTMCNC: 29.6 G/DL (ref 30–36.5)
MCV RBC AUTO: 100.4 FL (ref 80–99)
MONOCYTES # BLD: 0.6 K/UL (ref 0–1)
MONOCYTES NFR BLD: 16 % (ref 5–13)
NEUTS SEG # BLD: 2.5 K/UL (ref 1.8–8)
NEUTS SEG NFR BLD: 65 % (ref 32–75)
NRBC # BLD: 0 K/UL (ref 0–0.01)
NRBC BLD-RTO: 0 PER 100 WBC
PLATELET # BLD AUTO: 174 K/UL (ref 150–400)
PMV BLD AUTO: 10.9 FL (ref 8.9–12.9)
RBC # BLD AUTO: 2.69 M/UL (ref 4.1–5.7)
RBC MORPH BLD: ABNORMAL
TIBC SERPL-MCNC: 349 UG/DL (ref 250–450)
WBC # BLD AUTO: 3.7 K/UL (ref 4.1–11.1)

## 2023-12-06 ENCOUNTER — OFFICE VISIT (OUTPATIENT)
Age: 83
End: 2023-12-06
Payer: MEDICARE

## 2023-12-06 ENCOUNTER — HOSPITAL ENCOUNTER (OUTPATIENT)
Facility: HOSPITAL | Age: 83
Setting detail: INFUSION SERIES
Discharge: HOME | End: 2023-12-06
Payer: MEDICARE

## 2023-12-06 VITALS
TEMPERATURE: 98 F | WEIGHT: 186 LBS | HEIGHT: 72 IN | BODY MASS INDEX: 25.19 KG/M2 | HEART RATE: 55 BPM | RESPIRATION RATE: 18 BRPM | OXYGEN SATURATION: 99 %

## 2023-12-06 VITALS
HEIGHT: 72 IN | BODY MASS INDEX: 25.19 KG/M2 | SYSTOLIC BLOOD PRESSURE: 119 MMHG | TEMPERATURE: 97.8 F | DIASTOLIC BLOOD PRESSURE: 58 MMHG | WEIGHT: 186 LBS | RESPIRATION RATE: 18 BRPM | HEART RATE: 77 BPM

## 2023-12-06 DIAGNOSIS — I78.0 HEREDITARY HEMORRHAGIC TELANGIECTASIA (HCC): ICD-10-CM

## 2023-12-06 DIAGNOSIS — D50.0 IRON DEFICIENCY ANEMIA SECONDARY TO BLOOD LOSS (CHRONIC): Primary | ICD-10-CM

## 2023-12-06 DIAGNOSIS — I78.0 HHT (HEREDITARY HEMORRHAGIC TELANGIECTASIA) (HCC): Primary | ICD-10-CM

## 2023-12-06 DIAGNOSIS — D50.9 IRON DEFICIENCY ANEMIA, UNSPECIFIED IRON DEFICIENCY ANEMIA TYPE: ICD-10-CM

## 2023-12-06 PROCEDURE — G8427 DOCREV CUR MEDS BY ELIG CLIN: HCPCS | Performed by: INTERNAL MEDICINE

## 2023-12-06 PROCEDURE — 99214 OFFICE O/P EST MOD 30 MIN: CPT | Performed by: INTERNAL MEDICINE

## 2023-12-06 PROCEDURE — G8484 FLU IMMUNIZE NO ADMIN: HCPCS | Performed by: INTERNAL MEDICINE

## 2023-12-06 PROCEDURE — 1123F ACP DISCUSS/DSCN MKR DOCD: CPT | Performed by: INTERNAL MEDICINE

## 2023-12-06 PROCEDURE — 1036F TOBACCO NON-USER: CPT | Performed by: INTERNAL MEDICINE

## 2023-12-06 PROCEDURE — 6360000002 HC RX W HCPCS: Performed by: NURSE PRACTITIONER

## 2023-12-06 PROCEDURE — 2580000003 HC RX 258: Performed by: NURSE PRACTITIONER

## 2023-12-06 PROCEDURE — G8419 CALC BMI OUT NRM PARAM NOF/U: HCPCS | Performed by: INTERNAL MEDICINE

## 2023-12-06 PROCEDURE — 96365 THER/PROPH/DIAG IV INF INIT: CPT

## 2023-12-06 RX ORDER — ONDANSETRON 2 MG/ML
8 INJECTION INTRAMUSCULAR; INTRAVENOUS
OUTPATIENT
Start: 2023-12-13

## 2023-12-06 RX ORDER — DIPHENHYDRAMINE HYDROCHLORIDE 50 MG/ML
50 INJECTION INTRAMUSCULAR; INTRAVENOUS
OUTPATIENT
Start: 2023-12-13

## 2023-12-06 RX ORDER — SODIUM CHLORIDE 9 MG/ML
5-250 INJECTION, SOLUTION INTRAVENOUS PRN
OUTPATIENT
Start: 2023-12-13

## 2023-12-06 RX ORDER — SODIUM CHLORIDE 9 MG/ML
5-250 INJECTION, SOLUTION INTRAVENOUS PRN
Status: DISCONTINUED | OUTPATIENT
Start: 2023-12-06 | End: 2023-12-07 | Stop reason: HOSPADM

## 2023-12-06 RX ORDER — SODIUM CHLORIDE 9 MG/ML
INJECTION, SOLUTION INTRAVENOUS CONTINUOUS
OUTPATIENT
Start: 2023-12-13

## 2023-12-06 RX ORDER — ACETAMINOPHEN 325 MG/1
650 TABLET ORAL
OUTPATIENT
Start: 2023-12-13

## 2023-12-06 RX ORDER — ALBUTEROL SULFATE 90 UG/1
4 AEROSOL, METERED RESPIRATORY (INHALATION) PRN
OUTPATIENT
Start: 2023-12-13

## 2023-12-06 RX ORDER — HEPARIN 100 UNIT/ML
500 SYRINGE INTRAVENOUS PRN
OUTPATIENT
Start: 2023-12-13

## 2023-12-06 RX ORDER — EPINEPHRINE 1 MG/ML
0.3 INJECTION, SOLUTION INTRAMUSCULAR; SUBCUTANEOUS PRN
OUTPATIENT
Start: 2023-12-13

## 2023-12-06 RX ORDER — SODIUM CHLORIDE 0.9 % (FLUSH) 0.9 %
5-40 SYRINGE (ML) INJECTION PRN
Status: DISCONTINUED | OUTPATIENT
Start: 2023-12-06 | End: 2023-12-07 | Stop reason: HOSPADM

## 2023-12-06 RX ORDER — SODIUM CHLORIDE 0.9 % (FLUSH) 0.9 %
5-40 SYRINGE (ML) INJECTION PRN
OUTPATIENT
Start: 2023-12-13

## 2023-12-06 RX ORDER — FAMOTIDINE 10 MG/ML
20 INJECTION, SOLUTION INTRAVENOUS
OUTPATIENT
Start: 2023-12-13

## 2023-12-06 RX ADMIN — SODIUM CHLORIDE 25 ML/HR: 9 INJECTION, SOLUTION INTRAVENOUS at 11:19

## 2023-12-06 RX ADMIN — FERRIC CARBOXYMALTOSE INJECTION 750 MG: 50 INJECTION, SOLUTION INTRAVENOUS at 11:41

## 2023-12-06 RX ADMIN — Medication 10 ML: at 11:19

## 2023-12-06 ASSESSMENT — PAIN SCALES - GENERAL: PAINLEVEL_OUTOF10: 0

## 2023-12-06 NOTE — PROGRESS NOTES
Spiritual Care Partner Volunteer visited patient at [facility] in [address] [facility] on 12/6/2023    Documented by:  Carmita Garza, [address] [facility], [facility], [address] [place] [address]  Paging service: [phone] ([employer])

## 2023-12-06 NOTE — PROGRESS NOTES
Harris Winkler is a 79 y.o. male follow up for HHT and anemia. 1. Have you been to the ER, urgent care clinic since your last visit? Hospitalized since your last visit?no     2. Have you seen or consulted any other health care providers outside of the [address] [place] [address] since your last visit? Include any pap smears or colon screening.  no

## 2023-12-07 ENCOUNTER — HOSPITAL ENCOUNTER (OUTPATIENT)
Facility: HOSPITAL | Age: 83
Discharge: HOME | End: 2023-12-10

## 2023-12-07 VITALS
HEART RATE: 98 BPM | DIASTOLIC BLOOD PRESSURE: 58 MMHG | WEIGHT: 187 LBS | RESPIRATION RATE: 18 BRPM | SYSTOLIC BLOOD PRESSURE: 107 MMHG | HEIGHT: 72 IN | BODY MASS INDEX: 25.33 KG/M2

## 2023-12-07 DIAGNOSIS — C61 PROSTATE CANCER (HCC): Primary | ICD-10-CM

## 2023-12-07 NOTE — PROGRESS NOTES
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 Interval History   Mr. Jacob Adjutant arrives today for routine follow up 32 months from end of radiation treatments. On 12/4/2023 he had an ultrasensitive PSA test which was less than 0.014. His testosterone was measured at 38.8. 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 He continues to follow closely with Dr. Chepe Abarca and the [facility] in [address]. He denies any incontinence. He does note occasional urgency. No dysuria or hematuria. No bloody stools. He never saw pelvic floor PT. IPSS: 0, 1, 0, 2, 1, 0, 1 = 5/35  QOL: 5- unhappy  DEQUAN: 1, 0, 0, 0, 0 = 1/25    Review of Systems:  A complete review of systems was obtained and negative except as described above. Interval Imaging/Labs   PSA history:  10/25/2017: 2.1  4/5/2019: 2.5  1/31/2020: 2.7  8/28/2020: 2.78  3/10/2021: Started androgen deprivation  --Radiation therapy--  9/22/2021: <0.1 (T<2.5)  11/17/2021: Last 3 mo Lupron  12/13/2021: < 0.014 (T<2.5)  6/9/2022: <0.014 (T 12.56)  12/5/2022: <0.014  6/14/2023: <0.014 (T 46.5)  12/4/23: <0.014 (T 38.8)    Above imaging/lab reports were reviewed.   Allergies and Medications   No Known Allergies    Current Outpatient Medications   Medication Instructions    acetaminophen (TYLENOL) 500 MG tablet Oral, EVERY 6 HOURS PRN    albuterol sulfate HFA (PROVENTIL;VENTOLIN;PROAIR) 108 (90 Base) MCG/ACT inhaler 2 puffs, Inhalation, EVERY

## 2023-12-11 DIAGNOSIS — D50.0 IRON DEFICIENCY ANEMIA SECONDARY TO BLOOD LOSS (CHRONIC): ICD-10-CM

## 2023-12-13 ENCOUNTER — HOSPITAL ENCOUNTER (OUTPATIENT)
Facility: HOSPITAL | Age: 83
Setting detail: INFUSION SERIES
Discharge: HOME | End: 2023-12-13
Payer: MEDICARE

## 2023-12-13 VITALS
OXYGEN SATURATION: 98 % | WEIGHT: 187.8 LBS | TEMPERATURE: 97.8 F | BODY MASS INDEX: 25.44 KG/M2 | SYSTOLIC BLOOD PRESSURE: 116 MMHG | RESPIRATION RATE: 18 BRPM | HEIGHT: 72 IN | HEART RATE: 82 BPM | DIASTOLIC BLOOD PRESSURE: 65 MMHG

## 2023-12-13 DIAGNOSIS — D50.9 IRON DEFICIENCY ANEMIA, UNSPECIFIED IRON DEFICIENCY ANEMIA TYPE: ICD-10-CM

## 2023-12-13 DIAGNOSIS — I78.0 HHT (HEREDITARY HEMORRHAGIC TELANGIECTASIA) (HCC): Primary | ICD-10-CM

## 2023-12-13 PROCEDURE — 96365 THER/PROPH/DIAG IV INF INIT: CPT

## 2023-12-13 PROCEDURE — 6360000002 HC RX W HCPCS: Performed by: NURSE PRACTITIONER

## 2023-12-13 PROCEDURE — 2580000003 HC RX 258: Performed by: NURSE PRACTITIONER

## 2023-12-13 RX ORDER — HEPARIN 100 UNIT/ML
500 SYRINGE INTRAVENOUS PRN
OUTPATIENT
Start: 2023-12-13

## 2023-12-13 RX ORDER — FAMOTIDINE 10 MG/ML
20 INJECTION, SOLUTION INTRAVENOUS
OUTPATIENT
Start: 2023-12-13

## 2023-12-13 RX ORDER — SODIUM CHLORIDE 9 MG/ML
5-250 INJECTION, SOLUTION INTRAVENOUS PRN
Status: DISCONTINUED | OUTPATIENT
Start: 2023-12-13 | End: 2023-12-14 | Stop reason: HOSPADM

## 2023-12-13 RX ORDER — SODIUM CHLORIDE 0.9 % (FLUSH) 0.9 %
5-40 SYRINGE (ML) INJECTION PRN
OUTPATIENT
Start: 2023-12-13

## 2023-12-13 RX ORDER — SODIUM CHLORIDE 9 MG/ML
5-250 INJECTION, SOLUTION INTRAVENOUS PRN
Status: CANCELLED | OUTPATIENT
Start: 2023-12-13

## 2023-12-13 RX ORDER — DIPHENHYDRAMINE HYDROCHLORIDE 50 MG/ML
50 INJECTION INTRAMUSCULAR; INTRAVENOUS
OUTPATIENT
Start: 2023-12-13

## 2023-12-13 RX ORDER — EPINEPHRINE 1 MG/ML
0.3 INJECTION, SOLUTION INTRAMUSCULAR; SUBCUTANEOUS PRN
OUTPATIENT
Start: 2023-12-13

## 2023-12-13 RX ORDER — ONDANSETRON 2 MG/ML
8 INJECTION INTRAMUSCULAR; INTRAVENOUS
OUTPATIENT
Start: 2023-12-13

## 2023-12-13 RX ORDER — SODIUM CHLORIDE 9 MG/ML
INJECTION, SOLUTION INTRAVENOUS CONTINUOUS
OUTPATIENT
Start: 2023-12-13

## 2023-12-13 RX ORDER — ALBUTEROL SULFATE 90 UG/1
4 AEROSOL, METERED RESPIRATORY (INHALATION) PRN
OUTPATIENT
Start: 2023-12-13

## 2023-12-13 RX ORDER — SODIUM CHLORIDE 9 MG/ML
5-250 INJECTION, SOLUTION INTRAVENOUS PRN
OUTPATIENT
Start: 2023-12-13

## 2023-12-13 RX ORDER — ACETAMINOPHEN 325 MG/1
650 TABLET ORAL
OUTPATIENT
Start: 2023-12-13

## 2023-12-13 RX ADMIN — FERRIC CARBOXYMALTOSE INJECTION 750 MG: 50 INJECTION, SOLUTION INTRAVENOUS at 11:44

## 2023-12-13 RX ADMIN — SODIUM CHLORIDE 25 ML/HR: 9 INJECTION, SOLUTION INTRAVENOUS at 11:42

## 2023-12-13 ASSESSMENT — PAIN SCALES - GENERAL: PAINLEVEL_OUTOF10: 0

## 2023-12-13 NOTE — PROGRESS NOTES
Spiritual Care Partner Volunteer visited patient at [facility] in [id number] [place] [address] on 12/13/2023     Documented by:  Tianna Sullivan MDiv  Staff   Paging Service [phone] ([employer])

## 2024-01-09 ENCOUNTER — TELEPHONE (OUTPATIENT)
Age: 84
End: 2024-01-09

## 2024-01-09 DIAGNOSIS — R60.9 EDEMA, UNSPECIFIED TYPE: ICD-10-CM

## 2024-01-09 DIAGNOSIS — D50.0 IRON DEFICIENCY ANEMIA SECONDARY TO BLOOD LOSS (CHRONIC): ICD-10-CM

## 2024-01-09 DIAGNOSIS — R06.02 SHORTNESS OF BREATH: ICD-10-CM

## 2024-01-09 DIAGNOSIS — I78.0 HHT (HEREDITARY HEMORRHAGIC TELANGIECTASIA) (HCC): Primary | ICD-10-CM

## 2024-01-09 NOTE — TELEPHONE ENCOUNTER
Patient called and stated that he wanted to know if he could get some lab work down so it can be sent down to a doctor at [facility]. Requested a call back to discuss.       # [phone]

## 2024-01-09 NOTE — TELEPHONE ENCOUNTER
[facility]  [phone]    01/09/24- Patient stated Dr. Chavez, pulmonary vascular disease, with the [facility] center in [place] started him on a trial of furosemide 20 mg daily due to leg swelling.  He's requesting to have labs drawn locally next week Wednesday prior to follow up with Dr. Chavez.  Patient needs a BMP, BNP, and CBC ordered to be done at the lab next to our office.  He will provide a number for us to fax results to.  Vicky per Dr. Auguste.  Patient was appreciative.

## 2024-01-13 ENCOUNTER — HOSPITAL ENCOUNTER (EMERGENCY)
Facility: HOSPITAL | Age: 84
Discharge: HOME | End: 2024-01-13
Attending: EMERGENCY MEDICINE
Payer: MEDICARE

## 2024-01-13 VITALS
HEART RATE: 93 BPM | SYSTOLIC BLOOD PRESSURE: 100 MMHG | RESPIRATION RATE: 16 BRPM | HEIGHT: 72 IN | DIASTOLIC BLOOD PRESSURE: 56 MMHG | TEMPERATURE: 97.7 F | BODY MASS INDEX: 24.19 KG/M2 | OXYGEN SATURATION: 100 % | WEIGHT: 178.57 LBS

## 2024-01-13 DIAGNOSIS — R04.0 LEFT-SIDED EPISTAXIS: Primary | ICD-10-CM

## 2024-01-13 DIAGNOSIS — R04.0 ACUTE ANTERIOR EPISTAXIS: ICD-10-CM

## 2024-01-13 PROCEDURE — 99282 EMERGENCY DEPT VISIT SF MDM: CPT

## 2024-01-13 ASSESSMENT — PAIN - FUNCTIONAL ASSESSMENT: PAIN_FUNCTIONAL_ASSESSMENT: NONE - DENIES PAIN

## 2024-01-13 NOTE — ED NOTES
The patient was discharged home by Dr Rain in stable condition. The patient is alert and oriented, in no respiratory distress and discharge vital signs obtained. The patient's diagnosis, condition and treatment were explained. The patient expressed understanding. No prescriptions given/e-scribed to pharmacy. No work/school note given. A discharge plan has been developed. A  was not involved in the process. Aftercare instructions were given.  Pt ambulatory out of the ED with family.

## 2024-01-13 NOTE — ED PROVIDER NOTES
[facility] EMERGENCY DEPT  EMERGENCY DEPARTMENT ENCOUNTER      Pt Name: Williams Fitzgerald Jr  MRN: [id number]  Birthdate 1940  Date of evaluation: 1/13/2024  Provider: Walker Camacho MD    CHIEF COMPLAINT       Chief Complaint   Patient presents with    Epistaxis         HISTORY OF PRESENT ILLNESS   (Location/Symptom, Timing/Onset, Context/Setting, Quality, Duration, Modifying Factors, Severity)  Note limiting factors.   The history is provided by the patient and the spouse.   Epistaxis  Location:  L nare  Progression:  Improving  Chronicity:  Recurrent  Context: bleeding disorder (HHT) and weather change    Context: not nose picking, not recent infection and not trauma    Relieved by: cotton balls coated in aquaphor to nose.  Associated symptoms: blood in oropharynx    Associated symptoms: no cough, no dizziness, no sneezing and no syncope    Risk factors: frequent nosebleeds          Review of External Medical Records:     Nursing Notes were reviewed.    REVIEW OF SYSTEMS    (2-9 systems for level 4, 10 or more for level 5)     Review of Systems   HENT:  Positive for nosebleeds. Negative for sneezing.    Respiratory:  Negative for cough.    Cardiovascular:  Negative for syncope.   Neurological:  Negative for dizziness.       Except as noted above the remainder of the review of systems was reviewed and negative.       PAST MEDICAL HISTORY     Past Medical History:   Diagnosis Date    Cancer (HCC)     HHT (hereditary hemorrhagic telangiectasia) (HCC)     Prostate cancer (HCC)          SURGICAL HISTORY       Past Surgical History:   Procedure Laterality Date    APPENDECTOMY      CHEST SURGERY      excision of AVM    COLONOSCOPY N/A 1/29/2020    COLONOSCOPY performed by Manas Barr MD at [facility] ENDOSCOPY    COLONOSCOPY  2014    due 18    OTHER SURGICAL HISTORY  07/2018    sclerotheropy    TONSILLECTOMY           CURRENT MEDICATIONS       Discharge Medication List as of 1/13/2024  6:50 PM        CONTINUE these  medications which have NOT CHANGED    Details   acetaminophen (TYLENOL) 500 MG tablet Take by mouth every 6 hours as neededHistorical Med      albuterol sulfate HFA (PROVENTIL;VENTOLIN;PROAIR) 108 (90 Base) MCG/ACT inhaler Inhale 2 puffs into the lungs every 4 hours as neededHistorical Med      ascorbic acid (VITAMIN C) 250 MG tablet Take by mouthHistorical Med      vitamin D 25 MCG (1000 UT) CAPS Take by mouth dailyHistorical Med      dicyclomine (BENTYL) 20 MG tablet TAKE 1 TABLET BY MOUTH 3 TIMES DAILY AS NEEDED FOR ABDOMINAL CRAMPS (NEW DOSE/NEW DIRECTIONS)Historical Med      tranexamic acid (LYSTEDA) 650 MG TABS tablet Take by mouth 3 times dailyHistorical Med             ALLERGIES     Patient has no known allergies.    FAMILY HISTORY       Family History   Problem Relation Age of Onset    Cancer Other         colon, lung    Heart Disease Mother           SOCIAL HISTORY       Social History     Socioeconomic History    Marital status:      Spouse name: None    Number of children: None    Years of education: None    Highest education level: None   Tobacco Use    Smoking status: Former     Passive exposure: Never    Smokeless tobacco: Never   Vaping Use    Vaping Use: Never used   Substance and Sexual Activity    Alcohol use: Yes     Alcohol/week: 0.0 standard drinks of alcohol    Drug use: No    Sexual activity: Not Currently           PHYSICAL EXAM    (up to 7 for level 4, 8 or more for level 5)     ED Triage Vitals [01/13/24 1600]   BP Temp Temp Source Pulse Respirations SpO2 Height Weight - Scale   (!) 100/56 97.7 °F (36.5 °C) Oral 93 16 100 % 1.829 m (6') 81 kg (178 lb 9.2 oz)       Body mass index is 24.22 kg/m².    Physical Exam  Vitals and nursing note reviewed.   Constitutional:       Appearance: He is not ill-appearing or toxic-appearing.   HENT:      Head: Normocephalic and atraumatic.      Nose: No congestion or rhinorrhea.      Right Nostril: No foreign body, epistaxis, septal hematoma or

## 2024-01-13 NOTE — ED TRIAGE NOTES
Pt presents to ED with c/o spontaneous nose bleed about one hour PTA. Nose bleed has resolved in triage. VS stable. No hx of trauma.

## 2024-01-16 DIAGNOSIS — R60.9 EDEMA, UNSPECIFIED TYPE: ICD-10-CM

## 2024-01-16 DIAGNOSIS — R06.02 SHORTNESS OF BREATH: ICD-10-CM

## 2024-01-16 DIAGNOSIS — I78.0 HHT (HEREDITARY HEMORRHAGIC TELANGIECTASIA) (HCC): ICD-10-CM

## 2024-01-16 DIAGNOSIS — D50.0 IRON DEFICIENCY ANEMIA SECONDARY TO BLOOD LOSS (CHRONIC): ICD-10-CM

## 2024-01-16 LAB
BASOPHILS # BLD: 0 K/UL (ref 0–0.1)
BASOPHILS NFR BLD: 1 % (ref 0–1)
DIFFERENTIAL METHOD BLD: ABNORMAL
EOSINOPHIL # BLD: 0.2 K/UL (ref 0–0.4)
EOSINOPHIL NFR BLD: 6 % (ref 0–7)
ERYTHROCYTE [DISTWIDTH] IN BLOOD BY AUTOMATED COUNT: 14.9 % (ref 11.5–14.5)
FERRITIN SERPL-MCNC: 66 NG/ML (ref 26–388)
HCT VFR BLD AUTO: 27.4 % (ref 36.6–50.3)
HGB BLD-MCNC: 8.7 G/DL (ref 12.1–17)
IMM GRANULOCYTES # BLD AUTO: 0 K/UL (ref 0–0.04)
IMM GRANULOCYTES NFR BLD AUTO: 0 % (ref 0–0.5)
LYMPHOCYTES # BLD: 0.6 K/UL (ref 0.8–3.5)
LYMPHOCYTES NFR BLD: 16 % (ref 12–49)
MCH RBC QN AUTO: 31.6 PG (ref 26–34)
MCHC RBC AUTO-ENTMCNC: 31.8 G/DL (ref 30–36.5)
MCV RBC AUTO: 99.6 FL (ref 80–99)
MONOCYTES # BLD: 0.6 K/UL (ref 0–1)
MONOCYTES NFR BLD: 15 % (ref 5–13)
NEUTS SEG # BLD: 2.6 K/UL (ref 1.8–8)
NEUTS SEG NFR BLD: 62 % (ref 32–75)
NRBC # BLD: 0 K/UL (ref 0–0.01)
PLATELET # BLD AUTO: 146 K/UL (ref 150–400)
PMV BLD AUTO: 10.9 FL (ref 8.9–12.9)
RBC # BLD AUTO: 2.75 M/UL (ref 4.1–5.7)
RBC MORPH BLD: ABNORMAL
RBC MORPH BLD: ABNORMAL
WBC # BLD AUTO: 4 K/UL (ref 4.1–11.1)

## 2024-01-17 ENCOUNTER — TELEPHONE (OUTPATIENT)
Age: 84
End: 2024-01-17

## 2024-01-17 LAB
ANION GAP SERPL CALC-SCNC: 7 MMOL/L (ref 5–15)
BUN SERPL-MCNC: 21 MG/DL (ref 6–20)
BUN/CREAT SERPL: 24 (ref 12–20)
CALCIUM SERPL-MCNC: 8.7 MG/DL (ref 8.5–10.1)
CHLORIDE SERPL-SCNC: 108 MMOL/L (ref 97–108)
CO2 SERPL-SCNC: 26 MMOL/L (ref 21–32)
CREAT SERPL-MCNC: 0.89 MG/DL (ref 0.7–1.3)
GLUCOSE SERPL-MCNC: 107 MG/DL (ref 65–100)
IRON SATN MFR SERPL: 20 % (ref 20–50)
IRON SERPL-MCNC: 60 UG/DL (ref 35–150)
NT PRO BNP: 481 PG/ML
POTASSIUM SERPL-SCNC: 3.8 MMOL/L (ref 3.5–5.1)
SODIUM SERPL-SCNC: 141 MMOL/L (ref 136–145)
TIBC SERPL-MCNC: 300 UG/DL (ref 250–450)

## 2024-01-17 RX ORDER — SODIUM CHLORIDE 9 MG/ML
INJECTION, SOLUTION INTRAVENOUS CONTINUOUS
OUTPATIENT
Start: 2024-01-23

## 2024-01-17 RX ORDER — EPINEPHRINE 1 MG/ML
0.3 INJECTION, SOLUTION, CONCENTRATE INTRAVENOUS PRN
OUTPATIENT
Start: 2024-01-23

## 2024-01-17 RX ORDER — SODIUM CHLORIDE 9 MG/ML
5-250 INJECTION, SOLUTION INTRAVENOUS PRN
OUTPATIENT
Start: 2024-01-23

## 2024-01-17 RX ORDER — ONDANSETRON 2 MG/ML
8 INJECTION INTRAMUSCULAR; INTRAVENOUS
OUTPATIENT
Start: 2024-01-23

## 2024-01-17 RX ORDER — ALBUTEROL SULFATE 90 UG/1
4 AEROSOL, METERED RESPIRATORY (INHALATION) PRN
OUTPATIENT
Start: 2024-01-23

## 2024-01-17 RX ORDER — HEPARIN 100 UNIT/ML
500 SYRINGE INTRAVENOUS PRN
OUTPATIENT
Start: 2024-01-23

## 2024-01-17 RX ORDER — DIPHENHYDRAMINE HYDROCHLORIDE 50 MG/ML
50 INJECTION INTRAMUSCULAR; INTRAVENOUS
OUTPATIENT
Start: 2024-01-23

## 2024-01-17 RX ORDER — ACETAMINOPHEN 325 MG/1
650 TABLET ORAL
OUTPATIENT
Start: 2024-01-23

## 2024-01-17 RX ORDER — SODIUM CHLORIDE 0.9 % (FLUSH) 0.9 %
5-40 SYRINGE (ML) INJECTION PRN
OUTPATIENT
Start: 2024-01-23

## 2024-01-17 NOTE — TELEPHONE ENCOUNTER
[facility] [place] at [facility]  [phone]    Labs reviewed. Anemia persists.  Iron levels a little bit improved after IV iron last month, but he remains iron deficient, likely due to his continued nose bleeds.    I recommend we give another two doses of Injectafer sometime soon.    Ensure he is following up with ENT about the continued nose bleeds.

## 2024-01-17 NOTE — TELEPHONE ENCOUNTER
[facility] [place] at [facility]  [phone]    01/17/24- Voicemail left for patient requesting a return call to review lab results.      9:52 AM- Informed patient that per Dr. Auguste, \"labs reviewed. Anemia persists.  Iron levels a little bit improved after IV iron last month, but he remains iron deficient, likely due to his continued nose bleeds.     I recommend we give another two doses of Injectafer sometime soon.     Ensure he is following up with ENT about the continued nose bleeds.\"    Patient verbalized understanding.  Stated he doesn't have follow up with ENT at this time, as he's been following with Dr. Chavez at the [facility] center in [place].  Will have Yasmine call patient back to schedule IV iron.     Recent lab results faxed to Dr. Mark Chavez at [phone], phone [phone].  Fax confirmation received.

## 2024-01-19 ENCOUNTER — HOSPITAL ENCOUNTER (EMERGENCY)
Facility: HOSPITAL | Age: 84
Discharge: HOME | End: 2024-01-19
Attending: STUDENT IN AN ORGANIZED HEALTH CARE EDUCATION/TRAINING PROGRAM
Payer: MEDICARE

## 2024-01-19 VITALS
SYSTOLIC BLOOD PRESSURE: 119 MMHG | DIASTOLIC BLOOD PRESSURE: 61 MMHG | HEART RATE: 97 BPM | HEIGHT: 72 IN | OXYGEN SATURATION: 98 % | WEIGHT: 178 LBS | BODY MASS INDEX: 24.11 KG/M2 | TEMPERATURE: 98 F | RESPIRATION RATE: 16 BRPM

## 2024-01-19 DIAGNOSIS — R04.0 EPISTAXIS: Primary | ICD-10-CM

## 2024-01-19 LAB
BASOPHILS # BLD: 0 K/UL (ref 0–0.1)
BASOPHILS NFR BLD: 1 % (ref 0–1)
COMMENT:: NORMAL
DIFFERENTIAL METHOD BLD: ABNORMAL
EOSINOPHIL # BLD: 0.2 K/UL (ref 0–0.4)
EOSINOPHIL NFR BLD: 5 % (ref 0–7)
ERYTHROCYTE [DISTWIDTH] IN BLOOD BY AUTOMATED COUNT: 15.9 % (ref 11.5–14.5)
HCT VFR BLD AUTO: 24.3 % (ref 36.6–50.3)
HGB BLD-MCNC: 7.7 G/DL (ref 12.1–17)
IMM GRANULOCYTES # BLD AUTO: 0 K/UL (ref 0–0.04)
IMM GRANULOCYTES NFR BLD AUTO: 0 % (ref 0–0.5)
LYMPHOCYTES # BLD: 0.4 K/UL (ref 0.8–3.5)
LYMPHOCYTES NFR BLD: 12 % (ref 12–49)
MCH RBC QN AUTO: 32.1 PG (ref 26–34)
MCHC RBC AUTO-ENTMCNC: 31.7 G/DL (ref 30–36.5)
MCV RBC AUTO: 101.3 FL (ref 80–99)
MONOCYTES # BLD: 0.5 K/UL (ref 0–1)
MONOCYTES NFR BLD: 14 % (ref 5–13)
NEUTS SEG # BLD: 2.5 K/UL (ref 1.8–8)
NEUTS SEG NFR BLD: 68 % (ref 32–75)
NRBC # BLD: 0 K/UL (ref 0–0.01)
NRBC BLD-RTO: 0 PER 100 WBC
PLATELET # BLD AUTO: 167 K/UL (ref 150–400)
PMV BLD AUTO: 10.3 FL (ref 8.9–12.9)
RBC # BLD AUTO: 2.4 M/UL (ref 4.1–5.7)
RBC MORPH BLD: ABNORMAL
SPECIMEN HOLD: NORMAL
WBC # BLD AUTO: 3.6 K/UL (ref 4.1–11.1)

## 2024-01-19 PROCEDURE — 85025 COMPLETE CBC W/AUTO DIFF WBC: CPT

## 2024-01-19 PROCEDURE — 99283 EMERGENCY DEPT VISIT LOW MDM: CPT

## 2024-01-19 PROCEDURE — 2500000003 HC RX 250 WO HCPCS

## 2024-01-19 PROCEDURE — 30901 CONTROL OF NOSEBLEED: CPT

## 2024-01-19 PROCEDURE — 36415 COLL VENOUS BLD VENIPUNCTURE: CPT

## 2024-01-19 PROCEDURE — 6370000000 HC RX 637 (ALT 250 FOR IP)

## 2024-01-19 RX ORDER — TRANEXAMIC ACID 100 MG/ML
100 INJECTION, SOLUTION INTRAVENOUS ONCE
Status: COMPLETED | OUTPATIENT
Start: 2024-01-19 | End: 2024-01-19

## 2024-01-19 RX ADMIN — PHENYLEPHRINE HYDROCHLORIDE 2 SPRAY: 1 SPRAY NASAL at 18:13

## 2024-01-19 RX ADMIN — TRANEXAMIC ACID 100 MG: 100 INJECTION, SOLUTION INTRAVENOUS at 18:39

## 2024-01-19 ASSESSMENT — PAIN - FUNCTIONAL ASSESSMENT: PAIN_FUNCTIONAL_ASSESSMENT: NONE - DENIES PAIN

## 2024-01-19 ASSESSMENT — ENCOUNTER SYMPTOMS
DIARRHEA: 0
VOMITING: 0
NAUSEA: 0
SHORTNESS OF BREATH: 0
COUGH: 0
CONSTIPATION: 0

## 2024-01-19 NOTE — ED TRIAGE NOTES
Pt ambulatory to ED c/o nose bleed starting this AM. Bleeding controlled on arrival. Hx HHT. Last Hgb 8 per pt.

## 2024-01-19 NOTE — ED PROVIDER NOTES
[facility] EMERGENCY DEPT  EMERGENCY DEPARTMENT ENCOUNTER      Pt Name: Williams Fitzgerald Jr  MRN: [id number]  Birthdate 1940  Date of evaluation: 1/19/2024  Provider: Sharath Brito MD    CHIEF COMPLAINT       Chief Complaint   Patient presents with    Epistaxis         HISTORY OF PRESENT ILLNESS   (Location/Symptom, Timing/Onset, Context/Setting, Quality, Duration, Modifying Factors, Severity)  Note limiting factors.   Patient is an 84 yo male with history of HHT and recurrent nose bleeds presenting to the ED for bilateral nosebleed which started at 0800. Patient endorses significant bleeding for which he was able to attain hemostasis by stuffing his bilateral nasal passages with cotton pads. Patient denies any headache, lightheadedness, dizziness, palpitations, SOB, or CP at this time. Patient is on TXA 650mg daily for his HHT and is scheduled to see his physician for follow-up on this condition.            Review of External Medical Records:     Nursing Notes were reviewed.    REVIEW OF SYSTEMS    (2-9 systems for level 4, 10 or more for level 5)     Review of Systems   Constitutional:  Negative for chills and fever.   HENT:  Positive for nosebleeds. Negative for congestion.    Eyes:  Negative for visual disturbance.   Respiratory:  Negative for cough and shortness of breath.    Cardiovascular:  Positive for leg swelling. Negative for chest pain and palpitations.   Gastrointestinal:  Negative for constipation, diarrhea, nausea and vomiting.   Neurological:  Negative for dizziness, syncope, light-headedness and headaches.       Except as noted above the remainder of the review of systems was reviewed and negative.       PAST MEDICAL HISTORY     Past Medical History:   Diagnosis Date    Cancer (HCC)     HHT (hereditary hemorrhagic telangiectasia) (HCC)     Prostate cancer (HCC)          SURGICAL HISTORY       Past Surgical History:   Procedure Laterality Date    APPENDECTOMY      CHEST SURGERY      excision of  with 3-4cm strand of clotted blood removed with it. No further bleeding noted within the left nasal passage, no sign of clotting in the anterior nares. There is scant dried blood in the oropharynx  Eyes:      Conjunctiva/sclera: Conjunctivae normal.   Cardiovascular:      Rate and Rhythm: Normal rate and regular rhythm.      Heart sounds: No murmur heard.     No friction rub. No gallop.   Pulmonary:      Effort: Pulmonary effort is normal. No respiratory distress.      Breath sounds: Normal breath sounds. No stridor. No wheezing, rhonchi or rales.   Chest:      Chest wall: No tenderness.   Skin:     General: Skin is warm and dry.   Neurological:      Mental Status: He is alert.         DIAGNOSTIC RESULTS     EKG: All EKG's are interpreted by the Emergency Department Physician who either signs or Co-signs this chart in the absence of a cardiologist.        RADIOLOGY:   Non-plain film images such as CT, Ultrasound and MRI are read by the radiologist. Plain radiographic images are visualized and preliminarily interpreted by the emergency physician with the below findings:        Interpretation per the Radiologist below, if available at the time of this note:    No orders to display        LABS:  Labs Reviewed   CBC WITH AUTO DIFFERENTIAL - Abnormal; Notable for the following components:       Result Value    WBC 3.6 (*)     RBC 2.40 (*)     Hemoglobin 7.7 (*)     Hematocrit 24.3 (*)     .3 (*)     RDW 15.9 (*)     Monocytes % 14 (*)     Lymphocytes Absolute 0.4 (*)     All other components within normal limits   EXTRA TUBES HOLD   EXTRA TUBE, BLOOD BANK       All other labs were within normal range or not returned as of this dictation.    EMERGENCY DEPARTMENT COURSE and DIFFERENTIAL DIAGNOSIS/MDM:   Vitals:    Vitals:    01/19/24 1717   BP: 119/61   Pulse: 97   Resp: 16   Temp: 98 °F (36.7 °C)   SpO2: 98%   Weight: 80.7 kg (178 lb)   Height: 1.829 m (6')           Medical Decision Making  Patient with history of

## 2024-01-23 ENCOUNTER — HOSPITAL ENCOUNTER (OUTPATIENT)
Facility: HOSPITAL | Age: 84
Setting detail: INFUSION SERIES
Discharge: HOME | End: 2024-01-23
Payer: MEDICARE

## 2024-01-23 VITALS
BODY MASS INDEX: 24.11 KG/M2 | RESPIRATION RATE: 16 BRPM | DIASTOLIC BLOOD PRESSURE: 58 MMHG | OXYGEN SATURATION: 100 % | HEIGHT: 72 IN | TEMPERATURE: 97.8 F | HEART RATE: 87 BPM | WEIGHT: 178 LBS | SYSTOLIC BLOOD PRESSURE: 119 MMHG

## 2024-01-23 DIAGNOSIS — D50.9 IRON DEFICIENCY ANEMIA, UNSPECIFIED IRON DEFICIENCY ANEMIA TYPE: ICD-10-CM

## 2024-01-23 DIAGNOSIS — I78.0 HHT (HEREDITARY HEMORRHAGIC TELANGIECTASIA) (HCC): Primary | ICD-10-CM

## 2024-01-23 PROCEDURE — 96365 THER/PROPH/DIAG IV INF INIT: CPT

## 2024-01-23 PROCEDURE — 6360000002 HC RX W HCPCS: Performed by: NURSE PRACTITIONER

## 2024-01-23 PROCEDURE — 2580000003 HC RX 258: Performed by: NURSE PRACTITIONER

## 2024-01-23 RX ORDER — FAMOTIDINE 10 MG/ML
20 INJECTION, SOLUTION INTRAVENOUS
Status: CANCELLED | OUTPATIENT
Start: 2024-01-30

## 2024-01-23 RX ORDER — HEPARIN 100 UNIT/ML
500 SYRINGE INTRAVENOUS PRN
Status: CANCELLED | OUTPATIENT
Start: 2024-01-30

## 2024-01-23 RX ORDER — EPINEPHRINE 1 MG/ML
0.3 INJECTION, SOLUTION INTRAMUSCULAR; SUBCUTANEOUS PRN
Status: CANCELLED | OUTPATIENT
Start: 2024-01-30

## 2024-01-23 RX ORDER — SODIUM CHLORIDE 9 MG/ML
5-250 INJECTION, SOLUTION INTRAVENOUS PRN
Status: DISCONTINUED | OUTPATIENT
Start: 2024-01-23 | End: 2024-01-24 | Stop reason: HOSPADM

## 2024-01-23 RX ORDER — SODIUM CHLORIDE 9 MG/ML
5-250 INJECTION, SOLUTION INTRAVENOUS PRN
Status: CANCELLED | OUTPATIENT
Start: 2024-01-30

## 2024-01-23 RX ORDER — ACETAMINOPHEN 325 MG/1
650 TABLET ORAL
Status: CANCELLED | OUTPATIENT
Start: 2024-01-30

## 2024-01-23 RX ORDER — SODIUM CHLORIDE 9 MG/ML
INJECTION, SOLUTION INTRAVENOUS CONTINUOUS
Status: CANCELLED | OUTPATIENT
Start: 2024-01-30

## 2024-01-23 RX ORDER — DIPHENHYDRAMINE HYDROCHLORIDE 50 MG/ML
50 INJECTION INTRAMUSCULAR; INTRAVENOUS
Status: CANCELLED | OUTPATIENT
Start: 2024-01-30

## 2024-01-23 RX ORDER — ONDANSETRON 2 MG/ML
8 INJECTION INTRAMUSCULAR; INTRAVENOUS
Status: CANCELLED | OUTPATIENT
Start: 2024-01-30

## 2024-01-23 RX ORDER — SODIUM CHLORIDE 0.9 % (FLUSH) 0.9 %
5-40 SYRINGE (ML) INJECTION PRN
Status: CANCELLED | OUTPATIENT
Start: 2024-01-30

## 2024-01-23 RX ORDER — ALBUTEROL SULFATE 90 UG/1
4 AEROSOL, METERED RESPIRATORY (INHALATION) PRN
Status: CANCELLED | OUTPATIENT
Start: 2024-01-30

## 2024-01-23 RX ADMIN — FERRIC CARBOXYMALTOSE INJECTION 750 MG: 50 INJECTION, SOLUTION INTRAVENOUS at 14:46

## 2024-01-23 ASSESSMENT — PAIN SCALES - GENERAL: PAINLEVEL_OUTOF10: 0

## 2024-01-23 NOTE — PROGRESS NOTES
[facility] Progress Note    Date: January 23, 2024        1400: Mr. Amilcar Ramirez arrived ambulatory and in no distress for Injectafer Infusion. Assessment was completed, pt reports fatigue today. 24g PIV established to right arm without difficulty, positive blood return noted.      Mr. Amilcar Ramirez's vitals were reviewed.  Patient Vitals for the past 12 hrs:   Temp Pulse Resp BP SpO2   01/23/24 1510 -- 87 -- (!) 119/58 --   01/23/24 1400 97.8 °F (36.6 °C) 98 16 (!) 123/59 100 %         Medications given.  Medications Administered         ferric carboxymaltose (INJECTAFER) 750 mg in sodium chloride 0.9 % 250 mL IVPB Admin Date  01/23/2024 Action  New Bag Dose  750 mg Rate  870 mL/hr Route  IntraVENous Administered By  Swetha Brewer, RN              1515: Patient tolerated treatment well and was discharged from [facility] in stable condition. He politely declined 30-minute post-infusion observation. PIV flushed and removed. Patient is aware of next scheduled [facility] appointment on 01/30/24.      Swetha Brewer RN  January 23, 2024

## 2024-01-30 ENCOUNTER — HOSPITAL ENCOUNTER (OUTPATIENT)
Facility: HOSPITAL | Age: 84
Setting detail: INFUSION SERIES
Discharge: HOME | End: 2024-01-30
Payer: MEDICARE

## 2024-01-30 VITALS
OXYGEN SATURATION: 100 % | SYSTOLIC BLOOD PRESSURE: 126 MMHG | DIASTOLIC BLOOD PRESSURE: 58 MMHG | RESPIRATION RATE: 17 BRPM | HEART RATE: 90 BPM | TEMPERATURE: 97.7 F | HEIGHT: 72 IN | BODY MASS INDEX: 24.66 KG/M2 | WEIGHT: 182.1 LBS

## 2024-01-30 DIAGNOSIS — D50.9 IRON DEFICIENCY ANEMIA, UNSPECIFIED IRON DEFICIENCY ANEMIA TYPE: ICD-10-CM

## 2024-01-30 DIAGNOSIS — I78.0 HHT (HEREDITARY HEMORRHAGIC TELANGIECTASIA) (HCC): Primary | ICD-10-CM

## 2024-01-30 PROCEDURE — 2580000003 HC RX 258: Performed by: NURSE PRACTITIONER

## 2024-01-30 PROCEDURE — 96365 THER/PROPH/DIAG IV INF INIT: CPT

## 2024-01-30 PROCEDURE — 6360000002 HC RX W HCPCS: Performed by: NURSE PRACTITIONER

## 2024-01-30 RX ORDER — ONDANSETRON 2 MG/ML
8 INJECTION INTRAMUSCULAR; INTRAVENOUS
OUTPATIENT
Start: 2024-01-30

## 2024-01-30 RX ORDER — ACETAMINOPHEN 325 MG/1
650 TABLET ORAL
OUTPATIENT
Start: 2024-01-30

## 2024-01-30 RX ORDER — SODIUM CHLORIDE 9 MG/ML
5-250 INJECTION, SOLUTION INTRAVENOUS PRN
OUTPATIENT
Start: 2024-01-30

## 2024-01-30 RX ORDER — SODIUM CHLORIDE 9 MG/ML
INJECTION, SOLUTION INTRAVENOUS CONTINUOUS
OUTPATIENT
Start: 2024-01-30

## 2024-01-30 RX ORDER — HEPARIN 100 UNIT/ML
500 SYRINGE INTRAVENOUS PRN
OUTPATIENT
Start: 2024-01-30

## 2024-01-30 RX ORDER — SODIUM CHLORIDE 9 MG/ML
5-250 INJECTION, SOLUTION INTRAVENOUS PRN
Status: DISCONTINUED | OUTPATIENT
Start: 2024-01-30 | End: 2024-01-31 | Stop reason: HOSPADM

## 2024-01-30 RX ORDER — ALBUTEROL SULFATE 90 UG/1
4 AEROSOL, METERED RESPIRATORY (INHALATION) PRN
OUTPATIENT
Start: 2024-01-30

## 2024-01-30 RX ORDER — SODIUM CHLORIDE 0.9 % (FLUSH) 0.9 %
5-40 SYRINGE (ML) INJECTION PRN
OUTPATIENT
Start: 2024-01-30

## 2024-01-30 RX ORDER — SODIUM CHLORIDE 9 MG/ML
5-250 INJECTION, SOLUTION INTRAVENOUS PRN
Status: CANCELLED | OUTPATIENT
Start: 2024-01-30

## 2024-01-30 RX ORDER — EPINEPHRINE 1 MG/ML
0.3 INJECTION, SOLUTION INTRAMUSCULAR; SUBCUTANEOUS PRN
OUTPATIENT
Start: 2024-01-30

## 2024-01-30 RX ORDER — FAMOTIDINE 10 MG/ML
20 INJECTION, SOLUTION INTRAVENOUS
OUTPATIENT
Start: 2024-01-30

## 2024-01-30 RX ORDER — DIPHENHYDRAMINE HYDROCHLORIDE 50 MG/ML
50 INJECTION INTRAMUSCULAR; INTRAVENOUS
OUTPATIENT
Start: 2024-01-30

## 2024-01-30 RX ADMIN — FERRIC CARBOXYMALTOSE INJECTION 750 MG: 50 INJECTION, SOLUTION INTRAVENOUS at 12:39

## 2024-01-30 RX ADMIN — SODIUM CHLORIDE 25 ML/HR: 9 INJECTION, SOLUTION INTRAVENOUS at 11:56

## 2024-01-30 ASSESSMENT — PAIN SCALES - GENERAL: PAINLEVEL_OUTOF10: 0

## 2024-01-30 NOTE — PROGRESS NOTES
[facility] Progress Note        Date: 2024    Name: Williams Fitzgerald Jr    MRN: [id number]         : 1940    Mr. Amilcar Ramirez Arrived ambulatory and in no distress for Injectafer (2 of 2) Regimen.  Assessment was completed, no acute issues at this time, no new complaints voiced. LAC 24G PIV placed with +blood return. No lab  order for today treatment. Criteria met for today treatment.      Mr. Amilcar Ramirez's vitals were reviewed.  Patient Vitals for the past 12 hrs:   Temp Pulse BP SpO2   24 1130 97.8 °F (36.6 °C) 95 108/60 100 %         Medications received:  Medications Administered         0.9 % sodium chloride infusion Admin Date  2024 Action  New Bag Dose  25 mL/hr Rate  25 mL/hr Route  IntraVENous Administered By  Elly Maza RN        ferric carboxymaltose (INJECTAFER) 750 mg in sodium chloride 0.9 % 250 mL IVPB Admin Date  2024 Action  New Bag Dose  750 mg Rate  870 mL/hr Route  IntraVENous Administered By  Elly Maza RN             Mr. Amilcar Ramirez tolerated treatment well and was discharged from [facility] in stable condition. PIV flushed and removed per protocol. He is aware of his next appointment.    Elly Maza RN  2024    Future Appointments:  Future Appointments   Date Time Provider Department Center   2024 11:30 AM Geoff Auguste MD [facility]   2024 11:30 AM Rajeev Holt MD [facility]

## 2024-02-03 ENCOUNTER — HOSPITAL ENCOUNTER (EMERGENCY)
Facility: HOSPITAL | Age: 84
Discharge: HOME | End: 2024-02-03
Attending: STUDENT IN AN ORGANIZED HEALTH CARE EDUCATION/TRAINING PROGRAM
Payer: MEDICARE

## 2024-02-03 ENCOUNTER — APPOINTMENT (OUTPATIENT)
Facility: HOSPITAL | Age: 84
End: 2024-02-03
Payer: MEDICARE

## 2024-02-03 VITALS
RESPIRATION RATE: 16 BRPM | BODY MASS INDEX: 24.66 KG/M2 | SYSTOLIC BLOOD PRESSURE: 116 MMHG | HEIGHT: 72 IN | HEART RATE: 61 BPM | OXYGEN SATURATION: 100 % | TEMPERATURE: 97.8 F | WEIGHT: 182.1 LBS | DIASTOLIC BLOOD PRESSURE: 61 MMHG

## 2024-02-03 DIAGNOSIS — J40 BRONCHITIS: Primary | ICD-10-CM

## 2024-02-03 DIAGNOSIS — R05.1 ACUTE COUGH: ICD-10-CM

## 2024-02-03 PROCEDURE — 71045 X-RAY EXAM CHEST 1 VIEW: CPT

## 2024-02-03 PROCEDURE — 6360000002 HC RX W HCPCS: Performed by: STUDENT IN AN ORGANIZED HEALTH CARE EDUCATION/TRAINING PROGRAM

## 2024-02-03 PROCEDURE — 99283 EMERGENCY DEPT VISIT LOW MDM: CPT

## 2024-02-03 PROCEDURE — 6370000000 HC RX 637 (ALT 250 FOR IP): Performed by: STUDENT IN AN ORGANIZED HEALTH CARE EDUCATION/TRAINING PROGRAM

## 2024-02-03 RX ORDER — BENZONATATE 100 MG/1
100 CAPSULE ORAL 2 TIMES DAILY PRN
Qty: 20 CAPSULE | Refills: 0 | Status: SHIPPED | OUTPATIENT
Start: 2024-02-03 | End: 2024-02-10

## 2024-02-03 RX ORDER — ALBUTEROL SULFATE 90 UG/1
2 AEROSOL, METERED RESPIRATORY (INHALATION) 4 TIMES DAILY PRN
Qty: 18 G | Refills: 0 | Status: SHIPPED | OUTPATIENT
Start: 2024-02-03

## 2024-02-03 RX ORDER — DOXYCYCLINE HYCLATE 100 MG
100 TABLET ORAL 2 TIMES DAILY
Qty: 20 TABLET | Refills: 0 | Status: SHIPPED | OUTPATIENT
Start: 2024-02-03 | End: 2024-02-03

## 2024-02-03 RX ORDER — DOXYCYCLINE HYCLATE 100 MG
100 TABLET ORAL 2 TIMES DAILY
Qty: 20 TABLET | Refills: 0 | Status: SHIPPED | OUTPATIENT
Start: 2024-02-03 | End: 2024-02-13

## 2024-02-03 RX ORDER — BENZONATATE 100 MG/1
100 CAPSULE ORAL 2 TIMES DAILY PRN
Qty: 20 CAPSULE | Refills: 0 | Status: SHIPPED | OUTPATIENT
Start: 2024-02-03 | End: 2024-02-03

## 2024-02-03 RX ORDER — ALBUTEROL SULFATE 90 UG/1
2 AEROSOL, METERED RESPIRATORY (INHALATION) 4 TIMES DAILY PRN
Qty: 18 G | Refills: 0 | Status: SHIPPED | OUTPATIENT
Start: 2024-02-03 | End: 2024-02-03

## 2024-02-03 RX ADMIN — ALBUTEROL SULFATE 1 DOSE: 2.5 SOLUTION RESPIRATORY (INHALATION) at 16:36

## 2024-02-03 ASSESSMENT — PAIN - FUNCTIONAL ASSESSMENT: PAIN_FUNCTIONAL_ASSESSMENT: NONE - DENIES PAIN

## 2024-02-03 NOTE — ED TRIAGE NOTES
Patient co cough and congestion x 2 weeks and recently feels like it has gotten worse and wants to make sure he does not have pneumonia.

## 2024-02-03 NOTE — ED NOTES
Patient does not appear to be in any acute distress/shows no evidence of clinical instability at this time.     Provider has reviewed discharge instructions with the patient/family.  The patient/family verbalized understanding instructions as well as need for follow up for any further symptoms.     Discharge papers given, education provided, and any questions answered.

## 2024-02-04 NOTE — ED PROVIDER NOTES
DIRECTIONS)Historical Med      tranexamic acid (LYSTEDA) 650 MG TABS tablet Take by mouth 3 times dailyHistorical Med             ALLERGIES     Patient has no known allergies.    FAMILY HISTORY       Family History   Problem Relation Age of Onset    Cancer Other         colon, lung    Heart Disease Mother         SOCIAL HISTORY       Social History     Socioeconomic History    Marital status:    Tobacco Use    Smoking status: Former     Passive exposure: Never    Smokeless tobacco: Never   Vaping Use    Vaping Use: Never used   Substance and Sexual Activity    Alcohol use: Yes     Alcohol/week: 0.0 standard drinks of alcohol    Drug use: No    Sexual activity: Not Currently       PHYSICAL EXAM       ED Triage Vitals [02/03/24 1604]   BP Temp Temp Source Pulse Respirations SpO2 Height Weight - Scale   116/61 97.8 °F (36.6 °C) Oral 61 16 100 % 1.829 m (6') 82.6 kg (182 lb 1.6 oz)     Physical Exam  Constitutional:       Appearance: Normal appearance.   HENT:      Head: Normocephalic and atraumatic.   Eyes:      Extraocular Movements: Extraocular movements intact.      Pupils: Pupils are equal, round, and reactive to light.   Cardiovascular:      Rate and Rhythm: Normal rate and regular rhythm.   Pulmonary:      Effort: Pulmonary effort is normal.      Breath sounds: Wheezing present.   Abdominal:      General: Abdomen is flat. There is no distension.   Musculoskeletal:         General: No deformity or signs of injury.      Cervical back: Normal range of motion and neck supple.   Skin:     Coloration: Skin is not jaundiced.   Neurological:      General: No focal deficit present.      Mental Status: He is alert.   Psychiatric:         Mood and Affect: Mood normal.         DIAGNOSTIC RESULTS   EKG: If obtained, EKG interpretation provided in the ED course    RADIOLOGY:   XR CHEST PORTABLE   Final Result   Mild left basilar atelectasis.               LABS:  Labs Reviewed - No data to display    All other labs were

## 2024-02-07 ENCOUNTER — OFFICE VISIT (OUTPATIENT)
Age: 84
End: 2024-02-07
Payer: MEDICARE

## 2024-02-07 ENCOUNTER — TELEPHONE (OUTPATIENT)
Age: 84
End: 2024-02-07

## 2024-02-07 VITALS
WEIGHT: 180 LBS | HEART RATE: 64 BPM | BODY MASS INDEX: 24.41 KG/M2 | SYSTOLIC BLOOD PRESSURE: 125 MMHG | RESPIRATION RATE: 18 BRPM | DIASTOLIC BLOOD PRESSURE: 65 MMHG | OXYGEN SATURATION: 97 % | TEMPERATURE: 97.5 F

## 2024-02-07 DIAGNOSIS — I78.0 HHT (HEREDITARY HEMORRHAGIC TELANGIECTASIA) (HCC): ICD-10-CM

## 2024-02-07 DIAGNOSIS — D50.0 IRON DEFICIENCY ANEMIA SECONDARY TO BLOOD LOSS (CHRONIC): Primary | ICD-10-CM

## 2024-02-07 PROCEDURE — 99214 OFFICE O/P EST MOD 30 MIN: CPT | Performed by: INTERNAL MEDICINE

## 2024-02-07 PROCEDURE — G8420 CALC BMI NORM PARAMETERS: HCPCS | Performed by: INTERNAL MEDICINE

## 2024-02-07 PROCEDURE — 1123F ACP DISCUSS/DSCN MKR DOCD: CPT | Performed by: INTERNAL MEDICINE

## 2024-02-07 PROCEDURE — G8484 FLU IMMUNIZE NO ADMIN: HCPCS | Performed by: INTERNAL MEDICINE

## 2024-02-07 PROCEDURE — G8427 DOCREV CUR MEDS BY ELIG CLIN: HCPCS | Performed by: INTERNAL MEDICINE

## 2024-02-07 PROCEDURE — G2211 COMPLEX E/M VISIT ADD ON: HCPCS | Performed by: INTERNAL MEDICINE

## 2024-02-07 PROCEDURE — 1036F TOBACCO NON-USER: CPT | Performed by: INTERNAL MEDICINE

## 2024-02-07 NOTE — PROGRESS NOTES
Williams Fitzgerald Jr is a 83 y.o. male for anemia and HHT.      1. Have you been to the ER, urgent care clinic since your last visit?  Hospitalized since your last visit?no    2. Have you seen or consulted any other health care providers outside of the [facility] since your last visit?  Include any pap smears or colon screening. no    
anemia  Recurring problem.  He is s/p numerous doses of injectafer since 2021, most recently 1/2024.  He is on oral iron as well.    He is needing frequent IV iron.  In order to manage this more proactively, I recommend we try to schedule IV iron with a dose of injectafer every 6 weeks or so, if his insurance will permit. We will schedule labs a week prior.       The cause of his iron deficiency is ongoing blood loss related to his HHT, and it is expected to be a recurring problem.  He should continue oral iron as tolerated.  We will monitor his labs and give additional IV iron as needed.       HHT  Managed at [facility]. He is s/p treatment for pulmonary AVMs as well as epistaxis.  He is taking Tranexamic Acid from his HHT physician in [place].  I am assisting locally with IV iron infusions.  He is struggling with traveling to [place], thinking about establishing care with HHT center at [facility].       Epistaxis  Secondary to HHT.  Follows with ENT in [place].  Improved initially after starting Tranexamic Acid, but worsened again recently.  He is planning to establish with ENT locally, as well as the HHT center at [facility], so that he does not have to travel to [place] so often.       Prostate cancer  He is now s/p radiation with Dr. Holt completed 4/2021.  Last dose ADT on 2/2022, stopped due to fatigue.        Pulmonary AVMs  S/p embolization in [place] 7/2023.        Plan:     Injectafer 750mg IV every 6 weeks  Continue PO iron as tolerated  Follow up with HHT center, ENT  Labs prior to each iron infusion: CBC, iron profile, ferritin  Return to see me in 3 months      Signed By: Geoff Auguste MD

## 2024-02-08 RX ORDER — EPINEPHRINE 1 MG/ML
0.3 INJECTION, SOLUTION, CONCENTRATE INTRAVENOUS PRN
OUTPATIENT
Start: 2024-03-13

## 2024-02-08 RX ORDER — SODIUM CHLORIDE 9 MG/ML
INJECTION, SOLUTION INTRAVENOUS CONTINUOUS
OUTPATIENT
Start: 2024-03-13

## 2024-02-08 RX ORDER — HEPARIN 100 UNIT/ML
500 SYRINGE INTRAVENOUS PRN
OUTPATIENT
Start: 2024-03-13

## 2024-02-08 RX ORDER — SODIUM CHLORIDE 0.9 % (FLUSH) 0.9 %
5-40 SYRINGE (ML) INJECTION PRN
OUTPATIENT
Start: 2024-03-13

## 2024-02-08 RX ORDER — SODIUM CHLORIDE 9 MG/ML
5-250 INJECTION, SOLUTION INTRAVENOUS PRN
OUTPATIENT
Start: 2024-03-13

## 2024-02-08 RX ORDER — DIPHENHYDRAMINE HYDROCHLORIDE 50 MG/ML
50 INJECTION INTRAMUSCULAR; INTRAVENOUS
OUTPATIENT
Start: 2024-03-13

## 2024-02-08 RX ORDER — ACETAMINOPHEN 325 MG/1
650 TABLET ORAL
OUTPATIENT
Start: 2024-03-13

## 2024-02-08 RX ORDER — ALBUTEROL SULFATE 90 UG/1
4 AEROSOL, METERED RESPIRATORY (INHALATION) PRN
OUTPATIENT
Start: 2024-03-13

## 2024-02-08 RX ORDER — ONDANSETRON 2 MG/ML
8 INJECTION INTRAMUSCULAR; INTRAVENOUS
OUTPATIENT
Start: 2024-03-13

## 2024-03-11 DIAGNOSIS — D50.0 IRON DEFICIENCY ANEMIA SECONDARY TO BLOOD LOSS (CHRONIC): ICD-10-CM

## 2024-03-12 LAB
BASOPHILS # BLD: 0 K/UL (ref 0–0.1)
BASOPHILS NFR BLD: 1 % (ref 0–1)
DIFFERENTIAL METHOD BLD: ABNORMAL
EOSINOPHIL # BLD: 0.1 K/UL (ref 0–0.4)
EOSINOPHIL NFR BLD: 3 % (ref 0–7)
ERYTHROCYTE [DISTWIDTH] IN BLOOD BY AUTOMATED COUNT: 13.5 % (ref 11.5–14.5)
FERRITIN SERPL-MCNC: 55 NG/ML (ref 26–388)
HCT VFR BLD AUTO: 30.2 % (ref 36.6–50.3)
HGB BLD-MCNC: 9.7 G/DL (ref 12.1–17)
IMM GRANULOCYTES # BLD AUTO: 0 K/UL (ref 0–0.04)
IMM GRANULOCYTES NFR BLD AUTO: 0 % (ref 0–0.5)
IRON SATN MFR SERPL: 13 % (ref 20–50)
IRON SERPL-MCNC: 42 UG/DL (ref 35–150)
LYMPHOCYTES # BLD: 0.4 K/UL (ref 0.8–3.5)
LYMPHOCYTES NFR BLD: 11 % (ref 12–49)
MCH RBC QN AUTO: 32.1 PG (ref 26–34)
MCHC RBC AUTO-ENTMCNC: 32.1 G/DL (ref 30–36.5)
MCV RBC AUTO: 100 FL (ref 80–99)
MONOCYTES # BLD: 0.7 K/UL (ref 0–1)
MONOCYTES NFR BLD: 18 % (ref 5–13)
NEUTS SEG # BLD: 2.8 K/UL (ref 1.8–8)
NEUTS SEG NFR BLD: 67 % (ref 32–75)
NRBC # BLD: 0 K/UL (ref 0–0.01)
NRBC BLD-RTO: 0 PER 100 WBC
PLATELET # BLD AUTO: 180 K/UL (ref 150–400)
PMV BLD AUTO: 10.6 FL (ref 8.9–12.9)
RBC # BLD AUTO: 3.02 M/UL (ref 4.1–5.7)
RBC MORPH BLD: ABNORMAL
RBC MORPH BLD: ABNORMAL
TIBC SERPL-MCNC: 323 UG/DL (ref 250–450)
WBC # BLD AUTO: 4 K/UL (ref 4.1–11.1)

## 2024-03-13 ENCOUNTER — APPOINTMENT (OUTPATIENT)
Facility: HOSPITAL | Age: 84
End: 2024-03-13
Payer: MEDICARE

## 2024-03-14 ENCOUNTER — HOSPITAL ENCOUNTER (OUTPATIENT)
Facility: HOSPITAL | Age: 84
Setting detail: INFUSION SERIES
Discharge: HOME | End: 2024-03-14
Payer: MEDICARE

## 2024-03-14 VITALS
OXYGEN SATURATION: 96 % | DIASTOLIC BLOOD PRESSURE: 76 MMHG | HEART RATE: 90 BPM | BODY MASS INDEX: 24.88 KG/M2 | WEIGHT: 183.7 LBS | SYSTOLIC BLOOD PRESSURE: 109 MMHG | TEMPERATURE: 98.1 F | HEIGHT: 72 IN | RESPIRATION RATE: 17 BRPM

## 2024-03-14 DIAGNOSIS — I78.0 HHT (HEREDITARY HEMORRHAGIC TELANGIECTASIA) (HCC): Primary | ICD-10-CM

## 2024-03-14 DIAGNOSIS — D50.9 IRON DEFICIENCY ANEMIA, UNSPECIFIED IRON DEFICIENCY ANEMIA TYPE: ICD-10-CM

## 2024-03-14 PROCEDURE — 96365 THER/PROPH/DIAG IV INF INIT: CPT

## 2024-03-14 PROCEDURE — 6360000002 HC RX W HCPCS: Performed by: NURSE PRACTITIONER

## 2024-03-14 PROCEDURE — 2580000003 HC RX 258: Performed by: NURSE PRACTITIONER

## 2024-03-14 RX ORDER — SODIUM CHLORIDE 0.9 % (FLUSH) 0.9 %
5-40 SYRINGE (ML) INJECTION PRN
OUTPATIENT
Start: 2024-03-21

## 2024-03-14 RX ORDER — ALBUTEROL SULFATE 90 UG/1
4 AEROSOL, METERED RESPIRATORY (INHALATION) PRN
OUTPATIENT
Start: 2024-03-21

## 2024-03-14 RX ORDER — DIPHENHYDRAMINE HYDROCHLORIDE 50 MG/ML
50 INJECTION INTRAMUSCULAR; INTRAVENOUS
OUTPATIENT
Start: 2024-03-21

## 2024-03-14 RX ORDER — FAMOTIDINE 10 MG/ML
20 INJECTION, SOLUTION INTRAVENOUS
OUTPATIENT
Start: 2024-03-21

## 2024-03-14 RX ORDER — ACETAMINOPHEN 325 MG/1
650 TABLET ORAL
OUTPATIENT
Start: 2024-03-21

## 2024-03-14 RX ORDER — HEPARIN 100 UNIT/ML
500 SYRINGE INTRAVENOUS PRN
OUTPATIENT
Start: 2024-03-21

## 2024-03-14 RX ORDER — SODIUM CHLORIDE 9 MG/ML
5-250 INJECTION, SOLUTION INTRAVENOUS PRN
OUTPATIENT
Start: 2024-03-21

## 2024-03-14 RX ORDER — EPINEPHRINE 1 MG/ML
0.3 INJECTION, SOLUTION INTRAMUSCULAR; SUBCUTANEOUS PRN
OUTPATIENT
Start: 2024-03-21

## 2024-03-14 RX ORDER — SODIUM CHLORIDE 9 MG/ML
INJECTION, SOLUTION INTRAVENOUS CONTINUOUS
OUTPATIENT
Start: 2024-03-21

## 2024-03-14 RX ORDER — SODIUM CHLORIDE 9 MG/ML
5-250 INJECTION, SOLUTION INTRAVENOUS PRN
Status: DISCONTINUED | OUTPATIENT
Start: 2024-03-14 | End: 2024-03-15 | Stop reason: HOSPADM

## 2024-03-14 RX ORDER — ONDANSETRON 2 MG/ML
8 INJECTION INTRAMUSCULAR; INTRAVENOUS
OUTPATIENT
Start: 2024-03-21

## 2024-03-14 RX ADMIN — FERRIC CARBOXYMALTOSE INJECTION 750 MG: 50 INJECTION, SOLUTION INTRAVENOUS at 12:03

## 2024-03-14 RX ADMIN — SODIUM CHLORIDE 25 ML/HR: 9 INJECTION, SOLUTION INTRAVENOUS at 12:02

## 2024-03-14 ASSESSMENT — PAIN SCALES - GENERAL: PAINLEVEL_OUTOF10: 0

## 2024-03-14 NOTE — PROGRESS NOTES
[facility] Progress Note    Date: March 14, 2024         Pt arrived ambulatory to [facility] for Injectafer in stable condition.  Assessment completed. PIV placed to left arm with positive blood return. L    Patient Vitals for the past 12 hrs:   Temp Pulse Resp BP SpO2   03/14/24 1215 -- 90 -- 109/76 --   03/14/24 1115 98.1 °F (36.7 °C) 96 17 120/74 96 %       Medications Administered         0.9 % sodium chloride infusion Admin Date  03/14/2024 Action  New Bag Dose  25 mL/hr Rate  25 mL/hr Route  IntraVENous Administered By  Ann Thompson RN        ferric carboxymaltose (INJECTAFER) 750 mg in sodium chloride 0.9 % 250 mL IVPB Admin Date  03/14/2024 Action  New Bag Dose  750 mg Rate  870 mL/hr Route  IntraVENous Administered By  Ann Thompson, JOHN                  1230:  Tolerated treatment well, no adverse reactions noted. PIV flushed and removed. 2x2 and coban placed. D/Cd from [facility] ambulatory and in no distress.  Patient is aware of next scheduled [facility] appointment on 4/24/24.    Future Appointments   Date Time Provider Department Center   4/24/2024  9:00 AM SS INF3 CH4 <2H [facility]   5/9/2024 10:30 AM Geoff Auguste MD [facility]   6/5/2024  9:00 AM SS INF2 CH4 <2H [facility]   7/17/2024  9:00 AM SS INF2 CH4 <2H [facility]   12/5/2024 11:30 AM Rajeev Holt MD [facility]           Ann Thompson RN  March 14, 2024

## 2024-04-03 ENCOUNTER — APPOINTMENT (OUTPATIENT)
Facility: HOSPITAL | Age: 84
End: 2024-04-03
Payer: MEDICARE

## 2024-04-03 ENCOUNTER — HOSPITAL ENCOUNTER (EMERGENCY)
Facility: HOSPITAL | Age: 84
Discharge: HOME | End: 2024-04-03
Attending: EMERGENCY MEDICINE
Payer: MEDICARE

## 2024-04-03 VITALS
TEMPERATURE: 97.7 F | HEART RATE: 87 BPM | RESPIRATION RATE: 18 BRPM | HEIGHT: 72 IN | WEIGHT: 180 LBS | BODY MASS INDEX: 24.38 KG/M2 | SYSTOLIC BLOOD PRESSURE: 94 MMHG | OXYGEN SATURATION: 100 % | DIASTOLIC BLOOD PRESSURE: 66 MMHG

## 2024-04-03 DIAGNOSIS — R10.31 RIGHT GROIN PAIN: Primary | ICD-10-CM

## 2024-04-03 DIAGNOSIS — R18.8 INGUINAL FLUID COLLECTION: ICD-10-CM

## 2024-04-03 LAB
ALBUMIN SERPL-MCNC: 3.4 G/DL (ref 3.5–5)
ALBUMIN/GLOB SERPL: 1.1 (ref 1.1–2.2)
ALP SERPL-CCNC: 70 U/L (ref 45–117)
ALT SERPL-CCNC: 23 U/L (ref 12–78)
ANION GAP SERPL CALC-SCNC: 3 MMOL/L (ref 5–15)
APPEARANCE UR: CLEAR
AST SERPL-CCNC: 19 U/L (ref 15–37)
BACTERIA URNS QL MICRO: NEGATIVE /HPF
BASOPHILS # BLD: 0.1 K/UL (ref 0–0.1)
BASOPHILS NFR BLD: 1 % (ref 0–1)
BILIRUB SERPL-MCNC: 0.6 MG/DL (ref 0.2–1)
BILIRUB UR QL: NEGATIVE
BUN SERPL-MCNC: 19 MG/DL (ref 6–20)
BUN/CREAT SERPL: 20 (ref 12–20)
CALCIUM SERPL-MCNC: 8.6 MG/DL (ref 8.5–10.1)
CHLORIDE SERPL-SCNC: 107 MMOL/L (ref 97–108)
CO2 SERPL-SCNC: 28 MMOL/L (ref 21–32)
COLOR UR: NORMAL
CREAT SERPL-MCNC: 0.96 MG/DL (ref 0.7–1.3)
DIFFERENTIAL METHOD BLD: ABNORMAL
EOSINOPHIL # BLD: 0.1 K/UL (ref 0–0.4)
EOSINOPHIL NFR BLD: 1 % (ref 0–7)
EPITH CASTS URNS QL MICRO: NORMAL /LPF
ERYTHROCYTE [DISTWIDTH] IN BLOOD BY AUTOMATED COUNT: 14.5 % (ref 11.5–14.5)
GLOBULIN SER CALC-MCNC: 3 G/DL (ref 2–4)
GLUCOSE SERPL-MCNC: 119 MG/DL (ref 65–100)
GLUCOSE UR STRIP.AUTO-MCNC: NEGATIVE MG/DL
HCT VFR BLD AUTO: 30.8 % (ref 36.6–50.3)
HGB BLD-MCNC: 10 G/DL (ref 12.1–17)
HGB UR QL STRIP: NEGATIVE
HYALINE CASTS URNS QL MICRO: NORMAL /LPF (ref 0–2)
IMM GRANULOCYTES # BLD AUTO: 0 K/UL (ref 0–0.04)
IMM GRANULOCYTES NFR BLD AUTO: 0 % (ref 0–0.5)
KETONES UR QL STRIP.AUTO: NEGATIVE MG/DL
LEUKOCYTE ESTERASE UR QL STRIP.AUTO: NEGATIVE
LIPASE SERPL-CCNC: 19 U/L (ref 13–75)
LYMPHOCYTES # BLD: 0.2 K/UL (ref 0.8–3.5)
LYMPHOCYTES NFR BLD: 5 % (ref 12–49)
MCH RBC QN AUTO: 32.2 PG (ref 26–34)
MCHC RBC AUTO-ENTMCNC: 32.5 G/DL (ref 30–36.5)
MCV RBC AUTO: 99 FL (ref 80–99)
MONOCYTES # BLD: 0.5 K/UL (ref 0–1)
MONOCYTES NFR BLD: 11 % (ref 5–13)
NEUTS SEG # BLD: 4 K/UL (ref 1.8–8)
NEUTS SEG NFR BLD: 82 % (ref 32–75)
NITRITE UR QL STRIP.AUTO: NEGATIVE
NRBC # BLD: 0 K/UL (ref 0–0.01)
NRBC BLD-RTO: 0 PER 100 WBC
PH UR STRIP: 5.5 (ref 5–8)
PLATELET # BLD AUTO: 152 K/UL (ref 150–400)
PMV BLD AUTO: 10 FL (ref 8.9–12.9)
POTASSIUM SERPL-SCNC: 4.2 MMOL/L (ref 3.5–5.1)
PROT SERPL-MCNC: 6.4 G/DL (ref 6.4–8.2)
PROT UR STRIP-MCNC: NEGATIVE MG/DL
RBC # BLD AUTO: 3.11 M/UL (ref 4.1–5.7)
RBC #/AREA URNS HPF: NORMAL /HPF (ref 0–5)
RBC MORPH BLD: ABNORMAL
RBC MORPH BLD: ABNORMAL
SODIUM SERPL-SCNC: 138 MMOL/L (ref 136–145)
SP GR UR REFRACTOMETRY: 1.01 (ref 1–1.03)
URINE CULTURE IF INDICATED: NORMAL
UROBILINOGEN UR QL STRIP.AUTO: 1 EU/DL (ref 0.2–1)
WBC # BLD AUTO: 4.9 K/UL (ref 4.1–11.1)
WBC URNS QL MICRO: NORMAL /HPF (ref 0–4)

## 2024-04-03 PROCEDURE — 83690 ASSAY OF LIPASE: CPT

## 2024-04-03 PROCEDURE — 6360000004 HC RX CONTRAST MEDICATION: Performed by: EMERGENCY MEDICINE

## 2024-04-03 PROCEDURE — 85025 COMPLETE CBC W/AUTO DIFF WBC: CPT

## 2024-04-03 PROCEDURE — 81001 URINALYSIS AUTO W/SCOPE: CPT

## 2024-04-03 PROCEDURE — 80053 COMPREHEN METABOLIC PANEL: CPT

## 2024-04-03 PROCEDURE — 36415 COLL VENOUS BLD VENIPUNCTURE: CPT

## 2024-04-03 PROCEDURE — 99285 EMERGENCY DEPT VISIT HI MDM: CPT

## 2024-04-03 PROCEDURE — 74177 CT ABD & PELVIS W/CONTRAST: CPT

## 2024-04-03 RX ADMIN — IOPAMIDOL 100 ML: 755 INJECTION, SOLUTION INTRAVENOUS at 14:41

## 2024-04-03 ASSESSMENT — PAIN - FUNCTIONAL ASSESSMENT: PAIN_FUNCTIONAL_ASSESSMENT: 0-10

## 2024-04-03 ASSESSMENT — LIFESTYLE VARIABLES
HOW OFTEN DO YOU HAVE A DRINK CONTAINING ALCOHOL: NEVER
HOW MANY STANDARD DRINKS CONTAINING ALCOHOL DO YOU HAVE ON A TYPICAL DAY: PATIENT DOES NOT DRINK

## 2024-04-03 ASSESSMENT — PAIN SCALES - GENERAL: PAINLEVEL_OUTOF10: 0

## 2024-04-03 NOTE — ED PROVIDER NOTES
EMERGENCY DEPARTMENT PHYSICIAN NOTE     Patient: Williams Fitzgerald Jr     Time of Service: 4/3/2024  1:07 PM     Chief complaint:   Chief Complaint   Patient presents with    Groin Pain        HISTORY:  Patient is a 83 y.o. male who presents to the emergency department with complaints of groin pain.       Past Medical History:   Diagnosis Date    Cancer (HCC)     HHT (hereditary hemorrhagic telangiectasia) (HCC)     Prostate cancer (HCC)         Past Surgical History:   Procedure Laterality Date    APPENDECTOMY      CHEST SURGERY      excision of AVM    COLONOSCOPY N/A 1/29/2020    COLONOSCOPY performed by Manas Barr MD at [facility] ENDOSCOPY    COLONOSCOPY  2014    due 18    OTHER SURGICAL HISTORY  07/2018    sclerotheropy    TONSILLECTOMY          Family History   Problem Relation Age of Onset    Cancer Other         colon, lung    Heart Disease Mother         Social History     Socioeconomic History    Marital status:    Tobacco Use    Smoking status: Former     Passive exposure: Never    Smokeless tobacco: Never   Vaping Use    Vaping Use: Never used   Substance and Sexual Activity    Alcohol use: Yes     Alcohol/week: 0.0 standard drinks of alcohol    Drug use: No    Sexual activity: Not Currently        Current Medications: Reviewed in chart.    Allergies: No Known Allergies       REVIEW OF SYSTEMS: See HPI for pertinent positives and negatives.      PHYSICAL EXAM:  BP 94/66   Pulse 87   Temp 97.7 °F (36.5 °C) (Oral)   Resp 18   Ht 1.829 m (6')   Wt 81.6 kg (180 lb)   SpO2 100%   BMI 24.41 kg/m²    Physical Exam  Vitals and nursing note reviewed. Exam conducted with a chaperone present.   Constitutional:       General: He is not in acute distress.     Appearance: Normal appearance. He is normal weight. He is not toxic-appearing.   HENT:      Head: Normocephalic and atraumatic.      Nose: Nose normal.      Mouth/Throat:      Mouth: Mucous membranes are moist.      Pharynx: Oropharynx is

## 2024-04-03 NOTE — DISCHARGE INSTRUCTIONS
Return with any worsening or new symptoms.  In the meantime please call the number provided to follow-up with general surgery.  You can follow-up with Dr. Miller or any one of her partners by calling the number provided.

## 2024-04-03 NOTE — ED PROVIDER NOTES
Patient signed out to me by previous provider with instructions to follow-up on the patient's CT scan.  Please see initial notes for further details including HPI and physical exam.    CT shows a right inguinal fluid collection.  On my exam, I do not appreciate any swelling of the groin.  Patient endorses that the swelling has gone away.  He is having no tenderness.  There are no overlying skin changes.  He has no white count.  I have a low suspicion this fluid collection is an abscess.  I am not seeing any evidence of an incarcerated or strangulated hernia on exam either.  I think the patient is appropriate to follow-up with general surgery as an outpatient.  I explained return precautions to the patient and his wife and they are understanding.  Patient discharged in stable condition     Hernandez Berry MD  04/03/24 [id number]

## 2024-04-03 NOTE — ED TRIAGE NOTES
Pt arrives to the ER for complaints the ER for complaints of right sided groin pain that started this morning. He also reports swelling.     States that it is only painful to the touch.     Denies any redness, changes in urination or hematuria.

## 2024-04-05 ENCOUNTER — HOSPITAL ENCOUNTER (EMERGENCY)
Facility: HOSPITAL | Age: 84
Discharge: HOME | End: 2024-04-05
Attending: EMERGENCY MEDICINE
Payer: MEDICARE

## 2024-04-05 VITALS
HEIGHT: 72 IN | RESPIRATION RATE: 16 BRPM | SYSTOLIC BLOOD PRESSURE: 135 MMHG | WEIGHT: 180 LBS | DIASTOLIC BLOOD PRESSURE: 88 MMHG | BODY MASS INDEX: 24.38 KG/M2 | TEMPERATURE: 97.3 F | OXYGEN SATURATION: 100 % | HEART RATE: 100 BPM

## 2024-04-05 DIAGNOSIS — R04.0 EPISTAXIS: Primary | ICD-10-CM

## 2024-04-05 PROCEDURE — 6370000000 HC RX 637 (ALT 250 FOR IP): Performed by: EMERGENCY MEDICINE

## 2024-04-05 PROCEDURE — 30903 CONTROL OF NOSEBLEED: CPT

## 2024-04-05 PROCEDURE — 99283 EMERGENCY DEPT VISIT LOW MDM: CPT

## 2024-04-05 PROCEDURE — 2500000003 HC RX 250 WO HCPCS: Performed by: EMERGENCY MEDICINE

## 2024-04-05 RX ORDER — LIDOCAINE HYDROCHLORIDE AND EPINEPHRINE BITARTRATE 20; .01 MG/ML; MG/ML
20 INJECTION, SOLUTION SUBCUTANEOUS ONCE
Status: COMPLETED | OUTPATIENT
Start: 2024-04-05 | End: 2024-04-05

## 2024-04-05 RX ORDER — OXYMETAZOLINE HYDROCHLORIDE 0.05 G/100ML
2 SPRAY NASAL
Status: COMPLETED | OUTPATIENT
Start: 2024-04-05 | End: 2024-04-05

## 2024-04-05 RX ORDER — TRANEXAMIC ACID 100 MG/ML
100 INJECTION, SOLUTION INTRAVENOUS ONCE
Status: COMPLETED | OUTPATIENT
Start: 2024-04-05 | End: 2024-04-05

## 2024-04-05 RX ADMIN — OXYMETAZOLINE HYDROCHLORIDE 2 SPRAY: 0.05 SPRAY, METERED NASAL at 12:28

## 2024-04-05 RX ADMIN — TRANEXAMIC ACID 100 MG: 100 INJECTION, SOLUTION INTRAVENOUS at 12:58

## 2024-04-05 RX ADMIN — LIDOCAINE HYDROCHLORIDE,EPINEPHRINE BITARTRATE 20 ML: 20; .01 INJECTION, SOLUTION INFILTRATION; PERINEURAL at 12:58

## 2024-04-05 ASSESSMENT — LIFESTYLE VARIABLES
HOW MANY STANDARD DRINKS CONTAINING ALCOHOL DO YOU HAVE ON A TYPICAL DAY: PATIENT DOES NOT DRINK
HOW OFTEN DO YOU HAVE A DRINK CONTAINING ALCOHOL: NEVER

## 2024-04-05 ASSESSMENT — PAIN - FUNCTIONAL ASSESSMENT: PAIN_FUNCTIONAL_ASSESSMENT: 0-10

## 2024-04-05 ASSESSMENT — PAIN SCALES - GENERAL: PAINLEVEL_OUTOF10: 0

## 2024-04-05 NOTE — ED PROVIDER NOTES
EMERGENCY DEPARTMENT PHYSICIAN NOTE     Patient: iWlliams Fitzgerald Jr     Time of Service: 4/5/2024 11:26 AM     Chief complaint:   Chief Complaint   Patient presents with    Epistaxis        HISTORY:  Patient is a 83 y.o. male who presents to the emergency department with complaints of epistaxis.       Past Medical History:   Diagnosis Date    Cancer (HCC)     HHT (hereditary hemorrhagic telangiectasia) (HCC)     Prostate cancer (HCC)         Past Surgical History:   Procedure Laterality Date    APPENDECTOMY      CHEST SURGERY      excision of AVM    COLONOSCOPY N/A 1/29/2020    COLONOSCOPY performed by Manas Barr MD at [facility] ENDOSCOPY    COLONOSCOPY  2014    due 18    OTHER SURGICAL HISTORY  07/2018    sclerotheropy    TONSILLECTOMY          Family History   Problem Relation Age of Onset    Cancer Other         colon, lung    Heart Disease Mother         Social History     Socioeconomic History    Marital status:    Tobacco Use    Smoking status: Former     Passive exposure: Never    Smokeless tobacco: Never   Vaping Use    Vaping Use: Never used   Substance and Sexual Activity    Alcohol use: Yes     Alcohol/week: 0.0 standard drinks of alcohol    Drug use: No    Sexual activity: Not Currently        Current Medications: Reviewed in chart.    Allergies: No Known Allergies       REVIEW OF SYSTEMS: See HPI for pertinent positives and negatives.      PHYSICAL EXAM:  /88   Pulse 100   Temp 97.3 °F (36.3 °C) (Oral)   Resp 16   Ht 1.829 m (6')   Wt 81.6 kg (180 lb)   SpO2 100%   BMI 24.41 kg/m²    Physical Exam  Vitals and nursing note reviewed.   Constitutional:       General: He is not in acute distress.     Appearance: Normal appearance. He is normal weight. He is not toxic-appearing.   HENT:      Head: Normocephalic and atraumatic.      Nose: Nose normal. No congestion or rhinorrhea.      Comments: Bilateral tampons in the nares, covered in blood.     Mouth/Throat:      Mouth: Mucous

## 2024-04-05 NOTE — DISCHARGE INSTRUCTIONS
Routine appointments for health maintenance with a primary care provider are very important and emergency department visits are no substitute.  You should review all findings and test results from your visit today with your primary care physician.        Continue taking your tranexamic acid as prescribed.  Leave the packing in until you follow up with your Otolaryngologist.        Return to the emergency department for any new or concerning signs/symptoms or failure to improve.

## 2024-04-05 NOTE — ED NOTES
Pt was discharged by Dr Mel Mirza  Pt verbalized good understanding of all discharge instructions,   and follow up care.   All questions answered.  Pt in stable condition on discharge.

## 2024-04-05 NOTE — ED TRIAGE NOTES
Patient arrives to the ED with a nosebleed that started yesterday, patient reports it stopped last night but started again this morning. Patient denies blood thinners.

## 2024-04-17 NOTE — PROGRESS NOTES
Yes, thank you   ----- Message -----   From: Solomon Regan [facility]   Sent: 4/17/2024  10:54 AM EDT   To: MARTÍNEZ Claros NP            Good morning,     Patient has an [facility] appointment on 4/24/24 for Injectafer. Patient has had two ED visits (4/3/24 for right groin pain and 4/5/24 for epistaxis) since their last administration in March. Confirming it's still ok to treat for their upcoming appointment next Wednesday?      Thanks,  Solomon

## 2024-04-20 ENCOUNTER — HOSPITAL ENCOUNTER (EMERGENCY)
Facility: HOSPITAL | Age: 84
Discharge: HOME | End: 2024-04-20
Attending: STUDENT IN AN ORGANIZED HEALTH CARE EDUCATION/TRAINING PROGRAM
Payer: MEDICARE

## 2024-04-20 VITALS
WEIGHT: 175 LBS | RESPIRATION RATE: 17 BRPM | DIASTOLIC BLOOD PRESSURE: 60 MMHG | BODY MASS INDEX: 23.7 KG/M2 | TEMPERATURE: 97.8 F | HEART RATE: 83 BPM | OXYGEN SATURATION: 100 % | SYSTOLIC BLOOD PRESSURE: 116 MMHG | HEIGHT: 72 IN

## 2024-04-20 DIAGNOSIS — R04.0 EPISTAXIS: ICD-10-CM

## 2024-04-20 DIAGNOSIS — K40.91 UNILATERAL RECURRENT INGUINAL HERNIA WITHOUT OBSTRUCTION OR GANGRENE: Primary | ICD-10-CM

## 2024-04-20 PROCEDURE — 99282 EMERGENCY DEPT VISIT SF MDM: CPT

## 2024-04-20 ASSESSMENT — ENCOUNTER SYMPTOMS
NAUSEA: 0
ABDOMINAL PAIN: 0
VOMITING: 0
CONSTIPATION: 1

## 2024-04-20 ASSESSMENT — PAIN DESCRIPTION - LOCATION: LOCATION: GROIN

## 2024-04-20 ASSESSMENT — PAIN DESCRIPTION - ORIENTATION: ORIENTATION: RIGHT

## 2024-04-20 ASSESSMENT — PAIN SCALES - GENERAL: PAINLEVEL_OUTOF10: 8

## 2024-04-20 ASSESSMENT — PAIN - FUNCTIONAL ASSESSMENT: PAIN_FUNCTIONAL_ASSESSMENT: 0-10

## 2024-04-21 NOTE — ED PROVIDER NOTES
[facility] EMERGENCY DEPT  EMERGENCY DEPARTMENT ENCOUNTER      Pt Name: Williams Fitzgerald Jr  MRN: [id number]  Birthdate 1940  Date of evaluation: 4/20/2024  Provider: Donita Morse DO    CHIEF COMPLAINT       Chief Complaint   Patient presents with    Groin Pain    Epistaxis         HISTORY OF PRESENT ILLNESS    HPI    Williams Fitzgerald Jr is a 83 y.o. male with a history of hereditary hemorrhagic telangiectasia who presents to the emergency department for evaluation of groin swelling.  Patient notes this evening he developed a recurrent nosebleed, was initially come to the emergency department for evaluation of the nosebleed but was able to stop the bleeding after putting in a home nasal tampon.  States during this time he noticed that he had swelling and pain in his right groin region.  States similar symptoms once but self resolved.  Currently symptoms have resolved as well.  He denies any associate abdominal pain.  No vomiting, diarrhea.  Has had some difficulty with constipation recently.  No other complaints at this time.    Nursing Notes were reviewed.    REVIEW OF SYSTEMS       Review of Systems   Constitutional:  Negative for fever.   HENT:  Positive for nosebleeds.    Gastrointestinal:  Positive for constipation. Negative for abdominal pain, nausea and vomiting.   Genitourinary:         +groin pain           PAST MEDICAL HISTORY     Past Medical History:   Diagnosis Date    Cancer (HCC)     HHT (hereditary hemorrhagic telangiectasia) (HCC)     Prostate cancer (HCC)          SURGICAL HISTORY       Past Surgical History:   Procedure Laterality Date    APPENDECTOMY      CHEST SURGERY      excision of AVM    COLONOSCOPY N/A 1/29/2020    COLONOSCOPY performed by Manas Barr MD at [facility] ENDOSCOPY    COLONOSCOPY  2014    due 18    OTHER SURGICAL HISTORY  07/2018    sclerotheropy    TONSILLECTOMY           CURRENT MEDICATIONS       Previous Medications    ACETAMINOPHEN (TYLENOL) 500 MG TABLET    Take by

## 2024-04-21 NOTE — ED NOTES
I have reviewed discharge instructions with the patient and spouse.  Opportunity for questions and clarification was provided.  The patient and spouse verbalized understanding.  Patient discharged out of the ED via ambulation with no difficulty and in stable condition.

## 2024-04-21 NOTE — ED TRIAGE NOTES
Pt ambulatory to triage c/o nose bleed and groin pain that started this evening. Pt states he feels a lump on his right groin. When he was previously here for the same thing, he had the lump and then it disappeared.     PT endorses nausea

## 2024-04-21 NOTE — DISCHARGE INSTRUCTIONS
Limit any heavy left at night and prevent straining to prevent recurrence of the hernia.  Follow-up with general surgery.  You should also start a stool softener to help your bowels move regularly.  Return to the emergency department for any new or worsening symptoms.

## 2024-04-22 DIAGNOSIS — D50.0 IRON DEFICIENCY ANEMIA SECONDARY TO BLOOD LOSS (CHRONIC): ICD-10-CM

## 2024-04-22 NOTE — PROGRESS NOTES
[7:27 AM] Lis Hendricks Amilcar ([birth year]) went back to ED for nosebleed after Mary had given OKTT for 2 prior ED visits.  OKTT (again) for the Injectafer visit on Wednesday?  [7:46 AM] Mary Hudson  Yes, thank you

## 2024-04-23 LAB
BASOPHILS # BLD: 0 K/UL (ref 0–0.1)
BASOPHILS NFR BLD: 1 % (ref 0–1)
DIFFERENTIAL METHOD BLD: ABNORMAL
EOSINOPHIL # BLD: 0.2 K/UL (ref 0–0.4)
EOSINOPHIL NFR BLD: 5 % (ref 0–7)
ERYTHROCYTE [DISTWIDTH] IN BLOOD BY AUTOMATED COUNT: 13.6 % (ref 11.5–14.5)
FERRITIN SERPL-MCNC: 50 NG/ML (ref 26–388)
HCT VFR BLD AUTO: 34.1 % (ref 36.6–50.3)
HGB BLD-MCNC: 11 G/DL (ref 12.1–17)
IMM GRANULOCYTES # BLD AUTO: 0 K/UL (ref 0–0.04)
IMM GRANULOCYTES NFR BLD AUTO: 0 % (ref 0–0.5)
IRON SATN MFR SERPL: 74 % (ref 20–50)
IRON SERPL-MCNC: 225 UG/DL (ref 35–150)
LYMPHOCYTES # BLD: 0.6 K/UL (ref 0.8–3.5)
LYMPHOCYTES NFR BLD: 15 % (ref 12–49)
MCH RBC QN AUTO: 31.7 PG (ref 26–34)
MCHC RBC AUTO-ENTMCNC: 32.3 G/DL (ref 30–36.5)
MCV RBC AUTO: 98.3 FL (ref 80–99)
MONOCYTES # BLD: 0.7 K/UL (ref 0–1)
MONOCYTES NFR BLD: 18 % (ref 5–13)
NEUTS SEG # BLD: 2.3 K/UL (ref 1.8–8)
NEUTS SEG NFR BLD: 61 % (ref 32–75)
NRBC # BLD: 0 K/UL (ref 0–0.01)
NRBC BLD-RTO: 0 PER 100 WBC
PLATELET # BLD AUTO: 197 K/UL (ref 150–400)
PMV BLD AUTO: 10.4 FL (ref 8.9–12.9)
RBC # BLD AUTO: 3.47 M/UL (ref 4.1–5.7)
RBC MORPH BLD: ABNORMAL
TIBC SERPL-MCNC: 306 UG/DL (ref 250–450)
WBC # BLD AUTO: 3.8 K/UL (ref 4.1–11.1)

## 2024-04-24 ENCOUNTER — HOSPITAL ENCOUNTER (OUTPATIENT)
Facility: HOSPITAL | Age: 84
Setting detail: INFUSION SERIES
Discharge: HOME | End: 2024-04-24
Payer: MEDICARE

## 2024-04-24 VITALS
BODY MASS INDEX: 23.86 KG/M2 | HEIGHT: 72 IN | TEMPERATURE: 97.9 F | RESPIRATION RATE: 16 BRPM | DIASTOLIC BLOOD PRESSURE: 75 MMHG | SYSTOLIC BLOOD PRESSURE: 120 MMHG | HEART RATE: 76 BPM | WEIGHT: 176.2 LBS | OXYGEN SATURATION: 100 %

## 2024-04-24 DIAGNOSIS — I78.0 HHT (HEREDITARY HEMORRHAGIC TELANGIECTASIA) (HCC): Primary | ICD-10-CM

## 2024-04-24 DIAGNOSIS — D50.9 IRON DEFICIENCY ANEMIA, UNSPECIFIED IRON DEFICIENCY ANEMIA TYPE: ICD-10-CM

## 2024-04-24 PROCEDURE — 96365 THER/PROPH/DIAG IV INF INIT: CPT

## 2024-04-24 PROCEDURE — 6360000002 HC RX W HCPCS: Performed by: NURSE PRACTITIONER

## 2024-04-24 PROCEDURE — 2580000003 HC RX 258: Performed by: NURSE PRACTITIONER

## 2024-04-24 RX ORDER — ACETAMINOPHEN 325 MG/1
650 TABLET ORAL
OUTPATIENT
Start: 2024-04-25

## 2024-04-24 RX ORDER — HEPARIN 100 UNIT/ML
500 SYRINGE INTRAVENOUS PRN
OUTPATIENT
Start: 2024-04-25

## 2024-04-24 RX ORDER — EPINEPHRINE 1 MG/ML
0.3 INJECTION, SOLUTION INTRAMUSCULAR; SUBCUTANEOUS PRN
OUTPATIENT
Start: 2024-04-25

## 2024-04-24 RX ORDER — DIPHENHYDRAMINE HYDROCHLORIDE 50 MG/ML
50 INJECTION INTRAMUSCULAR; INTRAVENOUS
OUTPATIENT
Start: 2024-04-25

## 2024-04-24 RX ORDER — SODIUM CHLORIDE 9 MG/ML
5-250 INJECTION, SOLUTION INTRAVENOUS PRN
OUTPATIENT
Start: 2024-04-25

## 2024-04-24 RX ORDER — ONDANSETRON 2 MG/ML
8 INJECTION INTRAMUSCULAR; INTRAVENOUS
OUTPATIENT
Start: 2024-04-25

## 2024-04-24 RX ORDER — SODIUM CHLORIDE 0.9 % (FLUSH) 0.9 %
5-40 SYRINGE (ML) INJECTION PRN
OUTPATIENT
Start: 2024-04-25

## 2024-04-24 RX ORDER — FAMOTIDINE 10 MG/ML
20 INJECTION, SOLUTION INTRAVENOUS
OUTPATIENT
Start: 2024-04-25

## 2024-04-24 RX ORDER — ALBUTEROL SULFATE 90 UG/1
4 AEROSOL, METERED RESPIRATORY (INHALATION) PRN
OUTPATIENT
Start: 2024-04-25

## 2024-04-24 RX ORDER — SODIUM CHLORIDE 9 MG/ML
5-250 INJECTION, SOLUTION INTRAVENOUS PRN
Status: DISCONTINUED | OUTPATIENT
Start: 2024-04-24 | End: 2024-04-25 | Stop reason: HOSPADM

## 2024-04-24 RX ORDER — SODIUM CHLORIDE 9 MG/ML
5-250 INJECTION, SOLUTION INTRAVENOUS PRN
Status: CANCELLED | OUTPATIENT
Start: 2024-04-25

## 2024-04-24 RX ORDER — SODIUM CHLORIDE 9 MG/ML
INJECTION, SOLUTION INTRAVENOUS CONTINUOUS
OUTPATIENT
Start: 2024-04-25

## 2024-04-24 RX ADMIN — FERRIC CARBOXYMALTOSE INJECTION 750 MG: 50 INJECTION, SOLUTION INTRAVENOUS at 09:40

## 2024-04-24 RX ADMIN — SODIUM CHLORIDE 25 ML/HR: 9 INJECTION, SOLUTION INTRAVENOUS at 09:40

## 2024-04-24 ASSESSMENT — PAIN SCALES - GENERAL: PAINLEVEL_OUTOF10: 0

## 2024-04-24 NOTE — PROGRESS NOTES
[facility] Progress Note    Date: April 24, 2024        0900: Mr. Amilcar Ramirez arrived ambulatory and in no distress for Injectafer Infusion. Assessment was completed, no acute issues or new complaints at this time. 24g PIV established to left arm without difficulty, positive blood return noted.    Mr. Amilcar Ramirez's vitals were reviewed.  Patient Vitals for the past 12 hrs:   Temp Pulse Resp BP SpO2   04/24/24 1000 -- 76 -- 120/75 --   04/24/24 0903 97.9 °F (36.6 °C) 85 16 99/63 100 %         Medications given.  Medications Administered         0.9 % sodium chloride infusion Admin Date  04/24/2024 Action  New Bag Dose  25 mL/hr Rate  25 mL/hr Route  IntraVENous Administered By  Swetha Brewer RN        ferric carboxymaltose (INJECTAFER) 750 mg in sodium chloride 0.9 % 250 mL IVPB Admin Date  04/24/2024 Action  New Bag Dose  750 mg Rate  870 mL/hr Route  IntraVENous Administered By  Swetha Brewer, RN              1015: Patient tolerated treatment well and was discharged from [facility] in stable condition.  PIV flushed and removed. Patient is aware of next scheduled [facility] appointment on 06/05/24.      Swetha Brewer RN  April 24, 2024

## 2024-05-06 DIAGNOSIS — D50.0 IRON DEFICIENCY ANEMIA SECONDARY TO BLOOD LOSS (CHRONIC): ICD-10-CM

## 2024-05-07 LAB
BASOPHILS # BLD: 0 K/UL (ref 0–0.1)
BASOPHILS NFR BLD: 1 % (ref 0–1)
DIFFERENTIAL METHOD BLD: ABNORMAL
EOSINOPHIL # BLD: 0.2 K/UL (ref 0–0.4)
EOSINOPHIL NFR BLD: 6 % (ref 0–7)
ERYTHROCYTE [DISTWIDTH] IN BLOOD BY AUTOMATED COUNT: 15.4 % (ref 11.5–14.5)
FERRITIN SERPL-MCNC: 252 NG/ML (ref 26–388)
HCT VFR BLD AUTO: 31.8 % (ref 36.6–50.3)
HGB BLD-MCNC: 10.2 G/DL (ref 12.1–17)
IMM GRANULOCYTES # BLD AUTO: 0 K/UL (ref 0–0.04)
IMM GRANULOCYTES NFR BLD AUTO: 0 % (ref 0–0.5)
IRON SATN MFR SERPL: 37 % (ref 20–50)
IRON SERPL-MCNC: 93 UG/DL (ref 35–150)
LYMPHOCYTES # BLD: 0.5 K/UL (ref 0.8–3.5)
LYMPHOCYTES NFR BLD: 14 % (ref 12–49)
MCH RBC QN AUTO: 31.8 PG (ref 26–34)
MCHC RBC AUTO-ENTMCNC: 32.1 G/DL (ref 30–36.5)
MCV RBC AUTO: 99.1 FL (ref 80–99)
MONOCYTES # BLD: 0.5 K/UL (ref 0–1)
MONOCYTES NFR BLD: 16 % (ref 5–13)
NEUTS SEG # BLD: 2.1 K/UL (ref 1.8–8)
NEUTS SEG NFR BLD: 63 % (ref 32–75)
NRBC # BLD: 0 K/UL (ref 0–0.01)
NRBC BLD-RTO: 0 PER 100 WBC
PLATELET # BLD AUTO: 155 K/UL (ref 150–400)
PMV BLD AUTO: 10.5 FL (ref 8.9–12.9)
RBC # BLD AUTO: 3.21 M/UL (ref 4.1–5.7)
RBC MORPH BLD: ABNORMAL
TIBC SERPL-MCNC: 250 UG/DL (ref 250–450)
WBC # BLD AUTO: 3.3 K/UL (ref 4.1–11.1)

## 2024-05-09 ENCOUNTER — OFFICE VISIT (OUTPATIENT)
Age: 84
End: 2024-05-09
Payer: MEDICARE

## 2024-05-09 VITALS
RESPIRATION RATE: 16 BRPM | DIASTOLIC BLOOD PRESSURE: 61 MMHG | BODY MASS INDEX: 24.55 KG/M2 | HEART RATE: 84 BPM | WEIGHT: 181 LBS | OXYGEN SATURATION: 99 % | TEMPERATURE: 97.8 F | SYSTOLIC BLOOD PRESSURE: 120 MMHG

## 2024-05-09 DIAGNOSIS — D50.0 IRON DEFICIENCY ANEMIA SECONDARY TO BLOOD LOSS (CHRONIC): Primary | ICD-10-CM

## 2024-05-09 DIAGNOSIS — I78.0 HHT (HEREDITARY HEMORRHAGIC TELANGIECTASIA) (HCC): ICD-10-CM

## 2024-05-09 DIAGNOSIS — R04.0 RECURRENT EPISTAXIS: ICD-10-CM

## 2024-05-09 DIAGNOSIS — C61 PROSTATE CANCER (HCC): ICD-10-CM

## 2024-05-09 PROCEDURE — 99214 OFFICE O/P EST MOD 30 MIN: CPT | Performed by: INTERNAL MEDICINE

## 2024-05-09 NOTE — PROGRESS NOTES
Williams Fitzgerald Jr is a 84 y.o. male who is seen today for evaluation of anemia, HHT.     1. Have you been to the ER, urgent care clinic since your last visit?  Hospitalized since your last visit?no    2. Have you seen or consulted any other health care providers outside of the [facility] since your last visit?  Include any pap smears or colon screening. no    
7/2023.      Plan:     Continue Injectafer 750mg IV every 6 weeks  Continue PO iron as tolerated  Follow up with [facility], ENT  Referral to hematology at [facility]  Labs prior to each iron infusion: CBC, iron profile, ferritin  Return to see me in 3-4 months      Signed By: Geoff Auguste MD

## 2024-05-15 ENCOUNTER — HOSPITAL ENCOUNTER (EMERGENCY)
Facility: HOSPITAL | Age: 84
Discharge: HOME | End: 2024-05-15
Attending: EMERGENCY MEDICINE
Payer: MEDICARE

## 2024-05-15 VITALS
HEART RATE: 61 BPM | DIASTOLIC BLOOD PRESSURE: 78 MMHG | WEIGHT: 182 LBS | SYSTOLIC BLOOD PRESSURE: 110 MMHG | BODY MASS INDEX: 24.65 KG/M2 | OXYGEN SATURATION: 99 % | TEMPERATURE: 98.2 F | RESPIRATION RATE: 18 BRPM | HEIGHT: 72 IN

## 2024-05-15 DIAGNOSIS — R04.0 EPISTAXIS: Primary | ICD-10-CM

## 2024-05-15 LAB
ALBUMIN SERPL-MCNC: 3.4 G/DL (ref 3.5–5)
ALBUMIN/GLOB SERPL: 1.1 (ref 1.1–2.2)
ALP SERPL-CCNC: 87 U/L (ref 45–117)
ALT SERPL-CCNC: 23 U/L (ref 12–78)
ANION GAP SERPL CALC-SCNC: 0 MMOL/L (ref 5–15)
AST SERPL-CCNC: 16 U/L (ref 15–37)
BASOPHILS # BLD: 0 K/UL (ref 0–0.1)
BASOPHILS NFR BLD: 1 % (ref 0–1)
BILIRUB SERPL-MCNC: 0.3 MG/DL (ref 0.2–1)
BUN SERPL-MCNC: 18 MG/DL (ref 6–20)
BUN/CREAT SERPL: 20 (ref 12–20)
CALCIUM SERPL-MCNC: 9 MG/DL (ref 8.5–10.1)
CHLORIDE SERPL-SCNC: 111 MMOL/L (ref 97–108)
CO2 SERPL-SCNC: 31 MMOL/L (ref 21–32)
CREAT SERPL-MCNC: 0.88 MG/DL (ref 0.7–1.3)
DIFFERENTIAL METHOD BLD: ABNORMAL
EOSINOPHIL # BLD: 0.2 K/UL (ref 0–0.4)
EOSINOPHIL NFR BLD: 6 % (ref 0–7)
ERYTHROCYTE [DISTWIDTH] IN BLOOD BY AUTOMATED COUNT: 15.2 % (ref 11.5–14.5)
GLOBULIN SER CALC-MCNC: 3 G/DL (ref 2–4)
GLUCOSE SERPL-MCNC: 104 MG/DL (ref 65–100)
HCT VFR BLD AUTO: 32.7 % (ref 36.6–50.3)
HGB BLD-MCNC: 10.3 G/DL (ref 12.1–17)
IMM GRANULOCYTES # BLD AUTO: 0 K/UL (ref 0–0.04)
IMM GRANULOCYTES NFR BLD AUTO: 0 % (ref 0–0.5)
LYMPHOCYTES # BLD: 0.4 K/UL (ref 0.8–3.5)
LYMPHOCYTES NFR BLD: 10 % (ref 12–49)
MCH RBC QN AUTO: 31.3 PG (ref 26–34)
MCHC RBC AUTO-ENTMCNC: 31.5 G/DL (ref 30–36.5)
MCV RBC AUTO: 99.4 FL (ref 80–99)
MONOCYTES # BLD: 0.5 K/UL (ref 0–1)
MONOCYTES NFR BLD: 13 % (ref 5–13)
NEUTS SEG # BLD: 2.4 K/UL (ref 1.8–8)
NEUTS SEG NFR BLD: 70 % (ref 32–75)
NRBC # BLD: 0 K/UL (ref 0–0.01)
NRBC BLD-RTO: 0 PER 100 WBC
PLATELET # BLD AUTO: 155 K/UL (ref 150–400)
PMV BLD AUTO: 10.3 FL (ref 8.9–12.9)
POTASSIUM SERPL-SCNC: 4.2 MMOL/L (ref 3.5–5.1)
PROT SERPL-MCNC: 6.4 G/DL (ref 6.4–8.2)
RBC # BLD AUTO: 3.29 M/UL (ref 4.1–5.7)
RBC MORPH BLD: ABNORMAL
RBC MORPH BLD: ABNORMAL
SODIUM SERPL-SCNC: 142 MMOL/L (ref 136–145)
WBC # BLD AUTO: 3.5 K/UL (ref 4.1–11.1)

## 2024-05-15 PROCEDURE — 80053 COMPREHEN METABOLIC PANEL: CPT

## 2024-05-15 PROCEDURE — 30901 CONTROL OF NOSEBLEED: CPT

## 2024-05-15 PROCEDURE — 85025 COMPLETE CBC W/AUTO DIFF WBC: CPT

## 2024-05-15 PROCEDURE — 36415 COLL VENOUS BLD VENIPUNCTURE: CPT

## 2024-05-15 PROCEDURE — 99283 EMERGENCY DEPT VISIT LOW MDM: CPT

## 2024-05-15 ASSESSMENT — ENCOUNTER SYMPTOMS
ABDOMINAL DISTENTION: 0
EYE REDNESS: 0
SINUS PRESSURE: 0
SINUS PAIN: 0
COUGH: 0
TROUBLE SWALLOWING: 0
COLOR CHANGE: 0
NAUSEA: 0
SORE THROAT: 0

## 2024-05-15 ASSESSMENT — PAIN - FUNCTIONAL ASSESSMENT: PAIN_FUNCTIONAL_ASSESSMENT: 0-10

## 2024-05-15 ASSESSMENT — PAIN SCALES - GENERAL: PAINLEVEL_OUTOF10: 0

## 2024-05-15 NOTE — ED PROVIDER NOTES
[facility] EMERGENCY DEPT  EMERGENCY DEPARTMENT ENCOUNTER      Pt Name: Williams Fitzgerald Jr  MRN: [id number]  Birthdate 1940  Date of evaluation: 5/15/2024  Provider: MARTÍNEZ Barroso NP      HISTORY OF PRESENT ILLNESS      This is an 84-year-old male who presents ambulatory to the emergency room with complaints of a nosebleed.Patient states this started last night.  Patient has been applying pressure and has tried to tamponade his nose by placing tissue paper.  Patient has had to change them multiple times secondary to soaking.  Patient is denying any trauma to his nose.  Denies any chest pain, shortness of breath, dizziness, nausea or vomiting, fevers or chills.  No increased fatigue.  States this is intermittently been happening over the past 3 weeks.  Has not seen his PCP.  Is not on any blood thinners.  There are no further complaints.    Past Medical History:  No date: Cancer (HCC)  No date: HHT (hereditary hemorrhagic telangiectasia) (HCC)  No date: Prostate cancer (HCC)  Past Surgical History:  No date: APPENDECTOMY  No date: CHEST SURGERY      Comment:  excision of AVM  1/29/2020: COLONOSCOPY; N/A      Comment:  COLONOSCOPY performed by [facility]as [facility] MD at [facility]                ENDOSCOPY  2014: COLONOSCOPY      Comment:  due 18 07/2018: OTHER SURGICAL HISTORY      Comment:  sclerotheropy  No date: TONSILLECTOMY      The history is provided by the patient.           Nursing Notes were reviewed.    REVIEW OF SYSTEMS         Review of Systems   Constitutional:  Negative for appetite change, chills, diaphoresis and fatigue.   HENT:  Positive for nosebleeds. Negative for congestion, sinus pressure, sinus pain, sore throat and trouble swallowing.    Eyes:  Negative for pain and redness.   Respiratory:  Negative for cough and shortness of breath.    Cardiovascular:  Negative for chest pain and palpitations.   Gastrointestinal:  Negative for abdominal distention, abdominal pain, nausea and vomiting.

## 2024-05-15 NOTE — ED TRIAGE NOTES
Patient to ER for complaints of nose bleed starting last night.     Patient denies known injury/trauma.     Denies blood thinner use.     HX of HHT    Provider in triage to assess patient.

## 2024-05-20 ENCOUNTER — HOSPITAL ENCOUNTER (EMERGENCY)
Facility: HOSPITAL | Age: 84
Discharge: HOME | End: 2024-05-20
Attending: EMERGENCY MEDICINE
Payer: MEDICARE

## 2024-05-20 VITALS
OXYGEN SATURATION: 100 % | SYSTOLIC BLOOD PRESSURE: 141 MMHG | TEMPERATURE: 98.4 F | HEIGHT: 72 IN | RESPIRATION RATE: 18 BRPM | HEART RATE: 50 BPM | WEIGHT: 182 LBS | DIASTOLIC BLOOD PRESSURE: 77 MMHG | BODY MASS INDEX: 24.65 KG/M2

## 2024-05-20 DIAGNOSIS — I78.0 HHT (HEREDITARY HEMORRHAGIC TELANGIECTASIA) (HCC): ICD-10-CM

## 2024-05-20 DIAGNOSIS — R04.0 EPISTAXIS: Primary | ICD-10-CM

## 2024-05-20 PROCEDURE — 94761 N-INVAS EAR/PLS OXIMETRY MLT: CPT

## 2024-05-20 PROCEDURE — 2500000003 HC RX 250 WO HCPCS: Performed by: EMERGENCY MEDICINE

## 2024-05-20 PROCEDURE — 30901 CONTROL OF NOSEBLEED: CPT

## 2024-05-20 PROCEDURE — 6370000000 HC RX 637 (ALT 250 FOR IP): Performed by: EMERGENCY MEDICINE

## 2024-05-20 PROCEDURE — 99283 EMERGENCY DEPT VISIT LOW MDM: CPT

## 2024-05-20 RX ORDER — LIDOCAINE HYDROCHLORIDE AND EPINEPHRINE 15; 5 MG/ML; UG/ML
5 INJECTION, SOLUTION EPIDURAL ONCE
Status: COMPLETED | OUTPATIENT
Start: 2024-05-20 | End: 2024-05-20

## 2024-05-20 RX ORDER — AMOXICILLIN 875 MG/1
875 TABLET, COATED ORAL 2 TIMES DAILY
Qty: 20 TABLET | Refills: 0 | Status: SHIPPED | OUTPATIENT
Start: 2024-05-20 | End: 2024-05-30

## 2024-05-20 RX ORDER — OXYMETAZOLINE HYDROCHLORIDE 0.05 G/100ML
2 SPRAY NASAL
Status: COMPLETED | OUTPATIENT
Start: 2024-05-20 | End: 2024-05-20

## 2024-05-20 RX ADMIN — LIDOCAINE HYDROCHLORIDE,EPINEPHRINE BITARTRATE 5 ML: 15; .005 INJECTION, SOLUTION EPIDURAL; INFILTRATION; INTRACAUDAL; PERINEURAL at 12:10

## 2024-05-20 RX ADMIN — NASAL DECONGESTANT 2 SPRAY: 0.05 SPRAY NASAL at 12:10

## 2024-05-20 ASSESSMENT — ENCOUNTER SYMPTOMS
SHORTNESS OF BREATH: 0
CONSTIPATION: 0
SORE THROAT: 0

## 2024-05-20 ASSESSMENT — PAIN - FUNCTIONAL ASSESSMENT: PAIN_FUNCTIONAL_ASSESSMENT: NONE - DENIES PAIN

## 2024-05-20 NOTE — ED TRIAGE NOTES
Patient arrives to the ED for complaints of a nose bleed for the past couple days. States this has been an ongoing problem. No other complaints.     Denies blood thinners, pain.     PM HHT.

## 2024-05-20 NOTE — ED PROVIDER NOTES
Inhale 2 puffs into the lungs 4 times daily as needed for Wheezing    ASCORBIC ACID (VITAMIN C) 250 MG TABLET    Take by mouth    DICYCLOMINE (BENTYL) 20 MG TABLET    TAKE 1 TABLET BY MOUTH 3 TIMES DAILY AS NEEDED FOR ABDOMINAL CRAMPS (NEW DOSE/NEW DIRECTIONS)    TRANEXAMIC ACID (LYSTEDA) 650 MG TABS TABLET    Take by mouth 3 times daily    VITAMIN D 25 MCG (1000 UT) CAPS    Take by mouth daily       ALLERGIES     Patient has no known allergies.    FAMILY HISTORY       Family History   Problem Relation Age of Onset    Cancer Other         colon, lung    Heart Disease Mother           SOCIAL HISTORY       Social History     Socioeconomic History    Marital status:    Tobacco Use    Smoking status: Former     Passive exposure: Never    Smokeless tobacco: Never   Vaping Use    Vaping Use: Never used   Substance and Sexual Activity    Alcohol use: Yes     Alcohol/week: 0.0 standard drinks of alcohol    Drug use: No    Sexual activity: Not Currently           PHYSICAL EXAM    (up to 7 for level 4, 8 or more for level 5)     ED Triage Vitals   BP Temp Temp src Pulse Respirations SpO2 Height Weight - Scale   05/20/24 1144 05/20/24 1144 -- 05/20/24 1144 05/20/24 1144 05/20/24 1144 05/20/24 1146 05/20/24 1146   (!) 141/77 98.4 °F (36.9 °C)  50 18 100 % 1.829 m (6') 82.6 kg (182 lb)       Body mass index is 24.68 kg/m².    Physical Exam  Constitutional:       Appearance: He is not ill-appearing.   HENT:      Head: Normocephalic.      Mouth/Throat:      Mouth: Mucous membranes are moist.   Eyes:      General: No scleral icterus.  Cardiovascular:      Rate and Rhythm: Normal rate.   Pulmonary:      Effort: Pulmonary effort is normal.   Abdominal:      General: There is no distension.   Musculoskeletal:         General: No deformity.      Cervical back: Neck supple.   Skin:     General: Skin is warm.   Neurological:      General: No focal deficit present.      Mental Status: He is alert and oriented to person, place, and

## 2024-05-28 ENCOUNTER — TELEPHONE (OUTPATIENT)
Age: 84
End: 2024-05-28

## 2024-05-28 NOTE — TELEPHONE ENCOUNTER
Patient called and stated he's had a lot of nose bleeds and wants to know if he can get his labs done sooner. CB#[phone]

## 2024-05-28 NOTE — TELEPHONE ENCOUNTER
[facility] [place] at [facility]  [phone]    05/28/24- Patient reports 2 ED visits in the last couple weeks due to severe epistaxis.  Discussed with Mary Hudson, there are standing lab orders in the system if he would like to have labs checked.  Patient is already scheduled for injectafer next week so lab results wouldn't change that plan.  Patient verbalized understanding, stated he would like to see what his Hgb is after having the bleeds.  Okay to proceed with labs and will notify him of results.  No further questions or concerns at this time.

## 2024-05-31 ENCOUNTER — TELEPHONE (OUTPATIENT)
Age: 84
End: 2024-05-31

## 2024-05-31 DIAGNOSIS — D50.0 IRON DEFICIENCY ANEMIA SECONDARY TO BLOOD LOSS (CHRONIC): ICD-10-CM

## 2024-05-31 LAB
BASOPHILS # BLD: 0 K/UL (ref 0–0.1)
BASOPHILS NFR BLD: 1 % (ref 0–1)
DIFFERENTIAL METHOD BLD: ABNORMAL
EOSINOPHIL # BLD: 0.1 K/UL (ref 0–0.4)
EOSINOPHIL NFR BLD: 3 % (ref 0–7)
ERYTHROCYTE [DISTWIDTH] IN BLOOD BY AUTOMATED COUNT: 14.4 % (ref 11.5–14.5)
FERRITIN SERPL-MCNC: 41 NG/ML (ref 26–388)
HCT VFR BLD AUTO: 29.4 % (ref 36.6–50.3)
HGB BLD-MCNC: 9.5 G/DL (ref 12.1–17)
IMM GRANULOCYTES # BLD AUTO: 0 K/UL (ref 0–0.04)
IMM GRANULOCYTES NFR BLD AUTO: 0 % (ref 0–0.5)
IRON SATN MFR SERPL: 24 % (ref 20–50)
IRON SERPL-MCNC: 76 UG/DL (ref 35–150)
LYMPHOCYTES # BLD: 0.4 K/UL (ref 0.8–3.5)
LYMPHOCYTES NFR BLD: 12 % (ref 12–49)
MCH RBC QN AUTO: 31.1 PG (ref 26–34)
MCHC RBC AUTO-ENTMCNC: 32.3 G/DL (ref 30–36.5)
MCV RBC AUTO: 96.4 FL (ref 80–99)
MONOCYTES # BLD: 0.4 K/UL (ref 0–1)
MONOCYTES NFR BLD: 13 % (ref 5–13)
NEUTS SEG # BLD: 2.3 K/UL (ref 1.8–8)
NEUTS SEG NFR BLD: 71 % (ref 32–75)
NRBC # BLD: 0 K/UL (ref 0–0.01)
NRBC BLD-RTO: 0 PER 100 WBC
PLATELET # BLD AUTO: 218 K/UL (ref 150–400)
PMV BLD AUTO: 10.3 FL (ref 8.9–12.9)
RBC # BLD AUTO: 3.05 M/UL (ref 4.1–5.7)
RBC MORPH BLD: ABNORMAL
TIBC SERPL-MCNC: 314 UG/DL (ref 250–450)
WBC # BLD AUTO: 3.2 K/UL (ref 4.1–11.1)

## 2024-05-31 NOTE — TELEPHONE ENCOUNTER
[facility] [place] at [facility]  [phone]    I received a call from Dr. Mark Chavez, patient's pulmonologist and HHT physician from [place], [place]. He is recommending that we proceed with Avastin therapy to help better control the patient's HHT and reduce his epistaxis.  He separately sent an email with their recommended protocol and references.    Protocol:  Induction: Bevacizumab 5 mg/kg iv q 2 weeks x 6 doses.  Note the highest Hg near the end and soon after the induction phase.  Monitor Hg and ferritin every 2-4 weeks.  The target is a normal Hg, but usually we will accept >10-11.  When Hg drops more than 1.5 gm below the highest Hg in step 2 above, give one maintenance infusion of 5 mg/kg.  The time between step 1 and 4 becomes the new infusion frequency.  Most people will need a maintenance infusion every 3-9 months but some will need it as often as every 2-8 weeks.  If not meeting goals, consider increasing bevacizumab dose to 7.5 mg/kg, or switching to another therapy such as pazopanib  mg daily or pomalidomide 2-4 mg daily.      I called the patient to discuss, no answer, left message for him to call me back.      ADDENDUM  He returned my call and we discussed the above. We discussed the option of proceeding with Avastin now based on Dr. Chavez's recommendation, or awaiting his hematology consultation at [facility] which is scheduled for August. He would like to proceed now, we will work on the authorization and get him for a follow up appointment to discuss this further.

## 2024-06-03 DIAGNOSIS — I78.0 HHT (HEREDITARY HEMORRHAGIC TELANGIECTASIA) (HCC): Primary | ICD-10-CM

## 2024-06-03 RX ORDER — HEPARIN 100 UNIT/ML
500 SYRINGE INTRAVENOUS PRN
OUTPATIENT
Start: 2024-06-12

## 2024-06-03 RX ORDER — MEPERIDINE HYDROCHLORIDE 25 MG/ML
12.5 INJECTION INTRAMUSCULAR; INTRAVENOUS; SUBCUTANEOUS PRN
Status: CANCELLED | OUTPATIENT
Start: 2024-06-12

## 2024-06-03 RX ORDER — ALBUTEROL SULFATE 90 UG/1
4 AEROSOL, METERED RESPIRATORY (INHALATION) PRN
OUTPATIENT
Start: 2024-06-12

## 2024-06-03 RX ORDER — ONDANSETRON 2 MG/ML
8 INJECTION INTRAMUSCULAR; INTRAVENOUS
OUTPATIENT
Start: 2024-06-12

## 2024-06-03 RX ORDER — SODIUM CHLORIDE 0.9 % (FLUSH) 0.9 %
5-40 SYRINGE (ML) INJECTION PRN
OUTPATIENT
Start: 2024-06-12

## 2024-06-03 RX ORDER — ACETAMINOPHEN 325 MG/1
650 TABLET ORAL
OUTPATIENT
Start: 2024-06-12

## 2024-06-03 RX ORDER — SODIUM CHLORIDE 9 MG/ML
5-250 INJECTION, SOLUTION INTRAVENOUS PRN
OUTPATIENT
Start: 2024-06-12

## 2024-06-03 RX ORDER — EPINEPHRINE 1 MG/ML
0.3 INJECTION, SOLUTION, CONCENTRATE INTRAVENOUS PRN
OUTPATIENT
Start: 2024-06-12

## 2024-06-03 RX ORDER — DIPHENHYDRAMINE HYDROCHLORIDE 50 MG/ML
50 INJECTION INTRAMUSCULAR; INTRAVENOUS
OUTPATIENT
Start: 2024-06-12

## 2024-06-03 RX ORDER — SODIUM CHLORIDE 9 MG/ML
INJECTION, SOLUTION INTRAVENOUS CONTINUOUS
OUTPATIENT
Start: 2024-06-12

## 2024-06-05 ENCOUNTER — HOSPITAL ENCOUNTER (OUTPATIENT)
Facility: HOSPITAL | Age: 84
Setting detail: INFUSION SERIES
Discharge: HOME | End: 2024-06-05
Payer: MEDICARE

## 2024-06-05 VITALS
HEART RATE: 64 BPM | OXYGEN SATURATION: 98 % | RESPIRATION RATE: 18 BRPM | SYSTOLIC BLOOD PRESSURE: 102 MMHG | DIASTOLIC BLOOD PRESSURE: 57 MMHG | WEIGHT: 182.7 LBS | BODY MASS INDEX: 24.75 KG/M2 | TEMPERATURE: 97.6 F | HEIGHT: 72 IN

## 2024-06-05 DIAGNOSIS — D50.9 IRON DEFICIENCY ANEMIA, UNSPECIFIED IRON DEFICIENCY ANEMIA TYPE: ICD-10-CM

## 2024-06-05 DIAGNOSIS — I78.0 HHT (HEREDITARY HEMORRHAGIC TELANGIECTASIA) (HCC): Primary | ICD-10-CM

## 2024-06-05 PROCEDURE — 96365 THER/PROPH/DIAG IV INF INIT: CPT

## 2024-06-05 PROCEDURE — 6360000002 HC RX W HCPCS: Performed by: NURSE PRACTITIONER

## 2024-06-05 PROCEDURE — 2580000003 HC RX 258: Performed by: NURSE PRACTITIONER

## 2024-06-05 RX ORDER — SODIUM CHLORIDE 9 MG/ML
INJECTION, SOLUTION INTRAVENOUS CONTINUOUS
OUTPATIENT
Start: 2024-07-17

## 2024-06-05 RX ORDER — ALBUTEROL SULFATE 90 UG/1
4 AEROSOL, METERED RESPIRATORY (INHALATION) PRN
OUTPATIENT
Start: 2024-07-17

## 2024-06-05 RX ORDER — ONDANSETRON 2 MG/ML
8 INJECTION INTRAMUSCULAR; INTRAVENOUS
OUTPATIENT
Start: 2024-07-17

## 2024-06-05 RX ORDER — FAMOTIDINE 10 MG/ML
20 INJECTION, SOLUTION INTRAVENOUS
OUTPATIENT
Start: 2024-07-17

## 2024-06-05 RX ORDER — SODIUM CHLORIDE 9 MG/ML
5-250 INJECTION, SOLUTION INTRAVENOUS PRN
OUTPATIENT
Start: 2024-07-17

## 2024-06-05 RX ORDER — SODIUM CHLORIDE 9 MG/ML
5-250 INJECTION, SOLUTION INTRAVENOUS PRN
Status: DISCONTINUED | OUTPATIENT
Start: 2024-06-05 | End: 2024-06-06 | Stop reason: HOSPADM

## 2024-06-05 RX ORDER — EPINEPHRINE 1 MG/ML
0.3 INJECTION, SOLUTION INTRAMUSCULAR; SUBCUTANEOUS PRN
OUTPATIENT
Start: 2024-07-17

## 2024-06-05 RX ORDER — DIPHENHYDRAMINE HYDROCHLORIDE 50 MG/ML
50 INJECTION INTRAMUSCULAR; INTRAVENOUS
OUTPATIENT
Start: 2024-07-17

## 2024-06-05 RX ORDER — ACETAMINOPHEN 325 MG/1
650 TABLET ORAL
OUTPATIENT
Start: 2024-07-17

## 2024-06-05 RX ORDER — SODIUM CHLORIDE 0.9 % (FLUSH) 0.9 %
5-40 SYRINGE (ML) INJECTION PRN
OUTPATIENT
Start: 2024-07-17

## 2024-06-05 RX ORDER — HEPARIN 100 UNIT/ML
500 SYRINGE INTRAVENOUS PRN
OUTPATIENT
Start: 2024-07-17

## 2024-06-05 RX ADMIN — SODIUM CHLORIDE 25 ML/HR: 9 INJECTION, SOLUTION INTRAVENOUS at 09:39

## 2024-06-05 RX ADMIN — FERRIC CARBOXYMALTOSE INJECTION 750 MG: 50 INJECTION, SOLUTION INTRAVENOUS at 09:40

## 2024-06-05 ASSESSMENT — PAIN SCALES - GENERAL: PAINLEVEL_OUTOF10: 0

## 2024-06-05 NOTE — PROGRESS NOTES
[facility] Progress Note    Date: June 5, 2024        0900: Mr. Amilcar Ramirez arrived ambulatory and in no distress for Injectafer Infusion. Assessment was completed, pt reports he fatigues easily. 24g PIV established to left arm without difficulty, positive blood return noted.      Mr. Amilcar Ramirez's vitals were reviewed.  Patient Vitals for the past 12 hrs:   Temp Pulse Resp BP SpO2   06/05/24 1005 -- 64 -- (!) 102/57 --   06/05/24 0902 97.6 °F (36.4 °C) 86 18 103/75 98 %         Medications given.  Medications Administered         0.9 % sodium chloride infusion Admin Date  06/05/2024 Action  New Bag Dose  25 mL/hr Rate  25 mL/hr Route  IntraVENous Administered By  Swetha Brewer, RN        ferric carboxymaltose (INJECTAFER) 750 mg in sodium chloride 0.9 % 250 mL IVPB Admin Date  06/05/2024 Action  New Bag Dose  750 mg Rate  870 mL/hr Route  IntraVENous Administered By  Swetha Brewer, RN              1010: Patient tolerated treatment well and was discharged from [facility] in stable condition.  PIV flushed and removed. Patient is aware of next scheduled [facility] appointment on 06/12/24.      Swetha Brewer RN  June 5, 2024

## 2024-06-12 ENCOUNTER — OFFICE VISIT (OUTPATIENT)
Age: 84
End: 2024-06-12
Payer: MEDICARE

## 2024-06-12 VITALS
BODY MASS INDEX: 24.52 KG/M2 | SYSTOLIC BLOOD PRESSURE: 121 MMHG | DIASTOLIC BLOOD PRESSURE: 77 MMHG | TEMPERATURE: 98.2 F | HEART RATE: 77 BPM | RESPIRATION RATE: 18 BRPM | WEIGHT: 181 LBS | OXYGEN SATURATION: 100 % | HEIGHT: 72 IN

## 2024-06-12 DIAGNOSIS — I78.0 HHT (HEREDITARY HEMORRHAGIC TELANGIECTASIA) (HCC): ICD-10-CM

## 2024-06-12 DIAGNOSIS — D50.0 IRON DEFICIENCY ANEMIA DUE TO CHRONIC BLOOD LOSS: Primary | ICD-10-CM

## 2024-06-12 PROCEDURE — G8420 CALC BMI NORM PARAMETERS: HCPCS | Performed by: NURSE PRACTITIONER

## 2024-06-12 PROCEDURE — 99215 OFFICE O/P EST HI 40 MIN: CPT | Performed by: NURSE PRACTITIONER

## 2024-06-12 PROCEDURE — G8427 DOCREV CUR MEDS BY ELIG CLIN: HCPCS | Performed by: NURSE PRACTITIONER

## 2024-06-12 PROCEDURE — 1123F ACP DISCUSS/DSCN MKR DOCD: CPT | Performed by: NURSE PRACTITIONER

## 2024-06-12 PROCEDURE — 1036F TOBACCO NON-USER: CPT | Performed by: NURSE PRACTITIONER

## 2024-06-12 NOTE — PROGRESS NOTES
Cancer [place] at [facility]  [address], [address] [place] [address]  W: [phone]  F: [phone]      Reason for Visit:   Williams Fitzgerald Jr is a 84 y.o. male who is seen today for evaluation of anemia, HHT.    History of Present Illness:   Had a difficult bleeding episode about 3 weeks ago, was in the ED 3 different days due to bleeding. This has been more controlled lately.     He is taking Tranexamic acid three times daily. Taking oral iron once daily.     Review of systems was obtained and pertinent findings reviewed above. Past medical history, social history, family history, medications, and allergies are located in the electronic medical record.    Physical Exam:   Visit Vitals  /77 (Site: Right Upper Arm, Position: Sitting, Cuff Size: Large Adult)   Pulse 77   Temp 98.2 °F (36.8 °C) (Temporal)   Resp 18   Ht 1.829 m (6')   Wt 82.1 kg (181 lb)   SpO2 100%   BMI 24.55 kg/m²     General: no distress  Respiratory: normal respiratory effort  CV: no peripheral edema  Skin: no rashes; no ecchymoses; no petechiae    Results:     Lab Results   Component Value Date    WBC 3.2 (L) 05/31/2024    HGB 9.5 (L) 05/31/2024    HCT 29.4 (L) 05/31/2024     05/31/2024    MCV 96.4 05/31/2024    NEUTROABS 2.3 05/31/2024     Lab Results   Component Value Date     05/15/2024    K 4.2 05/15/2024     (H) 05/15/2024    CO2 31 05/15/2024    GLUCOSE 104 (H) 05/15/2024    BUN 18 05/15/2024    CREATININE 0.88 05/15/2024    LABGLOM 84 05/15/2024    CALCIUM 9.0 05/15/2024     Lab Results   Component Value Date    BILITOT 0.3 05/15/2024    ALT 23 05/15/2024    AST 16 05/15/2024    ALKPHOS 87 05/15/2024    GLOB 3.0 05/15/2024     Lab Results   Component Value Date    RETICCTPCT 1.3 02/24/2021    IRON 76 05/31/2024    TIBC 314 05/31/2024    IRONPERSAT 24 05/31/2024    FERRITIN 41 05/31/2024    [id number] 391 02/24/2021    FOLATE 14.0 02/24/2021     02/05/2021

## 2024-06-12 NOTE — PROGRESS NOTES
Williams Fitzgerald Jr is a 84 y.o. male follow up for anemia.      1. Have you been to the ER, urgent care clinic since your last visit?  Hospitalized since your last visit?no    2. Have you seen or consulted any other health care providers outside of the [facility] since your last visit?  Include any pap smears or colon screening. no

## 2024-06-26 ENCOUNTER — HOSPITAL ENCOUNTER (OUTPATIENT)
Facility: HOSPITAL | Age: 84
Setting detail: INFUSION SERIES
End: 2024-06-26

## 2024-07-04 ENCOUNTER — HOSPITAL ENCOUNTER (INPATIENT)
Facility: HOSPITAL | Age: 84
LOS: 1 days | Discharge: HOME | DRG: 812 | End: 2024-07-05
Attending: EMERGENCY MEDICINE | Admitting: STUDENT IN AN ORGANIZED HEALTH CARE EDUCATION/TRAINING PROGRAM
Payer: MEDICARE

## 2024-07-04 DIAGNOSIS — R04.0 EPISTAXIS: ICD-10-CM

## 2024-07-04 DIAGNOSIS — D62 ACUTE BLOOD LOSS ANEMIA: Primary | ICD-10-CM

## 2024-07-04 DIAGNOSIS — I48.0 PAROXYSMAL ATRIAL FIBRILLATION (HCC): ICD-10-CM

## 2024-07-04 PROCEDURE — 86923 COMPATIBILITY TEST ELECTRIC: CPT

## 2024-07-04 PROCEDURE — 85610 PROTHROMBIN TIME: CPT

## 2024-07-04 PROCEDURE — 86900 BLOOD TYPING SEROLOGIC ABO: CPT

## 2024-07-04 PROCEDURE — 86850 RBC ANTIBODY SCREEN: CPT

## 2024-07-04 PROCEDURE — 86901 BLOOD TYPING SEROLOGIC RH(D): CPT

## 2024-07-04 PROCEDURE — 85025 COMPLETE CBC W/AUTO DIFF WBC: CPT

## 2024-07-04 PROCEDURE — 96374 THER/PROPH/DIAG INJ IV PUSH: CPT

## 2024-07-04 PROCEDURE — 6370000000 HC RX 637 (ALT 250 FOR IP): Performed by: EMERGENCY MEDICINE

## 2024-07-04 PROCEDURE — 36415 COLL VENOUS BLD VENIPUNCTURE: CPT

## 2024-07-04 PROCEDURE — 80053 COMPREHEN METABOLIC PANEL: CPT

## 2024-07-04 PROCEDURE — 2500000003 HC RX 250 WO HCPCS

## 2024-07-04 PROCEDURE — 99285 EMERGENCY DEPT VISIT HI MDM: CPT

## 2024-07-04 PROCEDURE — 2500000003 HC RX 250 WO HCPCS: Performed by: EMERGENCY MEDICINE

## 2024-07-04 RX ORDER — TRANEXAMIC ACID 100 MG/ML
INJECTION, SOLUTION INTRAVENOUS
Status: COMPLETED
Start: 2024-07-04 | End: 2024-07-04

## 2024-07-04 RX ORDER — TRANEXAMIC ACID 10 MG/ML
1000 INJECTION, SOLUTION INTRAVENOUS
Status: ACTIVE | OUTPATIENT
Start: 2024-07-04 | End: 2024-07-05

## 2024-07-04 RX ORDER — 0.9 % SODIUM CHLORIDE 0.9 %
1000 INTRAVENOUS SOLUTION INTRAVENOUS ONCE
Status: COMPLETED | OUTPATIENT
Start: 2024-07-04 | End: 2024-07-05

## 2024-07-04 RX ORDER — OXYMETAZOLINE HYDROCHLORIDE 0.05 G/100ML
2 SPRAY NASAL
Status: COMPLETED | OUTPATIENT
Start: 2024-07-04 | End: 2024-07-04

## 2024-07-04 RX ORDER — TRANEXAMIC ACID 10 MG/ML
1000 INJECTION, SOLUTION INTRAVENOUS
Status: COMPLETED | OUTPATIENT
Start: 2024-07-04 | End: 2024-07-05

## 2024-07-04 RX ADMIN — NASAL DECONGESTANT 2 SPRAY: 0.05 SPRAY NASAL at 23:50

## 2024-07-04 RX ADMIN — TRANEXAMIC ACID 1000 MG: 10 INJECTION, SOLUTION INTRAVENOUS at 23:58

## 2024-07-04 RX ADMIN — TRANEXAMIC ACID 1000 MG: 100 INJECTION, SOLUTION INTRAVENOUS at 23:50

## 2024-07-04 ASSESSMENT — ENCOUNTER SYMPTOMS
ABDOMINAL PAIN: 0
RHINORRHEA: 0
BACK PAIN: 0
SHORTNESS OF BREATH: 0
COUGH: 0
SORE THROAT: 0
VOMITING: 0

## 2024-07-04 ASSESSMENT — PAIN - FUNCTIONAL ASSESSMENT: PAIN_FUNCTIONAL_ASSESSMENT: NONE - DENIES PAIN

## 2024-07-05 VITALS
HEIGHT: 74 IN | WEIGHT: 160 LBS | HEART RATE: 99 BPM | TEMPERATURE: 98.1 F | OXYGEN SATURATION: 94 % | BODY MASS INDEX: 20.53 KG/M2 | SYSTOLIC BLOOD PRESSURE: 111 MMHG | DIASTOLIC BLOOD PRESSURE: 65 MMHG | RESPIRATION RATE: 14 BRPM

## 2024-07-05 PROBLEM — D62 ACUTE BLOOD LOSS ANEMIA: Status: ACTIVE | Noted: 2024-07-05

## 2024-07-05 LAB
ALBUMIN SERPL-MCNC: 3 G/DL (ref 3.5–5)
ALBUMIN/GLOB SERPL: 1.3 (ref 1.1–2.2)
ALP SERPL-CCNC: 69 U/L (ref 45–117)
ALT SERPL-CCNC: 21 U/L (ref 12–78)
ANION GAP SERPL CALC-SCNC: 6 MMOL/L (ref 5–15)
AST SERPL-CCNC: 20 U/L (ref 15–37)
BASOPHILS # BLD: 0 K/UL (ref 0–0.1)
BASOPHILS NFR BLD: 1 % (ref 0–1)
BILIRUB SERPL-MCNC: 0.3 MG/DL (ref 0.2–1)
BUN SERPL-MCNC: 19 MG/DL (ref 6–20)
BUN/CREAT SERPL: 17 (ref 12–20)
CALCIUM SERPL-MCNC: 7.7 MG/DL (ref 8.5–10.1)
CHLORIDE SERPL-SCNC: 114 MMOL/L (ref 97–108)
CO2 SERPL-SCNC: 23 MMOL/L (ref 21–32)
CREAT SERPL-MCNC: 1.1 MG/DL (ref 0.7–1.3)
DIFFERENTIAL METHOD BLD: ABNORMAL
EKG DIAGNOSIS: NORMAL
EKG Q-T INTERVAL: 396 MS
EKG QRS DURATION: 108 MS
EKG QTC CALCULATION (BAZETT): 476 MS
EKG R AXIS: 64 DEGREES
EKG T AXIS: 23 DEGREES
EKG VENTRICULAR RATE: 87 BPM
EOSINOPHIL # BLD: 0.2 K/UL (ref 0–0.4)
EOSINOPHIL NFR BLD: 4 % (ref 0–7)
ERYTHROCYTE [DISTWIDTH] IN BLOOD BY AUTOMATED COUNT: 15.1 % (ref 11.5–14.5)
ERYTHROCYTE [DISTWIDTH] IN BLOOD BY AUTOMATED COUNT: 15.3 % (ref 11.5–14.5)
FERRITIN SERPL-MCNC: 37 NG/ML (ref 26–388)
GLOBULIN SER CALC-MCNC: 2.4 G/DL (ref 2–4)
GLUCOSE SERPL-MCNC: 252 MG/DL (ref 65–100)
HCT VFR BLD AUTO: 19.8 % (ref 36.6–50.3)
HCT VFR BLD AUTO: 22.8 % (ref 36.6–50.3)
HCT VFR BLD AUTO: 23.8 % (ref 36.6–50.3)
HGB BLD-MCNC: 6.2 G/DL (ref 12.1–17)
HGB BLD-MCNC: 7.1 G/DL (ref 12.1–17)
HGB BLD-MCNC: 7.5 G/DL (ref 12.1–17)
HISTORY CHECK: NORMAL
IMM GRANULOCYTES # BLD AUTO: 0 K/UL (ref 0–0.04)
IMM GRANULOCYTES NFR BLD AUTO: 0 % (ref 0–0.5)
INR PPP: 1.1 (ref 0.9–1.1)
IRON SATN MFR SERPL: 33 % (ref 20–50)
IRON SERPL-MCNC: 75 UG/DL (ref 35–150)
LYMPHOCYTES # BLD: 0.8 K/UL (ref 0.8–3.5)
LYMPHOCYTES NFR BLD: 20 % (ref 12–49)
MCH RBC QN AUTO: 31.4 PG (ref 26–34)
MCH RBC QN AUTO: 31.5 PG (ref 26–34)
MCHC RBC AUTO-ENTMCNC: 31.3 G/DL (ref 30–36.5)
MCHC RBC AUTO-ENTMCNC: 31.5 G/DL (ref 30–36.5)
MCV RBC AUTO: 100.5 FL (ref 80–99)
MCV RBC AUTO: 99.6 FL (ref 80–99)
MONOCYTES # BLD: 0.6 K/UL (ref 0–1)
MONOCYTES NFR BLD: 14 % (ref 5–13)
NEUTS SEG # BLD: 2.5 K/UL (ref 1.8–8)
NEUTS SEG NFR BLD: 61 % (ref 32–75)
NRBC # BLD: 0 K/UL (ref 0–0.01)
NRBC # BLD: 0 K/UL (ref 0–0.01)
NRBC BLD-RTO: 0 PER 100 WBC
NRBC BLD-RTO: 0 PER 100 WBC
PLATELET # BLD AUTO: 114 K/UL (ref 150–400)
PLATELET # BLD AUTO: 160 K/UL (ref 150–400)
PMV BLD AUTO: 10.2 FL (ref 8.9–12.9)
PMV BLD AUTO: 10.5 FL (ref 8.9–12.9)
POTASSIUM SERPL-SCNC: 3.5 MMOL/L (ref 3.5–5.1)
PROT SERPL-MCNC: 5.4 G/DL (ref 6.4–8.2)
PROTHROMBIN TIME: 11.3 SEC (ref 9–11.1)
RBC # BLD AUTO: 1.97 M/UL (ref 4.1–5.7)
RBC # BLD AUTO: 2.39 M/UL (ref 4.1–5.7)
RBC MORPH BLD: ABNORMAL
SODIUM SERPL-SCNC: 143 MMOL/L (ref 136–145)
TIBC SERPL-MCNC: 229 UG/DL (ref 250–450)
WBC # BLD AUTO: 4.1 K/UL (ref 4.1–11.1)
WBC # BLD AUTO: 5.7 K/UL (ref 4.1–11.1)

## 2024-07-05 PROCEDURE — 85014 HEMATOCRIT: CPT

## 2024-07-05 PROCEDURE — 2580000003 HC RX 258: Performed by: STUDENT IN AN ORGANIZED HEALTH CARE EDUCATION/TRAINING PROGRAM

## 2024-07-05 PROCEDURE — 36415 COLL VENOUS BLD VENIPUNCTURE: CPT

## 2024-07-05 PROCEDURE — 97161 PT EVAL LOW COMPLEX 20 MIN: CPT

## 2024-07-05 PROCEDURE — 97116 GAIT TRAINING THERAPY: CPT

## 2024-07-05 PROCEDURE — 93010 ELECTROCARDIOGRAM REPORT: CPT | Performed by: INTERNAL MEDICINE

## 2024-07-05 PROCEDURE — 97535 SELF CARE MNGMENT TRAINING: CPT

## 2024-07-05 PROCEDURE — 85018 HEMOGLOBIN: CPT

## 2024-07-05 PROCEDURE — 83550 IRON BINDING TEST: CPT

## 2024-07-05 PROCEDURE — 83540 ASSAY OF IRON: CPT

## 2024-07-05 PROCEDURE — 97530 THERAPEUTIC ACTIVITIES: CPT

## 2024-07-05 PROCEDURE — 30233N1 TRANSFUSION OF NONAUTOLOGOUS RED BLOOD CELLS INTO PERIPHERAL VEIN, PERCUTANEOUS APPROACH: ICD-10-PCS | Performed by: STUDENT IN AN ORGANIZED HEALTH CARE EDUCATION/TRAINING PROGRAM

## 2024-07-05 PROCEDURE — 94761 N-INVAS EAR/PLS OXIMETRY MLT: CPT

## 2024-07-05 PROCEDURE — 1100000000 HC RM PRIVATE

## 2024-07-05 PROCEDURE — P9016 RBC LEUKOCYTES REDUCED: HCPCS

## 2024-07-05 PROCEDURE — 93005 ELECTROCARDIOGRAM TRACING: CPT | Performed by: EMERGENCY MEDICINE

## 2024-07-05 PROCEDURE — 97165 OT EVAL LOW COMPLEX 30 MIN: CPT

## 2024-07-05 PROCEDURE — 82728 ASSAY OF FERRITIN: CPT

## 2024-07-05 PROCEDURE — 85027 COMPLETE CBC AUTOMATED: CPT

## 2024-07-05 PROCEDURE — 2580000003 HC RX 258: Performed by: EMERGENCY MEDICINE

## 2024-07-05 PROCEDURE — 36430 TRANSFUSION BLD/BLD COMPNT: CPT

## 2024-07-05 PROCEDURE — 99223 1ST HOSP IP/OBS HIGH 75: CPT | Performed by: INTERNAL MEDICINE

## 2024-07-05 PROCEDURE — 96361 HYDRATE IV INFUSION ADD-ON: CPT

## 2024-07-05 RX ORDER — ONDANSETRON 4 MG/1
4 TABLET, ORALLY DISINTEGRATING ORAL EVERY 8 HOURS PRN
Status: DISCONTINUED | OUTPATIENT
Start: 2024-07-05 | End: 2024-07-05 | Stop reason: HOSPADM

## 2024-07-05 RX ORDER — SODIUM CHLORIDE 0.9 % (FLUSH) 0.9 %
5-40 SYRINGE (ML) INJECTION EVERY 12 HOURS SCHEDULED
Status: DISCONTINUED | OUTPATIENT
Start: 2024-07-05 | End: 2024-07-05 | Stop reason: HOSPADM

## 2024-07-05 RX ORDER — SODIUM CHLORIDE 9 MG/ML
INJECTION, SOLUTION INTRAVENOUS PRN
Status: DISCONTINUED | OUTPATIENT
Start: 2024-07-05 | End: 2024-07-05 | Stop reason: HOSPADM

## 2024-07-05 RX ORDER — ONDANSETRON 2 MG/ML
4 INJECTION INTRAMUSCULAR; INTRAVENOUS EVERY 6 HOURS PRN
Status: DISCONTINUED | OUTPATIENT
Start: 2024-07-05 | End: 2024-07-05 | Stop reason: HOSPADM

## 2024-07-05 RX ORDER — MAGNESIUM SULFATE IN WATER 40 MG/ML
2000 INJECTION, SOLUTION INTRAVENOUS PRN
Status: DISCONTINUED | OUTPATIENT
Start: 2024-07-05 | End: 2024-07-05 | Stop reason: HOSPADM

## 2024-07-05 RX ORDER — POTASSIUM CHLORIDE 750 MG/1
40 TABLET, FILM COATED, EXTENDED RELEASE ORAL PRN
Status: DISCONTINUED | OUTPATIENT
Start: 2024-07-05 | End: 2024-07-05 | Stop reason: HOSPADM

## 2024-07-05 RX ORDER — ACETAMINOPHEN 650 MG/1
650 SUPPOSITORY RECTAL EVERY 6 HOURS PRN
Status: DISCONTINUED | OUTPATIENT
Start: 2024-07-05 | End: 2024-07-05 | Stop reason: HOSPADM

## 2024-07-05 RX ORDER — POLYETHYLENE GLYCOL 3350 17 G/17G
17 POWDER, FOR SOLUTION ORAL DAILY PRN
Status: DISCONTINUED | OUTPATIENT
Start: 2024-07-05 | End: 2024-07-05 | Stop reason: HOSPADM

## 2024-07-05 RX ORDER — POTASSIUM CHLORIDE 7.45 MG/ML
10 INJECTION INTRAVENOUS PRN
Status: DISCONTINUED | OUTPATIENT
Start: 2024-07-05 | End: 2024-07-05 | Stop reason: HOSPADM

## 2024-07-05 RX ORDER — SODIUM CHLORIDE 0.9 % (FLUSH) 0.9 %
5-40 SYRINGE (ML) INJECTION PRN
Status: DISCONTINUED | OUTPATIENT
Start: 2024-07-05 | End: 2024-07-05 | Stop reason: HOSPADM

## 2024-07-05 RX ORDER — ACETAMINOPHEN 325 MG/1
650 TABLET ORAL EVERY 6 HOURS PRN
Status: DISCONTINUED | OUTPATIENT
Start: 2024-07-05 | End: 2024-07-05 | Stop reason: HOSPADM

## 2024-07-05 RX ADMIN — SODIUM CHLORIDE, PRESERVATIVE FREE 10 ML: 5 INJECTION INTRAVENOUS at 09:33

## 2024-07-05 RX ADMIN — SODIUM CHLORIDE 1000 ML: 9 INJECTION, SOLUTION INTRAVENOUS at 01:30

## 2024-07-05 RX ADMIN — SODIUM CHLORIDE 1000 ML: 9 INJECTION, SOLUTION INTRAVENOUS at 00:01

## 2024-07-05 NOTE — ED NOTES
TRANSFER - OUT REPORT:    Verbal report given to RN on Williams Fitzgerald Jr  being transferred to 4th floor  for routine progression of patient care       Report consisted of patient's Situation, Background, Assessment and   Recommendations(SBAR).     Information from the following report(s) Nurse Handoff Report, ED Encounter Summary, and ED SBAR was reviewed with the receiving nurse.    [place] Fall Assessment:    Presents to emergency department  because of falls (Syncope, seizure, or loss of consciousness): No  Age > 70: Yes  Altered Mental Status, Intoxication with alcohol or substance confusion (Disorientation, impaired judgment, poor safety awaremess, or inability to follow instructions): No  Impaired Mobility: Ambulates or transfers with assistive devices or assistance; Unable to ambulate or transer.: No             Lines:   Peripheral IV 07/04/24 Proximal;Right;Anterior Forearm (Active)        Opportunity for questions and clarification was provided.      Patient transported with:  Tech

## 2024-07-05 NOTE — PROGRESS NOTES
PHYSICAL THERAPY EVALUATION/DISCHARGE    Patient: Williams Fitzgerald Jr (84 y.o. male)  Date: 7/5/2024  Primary Diagnosis: Epistaxis [R04.0]  Acute blood loss anemia [D62]       Precautions: Fall Risk                      ASSESSMENT AND RECOMMENDATIONS:  Based on the objective data below, the patient modified independent with ambulation without assistive device as well as up and down stairs and independent with all functional mobility. Noted during ambulation on the 4th floor longterm patient reached for the wall rails instructed patient if he feels like he is reaching for the furniture or rails more frequently Therapist suggested to use a single point cane for safety. Verbalized understanding no complains of any dizziness.  Reviewed all safety precaution and home exercise program with the patient, verbalized understanding, clear to go home per Physical Therapy perspective. Skilled physical therapy is not indicated at this time.     Functional Outcome Measure:  The patient scored 18/24 on the [facility] outcome measure .      Further skilled acute physical therapy is not indicated at this time.       PLAN :  Recommendation for discharge: (in order for the patient to meet his/her long term goals): No skilled physical therapy    Other factors to consider for discharge: no additional factors    IF patient discharges home will need the following DME: none       SUBJECTIVE:   Patient stated “ok ready to go home.”    OBJECTIVE DATA SUMMARY:     Past Medical History:   Diagnosis Date    Cancer (HCC)     HHT (hereditary hemorrhagic telangiectasia) (HCC)     Prostate cancer (HCC)      Past Surgical History:   Procedure Laterality Date    APPENDECTOMY      CHEST SURGERY      excision of AVM    COLONOSCOPY N/A 1/29/2020    COLONOSCOPY performed by Manas Barr MD at [facility] ENDOSCOPY    COLONOSCOPY  2014    due 18    OTHER SURGICAL HISTORY  07/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

## 2024-07-05 NOTE — CARE COORDINATION
07/05/24  2:24 PM    CM spoke with pt and spouse regarding HH arrangement in the home. HH orders and information sent to [facility], spouse to provide transportation home.       Case Management Assessment  Initial Evaluation    Date/Time of Evaluation: 7/5/2024 2:24 PM  Assessment Completed by: Ann Adler    If patient is discharged prior to next notation, then this note serves as note for discharge by case management.    Patient Name: Williams Fitzgerald Jr                   YOB: 1940  Diagnosis: Epistaxis [R04.0]  Acute blood loss anemia [D62]                   Date / Time: 7/4/2024 11:03 PM    Patient Admission Status: Inpatient   Readmission Risk (Low < 19, Mod (19-27), High > 27): Readmission Risk Score: 15.8    Current PCP: Brayan Hallman MD  PCP verified by CM? (P) Yes (Dr. Brayan Hallman)    Chart Reviewed: Yes      History Provided by: (P) Patient, Spouse, Medical Record  Patient Orientation: (P) Alert and Oriented    Patient Cognition: (P) Alert    Hospitalization in the last 30 days (Readmission):  No    If yes, Readmission Assessment in CM Navigator will be completed.    Advance Directives:      Code Status: Full Code   Patient's Primary Decision Maker is: (P) Named in Scanned ACP Document      Discharge Planning:    Patient lives with: (P) Spouse/Significant Other Type of Home: (P) House  Primary Care Giver: (P) Self  Patient Support Systems include: (P) Spouse/Significant Other   Current Financial resources: (P) None  Current community resources: (P) None  Current services prior to admission: (P) Home Care            Current DME:              Type of Home Care services:  (P) Nursing Services    ADLS  Prior functional level: (P) Independent in ADLs/IADLs  Current functional level: (P) Independent in ADLs/IADLs    PT AM-PAC:   /24  OT AM-PAC: 24 /24    Family can provide assistance at DC: (P) Yes  Would you like Case Management to discuss the discharge plan with any other family  members/significant others, and if so, who? (P) No  Plans to Return to Present Housing: (P) Yes  Other Identified Issues/Barriers to RETURNING to current housing:   Potential Assistance needed at discharge: (P) N/A            Potential DME:    Patient expects to discharge to: (P) House  Plan for transportation at discharge: (P) Family    Financial    Payor: MEDICARE / Plan: MEDICARE PART A AND B / Product Type: *No Product type* /     Does insurance require precert for SNF: No    Potential assistance Purchasing Medications: (P) No (pt gets medications from [facility] pharmacy with no barriers)  Meds-to-Beds request: Yes      [facility] - [place], [place] - [address] - P [phone] - F [phone]  [address] [address]  Phone: [phone] Fax: [phone]    [id number] [place], [place] - [address] - P [phone] - F [phone]  [address]  [facility] [address]  Phone: [phone] Fax: [phone]    [id number] - [place], [place] - [address] - P [phone] - F [phone]  [address] [facility] [address] [address]  Phone: [phone] Fax: [phone]      Notes:    Factors facilitating achievement of predicted outcomes: Family support, Caregiver support, Motivated, Pleasant, and Home is wheelchair accessible    Barriers to discharge:     Additional Case Management Notes:     CM met with pt and pt's spouse at bedside. Pt is 83 yo male admitted with anemia s/p multiple nose bleeds. CM verified demographics and explained role. Pt lives at home in one level home with 3 KHANH with supportive spouse. Pt does not have DME at home and does not require use of DME. Pt reports independence at PLOF with all ADLs/IADLs and still drives. Pt's spouse states that pt was receiving home health services however \"they only came out once before we had to come back to the hospital for the nose bleeds.\" Pt and spouse do not

## 2024-07-05 NOTE — PROGRESS NOTES
Left nostril packing removed, per Dr. Ennis at bedside. Scant amount of bleeding noted post removal which resolved within 30 seconds.

## 2024-07-05 NOTE — PLAN OF CARE
Problem: Discharge Planning  Goal: Discharge to home or other facility with appropriate resources  Outcome: Progressing     Problem: Skin/Tissue Integrity  Goal: Absence of new skin breakdown  Description: 1.  Monitor for areas of redness and/or skin breakdown  2.  Assess vascular access sites hourly  3.  Every 4-6 hours minimum:  Change oxygen saturation probe site  4.  Every 4-6 hours:  If on nasal continuous positive airway pressure, respiratory therapy assess nares and determine need for appliance change or resting period.  Outcome: Progressing     Problem: Chronic Conditions and Co-morbidities  Goal: Patient's chronic conditions and co-morbidity symptoms are monitored and maintained or improved  Outcome: Progressing

## 2024-07-05 NOTE — DISCHARGE SUMMARY
Discharge Summary   Please note that this dictation was completed with [employer], the computer voice recognition software.  Quite often unanticipated grammatical, syntax, homophones, and other interpretive errors are inadvertently transcribed by the computer software.  Please disregard these errors.  Please excuse any errors that have escaped final proofreading.    PATIENT ID: Williams Fitzgerald Jr  MRN: [id number]   YOB: 1940    DATE OF ADMISSION: 7/4/2024 11:03 PM    DATE OF DISCHARGE: 7/5/2024  PRIMARY CARE PROVIDER: Brayan Hallman MD         ATTENDING PHYSICIAN: ESTEVAN HOLCOMB MD  DISCHARGING PROVIDER: ESTEVAN HOLCOMB MD       CONSULTATIONS: IP CONSULT TO HEMATOLOGY  IP CONSULT HOME HEALTH    PROCEDURES/SURGERIES: * No surgery found *    ADMITTING HPI from excerpted H&P   84-year-old male with history of HHT, frequent nosebleeds presenting to the ER for epistaxis. Patient reports 2 hours of a severe nosebleed, primarily right-sided. Patient thinks he is lost a lot of blood, feels lightheaded.   Denies trauma or significant pain in the nose.      He tried nasal clamping and packing at home of the nose. He is not on anticoagulation.         Per chart review patient has been presented to the emergency room multiple times with similar complaint in the past, in the ED repeating hemoglobin was 6.2, and patient was admitted for blood transfusion     Patient denies melena, hematochezia hemoptysis or hematemesis     Available records were reviewed at the time of H&P.           HOSPITAL COURSE & DISCHARGE DIAGNOSIS/ PLAN:     Acute blood loss anemia secondary to epistaxis  · Patient has had recurrent ER visits with similar complaint in the past, currently bleeding is controlled, however.  Hemoglobin 6.2 on admission. Received 1 unit PRBC transfusion. Evaluated by hematology, outpatient follow up with hematology for iron infusions.      HHT with recurrent epistaxis   Patient has followed with ENT in the past  and undergone cauterization and sclerotherapy of nasal veins with no relief of symptoms. Epistaxis due to multiple telangiectasias. Advised humidifier, nasal saline.     Atrial fibrillation   Rate controlled. Not a candidate for AC due to recurrent epistaxis d/t HHT. Advised outpatient follow up with cardiologist     [facility] of prostate cancer  DISCHARGE MEDICATIONS:     Medication List        CONTINUE taking these medications      acetaminophen 500 MG tablet  Commonly known as: TYLENOL     ascorbic acid 250 MG tablet  Commonly known as: VITAMIN C     dicyclomine 20 MG tablet  Commonly known as: BENTYL     tranexamic acid 650 MG Tabs tablet  Commonly known as: LYSTEDA     vitamin D 25 MCG (1000 UT) Caps            STOP taking these medications      albuterol sulfate  (90 Base) MCG/ACT inhaler  Commonly known as: Ventolin HFA                NOTIFY YOUR PHYSICIAN FOR ANY OF THE FOLLOWING:   Fever over 101 degrees for 24 hours.   Chest pain, shortness of breath, fever, chills, nausea, vomiting, diarrhea, change in mentation, falling, weakness, bleeding. Severe pain or pain not relieved by medications.  Or, any other signs or symptoms that you may have questions about.    DISPOSITION:    Home With: x   OT  PT  HH  RN       Long term SNF/Inpatient Rehab    Independent/assisted living    Hospice    Other:       PATIENT CONDITION AT DISCHARGE:     Functional status    Poor     Deconditioned    x Independent      Cognition    x Lucid     Forgetful     Dementia      Catheters/lines (plus indication)    Chen     PICC     PEG    x None      Code status    x Full code     DNR      PHYSICAL EXAMINATION AT DISCHARGE:    General : alert x 3, awake, no acute distress,   HEENT: PEERL, EOMI, moist mucus membrane  Neck: supple, no JVD, no meningeal signs  Chest: Clear to auscultation bilaterally   CVS: S1 S2 heard, Capillary refill less than 2 seconds  Abd: soft/ Non tender, non distended, BS physiological,   Ext: no clubbing, no  cyanosis, no edema, brisk 2+ DP pulses  Neuro/Psych: pleasant mood and affect, no focal neurologic deficit  Skin: warm     CHRONIC MEDICAL DIAGNOSES:      Greater than 31 minutes were spent with the patient on counseling and coordination of care    Signed:   ESTEVAN HOLCOMB MD  7/5/2024  4:20 PM

## 2024-07-05 NOTE — PROGRESS NOTES
OCCUPATIONAL THERAPY EVALUATION/DISCHARGE  Patient: Williams Fitzgerald Jr (84 y.o. male)  Date: 7/5/2024  Primary Diagnosis: Epistaxis [R04.0]  Acute blood loss anemia [D62]         Precautions: Fall Risk                  ASSESSMENT :  The patient presented with nose bleed and fatigue, found to be anemic and admitted to [facility]. He has a Hx of HHT, and received a blood transfusion 7/5 am. At baseline he lives with his wife, and is Independent with ADLs/IADLs. This session, patient completing functional mobility and ADLs with [place]. Patient with slight fatigue and slightly decreased activity tolerance today, however VSS throughout activity and patient recovering short rest breaks. No acute OT needs at this time    Functional Outcome Measure:  The patient scored 24/24 on the AM-PAC outcome measure which is indicative of lower likelihood of post acute rehab needs.      Further skilled acute occupational therapy is not indicated at this time.     PLAN :  Recommend with staff: OOB for all meals, toileting to/from bathroom    Recommendation for discharge: (in order for the patient to meet his/her long term goals): No skilled occupational therapy    Other factors to consider for discharge: no additional factors    IF patient discharges home will need the following DME: none     SUBJECTIVE:   Patient stated, “Yeah I can get up and walk.”    OBJECTIVE DATA SUMMARY:     Past Medical History:   Diagnosis Date    Cancer (HCC)     HHT (hereditary hemorrhagic telangiectasia) (HCC)     Prostate cancer (HCC)      Past Surgical History:   Procedure Laterality Date    APPENDECTOMY      CHEST SURGERY      excision of AVM    COLONOSCOPY N/A 1/29/2020    COLONOSCOPY performed by Manas Barr MD at [facility] ENDOSCOPY    COLONOSCOPY  2014    due 19    OTHER SURGICAL HISTORY  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

## 2024-07-05 NOTE — H&P
Hospitalist Admission Note    NAME:  Williams Fitzgerald Jr   :  1940   MRN:  [id number]     Date/Time:  2024 2:54 AM    Patient PCP: Brayan Hallman MD  ________________________________________________________________________    Given the patient's current clinical presentation, I have a high level of concern for decompensation if discharged from the emergency department.  Complex decision making was performed, which includes reviewing the patient's available past medical records, laboratory results, and x-ray films.       My assessment of this patient's clinical condition and my plan of care is as follows.    Assessment / Plan:    84-year-old male with history of HHT, frequent nosebleeds presenting to the ER for epistaxis, bleeding has been controlled in the emergency room however repeated hemoglobin was 6.2 and patient was admitted for blood transfusion    # Acute blood loss anemia secondary to epistaxis  Patient has had recurrent ER visits with similar complaint in the past, currently bleeding is controlled, however.  Hemoglobin 6.2  Plan  Patient is consented and will transfuse 1 unit  Posttransfusion H&H    # HHT with recurrent frequent ER visits with nosebleeds  Will consult hematology while in house, however patient needs ENT follow-up outpatient    # PMH of prostate cancer         I have personally reviewed the radiographs, laboratory data in Epic and decisions and statements above are based partially on this personal interpretation.    Code Status: Full Code  DVT Prophylaxis: SCD's  GI Prophylaxis: not indicated    30 minutes of critical care time spent with the patient other than procedures     Subjective:   CHIEF COMPLAINT: \"Epistaxis\"    HISTORY OF PRESENT ILLNESS:     Williams is a 84-year-old male with history of HHT, frequent nosebleeds presenting to the ER for epistaxis. Patient reports 2 hours of a severe nosebleed, primarily right-sided. Patient thinks he is lost a lot of blood, feels  intact, no focal motor weakness, follows commands appropriately  Psych:  Good insight, oriented to person, place and time, alert          _______________________________________________________________________  Care Plan discussed with:    Comments   Patient x Discussed with patient in room. POC outlined and Questions answered    Family      RN x    Care Manager                    Consultant:  hellen SIEGEL MD   _______________________________________________________________________  Recommended Disposition:   Home with Family x   HH/PT/OT/RN    SNF/LTC    [facility]    ________________________________________________________________________  TOTAL TIME:  45 Minutes        Comments   >50% of visit spent in counseling and coordination of care x Chart reviewed  Discussion with patient and/or family and questions answered     ________________________________________________________________________  Signed: Octaviano Claudio MD        Procedures: see electronic medical records for all procedures/Xrays/labs and details which were not copied into this note but were reviewed prior to creation of Plan.

## 2024-07-05 NOTE — PLAN OF CARE
Problem: Chronic Conditions and Co-morbidities  Goal: Patient's chronic conditions and co-morbidity symptoms are monitored and maintained or improved  7/5/2024 1138 by Moni Mckee, RN  Outcome: Progressing  Flowsheets (Taken 7/5/2024 1138)  Care Plan - Patient's Chronic Conditions and Co-Morbidity Symptoms are Monitored and Maintained or Improved: Monitor and assess patient's chronic conditions and comorbid symptoms for stability, deterioration, or improvement  7/5/2024 0356 by Melchor Guerrero, RN  Outcome: Progressing     Problem: Safety - Adult  Goal: Free from fall injury  Outcome: Progressing  Flowsheets (Taken 7/5/2024 1138)  Free From Fall Injury: Instruct family/caregiver on patient safety

## 2024-07-05 NOTE — CONSULTS
potassium chloride (KLOR-CON) extended release tablet 40 mEq  40 mEq Oral PRN    Or    potassium bicarb-citric acid (EFFER-K) effervescent tablet 40 mEq  40 mEq Oral PRN    Or    potassium chloride 10 mEq/100 mL IVPB (Peripheral Line)  10 mEq IntraVENous PRN    magnesium sulfate 2000 mg in 50 mL IVPB premix  2,000 mg IntraVENous PRN    ondansetron (ZOFRAN-ODT) disintegrating tablet 4 mg  4 mg Oral Q8H PRN    Or    ondansetron (ZOFRAN) injection 4 mg  4 mg IntraVENous Q6H PRN    polyethylene glycol (GLYCOLAX) packet 17 g  17 g Oral Daily PRN    acetaminophen (TYLENOL) tablet 650 mg  650 mg Oral Q6H PRN    Or    acetaminophen (TYLENOL) suppository 650 mg  650 mg Rectal Q6H PRN    0.9 % sodium chloride infusion   IntraVENous PRN      No Known Allergies     Review of Systems: A complete review of systems was obtained, negative except as described above.            Physical Exam:   /65   Pulse 83   Temp 98.1 °F (36.7 °C) (Oral)   Resp 14   Ht 1.88 m (6' 2\")   Wt 72.6 kg (160 lb)   SpO2 94%   BMI 20.54 kg/m²   ECOG PS: 0  General: No distress  Eyes: anicteric sclerae  HENT: Atraumatic with normal appearance of ears and nose; OP clear  Neck: Supple; no thyromegaly, JVD  Lymphatic: No cervical, supraclavicular, axillary adenopathy  Respiratory: CTAB, normal respiratory effort  CV: Normal rate, regular rhythm, no murmurs, no peripheral edema  GI: Soft, nontender, nondistended, no masses  MS: Digits without clubbing or cyanosis.  Skin: No rashes, ecchymoses, or petechiae. Normal temperature, turgor, and texture.  Neuro/Psych: Moves all 4 extremities. Alert, oriented, appropriate affect, normal judgment/insight      Results:     Lab Results   Component Value Date/Time    WBC 5.7 07/05/2024 02:24 AM    WBC Comment 02/24/2021 08:18 AM    HGB 7.1 07/05/2024 09:14 AM    HCT 22.8 07/05/2024 09:14 AM     07/05/2024 02:24 AM    .5 07/05/2024 02:24 AM    NEUTROABS 2.5 07/04/2024 11:23 PM     Lab Results    Component Value Date/Time     07/04/2024 11:23 PM    K 3.5 07/04/2024 11:23 PM     07/04/2024 11:23 PM    CO2 23 07/04/2024 11:23 PM    GLUCOSE 252 07/04/2024 11:23 PM    BUN 19 07/04/2024 11:23 PM    CREATININE 1.10 07/04/2024 11:23 PM    CALCIUM 7.7 07/04/2024 11:23 PM     Lab Results   Component Value Date/Time    BILITOT 0.3 07/04/2024 11:23 PM    ALT 21 07/04/2024 11:23 PM    AST 20 07/04/2024 11:23 PM    ALKPHOS 69 07/04/2024 11:23 PM    ALKPHOS 77 01/14/2023 02:41 PM    GLOB 2.4 07/04/2024 11:23 PM     Lab Results   Component Value Date    RETICCTPCT 1.3 02/24/2021    IRON 75 07/05/2024    TIBC 229 (L) 07/05/2024    IRONPERSAT 33 07/05/2024    FERRITIN 37 07/05/2024    BOQTKMBJ03 391 02/24/2021    FOLATE 14.0 02/24/2021     02/05/2021     02/24/2021    HAPT 144 02/24/2021    PE6T Not Observed 02/24/2021     Lab Results   Component Value Date    INR 1.1 07/04/2024    PROTIME 11.3 (H) 07/04/2024    LIPASE 19 04/03/2024    TSH 1.67 03/03/2022       Imaging:     None recent    Assessment and Recommendations:   Williams Fitzgerald  is a 84 y.o. male     1. Iron deficiency anemia  Recurring problem due to epistaxis from HHT.  He receives frequent outpatient IV iron infusions with most recent plan of injectafer every 6 weeks or so, with labs a week prior.    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 He is s/p treatment for pulmonary AVMs as well as epistaxis.  He is taking Tranexamic Acid from his HHT physician in [place].  Pt is receiving IV iron infusions here locally.  He is struggling with traveling to [place], thinking about establishing care with HHT center at [facility]. He is scheduled to see Dr. Rand (ENT) at [facility] 7/30/2024. Pt awaiting appt at [facility] scheduled for Aug 2024. He also has plan to start Bevacizumab  infusions here soon.       3. Epistaxis  Secondary to HHT.  Follows with ENT in [place], but seeing ENT at [facility] in 7/2024.  Improved initially after starting Tranexamic Acid, but worsened again recently.     4. Prostate cancer  He is now s/p radiation with Dr. Holt completed 4/2021.  Last dose ADT on 2/2022, stopped due to fatigue.      5. Pulmonary AVMs  S/p embolization in [place] 7/2023.       Signed By: Bee Quigley MD     July 5, 2024

## 2024-07-05 NOTE — PROGRESS NOTES
Patient/spouse declined to wait for Case Management for home health service set up.  Spouse informs she has business card for home health company that recently visited however does not wish to resume at this time.    Patient declined wheelchair for discharge.  Ambulated off unit with spouse.

## 2024-07-05 NOTE — ED TRIAGE NOTES
Pt reports he has an episode of epistaxis that started a couple of hours ago pt does report he has a history of episodes like this. Pt denies being on blood thinners  
3-4 cups/cans per day

## 2024-07-05 NOTE — DISCHARGE INSTRUCTIONS
You came to the hospital with nose bleed and were found to have anemia ie low hemoglobin. You received 1 unit of blood and were seen by blood doctor. Please follow up with hematologist for iron infusion.

## 2024-07-05 NOTE — ED PROVIDER NOTES
[facility] EMERGENCY DEPT  EMERGENCY DEPARTMENT ENCOUNTER      Pt Name: Williams Fitzgerald Jr  MRN: [id number]  Birthdate 1940  Date of evaluation: 7/4/2024  Provider: Arvind Ortiz MD      HISTORY OF PRESENT ILLNESS      84-year-old male with history of HHT, frequent nosebleeds presenting to the ER for epistaxis.  Patient reports 2 hours of a severe nosebleed, primarily right-sided.  Patient thinks he is lost a lot of blood, feels lightheaded.  Denies trauma or significant pain in the nose.  He tried nasal clamping and packing at home of the nose.  He is not on anticoagulation.              Nursing Notes were reviewed.    REVIEW OF SYSTEMS         Review of Systems   Constitutional:  Negative for fatigue and fever.   HENT:  Positive for nosebleeds. Negative for rhinorrhea and sore throat.    Respiratory:  Negative for cough and shortness of breath.    Cardiovascular:  Negative for chest pain.   Gastrointestinal:  Negative for abdominal pain and vomiting.   Musculoskeletal:  Negative for back pain and neck pain.   Skin:  Negative for rash.   Neurological:  Negative for dizziness, weakness and headaches.           PAST MEDICAL HISTORY     Past Medical History:   Diagnosis Date    Cancer (HCC)     HHT (hereditary hemorrhagic telangiectasia) (HCC)     Prostate cancer (HCC)          SURGICAL HISTORY       Past Surgical History:   Procedure Laterality Date    APPENDECTOMY      CHEST SURGERY      excision of AVM    COLONOSCOPY N/A 1/29/2020    COLONOSCOPY performed by Manas Barr MD at [facility] ENDOSCOPY    COLONOSCOPY  2014    due 19    OTHER SURGICAL HISTORY  07/2018    sclerotheropy    TONSILLECTOMY           CURRENT MEDICATIONS       Previous Medications    ACETAMINOPHEN (TYLENOL) 500 MG TABLET    Take by mouth every 6 hours as needed    ALBUTEROL SULFATE HFA (VENTOLIN HFA) 108 (90 BASE) MCG/ACT INHALER    Inhale 2 puffs into the lungs 4 times daily as needed for Wheezing    ASCORBIC ACID (VITAMIN C) 250 MG TABLET  We will have the transfer center reach out to the local [facility] facilities and hopefully discussed the case with their ENT on-call. [BN]   0246 Hemoglobin Quant(!): 6.2 [BN]   0259 Patient has been consented for blood transfusion.  Epistaxis has resolved at this time  I discussed case with our hospitalist who is agreeable to admit for blood loss anemia [BN]      ED Course User Index  [BN] Arvind Ortiz MD         CONSULTS:  None    PROCEDURES:     Epistaxis Mgmt    Date/Time: 7/4/2024 11:33 PM    Performed by: Arvind Ortiz MD  Authorized by: Arvind Ortiz MD    Consent:     Consent obtained:  Verbal    Consent given by:  Patient    Risks, benefits, and alternatives were discussed: yes      Risks discussed:  Bleeding, infection, nasal injury and pain  Universal protocol:     Patient identity confirmed:  Verbally with patient  Procedure details:     Treatment site:  R anterior    Treatment method:  Anterior pack (Rhino Rocket, soaked in TXA)        Total critical care time (not including time spent performing separately reportable procedures): 32 minutes        (Please note that portions of this note were completed with a voice recognition program.  Efforts were made to edit the dictations but occasionally words are mis-transcribed.)    Arvind Ortiz MD (electronically signed)  Emergency Attending Physician              Arvind Ortiz MD  07/14/24 1114

## 2024-07-05 NOTE — CONSENT
Informed Consent for Blood Component Transfusion Note    I have discussed with the patient the rationale for blood component transfusion; its benefits in treating or preventing fatigue, organ damage, or death; and its risk which includes mild transfusion reactions, rare risk of blood borne infection, or more serious but rare reactions. I have discussed the alternatives to transfusion, including the risk and consequences of not receiving transfusion. The patient had an opportunity to ask questions and had agreed to proceed with transfusion of blood components.    Electronically signed by Arvind Ortiz MD on 7/5/24 at 2:51 AM EDT

## 2024-07-06 LAB
ABO + RH BLD: NORMAL
BLD PROD TYP BPU: NORMAL
BLOOD BANK BLOOD PRODUCT EXPIRATION DATE: NORMAL
BLOOD BANK DISPENSE STATUS: NORMAL
BLOOD BANK ISBT PRODUCT BLOOD TYPE: 5100
BLOOD BANK PRODUCT CODE: NORMAL
BLOOD BANK UNIT TYPE AND RH: NORMAL
BLOOD GROUP ANTIBODIES SERPL: NORMAL
BPU ID: NORMAL
CROSSMATCH RESULT: NORMAL
SPECIMEN EXP DATE BLD: NORMAL
UNIT DIVISION: 0
UNIT ISSUE DATE/TIME: NORMAL

## 2024-07-09 ENCOUNTER — TELEPHONE (OUTPATIENT)
Age: 84
End: 2024-07-09

## 2024-07-09 NOTE — TELEPHONE ENCOUNTER
Patient called to see when his next infusion is I stated he has one for avastin. Patient stated he was not supposed to be taking that anymore and would like a call to see if he's still getting that treatment.

## 2024-07-10 ENCOUNTER — HOSPITAL ENCOUNTER (OUTPATIENT)
Facility: HOSPITAL | Age: 84
Setting detail: INFUSION SERIES
End: 2024-07-10

## 2024-07-10 NOTE — PROGRESS NOTES
yes   ----- Message -----   From: Suzy Salazar formerly [facility]   Sent: 7/10/2024  11:25 AM EDT   To: MARTÍNEZ Claros NP            This patient is scheduled in [facility] next week for Injectafer. He has been in hospital a few times since the order was written/signed. Is it OK to proceed next week with treatment? Thank you.

## 2024-07-17 ENCOUNTER — HOSPITAL ENCOUNTER (OUTPATIENT)
Facility: HOSPITAL | Age: 84
Setting detail: INFUSION SERIES
Discharge: HOME | End: 2024-07-17
Payer: MEDICARE

## 2024-07-17 VITALS
BODY MASS INDEX: 23.86 KG/M2 | DIASTOLIC BLOOD PRESSURE: 63 MMHG | HEIGHT: 73 IN | SYSTOLIC BLOOD PRESSURE: 101 MMHG | HEART RATE: 68 BPM | TEMPERATURE: 97.9 F | OXYGEN SATURATION: 98 % | WEIGHT: 180 LBS

## 2024-07-17 DIAGNOSIS — D50.9 IRON DEFICIENCY ANEMIA, UNSPECIFIED IRON DEFICIENCY ANEMIA TYPE: ICD-10-CM

## 2024-07-17 DIAGNOSIS — I78.0 HHT (HEREDITARY HEMORRHAGIC TELANGIECTASIA) (HCC): Primary | ICD-10-CM

## 2024-07-17 PROCEDURE — 96365 THER/PROPH/DIAG IV INF INIT: CPT

## 2024-07-17 PROCEDURE — 2580000003 HC RX 258: Performed by: NURSE PRACTITIONER

## 2024-07-17 PROCEDURE — 6360000002 HC RX W HCPCS: Performed by: NURSE PRACTITIONER

## 2024-07-17 RX ORDER — DIPHENHYDRAMINE HYDROCHLORIDE 50 MG/ML
50 INJECTION INTRAMUSCULAR; INTRAVENOUS
OUTPATIENT
Start: 2024-08-28

## 2024-07-17 RX ORDER — SODIUM CHLORIDE 0.9 % (FLUSH) 0.9 %
5-40 SYRINGE (ML) INJECTION PRN
OUTPATIENT
Start: 2024-08-28

## 2024-07-17 RX ORDER — FAMOTIDINE 10 MG/ML
20 INJECTION, SOLUTION INTRAVENOUS
OUTPATIENT
Start: 2024-08-28

## 2024-07-17 RX ORDER — EPINEPHRINE 1 MG/ML
0.3 INJECTION, SOLUTION INTRAMUSCULAR; SUBCUTANEOUS PRN
OUTPATIENT
Start: 2024-08-28

## 2024-07-17 RX ORDER — ACETAMINOPHEN 325 MG/1
650 TABLET ORAL
OUTPATIENT
Start: 2024-08-28

## 2024-07-17 RX ORDER — ALBUTEROL SULFATE 90 UG/1
4 AEROSOL, METERED RESPIRATORY (INHALATION) PRN
OUTPATIENT
Start: 2024-08-28

## 2024-07-17 RX ORDER — SODIUM CHLORIDE 9 MG/ML
INJECTION, SOLUTION INTRAVENOUS CONTINUOUS
OUTPATIENT
Start: 2024-08-28

## 2024-07-17 RX ORDER — ONDANSETRON 2 MG/ML
8 INJECTION INTRAMUSCULAR; INTRAVENOUS
OUTPATIENT
Start: 2024-08-28

## 2024-07-17 RX ORDER — HEPARIN 100 UNIT/ML
500 SYRINGE INTRAVENOUS PRN
OUTPATIENT
Start: 2024-08-28

## 2024-07-17 RX ORDER — SODIUM CHLORIDE 9 MG/ML
5-250 INJECTION, SOLUTION INTRAVENOUS PRN
OUTPATIENT
Start: 2024-08-28

## 2024-07-17 RX ADMIN — FERRIC CARBOXYMALTOSE INJECTION 750 MG: 50 INJECTION, SOLUTION INTRAVENOUS at 10:01

## 2024-07-17 ASSESSMENT — PAIN SCALES - GENERAL: PAINLEVEL_OUTOF10: 0

## 2024-07-17 NOTE — PROGRESS NOTES
[facility] Progress Note    Date: July 17, 2024        0900: Mr. Amilcar Ramirez arrived ambulatory and in no distress for Injectafer Infusion. Assessment was completed, no acute issues or new complaints at this time. 24g PIV established to left arm without difficulty, positive blood return noted.    Mr. Amilcar Ramirez's vitals were reviewed.  Patient Vitals for the past 12 hrs:   Temp Pulse BP SpO2   07/17/24 1030 -- 68 101/63 --   07/17/24 0910 97.9 °F (36.6 °C) 88 114/62 98 %           Medications given.  Medications Administered         ferric carboxymaltose (INJECTAFER) 750 mg in sodium chloride 0.9 % 250 mL IVPB Admin Date  07/17/2024 Action  New Bag Dose  750 mg Rate  870 mL/hr Route  IntraVENous Administered By  Swetha Brewer, RN              1030: Patient tolerated treatment well and was discharged from [facility] in stable condition.  PIV flushed and removed. Patient is aware of next scheduled [facility] appointment on 08/28/24.      Swetha Brewer RN  July 17, 2024

## 2024-07-25 DIAGNOSIS — D50.0 IRON DEFICIENCY ANEMIA SECONDARY TO BLOOD LOSS (CHRONIC): ICD-10-CM

## 2024-07-25 DIAGNOSIS — I78.0 HHT (HEREDITARY HEMORRHAGIC TELANGIECTASIA) (HCC): ICD-10-CM

## 2024-07-26 ENCOUNTER — PATIENT MESSAGE (OUTPATIENT)
Age: 84
End: 2024-07-26

## 2024-07-26 ENCOUNTER — TELEPHONE (OUTPATIENT)
Age: 84
End: 2024-07-26

## 2024-07-26 LAB
BASOPHILS # BLD: 0 K/UL (ref 0–0.1)
BASOPHILS NFR BLD: 1 % (ref 0–1)
DIFFERENTIAL METHOD BLD: ABNORMAL
EOSINOPHIL # BLD: 0.2 K/UL (ref 0–0.4)
EOSINOPHIL NFR BLD: 4 % (ref 0–7)
ERYTHROCYTE [DISTWIDTH] IN BLOOD BY AUTOMATED COUNT: 18.2 % (ref 11.5–14.5)
FERRITIN SERPL-MCNC: 307 NG/ML (ref 26–388)
HCT VFR BLD AUTO: 28.4 % (ref 36.6–50.3)
HGB BLD-MCNC: 8.7 G/DL (ref 12.1–17)
IMM GRANULOCYTES # BLD AUTO: 0 K/UL (ref 0–0.04)
IMM GRANULOCYTES NFR BLD AUTO: 0 % (ref 0–0.5)
IRON SATN MFR SERPL: 20 % (ref 20–50)
IRON SERPL-MCNC: 58 UG/DL (ref 35–150)
LYMPHOCYTES # BLD: 0.4 K/UL (ref 0.8–3.5)
LYMPHOCYTES NFR BLD: 11 % (ref 12–49)
MCH RBC QN AUTO: 29.4 PG (ref 26–34)
MCHC RBC AUTO-ENTMCNC: 30.6 G/DL (ref 30–36.5)
MCV RBC AUTO: 95.9 FL (ref 80–99)
MONOCYTES # BLD: 0.4 K/UL (ref 0–1)
MONOCYTES NFR BLD: 11 % (ref 5–13)
NEUTS SEG # BLD: 2.8 K/UL (ref 1.8–8)
NEUTS SEG NFR BLD: 73 % (ref 32–75)
NRBC # BLD: 0 K/UL (ref 0–0.01)
NRBC BLD-RTO: 0 PER 100 WBC
PLATELET # BLD AUTO: 180 K/UL (ref 150–400)
PMV BLD AUTO: 11 FL (ref 8.9–12.9)
RBC # BLD AUTO: 2.96 M/UL (ref 4.1–5.7)
RBC MORPH BLD: ABNORMAL
RBC MORPH BLD: ABNORMAL
TIBC SERPL-MCNC: 294 UG/DL (ref 250–450)
WBC # BLD AUTO: 3.8 K/UL (ref 4.1–11.1)

## 2024-07-26 NOTE — TELEPHONE ENCOUNTER
[facility] [place] at [facility]  [phone]    Labs reviewed, drawn yesterday. Still with anemia, HGB 8.7, though this is climbing with time away from his recent hospital stay. Ferritin is not low, likely because he just received a dose of IV iron last week.    I recommend we continue to monitor and give additional IV iron as needed.    With his recurring nose bleeds, he may want to reconsider Avastin therapy as recommended by his T physician in [place]. However, I would wait until after he sees Dr. Rosenthal at [facility] on 8/8 before making a final decision about that.

## 2024-07-26 NOTE — TELEPHONE ENCOUNTER
[facility] [place] at [facility]  [phone]    07/26/24- Phone call placed to to pt advised him the following below per . He agreed with this plan and requested for labs to be emailed over to [email or web address]. No new questions or concerns.    Labs emailed over.      Labs reviewed, drawn yesterday. Still with anemia, HGB 8.7, though this is climbing with time away from his recent hospital stay. Ferritin is not low, likely because he just received a dose of IV iron last week.     I recommend we continue to monitor and give additional IV iron as needed.     With his recurring nose bleeds, he may want to reconsider Avastin therapy as recommended by his T physician in [place]. However, I would wait until after he sees Dr. Rosenthal at [facility] on 8/8 before making a final decision about that.

## 2024-07-28 ENCOUNTER — HOSPITAL ENCOUNTER (EMERGENCY)
Facility: HOSPITAL | Age: 84
Discharge: HOME | End: 2024-07-28
Attending: EMERGENCY MEDICINE
Payer: MEDICARE

## 2024-07-28 VITALS
BODY MASS INDEX: 23.86 KG/M2 | WEIGHT: 180 LBS | DIASTOLIC BLOOD PRESSURE: 59 MMHG | HEIGHT: 73 IN | HEART RATE: 89 BPM | RESPIRATION RATE: 19 BRPM | OXYGEN SATURATION: 99 % | SYSTOLIC BLOOD PRESSURE: 115 MMHG | TEMPERATURE: 98.1 F

## 2024-07-28 DIAGNOSIS — R04.0 EPISTAXIS: Primary | ICD-10-CM

## 2024-07-28 LAB
BASOPHILS # BLD: 0 K/UL (ref 0–0.1)
BASOPHILS NFR BLD: 1 % (ref 0–1)
DIFFERENTIAL METHOD BLD: ABNORMAL
EOSINOPHIL # BLD: 0.2 K/UL (ref 0–0.4)
EOSINOPHIL NFR BLD: 6 % (ref 0–7)
ERYTHROCYTE [DISTWIDTH] IN BLOOD BY AUTOMATED COUNT: 18.4 % (ref 11.5–14.5)
HCT VFR BLD AUTO: 27.2 % (ref 36.6–50.3)
HGB BLD-MCNC: 8.3 G/DL (ref 12.1–17)
IMM GRANULOCYTES # BLD AUTO: 0 K/UL (ref 0–0.04)
IMM GRANULOCYTES NFR BLD AUTO: 0 % (ref 0–0.5)
LYMPHOCYTES # BLD: 0.4 K/UL (ref 0.8–3.5)
LYMPHOCYTES NFR BLD: 13 % (ref 12–49)
MCH RBC QN AUTO: 29.6 PG (ref 26–34)
MCHC RBC AUTO-ENTMCNC: 30.5 G/DL (ref 30–36.5)
MCV RBC AUTO: 97.1 FL (ref 80–99)
MONOCYTES # BLD: 0.5 K/UL (ref 0–1)
MONOCYTES NFR BLD: 14 % (ref 5–13)
NEUTS SEG # BLD: 2.2 K/UL (ref 1.8–8)
NEUTS SEG NFR BLD: 66 % (ref 32–75)
NRBC # BLD: 0 K/UL (ref 0–0.01)
NRBC BLD-RTO: 0 PER 100 WBC
PLATELET # BLD AUTO: 144 K/UL (ref 150–400)
PMV BLD AUTO: 10.5 FL (ref 8.9–12.9)
RBC # BLD AUTO: 2.8 M/UL (ref 4.1–5.7)
RBC MORPH BLD: ABNORMAL
WBC # BLD AUTO: 3.3 K/UL (ref 4.1–11.1)

## 2024-07-28 PROCEDURE — 6370000000 HC RX 637 (ALT 250 FOR IP): Performed by: EMERGENCY MEDICINE

## 2024-07-28 PROCEDURE — 30901 CONTROL OF NOSEBLEED: CPT

## 2024-07-28 PROCEDURE — 36415 COLL VENOUS BLD VENIPUNCTURE: CPT

## 2024-07-28 PROCEDURE — 99283 EMERGENCY DEPT VISIT LOW MDM: CPT

## 2024-07-28 PROCEDURE — 85025 COMPLETE CBC W/AUTO DIFF WBC: CPT

## 2024-07-28 RX ORDER — OXYMETAZOLINE HYDROCHLORIDE 0.05 G/100ML
2 SPRAY NASAL
Status: COMPLETED | OUTPATIENT
Start: 2024-07-28 | End: 2024-07-28

## 2024-07-28 RX ADMIN — NASAL DECONGESTANT 2 SPRAY: 0.05 SPRAY NASAL at 02:48

## 2024-07-28 ASSESSMENT — PAIN - FUNCTIONAL ASSESSMENT: PAIN_FUNCTIONAL_ASSESSMENT: NONE - DENIES PAIN

## 2024-07-28 ASSESSMENT — PAIN SCALES - GENERAL: PAINLEVEL_OUTOF10: 0

## 2024-07-28 NOTE — ED NOTES
Discharge instruction given to patient. No complain of pain. No active nose bleeding. Patient is ambulatory.

## 2024-07-28 NOTE — ED PROVIDER NOTES
Smoking status: Former     Passive exposure: Never    Smokeless tobacco: Never   Vaping Use    Vaping Use: Never used   Substance and Sexual Activity    Alcohol use: Yes     Alcohol/week: 0.0 standard drinks of alcohol    Drug use: No    Sexual activity: Not Currently     Social Determinants of Health     Food Insecurity: No Food Insecurity (7/5/2024)    Hunger Vital Sign     Worried About Running Out of Food in the Last Year: Never true     Ran Out of Food in the Last Year: Never true   Transportation Needs: No Transportation Needs (7/5/2024)    PRAPARE - Transportation     Lack of Transportation (Medical): No     Lack of Transportation (Non-Medical): No   Housing Stability: Low Risk  (7/5/2024)    Housing Stability Vital Sign     Unable to Pay for Housing in the Last Year: No     Number of Places Lived in the Last Year: 1     Unstable Housing in the Last Year: No         PHYSICAL EXAM       ED Triage Vitals   BP Temp Temp src Pulse Resp SpO2 Height Weight   -- -- -- -- -- -- -- --       There is no height or weight on file to calculate BMI.    Physical Exam  Vitals and nursing note reviewed.   Constitutional:       Appearance: Normal appearance.   HENT:      Nose:      Comments: There is a pledget in his left nare and no active bleeding.  There is dried blood around his mouth and face  Neurological:      Mental Status: He is alert.             EMERGENCY DEPARTMENT COURSE and DIFFERENTIAL DIAGNOSIS/MDM:   Vitals:  There were no vitals filed for this visit.      Medical Decision Making  84-year-old male presents to the emergency department the nosebleed.  He has history of frequent nosebleeds with HHT.  On exam he has dried blood with recent bleeding from the left nare.  After packing was removed he had continued bleeding from the left nare and Afrin was applied.  A Rhino Rocket was also applied.  His hemoglobin is stable compared to prior.  Will not require blood transfusion or admission today.  After removal of

## 2024-08-06 ENCOUNTER — TELEPHONE (OUTPATIENT)
Age: 84
End: 2024-08-06

## 2024-08-06 NOTE — TELEPHONE ENCOUNTER
[facility] [place] [facility]  [phone]    08/06/24- Patient wanted to make sure Dr. Auguste was aware he's going to [facility] tomorrow for an appointment.  Reassured patient that they have access to our records in the computer, and we'll be able to see their notes as well.  He verbalized understanding and was appreciative.

## 2024-08-06 NOTE — TELEPHONE ENCOUNTER
Patient called and stated he is going to [facility] tomorrow and would like to know if he needs any additional information before he is seen.

## 2024-08-28 ENCOUNTER — HOSPITAL ENCOUNTER (OUTPATIENT)
Facility: HOSPITAL | Age: 84
Setting detail: INFUSION SERIES
Discharge: HOME | End: 2024-08-28
Payer: MEDICARE

## 2024-08-28 ENCOUNTER — APPOINTMENT (OUTPATIENT)
Facility: HOSPITAL | Age: 84
End: 2024-08-28
Payer: MEDICARE

## 2024-08-28 VITALS
WEIGHT: 180.3 LBS | RESPIRATION RATE: 18 BRPM | DIASTOLIC BLOOD PRESSURE: 57 MMHG | HEIGHT: 73 IN | SYSTOLIC BLOOD PRESSURE: 123 MMHG | HEART RATE: 77 BPM | OXYGEN SATURATION: 100 % | TEMPERATURE: 98 F | BODY MASS INDEX: 23.89 KG/M2

## 2024-08-28 DIAGNOSIS — D50.9 IRON DEFICIENCY ANEMIA, UNSPECIFIED IRON DEFICIENCY ANEMIA TYPE: ICD-10-CM

## 2024-08-28 DIAGNOSIS — I78.0 HHT (HEREDITARY HEMORRHAGIC TELANGIECTASIA) (HCC): Primary | ICD-10-CM

## 2024-08-28 PROCEDURE — 6360000002 HC RX W HCPCS: Performed by: NURSE PRACTITIONER

## 2024-08-28 PROCEDURE — 96365 THER/PROPH/DIAG IV INF INIT: CPT

## 2024-08-28 PROCEDURE — 2580000003 HC RX 258: Performed by: NURSE PRACTITIONER

## 2024-08-28 RX ORDER — ALBUTEROL SULFATE 90 UG/1
4 AEROSOL, METERED RESPIRATORY (INHALATION) PRN
OUTPATIENT
Start: 2024-10-09

## 2024-08-28 RX ORDER — ACETAMINOPHEN 325 MG/1
650 TABLET ORAL
OUTPATIENT
Start: 2024-10-09

## 2024-08-28 RX ORDER — SODIUM CHLORIDE 9 MG/ML
INJECTION, SOLUTION INTRAVENOUS CONTINUOUS
OUTPATIENT
Start: 2024-10-09

## 2024-08-28 RX ORDER — DIPHENHYDRAMINE HYDROCHLORIDE 50 MG/ML
50 INJECTION INTRAMUSCULAR; INTRAVENOUS
OUTPATIENT
Start: 2024-10-09

## 2024-08-28 RX ORDER — ONDANSETRON 2 MG/ML
8 INJECTION INTRAMUSCULAR; INTRAVENOUS
OUTPATIENT
Start: 2024-10-09

## 2024-08-28 RX ORDER — EPINEPHRINE 1 MG/ML
0.3 INJECTION, SOLUTION INTRAMUSCULAR; SUBCUTANEOUS PRN
OUTPATIENT
Start: 2024-10-09

## 2024-08-28 RX ORDER — FAMOTIDINE 10 MG/ML
20 INJECTION, SOLUTION INTRAVENOUS
OUTPATIENT
Start: 2024-10-09

## 2024-08-28 RX ORDER — SODIUM CHLORIDE 9 MG/ML
5-250 INJECTION, SOLUTION INTRAVENOUS PRN
OUTPATIENT
Start: 2024-10-09

## 2024-08-28 RX ORDER — HEPARIN 100 UNIT/ML
500 SYRINGE INTRAVENOUS PRN
OUTPATIENT
Start: 2024-10-09

## 2024-08-28 RX ORDER — SODIUM CHLORIDE 0.9 % (FLUSH) 0.9 %
5-40 SYRINGE (ML) INJECTION PRN
OUTPATIENT
Start: 2024-10-09

## 2024-08-28 RX ORDER — SODIUM CHLORIDE 9 MG/ML
5-250 INJECTION, SOLUTION INTRAVENOUS PRN
Status: DISCONTINUED | OUTPATIENT
Start: 2024-08-28 | End: 2024-08-29 | Stop reason: HOSPADM

## 2024-08-28 RX ADMIN — SODIUM CHLORIDE 25 ML/HR: 9 INJECTION, SOLUTION INTRAVENOUS at 09:16

## 2024-08-28 RX ADMIN — FERRIC CARBOXYMALTOSE INJECTION 750 MG: 50 INJECTION, SOLUTION INTRAVENOUS at 09:28

## 2024-08-28 ASSESSMENT — PAIN SCALES - GENERAL: PAINLEVEL_OUTOF10: 0

## 2024-08-28 NOTE — PROGRESS NOTES
Outpatient Infusion Center Progress Note        Date: 2024    Name: Williams Fitzgerald Jr    MRN: [id number]         : 1940    Mr. Amilcar Ramirez Arrived ambulatory and in no distress for Injectafer Regimen.  Assessment was completed, no acute issues at this time, no new complaints voiced.  LFA 24G PIV placed with +blood return. No lab order for today. Ok to treat order received from MONIQUE Hernandez for ED visit on  for nosebleed.     Vitals:    24 0853 24 0951   BP: 108/63 (!) 123/57   Pulse: 81 77   Resp: 18 18   Temp: 98 °F (36.7 °C)    TempSrc: Temporal    SpO2: 99% 100%   Weight: 81.8 kg (180 lb 4.8 oz)    Height: 1.854 m (6' 1\")         Medications Administered         0.9 % sodium chloride infusion Admin Date  2024 Action  New Bag Dose  25 mL/hr Rate  25 mL/hr Route  IntraVENous Documented By  Elly Maza RN        ferric carboxymaltose (INJECTAFER) 750 mg in sodium chloride 0.9 % 250 mL IVPB Admin Date  2024 Action  New Bag Dose  750 mg Rate  870 mL/hr Route  IntraVENous Documented By  Elly Maza RN            Mr. Aimlcar Ramirez tolerated treatment well and was discharged from [facility]fusion [facility] in stable condition at 0955. PIV flushed and removed per protocol. He is to return on  2024 at 0800 for his next appointment.    Elly Maza RN  2024    Future Appointments:  Future Appointments   Date Time Provider Department Center   10/9/2024  8:00 AM SS FASTTRACK 3 [facility]   10/9/2024  8:10 AM Mary Hudson APRN - NP [facility] BS AMB   2024  8:30 AM SS FASTTRACK 2 [facility]   2024 11:30 AM Rajeev Holt MD [facility]   2025 11:00 AM SS FASTTRACK 4 [facility]

## 2024-09-23 ENCOUNTER — TELEPHONE (OUTPATIENT)
Age: 84
End: 2024-09-23

## 2024-09-23 DIAGNOSIS — D50.0 IRON DEFICIENCY ANEMIA SECONDARY TO BLOOD LOSS (CHRONIC): ICD-10-CM

## 2024-09-23 LAB
BASOPHILS # BLD: 0 K/UL (ref 0–0.1)
BASOPHILS NFR BLD: 1 % (ref 0–1)
DIFFERENTIAL METHOD BLD: ABNORMAL
EOSINOPHIL # BLD: 0.2 K/UL (ref 0–0.4)
EOSINOPHIL NFR BLD: 5 % (ref 0–7)
ERYTHROCYTE [DISTWIDTH] IN BLOOD BY AUTOMATED COUNT: 16.2 % (ref 11.5–14.5)
FERRITIN SERPL-MCNC: 47 NG/ML (ref 26–388)
HCT VFR BLD AUTO: 32.9 % (ref 36.6–50.3)
HGB BLD-MCNC: 10.3 G/DL (ref 12.1–17)
IMM GRANULOCYTES # BLD AUTO: 0 K/UL (ref 0–0.04)
IMM GRANULOCYTES NFR BLD AUTO: 0 % (ref 0–0.5)
IRON SATN MFR SERPL: 21 % (ref 20–50)
IRON SERPL-MCNC: 69 UG/DL (ref 35–150)
LYMPHOCYTES # BLD: 0.4 K/UL (ref 0.8–3.5)
LYMPHOCYTES NFR BLD: 11 % (ref 12–49)
MCH RBC QN AUTO: 30.7 PG (ref 26–34)
MCHC RBC AUTO-ENTMCNC: 31.3 G/DL (ref 30–36.5)
MCV RBC AUTO: 97.9 FL (ref 80–99)
MONOCYTES # BLD: 0.6 K/UL (ref 0–1)
MONOCYTES NFR BLD: 17 % (ref 5–13)
NEUTS SEG # BLD: 2.6 K/UL (ref 1.8–8)
NEUTS SEG NFR BLD: 66 % (ref 32–75)
NRBC # BLD: 0 K/UL (ref 0–0.01)
NRBC BLD-RTO: 0 PER 100 WBC
PLATELET # BLD AUTO: 188 K/UL (ref 150–400)
PMV BLD AUTO: 10.5 FL (ref 8.9–12.9)
RBC # BLD AUTO: 3.36 M/UL (ref 4.1–5.7)
RBC MORPH BLD: ABNORMAL
TIBC SERPL-MCNC: 331 UG/DL (ref 250–450)
WBC # BLD AUTO: 3.8 K/UL (ref 4.1–11.1)

## 2024-09-25 ENCOUNTER — HOSPITAL ENCOUNTER (OUTPATIENT)
Facility: HOSPITAL | Age: 84
Setting detail: OBSERVATION
Discharge: HOME | End: 2024-09-26
Attending: STUDENT IN AN ORGANIZED HEALTH CARE EDUCATION/TRAINING PROGRAM | Admitting: INTERNAL MEDICINE
Payer: MEDICARE

## 2024-09-25 DIAGNOSIS — R04.0 EPISTAXIS: Primary | ICD-10-CM

## 2024-09-25 DIAGNOSIS — R55 SYNCOPE AND COLLAPSE: ICD-10-CM

## 2024-09-25 PROBLEM — K56.609 SMALL BOWEL OBSTRUCTION (HCC): Status: RESOLVED | Noted: 2023-01-14 | Resolved: 2024-09-25

## 2024-09-25 PROBLEM — Z78.9 ACTIVE ADVANCE DIRECTIVE ON FILE: Status: RESOLVED | Noted: 2018-01-31 | Resolved: 2024-09-25

## 2024-09-25 PROBLEM — S81.802A WOUND OF LOWER EXTREMITY, LEFT, INITIAL ENCOUNTER: Status: RESOLVED | Noted: 2018-09-24 | Resolved: 2024-09-25

## 2024-09-25 PROBLEM — C44.92 SCCA (SQUAMOUS CELL CARCINOMA) OF SKIN: Status: RESOLVED | Noted: 2018-07-17 | Resolved: 2024-09-25

## 2024-09-25 PROBLEM — S80.02XA CONTUSION OF LEFT KNEE: Status: RESOLVED | Noted: 2022-02-28 | Resolved: 2024-09-25

## 2024-09-25 PROBLEM — R42 DIZZINESS: Status: ACTIVE | Noted: 2024-09-25

## 2024-09-25 LAB
ABO + RH BLD: NORMAL
ANION GAP SERPL CALC-SCNC: 3 MMOL/L (ref 2–12)
APTT PPP: 23.3 SEC (ref 22.1–31)
BASOPHILS # BLD: 0 K/UL (ref 0–0.1)
BASOPHILS NFR BLD: 1 % (ref 0–1)
BLOOD GROUP ANTIBODIES SERPL: NORMAL
BUN SERPL-MCNC: 23 MG/DL (ref 6–20)
BUN/CREAT SERPL: 22 (ref 12–20)
CALCIUM SERPL-MCNC: 8.2 MG/DL (ref 8.5–10.1)
CHLORIDE SERPL-SCNC: 110 MMOL/L (ref 97–108)
CO2 SERPL-SCNC: 27 MMOL/L (ref 21–32)
CREAT SERPL-MCNC: 1.03 MG/DL (ref 0.7–1.3)
DIFFERENTIAL METHOD BLD: ABNORMAL
EOSINOPHIL # BLD: 0.2 K/UL (ref 0–0.4)
EOSINOPHIL NFR BLD: 5 % (ref 0–7)
ERYTHROCYTE [DISTWIDTH] IN BLOOD BY AUTOMATED COUNT: 15.9 % (ref 11.5–14.5)
ETHANOL SERPL-MCNC: <10 MG/DL (ref 0–0.08)
GLUCOSE SERPL-MCNC: 160 MG/DL (ref 65–100)
HCT VFR BLD AUTO: 27.6 % (ref 36.6–50.3)
HGB BLD-MCNC: 8.5 G/DL (ref 12.1–17)
IMM GRANULOCYTES # BLD AUTO: 0 K/UL (ref 0–0.04)
IMM GRANULOCYTES NFR BLD AUTO: 0 % (ref 0–0.5)
INR PPP: 1.1 (ref 0.9–1.1)
LDH SERPL L TO P-CCNC: 205 U/L (ref 85–241)
LYMPHOCYTES # BLD: 0.5 K/UL (ref 0.8–3.5)
LYMPHOCYTES NFR BLD: 13 % (ref 12–49)
MCH RBC QN AUTO: 29.7 PG (ref 26–34)
MCHC RBC AUTO-ENTMCNC: 30.8 G/DL (ref 30–36.5)
MCV RBC AUTO: 96.5 FL (ref 80–99)
MONOCYTES # BLD: 0.7 K/UL (ref 0–1)
MONOCYTES NFR BLD: 17 % (ref 5–13)
NEUTS SEG # BLD: 2.6 K/UL (ref 1.8–8)
NEUTS SEG NFR BLD: 64 % (ref 32–75)
NRBC # BLD: 0 K/UL (ref 0–0.01)
NRBC BLD-RTO: 0 PER 100 WBC
PLATELET # BLD AUTO: 208 K/UL (ref 150–400)
PMV BLD AUTO: 10 FL (ref 8.9–12.9)
POTASSIUM SERPL-SCNC: 3.7 MMOL/L (ref 3.5–5.1)
PROTHROMBIN TIME: 11.4 SEC (ref 9–11.1)
RBC # BLD AUTO: 2.86 M/UL (ref 4.1–5.7)
RBC MORPH BLD: ABNORMAL
SODIUM SERPL-SCNC: 140 MMOL/L (ref 136–145)
SPECIMEN EXP DATE BLD: NORMAL
THERAPEUTIC RANGE: NORMAL SECS (ref 58–77)
WBC # BLD AUTO: 4 K/UL (ref 4.1–11.1)

## 2024-09-25 PROCEDURE — 96375 TX/PRO/DX INJ NEW DRUG ADDON: CPT

## 2024-09-25 PROCEDURE — 83010 ASSAY OF HAPTOGLOBIN QUANT: CPT

## 2024-09-25 PROCEDURE — 83550 IRON BINDING TEST: CPT

## 2024-09-25 PROCEDURE — 2500000003 HC RX 250 WO HCPCS: Performed by: INTERNAL MEDICINE

## 2024-09-25 PROCEDURE — 80074 ACUTE HEPATITIS PANEL: CPT

## 2024-09-25 PROCEDURE — 80048 BASIC METABOLIC PNL TOTAL CA: CPT

## 2024-09-25 PROCEDURE — 85610 PROTHROMBIN TIME: CPT

## 2024-09-25 PROCEDURE — 6360000002 HC RX W HCPCS: Performed by: INTERNAL MEDICINE

## 2024-09-25 PROCEDURE — 82746 ASSAY OF FOLIC ACID SERUM: CPT

## 2024-09-25 PROCEDURE — G0378 HOSPITAL OBSERVATION PER HR: HCPCS

## 2024-09-25 PROCEDURE — 83615 LACTATE (LD) (LDH) ENZYME: CPT

## 2024-09-25 PROCEDURE — 30903 CONTROL OF NOSEBLEED: CPT

## 2024-09-25 PROCEDURE — 6360000002 HC RX W HCPCS: Performed by: STUDENT IN AN ORGANIZED HEALTH CARE EDUCATION/TRAINING PROGRAM

## 2024-09-25 PROCEDURE — 96361 HYDRATE IV INFUSION ADD-ON: CPT

## 2024-09-25 PROCEDURE — 36415 COLL VENOUS BLD VENIPUNCTURE: CPT

## 2024-09-25 PROCEDURE — 83540 ASSAY OF IRON: CPT

## 2024-09-25 PROCEDURE — 86901 BLOOD TYPING SEROLOGIC RH(D): CPT

## 2024-09-25 PROCEDURE — 99285 EMERGENCY DEPT VISIT HI MDM: CPT

## 2024-09-25 PROCEDURE — 86850 RBC ANTIBODY SCREEN: CPT

## 2024-09-25 PROCEDURE — 82607 VITAMIN B-12: CPT

## 2024-09-25 PROCEDURE — 96374 THER/PROPH/DIAG INJ IV PUSH: CPT

## 2024-09-25 PROCEDURE — 82077 ASSAY SPEC XCP UR&BREATH IA: CPT

## 2024-09-25 PROCEDURE — 2580000003 HC RX 258: Performed by: STUDENT IN AN ORGANIZED HEALTH CARE EDUCATION/TRAINING PROGRAM

## 2024-09-25 PROCEDURE — 85025 COMPLETE CBC W/AUTO DIFF WBC: CPT

## 2024-09-25 PROCEDURE — 82728 ASSAY OF FERRITIN: CPT

## 2024-09-25 PROCEDURE — 86900 BLOOD TYPING SEROLOGIC ABO: CPT

## 2024-09-25 PROCEDURE — 85730 THROMBOPLASTIN TIME PARTIAL: CPT

## 2024-09-25 PROCEDURE — 96365 THER/PROPH/DIAG IV INF INIT: CPT

## 2024-09-25 RX ORDER — OXYMETAZOLINE HYDROCHLORIDE 0.05 G/100ML
2 SPRAY NASAL
Status: DISPENSED | OUTPATIENT
Start: 2024-09-25 | End: 2024-09-26

## 2024-09-25 RX ORDER — SODIUM CHLORIDE 0.9 % (FLUSH) 0.9 %
5-40 SYRINGE (ML) INJECTION PRN
Status: DISCONTINUED | OUTPATIENT
Start: 2024-09-25 | End: 2024-09-26 | Stop reason: HOSPADM

## 2024-09-25 RX ORDER — ONDANSETRON 2 MG/ML
4 INJECTION INTRAMUSCULAR; INTRAVENOUS EVERY 6 HOURS PRN
Status: DISCONTINUED | OUTPATIENT
Start: 2024-09-25 | End: 2024-09-26 | Stop reason: HOSPADM

## 2024-09-25 RX ORDER — ONDANSETRON 4 MG/1
4 TABLET, ORALLY DISINTEGRATING ORAL EVERY 8 HOURS PRN
Status: DISCONTINUED | OUTPATIENT
Start: 2024-09-25 | End: 2024-09-26 | Stop reason: HOSPADM

## 2024-09-25 RX ORDER — SODIUM CHLORIDE 0.9 % (FLUSH) 0.9 %
5-40 SYRINGE (ML) INJECTION EVERY 12 HOURS SCHEDULED
Status: DISCONTINUED | OUTPATIENT
Start: 2024-09-25 | End: 2024-09-26 | Stop reason: HOSPADM

## 2024-09-25 RX ORDER — SODIUM CHLORIDE 9 MG/ML
INJECTION, SOLUTION INTRAVENOUS PRN
Status: DISCONTINUED | OUTPATIENT
Start: 2024-09-25 | End: 2024-09-26 | Stop reason: HOSPADM

## 2024-09-25 RX ORDER — TRANEXAMIC ACID 650 MG/1
1300 TABLET ORAL 3 TIMES DAILY PRN
Status: DISCONTINUED | OUTPATIENT
Start: 2024-09-25 | End: 2024-09-26 | Stop reason: HOSPADM

## 2024-09-25 RX ORDER — POLYETHYLENE GLYCOL 3350 17 G/17G
17 POWDER, FOR SOLUTION ORAL DAILY PRN
Status: DISCONTINUED | OUTPATIENT
Start: 2024-09-25 | End: 2024-09-26 | Stop reason: HOSPADM

## 2024-09-25 RX ORDER — DEXTROSE MONOHYDRATE, SODIUM CHLORIDE, AND POTASSIUM CHLORIDE 50; 2.98; 4.5 G/1000ML; G/1000ML; G/1000ML
INJECTION, SOLUTION INTRAVENOUS CONTINUOUS
Status: DISCONTINUED | OUTPATIENT
Start: 2024-09-25 | End: 2024-09-26 | Stop reason: HOSPADM

## 2024-09-25 RX ORDER — DICYCLOMINE HCL 20 MG
20 TABLET ORAL 4 TIMES DAILY PRN
Status: DISCONTINUED | OUTPATIENT
Start: 2024-09-25 | End: 2024-09-26 | Stop reason: HOSPADM

## 2024-09-25 RX ORDER — ACETAMINOPHEN 650 MG/1
650 SUPPOSITORY RECTAL EVERY 6 HOURS PRN
Status: DISCONTINUED | OUTPATIENT
Start: 2024-09-25 | End: 2024-09-26 | Stop reason: HOSPADM

## 2024-09-25 RX ORDER — ACETAMINOPHEN 325 MG/1
650 TABLET ORAL EVERY 6 HOURS PRN
Status: DISCONTINUED | OUTPATIENT
Start: 2024-09-25 | End: 2024-09-26 | Stop reason: HOSPADM

## 2024-09-25 RX ORDER — 0.9 % SODIUM CHLORIDE 0.9 %
500 INTRAVENOUS SOLUTION INTRAVENOUS ONCE
Status: COMPLETED | OUTPATIENT
Start: 2024-09-25 | End: 2024-09-25

## 2024-09-25 RX ADMIN — ONDANSETRON 4 MG: 2 INJECTION INTRAMUSCULAR; INTRAVENOUS at 22:41

## 2024-09-25 RX ADMIN — WATER 2000 MG: 1 INJECTION INTRAMUSCULAR; INTRAVENOUS; SUBCUTANEOUS at 22:07

## 2024-09-25 RX ADMIN — POTASSIUM CHLORIDE, DEXTROSE MONOHYDRATE AND SODIUM CHLORIDE: 300; 5; 450 INJECTION, SOLUTION INTRAVENOUS at 22:29

## 2024-09-25 RX ADMIN — SODIUM CHLORIDE 500 ML: 9 INJECTION, SOLUTION INTRAVENOUS at 19:41

## 2024-09-25 ASSESSMENT — PAIN - FUNCTIONAL ASSESSMENT: PAIN_FUNCTIONAL_ASSESSMENT: NONE - DENIES PAIN

## 2024-09-25 ASSESSMENT — ENCOUNTER SYMPTOMS: SHORTNESS OF BREATH: 0

## 2024-09-25 NOTE — ED PROVIDER NOTES
[facility] EMERGENCY DEPT  EMERGENCY DEPARTMENT ENCOUNTER      Pt Name: Williams Fitzgerald Jr  MRN: [id number]  Birthdate 1940  Date of evaluation: 9/25/2024  Provider: Williams Garcia MD    CHIEF COMPLAINT       Chief Complaint   Patient presents with    Epistaxis         HISTORY OF PRESENT ILLNESS   84-year-old male with history significant for hereditary hemorrhagic telangiectasia presents to the ED with chief complaint of bilateral epistaxis for the past 1 to 2 hours.  Patient reports history of similar symptoms in the past that have required packing and ENT follow-up.  He is not on any blood thinners but due to his HHT has had numerous bleeding episodes.  He currently feels lightheaded and generally weak.  No fevers, chills, chest pain, difficulty breathing, headache, abdominal pain, urinary symptoms, or bowel symptoms.    The history is provided by the patient.       Review of External Medical Records:     Nursing Notes were reviewed.    REVIEW OF SYSTEMS       Review of Systems   Respiratory:  Negative for shortness of breath.    Cardiovascular:  Negative for chest pain.       Except as noted above the remainder of the review of systems was reviewed and negative.       PAST MEDICAL HISTORY     Past Medical History:   Diagnosis Date    Cancer (HCC)     HHT (hereditary hemorrhagic telangiectasia) (HCC)     Prostate cancer (HCC)          SURGICAL HISTORY       Past Surgical History:   Procedure Laterality Date    APPENDECTOMY      CHEST SURGERY      excision of AVM    COLONOSCOPY N/A 1/29/2020    COLONOSCOPY performed by Manas Barr MD at [facility] ENDOSCOPY    COLONOSCOPY  2014    due 18    OTHER SURGICAL HISTORY  07/2018    sclerotheropy    TONSILLECTOMY           CURRENT MEDICATIONS       Previous Medications    ACETAMINOPHEN (TYLENOL) 500 MG TABLET    Take by mouth every 6 hours as needed    ASCORBIC ACID (VITAMIN C) 250 MG TABLET    Take by mouth    DICYCLOMINE (BENTYL) 20 MG TABLET    TAKE 1 TABLET BY

## 2024-09-25 NOTE — ED TRIAGE NOTES
Patient reports he has HHD and he began with a nose bleed about 1.5 hours ago. Patient states he is not on blood thinners. Hx of epistaxis.  Nose clamp applied.  States rhino rockets have helped in the past.

## 2024-09-26 ENCOUNTER — TELEPHONE (OUTPATIENT)
Age: 84
End: 2024-09-26

## 2024-09-26 VITALS
SYSTOLIC BLOOD PRESSURE: 127 MMHG | BODY MASS INDEX: 23.86 KG/M2 | HEART RATE: 106 BPM | WEIGHT: 180 LBS | RESPIRATION RATE: 18 BRPM | OXYGEN SATURATION: 97 % | TEMPERATURE: 98.6 F | DIASTOLIC BLOOD PRESSURE: 62 MMHG | HEIGHT: 73 IN

## 2024-09-26 DIAGNOSIS — I78.0 HHT (HEREDITARY HEMORRHAGIC TELANGIECTASIA) (HCC): Primary | ICD-10-CM

## 2024-09-26 LAB
-: NORMAL
ALBUMIN SERPL-MCNC: 2.8 G/DL (ref 3.5–5)
ALBUMIN/GLOB SERPL: 1.1 (ref 1.1–2.2)
ALP SERPL-CCNC: 55 U/L (ref 45–117)
ALT SERPL-CCNC: 26 U/L (ref 12–78)
ANION GAP SERPL CALC-SCNC: 3 MMOL/L (ref 2–12)
APPEARANCE UR: CLEAR
AST SERPL-CCNC: 18 U/L (ref 15–37)
BILIRUB SERPL-MCNC: 0.5 MG/DL (ref 0.2–1)
BILIRUB UR QL: NEGATIVE
BUN SERPL-MCNC: 36 MG/DL (ref 6–20)
BUN/CREAT SERPL: 39 (ref 12–20)
CALCIUM SERPL-MCNC: 7.4 MG/DL (ref 8.5–10.1)
CHLORIDE SERPL-SCNC: 112 MMOL/L (ref 97–108)
CO2 SERPL-SCNC: 24 MMOL/L (ref 21–32)
COLOR UR: ABNORMAL
CREAT SERPL-MCNC: 0.92 MG/DL (ref 0.7–1.3)
ERYTHROCYTE [DISTWIDTH] IN BLOOD BY AUTOMATED COUNT: 15.9 % (ref 11.5–14.5)
FERRITIN SERPL-MCNC: 44 NG/ML (ref 26–388)
FOLATE SERPL-MCNC: 13.5 NG/ML (ref 5–21)
GLOBULIN SER CALC-MCNC: 2.5 G/DL (ref 2–4)
GLUCOSE SERPL-MCNC: 191 MG/DL (ref 65–100)
GLUCOSE UR STRIP.AUTO-MCNC: 250 MG/DL
HAV IGM SER QL: NONREACTIVE
HBV CORE IGM SER QL: NONREACTIVE
HBV SURFACE AG SER QL: 0.13 INDEX
HBV SURFACE AG SER QL: NEGATIVE
HCT VFR BLD AUTO: 23.7 % (ref 36.6–50.3)
HCT VFR BLD AUTO: 25.5 % (ref 36.6–50.3)
HCV AB SER IA-ACNC: 0.1 INDEX
HCV AB SERPL QL IA: NONREACTIVE
HGB BLD-MCNC: 7.2 G/DL (ref 12.1–17)
HGB BLD-MCNC: 7.8 G/DL (ref 12.1–17)
HGB UR QL STRIP: NEGATIVE
IRON SATN MFR SERPL: 15 % (ref 20–50)
IRON SERPL-MCNC: 40 UG/DL (ref 35–150)
KETONES UR QL STRIP.AUTO: ABNORMAL MG/DL
LEUKOCYTE ESTERASE UR QL STRIP.AUTO: NEGATIVE
MAGNESIUM SERPL-MCNC: 1.9 MG/DL (ref 1.6–2.4)
MCH RBC QN AUTO: 29.9 PG (ref 26–34)
MCHC RBC AUTO-ENTMCNC: 30.4 G/DL (ref 30–36.5)
MCV RBC AUTO: 98.3 FL (ref 80–99)
NITRITE UR QL STRIP.AUTO: NEGATIVE
NRBC # BLD: 0 K/UL (ref 0–0.01)
NRBC BLD-RTO: 0 PER 100 WBC
PH UR STRIP: 6.5 (ref 5–8)
PHOSPHATE SERPL-MCNC: 1.9 MG/DL (ref 2.6–4.7)
PLATELET # BLD AUTO: 164 K/UL (ref 150–400)
PMV BLD AUTO: 10.1 FL (ref 8.9–12.9)
POTASSIUM SERPL-SCNC: 4.1 MMOL/L (ref 3.5–5.1)
PROT SERPL-MCNC: 5.3 G/DL (ref 6.4–8.2)
PROT UR STRIP-MCNC: NEGATIVE MG/DL
RBC # BLD AUTO: 2.41 M/UL (ref 4.1–5.7)
SODIUM SERPL-SCNC: 139 MMOL/L (ref 136–145)
SP GR UR REFRACTOMETRY: 1.02 (ref 1–1.03)
TIBC SERPL-MCNC: 263 UG/DL (ref 250–450)
UROBILINOGEN UR QL STRIP.AUTO: 1 EU/DL (ref 0.2–1)
VIT B12 SERPL-MCNC: 315 PG/ML (ref 193–986)
WBC # BLD AUTO: 8.4 K/UL (ref 4.1–11.1)

## 2024-09-26 PROCEDURE — 36415 COLL VENOUS BLD VENIPUNCTURE: CPT

## 2024-09-26 PROCEDURE — 85027 COMPLETE CBC AUTOMATED: CPT

## 2024-09-26 PROCEDURE — 84100 ASSAY OF PHOSPHORUS: CPT

## 2024-09-26 PROCEDURE — 87086 URINE CULTURE/COLONY COUNT: CPT

## 2024-09-26 PROCEDURE — G0378 HOSPITAL OBSERVATION PER HR: HCPCS

## 2024-09-26 PROCEDURE — 96366 THER/PROPH/DIAG IV INF ADDON: CPT

## 2024-09-26 PROCEDURE — 81002 URINALYSIS NONAUTO W/O SCOPE: CPT

## 2024-09-26 PROCEDURE — 83735 ASSAY OF MAGNESIUM: CPT

## 2024-09-26 PROCEDURE — 85018 HEMOGLOBIN: CPT

## 2024-09-26 PROCEDURE — 85014 HEMATOCRIT: CPT

## 2024-09-26 PROCEDURE — 6370000000 HC RX 637 (ALT 250 FOR IP): Performed by: INTERNAL MEDICINE

## 2024-09-26 PROCEDURE — 80053 COMPREHEN METABOLIC PANEL: CPT

## 2024-09-26 RX ORDER — ONDANSETRON 2 MG/ML
8 INJECTION INTRAMUSCULAR; INTRAVENOUS
OUTPATIENT
Start: 2024-10-17

## 2024-09-26 RX ORDER — ALBUTEROL SULFATE 90 UG/1
4 INHALANT RESPIRATORY (INHALATION) PRN
OUTPATIENT
Start: 2024-10-17

## 2024-09-26 RX ORDER — HEPARIN SODIUM (PORCINE) LOCK FLUSH IV SOLN 100 UNIT/ML 100 UNIT/ML
500 SOLUTION INTRAVENOUS PRN
OUTPATIENT
Start: 2024-10-17

## 2024-09-26 RX ORDER — EPINEPHRINE 1 MG/ML
0.3 INJECTION, SOLUTION, CONCENTRATE INTRAVENOUS PRN
OUTPATIENT
Start: 2024-10-31

## 2024-09-26 RX ORDER — FAMOTIDINE 10 MG/ML
20 INJECTION, SOLUTION INTRAVENOUS
OUTPATIENT
Start: 2024-10-31

## 2024-09-26 RX ORDER — SODIUM CHLORIDE 0.9 % (FLUSH) 0.9 %
5-40 SYRINGE (ML) INJECTION PRN
OUTPATIENT
Start: 2024-10-31

## 2024-09-26 RX ORDER — HEPARIN SODIUM (PORCINE) LOCK FLUSH IV SOLN 100 UNIT/ML 100 UNIT/ML
500 SOLUTION INTRAVENOUS PRN
OUTPATIENT
Start: 2024-10-31

## 2024-09-26 RX ORDER — SODIUM CHLORIDE 0.9 % (FLUSH) 0.9 %
5-40 SYRINGE (ML) INJECTION PRN
OUTPATIENT
Start: 2024-10-17

## 2024-09-26 RX ORDER — ACETAMINOPHEN 325 MG/1
650 TABLET ORAL
OUTPATIENT
Start: 2024-10-17

## 2024-09-26 RX ORDER — ACETAMINOPHEN 325 MG/1
650 TABLET ORAL
OUTPATIENT
Start: 2024-10-31

## 2024-09-26 RX ORDER — ONDANSETRON 2 MG/ML
8 INJECTION INTRAMUSCULAR; INTRAVENOUS
OUTPATIENT
Start: 2024-10-31

## 2024-09-26 RX ORDER — SODIUM CHLORIDE 9 MG/ML
INJECTION, SOLUTION INTRAVENOUS CONTINUOUS
OUTPATIENT
Start: 2024-10-31

## 2024-09-26 RX ORDER — SODIUM CHLORIDE 9 MG/ML
5-250 INJECTION, SOLUTION INTRAVENOUS PRN
OUTPATIENT
Start: 2024-10-17

## 2024-09-26 RX ORDER — FAMOTIDINE 10 MG/ML
20 INJECTION, SOLUTION INTRAVENOUS
OUTPATIENT
Start: 2024-10-17

## 2024-09-26 RX ORDER — SODIUM CHLORIDE 9 MG/ML
5-250 INJECTION, SOLUTION INTRAVENOUS PRN
OUTPATIENT
Start: 2024-10-31

## 2024-09-26 RX ORDER — ALBUTEROL SULFATE 90 UG/1
4 INHALANT RESPIRATORY (INHALATION) PRN
OUTPATIENT
Start: 2024-10-31

## 2024-09-26 RX ORDER — DIPHENHYDRAMINE HYDROCHLORIDE 50 MG/ML
50 INJECTION INTRAMUSCULAR; INTRAVENOUS
OUTPATIENT
Start: 2024-10-17

## 2024-09-26 RX ORDER — DIPHENHYDRAMINE HYDROCHLORIDE 50 MG/ML
50 INJECTION INTRAMUSCULAR; INTRAVENOUS
OUTPATIENT
Start: 2024-10-31

## 2024-09-26 RX ORDER — EPINEPHRINE 1 MG/ML
0.3 INJECTION, SOLUTION, CONCENTRATE INTRAVENOUS PRN
OUTPATIENT
Start: 2024-10-17

## 2024-09-26 RX ORDER — SODIUM CHLORIDE 9 MG/ML
INJECTION, SOLUTION INTRAVENOUS CONTINUOUS
OUTPATIENT
Start: 2024-10-17

## 2024-09-26 RX ADMIN — AMOXICILLIN AND CLAVULANATE POTASSIUM 1 TABLET: 875; 125 TABLET, FILM COATED ORAL at 09:37

## 2024-09-26 NOTE — ED NOTES
TRANSFER - OUT REPORT:    Verbal report given on Williams Fitzgerald Jr  being transferred to  435 for routine progression of patient care       Report consisted of patient's Situation, Background, Assessment and   Recommendations(SBAR).     Information from the following report(s) Nurse Handoff Report, Index, ED Encounter Summary, ED SBAR, Adult Overview, MAR, and Recent Results was reviewed with the receiving nurse.    [place] Fall Assessment:    Presents to emergency department  because of falls (Syncope, seizure, or loss of consciousness): Yes  Age > 70: Yes  Altered Mental Status, Intoxication with alcohol or substance confusion (Disorientation, impaired judgment, poor safety awaremess, or inability to follow instructions): No  Impaired Mobility: Ambulates or transfers with assistive devices or assistance; Unable to ambulate or transer.: Yes  Nursing Judgement: Yes          Lines:   Peripheral IV 09/25/24 Distal;Posterior;Right Forearm (Active)       Peripheral IV 09/25/24 Left Antecubital (Active)        Opportunity for questions and clarification was provided.      Patient transported with:  Tech

## 2024-09-26 NOTE — DISCHARGE INSTRUCTIONS
Hospital Medicine DISCHARGE INSTRUCTIONS    NAME: Williams Fitzgerald Jr   :  1940   MRN:  [id number]     Date:     2024    Admission date: 2024     Discharge date:  2024     Reason for your admission:  Syncope and collapse [R55]  Epistaxis [R04.0]    Discharge Diagnoses:  Epistaxis    DISCHARGE INSTRUCTIONS:    Thank you for allowing us to participate in your care. Your discharging Hospitalist is Cedrick Boudreaux MD. You were admitted for evaluation and treatment for the above diagnoses.    Medications:     It is important that medications are taken exactly as they are prescribed on the discharge medication instructions and keep them your  in the bottles provided by the pharmacist.   Keep a list of the medication names, dosages, and times to be taken at all times.    Do not take other medications without consulting your doctor.     Recommended diet:  regular diet    Recommended activity: activity as tolerated    Post discharge care:    For questions regarding your Hospitalization or to contact the Hospital Medicine team, please call [phone].    Notify follow up health care provider or return to the emergency department if you cannot get hold of your doctor if you feel worse or experience symptoms similar to those that brought you to hospital    Brayan Hallman MD  [address] [facility] [address] [phone]    Call  As needed    Kris Garza MD  [address] [facility]  [address]  [facility] [place] [address] [phone]    Call  to schedule a follow up ASAP       Information obtained by :  I understand that if any problems occur once I am at home I am to contact my physician and I understand and acknowledge receipt of the instructions indicated above.                                                                                                                                           Physician's or R.N.'s Signature

## 2024-09-26 NOTE — ED NOTES
Pt passed swallow screen with minimal sips of water.     Pt requesting not to take oral medication at this current time. This RN agrees based on presentation of swallow screen and Chey AMAYA made aware.     Bleeding controlled for now. Pt has bilateral Rhino Rockets in place.

## 2024-09-26 NOTE — DISCHARGE SUMMARY
[facility]  [address] [place] [address]  Tel: [phone]    Hospital Medicine Discharge Summary    Patient ID:    Williams Fitzgerald Jr  Age:              84 y.o.    : 1940  MRN:             [id number]     PCP: Brayan Hallman MD     Date of Admission: 2024    Date of Discharge:  2024    Discharge Diagnoses:  Principal Problem:    Epistaxis  Active Problems:    HHT (hereditary hemorrhagic telangiectasia) (HCC)    Iron deficiency anemia    Prostate cancer (HCC)    Acute blood loss anemia    Pulmonary arteriovenous malformation    Dizziness  Resolved Problems:    * No resolved hospital problems. *       Reason for admission:    Syncope and collapse [R55]  Epistaxis [R04.0]    Diagnostic testing:    Laboratory data reviewed and independently interpreted:    Recent Labs     24  1302 24  0737   WBC 3.8* 4.0* 8.4  --    HGB 10.3* 8.5* 7.2* 7.8*   HCT 32.9* 27.6* 23.7* 25.5*   RBC 3.36* 2.86* 2.41*  --    MCV 97.9 96.5 98.3  --    MCH 30.7 29.7 29.9  --     208 164  --      No results found for: \"LACTA\"  Recent Labs     24    139   K 3.7 4.1   * 112*   CO2 27 24   GLUCOSE 160* 191*   BUN 23* 36*   CREATININE 1.03 0.92   CALCIUM 8.2* 7.4*   MG  --  1.9   PHOS  --  1.9*   BILITOT  --  0.5   ALKPHOS  --  55   AST  --  18   ALT  --  26   INR 1.1  --      No components found for: \"GLUCOSEPOC\"  Lab Results   Component Value Date/Time    CHOL 137 2021 02:37 PM    TRIG 151 2021 02:37 PM    HDL 51 2021 02:37 PM     Imaging data reviewed:    No results found.    Hospital Course:     Mr. Amilcar Ramirez is a 84 y.o. admitted to [facility] and treated for the following:    Epistaxis POA: severe and recurrent, due to HHT. Packed in ER.  ENT at [facility] (Dr Barba) consulted and recommended him staying here at [facility]. Has remained

## 2024-09-26 NOTE — H&P
[facility]    Hospitalist Admission Note                                                                                                                                  NAME:  Williams Fitzgerald Jr   :   1940   MRN:  [id number]     PCP:  Brayan Hallman MD     Date/Time of service:  2024 9:04 PM  To assist coordination of care and communication with nursing and staff, this note may be preliminary early in the day, but finalized by end of the day.        Subjective:     CHIEF COMPLAINT: nosebleed     HISTORY OF PRESENT ILLNESS:     Mr. Amilcar Ramirez is a 84 y.o. male who presented to the Emergency Department complaining of nosebleed.  This is a recurrent issue.  Required packing and trumpet in ER.  ENT consulted by ER.  Recommned monitoring overnight, for DC in AM if bleeding and anemia stable.     Past Medical History:   Diagnosis Date    HHT (hereditary hemorrhagic telangiectasia) (HCC)     Iron deficiency anemia     Prostate cancer (HCC)     Pulmonary arteriovenous malformation         Past Surgical History:   Procedure Laterality Date    APPENDECTOMY      CHEST SURGERY      excision of AVM    COLONOSCOPY N/A 2020    COLONOSCOPY performed by Manas Barr MD at [facility] ENDOSCOPY    COLONOSCOPY      due     OTHER SURGICAL HISTORY  2018    sclerotheropy    TONSILLECTOMY         Social History     Tobacco Use    Smoking status: Former     Passive exposure: Never    Smokeless tobacco: Never   Substance Use Topics    Alcohol use: Yes     Alcohol/week: 0.0 standard drinks of alcohol        Family History   Problem Relation Age of Onset    Cancer Other         colon, lung    Heart Disease Mother         No Known Allergies     Prior to Admission medications    Medication Sig Start Date End Date Taking? Authorizing Provider   acetaminophen (TYLENOL) 500 MG tablet Take by mouth every 6 hours as needed    Automatic Reconciliation, Ar   ascorbic acid (VITAMIN C) 250 MG tablet  Take by mouth    Automatic Reconciliation, Ar   vitamin D 25 MCG (1000 UT) CAPS Take by mouth daily    Automatic Reconciliation, Ar   dicyclomine (BENTYL) 20 MG tablet TAKE 1 TABLET BY MOUTH 3 TIMES DAILY AS NEEDED FOR ABDOMINAL CRAMPS (NEW DOSE/NEW DIRECTIONS) 6/21/22   Automatic Reconciliation, Ar   tranexamic acid (LYSTEDA) 650 MG TABS tablet Take by mouth 3 times daily    Automatic Reconciliation, Ar       Review of Systems:  (bold if positive, if negative)    Gen:  Eyes:  ENT:  epistaxis  CVS:  dizzinessPulm:  GI:  :  MS:  Skin:  Psych:  Endo:  Hem:  Renal:  Neuro:        Objective:      VITALS:    Vital signs reviewed; most recent are:    Vitals:    09/25/24 1925 09/25/24 1945 09/25/24 2030   BP: (!) 83/52 107/66 121/68   Pulse: (!) 101  (!) 120   Resp: 16  18   Temp: 97.3 °F (36.3 °C)     TempSrc: Oral     SpO2: 100% 100% 99%   Weight: 81.6 kg (180 lb)     Height: 1.854 m (6' 1\")        @[id number]@     No intake or output data in the 24 hours ending 09/25/24 2104     Exam:     Physical Exam:    Gen:  Well-developed, well-nourished, in no acute distress  HEENT:  Pink conjunctivae, PERRL, hearing intact to voice, Packing in nare  Neck:  Supple, without masses, thyroid non-tender  Resp:  No accessory muscle use, clear breath sounds without wheezes rales or rhonchi  Card:  No murmurs, normal S1, S2 without thrills, bruits or peripheral edema  Abd:  Soft, non-tender, non-distended, normoactive bowel sounds are present, no mass  Lymph:  No cervical or inguinal adenopathy  Musc:  No cyanosis or clubbing  Skin:  No rashes or ulcers, skin turgor is good  Neuro:  Cranial nerves are grossly intact, no focal motor weakness, follows commands appropriately  Psych:  Good insight, oriented to person, place and time, alert     Labs:    Recent Labs     09/23/24  1302 09/25/24 1929   WBC 3.8* 4.0*   HGB 10.3* 8.5*   HCT 32.9* 27.6*    208     Recent Labs     09/25/24 1929      K 3.7   *   CO2 27

## 2024-09-27 LAB
BACTERIA SPEC CULT: NORMAL
HAPTOGLOB SERPL-MCNC: 93 MG/DL (ref 30–200)
SERVICE CMNT-IMP: NORMAL

## 2024-09-28 ENCOUNTER — HOSPITAL ENCOUNTER (EMERGENCY)
Facility: HOSPITAL | Age: 84
Discharge: HOME | End: 2024-09-28
Attending: EMERGENCY MEDICINE
Payer: MEDICARE

## 2024-09-28 VITALS
HEIGHT: 72 IN | OXYGEN SATURATION: 99 % | SYSTOLIC BLOOD PRESSURE: 126 MMHG | BODY MASS INDEX: 23.83 KG/M2 | WEIGHT: 175.93 LBS | RESPIRATION RATE: 12 BRPM | HEART RATE: 73 BPM | DIASTOLIC BLOOD PRESSURE: 61 MMHG | TEMPERATURE: 98.3 F

## 2024-09-28 DIAGNOSIS — K40.91 UNILATERAL RECURRENT INGUINAL HERNIA WITHOUT OBSTRUCTION OR GANGRENE: ICD-10-CM

## 2024-09-28 DIAGNOSIS — D50.9 IRON DEFICIENCY ANEMIA, UNSPECIFIED IRON DEFICIENCY ANEMIA TYPE: Primary | ICD-10-CM

## 2024-09-28 LAB
ALBUMIN SERPL-MCNC: 3.5 G/DL (ref 3.5–5)
ALBUMIN/GLOB SERPL: 1.2 (ref 1.1–2.2)
ALP SERPL-CCNC: 62 U/L (ref 45–117)
ALT SERPL-CCNC: 22 U/L (ref 12–78)
ANION GAP SERPL CALC-SCNC: 9 MMOL/L (ref 2–12)
AST SERPL-CCNC: 17 U/L (ref 15–37)
BASOPHILS # BLD: 0.1 K/UL (ref 0–0.1)
BASOPHILS NFR BLD: 1 % (ref 0–1)
BILIRUB SERPL-MCNC: 0.5 MG/DL (ref 0.2–1)
BUN SERPL-MCNC: 24 MG/DL (ref 6–20)
BUN/CREAT SERPL: 24 (ref 12–20)
CALCIUM SERPL-MCNC: 8.8 MG/DL (ref 8.5–10.1)
CHLORIDE SERPL-SCNC: 104 MMOL/L (ref 97–108)
CO2 SERPL-SCNC: 27 MMOL/L (ref 21–32)
CREAT SERPL-MCNC: 1.01 MG/DL (ref 0.7–1.3)
DIFFERENTIAL METHOD BLD: ABNORMAL
EOSINOPHIL # BLD: 0.1 K/UL (ref 0–0.4)
EOSINOPHIL NFR BLD: 1 % (ref 0–7)
ERYTHROCYTE [DISTWIDTH] IN BLOOD BY AUTOMATED COUNT: 16.4 % (ref 11.5–14.5)
GLOBULIN SER CALC-MCNC: 3 G/DL (ref 2–4)
GLUCOSE SERPL-MCNC: 116 MG/DL (ref 65–100)
HCT VFR BLD AUTO: 22.4 % (ref 36.6–50.3)
HGB BLD-MCNC: 7 G/DL (ref 12.1–17)
IMM GRANULOCYTES # BLD AUTO: 0 K/UL (ref 0–0.04)
IMM GRANULOCYTES NFR BLD AUTO: 0 % (ref 0–0.5)
LYMPHOCYTES # BLD: 0.3 K/UL (ref 0.8–3.5)
LYMPHOCYTES NFR BLD: 5 % (ref 12–49)
MCH RBC QN AUTO: 29.9 PG (ref 26–34)
MCHC RBC AUTO-ENTMCNC: 31.3 G/DL (ref 30–36.5)
MCV RBC AUTO: 95.7 FL (ref 80–99)
MONOCYTES # BLD: 0.9 K/UL (ref 0–1)
MONOCYTES NFR BLD: 14 % (ref 5–13)
NEUTS SEG # BLD: 4.7 K/UL (ref 1.8–8)
NEUTS SEG NFR BLD: 79 % (ref 32–75)
NRBC # BLD: 0 K/UL (ref 0–0.01)
NRBC BLD-RTO: 0 PER 100 WBC
PLATELET # BLD AUTO: 143 K/UL (ref 150–400)
PMV BLD AUTO: 10.4 FL (ref 8.9–12.9)
POTASSIUM SERPL-SCNC: 3.8 MMOL/L (ref 3.5–5.1)
PROT SERPL-MCNC: 6.5 G/DL (ref 6.4–8.2)
RBC # BLD AUTO: 2.34 M/UL (ref 4.1–5.7)
RBC MORPH BLD: ABNORMAL
SODIUM SERPL-SCNC: 140 MMOL/L (ref 136–145)
WBC # BLD AUTO: 6.1 K/UL (ref 4.1–11.1)

## 2024-09-28 PROCEDURE — 99283 EMERGENCY DEPT VISIT LOW MDM: CPT

## 2024-09-28 PROCEDURE — 80053 COMPREHEN METABOLIC PANEL: CPT

## 2024-09-28 PROCEDURE — 36415 COLL VENOUS BLD VENIPUNCTURE: CPT

## 2024-09-28 PROCEDURE — 85025 COMPLETE CBC W/AUTO DIFF WBC: CPT

## 2024-09-28 ASSESSMENT — PAIN SCALES - GENERAL: PAINLEVEL_OUTOF10: 6

## 2024-09-28 ASSESSMENT — PAIN DESCRIPTION - DESCRIPTORS: DESCRIPTORS: SORE

## 2024-09-28 ASSESSMENT — PAIN DESCRIPTION - ORIENTATION: ORIENTATION: RIGHT

## 2024-09-28 ASSESSMENT — PAIN DESCRIPTION - LOCATION: LOCATION: GROIN

## 2024-09-28 ASSESSMENT — PAIN - FUNCTIONAL ASSESSMENT: PAIN_FUNCTIONAL_ASSESSMENT: 0-10

## 2024-09-28 NOTE — ED PROVIDER NOTES
[facility]  EMERGENCY DEPARTMENT ENCOUNTER      Pt Name: Williams Fitzgerald Jr  MRN: [id number]  Birthdate 1940  Date of evaluation: 9/28/2024  Provider: Virginia Dumont DO    CHIEF COMPLAINT       Chief Complaint   Patient presents with    Groin Pain     Right side         HISTORY OF PRESENT ILLNESS   (Location/Symptom, Timing/Onset, Context/Setting, Quality, Duration, Modifying Factors, Severity)  Note limiting factors.   HPI      Review of External Medical Records:     Nursing Notes were reviewed.    REVIEW OF SYSTEMS    (2-9 systems for level 4, 10 or more for level 5)     Review of Systems    Except as noted above the remainder of the review of systems was reviewed and negative.       PAST MEDICAL HISTORY     Past Medical History:   Diagnosis Date    HHT (hereditary hemorrhagic telangiectasia) (HCC)     Iron deficiency anemia     Prostate cancer (HCC)     Pulmonary arteriovenous malformation          SURGICAL HISTORY       Past Surgical History:   Procedure Laterality Date    APPENDECTOMY      CHEST SURGERY      excision of AVM    COLONOSCOPY N/A 1/29/2020    COLONOSCOPY performed by Manas Barr MD at [facility] ENDOSCOPY    COLONOSCOPY  2014    due 18    OTHER SURGICAL HISTORY  07/2018    sclerotheropy    TONSILLECTOMY           CURRENT MEDICATIONS       Previous Medications    ACETAMINOPHEN (TYLENOL) 500 MG TABLET    Take by mouth every 6 hours as needed    AMOXICILLIN-CLAVULANATE (AUGMENTIN) 875-125 MG PER TABLET    Take 1 tablet by mouth every 12 hours for 7 days    ASCORBIC ACID (VITAMIN C) 250 MG TABLET    Take by mouth    DICYCLOMINE (BENTYL) 20 MG TABLET    TAKE 1 TABLET BY MOUTH 3 TIMES DAILY AS NEEDED FOR ABDOMINAL CRAMPS (NEW DOSE/NEW DIRECTIONS)    TRANEXAMIC ACID (LYSTEDA) 650 MG TABS TABLET    Take 2 tablets by mouth 3 times daily    VITAMIN D 25 MCG (1000 UT) CAPS    Take by mouth daily       ALLERGIES     Patient has no known allergies.    FAMILY HISTORY       Family History  Breath sounds: Normal breath sounds.   Abdominal:      General: Bowel sounds are normal. There is no distension.      Tenderness: There is no abdominal tenderness.   Genitourinary:     Testes: Normal.   Musculoskeletal:         General: Normal range of motion.   Skin:     General: Skin is warm and dry.      Coloration: Skin is pale.   Neurological:      General: No focal deficit present.      Mental Status: He is alert.         DIAGNOSTIC RESULTS     EKG: All EKG's are interpreted by the Emergency Department Physician who either signs or Co-signs this chart in the absence of a cardiologist.        RADIOLOGY:   Non-plain film images such as CT, Ultrasound and MRI are read by the radiologist. Plain radiographic images are visualized and preliminarily interpreted by the emergency physician with the below findings:        Interpretation per the Radiologist below, if available at the time of this note:    No orders to display        LABS:  Labs Reviewed   CBC WITH AUTO DIFFERENTIAL - Abnormal; Notable for the following components:       Result Value    RBC 2.34 (*)     Hemoglobin 7.0 (*)     Hematocrit 22.4 (*)     RDW 16.4 (*)     Platelets 143 (*)     Neutrophils % 79 (*)     Lymphocytes % 5 (*)     Monocytes % 14 (*)     Lymphocytes Absolute 0.3 (*)     All other components within normal limits   COMPREHENSIVE METABOLIC PANEL - Abnormal; Notable for the following components:    Glucose 116 (*)     BUN 24 (*)     BUN/Creatinine Ratio 24 (*)     All other components within normal limits       All other labs were within normal range or not returned as of this dictation.    EMERGENCY DEPARTMENT COURSE and DIFFERENTIAL DIAGNOSIS/MDM:   Vitals:    Vitals:    09/28/24 1217   BP: 121/71   Pulse: 73   Resp: 12   Temp: 98.3 °F (36.8 °C)   TempSrc: Tympanic   SpO2: 98%   Weight: 79.8 kg (175 lb 14.8 oz)   Height: 1.829 m (6')           Medical Decision Making  84-year-old male with a past medical history of frequent inguinal

## 2024-09-28 NOTE — ED TRIAGE NOTES
84 year old male pt comes to the ED via POV for a CC Right groin pain. Pt states that this has been going on for 3 years and got checked out before at [facility]. Pt states that this has been getting a little worse this morning at 7 and worse throughout the day. Pt is A&Ox4.

## 2024-10-03 ENCOUNTER — OFFICE VISIT (OUTPATIENT)
Age: 84
End: 2024-10-03
Payer: MEDICARE

## 2024-10-03 ENCOUNTER — HOSPITAL ENCOUNTER (OUTPATIENT)
Facility: HOSPITAL | Age: 84
Setting detail: INFUSION SERIES
Discharge: HOME | End: 2024-10-03
Payer: MEDICARE

## 2024-10-03 VITALS
HEART RATE: 75 BPM | SYSTOLIC BLOOD PRESSURE: 135 MMHG | RESPIRATION RATE: 16 BRPM | DIASTOLIC BLOOD PRESSURE: 62 MMHG | HEIGHT: 72 IN | TEMPERATURE: 97.7 F | BODY MASS INDEX: 24.37 KG/M2 | OXYGEN SATURATION: 100 % | WEIGHT: 179.9 LBS

## 2024-10-03 VITALS
TEMPERATURE: 97.7 F | OXYGEN SATURATION: 96 % | HEIGHT: 72 IN | HEART RATE: 57 BPM | WEIGHT: 179 LBS | DIASTOLIC BLOOD PRESSURE: 68 MMHG | RESPIRATION RATE: 18 BRPM | BODY MASS INDEX: 24.24 KG/M2 | SYSTOLIC BLOOD PRESSURE: 104 MMHG

## 2024-10-03 DIAGNOSIS — I78.0 HHT (HEREDITARY HEMORRHAGIC TELANGIECTASIA) (HCC): Primary | ICD-10-CM

## 2024-10-03 DIAGNOSIS — D62 ACUTE BLOOD LOSS ANEMIA: ICD-10-CM

## 2024-10-03 LAB
BASOPHILS # BLD: 0 K/UL (ref 0–0.1)
BASOPHILS NFR BLD: 1 % (ref 0–1)
DIFFERENTIAL METHOD BLD: ABNORMAL
EOSINOPHIL # BLD: 0.1 K/UL (ref 0–0.4)
EOSINOPHIL NFR BLD: 4 % (ref 0–7)
ERYTHROCYTE [DISTWIDTH] IN BLOOD BY AUTOMATED COUNT: 15 % (ref 11.5–14.5)
HCT VFR BLD AUTO: 21.6 % (ref 36.6–50.3)
HGB BLD-MCNC: 6.6 G/DL (ref 12.1–17)
HISTORY CHECK: NORMAL
IMM GRANULOCYTES # BLD AUTO: 0 K/UL (ref 0–0.04)
IMM GRANULOCYTES NFR BLD AUTO: 0 % (ref 0–0.5)
LYMPHOCYTES # BLD: 0.4 K/UL (ref 0.8–3.5)
LYMPHOCYTES NFR BLD: 13 % (ref 12–49)
MCH RBC QN AUTO: 29.2 PG (ref 26–34)
MCHC RBC AUTO-ENTMCNC: 30.6 G/DL (ref 30–36.5)
MCV RBC AUTO: 95.6 FL (ref 80–99)
MONOCYTES # BLD: 0.6 K/UL (ref 0–1)
MONOCYTES NFR BLD: 18 % (ref 5–13)
NEUTS SEG # BLD: 2.3 K/UL (ref 1.8–8)
NEUTS SEG NFR BLD: 64 % (ref 32–75)
NRBC # BLD: 0 K/UL (ref 0–0.01)
NRBC BLD-RTO: 0 PER 100 WBC
PLATELET # BLD AUTO: 176 K/UL (ref 150–400)
PMV BLD AUTO: 11.1 FL (ref 8.9–12.9)
RBC # BLD AUTO: 2.26 M/UL (ref 4.1–5.7)
RBC MORPH BLD: ABNORMAL
WBC # BLD AUTO: 3.4 K/UL (ref 4.1–11.1)

## 2024-10-03 PROCEDURE — 2580000003 HC RX 258: Performed by: INTERNAL MEDICINE

## 2024-10-03 PROCEDURE — P9016 RBC LEUKOCYTES REDUCED: HCPCS

## 2024-10-03 PROCEDURE — 86850 RBC ANTIBODY SCREEN: CPT

## 2024-10-03 PROCEDURE — 36415 COLL VENOUS BLD VENIPUNCTURE: CPT

## 2024-10-03 PROCEDURE — 86923 COMPATIBILITY TEST ELECTRIC: CPT

## 2024-10-03 PROCEDURE — 86900 BLOOD TYPING SEROLOGIC ABO: CPT

## 2024-10-03 PROCEDURE — 99215 OFFICE O/P EST HI 40 MIN: CPT | Performed by: NURSE PRACTITIONER

## 2024-10-03 PROCEDURE — 86901 BLOOD TYPING SEROLOGIC RH(D): CPT

## 2024-10-03 PROCEDURE — 85025 COMPLETE CBC W/AUTO DIFF WBC: CPT

## 2024-10-03 PROCEDURE — 36430 TRANSFUSION BLD/BLD COMPNT: CPT

## 2024-10-03 PROCEDURE — 96413 CHEMO IV INFUSION 1 HR: CPT

## 2024-10-03 PROCEDURE — 6360000002 HC RX W HCPCS: Performed by: INTERNAL MEDICINE

## 2024-10-03 RX ORDER — ALBUTEROL SULFATE 90 UG/1
4 INHALANT RESPIRATORY (INHALATION) PRN
OUTPATIENT
Start: 2024-10-03

## 2024-10-03 RX ORDER — SODIUM CHLORIDE 9 MG/ML
25 INJECTION, SOLUTION INTRAVENOUS PRN
Status: DISCONTINUED | OUTPATIENT
Start: 2024-10-03 | End: 2024-10-04 | Stop reason: HOSPADM

## 2024-10-03 RX ORDER — ALBUTEROL SULFATE 90 UG/1
4 INHALANT RESPIRATORY (INHALATION) PRN
Status: CANCELLED | OUTPATIENT
Start: 2024-10-03

## 2024-10-03 RX ORDER — SODIUM CHLORIDE 9 MG/ML
25 INJECTION, SOLUTION INTRAVENOUS PRN
Status: CANCELLED | OUTPATIENT
Start: 2024-10-03

## 2024-10-03 RX ORDER — ONDANSETRON 2 MG/ML
8 INJECTION INTRAMUSCULAR; INTRAVENOUS
Status: CANCELLED | OUTPATIENT
Start: 2024-10-03

## 2024-10-03 RX ORDER — ALBUTEROL SULFATE 90 UG/1
4 INHALANT RESPIRATORY (INHALATION) PRN
OUTPATIENT
Start: 2024-11-14

## 2024-10-03 RX ORDER — SODIUM CHLORIDE 9 MG/ML
20 INJECTION, SOLUTION INTRAVENOUS CONTINUOUS
Status: CANCELLED | OUTPATIENT
Start: 2024-10-03

## 2024-10-03 RX ORDER — SODIUM CHLORIDE 9 MG/ML
INJECTION, SOLUTION INTRAVENOUS CONTINUOUS
OUTPATIENT
Start: 2024-11-14

## 2024-10-03 RX ORDER — FAMOTIDINE 10 MG/ML
20 INJECTION, SOLUTION INTRAVENOUS
OUTPATIENT
Start: 2024-10-03

## 2024-10-03 RX ORDER — SODIUM CHLORIDE 0.9 % (FLUSH) 0.9 %
5-40 SYRINGE (ML) INJECTION PRN
Status: DISCONTINUED | OUTPATIENT
Start: 2024-10-03 | End: 2024-10-04 | Stop reason: HOSPADM

## 2024-10-03 RX ORDER — SODIUM CHLORIDE 0.9 % (FLUSH) 0.9 %
5-40 SYRINGE (ML) INJECTION PRN
Status: CANCELLED | OUTPATIENT
Start: 2024-10-03

## 2024-10-03 RX ORDER — SODIUM CHLORIDE 9 MG/ML
5-250 INJECTION, SOLUTION INTRAVENOUS PRN
OUTPATIENT
Start: 2024-11-14

## 2024-10-03 RX ORDER — ACETAMINOPHEN 325 MG/1
650 TABLET ORAL
OUTPATIENT
Start: 2024-10-03

## 2024-10-03 RX ORDER — SODIUM CHLORIDE 9 MG/ML
INJECTION, SOLUTION INTRAVENOUS CONTINUOUS
Status: CANCELLED | OUTPATIENT
Start: 2024-10-03

## 2024-10-03 RX ORDER — EPINEPHRINE 1 MG/ML
0.3 INJECTION, SOLUTION, CONCENTRATE INTRAVENOUS PRN
OUTPATIENT
Start: 2024-11-14

## 2024-10-03 RX ORDER — DIPHENHYDRAMINE HYDROCHLORIDE 50 MG/ML
50 INJECTION INTRAMUSCULAR; INTRAVENOUS
OUTPATIENT
Start: 2024-10-03

## 2024-10-03 RX ORDER — ONDANSETRON 2 MG/ML
8 INJECTION INTRAMUSCULAR; INTRAVENOUS
OUTPATIENT
Start: 2024-11-14

## 2024-10-03 RX ORDER — SODIUM CHLORIDE 9 MG/ML
5-250 INJECTION, SOLUTION INTRAVENOUS PRN
Status: DISCONTINUED | OUTPATIENT
Start: 2024-10-03 | End: 2024-10-04 | Stop reason: HOSPADM

## 2024-10-03 RX ORDER — HEPARIN SODIUM (PORCINE) LOCK FLUSH IV SOLN 100 UNIT/ML 100 UNIT/ML
500 SOLUTION INTRAVENOUS PRN
OUTPATIENT
Start: 2024-11-14

## 2024-10-03 RX ORDER — DIPHENHYDRAMINE HYDROCHLORIDE 50 MG/ML
50 INJECTION INTRAMUSCULAR; INTRAVENOUS
Status: CANCELLED | OUTPATIENT
Start: 2024-10-03

## 2024-10-03 RX ORDER — SODIUM CHLORIDE 9 MG/ML
INJECTION, SOLUTION INTRAVENOUS CONTINUOUS
OUTPATIENT
Start: 2024-10-03

## 2024-10-03 RX ORDER — SODIUM CHLORIDE 9 MG/ML
20 INJECTION, SOLUTION INTRAVENOUS CONTINUOUS
Status: DISCONTINUED | OUTPATIENT
Start: 2024-10-03 | End: 2024-10-04 | Stop reason: HOSPADM

## 2024-10-03 RX ORDER — FAMOTIDINE 10 MG/ML
20 INJECTION, SOLUTION INTRAVENOUS
OUTPATIENT
Start: 2024-11-14

## 2024-10-03 RX ORDER — EPINEPHRINE 1 MG/ML
0.3 INJECTION, SOLUTION, CONCENTRATE INTRAVENOUS PRN
Status: CANCELLED | OUTPATIENT
Start: 2024-10-03

## 2024-10-03 RX ORDER — EPINEPHRINE 1 MG/ML
0.3 INJECTION, SOLUTION INTRAMUSCULAR; SUBCUTANEOUS PRN
OUTPATIENT
Start: 2024-10-03

## 2024-10-03 RX ORDER — SODIUM CHLORIDE 0.9 % (FLUSH) 0.9 %
5-40 SYRINGE (ML) INJECTION PRN
OUTPATIENT
Start: 2024-11-14

## 2024-10-03 RX ORDER — FAMOTIDINE 10 MG/ML
20 INJECTION, SOLUTION INTRAVENOUS
Status: CANCELLED | OUTPATIENT
Start: 2024-10-03

## 2024-10-03 RX ORDER — ONDANSETRON 2 MG/ML
8 INJECTION INTRAMUSCULAR; INTRAVENOUS
OUTPATIENT
Start: 2024-10-03

## 2024-10-03 RX ORDER — ACETAMINOPHEN 325 MG/1
650 TABLET ORAL
OUTPATIENT
Start: 2024-11-14

## 2024-10-03 RX ORDER — DIPHENHYDRAMINE HYDROCHLORIDE 50 MG/ML
50 INJECTION INTRAMUSCULAR; INTRAVENOUS
OUTPATIENT
Start: 2024-11-14

## 2024-10-03 RX ORDER — ACETAMINOPHEN 325 MG/1
650 TABLET ORAL
Status: CANCELLED | OUTPATIENT
Start: 2024-10-03

## 2024-10-03 RX ADMIN — SODIUM CHLORIDE 25 ML/HR: 9 INJECTION, SOLUTION INTRAVENOUS at 14:51

## 2024-10-03 RX ADMIN — SODIUM CHLORIDE 425 MG: 9 INJECTION, SOLUTION INTRAVENOUS at 15:07

## 2024-10-03 ASSESSMENT — PAIN SCALES - GENERAL: PAINLEVEL_OUTOF10: 0

## 2024-10-03 NOTE — PROGRESS NOTES
Williams Fitzgerald  is a 84 y.o. male follow up HHT and anemia.    1. Have you been to the ER, urgent care clinic since your last visit?  Hospitalized since your last visit?no    2. Have you seen or consulted any other health care providers outside of the [facility] since your last visit?  Include any pap smears or colon screening. Yes      10/3/2024  1:27 PM   Vitals    SYSTOLIC 104    DIASTOLIC 68    Site    Position    Cuff Size    Pulse 57    Temp 97.7 °F (36.5 °C)    Respirations 18    SpO2 96 %    Weight - Scale 179 lb 14.4 oz    Height 6' 0.008\"    Body Mass Index 24.39 kg/m2    Pain Score    Pain Level 0

## 2024-10-03 NOTE — PROGRESS NOTES
\[facility] [place] [facility]\"" Progress Note    Date: October 3, 2024    Name: Williams Fitzgerald Jr    MRN: [id number]         : 1940    Mr. Amilcar Ramirez Arrived ambulatory and in no distress for C1D1 of Zirabev + 1 Unit PRBC.  Assessment was completed, no acute issues at this time, no new complaints voiced.  22g PIV placed in left FA  labs drawn & sent for processing.    Mr. Amilcar Ramirez's vitals were reviewed.  Vitals:    10/03/24 1327   BP: 104/68   Pulse: 57   Resp: 18   Temp: 97.7 °F (36.5 °C)   SpO2: 96%       Lab results were obtained and reviewed.  Recent Results (from the past 12 hour(s))   CBC With Auto Differential    Collection Time: 10/03/24  1:35 PM   Result Value Ref Range    WBC 3.4 (L) 4.1 - 11.1 K/uL    RBC 2.26 (L) 4.10 - 5.70 M/uL    Hemoglobin 6.6 (L) 12.1 - 17.0 g/dL    Hematocrit 21.6 (L) 36.6 - 50.3 %    MCV 95.6 80.0 - 99.0 FL    MCH 29.2 26.0 - 34.0 PG    MCHC 30.6 30.0 - 36.5 g/dL    RDW 15.0 (H) 11.5 - 14.5 %    Platelets 176 150 - 400 K/uL    MPV 11.1 8.9 - 12.9 FL    Nucleated RBCs 0.0 0  WBC    nRBC 0.00 0.00 - 0.01 K/uL    Neutrophils % 64 32 - 75 %    Lymphocytes % 13 12 - 49 %    Monocytes % 18 (H) 5 - 13 %    Eosinophils % 4 0 - 7 %    Basophils % 1 0 - 1 %    Immature Granulocytes % 0 0.0 - 0.5 %    Neutrophils Absolute 2.3 1.8 - 8.0 K/UL    Lymphocytes Absolute 0.4 (L) 0.8 - 3.5 K/UL    Monocytes Absolute 0.6 0.0 - 1.0 K/UL    Eosinophils Absolute 0.1 0.0 - 0.4 K/UL    Basophils Absolute 0.0 0.0 - 0.1 K/UL    Immature Granulocytes Absolute 0.0 0.00 - 0.04 K/UL    Differential Type SMEAR SCANNED      RBC Comment NORMOCYTIC, NORMOCHROMIC       Medications:  Medications Administered         0.9 % sodium chloride infusion Admin Date  10/03/2024 Action  New Bag Dose  25 mL/hr Rate  25 mL/hr Route  IntraVENous Documented By  Sumi Holloway, RN        bevacizumab-bvzr (ZIRABEV) 425 mg in sodium chloride 0.9 % 100 mL chemo IVPB Admin Date  10/03/2024 Action  New Bag Dose  425 mg Rate  254 mL/hr

## 2024-10-03 NOTE — PROGRESS NOTES
[facility] [place] at [facility]  [address] [address], [address] [facility] [address]  W: [phone]  F: [phone]      Reason for Visit:   Williams Fitzgerald Jr is a 84 y.o. male who is seen today for evaluation of anemia, HHT.    History of Present Illness:   Seen in ED on 9/28/2024 due to pain in groin from hernias, states this is not bothering him lately. Seen in ED on 9/25/2024 due to significant bleeding from nose. This was \"the worst bleed I've ever had\". No further bleeding since then.         Review of systems was obtained and pertinent findings reviewed above. Past medical history, social history, family history, medications, and allergies are located in the electronic medical record.    Physical Exam:   Visit Vitals  /68   Pulse 57   Temp 97.7 °F (36.5 °C)   Resp 18   Ht 1.829 m (6')   Wt 81.2 kg (179 lb)   SpO2 96%   BMI 24.28 kg/m²     General: no distress  Respiratory: normal respiratory effort  CV: no peripheral edema  Skin: no rashes; no ecchymoses; no petechiae    Results:     Lab Results   Component Value Date    WBC 6.1 09/28/2024    HGB 7.0 (L) 09/28/2024    HCT 22.4 (L) 09/28/2024     (L) 09/28/2024    MCV 95.7 09/28/2024    NEUTROABS 4.7 09/28/2024     Lab Results   Component Value Date     09/28/2024    K 3.8 09/28/2024     09/28/2024    CO2 27 09/28/2024    GLUCOSE 116 (H) 09/28/2024    BUN 24 (H) 09/28/2024    CREATININE 1.01 09/28/2024    LABGLOM 73 09/28/2024    CALCIUM 8.8 09/28/2024    MG 1.9 09/26/2024    PHOS 1.9 (L) 09/26/2024     Lab Results   Component Value Date    BILITOT 0.5 09/28/2024    ALT 22 09/28/2024    AST 17 09/28/2024    ALKPHOS 62 09/28/2024    GLOB 3.0 09/28/2024     Lab Results   Component Value Date    RETICCTPCT 1.3 02/24/2021    IRON 40 09/25/2024    TIBC 263 09/25/2024    IRONPERSAT 15 (L) 09/25/2024    FERRITIN 44 09/25/2024    ZKLRKEUS43 315 09/25/2024    FOLATE 13.5 09/25/2024     02/05/2021

## 2024-10-03 NOTE — CONSENT
Informed Consent for Blood Component Transfusion Note    I have discussed with the patient the rationale for blood component transfusion; its benefits in treating or preventing fatigue, organ damage, or death; and its risk which includes mild transfusion reactions, rare risk of blood borne infection, or more serious but rare reactions. I have discussed the alternatives to transfusion, including the risk and consequences of not receiving transfusion. The patient had an opportunity to ask questions and had agreed to proceed with transfusion of blood components.    Electronically signed by MARTÍNEZ Renee NP on 10/3/24 at 2:21 PM EDT

## 2024-10-04 ENCOUNTER — TELEPHONE (OUTPATIENT)
Age: 84
End: 2024-10-04

## 2024-10-04 LAB
ABO + RH BLD: NORMAL
BLD PROD TYP BPU: NORMAL
BLOOD BANK BLOOD PRODUCT EXPIRATION DATE: NORMAL
BLOOD BANK DISPENSE STATUS: NORMAL
BLOOD BANK ISBT PRODUCT BLOOD TYPE: 9500
BLOOD BANK PRODUCT CODE: NORMAL
BLOOD BANK UNIT TYPE AND RH: NORMAL
BLOOD GROUP ANTIBODIES SERPL: NORMAL
BPU ID: NORMAL
CROSSMATCH RESULT: NORMAL
SPECIMEN EXP DATE BLD: NORMAL
UNIT DIVISION: 0
UNIT ISSUE DATE/TIME: NORMAL

## 2024-10-04 NOTE — PROGRESS NOTES
[facility] [place] at [address], [address] [facility] [address] [facility]  Martin Deutscher: [phone]  F: [phone]      Reason for Visit:   Tayla Lerma is a 79 y.o. male who is seen for follow up of anemia. History of Present Illness:   He has been having increasing epistaxis. He reports having a colonoscopy and had a severe nose bleed during the procedure, ENT was called in and packed his nose. When he followed up with ENT to have the packing removed, he was started on a saline nasal spray which has helped quite a bit, bleeding much improved recently. He received additional IV iron in 7/2022 and 8/2022. He had an appointment scheduled with Dr. Collins Paez at [address] last week, but he cancelled this as he has been doing well with his nosebleeds. Taking oral iron once daily. He is tolerating this dose well. He is accompanied by his wife. His wife is Sylvester Lombardi, a realtor who helped me with the purchase of my current home. Three of his four children have HHT. Review of systems was obtained and pertinent findings reviewed above. Past medical history, social history, family history, medications, and allergies are located in the electronic medical record. Physical Exam:     Visit Vitals  BP (!) 107/59 (BP 1 Location: Right arm, BP Patient Position: Sitting)   Pulse 68   Temp 97.2 °F (36.2 °C) (Temporal)   Resp 18   Ht 6' (1.829 m)   Wt 178 lb (80.7 kg)   SpO2 98%   BMI 24.14 kg/m²       General: no distress  Respiratory: normal respiratory effort  CV: 2+ peripheral edema, mild erythema without tenderness to left shin  Skin: no rashes; no ecchymoses; no petechiae      Results:     Lab Results   Component Value Date/Time    WBC 3.6 09/09/2022 09:47 AM    HGB 11.3 (L) 09/09/2022 09:47 AM    HCT 35.3 (L) 09/09/2022 09:47 AM    PLATELET 813 34/50/2094 09:47 AM    MCV 96 09/09/2022 09:47 AM    ABS.  NEUTROPHILS 2.3 09/09/2022 09:47 AM     Lab Results   Component Value Date/Time    Sodium 141 07/20/2022 05:23 PM    Potassium 4.0 07/20/2022 05:23 PM    Chloride 105 07/20/2022 05:23 PM    CO2 29 07/20/2022 05:23 PM    Glucose 123 (H) 07/20/2022 05:23 PM    BUN 15 07/20/2022 05:23 PM    Creatinine 0.94 07/20/2022 05:23 PM    GFR est AA >60 07/20/2022 05:23 PM    GFR est non-AA >60 07/20/2022 05:23 PM    Calcium 8.6 07/20/2022 05:23 PM     Lab Results   Component Value Date/Time    Bilirubin, total 0.3 07/20/2022 05:23 PM    ALT (SGPT) 21 07/20/2022 05:23 PM    Alk.  phosphatase 61 07/20/2022 05:23 PM    Protein, total 6.4 07/20/2022 05:23 PM    Albumin 3.3 (L) 07/20/2022 05:23 PM    Globulin 3.1 07/20/2022 05:23 PM     Lab Results   Component Value Date/Time    Reticulocyte count 1.3 02/24/2021 08:18 AM    Iron % saturation 17 09/09/2022 09:47 AM    TIBC 256 09/09/2022 09:47 AM    Ferritin 172 09/09/2022 09:47 AM    Vitamin B12 391 02/24/2021 08:18 AM    Folate 14.0 02/24/2021 08:18 AM    Methylmalonic acid 300 02/05/2021 12:50 PM    Haptoglobin 144 02/24/2021 08:18 AM     02/24/2021 08:18 AM    TSH 1.67 03/03/2022 04:35 PM    M-Terell Not Observed 02/24/2021 08:18 AM     HGB (g/dL)   Date Value   09/09/2022 11.3 (L)   08/03/2022 10.8 (L)   07/27/2022 10.0 (L)   07/20/2022 9.4 (L)   06/06/2022 12.5 (L)   04/27/2022 11.9 (L)   03/08/2022 11.3 (L)   03/03/2022 11.2 (L)   01/24/2022 12.1 (L)   10/04/2021 12.2 (L)   07/19/2021 12.5 (L)   06/16/2021 11.5 (L)   05/12/2021 10.3 (L)   02/24/2021 10.0 (L)   02/05/2021 9.3 (L)   02/01/2021 9.7 (L)   11/24/2020 10.3 (L)   05/21/2020 11.1 (L)   01/31/2020 13.3   04/05/2019 12.0 (L)   11/05/2018 11.6 (L)   07/17/2018 12.0 (L)   06/12/2017 14.7   08/15/2016 12.1 (L)   01/26/2016 11.4 (L)   10/30/2015 11.9 (L)   04/02/2015 12.7   10/07/2014 12.1 (L)   08/20/2014 10.1 (L)     Ferritin (ng/mL)   Date Value   09/09/2022 172   06/06/2022 62   03/08/2022 55   01/24/2022 34   10/04/2021 38   07/19/2021 150   06/16/2021 1,062 (H)   05/12/2021 22 (L)   02/24/2021 13 (L)         Assessment:   1) Iron deficiency anemia  He is s/p injectafer x2 doses in 3/2021, 6/2021, 3/2022, and 8/2022. HGB improves with each treatment, but his iron deficiency subsequently recurs. He is on oral iron as well. His currently labs show an improvement in his HGB up to 11.3, and normalization of his iron studies. Given his significant bleeding, I suspect he will need additional IV iron in the near future. Monitor labs closely. The cause of his iron deficiency is blood loss related to his HHT. He is likely to have recurrence. He should continue oral iron as tolerated. We will monitor his labs and give additional IV iron as needed. 2) HHT  Managed at [facility]. He is s/p treatment for pulmonary AVMs as well as epistaxis. He is receiving regular imaging surveillance there. I have discussed systemic therapy options such as tamoxifen or Avastin, as well as topical therapy such as estradiol. Defer management to his physicians in [place]. He is also interested in finding an [address] [place] [address] center closer to home. He had an appointment to see Dr. Iesha Coulter at [facility] but he had to cancel this. He is considering rescheduling. 3) Epistaxis  Secondary to HHT. Follows with ENT in [place], s/p local therapy. Now getting relief with nasal saline spray. 4) Prostate cancer  He is now s/p radiation with Dr. Xin Ferraro completed 4/2021. Last dose ADT on 2/2022, stopped due to fatigue. 5) Afib  His cardiologist is considering cardioversion and watchman device, though this would require anticoagulation. Given his recent worsening epistaxis, this has been placed on hold.     Plan:     Continue PO iron as tolerated  Follow up with [address] [facility]  Labs in 2-3 months: CBC, iron profile, ferritin ([facility])  Return to see me in 2-3 months      Signed By: Emir Cage MD Gen: Well appearing in NAD  Head: NC/AT  Neck: trachea midline  Resp:  No distress  Ext: no deformities. Right knee without erythema, induration, warmth, ecchymosis. Moderate effusion supralateral to the patella, no medial or lateral joint-line TTP, decreased ROM > 90 degrees of flexion, no calf TTP, 2+ symmetrical distal pulses.  Neuro:  A&O appears non focal  Skin:  Warm and dry as visualized  Psych:  Normal affect and mood

## 2024-10-04 NOTE — TELEPHONE ENCOUNTER
Pt called in wanting to know if he should stop taking the Tranexamic Acid medication. Please advise     CB# [phone]

## 2024-10-04 NOTE — TELEPHONE ENCOUNTER
[facility] [place] at [facility]  [phone]    10/04/24- Phone call returned to pt/ wife he wanted to know when his next appointment was and if he needs to stop lysteda. Reviewed upcoming appointments and confirmed and confirmed per MD that he should stop Lysteda. No further questions or concerns.

## 2024-10-17 ENCOUNTER — HOSPITAL ENCOUNTER (OUTPATIENT)
Facility: HOSPITAL | Age: 84
Setting detail: INFUSION SERIES
Discharge: HOME | End: 2024-10-17
Payer: MEDICARE

## 2024-10-17 VITALS
HEART RATE: 69 BPM | SYSTOLIC BLOOD PRESSURE: 105 MMHG | DIASTOLIC BLOOD PRESSURE: 63 MMHG | WEIGHT: 178.5 LBS | RESPIRATION RATE: 18 BRPM | BODY MASS INDEX: 24.18 KG/M2 | OXYGEN SATURATION: 95 % | HEIGHT: 72 IN | TEMPERATURE: 97.8 F

## 2024-10-17 DIAGNOSIS — D62 ACUTE BLOOD LOSS ANEMIA: ICD-10-CM

## 2024-10-17 DIAGNOSIS — I78.0 HHT (HEREDITARY HEMORRHAGIC TELANGIECTASIA) (HCC): ICD-10-CM

## 2024-10-17 DIAGNOSIS — D50.9 IRON DEFICIENCY ANEMIA, UNSPECIFIED IRON DEFICIENCY ANEMIA TYPE: Primary | ICD-10-CM

## 2024-10-17 LAB
ABO + RH BLD: NORMAL
BASOPHILS # BLD: 0 K/UL (ref 0–0.1)
BASOPHILS NFR BLD: 1 % (ref 0–1)
BLOOD GROUP ANTIBODIES SERPL: NORMAL
DIFFERENTIAL METHOD BLD: ABNORMAL
EOSINOPHIL # BLD: 0.2 K/UL (ref 0–0.4)
EOSINOPHIL NFR BLD: 4 % (ref 0–7)
ERYTHROCYTE [DISTWIDTH] IN BLOOD BY AUTOMATED COUNT: 16.5 % (ref 11.5–14.5)
HCT VFR BLD AUTO: 24.4 % (ref 36.6–50.3)
HGB BLD-MCNC: 7.3 G/DL (ref 12.1–17)
IMM GRANULOCYTES # BLD AUTO: 0 K/UL (ref 0–0.04)
IMM GRANULOCYTES NFR BLD AUTO: 0 % (ref 0–0.5)
LYMPHOCYTES # BLD: 0.4 K/UL (ref 0.8–3.5)
LYMPHOCYTES NFR BLD: 11 % (ref 12–49)
MCH RBC QN AUTO: 26.9 PG (ref 26–34)
MCHC RBC AUTO-ENTMCNC: 29.9 G/DL (ref 30–36.5)
MCV RBC AUTO: 90 FL (ref 80–99)
MONOCYTES # BLD: 0.5 K/UL (ref 0–1)
MONOCYTES NFR BLD: 14 % (ref 5–13)
NEUTS SEG # BLD: 2.7 K/UL (ref 1.8–8)
NEUTS SEG NFR BLD: 70 % (ref 32–75)
NRBC # BLD: 0 K/UL (ref 0–0.01)
NRBC BLD-RTO: 0 PER 100 WBC
PLATELET # BLD AUTO: 189 K/UL (ref 150–400)
PMV BLD AUTO: 11.2 FL (ref 8.9–12.9)
RBC # BLD AUTO: 2.71 M/UL (ref 4.1–5.7)
RBC MORPH BLD: ABNORMAL
SPECIMEN EXP DATE BLD: NORMAL
WBC # BLD AUTO: 3.8 K/UL (ref 4.1–11.1)

## 2024-10-17 PROCEDURE — 96365 THER/PROPH/DIAG IV INF INIT: CPT

## 2024-10-17 PROCEDURE — 6360000002 HC RX W HCPCS: Performed by: NURSE PRACTITIONER

## 2024-10-17 PROCEDURE — 2580000003 HC RX 258: Performed by: NURSE PRACTITIONER

## 2024-10-17 PROCEDURE — 96367 TX/PROPH/DG ADDL SEQ IV INF: CPT

## 2024-10-17 PROCEDURE — 85025 COMPLETE CBC W/AUTO DIFF WBC: CPT

## 2024-10-17 PROCEDURE — 86850 RBC ANTIBODY SCREEN: CPT

## 2024-10-17 PROCEDURE — 96413 CHEMO IV INFUSION 1 HR: CPT

## 2024-10-17 PROCEDURE — 86901 BLOOD TYPING SEROLOGIC RH(D): CPT

## 2024-10-17 PROCEDURE — 86900 BLOOD TYPING SEROLOGIC ABO: CPT

## 2024-10-17 PROCEDURE — 36415 COLL VENOUS BLD VENIPUNCTURE: CPT

## 2024-10-17 RX ORDER — SODIUM CHLORIDE 0.9 % (FLUSH) 0.9 %
5-40 SYRINGE (ML) INJECTION PRN
OUTPATIENT
Start: 2024-11-21

## 2024-10-17 RX ORDER — ACETAMINOPHEN 325 MG/1
650 TABLET ORAL
OUTPATIENT
Start: 2024-11-21

## 2024-10-17 RX ORDER — ALBUTEROL SULFATE 90 UG/1
4 INHALANT RESPIRATORY (INHALATION) PRN
OUTPATIENT
Start: 2024-11-21

## 2024-10-17 RX ORDER — EPINEPHRINE 1 MG/ML
0.3 INJECTION, SOLUTION INTRAMUSCULAR; SUBCUTANEOUS PRN
OUTPATIENT
Start: 2024-11-21

## 2024-10-17 RX ORDER — FAMOTIDINE 10 MG/ML
20 INJECTION, SOLUTION INTRAVENOUS
OUTPATIENT
Start: 2024-11-21

## 2024-10-17 RX ORDER — HEPARIN 100 UNIT/ML
500 SYRINGE INTRAVENOUS PRN
OUTPATIENT
Start: 2024-11-21

## 2024-10-17 RX ORDER — SODIUM CHLORIDE 9 MG/ML
5-250 INJECTION, SOLUTION INTRAVENOUS PRN
Status: DISCONTINUED | OUTPATIENT
Start: 2024-10-17 | End: 2024-10-18 | Stop reason: HOSPADM

## 2024-10-17 RX ORDER — SODIUM CHLORIDE 9 MG/ML
INJECTION, SOLUTION INTRAVENOUS CONTINUOUS
OUTPATIENT
Start: 2024-11-21

## 2024-10-17 RX ORDER — ONDANSETRON 2 MG/ML
8 INJECTION INTRAMUSCULAR; INTRAVENOUS
OUTPATIENT
Start: 2024-11-21

## 2024-10-17 RX ORDER — SODIUM CHLORIDE 9 MG/ML
5-250 INJECTION, SOLUTION INTRAVENOUS PRN
OUTPATIENT
Start: 2024-11-21

## 2024-10-17 RX ORDER — DIPHENHYDRAMINE HYDROCHLORIDE 50 MG/ML
50 INJECTION INTRAMUSCULAR; INTRAVENOUS
OUTPATIENT
Start: 2024-11-21

## 2024-10-17 RX ADMIN — FERRIC CARBOXYMALTOSE INJECTION 750 MG: 50 INJECTION, SOLUTION INTRAVENOUS at 11:31

## 2024-10-17 RX ADMIN — SODIUM CHLORIDE 425 MG: 9 INJECTION, SOLUTION INTRAVENOUS at 11:59

## 2024-10-17 RX ADMIN — SODIUM CHLORIDE 10 ML/HR: 9 INJECTION, SOLUTION INTRAVENOUS at 11:56

## 2024-10-17 ASSESSMENT — PAIN DESCRIPTION - DESCRIPTORS: DESCRIPTORS: SORE

## 2024-10-17 ASSESSMENT — PAIN SCALES - GENERAL: PAINLEVEL_OUTOF10: 2

## 2024-10-17 ASSESSMENT — PAIN DESCRIPTION - ORIENTATION: ORIENTATION: LEFT

## 2024-10-17 ASSESSMENT — PAIN DESCRIPTION - LOCATION: LOCATION: FOOT

## 2024-10-17 NOTE — PROGRESS NOTES
Date: October 17, 2024       Mr. Amilcar Ramirez Arrived to [facility] for  Bevacizumab, Injectafer ambulatory in stable condition.  Assessment was completed and PIV placed to Left arm by Ailyn LOPEZ. Labs drawn and sent for processing. No MD appointment scheduled today.    Labs reviewed. Criteria for treatment was met.    Mr. Amilcar Ramirez's vitals were reviewed.  Patient Vitals for the past 12 hrs:   Temp Pulse Resp BP SpO2   10/17/24 1230 -- -- -- 105/63 --   10/17/24 1030 97.8 °F (36.6 °C) 69 18 (!) 115/53 95 %         Lab results were obtained and reviewed.  Recent Results (from the past 12 hour(s))   TYPE AND SCREEN    Collection Time: 10/17/24 10:45 AM   Result Value Ref Range    Crossmatch expiration date 10/20/2024,2359     ABO/Rh O POSITIVE     Antibody Screen NEG    CBC With Auto Differential    Collection Time: 10/17/24 10:45 AM   Result Value Ref Range    WBC 3.8 (L) 4.1 - 11.1 K/uL    RBC 2.71 (L) 4.10 - 5.70 M/uL    Hemoglobin 7.3 (L) 12.1 - 17.0 g/dL    Hematocrit 24.4 (L) 36.6 - 50.3 %    MCV 90.0 80.0 - 99.0 FL    MCH 26.9 26.0 - 34.0 PG    MCHC 29.9 (L) 30.0 - 36.5 g/dL    RDW 16.5 (H) 11.5 - 14.5 %    Platelets 189 150 - 400 K/uL    MPV 11.2 8.9 - 12.9 FL    Nucleated RBCs 0.0 0  WBC    nRBC 0.00 0.00 - 0.01 K/uL    Neutrophils % 70 32 - 75 %    Lymphocytes % 11 (L) 12 - 49 %    Monocytes % 14 (H) 5 - 13 %    Eosinophils % 4 0 - 7 %    Basophils % 1 0 - 1 %    Immature Granulocytes % 0 0.0 - 0.5 %    Neutrophils Absolute 2.7 1.8 - 8.0 K/UL    Lymphocytes Absolute 0.4 (L) 0.8 - 3.5 K/UL    Monocytes Absolute 0.5 0.0 - 1.0 K/UL    Eosinophils Absolute 0.2 0.0 - 0.4 K/UL    Basophils Absolute 0.0 0.0 - 0.1 K/UL    Immature Granulocytes Absolute 0.0 0.00 - 0.04 K/UL    Differential Type SMEAR SCANNED      RBC Comment ANISOCYTOSIS  1+            Pre-medications  were administered as ordered and chemotherapy was initiated.  Medications Administered         0.9 % sodium chloride infusion Admin Date  10/17/2024

## 2024-10-22 ENCOUNTER — HOSPITAL ENCOUNTER (INPATIENT)
Facility: HOSPITAL | Age: 84
LOS: 2 days | Discharge: HOME | DRG: 151 | End: 2024-10-24
Attending: EMERGENCY MEDICINE | Admitting: STUDENT IN AN ORGANIZED HEALTH CARE EDUCATION/TRAINING PROGRAM
Payer: MEDICARE

## 2024-10-22 DIAGNOSIS — I48.91 ATRIAL FIBRILLATION, UNSPECIFIED TYPE (HCC): ICD-10-CM

## 2024-10-22 DIAGNOSIS — I48.91 ATRIAL FIBRILLATION WITH RAPID VENTRICULAR RESPONSE (HCC): ICD-10-CM

## 2024-10-22 DIAGNOSIS — I48.91 NEW ONSET ATRIAL FIBRILLATION (HCC): ICD-10-CM

## 2024-10-22 DIAGNOSIS — R04.0 EPISTAXIS: Primary | ICD-10-CM

## 2024-10-22 LAB
ALBUMIN SERPL-MCNC: 3.2 G/DL (ref 3.5–5)
ALBUMIN/GLOB SERPL: 1.1 (ref 1.1–2.2)
ALP SERPL-CCNC: 72 U/L (ref 45–117)
ALT SERPL-CCNC: 15 U/L (ref 12–78)
ANION GAP SERPL CALC-SCNC: 3 MMOL/L (ref 2–12)
AST SERPL-CCNC: 11 U/L (ref 15–37)
BASOPHILS # BLD: 0.1 K/UL (ref 0–0.1)
BASOPHILS NFR BLD: 1 % (ref 0–1)
BILIRUB SERPL-MCNC: 0.4 MG/DL (ref 0.2–1)
BUN SERPL-MCNC: 19 MG/DL (ref 6–20)
BUN/CREAT SERPL: 20 (ref 12–20)
CALCIUM SERPL-MCNC: 8.4 MG/DL (ref 8.5–10.1)
CHLORIDE SERPL-SCNC: 112 MMOL/L (ref 97–108)
CO2 SERPL-SCNC: 26 MMOL/L (ref 21–32)
CREAT SERPL-MCNC: 0.95 MG/DL (ref 0.7–1.3)
DIFFERENTIAL METHOD BLD: ABNORMAL
EOSINOPHIL # BLD: 0.2 K/UL (ref 0–0.4)
EOSINOPHIL NFR BLD: 4 % (ref 0–7)
ERYTHROCYTE [DISTWIDTH] IN BLOOD BY AUTOMATED COUNT: 19.9 % (ref 11.5–14.5)
GLOBULIN SER CALC-MCNC: 2.8 G/DL (ref 2–4)
GLUCOSE SERPL-MCNC: 135 MG/DL (ref 65–100)
HCT VFR BLD AUTO: 24.9 % (ref 36.6–50.3)
HGB BLD-MCNC: 7.2 G/DL (ref 12.1–17)
IMM GRANULOCYTES # BLD AUTO: 0 K/UL (ref 0–0.04)
IMM GRANULOCYTES NFR BLD AUTO: 0 % (ref 0–0.5)
LYMPHOCYTES # BLD: 0.5 K/UL (ref 0.8–3.5)
LYMPHOCYTES NFR BLD: 9 % (ref 12–49)
MCH RBC QN AUTO: 27.2 PG (ref 26–34)
MCHC RBC AUTO-ENTMCNC: 28.9 G/DL (ref 30–36.5)
MCV RBC AUTO: 94 FL (ref 80–99)
MONOCYTES # BLD: 0.7 K/UL (ref 0–1)
MONOCYTES NFR BLD: 14 % (ref 5–13)
NEUTS SEG # BLD: 3.6 K/UL (ref 1.8–8)
NEUTS SEG NFR BLD: 72 % (ref 32–75)
NRBC # BLD: 0 K/UL (ref 0–0.01)
NRBC BLD-RTO: 0 PER 100 WBC
PLATELET # BLD AUTO: 181 K/UL (ref 150–400)
PMV BLD AUTO: 11.2 FL (ref 8.9–12.9)
POTASSIUM SERPL-SCNC: 3.8 MMOL/L (ref 3.5–5.1)
PROT SERPL-MCNC: 6 G/DL (ref 6.4–8.2)
RBC # BLD AUTO: 2.65 M/UL (ref 4.1–5.7)
RBC MORPH BLD: ABNORMAL
SODIUM SERPL-SCNC: 141 MMOL/L (ref 136–145)
WBC # BLD AUTO: 5.1 K/UL (ref 4.1–11.1)

## 2024-10-22 PROCEDURE — 85025 COMPLETE CBC W/AUTO DIFF WBC: CPT

## 2024-10-22 PROCEDURE — 6370000000 HC RX 637 (ALT 250 FOR IP): Performed by: STUDENT IN AN ORGANIZED HEALTH CARE EDUCATION/TRAINING PROGRAM

## 2024-10-22 PROCEDURE — 2580000003 HC RX 258: Performed by: EMERGENCY MEDICINE

## 2024-10-22 PROCEDURE — 36415 COLL VENOUS BLD VENIPUNCTURE: CPT

## 2024-10-22 PROCEDURE — 2Y41X5Z PACKING OF NASAL REGION USING PACKING MATERIAL: ICD-10-PCS | Performed by: EMERGENCY MEDICINE

## 2024-10-22 PROCEDURE — 2500000003 HC RX 250 WO HCPCS: Performed by: EMERGENCY MEDICINE

## 2024-10-22 PROCEDURE — 93005 ELECTROCARDIOGRAM TRACING: CPT | Performed by: EMERGENCY MEDICINE

## 2024-10-22 PROCEDURE — 2060000000 HC ICU INTERMEDIATE R&B

## 2024-10-22 PROCEDURE — 2580000003 HC RX 258: Performed by: STUDENT IN AN ORGANIZED HEALTH CARE EDUCATION/TRAINING PROGRAM

## 2024-10-22 PROCEDURE — 96365 THER/PROPH/DIAG IV INF INIT: CPT

## 2024-10-22 PROCEDURE — 99285 EMERGENCY DEPT VISIT HI MDM: CPT

## 2024-10-22 PROCEDURE — 6370000000 HC RX 637 (ALT 250 FOR IP): Performed by: NURSE PRACTITIONER

## 2024-10-22 PROCEDURE — 80053 COMPREHEN METABOLIC PANEL: CPT

## 2024-10-22 PROCEDURE — 96376 TX/PRO/DX INJ SAME DRUG ADON: CPT

## 2024-10-22 RX ORDER — ENOXAPARIN SODIUM 100 MG/ML
40 INJECTION SUBCUTANEOUS DAILY
Status: DISCONTINUED | OUTPATIENT
Start: 2024-10-22 | End: 2024-10-22

## 2024-10-22 RX ORDER — SODIUM CHLORIDE 0.9 % (FLUSH) 0.9 %
5-40 SYRINGE (ML) INJECTION EVERY 12 HOURS SCHEDULED
Status: DISCONTINUED | OUTPATIENT
Start: 2024-10-22 | End: 2024-10-24 | Stop reason: HOSPADM

## 2024-10-22 RX ORDER — SODIUM CHLORIDE 9 MG/ML
INJECTION, SOLUTION INTRAVENOUS PRN
Status: DISCONTINUED | OUTPATIENT
Start: 2024-10-22 | End: 2024-10-24 | Stop reason: HOSPADM

## 2024-10-22 RX ORDER — ONDANSETRON 2 MG/ML
4 INJECTION INTRAMUSCULAR; INTRAVENOUS EVERY 6 HOURS PRN
Status: DISCONTINUED | OUTPATIENT
Start: 2024-10-22 | End: 2024-10-24 | Stop reason: HOSPADM

## 2024-10-22 RX ORDER — POLYETHYLENE GLYCOL 3350 17 G/17G
17 POWDER, FOR SOLUTION ORAL DAILY PRN
Status: DISCONTINUED | OUTPATIENT
Start: 2024-10-22 | End: 2024-10-24 | Stop reason: HOSPADM

## 2024-10-22 RX ORDER — ACETAMINOPHEN 325 MG/1
650 TABLET ORAL EVERY 6 HOURS PRN
Status: DISCONTINUED | OUTPATIENT
Start: 2024-10-22 | End: 2024-10-24 | Stop reason: HOSPADM

## 2024-10-22 RX ORDER — OXYCODONE HYDROCHLORIDE 5 MG/1
2.5 TABLET ORAL ONCE
Status: COMPLETED | OUTPATIENT
Start: 2024-10-22 | End: 2024-10-22

## 2024-10-22 RX ORDER — DILTIAZEM HYDROCHLORIDE 30 MG/1
30 TABLET, FILM COATED ORAL EVERY 6 HOURS SCHEDULED
Status: COMPLETED | OUTPATIENT
Start: 2024-10-22 | End: 2024-10-23

## 2024-10-22 RX ORDER — ACETAMINOPHEN 650 MG/1
650 SUPPOSITORY RECTAL EVERY 6 HOURS PRN
Status: DISCONTINUED | OUTPATIENT
Start: 2024-10-22 | End: 2024-10-24 | Stop reason: HOSPADM

## 2024-10-22 RX ORDER — SODIUM CHLORIDE 0.9 % (FLUSH) 0.9 %
5-40 SYRINGE (ML) INJECTION PRN
Status: DISCONTINUED | OUTPATIENT
Start: 2024-10-22 | End: 2024-10-24 | Stop reason: HOSPADM

## 2024-10-22 RX ORDER — DILTIAZEM HYDROCHLORIDE 5 MG/ML
10 INJECTION INTRAVENOUS ONCE
Status: COMPLETED | OUTPATIENT
Start: 2024-10-22 | End: 2024-10-22

## 2024-10-22 RX ORDER — ONDANSETRON 4 MG/1
4 TABLET, ORALLY DISINTEGRATING ORAL EVERY 8 HOURS PRN
Status: DISCONTINUED | OUTPATIENT
Start: 2024-10-22 | End: 2024-10-22

## 2024-10-22 RX ORDER — MAGNESIUM SULFATE IN WATER 40 MG/ML
2000 INJECTION, SOLUTION INTRAVENOUS PRN
Status: DISCONTINUED | OUTPATIENT
Start: 2024-10-22 | End: 2024-10-24 | Stop reason: HOSPADM

## 2024-10-22 RX ADMIN — SODIUM CHLORIDE 5 MG/HR: 900 INJECTION, SOLUTION INTRAVENOUS at 16:43

## 2024-10-22 RX ADMIN — ACETAMINOPHEN 650 MG: 325 TABLET ORAL at 22:50

## 2024-10-22 RX ADMIN — SODIUM CHLORIDE, PRESERVATIVE FREE 10 ML: 5 INJECTION INTRAVENOUS at 21:20

## 2024-10-22 RX ADMIN — OXYCODONE 2.5 MG: 5 TABLET ORAL at 21:18

## 2024-10-22 RX ADMIN — DILTIAZEM HYDROCHLORIDE 10 MG: 5 INJECTION, SOLUTION INTRAVENOUS at 16:36

## 2024-10-22 RX ADMIN — DILTIAZEM HYDROCHLORIDE 30 MG: 30 TABLET ORAL at 22:51

## 2024-10-22 RX ADMIN — DILTIAZEM HYDROCHLORIDE 30 MG: 30 TABLET ORAL at 18:38

## 2024-10-22 ASSESSMENT — PAIN SCALES - GENERAL
PAINLEVEL_OUTOF10: 4
PAINLEVEL_OUTOF10: 4
PAINLEVEL_OUTOF10: 0
PAINLEVEL_OUTOF10: 7

## 2024-10-22 ASSESSMENT — PAIN DESCRIPTION - ONSET
ONSET: ON-GOING
ONSET: ON-GOING

## 2024-10-22 ASSESSMENT — PAIN DESCRIPTION - ORIENTATION
ORIENTATION: LEFT

## 2024-10-22 ASSESSMENT — PAIN DESCRIPTION - DESCRIPTORS
DESCRIPTORS: DISCOMFORT;PRESSURE
DESCRIPTORS: DISCOMFORT;PRESSURE

## 2024-10-22 ASSESSMENT — PAIN DESCRIPTION - LOCATION
LOCATION: NOSE
LOCATION: NOSE

## 2024-10-22 ASSESSMENT — PAIN DESCRIPTION - PAIN TYPE: TYPE: ACUTE PAIN

## 2024-10-22 ASSESSMENT — PAIN - FUNCTIONAL ASSESSMENT
PAIN_FUNCTIONAL_ASSESSMENT: NONE - DENIES PAIN
PAIN_FUNCTIONAL_ASSESSMENT: ACTIVITIES ARE NOT PREVENTED
PAIN_FUNCTIONAL_ASSESSMENT: ACTIVITIES ARE NOT PREVENTED

## 2024-10-22 ASSESSMENT — PAIN DESCRIPTION - FREQUENCY
FREQUENCY: CONTINUOUS
FREQUENCY: CONTINUOUS

## 2024-10-22 NOTE — ED PROVIDER NOTES
[facility] INTERMEDIATE CARE UNIT  EMERGENCY DEPARTMENT ENCOUNTER      Patient Name: Williams Fitzgerald Jr  MRN: [id number]  Birthdate 1940  Date of Evaluation: 10/22/2024  Physician: Cedrick Du MD    CHIEF COMPLAINT       Chief Complaint   Patient presents with    Epistaxis       HISTORY OF PRESENT ILLNESS   (Location/Symptom, Timing/Onset, Context/Setting, Quality, Duration, Modifying Factors, Severity)   Williams Fitzgerald Jr, 84 y.o., male     84-year-old male with a history of epistaxis presents with a chief complaint of nosebleed.  Patient reports that the bleeding started 3 hours ago.  He states that this is the worst that has been.  He also has a history of hereditary hemorrhagic telangiectasia.  He is not on any blood thinners.           Nursing Notes were reviewed.    REVIEW OF SYSTEMS    (Not required)   Review of Systems    Except as noted above the remainder of the review of systems was reviewed and negative.     PAST MEDICAL HISTORY     Past Medical History:   Diagnosis Date    HHT (hereditary hemorrhagic telangiectasia) (HCC)     Iron deficiency anemia     Prostate cancer (HCC)     Pulmonary arteriovenous malformation        SURGICAL HISTORY       Past Surgical History:   Procedure Laterality Date    APPENDECTOMY      CHEST SURGERY      excision of AVM    COLONOSCOPY N/A 1/29/2020    COLONOSCOPY performed by Manas Barr MD at [facility] ENDOSCOPY    COLONOSCOPY  2014    due 19    OTHER SURGICAL HISTORY  07/2018    sclerotheropy    TONSILLECTOMY         CURRENT MEDICATIONS       Current Discharge Medication List        CONTINUE these medications which have NOT CHANGED    Details   acetaminophen (TYLENOL) 500 MG tablet Take by mouth every 6 hours as needed      ascorbic acid (VITAMIN C) 250 MG tablet Take by mouth      vitamin D 25 MCG (1000 UT) CAPS Take by mouth daily      dicyclomine (BENTYL) 20 MG tablet TAKE 1 TABLET BY MOUTH 3 TIMES DAILY AS NEEDED FOR ABDOMINAL CRAMPS (NEW DOSE/NEW DIRECTIONS)     discussed: yes      Risks discussed:  Bleeding    Alternatives discussed:  No treatment  Universal protocol:     Procedure explained and questions answered to patient or proxy's satisfaction: yes    Anesthesia:     Anesthesia method:  None  Procedure details:     Treatment site:  L anterior    Treatment method:  Nasal balloon    Treatment complexity:  Limited    Treatment episode: initial    Post-procedure details:     Assessment:  Bleeding stopped    Procedure completion:  Tolerated well, no immediate complications      FINAL IMPRESSION      1. Epistaxis    2. Atrial fibrillation with rapid ventricular response (HCC)    3. New onset atrial fibrillation (HCC)    4. Atrial fibrillation, unspecified type (HCC)          DISPOSITION/PLAN   DISPOSITION Admitted 10/22/2024 05:24:03 PM    PATIENT REFERRED TO:  No follow-up provider specified.    DISCHARGE MEDICATIONS:  Current Discharge Medication List        Controlled Substances Monitoring:          No data to display                (Please note that portions of this note were completed with a voice recognition program.  Efforts were made to edit the dictations but occasionally words are mis-transcribed.)    Cedrick Du MD (electronically signed)  Attending Emergency Physician           Cedrick Du MD  10/22/24 0752

## 2024-10-22 NOTE — H&P
OTHER SURGICAL HISTORY  07/2018    sclerotheropy    TONSILLECTOMY       Social History     Tobacco Use    Smoking status: Former     Passive exposure: Never    Smokeless tobacco: Never   Substance Use Topics    Alcohol use: Yes     Alcohol/week: 0.0 standard drinks of alcohol      Family History   Problem Relation Age of Onset    Cancer Other         colon, lung    Heart Disease Mother         No Known Allergies     Prior to Admission medications    Medication Sig Start Date End Date Taking? Authorizing Provider   acetaminophen (TYLENOL) 500 MG tablet Take by mouth every 6 hours as needed  Patient not taking: Reported on 10/3/2024    Automatic Reconciliation, Ar   ascorbic acid (VITAMIN C) 250 MG tablet Take by mouth    Automatic Reconciliation, Ar   vitamin D 25 MCG (1000 UT) CAPS Take by mouth daily    Automatic Reconciliation, Ar   dicyclomine (BENTYL) 20 MG tablet TAKE 1 TABLET BY MOUTH 3 TIMES DAILY AS NEEDED FOR ABDOMINAL CRAMPS (NEW DOSE/NEW DIRECTIONS) 6/21/22   Automatic Reconciliation, Ar   tranexamic acid (LYSTEDA) 650 MG TABS tablet Take 2 tablets by mouth 3 times daily    Automatic Reconciliation, Ar       REVIEW OF SYSTEMS:  See HPI for details  General: Positive for fatigue, generalized weakness, negative for fever, chills, sweats,  weight loss  Eyes: negative for blurred vision, eye pain, loss of vision, diplopia  Ear Nose and Throat: Positive for epistaxis, body aches ,negative for rhinorrhea, pharyngitis, otalgia, tinnitus, speech or swallowing difficulties  Respiratory:  negative for pleuritic pain, cough, sputum production, wheezing, SOB, AGUILAR  Cardiology:  negative for chest pain, palpitations, orthopnea, PND, edema, syncope   Gastrointestinal: negative for abdominal pain, N/V, dysphagia, change in bowel habits, bleeding  Genitourinary: negative for frequency, urgency, dysuria, hematuria, incontinence  Muskuloskeletal : negative for arthralgia, myalgia  Hematology: negative for easy bruising,  bleeding, lymphadenopathy  Dermatological: negative for rash, ulceration, mole change, new lesion  Endocrine: negative for hot flashes or polydipsia  Neurological: negative for headache, dizziness, confusion, focal weakness, paresthesia, memory loss, gait disturbance  Psychological: negative for anxiety, depression, agitation      Objective:   VITALS:    Vitals:    10/22/24 1527   BP: 94/60   Pulse: (!) 153   Resp: 18   Temp: 98.2 °F (36.8 °C)   SpO2: 98%     PHYSICAL EXAM:    Physical Exam:    Gen: Well-developed, well-nourished, in no acute distress  HEENT:  Pink conjunctivae, PERRL, hearing intact to voice, moist mucous membranes  Neck: Supple, without masses, thyroid non-tender  Resp: No accessory muscle use, clear breath sounds without wheezes rales or rhonchi  Card: Regular rate and rhythm, no murmurs, normal S1, S2 without thrills, bruits bilateral leg edema  Abd:  Soft, non-tender, non-distended, normoactive bowel sounds are present, no palpable organomegaly and no detectable hernias  Lymph:  No cervical or inguinal adenopathy  Musc: No cyanosis or clubbing  Skin: No rashes or ulcers, skin turgor is good  Neuro:  Cranial nerves are grossly intact, no focal motor weakness, follows commands appropriately  Psych:  Good insight, oriented to person, place and time, alert          _______________________________________________________________________  Care Plan discussed with:    Comments   Patient x Discussed with patient in room. POC outlined and Questions answered    Family      RN x    Care Manager                    Consultant:  hellen SIEGEL MD   _______________________________________________________________________  Recommended Disposition:   Home with Family x   HH/PT/OT/RN    SNF/LTC    DAISY    ________________________________________________________________________  TOTAL TIME:  50 Minutes        Comments   >50% of visit spent in counseling and coordination of care  Chart reviewed  Discussion with patient

## 2024-10-22 NOTE — ED NOTES
TRANSFER - OUT REPORT:    Verbal report given to RN on Williams Fitzgerald Jr  being transferred to [facility] for routine progression of patient care       Report consisted of patient's Situation, Background, Assessment and   Recommendations(SBAR).     Information from the following report(s) Nurse Handoff Report, ED Encounter Summary, ED SBAR, Adult Overview, MAR, Recent Results, and Cardiac Rhythm    was reviewed with the receiving nurse.    [place] Fall Assessment:    Presents to emergency department  because of falls (Syncope, seizure, or loss of consciousness): No  Age > 70: Yes  Altered Mental Status, Intoxication with alcohol or substance confusion (Disorientation, impaired judgment, poor safety awaremess, or inability to follow instructions): Yes  Impaired Mobility: Ambulates or transfers with assistive devices or assistance; Unable to ambulate or transer.: Yes  Nursing Judgement: Yes          Lines:   Peripheral IV 10/22/24 Left;Ventral Forearm (Active)        Opportunity for questions and clarification was provided.      Patient transported with:  Registered Nurse and Tech

## 2024-10-23 ENCOUNTER — APPOINTMENT (OUTPATIENT)
Facility: HOSPITAL | Age: 84
DRG: 151 | End: 2024-10-23
Attending: STUDENT IN AN ORGANIZED HEALTH CARE EDUCATION/TRAINING PROGRAM
Payer: MEDICARE

## 2024-10-23 LAB
ALBUMIN SERPL-MCNC: 2.9 G/DL (ref 3.5–5)
ALBUMIN/GLOB SERPL: 1.1 (ref 1.1–2.2)
ALP SERPL-CCNC: 60 U/L (ref 45–117)
ALT SERPL-CCNC: 12 U/L (ref 12–78)
ANION GAP SERPL CALC-SCNC: 4 MMOL/L (ref 2–12)
AST SERPL-CCNC: 7 U/L (ref 15–37)
BASOPHILS # BLD: 0.1 K/UL (ref 0–0.1)
BASOPHILS NFR BLD: 1 % (ref 0–1)
BILIRUB SERPL-MCNC: 0.3 MG/DL (ref 0.2–1)
BUN SERPL-MCNC: 31 MG/DL (ref 6–20)
BUN/CREAT SERPL: 38 (ref 12–20)
CALCIUM SERPL-MCNC: 7.8 MG/DL (ref 8.5–10.1)
CHLORIDE SERPL-SCNC: 112 MMOL/L (ref 97–108)
CO2 SERPL-SCNC: 23 MMOL/L (ref 21–32)
CREAT SERPL-MCNC: 0.82 MG/DL (ref 0.7–1.3)
DIFFERENTIAL METHOD BLD: ABNORMAL
ECHO AO ARCH DIAM: 2.4 CM
ECHO AO ASC DIAM: 3.7 CM
ECHO AO ASCENDING AORTA INDEX: 1.84 CM/M2
ECHO AO ROOT DIAM: 3.9 CM
ECHO AO ROOT INDEX: 1.94 CM/M2
ECHO AV AREA PEAK VELOCITY: 2.3 CM2
ECHO AV AREA VTI: 2.3 CM2
ECHO AV AREA/BSA PEAK VELOCITY: 1.1 CM2/M2
ECHO AV AREA/BSA VTI: 1.1 CM2/M2
ECHO AV MEAN GRADIENT: 4 MMHG
ECHO AV MEAN VELOCITY: 0.9 M/S
ECHO AV PEAK GRADIENT: 7 MMHG
ECHO AV PEAK VELOCITY: 1.3 M/S
ECHO AV VELOCITY RATIO: 0.69
ECHO AV VTI: 28.9 CM
ECHO BSA: 2.01 M2
ECHO LA DIAMETER INDEX: 1.99 CM/M2
ECHO LA DIAMETER: 4 CM
ECHO LA TO AORTIC ROOT RATIO: 1.03
ECHO LA VOL A-L A2C: 111 ML (ref 18–58)
ECHO LA VOL A-L A4C: 84 ML (ref 18–58)
ECHO LA VOL BP: 92 ML (ref 18–58)
ECHO LA VOL MOD A2C: 100 ML (ref 18–58)
ECHO LA VOL MOD A4C: 79 ML (ref 18–58)
ECHO LA VOL/BSA BIPLANE: 46 ML/M2 (ref 16–34)
ECHO LA VOLUME AREA LENGTH: 101 ML
ECHO LA VOLUME INDEX A-L A2C: 55 ML/M2 (ref 16–34)
ECHO LA VOLUME INDEX A-L A4C: 42 ML/M2 (ref 16–34)
ECHO LA VOLUME INDEX AREA LENGTH: 50 ML/M2 (ref 16–34)
ECHO LA VOLUME INDEX MOD A2C: 50 ML/M2 (ref 16–34)
ECHO LA VOLUME INDEX MOD A4C: 39 ML/M2 (ref 16–34)
ECHO LV E' LATERAL VELOCITY: 11.2 CM/S
ECHO LV E' SEPTAL VELOCITY: 8.9 CM/S
ECHO LV EDV A2C: 71 ML
ECHO LV EDV A4C: 100 ML
ECHO LV EDV BP: 85 ML (ref 67–155)
ECHO LV EDV INDEX A4C: 50 ML/M2
ECHO LV EDV INDEX BP: 42 ML/M2
ECHO LV EDV NDEX A2C: 35 ML/M2
ECHO LV EJECTION FRACTION A2C: 50 %
ECHO LV EJECTION FRACTION A4C: 68 %
ECHO LV EJECTION FRACTION BIPLANE: 60 % (ref 55–100)
ECHO LV ESV A2C: 36 ML
ECHO LV ESV A4C: 33 ML
ECHO LV ESV BP: 34 ML (ref 22–58)
ECHO LV ESV INDEX A2C: 18 ML/M2
ECHO LV ESV INDEX A4C: 16 ML/M2
ECHO LV ESV INDEX BP: 17 ML/M2
ECHO LV FRACTIONAL SHORTENING: 25 % (ref 28–44)
ECHO LV INTERNAL DIMENSION DIASTOLE INDEX: 2.54 CM/M2
ECHO LV INTERNAL DIMENSION DIASTOLIC: 5.1 CM (ref 4.2–5.9)
ECHO LV INTERNAL DIMENSION SYSTOLIC INDEX: 1.89 CM/M2
ECHO LV INTERNAL DIMENSION SYSTOLIC: 3.8 CM
ECHO LV IVSD: 0.7 CM (ref 0.6–1)
ECHO LV MASS 2D: 118.7 G (ref 88–224)
ECHO LV MASS INDEX 2D: 59.1 G/M2 (ref 49–115)
ECHO LV POSTERIOR WALL DIASTOLIC: 0.7 CM (ref 0.6–1)
ECHO LV RELATIVE WALL THICKNESS RATIO: 0.27
ECHO LVOT AREA: 3.1 CM2
ECHO LVOT AV VTI INDEX: 0.72
ECHO LVOT DIAM: 2 CM
ECHO LVOT MEAN GRADIENT: 2 MMHG
ECHO LVOT PEAK GRADIENT: 3 MMHG
ECHO LVOT PEAK VELOCITY: 0.9 M/S
ECHO LVOT STROKE VOLUME INDEX: 32.3 ML/M2
ECHO LVOT SV: 65 ML
ECHO LVOT VTI: 20.7 CM
ECHO PV MAX VELOCITY: 1.1 M/S
ECHO PV PEAK GRADIENT: 5 MMHG
ECHO RV FREE WALL PEAK S': 10.3 CM/S
ECHO RV INTERNAL DIMENSION: 3 CM
ECHO RV TAPSE: 1.9 CM (ref 1.7–?)
ECHO TV REGURGITANT MAX VELOCITY: 2.65 M/S
ECHO TV REGURGITANT PEAK GRADIENT: 28 MMHG
EKG ATRIAL RATE: 300 BPM
EKG DIAGNOSIS: NORMAL
EKG Q-T INTERVAL: 310 MS
EKG QRS DURATION: 92 MS
EKG QTC CALCULATION (BAZETT): 419 MS
EKG R AXIS: 140 DEGREES
EKG T AXIS: -30 DEGREES
EKG VENTRICULAR RATE: 110 BPM
EOSINOPHIL # BLD: 0.1 K/UL (ref 0–0.4)
EOSINOPHIL NFR BLD: 2 % (ref 0–7)
ERYTHROCYTE [DISTWIDTH] IN BLOOD BY AUTOMATED COUNT: 20.8 % (ref 11.5–14.5)
FERRITIN SERPL-MCNC: 345 NG/ML (ref 26–388)
FOLATE SERPL-MCNC: 10.9 NG/ML (ref 5–21)
GLOBULIN SER CALC-MCNC: 2.6 G/DL (ref 2–4)
GLUCOSE SERPL-MCNC: 137 MG/DL (ref 65–100)
HCT VFR BLD AUTO: 21.4 % (ref 36.6–50.3)
HCT VFR BLD AUTO: 27 % (ref 36.6–50.3)
HGB BLD-MCNC: 6.3 G/DL (ref 12.1–17)
HGB BLD-MCNC: 7.9 G/DL (ref 12.1–17)
HISTORY CHECK: NORMAL
IMM GRANULOCYTES # BLD AUTO: 0 K/UL (ref 0–0.04)
IMM GRANULOCYTES NFR BLD AUTO: 0 % (ref 0–0.5)
IRON SATN MFR SERPL: 26 % (ref 20–50)
IRON SERPL-MCNC: 71 UG/DL (ref 35–150)
LYMPHOCYTES # BLD: 0.6 K/UL (ref 0.8–3.5)
LYMPHOCYTES NFR BLD: 11 % (ref 12–49)
MCH RBC QN AUTO: 27.9 PG (ref 26–34)
MCHC RBC AUTO-ENTMCNC: 29.4 G/DL (ref 30–36.5)
MCV RBC AUTO: 94.7 FL (ref 80–99)
MONOCYTES # BLD: 0.8 K/UL (ref 0–1)
MONOCYTES NFR BLD: 15 % (ref 5–13)
NEUTS SEG # BLD: 3.6 K/UL (ref 1.8–8)
NEUTS SEG NFR BLD: 71 % (ref 32–75)
NRBC # BLD: 0.03 K/UL (ref 0–0.01)
NRBC BLD-RTO: 0.6 PER 100 WBC
PLATELET # BLD AUTO: 151 K/UL (ref 150–400)
PMV BLD AUTO: 10.6 FL (ref 8.9–12.9)
POTASSIUM SERPL-SCNC: 3.9 MMOL/L (ref 3.5–5.1)
PROT SERPL-MCNC: 5.5 G/DL (ref 6.4–8.2)
RBC # BLD AUTO: 2.26 M/UL (ref 4.1–5.7)
RBC MORPH BLD: ABNORMAL
SODIUM SERPL-SCNC: 139 MMOL/L (ref 136–145)
TIBC SERPL-MCNC: 277 UG/DL (ref 250–450)
VIT B12 SERPL-MCNC: 343 PG/ML (ref 193–986)
WBC # BLD AUTO: 5.2 K/UL (ref 4.1–11.1)

## 2024-10-23 PROCEDURE — 6370000000 HC RX 637 (ALT 250 FOR IP): Performed by: NURSE PRACTITIONER

## 2024-10-23 PROCEDURE — 2060000000 HC ICU INTERMEDIATE R&B

## 2024-10-23 PROCEDURE — 80053 COMPREHEN METABOLIC PANEL: CPT

## 2024-10-23 PROCEDURE — 86923 COMPATIBILITY TEST ELECTRIC: CPT

## 2024-10-23 PROCEDURE — 85018 HEMOGLOBIN: CPT

## 2024-10-23 PROCEDURE — 86850 RBC ANTIBODY SCREEN: CPT

## 2024-10-23 PROCEDURE — 93010 ELECTROCARDIOGRAM REPORT: CPT | Performed by: SPECIALIST

## 2024-10-23 PROCEDURE — 85025 COMPLETE CBC W/AUTO DIFF WBC: CPT

## 2024-10-23 PROCEDURE — 82746 ASSAY OF FOLIC ACID SERUM: CPT

## 2024-10-23 PROCEDURE — 86901 BLOOD TYPING SEROLOGIC RH(D): CPT

## 2024-10-23 PROCEDURE — 94761 N-INVAS EAR/PLS OXIMETRY MLT: CPT

## 2024-10-23 PROCEDURE — 30233N1 TRANSFUSION OF NONAUTOLOGOUS RED BLOOD CELLS INTO PERIPHERAL VEIN, PERCUTANEOUS APPROACH: ICD-10-PCS | Performed by: STUDENT IN AN ORGANIZED HEALTH CARE EDUCATION/TRAINING PROGRAM

## 2024-10-23 PROCEDURE — APPSS30 APP SPLIT SHARED TIME 16-30 MINUTES: Performed by: NURSE PRACTITIONER

## 2024-10-23 PROCEDURE — 6370000000 HC RX 637 (ALT 250 FOR IP): Performed by: STUDENT IN AN ORGANIZED HEALTH CARE EDUCATION/TRAINING PROGRAM

## 2024-10-23 PROCEDURE — 36430 TRANSFUSION BLD/BLD COMPNT: CPT

## 2024-10-23 PROCEDURE — 86900 BLOOD TYPING SEROLOGIC ABO: CPT

## 2024-10-23 PROCEDURE — 2580000003 HC RX 258: Performed by: STUDENT IN AN ORGANIZED HEALTH CARE EDUCATION/TRAINING PROGRAM

## 2024-10-23 PROCEDURE — 82728 ASSAY OF FERRITIN: CPT

## 2024-10-23 PROCEDURE — 93306 TTE W/DOPPLER COMPLETE: CPT | Performed by: STUDENT IN AN ORGANIZED HEALTH CARE EDUCATION/TRAINING PROGRAM

## 2024-10-23 PROCEDURE — 83540 ASSAY OF IRON: CPT

## 2024-10-23 PROCEDURE — 36415 COLL VENOUS BLD VENIPUNCTURE: CPT

## 2024-10-23 PROCEDURE — 93306 TTE W/DOPPLER COMPLETE: CPT

## 2024-10-23 PROCEDURE — 85014 HEMATOCRIT: CPT

## 2024-10-23 PROCEDURE — 82607 VITAMIN B-12: CPT

## 2024-10-23 PROCEDURE — P9016 RBC LEUKOCYTES REDUCED: HCPCS

## 2024-10-23 PROCEDURE — 83550 IRON BINDING TEST: CPT

## 2024-10-23 RX ORDER — SODIUM CHLORIDE 9 MG/ML
INJECTION, SOLUTION INTRAVENOUS PRN
Status: DISCONTINUED | OUTPATIENT
Start: 2024-10-23 | End: 2024-10-24

## 2024-10-23 RX ORDER — DILTIAZEM HYDROCHLORIDE 120 MG/1
120 CAPSULE, COATED, EXTENDED RELEASE ORAL DAILY
Status: DISCONTINUED | OUTPATIENT
Start: 2024-10-24 | End: 2024-10-24 | Stop reason: HOSPADM

## 2024-10-23 RX ORDER — UREA 10 %
1000 LOTION (ML) TOPICAL DAILY
Status: DISCONTINUED | OUTPATIENT
Start: 2024-10-23 | End: 2024-10-24 | Stop reason: HOSPADM

## 2024-10-23 RX ADMIN — DILTIAZEM HYDROCHLORIDE 30 MG: 30 TABLET ORAL at 09:05

## 2024-10-23 RX ADMIN — DILTIAZEM HYDROCHLORIDE 30 MG: 30 TABLET ORAL at 21:11

## 2024-10-23 RX ADMIN — CYANOCOBALAMIN TAB 500 MCG 1000 MCG: 500 TAB at 12:54

## 2024-10-23 RX ADMIN — SODIUM CHLORIDE, PRESERVATIVE FREE 10 ML: 5 INJECTION INTRAVENOUS at 21:12

## 2024-10-23 RX ADMIN — SODIUM CHLORIDE, PRESERVATIVE FREE 10 ML: 5 INJECTION INTRAVENOUS at 09:06

## 2024-10-23 RX ADMIN — DILTIAZEM HYDROCHLORIDE 30 MG: 30 TABLET ORAL at 15:34

## 2024-10-23 NOTE — CONSENT
Informed Consent for Blood Component Transfusion Note    I have discussed with the patient the rationale for blood component transfusion; its benefits in treating or preventing fatigue, organ damage, or death; and its risk which includes mild transfusion reactions, rare risk of blood borne infection, or more serious but rare reactions. I have discussed the alternatives to transfusion, including the risk and consequences of not receiving transfusion. The patient had an opportunity to ask questions and had agreed to proceed with transfusion of blood components.    Electronically signed by MARTÍNEZ Dominguez NP on 10/23/24 at 4:16 AM EDT

## 2024-10-23 NOTE — CARE COORDINATION
10/23/2024  3:46 PM      Care Management Initial Assessment  10/23/2024 3:46 PM  If patient is discharged prior to next notation, then this note serves as note for discharge by case management.    Reason for Admission:   Epistaxis [R04.0]  New onset atrial fibrillation (HCC) [I48.91]  Atrial fibrillation with rapid ventricular response (HCC) [I48.91]  Atrial fibrillation with RVR (HCC) [I48.91]  Atrial fibrillation, unspecified type (HCC) [I48.91]         Patient Admission Status: Inpatient  Date Admitted to INP: 10/22/24  RUR: Readmission Risk Score: 19.6    Hospitalization in the last 30 days (Readmission):  Yes        Advance Care Planning:  Code Status: Full Code  Primary Healthcare Decision Maker: Legal Next of Kin (spouse Nanette Fitzgerald  C) [phone])   Advance Directive: has an advanced directive - a copy has been provided     __________________________________________________________________________  Assessment:      10/23/24 1544   Readmission Assessment   Number of Days since last admission? 8-30 days  (Admission Epistaxis   9/25-9/26/24)   Previous Disposition Home with Family   Who is being Interviewed Patient   What was the patient's/caregiver's perception as to why they think they needed to return back to the hospital? Other (Comment)  (recurrnt symptoms/nosebleeds)   Did you visit your Primary Care Physician after you left the hospital, before you returned this time? Yes   Did you see a specialist, such as Cardiac, Pulmonary, Orthopedic Physician, etc. after you left the hospital? Yes  (ENT)   Who advised the patient to return to the hospital? Self-referral   Does the patient report anything that got in the way of taking their medications? No   In our efforts to provide the best possible care to you and others like you, can you think of anything that we could have done to help you after you left the hospital the first time, so that you might not have needed to return so soon? Other

## 2024-10-23 NOTE — PLAN OF CARE
Problem: Discharge Planning  Goal: Discharge to home or other facility with appropriate resources  Outcome: HH/HSPC Progressing     Problem: Pain  Goal: Verbalizes/displays adequate comfort level or baseline comfort level  Outcome: /HSPC Progressing     Problem: Safety - Adult  Goal: Free from fall injury  Outcome: /HSPC Progressing

## 2024-10-23 NOTE — PROGRESS NOTES
Medical History:   Diagnosis Date    HHT (hereditary hemorrhagic telangiectasia) (HCC)     Iron deficiency anemia     Prostate cancer (HCC)     Pulmonary arteriovenous malformation      Past Surgical History:   Procedure Laterality Date    APPENDECTOMY      CHEST SURGERY      excision of AVM    COLONOSCOPY N/A 1/29/2020    COLONOSCOPY performed by Manas Barr MD at [facility] ENDOSCOPY    COLONOSCOPY  2014    due 18    OTHER SURGICAL HISTORY  07/2018    sclerotheropy    TONSILLECTOMY       Social Hx:  reports that he has quit smoking. He has never been exposed to tobacco smoke. He has never used smokeless tobacco. He reports current alcohol use. He reports that he does not use drugs.  Family Hx: family history includes Cancer in an other family member; Heart Disease in his mother.  No Known Allergies       OBJECTIVE:  Wt Readings from Last 3 Encounters:   10/22/24 79.4 kg (175 lb)   10/17/24 81 kg (178 lb 8 oz)   10/03/24 81.6 kg (179 lb 14.4 oz)     [unfilled]    Physical Exam:    Vitals:   Vitals:    10/23/24 0542 10/23/24 0658 10/23/24 0750 10/23/24 0807   BP: (!) 97/55  118/67 (!) 122/57   Pulse: 72 89 89 75   Resp: 18  16 18   Temp: 98.2 °F (36.8 °C)  98.2 °F (36.8 °C) 98.2 °F (36.8 °C)   TempSrc: Oral   Oral   SpO2: 99%  98% 99%   Weight:       Height:             Gen: Well-developed, well-nourished, in no acute distress  Neck: Supple, No JVD, No Carotid Bruit  Resp: No accessory muscle use, Clear breath sounds, No rales or rhonchi  Card: irregular rhythm, Normal S1, S2, No murmurs, rubs or gallop. No thrills.   Abd:   Soft, non-tender, non-distended, BS+   MSK: No cyanosis  Skin: Rhinorocket in place without evidence of bleeding  Neuro: Moving all four extremities, follows commands appropriately  Psych: Good insight, oriented to person, place, alert, Nml Affect  LE: 3+ edema    Data Review:     Radiology:   XR Results (most recent):  @BSHSILASTIMGCAT(NUG4637:1)@    Recent Labs     10/22/24  4484  10/23/24  0119    139   K 3.8 3.9   * 112*   CO2 26 23   BUN 19 31*     Recent Labs     10/22/24  1547 10/23/24  0119   WBC 5.1 5.2   HGB 7.2* 6.3*   HCT 24.9* 21.4*    151     No results for input(s): \"INR\" in the last 72 hours.    Invalid input(s): \"PTTP\", \"PTP\", \"GPT\", \"SGOT\", \"AP\"  No results for input(s): \"CHOL\", \"HDLC\", \"LDLC\", \"HBA1C\" in the last 72 hours.    Invalid input(s): \"TGL\"      Current meds:    Current Facility-Administered Medications:     0.9 % sodium chloride infusion, , IntraVENous, PRN, Chey Locke, APRN - NP    [COMPLETED] dilTIAZem injection 10 mg, 10 mg, IntraVENous, Once, 10 mg at 10/22/24 1636 **FOLLOWED BY** dilTIAZem 100 mg in sodium chloride 0.9 % 100 mL infusion (ADD-[place]), 2.5-15 mg/hr, IntraVENous, Continuous, Cedrick Du MD, Stopped at 10/23/24 0257    dilTIAZem (CARDIZEM) tablet 30 mg, 30 mg, Oral, 4 times per day, Keven Quijano, DO, 30 mg at 10/23/24 0905    sodium chloride flush 0.9 % injection 5-40 mL, 5-40 mL, IntraVENous, 2 times per day, Gail Mendenhall MD, 10 mL at 10/23/24 0906    sodium chloride flush 0.9 % injection 5-40 mL, 5-40 mL, IntraVENous, PRN, Gail Mendenhall MD    0.9 % sodium chloride infusion, , IntraVENous, PRN, Gail Mendenhall MD    magnesium sulfate 2000 mg in 50 mL IVPB premix, 2,000 mg, IntraVENous, PRN, Gail Mendenhall MD    [DISCONTINUED] ondansetron (ZOFRAN-ODT) disintegrating tablet 4 mg, 4 mg, Oral, Q8H PRN **OR** ondansetron (ZOFRAN) injection 4 mg, 4 mg, IntraVENous, Q6H PRN, Gail Mendenhall MD    polyethylene glycol (GLYCOLAX) packet 17 g, 17 g, Oral, Daily PRN, Gail Mendenhall MD    acetaminophen (TYLENOL) tablet 650 mg, 650 mg, Oral, Q6H PRN, 650 mg at 10/22/24 2250 **OR** acetaminophen (TYLENOL) suppository 650 mg, 650 mg, Rectal, Q6H PRN, Gail Mendenhall MD Susan Fisher, APRN - NP    [facility] Cardiology  Call center: (P) [phone]  (F) [phone]      CC:Brayan Hallman MD

## 2024-10-23 NOTE — PROGRESS NOTES
Spiritual Health History and Assessment/Progress Note  [facility]    Rituals, Rites and Sacraments,  ,  ,      Name: Williams Fitzgerald Jr MRN: [id number]    Age: 84 y.o.     Sex: male   Language: English   Hoahaoism: Hinduism   Atrial fibrillation with RVR (HCC)     Date: 10/23/2024            Total Time Calculated: 15 min              Spiritual Assessment began in [facility] B3 INTERMEDIATE CARE UNIT        Referral/Consult From: Patient   Encounter Overview/Reason: Rituals, Rites and Sacraments  Service Provided For: Patient, Family    Lurdes, Belief, Meaning:   Patient identifies as spiritual, is connected with a lurdes tradition or spiritual practice, and has beliefs or practices that help with coping during difficult times  Family/Friends identify as spiritual, are connected with a lurdes tradition or spiritual practice, and have beliefs or practices that help with coping during difficult times      Importance and Influence:  Patient has spiritual/personal beliefs that influence decisions regarding their health  Family/Friends have spiritual/personal beliefs that influence decisions regarding the patient's health    Community:  Patient is connected with a spiritual community and feels well-supported. Support system includes: Spouse/Partner, Children, Lurdes Community, Friends, and Extended family  Family/Friends are connected with a spiritual community: and feel well-supported. Support system includes: Spouse/Partner, Children, Lurdes Community, Friends, and Extended family    Assessment and Plan of Care:     Patient Interventions include: Provided sacramental/Synagogue ritual and Other: Sacrament of the Sick provided by father Hemanth  Family/Friends Interventions include: Provided sacramental/Synagogue ritual and Other:  Sacrament of the Sick provided by father Hemanth    Patient Plan of Care: Spiritual Care available upon further referral  Family/Friends Plan of Care: Spiritual Care available upon further

## 2024-10-23 NOTE — PROGRESS NOTES
10/22/24    ECHO (TTE) COMPLETE (PRN CONTRAST/BUBBLE/STRAIN/3D) 10/23/2024  1:18 PM (Final)    Interpretation Summary    Left Ventricle: Normal left ventricular systolic function. EF by 2D Simpsons Biplane is 60%. Left ventricle size is normal. Normal wall thickness. Normal wall motion. Normal diastolic function.    Left Atrium: Left atrium is dilated. LA Vol Index A/L is 50 mL/m2.    Right Atrium: Right atrium is dilated.    Aorta: Normal sized ascending aorta. Mildly dilated aortic root. Ao root diameter is 3.9 cm. Ao ascending diameter is 3.7 cm.    Image quality is good.    Signed by: Keven Quijano DO on 10/23/2024  1:18 PM      Future Appointments   Date Time Provider Department Center   10/31/2024  1:30 PM SS CHEMO CHAIR 1 [facility]   11/14/2024  1:30 PM SS CHEMO CHAIR 1 [facility]   11/19/2024 10:00 AM Keven Quijano DO [facility]   11/29/2024  1:30 PM SS CHEMO CHAIR 1 [facility]   12/5/2024 12:30 [facility]   12/12/2024  1:30 PM SS CHEMO CHAIR 1 [facility]   12/12/2024  1:40 PM Mary Hudson, APRN - NP [facility]   12/26/2024  1:30 PM SS CHEMO CHAIR 1 [facility]   1/9/2025  1:30 PM SS CHEMO CHAIR 1 [facility]

## 2024-10-23 NOTE — PROGRESS NOTES
Hospitalist Progress Note      NAME:  Williams Fitzgerald Jr   :  1940  MRM:  [id number]    Date/Time: 10/23/2024  9:39 AM           Assessment / Plan:     Williams is a 84 y.o. with past medical history of head injury hemorrhagic telangiectasia, pulmonary AV malformation, prostate cancer, chronic anemia, presented to ED with severe epistaxis.         Epistaxis, sec to HHT, POA : s/p nasal packing and applying Rhino Rocket in the ED.  Bleeding appears to be improving, but still some mild mild oozing from left nares.  Continue to monitor closely.  Patient being followed by ENT as an outpatient.     A-fib with RVR, POA: New onset A-fib.  Patient was started on Cardizem drip in the ED.  Cardiology evaluated the patient.  Off Cardizem drip, added p.o. diltiazem.  Echocardiogram pending.  Not a candidate for anticoagulation due to history of HHT and chronic anemia.  Per cardio, can consider left atrial appendage occlusion procedure as an outpatient.     Chronic anemia , POA: Anemia is likely exacerbated by recurrent epistaxis, HHT.  Follows hematology as an outpatient.  He has been getting IV iron infusions as an outpatient.  Hb 6.3 today.  Received 1 unit of blood.  Posttransfusion H&H 7.9.  No iron deficiency on labs.  B12 on low normal limits, start B12 supplementation.  Folic acid within normal limits.  Continue to monitor H&H closely.       Hereditary hemorrhagic telangiectasia, pulmonary AVMs, POA: Previously followed at the HHT center in [facility]        #BMI (Calculated): 23.73    I have personally reviewed the radiographs, laboratory data in Epic and decisions and statements above are based partially on this personal interpretation.                 Care Plan discussed with: Patient and Family    Discussed:  Care Plan    Prophylaxis:  SCD's    Disposition:  Home w/Family, possibly discharge tomorrow once epistaxis completely resolved and H&H remained

## 2024-10-23 NOTE — PROGRESS NOTES
Spiritual Health History and Assessment/Progress Note  [facility]    Initial Encounter,  ,  ,      Name: Williams Fitzgerald Jr MRN: [id number]    Age: 84 y.o.     Sex: male   Language: English   Cheondoism: Episcopalian   Atrial fibrillation with RVR (HCC)     Date: 10/23/2024            Total Time Calculated: 50 min              Spiritual Assessment began in [facility] B3 INTERMEDIATE CARE UNIT        Referral/Consult From: Multi-disciplinary team   Encounter Overview/Reason: Initial Encounter  Service Provided For: Patient and family together    Lurdes, Belief, Meaning:   Patient identifies as spiritual, is connected with a lurdes tradition or spiritual practice, and has beliefs or practices that help with coping during difficult times  Family/Friends identify as spiritual, are connected with a lurdes tradition or spiritual practice, and have beliefs or practices that help with coping during difficult times      Importance and Influence:  Patient has spiritual/personal beliefs that influence decisions regarding their health  Family/Friends have spiritual/personal beliefs that influence decisions regarding the patient's health    Community:  Patient is connected with a spiritual community and feels well-supported. Support system includes: Spouse/Partner, Children, Lurdes Community, Friends, and Extended family  Family/Friends are connected with a spiritual community: and feel well-supported. Support system includes: Spouse/Partner, Children, Lurdes Community, Friends, and Extended family    Assessment and Plan of Care:     Patient Interventions include: Facilitated expression of thoughts and feelings, Explored spiritual coping/struggle/distress, Engaged in theological reflection, and Affirmed coping skills/support systems  Family/Friends Interventions include: Facilitated expression of thoughts and feelings, Explored spiritual coping/struggle/distress, Engaged in theological reflection, and Affirmed coping skills/support  systems    Patient Plan of Care: Contact Lurdes  for support or sacramental needs and Spiritual Care available upon further referral  Family/Friends Plan of Care: Spiritual Care available upon further referral     visit for intial spiritual assessment. Reviewed chart and spoke with nurse. Pt shared recent medical events. Pt frieda through Yazidism practices, his lurdes in God, and the support of his aakash, especially his wife. Pt and wife have been  for 66 years, have 4 children (3 boys and a girl), grandchildren and great-grandchildren. Pt finds rashawn in being outside and enjoying creation's peacefulness. Pt hopeful for resolution of symptoms and discharge home.    Electronically signed by GIOVANNA Judge on 10/23/2024 at 10:46 AM

## 2024-10-23 NOTE — CARE COORDINATION
10/23/2024  3:54 PM     10/23/24 1534   Service Assessment   Patient Orientation Alert and Oriented   Cognition Alert   History Provided By Patient;Spouse   Primary Caregiver Self   Support Systems Spouse/Significant Other   Patient's Healthcare Decision Maker is: Legal Next of Kin  (spouse Nanette Fitzgerald  (NOHEMI) [phone])   PCP Verified by CM Yes  (Brayan Hallman MD)   Last Visit to PCP Within last 3 months   Prior Functional Level Independent in ADLs/IADLs   Current Functional Level Independent in ADLs/IADLs   Can patient return to prior living arrangement Yes   Ability to make needs known: Good   Family able to assist with home care needs: Yes   Financial Resources Medicare;Other (Comment)  (Medicare A/B, CIGNA, [employer] Part D)   Community Resources None   Social/Functional History   Lives With Spouse   Type of Home House   Active  Yes   Mode of Transportation Car   Discharge Planning   Type of Residence House   Living Arrangements Spouse/Significant Other   Current Services Prior To Admission None   Potential Assistance Needed N/A   DME Ordered? No   Potential Assistance Purchasing Medications No  ([facility], [employer])   Type of Home Care Services None   Patient expects to be discharged to: House   History of falls? 0   Services At/After Discharge   [place] Resource Information Provided? No   Mode of Transport at Discharge Other (see comment)  (spouse)   Confirm Follow Up Transport Family     KYRA Moon

## 2024-10-24 VITALS
DIASTOLIC BLOOD PRESSURE: 61 MMHG | OXYGEN SATURATION: 100 % | WEIGHT: 175 LBS | BODY MASS INDEX: 23.7 KG/M2 | TEMPERATURE: 98.2 F | RESPIRATION RATE: 20 BRPM | HEART RATE: 96 BPM | HEIGHT: 72 IN | SYSTOLIC BLOOD PRESSURE: 109 MMHG

## 2024-10-24 LAB
ABO + RH BLD: NORMAL
ANION GAP SERPL CALC-SCNC: 5 MMOL/L (ref 2–12)
BLD PROD TYP BPU: NORMAL
BLOOD BANK BLOOD PRODUCT EXPIRATION DATE: NORMAL
BLOOD BANK DISPENSE STATUS: NORMAL
BLOOD BANK ISBT PRODUCT BLOOD TYPE: 5100
BLOOD BANK PRODUCT CODE: NORMAL
BLOOD BANK UNIT TYPE AND RH: NORMAL
BLOOD GROUP ANTIBODIES SERPL: NORMAL
BPU ID: NORMAL
BUN SERPL-MCNC: 13 MG/DL (ref 6–20)
BUN/CREAT SERPL: 17 (ref 12–20)
CALCIUM SERPL-MCNC: 7.9 MG/DL (ref 8.5–10.1)
CHLORIDE SERPL-SCNC: 114 MMOL/L (ref 97–108)
CO2 SERPL-SCNC: 23 MMOL/L (ref 21–32)
CREAT SERPL-MCNC: 0.78 MG/DL (ref 0.7–1.3)
CROSSMATCH RESULT: NORMAL
ERYTHROCYTE [DISTWIDTH] IN BLOOD BY AUTOMATED COUNT: 21 % (ref 11.5–14.5)
GLUCOSE BLD STRIP.AUTO-MCNC: 115 MG/DL (ref 65–117)
GLUCOSE SERPL-MCNC: 117 MG/DL (ref 65–100)
HCT VFR BLD AUTO: 27.5 % (ref 36.6–50.3)
HGB BLD-MCNC: 8.2 G/DL (ref 12.1–17)
MAGNESIUM SERPL-MCNC: 2.3 MG/DL (ref 1.6–2.4)
MCH RBC QN AUTO: 28.6 PG (ref 26–34)
MCHC RBC AUTO-ENTMCNC: 29.8 G/DL (ref 30–36.5)
MCV RBC AUTO: 95.8 FL (ref 80–99)
NRBC # BLD: 0 K/UL (ref 0–0.01)
NRBC BLD-RTO: 0 PER 100 WBC
PHOSPHATE SERPL-MCNC: 1.8 MG/DL (ref 2.6–4.7)
PLATELET # BLD AUTO: 140 K/UL (ref 150–400)
PMV BLD AUTO: 11 FL (ref 8.9–12.9)
POTASSIUM SERPL-SCNC: 3.9 MMOL/L (ref 3.5–5.1)
RBC # BLD AUTO: 2.87 M/UL (ref 4.1–5.7)
SERVICE CMNT-IMP: NORMAL
SODIUM SERPL-SCNC: 142 MMOL/L (ref 136–145)
SPECIMEN EXP DATE BLD: NORMAL
UNIT DIVISION: 0
UNIT ISSUE DATE/TIME: NORMAL
WBC # BLD AUTO: 5.4 K/UL (ref 4.1–11.1)

## 2024-10-24 PROCEDURE — 2500000003 HC RX 250 WO HCPCS

## 2024-10-24 PROCEDURE — 36415 COLL VENOUS BLD VENIPUNCTURE: CPT

## 2024-10-24 PROCEDURE — 85027 COMPLETE CBC AUTOMATED: CPT

## 2024-10-24 PROCEDURE — 6370000000 HC RX 637 (ALT 250 FOR IP): Performed by: STUDENT IN AN ORGANIZED HEALTH CARE EDUCATION/TRAINING PROGRAM

## 2024-10-24 PROCEDURE — 94761 N-INVAS EAR/PLS OXIMETRY MLT: CPT

## 2024-10-24 PROCEDURE — 84100 ASSAY OF PHOSPHORUS: CPT

## 2024-10-24 PROCEDURE — 2580000003 HC RX 258: Performed by: STUDENT IN AN ORGANIZED HEALTH CARE EDUCATION/TRAINING PROGRAM

## 2024-10-24 PROCEDURE — 83735 ASSAY OF MAGNESIUM: CPT

## 2024-10-24 PROCEDURE — 80048 BASIC METABOLIC PNL TOTAL CA: CPT

## 2024-10-24 PROCEDURE — 82962 GLUCOSE BLOOD TEST: CPT

## 2024-10-24 RX ORDER — DILTIAZEM HYDROCHLORIDE 5 MG/ML
10 INJECTION INTRAVENOUS ONCE
Status: COMPLETED | OUTPATIENT
Start: 2024-10-24 | End: 2024-10-24

## 2024-10-24 RX ORDER — DILTIAZEM HYDROCHLORIDE 5 MG/ML
INJECTION INTRAVENOUS
Status: COMPLETED
Start: 2024-10-24 | End: 2024-10-24

## 2024-10-24 RX ORDER — DILTIAZEM HYDROCHLORIDE 120 MG/1
120 CAPSULE, COATED, EXTENDED RELEASE ORAL DAILY
Qty: 60 CAPSULE | Refills: 0 | Status: SHIPPED | OUTPATIENT
Start: 2024-10-25 | End: 2024-12-24

## 2024-10-24 RX ADMIN — DILTIAZEM HYDROCHLORIDE 10 MG: 5 INJECTION INTRAVENOUS at 05:43

## 2024-10-24 RX ADMIN — SODIUM CHLORIDE, PRESERVATIVE FREE 10 ML: 5 INJECTION INTRAVENOUS at 08:25

## 2024-10-24 RX ADMIN — DILTIAZEM HYDROCHLORIDE 10 MG: 5 INJECTION, SOLUTION INTRAVENOUS at 05:43

## 2024-10-24 RX ADMIN — CYANOCOBALAMIN TAB 500 MCG 1000 MCG: 500 TAB at 08:25

## 2024-10-24 NOTE — CARE COORDINATION
10/24/2024  12:19 PM  Care Management Progress Note    Reason for Admission:   Epistaxis [R04.0]  New onset atrial fibrillation (HCC) [I48.91]  Atrial fibrillation with rapid ventricular response (HCC) [I48.91]  Atrial fibrillation with RVR (HCC) [I48.91]  Atrial fibrillation, unspecified type (HCC) [I48.91]         Patient Admission Status: Inpatient  RUR: 20 %   Hospitalization in the last 30 days (Readmission):  Yes        Transition of care plan:  Pt emergently re-admitted 10/22/24 for epistaxis, discussed in IDR is continuing to require medical management, cardiology following;ENT consult pending  DC when stable to home w/ family assistance and outpatient  follow up   Discharge plan communicated with patient and/or discharge caregiver: Yes    Date 1st IMM letter given: 10/23/24  Outpatient follow-up. PCP, cardiology, ENT   Transport at discharge: Family  KYRA Moon

## 2024-10-24 NOTE — CONSULTS
Otolaryngology - Head & Neck Surgery         PATIENT NAME: Williams Fitzgerald Jr  MRN: [id number]  DATE: 10/24/2024  ADMISSION DATE: 10/22/2024      Subjective:     Patient well known to me for recurrent epistaxis.  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  The severity of the epistaxis has considerably progressed over recent months.  Presented to ER 2 days ago with profuse epistaxis.  Packing placed but removed today. Hgb 8.2.     Objective:     /61   Pulse 91   Temp 98.2 °F (36.8 °C) (Oral)   Resp 20   Ht 1.829 m (6')   Wt 79.4 kg (175 lb)   SpO2 100%   BMI 23.73 kg/m²   10/22 1901 - 10/24 0700  In: 340   Out: 800 [Urine:800]    Physical Exam:     Awake, alert, NAD.  Nose - some tissue in right anterior nare with light blood staining. Small merocel on left side.   No blood in oropharynx.            Assessment:     Recurrent epistaxis, HHT.  Now controlled but will recur.    Plan:     I have reached out to Dr. Denilson Eden from ENT/rhinology at [facility] who has seen patient.  Will discuss possible surgical interventions.  He should be OK for discharge today with office follow up next week.        Kris Garza MD - Otolaryngology- Head & Neck Surgery  [facility]  [phone] (office)  [phone] (cell)                                                                                      
appropriately  Psych: Good insight, oriented to person, place, alert, Nml Affect  LE: 3+ edema    Data Review:     Radiology:   XR Results (most recent):  @BSHSILASTIMGCAT([id number]:1)@    Recent Labs     10/22/24  1547      K 3.8   *   CO2 26   BUN 19     Recent Labs     10/22/24  1547   WBC 5.1   HGB 7.2*   HCT 24.9*        No results for input(s): \"INR\" in the last 72 hours.    Invalid input(s): \"PTTP\", \"PTP\", \"GPT\", \"SGOT\", \"AP\"  No results for input(s): \"CHOL\", \"HDLC\", \"LDLC\", \"HBA1C\" in the last 72 hours.    Invalid input(s): \"TGL\"      Current meds:    Current Facility-Administered Medications:     dilTIAZem injection 10 mg, 10 mg, IntraVENous, Once **FOLLOWED BY** dilTIAZem 100 mg in sodium chloride 0.9 % 100 mL infusion (ADD-[place]), 2.5-15 mg/hr, IntraVENous, Continuous, Cedrick Du MD    dilTIAZem (CARDIZEM) tablet 30 mg, 30 mg, Oral, 4 times per day, Keven Quijano DO    Current Outpatient Medications:     acetaminophen (TYLENOL) 500 MG tablet, Take by mouth every 6 hours as needed (Patient not taking: Reported on 10/3/2024), Disp: , Rfl:     ascorbic acid (VITAMIN C) 250 MG tablet, Take by mouth, Disp: , Rfl:     vitamin D 25 MCG (1000 UT) CAPS, Take by mouth daily, Disp: , Rfl:     dicyclomine (BENTYL) 20 MG tablet, TAKE 1 TABLET BY MOUTH 3 TIMES DAILY AS NEEDED FOR ABDOMINAL CRAMPS (NEW DOSE/NEW DIRECTIONS), Disp: , Rfl:     tranexamic acid (LYSTEDA) 650 MG TABS tablet, Take 2 tablets by mouth 3 times daily, Disp: , Rfl:     Keven Quijano DO    [facility] Cardiology  Call center: P) [phone] (f) [phone]      CC:Brayan Hallman MD

## 2024-10-24 NOTE — DISCHARGE SUMMARY
Hospitalist Discharge Summary     Patient ID:  Williams Fitzgerald   [id number]  84 y.o.  1940    Admit date: 10/22/2024    Discharge date and time: 10/24/2024    Admission Diagnoses: Epistaxis [R04.0]  New onset atrial fibrillation (HCC) [I48.91]  Atrial fibrillation with rapid ventricular response (HCC) [I48.91]  Atrial fibrillation with RVR (HCC) [I48.91]  Atrial fibrillation, unspecified type (HCC) [I48.91]    Discharge Diagnoses:    Principal Problem:    Atrial fibrillation with RVR (HCC)  Resolved Problems:    * No resolved hospital problems. *           Hospital Course:         Williams is a 84 y.o. with past medical history of head injury hemorrhagic telangiectasia, pulmonary AV malformation, prostate cancer, chronic anemia, presented to ED with severe epistaxis.            Epistaxis, sec to HHT, POA : s/p nasal packing and applying Rhino Rocket in the ED.  Bleeding appears to be resolved.  He did require 1 unit of transfusion.  H&H remained stable.  ENT was consulted however he want to go home and plan to have outpatient follow-up with his ENT.       A-fib with RVR, POA: New onset A-fib.  Patient was started on Cardizem drip in the ED.  Cardiology evaluated the patient.  Off Cardizem drip, added p.o. diltiazem.  Echocardiogram showed preserved EF, normal systolic and diastolic function.  Not a candidate for anticoagulation due to history of HHT and chronic anemia.  Per cardio, can consider left atrial appendage occlusion procedure as an outpatient.     Chronic anemia , POA: Anemia is likely exacerbated by recurrent epistaxis, HHT.  Follows hematology as an outpatient.  He has been getting IV iron infusions as an outpatient.  Hb 6.3 during hospital stay.  Received 1 unit of blood.  Posttransfusion H&H 8.2.  No iron deficiency on labs.  B12 on low normal limits, started B12 supplementation.  Folic acid within normal limits.  Continue to monitor H&H closely.  Outpatient follow-up with hematology.      Hereditary hemorrhagic telangiectasia, pulmonary AVMs, POA: Previously followed at the HHT center in [facility]        Patient otherwise clinically stable for discharge.      Imaging  No results found.     PCP: Brayan Hallman MD     Consults: cardiology    Condition of patient at discharge: Stable    Discharge Exam:      Gen:    Well-developed, well-nourished, in no acute distress  HEENT:  Pink conjunctivae, PERRL, hearing intact to voice, moist mucous membranes  Neck:  Supple, without masses, thyroid non-tender  Resp:  No accessory muscle use, clear breath sounds without wheezes rales or rhonchi  Card:   Regular rate and rhythm, no murmurs, normal S1, S2 without thrills, bruits bilateral leg edema  Abd:  Soft, non-tender, non-distended, normoactive bowel sounds are present, no palpable organomegaly and no detectable hernias  Lymph:  No cervical or inguinal adenopathy  Musc:  No cyanosis or clubbing  Skin:   No rashes or ulcers, skin turgor is good  Neuro:  Cranial nerves are grossly intact, no focal motor weakness, follows commands appropriately  Psych:  Good insight, oriented to person, place and time, alert            Disposition: home    Patient Instructions:      Medication List        START taking these medications      cyanocobalamin 1000 MCG tablet  Take 1 tablet by mouth daily for 30 doses  Start taking on: October 25, 2024     dilTIAZem 120 MG extended release capsule  Commonly known as: CARDIZEM CD  Take 1 capsule by mouth daily  Start taking on: October 25, 2024            CONTINUE taking these medications      ascorbic acid 250 MG tablet  Commonly known as: VITAMIN C     dicyclomine 20 MG tablet  Commonly known as: BENTYL     tranexamic acid 650 MG Tabs tablet  Commonly known as: LYSTEDA     vitamin D 25 MCG (1000 UT) Caps            STOP taking these medications      acetaminophen 500 MG tablet  Commonly known as: TYLENOL               Where to Get Your Medications        These

## 2024-10-24 NOTE — PROGRESS NOTES
0613  RRT called at 0520 for blood profusely pouring from right nostril.  Patient used multiple nampons (patient supplied).  After about 15 minutes bleeding slowed down.  Patient was spitting up blood as well.  Dr. Gardner came to bedside.  Patient did not want a rhino rocket placed.  Heart rate was in the 140's.  Verbal order for 10 mg of IV dilt ordered.  Labs ordered.      0700  Bedside and Verbal shift change report given to Kris (oncoming nurse) by Lucinda (offgoing nurse). Report included the following information Nurse Handoff Report.

## 2024-10-30 ENCOUNTER — TELEPHONE (OUTPATIENT)
Age: 84
End: 2024-10-30

## 2024-10-30 RX ORDER — ONDANSETRON 2 MG/ML
8 INJECTION INTRAMUSCULAR; INTRAVENOUS
OUTPATIENT
Start: 2024-11-28

## 2024-10-30 RX ORDER — ACETAMINOPHEN 325 MG/1
650 TABLET ORAL
OUTPATIENT
Start: 2024-11-28

## 2024-10-30 RX ORDER — FAMOTIDINE 10 MG/ML
20 INJECTION, SOLUTION INTRAVENOUS
OUTPATIENT
Start: 2024-11-28

## 2024-10-30 RX ORDER — SODIUM CHLORIDE 9 MG/ML
5-250 INJECTION, SOLUTION INTRAVENOUS PRN
OUTPATIENT
Start: 2024-11-28

## 2024-10-30 RX ORDER — SODIUM CHLORIDE 9 MG/ML
INJECTION, SOLUTION INTRAVENOUS CONTINUOUS
OUTPATIENT
Start: 2024-11-28

## 2024-10-30 RX ORDER — ALBUTEROL SULFATE 90 UG/1
4 INHALANT RESPIRATORY (INHALATION) PRN
OUTPATIENT
Start: 2024-11-28

## 2024-10-30 RX ORDER — HEPARIN 100 UNIT/ML
500 SYRINGE INTRAVENOUS PRN
OUTPATIENT
Start: 2024-11-28

## 2024-10-30 RX ORDER — EPINEPHRINE 1 MG/ML
0.3 INJECTION, SOLUTION INTRAMUSCULAR; SUBCUTANEOUS PRN
OUTPATIENT
Start: 2024-11-28

## 2024-10-30 RX ORDER — SODIUM CHLORIDE 0.9 % (FLUSH) 0.9 %
5-40 SYRINGE (ML) INJECTION PRN
OUTPATIENT
Start: 2024-11-28

## 2024-10-30 RX ORDER — DIPHENHYDRAMINE HYDROCHLORIDE 50 MG/ML
50 INJECTION INTRAMUSCULAR; INTRAVENOUS
OUTPATIENT
Start: 2024-11-28

## 2024-10-30 NOTE — TELEPHONE ENCOUNTER
[facility] at [facility] [facility]  [address], [address] [facility] [address]  W: [phone]  F: [phone]      Medical Nutrition Therapy  Nutrition Encounter:    Referral received regarding iron containing foods. Called patient,  explained that RD is available to address nutrition throughout the spectrum of care.  He reports having lost a few pounds, but is making more attempt to eat.  Discussed importance of nutrition and iron rich foods Encouraged him to reach out as needed.       Signed By: AD PETERSON RD

## 2024-10-31 ENCOUNTER — HOSPITAL ENCOUNTER (OUTPATIENT)
Facility: HOSPITAL | Age: 84
Setting detail: INFUSION SERIES
Discharge: HOME | End: 2024-10-31
Payer: MEDICARE

## 2024-10-31 VITALS
HEART RATE: 75 BPM | OXYGEN SATURATION: 98 % | HEIGHT: 72 IN | TEMPERATURE: 98 F | BODY MASS INDEX: 23.6 KG/M2 | SYSTOLIC BLOOD PRESSURE: 111 MMHG | DIASTOLIC BLOOD PRESSURE: 66 MMHG | WEIGHT: 174.2 LBS

## 2024-10-31 DIAGNOSIS — I78.0 HHT (HEREDITARY HEMORRHAGIC TELANGIECTASIA) (HCC): Primary | ICD-10-CM

## 2024-10-31 LAB
ABO + RH BLD: NORMAL
BASOPHILS # BLD: 0 K/UL (ref 0–0.1)
BASOPHILS NFR BLD: 1 % (ref 0–1)
BLOOD GROUP ANTIBODIES SERPL: NORMAL
DIFFERENTIAL METHOD BLD: ABNORMAL
EOSINOPHIL # BLD: 0.2 K/UL (ref 0–0.4)
EOSINOPHIL NFR BLD: 6 % (ref 0–7)
ERYTHROCYTE [DISTWIDTH] IN BLOOD BY AUTOMATED COUNT: 19.4 % (ref 11.5–14.5)
FERRITIN SERPL-MCNC: 142 NG/ML (ref 26–388)
HCT VFR BLD AUTO: 30.8 % (ref 36.6–50.3)
HGB BLD-MCNC: 9.3 G/DL (ref 12.1–17)
IMM GRANULOCYTES # BLD AUTO: 0 K/UL (ref 0–0.04)
IMM GRANULOCYTES NFR BLD AUTO: 0 % (ref 0–0.5)
LYMPHOCYTES # BLD: 0.4 K/UL (ref 0.8–3.5)
LYMPHOCYTES NFR BLD: 12 % (ref 12–49)
MCH RBC QN AUTO: 28.7 PG (ref 26–34)
MCHC RBC AUTO-ENTMCNC: 30.2 G/DL (ref 30–36.5)
MCV RBC AUTO: 95.1 FL (ref 80–99)
MONOCYTES # BLD: 0.5 K/UL (ref 0–1)
MONOCYTES NFR BLD: 17 % (ref 5–13)
NEUTS SEG # BLD: 2.1 K/UL (ref 1.8–8)
NEUTS SEG NFR BLD: 64 % (ref 32–75)
NRBC # BLD: 0 K/UL (ref 0–0.01)
NRBC BLD-RTO: 0 PER 100 WBC
PLATELET # BLD AUTO: 215 K/UL (ref 150–400)
PMV BLD AUTO: 10.1 FL (ref 8.9–12.9)
RBC # BLD AUTO: 3.24 M/UL (ref 4.1–5.7)
RBC MORPH BLD: ABNORMAL
RBC MORPH BLD: ABNORMAL
SPECIMEN EXP DATE BLD: NORMAL
WBC # BLD AUTO: 3.2 K/UL (ref 4.1–11.1)

## 2024-10-31 PROCEDURE — 85025 COMPLETE CBC W/AUTO DIFF WBC: CPT

## 2024-10-31 PROCEDURE — 86850 RBC ANTIBODY SCREEN: CPT

## 2024-10-31 PROCEDURE — 86901 BLOOD TYPING SEROLOGIC RH(D): CPT

## 2024-10-31 PROCEDURE — 96413 CHEMO IV INFUSION 1 HR: CPT

## 2024-10-31 PROCEDURE — 36415 COLL VENOUS BLD VENIPUNCTURE: CPT

## 2024-10-31 PROCEDURE — 6360000002 HC RX W HCPCS: Performed by: NURSE PRACTITIONER

## 2024-10-31 PROCEDURE — 82728 ASSAY OF FERRITIN: CPT

## 2024-10-31 PROCEDURE — 86900 BLOOD TYPING SEROLOGIC ABO: CPT

## 2024-10-31 PROCEDURE — 2580000003 HC RX 258: Performed by: NURSE PRACTITIONER

## 2024-10-31 RX ORDER — SODIUM CHLORIDE 0.9 % (FLUSH) 0.9 %
5-40 SYRINGE (ML) INJECTION PRN
Status: DISCONTINUED | OUTPATIENT
Start: 2024-10-31 | End: 2024-11-01 | Stop reason: HOSPADM

## 2024-10-31 RX ADMIN — Medication 10 ML: at 14:33

## 2024-10-31 RX ADMIN — Medication 20 ML: at 15:12

## 2024-10-31 RX ADMIN — SODIUM CHLORIDE 425 MG: 9 INJECTION, SOLUTION INTRAVENOUS at 14:33

## 2024-10-31 ASSESSMENT — PAIN SCALES - GENERAL: PAINLEVEL_OUTOF10: 0

## 2024-10-31 NOTE — PROGRESS NOTES
[facility] Progress Note    Date: 2024    Name: Williams Fitzgerald Jr    MRN: [id number]         : 1940    Mr. Amilcar Ramirez arrived ambulatory and in no distress for C3D1 of Bevacizumab Regimen.  Assessment was completed and left arm #24 PIV placed without difficulty, labs drawn & sent for processing.    OKTT order obtained for recent hospitalization        Mr. Amilcar Ramirez's vitals were reviewed.  Vitals:    10/31/24 1336   BP: 111/66   Pulse: 75   Temp: 98 °F (36.7 °C)   SpO2: 98%       Lab results were obtained and reviewed.  Recent Results (from the past 12 hour(s))   TYPE AND SCREEN    Collection Time: 10/31/24  1:48 PM   Result Value Ref Range    Crossmatch expiration date 2024,2359     ABO/Rh O POSITIVE     Antibody Screen NEG    CBC With Auto Differential    Collection Time: 10/31/24  1:48 PM   Result Value Ref Range    WBC 3.2 (L) 4.1 - 11.1 K/uL    RBC 3.24 (L) 4.10 - 5.70 M/uL    Hemoglobin 9.3 (L) 12.1 - 17.0 g/dL    Hematocrit 30.8 (L) 36.6 - 50.3 %    MCV 95.1 80.0 - 99.0 FL    MCH 28.7 26.0 - 34.0 PG    MCHC 30.2 30.0 - 36.5 g/dL    RDW 19.4 (H) 11.5 - 14.5 %    Platelets 215 150 - 400 K/uL    MPV 10.1 8.9 - 12.9 FL    Nucleated RBCs 0.0 0  WBC    nRBC 0.00 0.00 - 0.01 K/uL    Neutrophils % 64 32 - 75 %    Lymphocytes % 12 12 - 49 %    Monocytes % 17 (H) 5 - 13 %    Eosinophils % 6 0 - 7 %    Basophils % 1 0 - 1 %    Immature Granulocytes % 0 0.0 - 0.5 %    Neutrophils Absolute 2.1 1.8 - 8.0 K/UL    Lymphocytes Absolute 0.4 (L) 0.8 - 3.5 K/UL    Monocytes Absolute 0.5 0.0 - 1.0 K/UL    Eosinophils Absolute 0.2 0.0 - 0.4 K/UL    Basophils Absolute 0.0 0.0 - 0.1 K/UL    Immature Granulocytes Absolute 0.0 0.00 - 0.04 K/UL    Differential Type SMEAR SCANNED      RBC Comment ANISOCYTOSIS  1+        RBC Comment MACROCYTOSIS  1+       Ferritin    Collection Time: 10/31/24  1:48 PM   Result Value Ref Range    Ferritin 142 26 - 388 NG/ML       Medications:  Medications Administered          bevacizumab-bvzr (ZIRABEV) 425 mg in sodium chloride 0.9 % 100 mL chemo IVPB Admin Date  10/31/2024 Action  New Bag Dose  425 mg Rate  254 mL/hr Route  IntraVENous Documented By  Kindra Hendricks RN        sodium chloride flush 0.9 % injection 5-40 mL Admin Date  10/31/2024 Action  Given Dose  10 mL Rate   Route  IntraVENous Documented By  Kindra Hendricks RN        sodium chloride flush 0.9 % injection 5-40 mL Admin Date  10/31/2024 Action  Given Dose  20 mL Rate   Route  IntraVENous Documented By  Kindra Hendricks, JOHN                 Two nurses verified prior to administering: Drug name, drug dose, infusion volume or drug volume when prepared in a syringe, rate of administration, route of administration,  expiration dates and/or times, appearance and physical integrity of the drugs, rate set on infusion pump, when used sequencing of drug administration.       Education provided regarding chemotherapy side effects and management.  Patient verbalized understanding.    Blood return maintained throughout treatment.    Mr. Amilcar Ramirez tolerated treatment well and [facility].   PIV flushed and removed per protocol. He is to return 11/14/24 for his next appointment.    AVS declined    KINDRA HENDRICKS RN  October 31, 2024

## 2024-11-07 ENCOUNTER — HOSPITAL ENCOUNTER (EMERGENCY)
Facility: HOSPITAL | Age: 84
Discharge: HOME | End: 2024-11-07
Attending: EMERGENCY MEDICINE
Payer: MEDICARE

## 2024-11-07 ENCOUNTER — HOSPITAL ENCOUNTER (EMERGENCY)
Facility: HOSPITAL | Age: 84
Discharge: HOME | End: 2024-11-08
Attending: EMERGENCY MEDICINE
Payer: MEDICARE

## 2024-11-07 VITALS
HEART RATE: 85 BPM | DIASTOLIC BLOOD PRESSURE: 73 MMHG | TEMPERATURE: 97.6 F | HEIGHT: 72 IN | OXYGEN SATURATION: 100 % | RESPIRATION RATE: 16 BRPM | SYSTOLIC BLOOD PRESSURE: 133 MMHG | WEIGHT: 179 LBS | BODY MASS INDEX: 24.24 KG/M2

## 2024-11-07 VITALS
BODY MASS INDEX: 24.24 KG/M2 | RESPIRATION RATE: 19 BRPM | WEIGHT: 179 LBS | SYSTOLIC BLOOD PRESSURE: 127 MMHG | OXYGEN SATURATION: 98 % | DIASTOLIC BLOOD PRESSURE: 70 MMHG | HEIGHT: 72 IN | TEMPERATURE: 97.8 F | HEART RATE: 83 BPM

## 2024-11-07 DIAGNOSIS — R04.0 EPISTAXIS: Primary | ICD-10-CM

## 2024-11-07 LAB
BASOPHILS # BLD: 0.1 K/UL (ref 0–0.1)
BASOPHILS NFR BLD: 1 % (ref 0–1)
DIFFERENTIAL METHOD BLD: ABNORMAL
EOSINOPHIL # BLD: 0.2 K/UL (ref 0–0.4)
EOSINOPHIL NFR BLD: 4 % (ref 0–7)
ERYTHROCYTE [DISTWIDTH] IN BLOOD BY AUTOMATED COUNT: 18.3 % (ref 11.5–14.5)
HCT VFR BLD AUTO: 31 % (ref 36.6–50.3)
HGB BLD-MCNC: 9.2 G/DL (ref 12.1–17)
IMM GRANULOCYTES # BLD AUTO: 0 K/UL (ref 0–0.04)
IMM GRANULOCYTES NFR BLD AUTO: 0 % (ref 0–0.5)
LYMPHOCYTES # BLD: 0.4 K/UL (ref 0.8–3.5)
LYMPHOCYTES NFR BLD: 7 % (ref 12–49)
MCH RBC QN AUTO: 28.4 PG (ref 26–34)
MCHC RBC AUTO-ENTMCNC: 29.7 G/DL (ref 30–36.5)
MCV RBC AUTO: 95.7 FL (ref 80–99)
MONOCYTES # BLD: 0.7 K/UL (ref 0–1)
MONOCYTES NFR BLD: 13 % (ref 5–13)
NEUTS SEG # BLD: 3.8 K/UL (ref 1.8–8)
NEUTS SEG NFR BLD: 75 % (ref 32–75)
NRBC # BLD: 0 K/UL (ref 0–0.01)
NRBC BLD-RTO: 0 PER 100 WBC
PLATELET # BLD AUTO: 207 K/UL (ref 150–400)
PMV BLD AUTO: 10.1 FL (ref 8.9–12.9)
RBC # BLD AUTO: 3.24 M/UL (ref 4.1–5.7)
RBC MORPH BLD: ABNORMAL
WBC # BLD AUTO: 5.2 K/UL (ref 4.1–11.1)

## 2024-11-07 PROCEDURE — 6370000000 HC RX 637 (ALT 250 FOR IP): Performed by: EMERGENCY MEDICINE

## 2024-11-07 PROCEDURE — 36415 COLL VENOUS BLD VENIPUNCTURE: CPT

## 2024-11-07 PROCEDURE — 99283 EMERGENCY DEPT VISIT LOW MDM: CPT

## 2024-11-07 PROCEDURE — 99282 EMERGENCY DEPT VISIT SF MDM: CPT

## 2024-11-07 PROCEDURE — 30901 CONTROL OF NOSEBLEED: CPT

## 2024-11-07 PROCEDURE — 85025 COMPLETE CBC W/AUTO DIFF WBC: CPT

## 2024-11-07 RX ORDER — OXYMETAZOLINE HYDROCHLORIDE 0.05 G/100ML
2 SPRAY NASAL
Status: COMPLETED | OUTPATIENT
Start: 2024-11-07 | End: 2024-11-07

## 2024-11-07 RX ADMIN — OXYMETAZOLINE HYDROCHLORIDE 2 SPRAY: 0.5 SPRAY NASAL at 22:38

## 2024-11-07 ASSESSMENT — PAIN - FUNCTIONAL ASSESSMENT
PAIN_FUNCTIONAL_ASSESSMENT: 0-10
PAIN_FUNCTIONAL_ASSESSMENT: NONE - DENIES PAIN

## 2024-11-07 ASSESSMENT — PAIN SCALES - GENERAL: PAINLEVEL_OUTOF10: 0

## 2024-11-07 NOTE — ED PROVIDER NOTES
[facility] EMERGENCY DEPT  EMERGENCY DEPARTMENT ENCOUNTER      Pt Name: Williams Fitzgerald Jr  MRN: [id number]  Birthdate 1940  Date of evaluation: 11/7/2024  Provider: Margie Pringle MD    CHIEF COMPLAINT       Chief Complaint   Patient presents with    Epistaxis         HISTORY OF PRESENT ILLNESS    iWlliams Fitzgerald is an 85 yo M with h/o hereditary hemorrhagic telangiectasia and iron deficiency anemia with recurrent nose bleeds.  He states she has had nose bleed for the past hour.  It started in his left nostril which he packed with 2 OTC nasal tampons.  Then he placed 3 more in his right nostril as well.  He He notes that his bleeding as now stopped and does not want them taken out if it can be avoided.  He denies chest pain or shortness of breath.           Additional history from independent historians:     Review of External Medical Records:     Nursing Notes were reviewed.    REVIEW OF SYSTEMS       Review of Systems    Except as noted above the remainder of the review of systems was reviewed and negative.       PAST MEDICAL HISTORY     Past Medical History:   Diagnosis Date    HHT (hereditary hemorrhagic telangiectasia) (HCC)     Iron deficiency anemia     Prostate cancer (HCC)     Pulmonary arteriovenous malformation          SURGICAL HISTORY       Past Surgical History:   Procedure Laterality Date    APPENDECTOMY      CHEST SURGERY      excision of AVM    COLONOSCOPY N/A 1/29/2020    COLONOSCOPY performed by Manas Barr MD at [facility] ENDOSCOPY    COLONOSCOPY  2014    due 19    OTHER SURGICAL HISTORY  07/2018    sclerotheropy    TONSILLECTOMY           CURRENT MEDICATIONS       Previous Medications    ASCORBIC ACID (VITAMIN C) 250 MG TABLET    Take by mouth    DICYCLOMINE (BENTYL) 20 MG TABLET    TAKE 1 TABLET BY MOUTH 3 TIMES DAILY AS NEEDED FOR ABDOMINAL CRAMPS (NEW DOSE/NEW DIRECTIONS)    DILTIAZEM (CARDIZEM CD) 120 MG EXTENDED RELEASE CAPSULE    Take 1 capsule by mouth daily    TRANEXAMIC ACID  (LYSTEDA) 650 MG TABS TABLET    Take 2 tablets by mouth 3 times daily    VITAMIN B-12 1000 MCG TABLET    Take 1 tablet by mouth daily for 30 doses    VITAMIN D 25 MCG (1000 UT) CAPS    Take by mouth daily       ALLERGIES     Patient has no known allergies.    FAMILY HISTORY       Family History   Problem Relation Age of Onset    Cancer Other         colon, lung    Heart Disease Mother           SOCIAL HISTORY       Social History     Socioeconomic History    Marital status:    Tobacco Use    Smoking status: Former     Passive exposure: Never    Smokeless tobacco: Never   Vaping Use    Vaping status: Never Used   Substance and Sexual Activity    Alcohol use: Yes     Alcohol/week: 0.0 standard drinks of alcohol    Drug use: No    Sexual activity: Not Currently     Social Determinants of Health     Food Insecurity: No Food Insecurity (9/26/2024)    Hunger Vital Sign     Worried About Running Out of Food in the Last Year: Never true     Ran Out of Food in the Last Year: Never true   Transportation Needs: No Transportation Needs (9/26/2024)    PRAPARE - Transportation     Lack of Transportation (Medical): No     Lack of Transportation (Non-Medical): No   Housing Stability: Low Risk  (9/26/2024)    Housing Stability Vital Sign     Unable to Pay for Housing in the Last Year: No     Number of Times Moved in the Last Year: 1     Homeless in the Last Year: No         PHYSICAL EXAM       ED Triage Vitals [11/07/24 1654]   BP Systolic BP Percentile Diastolic BP Percentile Temp Temp src Pulse Respirations SpO2   133/73 -- -- 97.6 °F (36.4 °C) -- 85 16 100 %      Height Weight - Scale         1.829 m (6') 81.2 kg (179 lb)             Body mass index is 24.28 kg/m².    Physical Exam  Vitals and nursing note reviewed.   Constitutional:       General: He is not in acute distress.  HENT:      Head: Normocephalic and atraumatic.      Nose:      Right Nostril: Foreign body (nasal packing) and epistaxis present.      Left

## 2024-11-07 NOTE — ED TRIAGE NOTES
Patient to ER for complaint of nose bleed x 1 hour.     Patient reports hx of chronic nose bleeds, had to be admitted for nose bleeds.     Denies blood thinner use.

## 2024-11-08 NOTE — ED NOTES
I have reviewed discharge instructions with the patient.  Opportunity for questions and clarification was provided.  The patient and spouse verbalized understanding.  Patient discharged out of the ED via ambulatory with no difficulty and in stable condition.

## 2024-11-08 NOTE — ED TRIAGE NOTES
PT ambulatory to triage c/o being here earlier for a nose bleed and he is afraid to take the cotton ball out because he doesn't want it to start bleeding again.

## 2024-11-08 NOTE — ED NOTES
Epistaxis Mgmt    Date/Time: 11/7/2024 11:08 PM    Performed by: Prince Zarate MD  Authorized by: Prince Zarate MD    Consent:     Consent obtained:  Verbal    Consent given by:  Patient and spouse    Risks, benefits, and alternatives were discussed: yes      Risks discussed:  Bleeding, nasal injury and pain    Alternatives discussed:  Delayed treatment, alternative treatment and referral  Universal protocol:     Procedure explained and questions answered to patient or proxy's satisfaction: yes      Relevant documents present and verified: yes      Site/side marked: yes      Patient identity confirmed:  Verbally with patient, arm band, provided demographic data and hospital-assigned identification number  Anesthesia:     Anesthesia method:  None  Procedure details:     Treatment site:  L anterior    Treatment method:  Nasal balloon    Treatment complexity:  Limited    Treatment episode: recurring    Post-procedure details:     Assessment:  Bleeding stopped    Procedure completion:  Tolerated well, no immediate complications        Prince Zarate MD  11/07/24 6368

## 2024-11-08 NOTE — ED PROVIDER NOTES
by mouth daily       ALLERGIES     Patient has no known allergies.    FAMILY HISTORY       Family History   Problem Relation Age of Onset    Cancer Other         colon, lung    Heart Disease Mother           SOCIAL HISTORY       Social History     Socioeconomic History    Marital status:    Tobacco Use    Smoking status: Former     Passive exposure: Never    Smokeless tobacco: Never   Vaping Use    Vaping status: Never Used   Substance and Sexual Activity    Alcohol use: Yes     Alcohol/week: 0.0 standard drinks of alcohol    Drug use: No    Sexual activity: Not Currently     Social Determinants of Health     Food Insecurity: No Food Insecurity (9/26/2024)    Hunger Vital Sign     Worried About Running Out of Food in the Last Year: Never true     Ran Out of Food in the Last Year: Never true   Transportation Needs: No Transportation Needs (9/26/2024)    PRAPARE - Transportation     Lack of Transportation (Medical): No     Lack of Transportation (Non-Medical): No   Housing Stability: Low Risk  (9/26/2024)    Housing Stability Vital Sign     Unable to Pay for Housing in the Last Year: No     Number of Times Moved in the Last Year: 1     Homeless in the Last Year: No           PHYSICAL EXAM         ED TRIAGE VITALS  BP: 127/70, Temp: 97.8 °F (36.6 °C), Pulse: 83, Respirations: 19, SpO2: 98 %    Body mass index is 24.28 kg/m².    Physical Exam  Vitals and nursing note reviewed.   Constitutional:       Appearance: Normal appearance.   HENT:      Head: Normocephalic and atraumatic.      Nose:      Right Nostril: No epistaxis.      Left Nostril: Epistaxis present.      Mouth/Throat:      Mouth: Mucous membranes are moist.   Eyes:      Conjunctiva/sclera: Conjunctivae normal.   Cardiovascular:      Rate and Rhythm: Normal rate and regular rhythm.      Pulses: Normal pulses.      Heart sounds: Normal heart sounds.   Pulmonary:      Effort: Pulmonary effort is normal. No respiratory distress.      Breath sounds: Normal          (Please note that portions of this note were completed with a voice recognition program.  Efforts were made to edit the dictations but occasionally words are mis-transcribed.)    AC Wellington (electronically signed)  Physician Assistant        Stephany Hutchison PA  11/08/24 0002

## 2024-11-14 ENCOUNTER — HOSPITAL ENCOUNTER (OUTPATIENT)
Facility: HOSPITAL | Age: 84
Setting detail: INFUSION SERIES
Discharge: HOME | End: 2024-11-14
Payer: MEDICARE

## 2024-11-14 VITALS
SYSTOLIC BLOOD PRESSURE: 121 MMHG | WEIGHT: 177 LBS | DIASTOLIC BLOOD PRESSURE: 59 MMHG | OXYGEN SATURATION: 100 % | HEART RATE: 75 BPM | BODY MASS INDEX: 23.98 KG/M2 | RESPIRATION RATE: 18 BRPM | TEMPERATURE: 97.7 F | HEIGHT: 72 IN

## 2024-11-14 DIAGNOSIS — I78.0 HHT (HEREDITARY HEMORRHAGIC TELANGIECTASIA) (HCC): Primary | ICD-10-CM

## 2024-11-14 LAB
BASOPHILS # BLD: 0 K/UL (ref 0–0.1)
BASOPHILS NFR BLD: 1 % (ref 0–1)
DIFFERENTIAL METHOD BLD: ABNORMAL
EOSINOPHIL # BLD: 0.3 K/UL (ref 0–0.4)
EOSINOPHIL NFR BLD: 6 % (ref 0–7)
ERYTHROCYTE [DISTWIDTH] IN BLOOD BY AUTOMATED COUNT: 17.3 % (ref 11.5–14.5)
HCT VFR BLD AUTO: 30.2 % (ref 36.6–50.3)
HGB BLD-MCNC: 9.3 G/DL (ref 12.1–17)
IMM GRANULOCYTES # BLD AUTO: 0 K/UL (ref 0–0.04)
IMM GRANULOCYTES NFR BLD AUTO: 0 % (ref 0–0.5)
LYMPHOCYTES # BLD: 0.5 K/UL (ref 0.8–3.5)
LYMPHOCYTES NFR BLD: 11 % (ref 12–49)
MCH RBC QN AUTO: 28.5 PG (ref 26–34)
MCHC RBC AUTO-ENTMCNC: 30.8 G/DL (ref 30–36.5)
MCV RBC AUTO: 92.6 FL (ref 80–99)
MONOCYTES # BLD: 0.6 K/UL (ref 0–1)
MONOCYTES NFR BLD: 14 % (ref 5–13)
NEUTS SEG # BLD: 2.8 K/UL (ref 1.8–8)
NEUTS SEG NFR BLD: 68 % (ref 32–75)
NRBC # BLD: 0 K/UL (ref 0–0.01)
NRBC BLD-RTO: 0 PER 100 WBC
PLATELET # BLD AUTO: 150 K/UL (ref 150–400)
PMV BLD AUTO: 9.7 FL (ref 8.9–12.9)
RBC # BLD AUTO: 3.26 M/UL (ref 4.1–5.7)
RBC MORPH BLD: ABNORMAL
WBC # BLD AUTO: 4.2 K/UL (ref 4.1–11.1)

## 2024-11-14 PROCEDURE — 96413 CHEMO IV INFUSION 1 HR: CPT

## 2024-11-14 PROCEDURE — 6360000002 HC RX W HCPCS: Performed by: NURSE PRACTITIONER

## 2024-11-14 PROCEDURE — 85025 COMPLETE CBC W/AUTO DIFF WBC: CPT

## 2024-11-14 PROCEDURE — 2580000003 HC RX 258: Performed by: NURSE PRACTITIONER

## 2024-11-14 RX ADMIN — SODIUM CHLORIDE 425 MG: 9 INJECTION, SOLUTION INTRAVENOUS at 14:20

## 2024-11-14 ASSESSMENT — PAIN SCALES - GENERAL: PAINLEVEL_OUTOF10: 0

## 2024-11-14 NOTE — PROGRESS NOTES
syringe, rate of administration, route of administration,  expiration dates and/or times, appearance and physical integrity of the drugs, rate set on infusion pump, when used sequencing of drug administration.           Mr. Amilcar Ramirez tolerated treatment well and [facility].  PIV removed. Patient aware of their next appointment.    Joshua Alcazar RN  November 14, 2024    Future Appointments   Date Time Provider Department Center   11/29/2024  1:30 PM SS CHEMO CHAIR [facility]   12/5/2024 12:30 PM Rajeev Holt MD [facility]   12/12/2024  1:30 PM SS CHEMO CHAIR [facility]   12/12/2024  1:40 PM Mary Hudson, APRN - NP [facility]   12/26/2024  1:30 PM SS CHEMO CHAIR [facility]   1/9/2025  1:30 PM SS CHEMO CHAIR [facility]

## 2024-11-18 DIAGNOSIS — C61 PROSTATE CANCER (HCC): Primary | ICD-10-CM

## 2024-11-20 ENCOUNTER — APPOINTMENT (OUTPATIENT)
Facility: HOSPITAL | Age: 84
End: 2024-11-20
Payer: MEDICARE

## 2024-11-27 ENCOUNTER — TELEPHONE (OUTPATIENT)
Age: 84
End: 2024-11-27

## 2024-11-27 NOTE — TELEPHONE ENCOUNTER
[facility] [place] at [facility]  [phone]    I returned his call. We reviewed his current status. He was concerned because he thought he was missing many of his Avastin infusions for nose bleeds. However, on review of the chart he has been receiving these every 2 weeks on schedule. He'll receive dose #5 on 11/29 and he has follow up with us 2 weeks later for dose #6. The plan is to then reduce the frequency of dosing, depending on how he is doing.    He is planning a procedure with ENT in January. If his epistaxis has not improved by then with our current interventions, then I recommend that he proceed. However, if epistaxis improves with the current Avastin infusions, then it may not be needed.

## 2024-11-27 NOTE — TELEPHONE ENCOUNTER
[facility] [place] at [facility]  [phone]    11/27/24- Patient's daughter expressed concern that patient has been disoriented and confused over the last week.  Stated yesterday he was looking in the fridge for thank you cards.  He asked if the people upstairs are going to come down for dinner, when it's just his wife and himself at home.  She's wondering if it's related to his anemia and what they should do.     Discussed with Dr. Auguste, he doesn't believe anemia is related as patient's Hgb has been stable recently.  There are rare side effects of Avastin that could contribute.  Discussed monitoring for neurologic changes such as speech difficulty or vision changes.  If they notice neurological changes or confusion progressing, recommend ED for urgent imaging.  Also, recommend contacting Dr. Hallman (PCP) to discuss concerns and see if he has any other recommendations regarding testing.  Patient's daughter verbalized understanding and was appreciative of the call.

## 2024-11-27 NOTE — TELEPHONE ENCOUNTER
Pt daughter called in stating she's noticed some changes with the pt. She said she would like to speak with the nurse. Please advise       CB# [phone]

## 2024-11-27 NOTE — TELEPHONE ENCOUNTER
Patient called and stated that he would like a call back about a personal matter. Patient didn't want to elaborate on what the subject would be. Requested a call back.       # [phone]

## 2024-11-29 ENCOUNTER — HOSPITAL ENCOUNTER (OUTPATIENT)
Facility: HOSPITAL | Age: 84
Setting detail: INFUSION SERIES
Discharge: HOME | End: 2024-11-29
Payer: MEDICARE

## 2024-11-29 VITALS
DIASTOLIC BLOOD PRESSURE: 62 MMHG | BODY MASS INDEX: 24.08 KG/M2 | HEIGHT: 72 IN | RESPIRATION RATE: 18 BRPM | OXYGEN SATURATION: 100 % | SYSTOLIC BLOOD PRESSURE: 143 MMHG | WEIGHT: 177.8 LBS | HEART RATE: 73 BPM | TEMPERATURE: 97.6 F

## 2024-11-29 DIAGNOSIS — D50.9 IRON DEFICIENCY ANEMIA, UNSPECIFIED IRON DEFICIENCY ANEMIA TYPE: ICD-10-CM

## 2024-11-29 DIAGNOSIS — I78.0 HHT (HEREDITARY HEMORRHAGIC TELANGIECTASIA) (HCC): Primary | ICD-10-CM

## 2024-11-29 LAB
ABO + RH BLD: NORMAL
BASOPHILS # BLD: 0.1 K/UL (ref 0–0.1)
BASOPHILS NFR BLD: 1 % (ref 0–1)
BLOOD GROUP ANTIBODIES SERPL: NORMAL
DIFFERENTIAL METHOD BLD: ABNORMAL
EOSINOPHIL # BLD: 0.3 K/UL (ref 0–0.4)
EOSINOPHIL NFR BLD: 6 % (ref 0–7)
ERYTHROCYTE [DISTWIDTH] IN BLOOD BY AUTOMATED COUNT: 16.3 % (ref 11.5–14.5)
FERRITIN SERPL-MCNC: 23 NG/ML (ref 26–388)
HCT VFR BLD AUTO: 28.7 % (ref 36.6–50.3)
HGB BLD-MCNC: 8.7 G/DL (ref 12.1–17)
IMM GRANULOCYTES # BLD AUTO: 0 K/UL (ref 0–0.04)
IMM GRANULOCYTES NFR BLD AUTO: 0 % (ref 0–0.5)
LYMPHOCYTES # BLD: 0.5 K/UL (ref 0.8–3.5)
LYMPHOCYTES NFR BLD: 10 % (ref 12–49)
MCH RBC QN AUTO: 26.8 PG (ref 26–34)
MCHC RBC AUTO-ENTMCNC: 30.3 G/DL (ref 30–36.5)
MCV RBC AUTO: 88.3 FL (ref 80–99)
MONOCYTES # BLD: 0.7 K/UL (ref 0–1)
MONOCYTES NFR BLD: 14 % (ref 5–13)
NEUTS SEG # BLD: 3.6 K/UL (ref 1.8–8)
NEUTS SEG NFR BLD: 69 % (ref 32–75)
NRBC # BLD: 0 K/UL (ref 0–0.01)
NRBC BLD-RTO: 0 PER 100 WBC
PLATELET # BLD AUTO: 192 K/UL (ref 150–400)
PMV BLD AUTO: 10.4 FL (ref 8.9–12.9)
RBC # BLD AUTO: 3.25 M/UL (ref 4.1–5.7)
RBC MORPH BLD: ABNORMAL
RBC MORPH BLD: ABNORMAL
SPECIMEN EXP DATE BLD: NORMAL
WBC # BLD AUTO: 5.2 K/UL (ref 4.1–11.1)

## 2024-11-29 PROCEDURE — 96367 TX/PROPH/DG ADDL SEQ IV INF: CPT

## 2024-11-29 PROCEDURE — 2580000003 HC RX 258: Performed by: INTERNAL MEDICINE

## 2024-11-29 PROCEDURE — 2580000003 HC RX 258: Performed by: NURSE PRACTITIONER

## 2024-11-29 PROCEDURE — 6360000002 HC RX W HCPCS: Performed by: NURSE PRACTITIONER

## 2024-11-29 PROCEDURE — 6360000002 HC RX W HCPCS: Performed by: INTERNAL MEDICINE

## 2024-11-29 PROCEDURE — 86901 BLOOD TYPING SEROLOGIC RH(D): CPT

## 2024-11-29 PROCEDURE — 85025 COMPLETE CBC W/AUTO DIFF WBC: CPT

## 2024-11-29 PROCEDURE — 86900 BLOOD TYPING SEROLOGIC ABO: CPT

## 2024-11-29 PROCEDURE — 86850 RBC ANTIBODY SCREEN: CPT

## 2024-11-29 PROCEDURE — 82728 ASSAY OF FERRITIN: CPT

## 2024-11-29 PROCEDURE — 36415 COLL VENOUS BLD VENIPUNCTURE: CPT

## 2024-11-29 PROCEDURE — 96413 CHEMO IV INFUSION 1 HR: CPT

## 2024-11-29 RX ORDER — EPINEPHRINE 1 MG/ML
0.3 INJECTION, SOLUTION INTRAMUSCULAR; SUBCUTANEOUS PRN
OUTPATIENT
Start: 2025-01-10

## 2024-11-29 RX ORDER — SODIUM CHLORIDE 0.9 % (FLUSH) 0.9 %
5-40 SYRINGE (ML) INJECTION PRN
OUTPATIENT
Start: 2025-01-10

## 2024-11-29 RX ORDER — SODIUM CHLORIDE 9 MG/ML
INJECTION, SOLUTION INTRAVENOUS CONTINUOUS
OUTPATIENT
Start: 2025-01-10

## 2024-11-29 RX ORDER — ALBUTEROL SULFATE 90 UG/1
4 INHALANT RESPIRATORY (INHALATION) PRN
OUTPATIENT
Start: 2025-01-10

## 2024-11-29 RX ORDER — DIPHENHYDRAMINE HYDROCHLORIDE 50 MG/ML
50 INJECTION INTRAMUSCULAR; INTRAVENOUS
OUTPATIENT
Start: 2025-01-10

## 2024-11-29 RX ORDER — ACETAMINOPHEN 325 MG/1
650 TABLET ORAL
OUTPATIENT
Start: 2025-01-10

## 2024-11-29 RX ORDER — ONDANSETRON 2 MG/ML
8 INJECTION INTRAMUSCULAR; INTRAVENOUS
OUTPATIENT
Start: 2025-01-10

## 2024-11-29 RX ORDER — HYDROCORTISONE SODIUM SUCCINATE 100 MG/2ML
100 INJECTION INTRAMUSCULAR; INTRAVENOUS
OUTPATIENT
Start: 2025-01-10

## 2024-11-29 RX ORDER — FAMOTIDINE 10 MG/ML
20 INJECTION, SOLUTION INTRAVENOUS
OUTPATIENT
Start: 2025-01-10

## 2024-11-29 RX ORDER — SODIUM CHLORIDE 0.9 % (FLUSH) 0.9 %
5-40 SYRINGE (ML) INJECTION PRN
Status: DISCONTINUED | OUTPATIENT
Start: 2024-11-29 | End: 2024-11-30 | Stop reason: HOSPADM

## 2024-11-29 RX ORDER — HEPARIN 100 UNIT/ML
500 SYRINGE INTRAVENOUS PRN
OUTPATIENT
Start: 2025-01-10

## 2024-11-29 RX ORDER — SODIUM CHLORIDE 9 MG/ML
5-250 INJECTION, SOLUTION INTRAVENOUS PRN
OUTPATIENT
Start: 2025-01-10

## 2024-11-29 RX ADMIN — SODIUM CHLORIDE, PRESERVATIVE FREE 10 ML: 5 INJECTION INTRAVENOUS at 15:21

## 2024-11-29 RX ADMIN — FERRIC CARBOXYMALTOSE INJECTION 750 MG: 50 INJECTION, SOLUTION INTRAVENOUS at 14:57

## 2024-11-29 RX ADMIN — SODIUM CHLORIDE 425 MG: 9 INJECTION, SOLUTION INTRAVENOUS at 14:23

## 2024-11-29 ASSESSMENT — PAIN SCALES - GENERAL: PAINLEVEL_OUTOF10: 0

## 2024-11-29 NOTE — PROGRESS NOTES
[facility] VISIT NOTE  Date: 2024    Name: Williams Fitzgerald Jr    MRN: [id number]         : 1940    Mr. Amilcar Ramirez Arrived ambulatory and in no distress for C5D1 of Avastin+Injectafer  Regimen.  Assessment was completed, no acute issues at this time, no new complaints voiced.  LFA 24G PIV placed with +blood return, labs drawn & sent for processing.  criteria are met for today treatment.    Vitals:    24 1330 24 1519   BP: (!) 123/59 (!) 143/62   Pulse: 79 73   Resp: 18 18   Temp: 97.6 °F (36.4 °C)    TempSrc: Temporal    SpO2: 100%    Weight: 80.6 kg (177 lb 12.8 oz)    Height: 1.829 m (6')        Recent Results (from the past 12 hour(s))   CBC With Auto Differential    Collection Time: 24  1:44 PM   Result Value Ref Range    WBC 5.2 4.1 - 11.1 K/uL    RBC 3.25 (L) 4.10 - 5.70 M/uL    Hemoglobin 8.7 (L) 12.1 - 17.0 g/dL    Hematocrit 28.7 (L) 36.6 - 50.3 %    MCV 88.3 80.0 - 99.0 FL    MCH 26.8 26.0 - 34.0 PG    MCHC 30.3 30.0 - 36.5 g/dL    RDW 16.3 (H) 11.5 - 14.5 %    Platelets 192 150 - 400 K/uL    MPV 10.4 8.9 - 12.9 FL    Nucleated RBCs 0.0 0  WBC    nRBC 0.00 0.00 - 0.01 K/uL    Neutrophils % 69 32 - 75 %    Lymphocytes % 10 (L) 12 - 49 %    Monocytes % 14 (H) 5 - 13 %    Eosinophils % 6 0 - 7 %    Basophils % 1 0 - 1 %    Immature Granulocytes % 0 0.0 - 0.5 %    Neutrophils Absolute 3.6 1.8 - 8.0 K/UL    Lymphocytes Absolute 0.5 (L) 0.8 - 3.5 K/UL    Monocytes Absolute 0.7 0.0 - 1.0 K/UL    Eosinophils Absolute 0.3 0.0 - 0.4 K/UL    Basophils Absolute 0.1 0.0 - 0.1 K/UL    Immature Granulocytes Absolute 0.0 0.00 - 0.04 K/UL    Differential Type SMEAR SCANNED      RBC Comment ANISOCYTOSIS  1+        RBC Comment OVALOCYTES  PRESENT       TYPE AND SCREEN    Collection Time: 24  1:44 PM   Result Value Ref Range    Crossmatch expiration date 2024,2359     ABO/Rh O POSITIVE     Antibody Screen NEG         Medications Administered         bevacizumab-bvzr

## 2024-12-04 DIAGNOSIS — D50.0 IRON DEFICIENCY ANEMIA SECONDARY TO BLOOD LOSS (CHRONIC): ICD-10-CM

## 2024-12-04 DIAGNOSIS — C61 PROSTATE CANCER (HCC): ICD-10-CM

## 2024-12-06 LAB
BASOPHILS # BLD: 0.1 K/UL (ref 0–0.1)
BASOPHILS NFR BLD: 1 % (ref 0–1)
DIFFERENTIAL METHOD BLD: ABNORMAL
EOSINOPHIL # BLD: 0.3 K/UL (ref 0–0.4)
EOSINOPHIL NFR BLD: 6 % (ref 0–7)
ERYTHROCYTE [DISTWIDTH] IN BLOOD BY AUTOMATED COUNT: 19.4 % (ref 11.5–14.5)
FERRITIN SERPL-MCNC: 406 NG/ML (ref 26–388)
HCT VFR BLD AUTO: 26.6 % (ref 36.6–50.3)
HGB BLD-MCNC: 7.8 G/DL (ref 12.1–17)
IMM GRANULOCYTES # BLD AUTO: 0 K/UL (ref 0–0.04)
IMM GRANULOCYTES NFR BLD AUTO: 0 % (ref 0–0.5)
IRON SATN MFR SERPL: 18 % (ref 20–50)
IRON SERPL-MCNC: 55 UG/DL (ref 35–150)
LYMPHOCYTES # BLD: 0.4 K/UL (ref 0.8–3.5)
LYMPHOCYTES NFR BLD: 8 % (ref 12–49)
MCH RBC QN AUTO: 27.8 PG (ref 26–34)
MCHC RBC AUTO-ENTMCNC: 29.3 G/DL (ref 30–36.5)
MCV RBC AUTO: 94.7 FL (ref 80–99)
MONOCYTES # BLD: 0.8 K/UL (ref 0–1)
MONOCYTES NFR BLD: 14 % (ref 5–13)
NEUTS SEG # BLD: 3.9 K/UL (ref 1.8–8)
NEUTS SEG NFR BLD: 71 % (ref 32–75)
NRBC # BLD: 0 K/UL (ref 0–0.01)
NRBC BLD-RTO: 0 PER 100 WBC
PLATELET # BLD AUTO: 169 K/UL (ref 150–400)
PMV BLD AUTO: 11.1 FL (ref 8.9–12.9)
RBC # BLD AUTO: 2.81 M/UL (ref 4.1–5.7)
RBC MORPH BLD: ABNORMAL
TIBC SERPL-MCNC: 312 UG/DL (ref 250–450)
WBC # BLD AUTO: 5.5 K/UL (ref 4.1–11.1)

## 2024-12-08 ENCOUNTER — HOSPITAL ENCOUNTER (EMERGENCY)
Facility: HOSPITAL | Age: 84
Discharge: HOME | End: 2024-12-09
Attending: EMERGENCY MEDICINE
Payer: MEDICARE

## 2024-12-08 DIAGNOSIS — R04.0 EPISTAXIS: Primary | ICD-10-CM

## 2024-12-08 PROCEDURE — 30901 CONTROL OF NOSEBLEED: CPT

## 2024-12-08 PROCEDURE — 99285 EMERGENCY DEPT VISIT HI MDM: CPT

## 2024-12-08 ASSESSMENT — PAIN - FUNCTIONAL ASSESSMENT: PAIN_FUNCTIONAL_ASSESSMENT: NONE - DENIES PAIN

## 2024-12-09 ENCOUNTER — HOSPITAL ENCOUNTER (OUTPATIENT)
Facility: HOSPITAL | Age: 84
Setting detail: OBSERVATION
Discharge: ANOTHER HOSPITAL | End: 2024-12-09
Attending: EMERGENCY MEDICINE | Admitting: STUDENT IN AN ORGANIZED HEALTH CARE EDUCATION/TRAINING PROGRAM
Payer: MEDICARE

## 2024-12-09 VITALS
WEIGHT: 181.66 LBS | TEMPERATURE: 98.1 F | DIASTOLIC BLOOD PRESSURE: 61 MMHG | RESPIRATION RATE: 19 BRPM | HEIGHT: 72 IN | HEART RATE: 68 BPM | BODY MASS INDEX: 24.61 KG/M2 | SYSTOLIC BLOOD PRESSURE: 107 MMHG | OXYGEN SATURATION: 96 %

## 2024-12-09 VITALS
TEMPERATURE: 98.7 F | BODY MASS INDEX: 24.11 KG/M2 | HEART RATE: 95 BPM | OXYGEN SATURATION: 99 % | SYSTOLIC BLOOD PRESSURE: 111 MMHG | HEIGHT: 72 IN | WEIGHT: 178 LBS | DIASTOLIC BLOOD PRESSURE: 70 MMHG | RESPIRATION RATE: 18 BRPM

## 2024-12-09 DIAGNOSIS — R04.0 EPISTAXIS: Primary | ICD-10-CM

## 2024-12-09 LAB
ABO + RH BLD: NORMAL
ALBUMIN SERPL-MCNC: 2.7 G/DL (ref 3.5–5)
ALBUMIN SERPL-MCNC: 3 G/DL (ref 3.5–5)
ALBUMIN/GLOB SERPL: 1 (ref 1.1–2.2)
ALBUMIN/GLOB SERPL: 1 (ref 1.1–2.2)
ALP SERPL-CCNC: 71 U/L (ref 45–117)
ALP SERPL-CCNC: 76 U/L (ref 45–117)
ALT SERPL-CCNC: 20 U/L (ref 12–78)
ALT SERPL-CCNC: 24 U/L (ref 12–78)
ANION GAP SERPL CALC-SCNC: 3 MMOL/L (ref 2–12)
ANION GAP SERPL CALC-SCNC: 4 MMOL/L (ref 2–12)
APTT PPP: 24.2 SEC (ref 22.1–31)
AST SERPL-CCNC: 13 U/L (ref 15–37)
AST SERPL-CCNC: 3 U/L (ref 15–37)
BASOPHILS # BLD: 0 K/UL (ref 0–0.1)
BASOPHILS # BLD: 0.1 K/UL (ref 0–0.1)
BASOPHILS NFR BLD: 0 % (ref 0–1)
BASOPHILS NFR BLD: 1 % (ref 0–1)
BILIRUB SERPL-MCNC: 0.2 MG/DL (ref 0.2–1)
BILIRUB SERPL-MCNC: 0.3 MG/DL (ref 0.2–1)
BLOOD GROUP ANTIBODIES SERPL: NORMAL
BUN SERPL-MCNC: 21 MG/DL (ref 6–20)
BUN SERPL-MCNC: 29 MG/DL (ref 6–20)
BUN/CREAT SERPL: 24 (ref 12–20)
BUN/CREAT SERPL: 40 (ref 12–20)
CALCIUM SERPL-MCNC: 8.3 MG/DL (ref 8.5–10.1)
CALCIUM SERPL-MCNC: 8.4 MG/DL (ref 8.5–10.1)
CHLORIDE SERPL-SCNC: 110 MMOL/L (ref 97–108)
CHLORIDE SERPL-SCNC: 112 MMOL/L (ref 97–108)
CO2 SERPL-SCNC: 25 MMOL/L (ref 21–32)
CO2 SERPL-SCNC: 28 MMOL/L (ref 21–32)
COMMENT:: NORMAL
CREAT SERPL-MCNC: 0.73 MG/DL (ref 0.7–1.3)
CREAT SERPL-MCNC: 0.86 MG/DL (ref 0.7–1.3)
DIFFERENTIAL METHOD BLD: ABNORMAL
DIFFERENTIAL METHOD BLD: ABNORMAL
EOSINOPHIL # BLD: 0.2 K/UL (ref 0–0.4)
EOSINOPHIL # BLD: 0.3 K/UL (ref 0–0.4)
EOSINOPHIL NFR BLD: 4 % (ref 0–7)
EOSINOPHIL NFR BLD: 5 % (ref 0–7)
ERYTHROCYTE [DISTWIDTH] IN BLOOD BY AUTOMATED COUNT: 21.4 % (ref 11.5–14.5)
ERYTHROCYTE [DISTWIDTH] IN BLOOD BY AUTOMATED COUNT: 21.5 % (ref 11.5–14.5)
FERRITIN SERPL-MCNC: 201 NG/ML (ref 26–388)
FOLATE SERPL-MCNC: 9.2 NG/ML (ref 5–21)
GLOBULIN SER CALC-MCNC: 2.7 G/DL (ref 2–4)
GLOBULIN SER CALC-MCNC: 2.9 G/DL (ref 2–4)
GLUCOSE SERPL-MCNC: 114 MG/DL (ref 65–100)
GLUCOSE SERPL-MCNC: 133 MG/DL (ref 65–100)
HCT VFR BLD AUTO: 21.4 % (ref 36.6–50.3)
HCT VFR BLD AUTO: 22.6 % (ref 36.6–50.3)
HCT VFR BLD AUTO: 25.3 % (ref 36.6–50.3)
HGB BLD-MCNC: 6.2 G/DL (ref 12.1–17)
HGB BLD-MCNC: 6.6 G/DL (ref 12.1–17)
HGB BLD-MCNC: 7.5 G/DL (ref 12.1–17)
HISTORY CHECK: NORMAL
HISTORY CHECK: NORMAL
IMM GRANULOCYTES # BLD AUTO: 0 K/UL (ref 0–0.04)
IMM GRANULOCYTES # BLD AUTO: 0 K/UL (ref 0–0.04)
IMM GRANULOCYTES NFR BLD AUTO: 0 % (ref 0–0.5)
IMM GRANULOCYTES NFR BLD AUTO: 0 % (ref 0–0.5)
INR PPP: 1 (ref 0.9–1.1)
IRON SATN MFR SERPL: 26 % (ref 20–50)
IRON SERPL-MCNC: 62 UG/DL (ref 35–150)
LYMPHOCYTES # BLD: 0.4 K/UL (ref 0.8–3.5)
LYMPHOCYTES # BLD: 0.5 K/UL (ref 0.8–3.5)
LYMPHOCYTES NFR BLD: 8 % (ref 12–49)
LYMPHOCYTES NFR BLD: 9 % (ref 12–49)
MCH RBC QN AUTO: 27.9 PG (ref 26–34)
MCH RBC QN AUTO: 28.1 PG (ref 26–34)
MCHC RBC AUTO-ENTMCNC: 29 G/DL (ref 30–36.5)
MCHC RBC AUTO-ENTMCNC: 29.6 G/DL (ref 30–36.5)
MCV RBC AUTO: 94.8 FL (ref 80–99)
MCV RBC AUTO: 96.4 FL (ref 80–99)
MONOCYTES # BLD: 0.7 K/UL (ref 0–1)
MONOCYTES # BLD: 0.8 K/UL (ref 0–1)
MONOCYTES NFR BLD: 13 % (ref 5–13)
MONOCYTES NFR BLD: 14 % (ref 5–13)
NEUTS SEG # BLD: 3.7 K/UL (ref 1.8–8)
NEUTS SEG # BLD: 4.4 K/UL (ref 1.8–8)
NEUTS SEG NFR BLD: 72 % (ref 32–75)
NEUTS SEG NFR BLD: 74 % (ref 32–75)
NRBC # BLD: 0 K/UL (ref 0–0.01)
NRBC # BLD: 0 K/UL (ref 0–0.01)
NRBC BLD-RTO: 0 PER 100 WBC
NRBC BLD-RTO: 0 PER 100 WBC
PLATELET # BLD AUTO: 162 K/UL (ref 150–400)
PLATELET # BLD AUTO: 182 K/UL (ref 150–400)
PLATELET COMMENT: ABNORMAL
PMV BLD AUTO: 10.3 FL (ref 8.9–12.9)
PMV BLD AUTO: 10.8 FL (ref 8.9–12.9)
POTASSIUM SERPL-SCNC: 4 MMOL/L (ref 3.5–5.1)
POTASSIUM SERPL-SCNC: 4.3 MMOL/L (ref 3.5–5.1)
PROT SERPL-MCNC: 5.4 G/DL (ref 6.4–8.2)
PROT SERPL-MCNC: 5.9 G/DL (ref 6.4–8.2)
PROTHROMBIN TIME: 10.9 SEC (ref 9–11.1)
RBC # BLD AUTO: 2.22 M/UL (ref 4.1–5.7)
RBC # BLD AUTO: 2.67 M/UL (ref 4.1–5.7)
RBC MORPH BLD: ABNORMAL
SODIUM SERPL-SCNC: 140 MMOL/L (ref 136–145)
SODIUM SERPL-SCNC: 142 MMOL/L (ref 136–145)
SPECIMEN EXP DATE BLD: NORMAL
SPECIMEN HOLD: NORMAL
THERAPEUTIC RANGE: NORMAL SECS (ref 58–77)
TIBC SERPL-MCNC: 241 UG/DL (ref 250–450)
VIT B12 SERPL-MCNC: 504 PG/ML (ref 193–986)
WBC # BLD AUTO: 5 K/UL (ref 4.1–11.1)
WBC # BLD AUTO: 6.1 K/UL (ref 4.1–11.1)

## 2024-12-09 PROCEDURE — G0378 HOSPITAL OBSERVATION PER HR: HCPCS

## 2024-12-09 PROCEDURE — 82607 VITAMIN B-12: CPT

## 2024-12-09 PROCEDURE — 85014 HEMATOCRIT: CPT

## 2024-12-09 PROCEDURE — 96361 HYDRATE IV INFUSION ADD-ON: CPT

## 2024-12-09 PROCEDURE — P9016 RBC LEUKOCYTES REDUCED: HCPCS

## 2024-12-09 PROCEDURE — 85730 THROMBOPLASTIN TIME PARTIAL: CPT

## 2024-12-09 PROCEDURE — 2580000003 HC RX 258: Performed by: STUDENT IN AN ORGANIZED HEALTH CARE EDUCATION/TRAINING PROGRAM

## 2024-12-09 PROCEDURE — 96367 TX/PROPH/DG ADDL SEQ IV INF: CPT

## 2024-12-09 PROCEDURE — 82728 ASSAY OF FERRITIN: CPT

## 2024-12-09 PROCEDURE — 86900 BLOOD TYPING SEROLOGIC ABO: CPT

## 2024-12-09 PROCEDURE — 86920 COMPATIBILITY TEST SPIN: CPT

## 2024-12-09 PROCEDURE — P9047 ALBUMIN (HUMAN), 25%, 50ML: HCPCS

## 2024-12-09 PROCEDURE — 85025 COMPLETE CBC W/AUTO DIFF WBC: CPT

## 2024-12-09 PROCEDURE — 2500000003 HC RX 250 WO HCPCS: Performed by: EMERGENCY MEDICINE

## 2024-12-09 PROCEDURE — 2580000003 HC RX 258

## 2024-12-09 PROCEDURE — 80053 COMPREHEN METABOLIC PANEL: CPT

## 2024-12-09 PROCEDURE — 86901 BLOOD TYPING SEROLOGIC RH(D): CPT

## 2024-12-09 PROCEDURE — 96365 THER/PROPH/DIAG IV INF INIT: CPT

## 2024-12-09 PROCEDURE — 36415 COLL VENOUS BLD VENIPUNCTURE: CPT

## 2024-12-09 PROCEDURE — 85018 HEMOGLOBIN: CPT

## 2024-12-09 PROCEDURE — 86850 RBC ANTIBODY SCREEN: CPT

## 2024-12-09 PROCEDURE — 2500000003 HC RX 250 WO HCPCS

## 2024-12-09 PROCEDURE — 83550 IRON BINDING TEST: CPT

## 2024-12-09 PROCEDURE — 82746 ASSAY OF FOLIC ACID SERUM: CPT

## 2024-12-09 PROCEDURE — 83540 ASSAY OF IRON: CPT

## 2024-12-09 PROCEDURE — 6370000000 HC RX 637 (ALT 250 FOR IP): Performed by: EMERGENCY MEDICINE

## 2024-12-09 PROCEDURE — 85610 PROTHROMBIN TIME: CPT

## 2024-12-09 PROCEDURE — 99285 EMERGENCY DEPT VISIT HI MDM: CPT

## 2024-12-09 PROCEDURE — 36430 TRANSFUSION BLD/BLD COMPNT: CPT

## 2024-12-09 PROCEDURE — 6360000002 HC RX W HCPCS

## 2024-12-09 RX ORDER — ONDANSETRON 2 MG/ML
4 INJECTION INTRAMUSCULAR; INTRAVENOUS EVERY 6 HOURS PRN
Status: DISCONTINUED | OUTPATIENT
Start: 2024-12-09 | End: 2024-12-09 | Stop reason: HOSPADM

## 2024-12-09 RX ORDER — MAGNESIUM SULFATE IN WATER 40 MG/ML
2000 INJECTION, SOLUTION INTRAVENOUS PRN
Status: DISCONTINUED | OUTPATIENT
Start: 2024-12-09 | End: 2024-12-09 | Stop reason: HOSPADM

## 2024-12-09 RX ORDER — ONDANSETRON 4 MG/1
4 TABLET, ORALLY DISINTEGRATING ORAL EVERY 8 HOURS PRN
Status: DISCONTINUED | OUTPATIENT
Start: 2024-12-09 | End: 2024-12-09 | Stop reason: HOSPADM

## 2024-12-09 RX ORDER — SODIUM CHLORIDE 9 MG/ML
INJECTION, SOLUTION INTRAVENOUS PRN
Status: DISCONTINUED | OUTPATIENT
Start: 2024-12-09 | End: 2024-12-09 | Stop reason: HOSPADM

## 2024-12-09 RX ORDER — GINSENG 100 MG
CAPSULE ORAL
Status: DISCONTINUED
Start: 2024-12-09 | End: 2024-12-09 | Stop reason: HOSPADM

## 2024-12-09 RX ORDER — SODIUM CHLORIDE 0.9 % (FLUSH) 0.9 %
5-40 SYRINGE (ML) INJECTION PRN
Status: DISCONTINUED | OUTPATIENT
Start: 2024-12-09 | End: 2024-12-09 | Stop reason: HOSPADM

## 2024-12-09 RX ORDER — SODIUM CHLORIDE 9 MG/ML
INJECTION, SOLUTION INTRAVENOUS CONTINUOUS
Status: DISCONTINUED | OUTPATIENT
Start: 2024-12-09 | End: 2024-12-09 | Stop reason: HOSPADM

## 2024-12-09 RX ORDER — SODIUM CHLORIDE 0.9 % (FLUSH) 0.9 %
5-40 SYRINGE (ML) INJECTION EVERY 12 HOURS SCHEDULED
Status: DISCONTINUED | OUTPATIENT
Start: 2024-12-09 | End: 2024-12-09 | Stop reason: HOSPADM

## 2024-12-09 RX ORDER — ACETAMINOPHEN 325 MG/1
650 TABLET ORAL EVERY 6 HOURS PRN
Status: DISCONTINUED | OUTPATIENT
Start: 2024-12-09 | End: 2024-12-09 | Stop reason: HOSPADM

## 2024-12-09 RX ORDER — POTASSIUM CHLORIDE 7.45 MG/ML
10 INJECTION INTRAVENOUS PRN
Status: DISCONTINUED | OUTPATIENT
Start: 2024-12-09 | End: 2024-12-09 | Stop reason: HOSPADM

## 2024-12-09 RX ORDER — TRANEXAMIC ACID 100 MG/ML
100 INJECTION, SOLUTION INTRAVENOUS ONCE
Status: DISCONTINUED | OUTPATIENT
Start: 2024-12-09 | End: 2024-12-09 | Stop reason: HOSPADM

## 2024-12-09 RX ORDER — POLYETHYLENE GLYCOL 3350 17 G/17G
17 POWDER, FOR SOLUTION ORAL DAILY PRN
Status: DISCONTINUED | OUTPATIENT
Start: 2024-12-09 | End: 2024-12-09 | Stop reason: HOSPADM

## 2024-12-09 RX ORDER — SODIUM CHLORIDE, SODIUM LACTATE, POTASSIUM CHLORIDE, CALCIUM CHLORIDE 600; 310; 30; 20 MG/100ML; MG/100ML; MG/100ML; MG/100ML
INJECTION, SOLUTION INTRAVENOUS CONTINUOUS
Status: DISCONTINUED | OUTPATIENT
Start: 2024-12-09 | End: 2024-12-09

## 2024-12-09 RX ORDER — TRANEXAMIC ACID 10 MG/ML
1000 INJECTION, SOLUTION INTRAVENOUS
Status: DISCONTINUED | OUTPATIENT
Start: 2024-12-09 | End: 2024-12-09

## 2024-12-09 RX ORDER — TRANEXAMIC ACID 100 MG/ML
100 INJECTION, SOLUTION INTRAVENOUS ONCE
Status: COMPLETED | OUTPATIENT
Start: 2024-12-09 | End: 2024-12-09

## 2024-12-09 RX ORDER — DILTIAZEM HYDROCHLORIDE 120 MG/1
120 CAPSULE, COATED, EXTENDED RELEASE ORAL DAILY
Status: DISCONTINUED | OUTPATIENT
Start: 2024-12-09 | End: 2024-12-09 | Stop reason: ALTCHOICE

## 2024-12-09 RX ORDER — TRANEXAMIC ACID 100 MG/ML
15 INJECTION, SOLUTION INTRAVENOUS ONCE
Status: DISCONTINUED | OUTPATIENT
Start: 2024-12-09 | End: 2024-12-09

## 2024-12-09 RX ORDER — ACETAMINOPHEN 650 MG/1
650 SUPPOSITORY RECTAL EVERY 6 HOURS PRN
Status: DISCONTINUED | OUTPATIENT
Start: 2024-12-09 | End: 2024-12-09 | Stop reason: HOSPADM

## 2024-12-09 RX ORDER — OXYMETAZOLINE HYDROCHLORIDE 0.05 G/100ML
2 SPRAY NASAL
Status: COMPLETED | OUTPATIENT
Start: 2024-12-09 | End: 2024-12-09

## 2024-12-09 RX ORDER — ALBUMIN (HUMAN) 12.5 G/50ML
25 SOLUTION INTRAVENOUS ONCE
Status: COMPLETED | OUTPATIENT
Start: 2024-12-09 | End: 2024-12-09

## 2024-12-09 RX ORDER — POTASSIUM CHLORIDE 750 MG/1
40 TABLET, EXTENDED RELEASE ORAL PRN
Status: DISCONTINUED | OUTPATIENT
Start: 2024-12-09 | End: 2024-12-09 | Stop reason: HOSPADM

## 2024-12-09 RX ADMIN — SODIUM CHLORIDE: 9 INJECTION, SOLUTION INTRAVENOUS at 08:04

## 2024-12-09 RX ADMIN — ALBUMIN (HUMAN) 25 G: 0.25 INJECTION, SOLUTION INTRAVENOUS at 10:45

## 2024-12-09 RX ADMIN — TRANEXAMIC ACID 100 MG: 100 INJECTION, SOLUTION INTRAVENOUS at 00:50

## 2024-12-09 RX ADMIN — SODIUM CHLORIDE, PRESERVATIVE FREE 10 ML: 5 INJECTION INTRAVENOUS at 09:49

## 2024-12-09 RX ADMIN — Medication 2 SPRAY: at 00:50

## 2024-12-09 RX ADMIN — TRANEXAMIC ACID 1000 MG: 100 INJECTION, SOLUTION INTRAVENOUS at 09:33

## 2024-12-09 ASSESSMENT — PAIN SCALES - GENERAL
PAINLEVEL_OUTOF10: 0

## 2024-12-09 ASSESSMENT — ENCOUNTER SYMPTOMS
BACK PAIN: 0
COLOR CHANGE: 0
CONSTIPATION: 0
SHORTNESS OF BREATH: 0
VOMITING: 0
ABDOMINAL PAIN: 0
DIARRHEA: 0
NAUSEA: 0

## 2024-12-09 NOTE — DISCHARGE INSTRUCTIONS
Discharge Instructions       PATIENT ID: Williams Fitzgerald Jr  MRN: [id number]   YOB: 1940    DATE OF ADMISSION: 12/9/2024   DATE OF DISCHARGE: 12/9/2024    PRIMARY CARE PROVIDER: Brayan Hallman     ATTENDING PHYSICIAN: Elpidio Moyer MD   DISCHARGING PROVIDER: Shakira Long PA-C    To contact this individual call [phone] and ask the  to page.   If unavailable ask to be transferred the Adult Hospitalist Department.    DISCHARGE DIAGNOSES  Recurrent Epistaxis  Hereditary hemorrhagic telangiectasia / Pulmonary AVMs   -Currently on trial of avastin and previously on TXA tabs  -Transfer to [facility]   -Source control with TXA soaked nasal packing  -TXA IVPB given once  -Keep NPO  -Continue IVF, rate to 125  -ENT consulted (Dr. Cloud). Patient to be transferred to ENT at [facility].     Blood Loss Anemia  -Hgb 7.5 on ED presentation, dropped to 6.2  -Patient asymptomatic  -Transfused 1 unit PRBC stat (12/9)  -Iron panel unremarkable  -PT/INR/PTT WNL     aFib  -Not chronically anti-coagulated due to HHT  -Per chart review, patient has previously taken dilTIAZem (CARDIZEM CD) 120 MG extended release capsule daily. Currently states he does not take this medication. Hold for now. Resume as able/as needed.    CONSULTATIONS: IP CONSULT TO HOSPITALIST  IP CONSULT TO OTOLARYNGOLOGY    PROCEDURES/SURGERIES: * No surgery found *    PENDING TEST RESULTS:   At the time of discharge the following test results are still pending: None     DIET: regular diet     ACTIVITY: activity as tolerated      DISCHARGE MEDICATIONS:   See Medication Reconciliation Form    It is important that you take the medication exactly as they are prescribed.   Keep your medication in the bottles provided by the pharmacist and keep a list of the medication names, dosages, and times to be taken in your wallet.   Do not take other medications without consulting your doctor.       NOTIFY YOUR PHYSICIAN FOR ANY OF THE FOLLOWING:   Fever

## 2024-12-09 NOTE — SIGNIFICANT EVENT
Rapid Response  Rapid response room ED 1 (inpatient hold, transferring to [facility]) called overhead at 1424. RRT responding.    Rapid response called for  epistaxis    Pt has Rhinorocket in L nare, however, the R nare is bleeding as well. Shakira hospitalist PA at bedside, called Dr. Cloud, ENT.    Dr. Cloud, ENT at bedside, placed Rhinorocket R nare, bleeding controlled.    VSS.    Checked in with primary RN prior to leaving. Opportunity for questions and concerns provided.      Rapid Response Team Sepsis Screening  Is the patient's history suggestive of a new infection? No    Are two or more SIRS criteria present? No    Is there evidence of Organ Dysfunction? [facility] Sepsis OD: None    Communication with provider: No    Was a Code Sepsis called at this encounter? No. Why? Not indicated at this time.

## 2024-12-09 NOTE — PROGRESS NOTES
[facility]                                                                                          Hospitalist Progress Note  Shakira Long PA-C  Answering service: [phone] OR [phone] from in house phone        Date of Service:  2024  NAME:  Williams Fitzgerald Jr  :  1940  MRN:  [id number]       Admission Summary:   Williams Fitzgerald Jr is a 84 y.o. male with hx of A-fib, Hereditary hemorrhagic, telangiectasia, pulmonary AVMs, prostate cancer, recurrent epistaxis who presented to [place] ED with complaints of a nose bleed. Had been in his usual state of health until 2024 in the evening when he developed left nasal epistaxis and presented to the ED. He underwent nasal packing with TXA soaked gauze and was transferred to [facility].     The patient denied any fever, chills, chest or abdominal pain, nausea, vomiting, cough, congestion, recent illness, palpitations, or dysuria.  Remarkable vitals on ER Presentation: vss  Labs Remarkable for: hgb 7.5  ER Images: none  ER Rx: none  Interval history / Subjective:   Upon seeing the patient on rounds, patient sitting at the side of his stretcher with an emesis bag below his nose, filled with bloody tissues. Another emisis back on computer filled with bloody tissues. Rhino rocket currently in left nare, soaked through and starting to slip. Right nare per nursing just started bleeding and has been on a steady drip for about 20 minutes. Patient states he is \"scared because this keeps happening.\" Wife is at bedside. Wife at bedside states this is a chronic, recurrent issue for the patient due to his blood disorder, HHT. Patient is scheduled to have an appointment at [facility] in a few weeks due to needing an elevated level of care for this issue. Patient denies dizziness, lightheadedness or diplopia.     I personally contacted Dr. Cloud who recommended bilateral rhino rockets due to Hgb now becoming unstable. Upon

## 2024-12-09 NOTE — ED NOTES
Pt. States nose bleed seems to have slowed at this time. TXA and Affrin at bedside with nasal packing tray. Pt. Informed of plan of care to go to [place].

## 2024-12-09 NOTE — ED NOTES
Bedside and Verbal shift change report given to ERVIN RN (oncoming nurse) by BEN LOPEZ (offgoing nurse). Report included the following information ED Encounter Summary, ED SBAR, MAR, and Recent Results.

## 2024-12-09 NOTE — ED TRIAGE NOTES
Pt. State started with nose bleed about 1 hour ago, state feels a clot down the back of his throat. Pt. States has a rare blood disease, HHT. States gets nose bleed frequently. Pt. Is not on blood thinner. Pt. Has packing in his nose at this time.

## 2024-12-09 NOTE — ED NOTES
Pt. Resting in stretcher with wife at bedside. Rhino rocket in left nostril in place. Pt. Having slight oozing. Pt. Awaiting transfer to [place]. They are currently at capacity. Dr. Banerjee speaking with ER at this time.

## 2024-12-09 NOTE — ED PROVIDER NOTES
Transfer Documentation   6:19 AM   Evaluated the patient upon arrival. Stable for admission by the hospitalist service. Patient will board in the ED under his/her/their care as arranged by transferring ED provider. Accepting provider/service has been made aware of patient's arrival.  MD Mamta Vera Richard R, MD  12/09/24 0648

## 2024-12-09 NOTE — CONSENT
Informed Consent for Blood Component Transfusion Note    I have discussed with the patient the rationale for blood component transfusion; its benefits in treating or preventing fatigue, organ damage, or death; and its risk which includes mild transfusion reactions, rare risk of blood borne infection, or more serious but rare reactions. I have discussed the alternatives to transfusion, including the risk and consequences of not receiving transfusion. The patient had an opportunity to ask questions and had agreed to proceed with transfusion of blood components.    Electronically signed by Shakira Long PA-C on 12/9/24 at 9:11 AM EST

## 2024-12-09 NOTE — PROCEDURES
PROCEDURE NOTE  Date: 12/9/2024   Name: Williams Fitzgerald Jr  YOB: 1940    Procedures  Patient experienced further bleeding from right nare. Right Rhino-rocket placed without difficulty. 2 ml of sterile saline placed to posterior balloon. Satisfactory control of bleeding observed. Patient tolerated well.

## 2024-12-09 NOTE — ED NOTES
Report given to JOHN Fuentes at [facility].    EMTALA, ED Encounter Summary and Facesheet given to [facility].     Patient aware of being transferred to the [facility] ED.

## 2024-12-09 NOTE — ED NOTES
0902: Patient's wife came to nurse's station to let this RN know that patient's nose was bleeding again. RN entered room to find patient's R nostril dripping blood, patient spit up 2 clots into emesis bag. This RN messaged hospitalist for orders. RN attempted to place nasal clamp, will not stay in place with rhino rocket in L side of nose. PA gave orders to place quick clot up R nostril. Quick clot not working.     09:11: AC Rosenberg at bedside. Patient's Hgb 6.2, blood transfusion ordered at this time. Patient's wife had cotton swabs for nose they use often, placed 2 up patient's R nostril per PA. Patient's  L nostril began bleeding over rhino rocket. Patient's vital signs stable, patient denies dizziness, headache, or chest pain.     09:30: Bleeding under control at this time. Type and screen has been sent. Awaiting call from blood bank that blood is ready. ENT on the way to see patient now. RN advised to call PA if bleeding began again and place bilateral rhino rockets with atomized TXA.     09:47: ENT at bedside.

## 2024-12-09 NOTE — ED PROVIDER NOTES
of facial artery embolization.        REASSESSMENT          CRITICAL CARE TIME   Total Critical Care time was 50 minutes, excluding separately reportable procedures.  There was a high probability of clinically significant/life threatening deterioration in the patient's condition which required my urgent intervention.      CONSULTS:  None    PROCEDURES:  Unless otherwise noted below, none     Procedures        FINAL IMPRESSION      1. Epistaxis          DISPOSITION/PLAN   DISPOSITION Decision To Transfer 12/09/2024 12:46:52 AM      PATIENT REFERRED TO:  No follow-up provider specified.    DISCHARGE MEDICATIONS:  New Prescriptions    No medications on file     Controlled Substances Monitoring:          No data to display                (Please note that portions of this note were completed with a voice recognition program.  Efforts were made to edit the dictations but occasionally words are mis-transcribed.)    Abhishek Banerjee MD (electronically signed)  Attending Emergency Physician           Abhishek Banerjee MD  12/09/24 [id number]

## 2024-12-09 NOTE — H&P
History & Physical    Primary Care Provider: Brayan Hallman MD  Source of Information: Patient and chart review    History of Presenting Illness:   Williams Fitzgerald Jr is a 84 y.o. male with hx of afib, Hereditary hemorrhagic, telangiectasia, pulmonary AVMs, prostate cancer, recurrent epistaxis who presented to [facility] ed with complaints of a nose bleed. Had been in his usual state of health until last evening when he developed left nasal epistaxis and presented to [facility] ed.  He underwent nasal packing with TXA soaked gauze and was transferred to [facility].    The patient denies any fever, chills, chest or abdominal pain, nausea, vomiting, cough, congestion, recent illness, palpitations, or dysuria.  Remarkable vitals on ER Presentation: vss  Labs Remarkable for: hgb 7.5  ER Images: none  ER Rx: none     Review of Systems:  Pertinent items are noted in the History of Present Illness.     Past Medical History:   Diagnosis Date    HHT (hereditary hemorrhagic telangiectasia) (HCC)     Iron deficiency anemia     Prostate cancer (HCC)     Pulmonary arteriovenous malformation       Past Surgical History:   Procedure Laterality Date    APPENDECTOMY      CHEST SURGERY      excision of AVM    COLONOSCOPY N/A 1/29/2020    COLONOSCOPY performed by Manas Barr MD at [facility] ENDOSCOPY    COLONOSCOPY  2014    due 19    OTHER SURGICAL HISTORY  07/2018    sclerotheropy    TONSILLECTOMY       Prior to Admission medications    Medication Sig Start Date End Date Taking? Authorizing Provider   dilTIAZem (CARDIZEM CD) 120 MG extended release capsule Take 1 capsule by mouth daily  Patient not taking: Reported on 10/25/2024 10/25/24 12/24/24  Gail Mendenhall MD   vitamin B-12 1000 MCG tablet Take 1 tablet by mouth daily for 30 doses 10/25/24 11/24/24  Gail Mendenhall MD   ascorbic acid (VITAMIN C) 250 MG tablet Take by mouth    Automatic Reconciliation, Ar   vitamin D 25 MCG (1000 UT) CAPS Take by mouth daily    Automatic

## 2024-12-09 NOTE — CONSULTS
IntraVENous, PRN  ondansetron (ZOFRAN-ODT) disintegrating tablet 4 mg, 4 mg, Oral, Q8H PRN **OR** ondansetron (ZOFRAN) injection 4 mg, 4 mg, IntraVENous, Q6H PRN  polyethylene glycol (GLYCOLAX) packet 17 g, 17 g, Oral, Daily PRN  acetaminophen (TYLENOL) tablet 650 mg, 650 mg, Oral, Q6H PRN **OR** acetaminophen (TYLENOL) suppository 650 mg, 650 mg, Rectal, Q6H PRN  0.9 % sodium chloride infusion, , IntraVENous, Continuous  0.9 % sodium chloride infusion, , IntraVENous, PRN  tranexamic acid (CYKLOKAPRON) injection 100 mg, 100 mg, Nasal, Once  Allergies:  Patient has no known allergies.    Social History:    Patient currently lives with family, wife.  Family History:       Problem Relation Age of Onset    Cancer Other         colon, lung    Heart Disease Mother      REVIEW OF SYSTEMS:    See HPI. Hearing loss, wears hearing aids.    PHYSICAL EXAM:    VITALS:  BP (!) 97/59   Pulse 70   Temp 98.1 °F (36.7 °C) (Oral)   Resp 18   Ht 1.829 m (6')   Wt 82.4 kg (181 lb 10.5 oz)   SpO2 100%   BMI 24.64 kg/m²   CONSTITUTIONAL:  awake, alert, cooperative, no apparent distress, and appears stated age  EYES:  Lids and lashes normal, pupils equal, round and reactive to light, extra ocular muscles intact, sclera clear, conjunctiva normal  ENT:  Normocephalic, without obvious abnormality, atraumatic, sinuses nontender on palpation, external ears without lesions, oral pharynx with moist mucus membranes, tonsils without erythema or exudates, gums normal and good dentition. Nose without active bleeding at present, secondary to bilateral nasal packing.  NECK:  Supple, symmetrical, trachea midline, no adenopathy, thyroid symmetric, not enlarged and no tenderness, skin normal    DATA:    CBC with Differential:    Lab Results   Component Value Date/Time    WBC 5.0 12/09/2024 08:08 AM    WBC Comment 02/24/2021 08:18 AM    RBC 2.22 12/09/2024 08:08 AM    RBC Comment 02/24/2021 08:18 AM    HGB 6.2 12/09/2024 08:08 AM    HCT 21.4

## 2024-12-09 NOTE — ED TRIAGE NOTES
Patient BIBA from [facility] for epistaxis and possible facial artery embolization by IR. Patient has a hx of HHT. Bleeding started around 2300 yesterday. Received TXA at previous facility.    Patient has rhino rocket in L nare. Blood is oozing from L nare as well.

## 2024-12-09 NOTE — ED NOTES
Verbal shift change report given to JOHN Agosto (oncoming nurse) by JOHN Jeter (offgoing nurse). Report included the following information Nurse Handoff Report, ED Encounter Summary, ED SBAR, Adult Overview, Intake/Output, MAR, Recent Results, and Neuro Assessment.

## 2024-12-09 NOTE — DISCHARGE SUMMARY
Discharge Summary       PATIENT ID: Williams Fitzgerald Jr  MRN: [id number]   YOB: 1940    DATE OF ADMISSION: 12/9/2024  5:14 AM    DATE OF DISCHARGE: 12/9/2024   PRIMARY CARE PROVIDER: Brayan Hallman MD     ATTENDING PHYSICIAN: Dr. Elpidio Moyer  DISCHARGING PROVIDER: Shakira Long PA-C    To contact this individual call [phone] and ask the  to page.  If unavailable ask to be transferred the Adult Hospitalist Department.    CONSULTATIONS: IP CONSULT TO HOSPITALIST  IP CONSULT TO OTOLARYNGOLOGY    PROCEDURES/SURGERIES: * No surgery found *     ADMITTING DIAGNOSES & HOSPITAL COURSE:   Williams Fitzgerald Jr is a 84 y.o. male with hx of A-fib, Hereditary hemorrhagic, telangiectasia, pulmonary AVMs, prostate cancer, recurrent epistaxis who presented to [place] ED with complaints of a nose bleed. Had been in his usual state of health until 12/8/2024 in the evening when he developed left nasal epistaxis and presented to the ED. He underwent nasal packing with TXA soaked gauze and was transferred to [facility].     The patient denied any fever, chills, chest or abdominal pain, nausea, vomiting, cough, congestion, recent illness, palpitations, or dysuria.  Remarkable vitals on ER Presentation: vss  Labs Remarkable for: hgb 7.5  ER Images: none  ER Rx: none    Patient was seen in the ED sitting at the side of his stretcher with an emesis bag below his nose, filled with bloody tissues. Another emisis back on computer filled with bloody tissues. Rhino rocket currently in left nare, soaked through and starting to slip. Right nare per nursing just started bleeding and has been on a steady drip for about 20 minutes. Patient states he is \"scared because this keeps happening.\" Wife is at bedside. Wife at bedside states this is a chronic, recurrent issue for the patient due to his blood disorder, HHT. Patient is scheduled to have an appointment at [facility] in a few weeks due to needing an elevated

## 2024-12-10 LAB
ABO + RH BLD: NORMAL
BLD PROD TYP BPU: NORMAL
BLD PROD TYP BPU: NORMAL
BLOOD BANK BLOOD PRODUCT EXPIRATION DATE: NORMAL
BLOOD BANK BLOOD PRODUCT EXPIRATION DATE: NORMAL
BLOOD BANK DISPENSE STATUS: NORMAL
BLOOD BANK DISPENSE STATUS: NORMAL
BLOOD BANK ISBT PRODUCT BLOOD TYPE: 5100
BLOOD BANK ISBT PRODUCT BLOOD TYPE: 5100
BLOOD BANK PRODUCT CODE: NORMAL
BLOOD BANK PRODUCT CODE: NORMAL
BLOOD BANK UNIT TYPE AND RH: NORMAL
BLOOD BANK UNIT TYPE AND RH: NORMAL
BLOOD GROUP ANTIBODIES SERPL: NORMAL
BPU ID: NORMAL
BPU ID: NORMAL
CROSSMATCH RESULT: NORMAL
CROSSMATCH RESULT: NORMAL
SPECIMEN EXP DATE BLD: NORMAL
UNIT DIVISION: 0
UNIT DIVISION: 0
UNIT ISSUE DATE/TIME: NORMAL
UNIT ISSUE DATE/TIME: NORMAL

## 2024-12-17 NOTE — PROGRESS NOTES
[facility] [place] at [facility]  [address], [address] [facility] [address]  W: [phone]  F: [phone]      Reason for Visit:   Williams Fitzgerald Jr is a 84 y.o. male who is seen today for evaluation of anemia, HHT.    History of Present Illness:   He has continued with significant epistaxis despite Avastin infusions. He presented to [facility] ED with a nose bleed that would not stop. This was packed, but the bleeding persisted and he was transferred to [facility] for ENT evaluation. He required pRBC support and was given IV TXA. He was then transferred to [facility] where ENT took him to the OR for cauterization with coblator. He reports improvement in his nose bleeds since that time, but not resolution.    He is accompanied by his wife today.        Review of systems was obtained and pertinent findings reviewed above. Past medical history, social history, family history, medications, and allergies are located in the electronic medical record.    Physical Exam:   Visit Vitals  BP (!) 112/54 (Site: Right Upper Arm, Position: Sitting)   Pulse 75   Resp 18   Ht 1.829 m (6')   Wt 78.8 kg (173 lb 12.8 oz)   SpO2 100%   BMI 23.57 kg/m²       General: no distress  Respiratory: normal respiratory effort  CV: no peripheral edema  Skin: no rashes; no ecchymoses; no petechiae    Results:     Lab Results   Component Value Date    WBC 5.0 12/09/2024    HGB 6.6 (L) 12/09/2024    HCT 22.6 (L) 12/09/2024     12/09/2024    MCV 96.4 12/09/2024    NEUTROABS 3.7 12/09/2024     Lab Results   Component Value Date     12/09/2024    K 4.3 12/09/2024     (H) 12/09/2024    CO2 25 12/09/2024    GLUCOSE 114 (H) 12/09/2024    BUN 29 (H) 12/09/2024    CREATININE 0.73 12/09/2024    LABGLOM 89 12/09/2024    CALCIUM 8.4 (L) 12/09/2024    MG 2.3 10/24/2024    PHOS 1.8 (L) 10/24/2024     Lab Results   Component Value Date    BILITOT 0.2 12/09/2024    ALT 20 12/09/2024    AST 3 (L) 12/09/2024    ALKPHOS 71 12/09/2024

## 2024-12-18 RX ORDER — HEPARIN 100 UNIT/ML
500 SYRINGE INTRAVENOUS PRN
Status: CANCELLED | OUTPATIENT
Start: 2024-12-26

## 2024-12-18 RX ORDER — ALBUTEROL SULFATE 90 UG/1
4 INHALANT RESPIRATORY (INHALATION) PRN
Status: CANCELLED | OUTPATIENT
Start: 2024-12-26

## 2024-12-18 RX ORDER — SODIUM CHLORIDE 9 MG/ML
5-250 INJECTION, SOLUTION INTRAVENOUS PRN
Status: CANCELLED | OUTPATIENT
Start: 2024-12-26

## 2024-12-18 RX ORDER — ONDANSETRON 2 MG/ML
8 INJECTION INTRAMUSCULAR; INTRAVENOUS
Status: CANCELLED | OUTPATIENT
Start: 2024-12-26

## 2024-12-18 RX ORDER — SODIUM CHLORIDE 0.9 % (FLUSH) 0.9 %
5-40 SYRINGE (ML) INJECTION PRN
Status: CANCELLED | OUTPATIENT
Start: 2024-12-26

## 2024-12-18 RX ORDER — EPINEPHRINE 1 MG/ML
0.3 INJECTION, SOLUTION INTRAMUSCULAR; SUBCUTANEOUS PRN
Status: CANCELLED | OUTPATIENT
Start: 2024-12-26

## 2024-12-18 RX ORDER — HYDROCORTISONE SODIUM SUCCINATE 100 MG/2ML
100 INJECTION INTRAMUSCULAR; INTRAVENOUS
Status: CANCELLED | OUTPATIENT
Start: 2024-12-26

## 2024-12-18 RX ORDER — ACETAMINOPHEN 325 MG/1
650 TABLET ORAL
Status: CANCELLED | OUTPATIENT
Start: 2024-12-26

## 2024-12-18 RX ORDER — FAMOTIDINE 10 MG/ML
20 INJECTION, SOLUTION INTRAVENOUS
Status: CANCELLED | OUTPATIENT
Start: 2024-12-26

## 2024-12-18 RX ORDER — SODIUM CHLORIDE 9 MG/ML
INJECTION, SOLUTION INTRAVENOUS CONTINUOUS
Status: CANCELLED | OUTPATIENT
Start: 2024-12-26

## 2024-12-18 RX ORDER — DIPHENHYDRAMINE HYDROCHLORIDE 50 MG/ML
50 INJECTION INTRAMUSCULAR; INTRAVENOUS
Status: CANCELLED | OUTPATIENT
Start: 2024-12-26

## 2024-12-26 ENCOUNTER — OFFICE VISIT (OUTPATIENT)
Age: 84
End: 2024-12-26
Payer: MEDICARE

## 2024-12-26 ENCOUNTER — HOSPITAL ENCOUNTER (OUTPATIENT)
Facility: HOSPITAL | Age: 84
Setting detail: INFUSION SERIES
Discharge: HOME | End: 2024-12-26
Payer: MEDICARE

## 2024-12-26 VITALS
WEIGHT: 169.8 LBS | RESPIRATION RATE: 18 BRPM | TEMPERATURE: 97.8 F | HEIGHT: 72 IN | SYSTOLIC BLOOD PRESSURE: 117 MMHG | BODY MASS INDEX: 23 KG/M2 | HEART RATE: 65 BPM | OXYGEN SATURATION: 100 % | DIASTOLIC BLOOD PRESSURE: 63 MMHG

## 2024-12-26 VITALS
HEIGHT: 72 IN | DIASTOLIC BLOOD PRESSURE: 54 MMHG | SYSTOLIC BLOOD PRESSURE: 112 MMHG | OXYGEN SATURATION: 100 % | RESPIRATION RATE: 18 BRPM | WEIGHT: 173.8 LBS | BODY MASS INDEX: 23.54 KG/M2 | HEART RATE: 75 BPM

## 2024-12-26 DIAGNOSIS — D50.0 IRON DEFICIENCY ANEMIA DUE TO CHRONIC BLOOD LOSS: ICD-10-CM

## 2024-12-26 DIAGNOSIS — I78.0 HHT (HEREDITARY HEMORRHAGIC TELANGIECTASIA) (HCC): ICD-10-CM

## 2024-12-26 DIAGNOSIS — I78.0 HHT (HEREDITARY HEMORRHAGIC TELANGIECTASIA) (HCC): Primary | ICD-10-CM

## 2024-12-26 LAB
BASOPHILS # BLD: 0.1 K/UL (ref 0–0.1)
BASOPHILS # BLD: 0.1 K/UL (ref 0–0.1)
BASOPHILS NFR BLD: 1 % (ref 0–1)
BASOPHILS NFR BLD: 1 % (ref 0–1)
DIFFERENTIAL METHOD BLD: ABNORMAL
DIFFERENTIAL METHOD BLD: ABNORMAL
EOSINOPHIL # BLD: 0.2 K/UL (ref 0–0.4)
EOSINOPHIL # BLD: 0.2 K/UL (ref 0–0.4)
EOSINOPHIL NFR BLD: 4 % (ref 0–7)
EOSINOPHIL NFR BLD: 4 % (ref 0–7)
ERYTHROCYTE [DISTWIDTH] IN BLOOD BY AUTOMATED COUNT: 17.8 % (ref 11.5–14.5)
ERYTHROCYTE [DISTWIDTH] IN BLOOD BY AUTOMATED COUNT: 17.8 % (ref 11.5–14.5)
HCT VFR BLD AUTO: 30.9 % (ref 36.6–50.3)
HCT VFR BLD AUTO: 31.4 % (ref 36.6–50.3)
HGB BLD-MCNC: 9.2 G/DL (ref 12.1–17)
HGB BLD-MCNC: 9.3 G/DL (ref 12.1–17)
IMM GRANULOCYTES # BLD AUTO: 0 K/UL (ref 0–0.04)
IMM GRANULOCYTES # BLD AUTO: 0.1 K/UL (ref 0–0.04)
IMM GRANULOCYTES NFR BLD AUTO: 0 % (ref 0–0.5)
IMM GRANULOCYTES NFR BLD AUTO: 1 % (ref 0–0.5)
LYMPHOCYTES # BLD: 0.5 K/UL (ref 0.8–3.5)
LYMPHOCYTES # BLD: 0.5 K/UL (ref 0.8–3.5)
LYMPHOCYTES NFR BLD: 10 % (ref 12–49)
LYMPHOCYTES NFR BLD: 9 % (ref 12–49)
MCH RBC QN AUTO: 27 PG (ref 26–34)
MCH RBC QN AUTO: 27.2 PG (ref 26–34)
MCHC RBC AUTO-ENTMCNC: 29.6 G/DL (ref 30–36.5)
MCHC RBC AUTO-ENTMCNC: 29.8 G/DL (ref 30–36.5)
MCV RBC AUTO: 91 FL (ref 80–99)
MCV RBC AUTO: 91.4 FL (ref 80–99)
MONOCYTES # BLD: 0.8 K/UL (ref 0–1)
MONOCYTES # BLD: 0.8 K/UL (ref 0–1)
MONOCYTES NFR BLD: 15 % (ref 5–13)
MONOCYTES NFR BLD: 15 % (ref 5–13)
NEUTS SEG # BLD: 3.6 K/UL (ref 1.8–8)
NEUTS SEG # BLD: 4 K/UL (ref 1.8–8)
NEUTS SEG NFR BLD: 69 % (ref 32–75)
NEUTS SEG NFR BLD: 71 % (ref 32–75)
NRBC # BLD: 0 K/UL (ref 0–0.01)
NRBC # BLD: 0 K/UL (ref 0–0.01)
NRBC BLD-RTO: 0 PER 100 WBC
NRBC BLD-RTO: 0 PER 100 WBC
PLATELET # BLD AUTO: 245 K/UL (ref 150–400)
PLATELET # BLD AUTO: 273 K/UL (ref 150–400)
PMV BLD AUTO: 10.2 FL (ref 8.9–12.9)
PMV BLD AUTO: 10.4 FL (ref 8.9–12.9)
RBC # BLD AUTO: 3.38 M/UL (ref 4.1–5.7)
RBC # BLD AUTO: 3.45 M/UL (ref 4.1–5.7)
RBC MORPH BLD: ABNORMAL
RBC MORPH BLD: ABNORMAL
WBC # BLD AUTO: 5.3 K/UL (ref 4.1–11.1)
WBC # BLD AUTO: 5.6 K/UL (ref 4.1–11.1)

## 2024-12-26 PROCEDURE — 6360000002 HC RX W HCPCS: Performed by: INTERNAL MEDICINE

## 2024-12-26 PROCEDURE — 96413 CHEMO IV INFUSION 1 HR: CPT

## 2024-12-26 PROCEDURE — 85025 COMPLETE CBC W/AUTO DIFF WBC: CPT

## 2024-12-26 PROCEDURE — 2580000003 HC RX 258: Performed by: INTERNAL MEDICINE

## 2024-12-26 PROCEDURE — 36415 COLL VENOUS BLD VENIPUNCTURE: CPT

## 2024-12-26 PROCEDURE — 99214 OFFICE O/P EST MOD 30 MIN: CPT | Performed by: INTERNAL MEDICINE

## 2024-12-26 RX ADMIN — SODIUM CHLORIDE 425 MG: 9 INJECTION, SOLUTION INTRAVENOUS at 15:26

## 2024-12-26 ASSESSMENT — PAIN SCALES - GENERAL: PAINLEVEL_OUTOF10: 0

## 2024-12-26 NOTE — PROGRESS NOTES
Williams Fitzgerald Jr is a 84 y.o. male  Chief Complaint   Patient presents with    Follow-up     HHT and anemia     1. Have you been to the ER, urgent care clinic since your last visit?  Hospitalized since your last visit? Multiple visits to [facility] ED and then [facility] ED then transferred to [facility] and had a major operation to address his nosebleeds    2. Have you seen or consulted any other health care providers outside of the [facility] since your last visit?  Include any pap smears or colon screening. Dr. Silvia whittington from [facility] and will start back up with PT (ordered by Dr. Hallman)

## 2024-12-26 NOTE — PROGRESS NOTES
[facility] Progress Note    Date: December 26, 2024    1330: Mr. Amilcar Ramirez Arrived ambulatory and in stable condition to the [facility] for  C6D1 Avastin Regimen.  Assessment was completed, Patient recently in the hospital for nose bleeds/ surgery. 24G PIV placed to right arm with positive blood return. Labs drawn and sent for processing. Went to provider appointment with Medical Oncology. Returned from provider appointment. Labs reviewed. Criteria for treatment was met.    Mr. Amilcar Ramirez's vitals were reviewed.  Patient Vitals for the past 12 hrs:   Temp Pulse Resp BP SpO2   12/26/24 1330 97.8 °F (36.6 °C) 65 18 117/63 100 %     Lab results were obtained and reviewed.  Recent Results (from the past 12 hour(s))   CBC with Auto Differential    Collection Time: 12/26/24  1:59 PM   Result Value Ref Range    WBC 5.3 4.1 - 11.1 K/uL    RBC 3.38 (L) 4.10 - 5.70 M/uL    Hemoglobin 9.2 (L) 12.1 - 17.0 g/dL    Hematocrit 30.9 (L) 36.6 - 50.3 %    MCV 91.4 80.0 - 99.0 FL    MCH 27.2 26.0 - 34.0 PG    MCHC 29.8 (L) 30.0 - 36.5 g/dL    RDW 17.8 (H) 11.5 - 14.5 %    Platelets 245 150 - 400 K/uL    MPV 10.2 8.9 - 12.9 FL    Nucleated RBCs 0.0 0  WBC    nRBC 0.00 0.00 - 0.01 K/uL    Neutrophils % 69 32 - 75 %    Lymphocytes % 10 (L) 12 - 49 %    Monocytes % 15 (H) 5 - 13 %    Eosinophils % 4 0 - 7 %    Basophils % 1 0 - 1 %    Immature Granulocytes % 1 (H) 0.0 - 0.5 %    Neutrophils Absolute 3.6 1.8 - 8.0 K/UL    Lymphocytes Absolute 0.5 (L) 0.8 - 3.5 K/UL    Monocytes Absolute 0.8 0.0 - 1.0 K/UL    Eosinophils Absolute 0.2 0.0 - 0.4 K/UL    Basophils Absolute 0.1 0.0 - 0.1 K/UL    Immature Granulocytes Absolute 0.1 (H) 0.00 - 0.04 K/UL    Differential Type SMEAR SCANNED      RBC Comment ANISOCYTOSIS  1+          Pre-medications  were administered as ordered and chemotherapy was initiated.  Medications Administered         bevacizumab-bvzr (ZIRABEV) 425 mg in sodium chloride 0.9 % 100 mL chemo IVPB Admin Date  12/26/2024 Action  New Bag

## 2025-01-11 ENCOUNTER — HOSPITAL ENCOUNTER (EMERGENCY)
Facility: HOSPITAL | Age: 85
Discharge: HOME | End: 2025-01-12
Attending: EMERGENCY MEDICINE
Payer: MEDICARE

## 2025-01-11 DIAGNOSIS — R04.0 EPISTAXIS: Primary | ICD-10-CM

## 2025-01-11 LAB
APTT PPP: 26.1 SEC (ref 22.1–31)
BASOPHILS # BLD: 0.03 K/UL (ref 0–0.1)
BASOPHILS NFR BLD: 0.5 % (ref 0–1)
DIFFERENTIAL METHOD BLD: ABNORMAL
EOSINOPHIL # BLD: 0.31 K/UL (ref 0–0.4)
EOSINOPHIL NFR BLD: 5.4 % (ref 0–7)
ERYTHROCYTE [DISTWIDTH] IN BLOOD BY AUTOMATED COUNT: 17.2 % (ref 11.5–14.5)
HCT VFR BLD AUTO: 32.8 % (ref 36.6–50.3)
HGB BLD-MCNC: 9.8 G/DL (ref 12.1–17)
IMM GRANULOCYTES # BLD AUTO: 0.01 K/UL (ref 0–0.04)
IMM GRANULOCYTES NFR BLD AUTO: 0.2 % (ref 0–0.5)
INR PPP: 1.1 (ref 0.9–1.1)
LYMPHOCYTES # BLD: 0.55 K/UL (ref 0.8–3.5)
LYMPHOCYTES NFR BLD: 9.6 % (ref 12–49)
MCH RBC QN AUTO: 26.6 PG (ref 26–34)
MCHC RBC AUTO-ENTMCNC: 29.9 G/DL (ref 30–36.5)
MCV RBC AUTO: 88.9 FL (ref 80–99)
MONOCYTES # BLD: 0.96 K/UL (ref 0–1)
MONOCYTES NFR BLD: 16.8 % (ref 5–13)
NEUTS SEG # BLD: 3.85 K/UL (ref 1.8–8)
NEUTS SEG NFR BLD: 67.5 % (ref 32–75)
NRBC # BLD: 0 K/UL (ref 0–0.01)
NRBC BLD-RTO: 0 PER 100 WBC
PLATELET # BLD AUTO: 210 K/UL (ref 150–400)
PMV BLD AUTO: 9.9 FL (ref 8.9–12.9)
PROTHROMBIN TIME: 10.9 SEC (ref 9–11.1)
RBC # BLD AUTO: 3.69 M/UL (ref 4.1–5.7)
RBC MORPH BLD: ABNORMAL
THERAPEUTIC RANGE: NORMAL SECS (ref 58–77)
WBC # BLD AUTO: 5.7 K/UL (ref 4.1–11.1)

## 2025-01-11 PROCEDURE — 85730 THROMBOPLASTIN TIME PARTIAL: CPT

## 2025-01-11 PROCEDURE — 85610 PROTHROMBIN TIME: CPT

## 2025-01-11 PROCEDURE — 85025 COMPLETE CBC W/AUTO DIFF WBC: CPT

## 2025-01-11 PROCEDURE — 99283 EMERGENCY DEPT VISIT LOW MDM: CPT

## 2025-01-11 PROCEDURE — 6370000000 HC RX 637 (ALT 250 FOR IP): Performed by: EMERGENCY MEDICINE

## 2025-01-11 PROCEDURE — 36415 COLL VENOUS BLD VENIPUNCTURE: CPT

## 2025-01-11 RX ORDER — OXYMETAZOLINE HYDROCHLORIDE 0.05 G/100ML
2 SPRAY NASAL
Status: COMPLETED | OUTPATIENT
Start: 2025-01-11 | End: 2025-01-11

## 2025-01-11 RX ORDER — OXYMETAZOLINE HYDROCHLORIDE 0.05 G/100ML
2 SPRAY NASAL 2 TIMES DAILY
Qty: 1 EACH | Refills: 3 | Status: ON HOLD | OUTPATIENT
Start: 2025-01-11 | End: 2025-02-10

## 2025-01-11 RX ADMIN — OXYMETAZOLINE HYDROCHLORIDE 2 SPRAY: 0.5 SPRAY NASAL at 21:33

## 2025-01-11 ASSESSMENT — PAIN SCALES - GENERAL: PAINLEVEL_OUTOF10: 0

## 2025-01-11 ASSESSMENT — PAIN - FUNCTIONAL ASSESSMENT: PAIN_FUNCTIONAL_ASSESSMENT: 0-10

## 2025-01-12 VITALS
RESPIRATION RATE: 16 BRPM | HEIGHT: 72 IN | BODY MASS INDEX: 23.92 KG/M2 | WEIGHT: 176.59 LBS | TEMPERATURE: 98.1 F | OXYGEN SATURATION: 97 % | DIASTOLIC BLOOD PRESSURE: 68 MMHG | SYSTOLIC BLOOD PRESSURE: 128 MMHG | HEART RATE: 86 BPM

## 2025-01-12 NOTE — ED TRIAGE NOTES
Pt ambulatory to triage c/o bloody nose that started at 5pm. Pt has got it plugged and feels as though the blood is slowing.    Hx bloody noses

## 2025-01-12 NOTE — ED PROVIDER NOTES
PM      PATIENT REFERRED TO:  Ye Krishnamurthy MD  [address]  [facility] [address] [phone]    Schedule an appointment as soon as possible for a visit in 3 days  for reevaluation and further treatment as needed    Brayan Hallman MD  [address]  [facility] [address] [phone]    In 3 days  for reevaluation and further treatment as needed      DISCHARGE MEDICATIONS:  Discharge Medication List as of 1/11/2025 11:45 PM        START taking these medications    Details   oxymetazoline (12 HOUR NASAL SPRAY) 0.05 % nasal spray 2 sprays by Nasal route 2 times daily, Disp-1 each, R-3Normal               (Please note that portions of this note were completed with a voice recognition program.  Efforts were made to edit the dictations but occasionally words are mis-transcribed.)    Thang Urbano MD (electronically signed)  Emergency Attending Physician / Physician Assistant / Nurse Practitioner            Thang Urbano MD  01/12/25 [id number]

## 2025-01-15 ENCOUNTER — APPOINTMENT (OUTPATIENT)
Facility: HOSPITAL | Age: 85
End: 2025-01-15
Payer: MEDICARE

## 2025-01-15 ENCOUNTER — HOSPITAL ENCOUNTER (INPATIENT)
Facility: HOSPITAL | Age: 85
LOS: 1 days | Discharge: HOME HEALTH | End: 2025-01-16
Attending: EMERGENCY MEDICINE | Admitting: STUDENT IN AN ORGANIZED HEALTH CARE EDUCATION/TRAINING PROGRAM
Payer: MEDICARE

## 2025-01-15 ENCOUNTER — HOSPITAL ENCOUNTER (INPATIENT)
Facility: HOSPITAL | Age: 85
Discharge: HOME | End: 2025-01-18
Payer: MEDICARE

## 2025-01-15 DIAGNOSIS — I63.9 CEREBROVASCULAR ACCIDENT (CVA), UNSPECIFIED MECHANISM (HCC): ICD-10-CM

## 2025-01-15 DIAGNOSIS — R41.82 ALTERED MENTAL STATUS, UNSPECIFIED ALTERED MENTAL STATUS TYPE: Primary | ICD-10-CM

## 2025-01-15 PROBLEM — R46.89 ALTERED BEHAVIOR: Status: ACTIVE | Noted: 2025-01-15

## 2025-01-15 LAB
ALBUMIN SERPL-MCNC: 3.6 G/DL (ref 3.5–5)
ALBUMIN/GLOB SERPL: 0.9 (ref 1.1–2.2)
ALP SERPL-CCNC: 100 U/L (ref 45–117)
ALT SERPL-CCNC: 22 U/L (ref 12–78)
ANION GAP SERPL CALC-SCNC: 3 MMOL/L (ref 2–12)
APPEARANCE UR: ABNORMAL
AST SERPL-CCNC: 22 U/L (ref 15–37)
BACTERIA URNS QL MICRO: ABNORMAL /HPF
BASOPHILS # BLD: 0.06 K/UL (ref 0–0.1)
BASOPHILS NFR BLD: 0.9 % (ref 0–1)
BILIRUB SERPL-MCNC: 0.8 MG/DL (ref 0.2–1)
BILIRUB UR QL: NEGATIVE
BUN SERPL-MCNC: 23 MG/DL (ref 6–20)
BUN/CREAT SERPL: 20 (ref 12–20)
CALCIUM SERPL-MCNC: 9 MG/DL (ref 8.5–10.1)
CHLORIDE SERPL-SCNC: 108 MMOL/L (ref 97–108)
CO2 SERPL-SCNC: 28 MMOL/L (ref 21–32)
COLOR UR: ABNORMAL
COMMENT:: NORMAL
CREAT SERPL-MCNC: 1.16 MG/DL (ref 0.7–1.3)
DIFFERENTIAL METHOD BLD: ABNORMAL
EKG ATRIAL RATE: 83 BPM
EKG DIAGNOSIS: NORMAL
EKG P AXIS: 101 DEGREES
EKG P-R INTERVAL: 302 MS
EKG Q-T INTERVAL: 392 MS
EKG QRS DURATION: 114 MS
EKG QTC CALCULATION (BAZETT): 460 MS
EKG R AXIS: 78 DEGREES
EKG T AXIS: 82 DEGREES
EKG VENTRICULAR RATE: 83 BPM
EOSINOPHIL # BLD: 0.36 K/UL (ref 0–0.4)
EOSINOPHIL NFR BLD: 5.6 % (ref 0–7)
EPITH CASTS URNS QL MICRO: ABNORMAL /LPF
ERYTHROCYTE [DISTWIDTH] IN BLOOD BY AUTOMATED COUNT: 17.3 % (ref 11.5–14.5)
GLOBULIN SER CALC-MCNC: 4.2 G/DL (ref 2–4)
GLUCOSE SERPL-MCNC: 109 MG/DL (ref 65–100)
GLUCOSE UR STRIP.AUTO-MCNC: NEGATIVE MG/DL
HCT VFR BLD AUTO: 34.5 % (ref 36.6–50.3)
HGB BLD-MCNC: 10.7 G/DL (ref 12.1–17)
HGB UR QL STRIP: ABNORMAL
HYALINE CASTS URNS QL MICRO: ABNORMAL /LPF (ref 0–2)
IMM GRANULOCYTES # BLD AUTO: 0.01 K/UL (ref 0–0.04)
IMM GRANULOCYTES NFR BLD AUTO: 0.2 % (ref 0–0.5)
KETONES UR QL STRIP.AUTO: NEGATIVE MG/DL
LEUKOCYTE ESTERASE UR QL STRIP.AUTO: ABNORMAL
LYMPHOCYTES # BLD: 0.51 K/UL (ref 0.8–3.5)
LYMPHOCYTES NFR BLD: 7.9 % (ref 12–49)
MCH RBC QN AUTO: 26.7 PG (ref 26–34)
MCHC RBC AUTO-ENTMCNC: 31 G/DL (ref 30–36.5)
MCV RBC AUTO: 86 FL (ref 80–99)
MONOCYTES # BLD: 0.88 K/UL (ref 0–1)
MONOCYTES NFR BLD: 13.8 % (ref 5–13)
NEUTS SEG # BLD: 4.58 K/UL (ref 1.8–8)
NEUTS SEG NFR BLD: 71.6 % (ref 32–75)
NITRITE UR QL STRIP.AUTO: NEGATIVE
NRBC # BLD: 0 K/UL (ref 0–0.01)
NRBC BLD-RTO: 0 PER 100 WBC
PH UR STRIP: 6 (ref 5–8)
PLATELET # BLD AUTO: 219 K/UL (ref 150–400)
PMV BLD AUTO: 10.4 FL (ref 8.9–12.9)
POTASSIUM SERPL-SCNC: 4.3 MMOL/L (ref 3.5–5.1)
PROT SERPL-MCNC: 7.8 G/DL (ref 6.4–8.2)
PROT UR STRIP-MCNC: NEGATIVE MG/DL
RBC # BLD AUTO: 4.01 M/UL (ref 4.1–5.7)
RBC #/AREA URNS HPF: ABNORMAL /HPF (ref 0–5)
RBC MORPH BLD: ABNORMAL
SODIUM SERPL-SCNC: 139 MMOL/L (ref 136–145)
SP GR UR REFRACTOMETRY: 1.02 (ref 1–1.03)
SPECIMEN HOLD: NORMAL
TROPONIN I SERPL HS-MCNC: 8 NG/L (ref 0–76)
UROBILINOGEN UR QL STRIP.AUTO: 0.2 EU/DL (ref 0.2–1)
WBC # BLD AUTO: 6.4 K/UL (ref 4.1–11.1)
WBC URNS QL MICRO: >100 /HPF (ref 0–4)

## 2025-01-15 PROCEDURE — 80053 COMPREHEN METABOLIC PANEL: CPT

## 2025-01-15 PROCEDURE — 93010 ELECTROCARDIOGRAM REPORT: CPT | Performed by: STUDENT IN AN ORGANIZED HEALTH CARE EDUCATION/TRAINING PROGRAM

## 2025-01-15 PROCEDURE — 84484 ASSAY OF TROPONIN QUANT: CPT

## 2025-01-15 PROCEDURE — 94761 N-INVAS EAR/PLS OXIMETRY MLT: CPT

## 2025-01-15 PROCEDURE — 99285 EMERGENCY DEPT VISIT HI MDM: CPT

## 2025-01-15 PROCEDURE — 36415 COLL VENOUS BLD VENIPUNCTURE: CPT

## 2025-01-15 PROCEDURE — 81001 URINALYSIS AUTO W/SCOPE: CPT

## 2025-01-15 PROCEDURE — 85025 COMPLETE CBC W/AUTO DIFF WBC: CPT

## 2025-01-15 PROCEDURE — 93005 ELECTROCARDIOGRAM TRACING: CPT | Performed by: NURSE PRACTITIONER

## 2025-01-15 PROCEDURE — 6370000000 HC RX 637 (ALT 250 FOR IP): Performed by: STUDENT IN AN ORGANIZED HEALTH CARE EDUCATION/TRAINING PROGRAM

## 2025-01-15 PROCEDURE — 70450 CT HEAD/BRAIN W/O DYE: CPT

## 2025-01-15 PROCEDURE — 1100000000 HC RM PRIVATE

## 2025-01-15 PROCEDURE — 2500000003 HC RX 250 WO HCPCS: Performed by: STUDENT IN AN ORGANIZED HEALTH CARE EDUCATION/TRAINING PROGRAM

## 2025-01-15 PROCEDURE — 70551 MRI BRAIN STEM W/O DYE: CPT

## 2025-01-15 RX ORDER — ASPIRIN 300 MG/1
300 SUPPOSITORY RECTAL DAILY
Status: DISCONTINUED | OUTPATIENT
Start: 2025-01-15 | End: 2025-01-16 | Stop reason: HOSPADM

## 2025-01-15 RX ORDER — SODIUM CHLORIDE 9 MG/ML
INJECTION, SOLUTION INTRAVENOUS PRN
Status: DISCONTINUED | OUTPATIENT
Start: 2025-01-15 | End: 2025-01-16 | Stop reason: HOSPADM

## 2025-01-15 RX ORDER — SODIUM CHLORIDE 0.9 % (FLUSH) 0.9 %
5-40 SYRINGE (ML) INJECTION EVERY 12 HOURS SCHEDULED
Status: DISCONTINUED | OUTPATIENT
Start: 2025-01-15 | End: 2025-01-16 | Stop reason: HOSPADM

## 2025-01-15 RX ORDER — ONDANSETRON 2 MG/ML
4 INJECTION INTRAMUSCULAR; INTRAVENOUS EVERY 6 HOURS PRN
Status: DISCONTINUED | OUTPATIENT
Start: 2025-01-15 | End: 2025-01-16 | Stop reason: HOSPADM

## 2025-01-15 RX ORDER — ATORVASTATIN CALCIUM 20 MG/1
80 TABLET, FILM COATED ORAL NIGHTLY
Status: DISCONTINUED | OUTPATIENT
Start: 2025-01-15 | End: 2025-01-16 | Stop reason: HOSPADM

## 2025-01-15 RX ORDER — ENOXAPARIN SODIUM 100 MG/ML
40 INJECTION SUBCUTANEOUS DAILY
Status: DISCONTINUED | OUTPATIENT
Start: 2025-01-15 | End: 2025-01-15

## 2025-01-15 RX ORDER — ASPIRIN 81 MG/1
81 TABLET, CHEWABLE ORAL DAILY
Status: DISCONTINUED | OUTPATIENT
Start: 2025-01-15 | End: 2025-01-16 | Stop reason: HOSPADM

## 2025-01-15 RX ORDER — PANTOPRAZOLE SODIUM 40 MG/1
40 TABLET, DELAYED RELEASE ORAL
Status: DISCONTINUED | OUTPATIENT
Start: 2025-01-16 | End: 2025-01-16 | Stop reason: HOSPADM

## 2025-01-15 RX ORDER — ASCORBIC ACID 500 MG
250 TABLET ORAL DAILY
Status: DISCONTINUED | OUTPATIENT
Start: 2025-01-15 | End: 2025-01-16 | Stop reason: HOSPADM

## 2025-01-15 RX ORDER — OXYMETAZOLINE HYDROCHLORIDE 0.05 G/100ML
2 SPRAY NASAL 2 TIMES DAILY
Status: DISCONTINUED | OUTPATIENT
Start: 2025-01-15 | End: 2025-01-16 | Stop reason: HOSPADM

## 2025-01-15 RX ORDER — ONDANSETRON 4 MG/1
4 TABLET, ORALLY DISINTEGRATING ORAL EVERY 8 HOURS PRN
Status: DISCONTINUED | OUTPATIENT
Start: 2025-01-15 | End: 2025-01-16 | Stop reason: HOSPADM

## 2025-01-15 RX ORDER — LABETALOL HYDROCHLORIDE 5 MG/ML
10 INJECTION, SOLUTION INTRAVENOUS EVERY 6 HOURS PRN
Status: DISCONTINUED | OUTPATIENT
Start: 2025-01-15 | End: 2025-01-16 | Stop reason: HOSPADM

## 2025-01-15 RX ORDER — ACETAMINOPHEN 325 MG/1
650 TABLET ORAL EVERY 4 HOURS PRN
Status: DISCONTINUED | OUTPATIENT
Start: 2025-01-15 | End: 2025-01-16 | Stop reason: HOSPADM

## 2025-01-15 RX ORDER — POLYETHYLENE GLYCOL 3350 17 G/17G
17 POWDER, FOR SOLUTION ORAL DAILY PRN
Status: DISCONTINUED | OUTPATIENT
Start: 2025-01-15 | End: 2025-01-16 | Stop reason: HOSPADM

## 2025-01-15 RX ORDER — SODIUM CHLORIDE 0.9 % (FLUSH) 0.9 %
5-40 SYRINGE (ML) INJECTION PRN
Status: DISCONTINUED | OUTPATIENT
Start: 2025-01-15 | End: 2025-01-16 | Stop reason: HOSPADM

## 2025-01-15 RX ADMIN — OXYCODONE HYDROCHLORIDE AND ACETAMINOPHEN 250 MG: 500 TABLET ORAL at 18:17

## 2025-01-15 RX ADMIN — OXYMETAZOLINE HYDROCHLORIDE 2 SPRAY: 0.5 SPRAY NASAL at 22:31

## 2025-01-15 RX ADMIN — SODIUM CHLORIDE, PRESERVATIVE FREE 10 ML: 5 INJECTION INTRAVENOUS at 22:31

## 2025-01-15 RX ADMIN — ATORVASTATIN CALCIUM 80 MG: 20 TABLET, FILM COATED ORAL at 22:28

## 2025-01-15 ASSESSMENT — PAIN SCALES - GENERAL
PAINLEVEL_OUTOF10: 0
PAINLEVEL_OUTOF10: 0

## 2025-01-15 ASSESSMENT — PAIN - FUNCTIONAL ASSESSMENT
PAIN_FUNCTIONAL_ASSESSMENT: NONE - DENIES PAIN
PAIN_FUNCTIONAL_ASSESSMENT: 0-10

## 2025-01-15 NOTE — ED NOTES
Patient attempted a urine sample, was unable to give, patient does not want to wait for urine sample at this time.

## 2025-01-15 NOTE — DISCHARGE INSTRUCTIONS
HOSPITALIST DISCHARGE INSTRUCTIONS          NAME: Williams Fitzgerald Jr   :  1940   MRN:  [id number]     Date/Time:  2025 11:25 AM    ADMIT DATE: 1/15/2025     DISCHARGE DATE: 2025     ADMITTING DIAGNOSIS:  Confusion, UTI.  Your head MRI was negative for strokes     DISCHARGE DIAGNOSIS:  same    MEDICATIONS:  [unfilled]     It is important that you take the medication exactly as they are prescribed.   Keep your medication in the bottles provided by the pharmacist and keep a list of the medication names, dosages, and times to be taken in your wallet.   Do not take other medications without consulting your doctor     Pain Management: per above medications    What to do at Home    Recommended diet:  regular diet    Recommended activity: activity as tolerated    1) Return to the hospital if you feel worse    2) If you experience any of the following symptoms then please call your primary care physician or return to the emergency room if you cannot get hold of your doctor:  Fever, chills, nausea, vomiting, diarrhea, change in mentation, falling, bleeding, shortness of breath, chest pain, severe headache, severe abdominal pain,     3) Follow up with your doctors as directed    Follow Up:  Brayan Hallman MD  [address] [place] [address] [phone]    Schedule an appointment as soon as possible for a visit in 1 week(s)    .      Information obtained by :  I understand that if any problems occur once I am at home I am to contact my physician.    I understand and acknowledge receipt of the instructions indicated above.                                                                                                                                           Physician's or R.N.'s Signature                                                                  Date/Time                                                                                                                                         come back.   Where can you learn more?  Go to [email or web address] and enter S351 to learn more about \"Urinary Tract Infections (UTI) in Men: Care Instructions.\"  Current as of: April 30, 2024  Content Version: 14.3  © 2024 [email or web address].   Care instructions adapted under license by [employer]. If you have questions about a medical condition or this instruction, always ask your healthcare professional. [employer] disclaims any warranty or liability for your use of this information.

## 2025-01-15 NOTE — H&P
Hospitalist Admission Note    NAME:  Williams Fitzgerald Jr   :  1940   MRN:  [id number]     Date/Time:  1/15/2025 3:07 PM    Patient PCP: Brayan Hallman MD  ________________________________________________________________________    Given the patient's current clinical presentation, I have a high level of concern for decompensation if discharged from the emergency department.  Complex decision making was performed, which includes reviewing the patient's available past medical records, laboratory results, and x-ray films.       My assessment of this patient's clinical condition and my plan of care is as follows.    Assessment / Plan:     84 y.o. male with a history of HHT who presents to the emergency room today accompanied by his wife with main complaint of recurrent episodes of hallucinations, with concerns from the ER for CVA patient was admitted for further workup    # CVA/TIA rule out:   Patient presented with recurrent episodes of visual hallucinations which is less likely could be secondary to underlying CVA   symptoms are intermittent   CT  Head NAP.  . Trop neg.   Plan  - MRI brain   - Echo  - TSH, lipid panel, A1c, LDL goal <70  - ASA 81mg after dysphagia screen  - Permissive HTN for the first 24-48 hours [  HTN for the first 24-48 hours SBP<220 and DBP<110  ]  - Consulted PT/OT for rehab eval and CM for disposition planning  - NPO until dysphagia screen  - Neuro checks q4h   - Daily CBC, CMP    # Metabolic encephalopathy  #Visual lacerations  Most likely is the main cause of patient's underlying current condition  Plan  Will follow-up on brain MRI and neurology eval  Patient most likely will need outpatient follow-up    # HHT  Continue home meds   Monitor for bleeding [ now on ASA [    # Anemia   Daily labs   Monitor CBC  Transfuse if needed         I have personally reviewed the radiographs, laboratory data in Epic and decisions and statements above are based partially on this personal

## 2025-01-15 NOTE — ED TRIAGE NOTES
Wife brings in patient for worsening hallucinations that started today. Wife reports that the hallucinations started a few weeks ago.     Wife also reports that patient has been having increased nose bleeds and was started on a trial drug. Pt also has a hx of HHT.     Pt denies any changes in urination, chest pain, shortness of breath, changes in vision/speech, focal weakness.

## 2025-01-15 NOTE — ED PROVIDER NOTES
[facility] EMERGENCY DEPARTMENT  EMERGENCY DEPARTMENT ENCOUNTER      Pt Name: Williams Fitzgerald Jr  MRN: [id number]  Birthdate 1940  Date of evaluation: 1/15/2025  Provider: MARTÍNEZ Barroso NP      HISTORY OF PRESENT ILLNESS      Chief Complaint (CC): Worsening hallucinations.    Past Medical History:  No date: HHT (hereditary hemorrhagic telangiectasia) (HCC)  No date: Iron deficiency anemia  No date: Prostate cancer (HCC)  No date: Pulmonary arteriovenous malformation  Past Surgical History:  No date: APPENDECTOMY  No date: CHEST SURGERY      Comment:  excision of AVM  1/29/2020: COLONOSCOPY; N/A      Comment:  COLONOSCOPY performed by Manas Barr MD at [facility]                ENDOSCOPY  2014: COLONOSCOPY      Comment:  due 19 07/2018: OTHER SURGICAL HISTORY      Comment:  sclerotheropy  No date: TONSILLECTOMY      The history is provided by the patient.           Nursing Notes were reviewed.    REVIEW OF SYSTEMS         Review of Systems   Constitutional:  Negative for activity change, chills, diaphoresis, fatigue and fever.   HENT:  Negative for congestion, sinus pressure, sinus pain and trouble swallowing.    Eyes:  Negative for pain, redness and visual disturbance.   Respiratory:  Negative for cough and shortness of breath.    Cardiovascular:  Negative for chest pain and palpitations.   Gastrointestinal:  Negative for abdominal distention, abdominal pain, nausea and vomiting.   Genitourinary:  Negative for difficulty urinating, frequency and urgency.   Musculoskeletal:  Negative for arthralgias, gait problem, neck pain and neck stiffness.   Skin:  Negative for color change and wound.   Neurological:  Negative for dizziness, speech difficulty and weakness.   Hematological:  Does not bruise/bleed easily.   Psychiatric/Behavioral:  Positive for confusion and hallucinations. Negative for agitation. The patient is not nervous/anxious.            PAST MEDICAL HISTORY     Past  and any images obtained. This was discussed with my attending and he/she is in agreement with plan of care.           Problems Addressed:  Altered mental status, unspecified altered mental status type: acute illness or injury    Amount and/or Complexity of Data Reviewed  Labs: ordered. Decision-making details documented in ED Course.  Radiology: ordered. Decision-making details documented in ED Course.  ECG/medicine tests: ordered.    Risk  Decision regarding hospitalization.            REASSESSMENT     ED Course as of 01/15/25 1143   Wed Jimmie 15, 2025   1142 11:39 AM EKG shows normal sinus rhythm with a rate of 83 bpm with no acute ST or T wave abnormality suggestive of ischemia. [WJ]      ED Course User Index  [WJ] Scottie Pizarro,          CONSULTS:  None    PROCEDURES:     Procedures    Labs Reviewed   CBC WITH AUTO DIFFERENTIAL - Abnormal; Notable for the following components:       Result Value    RBC 4.01 (*)     Hemoglobin 10.7 (*)     Hematocrit 34.5 (*)     RDW 17.3 (*)     Lymphocytes % 7.9 (*)     Monocytes % 13.8 (*)     Lymphocytes Absolute 0.51 (*)     All other components within normal limits   COMPREHENSIVE METABOLIC PANEL - Abnormal; Notable for the following components:    Glucose 109 (*)     BUN 23 (*)     Globulin 4.2 (*)     Albumin/Globulin Ratio 0.9 (*)     All other components within normal limits   TROPONIN   URINALYSIS WITH MICROSCOPIC       CT Head W/O Contrast   Final Result   1.  No acute intracranial abnormality.   2.  Pneumatized debris versus foreign bodies in the bilateral nares, correlate   clinically.               Electronically signed by AMRIT Tenorio Serve Consult for Admission  3:02 PM    ED Room Number: OFLW/OFL4  Patient Name and age:  Williams Fitzgerald Jr 84 y.o.  male  Working Diagnosis:   1. Altered mental status, unspecified altered mental status type        COVID-19 Suspicion: No  Sepsis present:  No  Reassessment needed: No  Code Status:  Full

## 2025-01-16 VITALS
RESPIRATION RATE: 13 BRPM | SYSTOLIC BLOOD PRESSURE: 132 MMHG | HEART RATE: 81 BPM | TEMPERATURE: 98.1 F | DIASTOLIC BLOOD PRESSURE: 66 MMHG | OXYGEN SATURATION: 100 % | HEIGHT: 72 IN | BODY MASS INDEX: 23.84 KG/M2 | WEIGHT: 176 LBS

## 2025-01-16 PROBLEM — G93.41 ACUTE METABOLIC ENCEPHALOPATHY: Status: ACTIVE | Noted: 2025-01-16

## 2025-01-16 PROBLEM — N39.0 UTI (URINARY TRACT INFECTION): Status: ACTIVE | Noted: 2025-01-16

## 2025-01-16 LAB
AMMONIA PLAS-SCNC: 18 UMOL/L
ANION GAP SERPL CALC-SCNC: 3 MMOL/L (ref 2–12)
BUN SERPL-MCNC: 21 MG/DL (ref 6–20)
BUN/CREAT SERPL: 23 (ref 12–20)
CALCIUM SERPL-MCNC: 8.5 MG/DL (ref 8.5–10.1)
CHLORIDE SERPL-SCNC: 110 MMOL/L (ref 97–108)
CHOLEST SERPL-MCNC: 140 MG/DL
CO2 SERPL-SCNC: 28 MMOL/L (ref 21–32)
CREAT SERPL-MCNC: 0.91 MG/DL (ref 0.7–1.3)
CRP SERPL-MCNC: 0.62 MG/DL (ref 0–0.3)
ERYTHROCYTE [DISTWIDTH] IN BLOOD BY AUTOMATED COUNT: 17.4 % (ref 11.5–14.5)
EST. AVERAGE GLUCOSE BLD GHB EST-MCNC: 88 MG/DL
GLUCOSE SERPL-MCNC: 113 MG/DL (ref 65–100)
HBA1C MFR BLD: 4.7 % (ref 4–5.6)
HCT VFR BLD AUTO: 32.1 % (ref 36.6–50.3)
HDLC SERPL-MCNC: 55 MG/DL
HDLC SERPL: 2.5 (ref 0–5)
HGB BLD-MCNC: 9.8 G/DL (ref 12.1–17)
LDLC SERPL CALC-MCNC: 71.2 MG/DL (ref 0–100)
MAGNESIUM SERPL-MCNC: 2.2 MG/DL (ref 1.6–2.4)
MCH RBC QN AUTO: 25.9 PG (ref 26–34)
MCHC RBC AUTO-ENTMCNC: 30.5 G/DL (ref 30–36.5)
MCV RBC AUTO: 84.7 FL (ref 80–99)
NRBC # BLD: 0 K/UL (ref 0–0.01)
NRBC BLD-RTO: 0 PER 100 WBC
PHOSPHATE SERPL-MCNC: 2.9 MG/DL (ref 2.6–4.7)
PLATELET # BLD AUTO: 195 K/UL (ref 150–400)
PMV BLD AUTO: 9.8 FL (ref 8.9–12.9)
POTASSIUM SERPL-SCNC: 3.6 MMOL/L (ref 3.5–5.1)
RBC # BLD AUTO: 3.79 M/UL (ref 4.1–5.7)
SODIUM SERPL-SCNC: 141 MMOL/L (ref 136–145)
T4 FREE SERPL-MCNC: 0.8 NG/DL (ref 0.8–1.5)
TRIGL SERPL-MCNC: 69 MG/DL
TSH SERPL DL<=0.05 MIU/L-ACNC: 5.23 UIU/ML (ref 0.36–3.74)
VLDLC SERPL CALC-MCNC: 13.8 MG/DL
WBC # BLD AUTO: 5.2 K/UL (ref 4.1–11.1)

## 2025-01-16 PROCEDURE — 92610 EVALUATE SWALLOWING FUNCTION: CPT

## 2025-01-16 PROCEDURE — 97165 OT EVAL LOW COMPLEX 30 MIN: CPT

## 2025-01-16 PROCEDURE — 6370000000 HC RX 637 (ALT 250 FOR IP): Performed by: STUDENT IN AN ORGANIZED HEALTH CARE EDUCATION/TRAINING PROGRAM

## 2025-01-16 PROCEDURE — 6360000002 HC RX W HCPCS: Performed by: INTERNAL MEDICINE

## 2025-01-16 PROCEDURE — 82140 ASSAY OF AMMONIA: CPT

## 2025-01-16 PROCEDURE — 85027 COMPLETE CBC AUTOMATED: CPT

## 2025-01-16 PROCEDURE — 2500000003 HC RX 250 WO HCPCS: Performed by: STUDENT IN AN ORGANIZED HEALTH CARE EDUCATION/TRAINING PROGRAM

## 2025-01-16 PROCEDURE — 80048 BASIC METABOLIC PNL TOTAL CA: CPT

## 2025-01-16 PROCEDURE — 97535 SELF CARE MNGMENT TRAINING: CPT

## 2025-01-16 PROCEDURE — 83036 HEMOGLOBIN GLYCOSYLATED A1C: CPT

## 2025-01-16 PROCEDURE — 84443 ASSAY THYROID STIM HORMONE: CPT

## 2025-01-16 PROCEDURE — 84100 ASSAY OF PHOSPHORUS: CPT

## 2025-01-16 PROCEDURE — 94761 N-INVAS EAR/PLS OXIMETRY MLT: CPT

## 2025-01-16 PROCEDURE — 83735 ASSAY OF MAGNESIUM: CPT

## 2025-01-16 PROCEDURE — 87086 URINE CULTURE/COLONY COUNT: CPT

## 2025-01-16 PROCEDURE — 2500000003 HC RX 250 WO HCPCS: Performed by: INTERNAL MEDICINE

## 2025-01-16 PROCEDURE — 86140 C-REACTIVE PROTEIN: CPT

## 2025-01-16 PROCEDURE — 84439 ASSAY OF FREE THYROXINE: CPT

## 2025-01-16 PROCEDURE — 80061 LIPID PANEL: CPT

## 2025-01-16 RX ORDER — CEFDINIR 300 MG/1
300 CAPSULE ORAL 2 TIMES DAILY
Qty: 14 CAPSULE | Refills: 0 | Status: SHIPPED | OUTPATIENT
Start: 2025-01-16 | End: 2025-01-23

## 2025-01-16 RX ADMIN — OXYMETAZOLINE HYDROCHLORIDE 2 SPRAY: 0.5 SPRAY NASAL at 08:54

## 2025-01-16 RX ADMIN — OXYCODONE HYDROCHLORIDE AND ACETAMINOPHEN 250 MG: 500 TABLET ORAL at 07:59

## 2025-01-16 RX ADMIN — WATER 1000 MG: 1 INJECTION INTRAMUSCULAR; INTRAVENOUS; SUBCUTANEOUS at 07:59

## 2025-01-16 RX ADMIN — PANTOPRAZOLE SODIUM 40 MG: 40 TABLET, DELAYED RELEASE ORAL at 06:22

## 2025-01-16 RX ADMIN — SODIUM CHLORIDE, PRESERVATIVE FREE 10 ML: 5 INJECTION INTRAVENOUS at 08:00

## 2025-01-16 NOTE — DISCHARGE SUMMARY
[facility]  [address] [place] [address] [phone]          Hospitalist Discharge Summary     Patient ID:  Williams Fitzgerald Jr  [id number]  84 y.o.  1940    Admit date: 1/15/2025    Discharge date and time: 1/16/2025 11:26 AM    Admission Diagnoses: Altered mental status, unspecified altered mental status type [R41.82]  Cerebrovascular accident (CVA), unspecified mechanism (HCC) [I63.9]  Altered behavior [R46.89]    Discharge Diagnoses:  Principal Diagnosis Acute metabolic encephalopathy                                            Principal Problem:    Acute metabolic encephalopathy  Active Problems:    Altered behavior    UTI (urinary tract infection)  Resolved Problems:    * No resolved hospital problems. *         Hospital Course:     85 yo hx of Pafib, HHT, pulmonary AVMs, epistaxis, iron def anemia, presented w/ visual hallucinations, UTI.  Brain MRI neg CVA     1) Acute met encephalopathy/visual hallucinations: now resolved.  Likely due to UTI.  Brain MRI neg for CVA.  Ammonia level normal.  No further w/u    2) HHT: follows by Hematology.  No blood thinners    3) Iron def anemia: due to HHT.  Hgb stable.  F/u outpatient     PCP: Brayan Hallman MD     Consults: neurology    Significant Diagnostic Studies: brain MRI    Discharge Exam:  Physical Exam:    Gen: Elderly, frail, pleasant, NAD  HEENT:  Pink conjunctivae, PERRL, hearing intact to voice, moist mucous membranes  Neck: Supple, without masses, thyroid non-tender  Resp: No accessory muscle use, clear breath sounds without wheezes rales or rhonchi  Card: No murmurs, normal S1, S2 without thrills, bruits or peripheral edema  Abd:  Soft, non-tender, non-distended, normoactive bowel sounds are present  Lymph:  No cervical or inguinal adenopathy  Musc: No cyanosis or clubbing  Skin: No rashes  Neuro:  Cranial nerves are grossly intact, no focal motor weakness, follows commands appropriately  Psych:  Good

## 2025-01-16 NOTE — PROGRESS NOTES
with bedroom/bathroom, Two level, Performs ADL's on one level  Home Access: Stairs to enter with rails  Entrance Stairs - Number of Steps: 3  Entrance Stairs - Rails: Both  Bathroom Shower/Tub: Walk-in shower  Bathroom Equipment: Shower chair  Home Equipment: Cane, Walker - Rolling  Has the patient had two or more falls in the past year or any fall with injury in the past year?: No  Receives Help From: Family  Prior Level of Assist for ADLs: Independent  Prior Level of Assist for Homemaking: Needs assistance  Prior Level of Assist for Transfers: Independent  Active : Yes    Baseline Assessment:  Current Diet : Regular  Current Liquid Diet : Thin  Prior Dysphagia History: none       Cognitive and Communication Status:  Neurologic State: Alert  Orientation Level: Oriented x4  Cognition: Follows commands    Dysphagia:  Oral Assessment:  Oral Motor   Labial: No impairment  Dentition: Full;Natural  Oral Hygiene: Xerostomia  Oral Hygiene Comments: dried and fresh blood on soft palate and posterior pharyngeal wall, however patient with known nosebleed  Lingual: No impairment  Velum: No Impairment  Mandible: No impairment  P.O. Trials:  PO Trials  Assessment Method(s): Observation  Patient Position: up in bed  Vocal Quality: No Impairment  Consistency Presented: Regular;Thin  How Presented: Self-fed/presented;Straw;Successive Swallows  Bolus Acceptance: No impairment  Bolus Formation/Control: No impairment  Propulsion: No impairment  Oral Residue: None  Aspiration Signs/Symptoms: None  Pharyngeal Phase Characteristics: No impairment, issues, or problems            Motor Speech:         Language Comprehension and Expression:                   Neuro-Linguistics:                      Voice:       Respiratory Status/Airway:  Room air                         Outcome Measure:  Functional Oral Intake Scale (FOIS): 7--Total oral diet with no restrictions    After treatment:   Patient left in no apparent distress in bed, Call  luna left within reach, Nursing notified, and Caregiver present    COMMUNICATION/EDUCATION:   Patient was educated regarding WFL swallow.  He demonstrated Good understanding as evidenced by Verbalizing understanding.     The patient's plan of care including recommendations, planned interventions, and recommended diet changes were discussed with: Registered nurse    Patient/family have participated as able in goal setting and plan of care and Patient/family agree to work toward stated goals and plan of care    Thank you,  Alana Cortez, SLP    SLP Individual Minutes  Time In: 1118  Time Out: 1130  Minutes: 12

## 2025-01-16 NOTE — ED NOTES
SBAR report given to JOHN West to [facility] for patient's routine progression of care.

## 2025-01-16 NOTE — CARE COORDINATION
Care Management Initial Assessment  1/16/2025 11:50 AM  If patient is discharged prior to next notation, then this note serves as note for discharge by case management.    Reason for Admission:   Altered mental status, unspecified altered mental status type [R41.82]  Cerebrovascular accident (CVA), unspecified mechanism (HCC) [I63.9]  Altered behavior [R46.89]         Patient Admission Status: Inpatient  Date Admitted to INP: 1/15/25  RUR: Readmission Risk Score: 19.9    Hospitalization in the last 30 days (Readmission):  No        Advance Care Planning:  Code Status: Full Code  Primary Healthcare Decision Maker: Named in Scanned ACP Document  Primary Decision Maker: Nanette Fitzgerald - Spouse - [phone]   Advance Directive: Power of  for healthcare on file, has an advanced directive - a copy has been provided     __________________________________________________________________________  Assessment:      01/16/25 1146   Service Assessment   Patient Orientation Alert and Oriented   Cognition Alert   History Provided By Spouse;Patient   Primary Caregiver Spouse   Accompanied By/Relationship wife Nanette Fitzgerald   Support Systems Spouse/Significant Other;Children;Family Members   Patient's Healthcare Decision Maker is: Named in Scanned ACP Document   PCP Verified by CM Yes   Last Visit to PCP Within last 6 months   Prior Functional Level Independent in ADLs/IADLs   Current Functional Level Independent in ADLs/IADLs   Can patient return to prior living arrangement Yes   Ability to make needs known: Fair   Family able to assist with home care needs: Yes   Would you like for me to discuss the discharge plan with any other family members/significant others, and if so, who? Yes  (wife Nanette)   Financial Resources Medicare   Community Resources None   Social/Functional History   Lives With Spouse   Type of Home House   Home Layout Able to Live on Main level with bedroom/bathroom;Two level;Performs ADL's on one level

## 2025-01-16 NOTE — PLAN OF CARE
Problem: Occupational Therapy - Adult  Goal: By Discharge: Performs self-care activities at highest level of function for planned discharge setting.  See evaluation for individualized goals.  Description: FUNCTIONAL STATUS PRIOR TO ADMISSION:    Receives Help From: Family,  ,  ,  ,  ,  ,  ,  ,  ,  , Active : Yes     HOME SUPPORT: Patient lived with spouse but didn't require assistance. Was independent for mobility and ADLs    Occupational Therapy Goals:  Initiated 1/16/2025  1.  Patient will perform upper body dressing and lower body dressing with Modified [place] within 7 day(s).  2.  Patient will perform bathing with Modified [place] within 7 day(s).  3.  Patient will perform all aspects of toileting with Modified [place] within 7 day(s).  4.  Patient will participate in upper extremity therapeutic exercise/activities with Modified [place] for 10 minutes within 7 day(s).    5.  Patient will utilize energy conservation techniques during functional activities with verbal and visual cues within 7 day(s).    Outcome: Progressing   OCCUPATIONAL THERAPY EVALUATION    Patient: Williams Fitzgerald Jr (84 y.o. male)  Date: 1/16/2025  Primary Diagnosis: Altered mental status, unspecified altered mental status type [R41.82]  Cerebrovascular accident (CVA), unspecified mechanism (HCC) [I63.9]  Altered behavior [R46.89]         Precautions:                    ASSESSMENT :  The patient is limited by decreased functional mobility, independence in ADLs, high-level IADLs, safety awareness, and higher level balance. In setting of admission for increased confusion and visual hallucinations. CT and MRI both negative for acute abnormality and pt and spouse at bedside report pt nearly back to baseline. He is tangential though redirectable and generally mobilizes quite well (better with shoes with R shoe insert), strength/ROM/coordination of UEs equal and symmetrical with patient demonstrating ability to complete  determined to be of the following complexity level: Low

## 2025-01-16 NOTE — PROGRESS NOTES
NUTRITION     Nutrition screening referral was triggered based on results obtained during nursing admission assessment for weight loss and poor PO intakes.  Spoke with pt and wife at bedside. Wife reports pt does not eat a lot at baseline but still eats at least 2 meals/day.  Pt declines a decrease in PO intakes recently.  Reports eating > 75% breakfast today.  Wife states pt has lost maybe 4 lbs over last month, not a significant amount of weight loss.      The patient's chart was reviewed and nutrition assessment is not indicated at this time.  Plan to see patient for rescreen as indicated.  Thank you.     Hailey Walter RD  Contact via [employer]

## 2025-01-17 ENCOUNTER — TELEPHONE (OUTPATIENT)
Age: 85
End: 2025-01-17

## 2025-01-17 LAB
BACTERIA SPEC CULT: NORMAL
SERVICE CMNT-IMP: NORMAL

## 2025-01-17 ASSESSMENT — ENCOUNTER SYMPTOMS
ABDOMINAL DISTENTION: 0
COLOR CHANGE: 0
TROUBLE SWALLOWING: 0
COUGH: 0
VOMITING: 0
ABDOMINAL PAIN: 0
EYE PAIN: 0
NAUSEA: 0
SINUS PRESSURE: 0
SINUS PAIN: 0
EYE REDNESS: 0
SHORTNESS OF BREATH: 0

## 2025-01-17 NOTE — TELEPHONE ENCOUNTER
Patients spouse called and stated that she would like to reschedule the patients [facility] appt that he missed on 1/9. Requested a call back.     # [phone]

## 2025-01-18 ENCOUNTER — HOSPITAL ENCOUNTER (INPATIENT)
Facility: HOSPITAL | Age: 85
LOS: 2 days | Discharge: HOME | DRG: 300 | End: 2025-01-22
Attending: STUDENT IN AN ORGANIZED HEALTH CARE EDUCATION/TRAINING PROGRAM | Admitting: STUDENT IN AN ORGANIZED HEALTH CARE EDUCATION/TRAINING PROGRAM
Payer: MEDICARE

## 2025-01-18 ENCOUNTER — HOSPITAL ENCOUNTER (EMERGENCY)
Facility: HOSPITAL | Age: 85
Discharge: ANOTHER HOSPITAL | End: 2025-01-18
Attending: EMERGENCY MEDICINE
Payer: MEDICARE

## 2025-01-18 VITALS
OXYGEN SATURATION: 100 % | WEIGHT: 176 LBS | RESPIRATION RATE: 20 BRPM | HEIGHT: 72 IN | SYSTOLIC BLOOD PRESSURE: 166 MMHG | TEMPERATURE: 98.2 F | HEART RATE: 92 BPM | BODY MASS INDEX: 23.84 KG/M2 | DIASTOLIC BLOOD PRESSURE: 92 MMHG

## 2025-01-18 DIAGNOSIS — R04.0 EPISTAXIS: Primary | ICD-10-CM

## 2025-01-18 DIAGNOSIS — I78.0 HHT (HEREDITARY HEMORRHAGIC TELANGIECTASIA) (HCC): ICD-10-CM

## 2025-01-18 LAB
ALBUMIN SERPL-MCNC: 3.6 G/DL (ref 3.5–5)
ALBUMIN/GLOB SERPL: 0.9 (ref 1.1–2.2)
ALP SERPL-CCNC: 94 U/L (ref 45–117)
ALT SERPL-CCNC: 25 U/L (ref 12–78)
ANION GAP SERPL CALC-SCNC: 4 MMOL/L (ref 2–12)
AST SERPL-CCNC: 21 U/L (ref 15–37)
BILIRUB SERPL-MCNC: 0.3 MG/DL (ref 0.2–1)
BUN SERPL-MCNC: 25 MG/DL (ref 6–20)
BUN/CREAT SERPL: 24 (ref 12–20)
CALCIUM SERPL-MCNC: 8.9 MG/DL (ref 8.5–10.1)
CHLORIDE SERPL-SCNC: 110 MMOL/L (ref 97–108)
CO2 SERPL-SCNC: 26 MMOL/L (ref 21–32)
CREAT SERPL-MCNC: 1.05 MG/DL (ref 0.7–1.3)
GLOBULIN SER CALC-MCNC: 4.1 G/DL (ref 2–4)
GLUCOSE SERPL-MCNC: 108 MG/DL (ref 65–100)
POTASSIUM SERPL-SCNC: 4.1 MMOL/L (ref 3.5–5.1)
PROT SERPL-MCNC: 7.7 G/DL (ref 6.4–8.2)
SODIUM SERPL-SCNC: 140 MMOL/L (ref 136–145)

## 2025-01-18 PROCEDURE — 99285 EMERGENCY DEPT VISIT HI MDM: CPT

## 2025-01-18 PROCEDURE — 36415 COLL VENOUS BLD VENIPUNCTURE: CPT

## 2025-01-18 PROCEDURE — 30903 CONTROL OF NOSEBLEED: CPT

## 2025-01-18 PROCEDURE — 80053 COMPREHEN METABOLIC PANEL: CPT

## 2025-01-18 RX ORDER — OXYMETAZOLINE HYDROCHLORIDE 0.05 G/100ML
2 SPRAY NASAL
Status: DISCONTINUED | OUTPATIENT
Start: 2025-01-18 | End: 2025-01-18 | Stop reason: HOSPADM

## 2025-01-18 ASSESSMENT — PAIN - FUNCTIONAL ASSESSMENT: PAIN_FUNCTIONAL_ASSESSMENT: NONE - DENIES PAIN

## 2025-01-19 LAB
ABO + RH BLD: NORMAL
APTT PPP: 26.2 SEC (ref 22.1–31)
BASOPHILS # BLD: 0.04 K/UL (ref 0–0.1)
BASOPHILS # BLD: 0.05 K/UL (ref 0–0.1)
BASOPHILS NFR BLD: 0.5 % (ref 0–1)
BASOPHILS NFR BLD: 0.7 % (ref 0–1)
BLOOD GROUP ANTIBODIES SERPL: NORMAL
COMMENT:: NORMAL
COMMENT:: NORMAL
DIFFERENTIAL METHOD BLD: ABNORMAL
DIFFERENTIAL METHOD BLD: ABNORMAL
EKG ATRIAL RATE: 103 BPM
EKG DIAGNOSIS: NORMAL
EKG P AXIS: 88 DEGREES
EKG P-R INTERVAL: 240 MS
EKG Q-T INTERVAL: 336 MS
EKG QRS DURATION: 106 MS
EKG QTC CALCULATION (BAZETT): 440 MS
EKG R AXIS: 64 DEGREES
EKG T AXIS: 59 DEGREES
EKG VENTRICULAR RATE: 103 BPM
EOSINOPHIL # BLD: 0.11 K/UL (ref 0–0.4)
EOSINOPHIL # BLD: 0.25 K/UL (ref 0–0.4)
EOSINOPHIL NFR BLD: 2 % (ref 0–7)
EOSINOPHIL NFR BLD: 2.7 % (ref 0–7)
ERYTHROCYTE [DISTWIDTH] IN BLOOD BY AUTOMATED COUNT: 16.6 % (ref 11.5–14.5)
ERYTHROCYTE [DISTWIDTH] IN BLOOD BY AUTOMATED COUNT: 16.8 % (ref 11.5–14.5)
HCT VFR BLD AUTO: 28.5 % (ref 36.6–50.3)
HCT VFR BLD AUTO: 28.7 % (ref 36.6–50.3)
HGB BLD-MCNC: 8.7 G/DL (ref 12.1–17)
HGB BLD-MCNC: 9 G/DL (ref 12.1–17)
IMM GRANULOCYTES # BLD AUTO: 0.01 K/UL (ref 0–0.04)
IMM GRANULOCYTES # BLD AUTO: 0.02 K/UL (ref 0–0.04)
IMM GRANULOCYTES NFR BLD AUTO: 0.2 % (ref 0–0.5)
IMM GRANULOCYTES NFR BLD AUTO: 0.2 % (ref 0–0.5)
INR PPP: 1 (ref 0.9–1.1)
LYMPHOCYTES # BLD: 0.41 K/UL (ref 0.8–3.5)
LYMPHOCYTES # BLD: 0.53 K/UL (ref 0.8–3.5)
LYMPHOCYTES NFR BLD: 5.6 % (ref 12–49)
LYMPHOCYTES NFR BLD: 7.3 % (ref 12–49)
MCH RBC QN AUTO: 26.2 PG (ref 26–34)
MCH RBC QN AUTO: 26.7 PG (ref 26–34)
MCHC RBC AUTO-ENTMCNC: 30.5 G/DL (ref 30–36.5)
MCHC RBC AUTO-ENTMCNC: 31.4 G/DL (ref 30–36.5)
MCV RBC AUTO: 85.2 FL (ref 80–99)
MCV RBC AUTO: 85.8 FL (ref 80–99)
MONOCYTES # BLD: 0.73 K/UL (ref 0–1)
MONOCYTES # BLD: 0.77 K/UL (ref 0–1)
MONOCYTES NFR BLD: 13.1 % (ref 5–13)
MONOCYTES NFR BLD: 8.2 % (ref 5–13)
NEUTS SEG # BLD: 4.3 K/UL (ref 1.8–8)
NEUTS SEG # BLD: 7.78 K/UL (ref 1.8–8)
NEUTS SEG NFR BLD: 76.7 % (ref 32–75)
NEUTS SEG NFR BLD: 82.8 % (ref 32–75)
NRBC # BLD: 0 K/UL (ref 0–0.01)
NRBC # BLD: 0 K/UL (ref 0–0.01)
NRBC BLD-RTO: 0 PER 100 WBC
NRBC BLD-RTO: 0 PER 100 WBC
PLATELET # BLD AUTO: 188 K/UL (ref 150–400)
PLATELET # BLD AUTO: 195 K/UL (ref 150–400)
PMV BLD AUTO: 10.8 FL (ref 8.9–12.9)
PMV BLD AUTO: 11.1 FL (ref 8.9–12.9)
PROTHROMBIN TIME: 11.1 SEC (ref 9.2–11.2)
RBC # BLD AUTO: 3.32 M/UL (ref 4.1–5.7)
RBC # BLD AUTO: 3.37 M/UL (ref 4.1–5.7)
RBC MORPH BLD: ABNORMAL
RBC MORPH BLD: ABNORMAL
SPECIMEN EXP DATE BLD: NORMAL
SPECIMEN HOLD: NORMAL
SPECIMEN HOLD: NORMAL
THERAPEUTIC RANGE: NORMAL SECS (ref 58–77)
WBC # BLD AUTO: 5.6 K/UL (ref 4.1–11.1)
WBC # BLD AUTO: 9.4 K/UL (ref 4.1–11.1)

## 2025-01-19 PROCEDURE — 85610 PROTHROMBIN TIME: CPT

## 2025-01-19 PROCEDURE — 6360000002 HC RX W HCPCS: Performed by: STUDENT IN AN ORGANIZED HEALTH CARE EDUCATION/TRAINING PROGRAM

## 2025-01-19 PROCEDURE — G0378 HOSPITAL OBSERVATION PER HR: HCPCS

## 2025-01-19 PROCEDURE — 97161 PT EVAL LOW COMPLEX 20 MIN: CPT

## 2025-01-19 PROCEDURE — 97116 GAIT TRAINING THERAPY: CPT

## 2025-01-19 PROCEDURE — 2500000003 HC RX 250 WO HCPCS: Performed by: STUDENT IN AN ORGANIZED HEALTH CARE EDUCATION/TRAINING PROGRAM

## 2025-01-19 PROCEDURE — 93005 ELECTROCARDIOGRAM TRACING: CPT | Performed by: STUDENT IN AN ORGANIZED HEALTH CARE EDUCATION/TRAINING PROGRAM

## 2025-01-19 PROCEDURE — 85025 COMPLETE CBC W/AUTO DIFF WBC: CPT

## 2025-01-19 PROCEDURE — 86901 BLOOD TYPING SEROLOGIC RH(D): CPT

## 2025-01-19 PROCEDURE — 86900 BLOOD TYPING SEROLOGIC ABO: CPT

## 2025-01-19 PROCEDURE — 97530 THERAPEUTIC ACTIVITIES: CPT

## 2025-01-19 PROCEDURE — 6370000000 HC RX 637 (ALT 250 FOR IP): Performed by: STUDENT IN AN ORGANIZED HEALTH CARE EDUCATION/TRAINING PROGRAM

## 2025-01-19 PROCEDURE — 36415 COLL VENOUS BLD VENIPUNCTURE: CPT

## 2025-01-19 PROCEDURE — 2580000003 HC RX 258: Performed by: STUDENT IN AN ORGANIZED HEALTH CARE EDUCATION/TRAINING PROGRAM

## 2025-01-19 PROCEDURE — 85730 THROMBOPLASTIN TIME PARTIAL: CPT

## 2025-01-19 PROCEDURE — 96374 THER/PROPH/DIAG INJ IV PUSH: CPT

## 2025-01-19 PROCEDURE — 86850 RBC ANTIBODY SCREEN: CPT

## 2025-01-19 RX ORDER — SODIUM CHLORIDE 9 MG/ML
INJECTION, SOLUTION INTRAVENOUS PRN
Status: DISCONTINUED | OUTPATIENT
Start: 2025-01-19 | End: 2025-01-22 | Stop reason: HOSPADM

## 2025-01-19 RX ORDER — SODIUM CHLORIDE 0.9 % (FLUSH) 0.9 %
5-40 SYRINGE (ML) INJECTION PRN
Status: DISCONTINUED | OUTPATIENT
Start: 2025-01-19 | End: 2025-01-22 | Stop reason: HOSPADM

## 2025-01-19 RX ORDER — POLYETHYLENE GLYCOL 3350 17 G/17G
17 POWDER, FOR SOLUTION ORAL DAILY PRN
Status: DISCONTINUED | OUTPATIENT
Start: 2025-01-19 | End: 2025-01-22 | Stop reason: HOSPADM

## 2025-01-19 RX ORDER — ONDANSETRON 2 MG/ML
4 INJECTION INTRAMUSCULAR; INTRAVENOUS ONCE
Status: COMPLETED | OUTPATIENT
Start: 2025-01-19 | End: 2025-01-19

## 2025-01-19 RX ORDER — SODIUM CHLORIDE 0.9 % (FLUSH) 0.9 %
5-40 SYRINGE (ML) INJECTION EVERY 12 HOURS SCHEDULED
Status: DISCONTINUED | OUTPATIENT
Start: 2025-01-19 | End: 2025-01-22 | Stop reason: HOSPADM

## 2025-01-19 RX ORDER — MAGNESIUM SULFATE IN WATER 40 MG/ML
2000 INJECTION, SOLUTION INTRAVENOUS PRN
Status: DISCONTINUED | OUTPATIENT
Start: 2025-01-19 | End: 2025-01-22 | Stop reason: HOSPADM

## 2025-01-19 RX ORDER — SODIUM CHLORIDE 9 MG/ML
INJECTION, SOLUTION INTRAVENOUS CONTINUOUS
Status: DISPENSED | OUTPATIENT
Start: 2025-01-19 | End: 2025-01-19

## 2025-01-19 RX ORDER — ACETAMINOPHEN 325 MG/1
650 TABLET ORAL EVERY 6 HOURS PRN
Status: DISCONTINUED | OUTPATIENT
Start: 2025-01-19 | End: 2025-01-22 | Stop reason: HOSPADM

## 2025-01-19 RX ORDER — ACETAMINOPHEN 650 MG/1
650 SUPPOSITORY RECTAL EVERY 6 HOURS PRN
Status: DISCONTINUED | OUTPATIENT
Start: 2025-01-19 | End: 2025-01-22 | Stop reason: HOSPADM

## 2025-01-19 RX ORDER — CEPHALEXIN 500 MG/1
500 CAPSULE ORAL EVERY 12 HOURS SCHEDULED
Status: DISCONTINUED | OUTPATIENT
Start: 2025-01-19 | End: 2025-01-22 | Stop reason: HOSPADM

## 2025-01-19 RX ORDER — POTASSIUM CHLORIDE 7.45 MG/ML
10 INJECTION INTRAVENOUS PRN
Status: DISCONTINUED | OUTPATIENT
Start: 2025-01-19 | End: 2025-01-22 | Stop reason: HOSPADM

## 2025-01-19 RX ORDER — CEPHALEXIN 500 MG/1
500 CAPSULE ORAL EVERY 6 HOURS SCHEDULED
Status: DISCONTINUED | OUTPATIENT
Start: 2025-01-19 | End: 2025-01-19

## 2025-01-19 RX ORDER — POTASSIUM CHLORIDE 750 MG/1
40 TABLET, EXTENDED RELEASE ORAL PRN
Status: DISCONTINUED | OUTPATIENT
Start: 2025-01-19 | End: 2025-01-22 | Stop reason: HOSPADM

## 2025-01-19 RX ADMIN — SODIUM CHLORIDE, PRESERVATIVE FREE 10 ML: 5 INJECTION INTRAVENOUS at 09:03

## 2025-01-19 RX ADMIN — CEPHALEXIN 500 MG: 500 CAPSULE ORAL at 20:33

## 2025-01-19 RX ADMIN — CEPHALEXIN 500 MG: 500 CAPSULE ORAL at 00:38

## 2025-01-19 RX ADMIN — CEPHALEXIN 500 MG: 500 CAPSULE ORAL at 06:53

## 2025-01-19 RX ADMIN — ONDANSETRON 4 MG: 2 INJECTION INTRAMUSCULAR; INTRAVENOUS at 14:51

## 2025-01-19 RX ADMIN — SODIUM CHLORIDE: 9 INJECTION, SOLUTION INTRAVENOUS at 01:33

## 2025-01-19 RX ADMIN — SODIUM CHLORIDE, PRESERVATIVE FREE 10 ML: 5 INJECTION INTRAVENOUS at 20:34

## 2025-01-19 ASSESSMENT — PAIN SCALES - GENERAL
PAINLEVEL_OUTOF10: 0
PAINLEVEL_OUTOF10: 0

## 2025-01-19 NOTE — CONSULTS
Department of Otolaryngology  Manas Cloud MD   Consult Note      Reason for Consult:  Epistaxis  Requesting Physician:  Huang Kraus MD    CHIEF COMPLAINT:  Nose Bleeds    History Obtained From:  patient, electronic medical record    HISTORY OF PRESENT ILLNESS:                The patient is a 84 y.o. male with significant past medical history of HHT who presents with recurrent epistaxis. Patient presented to [facility] ED last evening with brisk bilateral epistaxis requiring placement of bilateral nasal packing. Patient was transferred to [facility] for further specialty care. Patient states he underwent nasal surgery for control of epistaxis difficulty at [facility] by Denilson Rand MD approximately six weeks ago. He had been doing well until yesterday, developed bleeding from nose and mouth prompting his presentation to the ED. 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 Family history includes grandfather, mother, sister and three of his four children with HHT. Patient reports he was scheduled to have a follow up appointment tomorrow at [facility] to see Dr. Rand.     Past Medical History:        Diagnosis Date    HHT (hereditary hemorrhagic telangiectasia) (HCC)     Iron deficiency anemia     Prostate cancer (HCC)     Pulmonary arteriovenous malformation      Past Surgical History:        Procedure Laterality Date    APPENDECTOMY      CHEST SURGERY      excision of AVM    COLONOSCOPY N/A 1/29/2020    COLONOSCOPY performed by Manas Barr MD at [facility] ENDOSCOPY    COLONOSCOPY  2014    due 18    OTHER SURGICAL HISTORY  07/2018    sclerotheropy    TONSILLECTOMY       Current Medications:   Current Facility-Administered Medications: cephALEXin (KEFLEX) capsule 500 mg, 500 mg, Oral, 4 times per day  sodium chloride flush 0.9 % injection 5-40 mL, 5-40 mL, IntraVENous, 2 times per day  sodium chloride flush 0.9 % injection 5-40 mL, 5-40 
Patient follows with Dr. Bray for what wife states is truncal instability, increasing weakness, trouble with handling secretions and swallowing? Patient had outpatient work up including MRI brain, but unknown result. Aspiration precautions, consult neurology

## 2025-01-19 NOTE — ED TRIAGE NOTES
Pt is a transfer from [place] with cc of nose bleed. Pt was transferred here for ENT. Pt has been experiencing a nose bleed since yesterday around 1700. Pt denies any pain.

## 2025-01-19 NOTE — ED NOTES
Patient transporting ED to ED, Dr Urbano spoke with transfer center, report number given to primary RN     SHERRY ETA [phone]

## 2025-01-19 NOTE — ED NOTES
[facility] is arrived at this time and is transferring patient onto the stretcher. SBAR report given to Isabel from [facility] transport team. Patient and patients family aware of the reasoning behind transfer.

## 2025-01-19 NOTE — ED PROVIDER NOTES
mouth    CEFDINIR (OMNICEF) 300 MG CAPSULE    Take 1 capsule by mouth 2 times daily for 7 days    DICYCLOMINE (BENTYL) 20 MG TABLET    TAKE 1 TABLET BY MOUTH 3 TIMES DAILY AS NEEDED FOR ABDOMINAL CRAMPS (NEW DOSE/NEW DIRECTIONS)    OXYMETAZOLINE (12 HOUR NASAL SPRAY) 0.05 % NASAL SPRAY    2 sprays by Nasal route 2 times daily    VITAMIN B-12 1000 MCG TABLET    Take 1 tablet by mouth daily for 30 doses    VITAMIN D 25 MCG (1000 UT) CAPS    Take by mouth daily       ALLERGIES     Patient has no known allergies.    FAMILY HISTORY       Family History   Problem Relation Age of Onset    Cancer Other         colon, lung    Heart Disease Mother           SOCIAL HISTORY       Social History     Socioeconomic History    Marital status:    Tobacco Use    Smoking status: Former     Passive exposure: Never    Smokeless tobacco: Never   Vaping Use    Vaping status: Never Used   Substance and Sexual Activity    Alcohol use: Yes     Alcohol/week: 0.0 standard drinks of alcohol    Drug use: No    Sexual activity: Not Currently     Social Determinants of Health     Food Insecurity: No Food Insecurity (1/16/2025)    Hunger Vital Sign     Worried About Running Out of Food in the Last Year: Never true     Ran Out of Food in the Last Year: Never true   Transportation Needs: No Transportation Needs (1/16/2025)    PRAPARE - Transportation     Lack of Transportation (Medical): No     Lack of Transportation (Non-Medical): No   Housing Stability: Low Risk  (1/16/2025)    Housing Stability Vital Sign     Unable to Pay for Housing in the Last Year: No     Number of Times Moved in the Last Year: 1     Homeless in the Last Year: No           PHYSICAL EXAM    (up to 7 for level 4, 8 or more for level 5)     ED Triage Vitals [01/18/25 2212]   BP Systolic BP Percentile Diastolic BP Percentile Temp Temp Source Pulse Respirations SpO2   (!) 132/103 -- -- 98.2 °F (36.8 °C) Axillary 92 18 99 %      Height Weight - Scale         1.829 m (6') 79.8

## 2025-01-19 NOTE — ED NOTES
Rhino Rocket applied to each nostril by Thang Urbano MD. Suctioned blood out from patients mouth. Patients nose bleed is under control at this time. Afrin is at bedside.

## 2025-01-19 NOTE — PROGRESS NOTES
Hospitalist Progress Note  Huang Kraus MD  Answering service: [phone]        Date of Service:  2025  NAME:  Williams Fitzgerald Jr  :  1940  MRN:  [id number]      Admission Summary:   Williams Fitzgerald Jr is a 84 y.o. male with hx of afib, Hereditary hemorrhagic, telangiectasia, pulmonary AVMs, prostate cancer, recurrent epistaxis who presented to [facility] ed with complaints of a nose bleed. Had been in his usual state of health until last evening when he developed epistaxis and presented to [facility] ed.  He underwent nasal packing and was transferred to [facility] foe ent availability.  Reports recent dx of uti and on abx.  The patient denies any fever, chills, chest or abdominal pain, nausea, vomiting, cough, congestion, recent illness, palpitations, or dysuria.     Remarkable vitals on ER Presentation: vss  Labs Remarkable for: baseline  ER Images: none  ER Rx: rhinorocket     Interval history / Subjective:   Follow up on epistaxis  2/2 hereditary hemorrhagic telengiectasias  s/p nasal packing   Reports sinus pressure   Nausea+      Assessment & Plan:     Recurrent Epistaxis  Hereditary hemorrhagic telangiectasia / Pulmonary AVMs   -currently on trial of avastin and previously on txa tabs  -source control with txa soaked nasal packing  -ENT following / recommend to maintain nasal packing for 2-3 days   -started on cephalexin prophylaxis   -Op ENT f/u at [facility]      Blood Loss Anemia  -hgb 7.5 on ed presentation  -repeat cbc /improved & stable hb   -type and cross  -tx for hgb <7     aFib  -not chronically ac  -cont dilt    UTI   UTI  on recent admission at [facility] 1/15   Discharged on cefdinir  Ucx   negative     Continue keflex for UTI /neg cx         Had prolonged discussion with patient's daughter today .  She requested daily updates on patient as her mother who mostly accompanies patient  has

## 2025-01-19 NOTE — PROGRESS NOTES
Day #1 of cephalexin   Indication:  PPX for nasal packing  Current regimen:  500 mg Q6H  Abx regimen: cephalexin  Recent Labs     25  2224 25  0041 25  0727   WBC  --  9.4 5.6   CREATININE 1.05  --   --    BUN 25*  --   --      Est CrCl: 56 ml/min; UO: -- ml/kg/hr  Temp (24hrs), Av.2 °F (36.8 °C), Min:97.9 °F (36.6 °C), Max:98.4 °F (36.9 °C)      Plan: Change to cephalexin 500 mg BID (max 1000 mg for crcl < 60 ml/min)

## 2025-01-19 NOTE — ED TRIAGE NOTES
Patient ambulated into ED room with report of epistaxis. Patients nose actively bleeding. Patient reports nose bleed started about an hour ago. Patient denies being on blood thinners but confirms that he has gotten nose bleeds previously.

## 2025-01-19 NOTE — ED NOTES
ED TO INPATIENT SBAR HANDOFF    Patient Name: Williams Fitzgerald Jr   :  1940  84 y.o.   MRN:  [id number]  ED Room #:  ER13/13     Situation  Code Status: Full Code   Allergies: Patient has no known allergies.  Weight: No data found.    Arrived from: home ( [facility] transfer)     Chief Complaint:   Chief Complaint   Patient presents with    Epistaxis       Hospital Problem/Diagnosis:  Principal Problem:    Epistaxis  Resolved Problems:    * No resolved hospital problems. *      Mobility: limited transfer mobility   ED Fall Risk: Presents to emergency department  because of falls (Syncope, seizure, or loss of consciousness): No, Age > 70: Yes, Altered Mental Status, Intoxication with alcohol or substance confusion (Disorientation, impaired judgment, poor safety awaremess, or inability to follow instructions): No, Impaired Mobility: Ambulates or transfers with assistive devices or assistance; Unable to ambulate or transer.: No, Nursing Judgement: Yes   Fell in ED or prior to admission: no   Restraints: no     Sitter: no   Family/Caregiver Present: yes    Neet to know social/safety information: N/A    Background  History:   Past Medical History:   Diagnosis Date    HHT (hereditary hemorrhagic telangiectasia) (HCC)     Iron deficiency anemia     Prostate cancer (HCC)     Pulmonary arteriovenous malformation        Assessment    Abnormal Assessment Findings: hearing impaired, epistaxis, and elevated bp.   Imaging:   No orders to display     Abnormal labs:   Abnormal Labs Reviewed   CBC WITH AUTO DIFFERENTIAL - Abnormal; Notable for the following components:       Result Value    RBC 3.37 (*)     Hemoglobin 9.0 (*)     Hematocrit 28.7 (*)     RDW 16.6 (*)     Neutrophils % 82.8 (*)     Lymphocytes % 5.6 (*)     Lymphocytes Absolute 0.53 (*)     All other components within normal limits       Vitals/MEWS:        Vitals:    25 0015 25 0100 25 0215   BP: (!) 171/88 (!) 161/87 (!) 169/75   Pulse: 92  93    Repeat LA: N/A  Antibiotic Given: Yes  Fluid Resuscitation: N/A  VS x 2 post-fluid resuscitation: N/A    Recommendation    Plan for next 24 hours: ENT consult    Consults ordered: IP CONSULT TO HOSPITALIST    Consulted provider:      If any further questions, please call Sending RN at [phone]  Electronically signed by: Electronically signed by Carleen Fajardo RN on 1/19/2025 at 5:26 AM

## 2025-01-19 NOTE — PLAN OF CARE
Problem: Physical Therapy - Adult  Goal: By Discharge: Performs mobility at highest level of function for planned discharge setting.  See evaluation for individualized goals.  Description: FUNCTIONAL STATUS PRIOR TO ADMISSION: Patient was independent and active without use of DME.    HOME SUPPORT PRIOR TO ADMISSION: The patient lived with wife but did not require assistance.    Physical Therapy Goals  Initiated 1/19/2025  1.  Patient will move from supine to sit and sit to supine and scoot up and down in bed with modified independence within 7 day(s).    2.  Patient will perform sit to stand with modified independence within 7 day(s).  3.  Patient will transfer from bed to chair and chair to bed with modified independence using the least restrictive device within 7 day(s).  4.  Patient will ambulate with modified independence for > 150 feet with the least restrictive device within 7 day(s).   5.  Patient will ascend/descend 4 stairs with one handrail(s) with supervision within 7 day(s).     PHYSICAL THERAPY EVALUATION    Patient: Williams Fitzgerald Jr (84 y.o. male)  Date: 1/19/2025  Primary Diagnosis: Epistaxis [R04.0]  HHT (hereditary hemorrhagic telangiectasia) (HCC) [I78.0]       Precautions: Restrictions/Precautions: Fall Risk  Activity Level: Up with Assist                      ASSESSMENT :   DEFICITS/IMPAIRMENTS:   The patient is limited by decreased functional mobility, strength, sensation, activity tolerance, endurance, safety awareness and balance -- admitted to hospital for epistaxis.  Pt's wife reports gait unsteady at times but no recent falls.  Recommend up in chair for meals and amb to bathroom and in hallway with contact guard.      Based on the impairments listed above - pt may benefit from outpatient PT for decreased balance.    Patient will benefit from skilled intervention to address the above impairments.    Functional Outcome Measure:  The patient scored 20/24 on the AM-PAC outcome measure which        [facility] AM-PAC®      Basic Mobility Inpatient Short Form (6-Clicks) Version 2  How much HELP from another person do you currently need... (If the patient hasn't done an activity recently, how much help from another person do you think they would need if they tried?) Total A Lot A Little None   1.  Turning from your back to your side while in a flat bed without using bedrails? []  1 []  2 []  3  [x]  4   2.  Moving from lying on your back to sitting on the side of a flat bed without using bedrails? []  1 []  2 []  3  [x]  4   3.  Moving to and from a bed to a chair (including a wheelchair)? []  1 []  2 [x]  3  []  4   4. Standing up from a chair using your arms (e.g. wheelchair or bedside chair)? []  1 []  2 [x]  3  []  4   5.  Walking in hospital room? []  1 []  2 [x]  3  []  4   6.  Climbing 3-5 steps with a railing? []  1 []  2 [x]  3  []  4     Raw Score: 20/24                            Cutoff score <=171,2,3 had higher odds of discharging home with home health or need of SNF/IPR.    1. Ellie Calix, Jeny Eastman, Gustabo Alcantara, Alessandra Nair, Malick Ventura, Morro Calix.  Validity of the -PAC “6-Clicks” Inpatient Daily Activity and Basic Mobility Short Forms. Physical Therapy Mar 2014, [phone]) [phone]; DOI: 10.2522/ptj.74626393  2. Scottie HOLGUIN, Mgiuel CARRERA, Kale CARRERA, Geoff CARRERA. Association of AM-PAC \"6-Clicks\" Basic Mobility and Daily Activity Scores With Discharge Destination. Phys Ther. 2021 Apr 4;101(4):togj225. doi: 10.1093/ptj/pbwl519. PMID: 21067207.  3. Jaziel CARRERA, Komal D, Flaca S, Alonso K, Manny S. Activity Measure for Post-Acute Care \"6-Clicks\" Basic Mobility Scores Predict Discharge Destination After Acute Care Hospitalization in Select Patient Groups: A Retrospective, Observational Study. Arch Rehabil Res Clin Transl. 2022 Jul 16;4(3):108172. doi: 10.1016/j.arrct.2022.758190. PMID: 59803656; PMCID: [id number].  4. Fifi DAVIS, Lani S, Patti W, Estefanía LAZO. AM-PAC

## 2025-01-19 NOTE — ED PROVIDER NOTES
[facility] EMERGENCY DEPARTMENT  EMERGENCY DEPARTMENT ENCOUNTER      Pt Name: Williams Fitzgerald Jr  MRN: [id number]  Birthdate 1940  Date of evaluation: 1/18/2025  Provider: Binh Palma DO    CHIEF COMPLAINT     No chief complaint on file.        HISTORY OF PRESENT ILLNESS   (Location/Symptom, Timing/Onset, Context/Setting, Quality, Duration, Modifying Factors, Severity)  Note limiting factors.   84-year-old male history of HHT, frequent nosebleeds and anemia presenting today secondary as a transfer from outside facility after having presented there with bilateral epistaxis.  He received bilateral packing which did stop the bleeding however was sent here to be admitted to the hospital with ENT coverage.  Patient feels like bleeding is stopped.  He is not on blood thinners.  He has no active complaints other than some discomfort in the nose.            Review of External Medical Records:     Nursing Notes were reviewed.    REVIEW OF SYSTEMS    (2-9 systems for level 4, 10 or more for level 5)     Review of Systems   HENT:  Positive for nosebleeds.    Hematological:  Bruises/bleeds easily.       Except as noted above the remainder of the review of systems was reviewed and negative.       PAST MEDICAL HISTORY     Past Medical History:   Diagnosis Date    HHT (hereditary hemorrhagic telangiectasia) (HCC)     Iron deficiency anemia     Prostate cancer (HCC)     Pulmonary arteriovenous malformation          SURGICAL HISTORY       Past Surgical History:   Procedure Laterality Date    APPENDECTOMY      CHEST SURGERY      excision of AVM    COLONOSCOPY N/A 1/29/2020    COLONOSCOPY performed by Manas Barr MD at [facility] ENDOSCOPY    COLONOSCOPY  2014    due 18    OTHER SURGICAL HISTORY  07/2018    sclerotheropy    TONSILLECTOMY           CURRENT MEDICATIONS       Previous Medications    ASCORBIC ACID (VITAMIN C) 250 MG TABLET    Take by mouth    CEFDINIR (OMNICEF) 300 MG CAPSULE    Take 1 capsule by mouth

## 2025-01-19 NOTE — H&P
History & Physical    Primary Care Provider: Brayan Hallman MD  Source of Information: Patient and chart review    History of Presenting Illness:   Williams Fitzgerald Jr is a 84 y.o. male with hx of afib, Hereditary hemorrhagic, telangiectasia, pulmonary AVMs, prostate cancer, recurrent epistaxis who presented to [facility] ed with complaints of a nose bleed. Had been in his usual state of health until last evening when he developed epistaxis and presented to [facility] ed.  He underwent nasal packing and was transferred to [facility] foe ent availability.  Reports recent dx of uti and on abx.  The patient denies any fever, chills, chest or abdominal pain, nausea, vomiting, cough, congestion, recent illness, palpitations, or dysuria.    Remarkable vitals on ER Presentation: vss  Labs Remarkable for: baseline  ER Images: none  ER Rx: rhinorocket     Review of Systems:  Pertinent items are noted in the History of Present Illness.     Past Medical History:   Diagnosis Date    HHT (hereditary hemorrhagic telangiectasia) (HCC)     Iron deficiency anemia     Prostate cancer (HCC)     Pulmonary arteriovenous malformation       Past Surgical History:   Procedure Laterality Date    APPENDECTOMY      CHEST SURGERY      excision of AVM    COLONOSCOPY N/A 1/29/2020    COLONOSCOPY performed by Manas Barr MD at [facility] ENDOSCOPY    COLONOSCOPY  2014    due 18    OTHER SURGICAL HISTORY  07/2018    sclerotheropy    TONSILLECTOMY       Prior to Admission medications    Medication Sig Start Date End Date Taking? Authorizing Provider   cefdinir (OMNICEF) 300 MG capsule Take 1 capsule by mouth 2 times daily for 7 days 1/16/25 1/23/25  Jaron Gardner MD   oxymetazoline (12 HOUR NASAL SPRAY) 0.05 % nasal spray 2 sprays by Nasal route 2 times daily 1/11/25 2/10/25  Thang Urbano MD   vitamin B-12 1000 MCG tablet Take 1 tablet by mouth daily for 30 doses 10/25/24 1/17/25  Gail Mendenhall MD   ascorbic acid (VITAMIN C) 250 MG tablet Take by

## 2025-01-19 NOTE — ED NOTES
TRANSFER - OUT REPORT:    Verbal report given to JOHN De Leon on Williams Fitzgerald Jr  being transferred to [facility]  for routine progression of patient care       Report consisted of patient's Situation, Background, Assessment and   Recommendations(SBAR).     Information from the following report(s) ED Encounter Summary, ED SBAR, Adult Overview, and Quality Measures was reviewed with the receiving nurse.    [place] Fall Assessment:    Presents to emergency department  because of falls (Syncope, seizure, or loss of consciousness): No  Age > 70: No  Altered Mental Status, Intoxication with alcohol or substance confusion (Disorientation, impaired judgment, poor safety awaremess, or inability to follow instructions): No  Impaired Mobility: Ambulates or transfers with assistive devices or assistance; Unable to ambulate or transer.: No  Nursing Judgement: Yes          Lines:   Peripheral IV 01/18/25 Proximal;Right;Anterior Forearm (Active)        Opportunity for questions and clarification was provided.      Patient transported with:  SHERRY

## 2025-01-19 NOTE — CARE COORDINATION
01/19/25 1059   Readmission Assessment   Number of Days since last admission? 1-7 days  (Previous hospitalization dates; 1/15/25 - 1/16/25 & transfer from [facility] on 1/18 for ENT services.)   Previous Disposition Home with Family   Who is being Interviewed Patient  (Chart review)   What was the patient's/caregiver's perception as to why they think they needed to return back to the hospital? Other (Comment)  (Epistaxis)   Did you visit your Primary Care Physician after you left the hospital, before you returned this time? No   Why weren't you able to visit your PCP? Other (Comment)  (Returned prior to appointment)   Did you see a specialist, such as Cardiac, Pulmonary, Orthopedic Physician, etc. after you left the hospital? No   Who advised the patient to return to the hospital? Self-referral   Does the patient report anything that got in the way of taking their medications? No   In our efforts to provide the best possible care to you and others like you, can you think of anything that we could have done to help you after you left the hospital the first time, so that you might not have needed to return so soon? Teach back instructions regarding management of illness     JOE Juares   [phone]

## 2025-01-20 ENCOUNTER — ANESTHESIA EVENT (OUTPATIENT)
Facility: HOSPITAL | Age: 85
DRG: 299 | End: 2025-01-20
Payer: MEDICARE

## 2025-01-20 LAB
ANION GAP SERPL CALC-SCNC: 6 MMOL/L (ref 2–12)
BASOPHILS # BLD: 0.03 K/UL (ref 0–0.1)
BASOPHILS NFR BLD: 0.5 % (ref 0–1)
BUN SERPL-MCNC: 19 MG/DL (ref 6–20)
BUN/CREAT SERPL: 23 (ref 12–20)
CALCIUM SERPL-MCNC: 8.8 MG/DL (ref 8.5–10.1)
CHLORIDE SERPL-SCNC: 111 MMOL/L (ref 97–108)
CO2 SERPL-SCNC: 24 MMOL/L (ref 21–32)
CREAT SERPL-MCNC: 0.81 MG/DL (ref 0.7–1.3)
DIFFERENTIAL METHOD BLD: ABNORMAL
EOSINOPHIL # BLD: 0.19 K/UL (ref 0–0.4)
EOSINOPHIL NFR BLD: 3.2 % (ref 0–7)
ERYTHROCYTE [DISTWIDTH] IN BLOOD BY AUTOMATED COUNT: 17 % (ref 11.5–14.5)
GLUCOSE SERPL-MCNC: 99 MG/DL (ref 65–100)
HCT VFR BLD AUTO: 29.1 % (ref 36.6–50.3)
HGB BLD-MCNC: 9.1 G/DL (ref 12.1–17)
IMM GRANULOCYTES # BLD AUTO: 0.01 K/UL (ref 0–0.04)
IMM GRANULOCYTES NFR BLD AUTO: 0.2 % (ref 0–0.5)
LYMPHOCYTES # BLD: 0.4 K/UL (ref 0.8–3.5)
LYMPHOCYTES NFR BLD: 6.6 % (ref 12–49)
MCH RBC QN AUTO: 26.2 PG (ref 26–34)
MCHC RBC AUTO-ENTMCNC: 31.3 G/DL (ref 30–36.5)
MCV RBC AUTO: 83.9 FL (ref 80–99)
MONOCYTES # BLD: 0.98 K/UL (ref 0–1)
MONOCYTES NFR BLD: 16.3 % (ref 5–13)
NEUTS SEG # BLD: 4.41 K/UL (ref 1.8–8)
NEUTS SEG NFR BLD: 73.2 % (ref 32–75)
NRBC # BLD: 0 K/UL (ref 0–0.01)
NRBC BLD-RTO: 0 PER 100 WBC
PLATELET # BLD AUTO: 184 K/UL (ref 150–400)
PMV BLD AUTO: 10.3 FL (ref 8.9–12.9)
POTASSIUM SERPL-SCNC: 3.8 MMOL/L (ref 3.5–5.1)
RBC # BLD AUTO: 3.47 M/UL (ref 4.1–5.7)
SODIUM SERPL-SCNC: 141 MMOL/L (ref 136–145)
WBC # BLD AUTO: 6 K/UL (ref 4.1–11.1)

## 2025-01-20 PROCEDURE — 6370000000 HC RX 637 (ALT 250 FOR IP): Performed by: STUDENT IN AN ORGANIZED HEALTH CARE EDUCATION/TRAINING PROGRAM

## 2025-01-20 PROCEDURE — 85025 COMPLETE CBC W/AUTO DIFF WBC: CPT

## 2025-01-20 PROCEDURE — G0378 HOSPITAL OBSERVATION PER HR: HCPCS

## 2025-01-20 PROCEDURE — 2500000003 HC RX 250 WO HCPCS: Performed by: STUDENT IN AN ORGANIZED HEALTH CARE EDUCATION/TRAINING PROGRAM

## 2025-01-20 PROCEDURE — 1100000003 HC PRIVATE W/ TELEMETRY

## 2025-01-20 PROCEDURE — 80048 BASIC METABOLIC PNL TOTAL CA: CPT

## 2025-01-20 PROCEDURE — 99233 SBSQ HOSP IP/OBS HIGH 50: CPT | Performed by: OTOLARYNGOLOGY

## 2025-01-20 RX ADMIN — SODIUM CHLORIDE, PRESERVATIVE FREE 10 ML: 5 INJECTION INTRAVENOUS at 21:28

## 2025-01-20 RX ADMIN — SODIUM CHLORIDE, PRESERVATIVE FREE 10 ML: 5 INJECTION INTRAVENOUS at 09:24

## 2025-01-20 RX ADMIN — CEPHALEXIN 500 MG: 500 CAPSULE ORAL at 09:24

## 2025-01-20 RX ADMIN — CEPHALEXIN 500 MG: 500 CAPSULE ORAL at 21:28

## 2025-01-20 NOTE — PROGRESS NOTES
Occupational Therapy    Order acknowledged and chart reviewed. Pt is close or at baseline with self-care and mobility.  Spoke with pt/wife who were in an agreement that no skilled acute OT needed.  Will sign off.

## 2025-01-20 NOTE — PROGRESS NOTES
Chart reviewed. Cleared by RN for PT tx. Pt observed semi zuluaga in bed, on RA, remote tele, family present. Family and pt endorse no issues mobilizing since eval and no need for further acute PT at this time.  Pt to resume existing HHPT upon d/c and owns necessary DME.  Pt and family provided education on safe mobilizing and fall prevention while acute and upon d/c with both verbalizing understanding of all.  Pt and family politely declined mobilizing with PT at this time. Will complete order at this time.  Please re consult if change in mobility while acute.    Lei Morton, PT

## 2025-01-20 NOTE — PROGRESS NOTES
Otolaryngology Progress Note    SUBJECTIVE:  Patient is still packed on both sides. No complaints. Wife is at bedside. Daughter also has HHT.    OBJECTIVE      Physical  VITALS:  /66   Pulse 89   Temp 98.2 °F (36.8 °C)   Resp 18   SpO2 98%   ENT:  Bilateral nasal packing (Rhinorockets). No bloody PND.   Data  CBC:   Lab Results   Component Value Date/Time    WBC 6.0 01/20/2025 01:58 AM    WBC Comment 02/24/2021 08:18 AM    RBC 3.47 01/20/2025 01:58 AM    RBC Comment 02/24/2021 08:18 AM    HGB 9.1 01/20/2025 01:58 AM    HCT 29.1 01/20/2025 01:58 AM    MCV 83.9 01/20/2025 01:58 AM    MCH 26.2 01/20/2025 01:58 AM    MCHC 31.3 01/20/2025 01:58 AM    RDW 17.0 01/20/2025 01:58 AM     01/20/2025 01:58 AM    MPV 10.3 01/20/2025 01:58 AM     Platelets:    Lab Results   Component Value Date/Time     01/20/2025 01:58 AM     PT/INR:    Lab Results   Component Value Date/Time    PROTIME 11.1 01/19/2025 12:41 AM    INR 1.0 01/19/2025 12:41 AM     Current Inpatient Medications  Current Facility-Administered Medications: sodium chloride flush 0.9 % injection 5-40 mL, 5-40 mL, IntraVENous, 2 times per day  sodium chloride flush 0.9 % injection 5-40 mL, 5-40 mL, IntraVENous, PRN  0.9 % sodium chloride infusion, , IntraVENous, PRN  potassium chloride (KLOR-CON) extended release tablet 40 mEq, 40 mEq, Oral, PRN **OR** potassium bicarb-citric acid (EFFER-K) effervescent tablet 40 mEq, 40 mEq, Oral, PRN **OR** potassium chloride 10 mEq/100 mL IVPB (Peripheral Line), 10 mEq, IntraVENous, PRN  magnesium sulfate 2000 mg in 50 mL IVPB premix, 2,000 mg, IntraVENous, PRN  polyethylene glycol (GLYCOLAX) packet 17 g, 17 g, Oral, Daily PRN  acetaminophen (TYLENOL) tablet 650 mg, 650 mg, Oral, Q6H PRN **OR** acetaminophen (TYLENOL) suppository 650 mg, 650 mg, Rectal, Q6H PRN  cephALEXin (KEFLEX) capsule 500 mg, 500 mg, Oral, 2 times per day  ASSESSMENT AND PLAN    HHT/Osler-Weber-Randu  Recurrent chronic epistaxis

## 2025-01-20 NOTE — DISCHARGE INSTRUCTIONS
Wound care:  Right arm skin tear: Apply vaseline and bandaid over clear dressing. Change every couple of days.                   Discharge Instructions       PATIENT ID: Williams Fitzgerald Jr  MRN: [id number]   YOB: 1940    DATE OF ADMISSION: 1/18/2025   DATE OF DISCHARGE: 1/22/2025    PRIMARY CARE PROVIDER: Brayan Hallman     ATTENDING PHYSICIAN: Elpidio Moyer MD   DISCHARGING PROVIDER: MARTÍNEZ Ward NP    To contact this individual call [phone] and ask the  to page.   If unavailable ask to be transferred the Adult Hospitalist Department.    DISCHARGE DIAGNOSES nose bleed due to HHT    CONSULTATIONS: IP CONSULT TO HOSPITALIST    PROCEDURES/SURGERIES: Procedure(s):  BILATERAL NASAL ENDOSCOPY WITH CONTROL OF EPISTAXIS    PENDING TEST RESULTS:   At the time of discharge the following test results are still pending:     FOLLOW UP APPOINTMENTS:   Follow-up Information    Follow up with Dr Rand               ADDITIONAL CARE RECOMMENDATIONS: saline nasal spray three time a day for 1-2 weeks     DIET: regular diet        ACTIVITY: activity as tolerated     WOUND CARE: Right arm Change xeroform and foam every MWF. Leave mepitel one in place. Until healed      DISCHARGE MEDICATIONS:   See Medication Reconciliation Form    It is important that you take the medication exactly as they are prescribed.   Keep your medication in the bottles provided by the pharmacist and keep a list of the medication names, dosages, and times to be taken in your wallet.   Do not take other medications without consulting your doctor.       NOTIFY YOUR PHYSICIAN FOR ANY OF THE FOLLOWING:   Fever over 101 degrees for 24 hours.   Chest pain, shortness of breath, fever, chills, nausea, vomiting, diarrhea, change in mentation, falling, weakness, bleeding. Severe pain or pain not relieved by medications.  Or, any other signs or symptoms that you may have questions about.      DISPOSITION:    Home With:   OT  PT

## 2025-01-20 NOTE — PROGRESS NOTES
Spiritual Health History and Assessment/Progress Note  [facility]    Rituals, Rites and Sacraments,  ,  ,      Name: Williams Fitzgerald Jr MRN: [id number]    Age: 84 y.o.     Sex: male   Language: English   Denominational: Jewish   Epistaxis     Date: 1/20/2025            Total Time Calculated: 5 min              Spiritual Assessment began in [facility] 5E2 SURGICAL UNIT        Referral/Consult From: Clergy/   Encounter Overview/Reason: Rituals, Rites and Sacraments  Service Provided For: Patient    Lurdes, Belief, Meaning:   Patient is connected with a lurdes tradition or spiritual practice  Family/Friends are connected with a lurdes tradition or spiritual practice      Importance and Influence:  Patient has spiritual/personal beliefs that influence decisions regarding their health  Family/Friends have spiritual/personal beliefs that influence decisions regarding the patient's health    Community:  Patient is connected with a spiritual community  Family/Friends are connected with a spiritual community:    Assessment and Plan of Care:     Patient Interventions include: Provided sacramental/Yazidism ritual  Family/Friends Interventions include: Provided sacramental/Yazidism ritual    Patient Plan of Care: Spiritual Care available upon further referral  Family/Friends Plan of Care: Spiritual Care available upon further referral    Electronically signed by Chaplain MAXIME on 1/20/2025 at 2:21 PM     [employer] visit. Mr. Amilcar Ramirez was up and dressed and sitting on the edge of his bed. His wife was with him.  They are hoping to go home today. Prayer and communion offered.  They want to be home so they can watch the [employer] game tonight. Mr. Fitzgerald went to [employer] and his wife to [facility].Assured them of prayer for their intention.     Sr. DONALD Prieto, RN, ACSW, LCSW   Page:  [phone]

## 2025-01-20 NOTE — PROGRESS NOTES
Hospitalist Progress Note  MARTÍNEZ Ward NP  Answering service: [phone] OR [phone] from in house phone        Date of Service:  2025  NAME:  Williams Fitzgerald Jr  :  1940  MRN:  [id number]      Admission Summary:   Per H&P    Williams Fitzgerald Jr is a 84 y.o. male with hx of afib, Hereditary hemorrhagic, telangiectasia, pulmonary AVMs, prostate cancer, recurrent epistaxis who presented to [facility] ed with complaints of a nose bleed. Had been in his usual state of health until last evening when he developed epistaxis and presented to [facility] ed.  He underwent nasal packing and was transferred to [facility] foe ent availability.  Reports recent dx of uti and on abx.  The patient denies any fever, chills, chest or abdominal pain, nausea, vomiting, cough, congestion, recent illness, palpitations, or dysuria.     Remarkable vitals on ER Presentation: vss  Labs Remarkable for: baseline  ER Images: none  ER Rx: rhinorocket    Interval history / Subjective:   I saw the patient this morning on rounds.  No complaints the moment of the encounter     Assessment & Plan:      Recurrent Epistaxis  Hereditary hemorrhagic telangiectasia / Pulmonary AVMs   currently on trial of avastin and previously on txa tabs  Has been seen by ENT, appreciate recommendations  ENT following, appreciate recommendations  Continuing cephalexin for prophylaxis  Will go to the OR with ENT tomorrow for removal of packing and bilateral cauterization             Anemia  Element of chronic anemia generally in the 9 range  Hemoglobin 9.1 this morning  Will continue to monitor and consider transfusion for hemoglobin less than 7       aFib  Heart rate currently controlled  Continue diltiazem  Regarding hypercoagulable state due to atrial fibrillation, not on anticoagulation       UTI possible  On 1/15Urinalysis indicative of urinary tract

## 2025-01-21 ENCOUNTER — ANESTHESIA (OUTPATIENT)
Facility: HOSPITAL | Age: 85
DRG: 299 | End: 2025-01-21
Payer: MEDICARE

## 2025-01-21 LAB
ANION GAP SERPL CALC-SCNC: 4 MMOL/L (ref 2–12)
BASOPHILS # BLD: 0.07 K/UL (ref 0–0.1)
BASOPHILS NFR BLD: 1.4 % (ref 0–1)
BUN SERPL-MCNC: 18 MG/DL (ref 6–20)
BUN/CREAT SERPL: 19 (ref 12–20)
CALCIUM SERPL-MCNC: 9 MG/DL (ref 8.5–10.1)
CHLORIDE SERPL-SCNC: 109 MMOL/L (ref 97–108)
CO2 SERPL-SCNC: 28 MMOL/L (ref 21–32)
CREAT SERPL-MCNC: 0.96 MG/DL (ref 0.7–1.3)
DIFFERENTIAL METHOD BLD: ABNORMAL
EOSINOPHIL # BLD: 0.42 K/UL (ref 0–0.4)
EOSINOPHIL NFR BLD: 8.6 % (ref 0–7)
ERYTHROCYTE [DISTWIDTH] IN BLOOD BY AUTOMATED COUNT: 16.9 % (ref 11.5–14.5)
GLUCOSE SERPL-MCNC: 100 MG/DL (ref 65–100)
HCT VFR BLD AUTO: 30.4 % (ref 36.6–50.3)
HGB BLD-MCNC: 9 G/DL (ref 12.1–17)
IMM GRANULOCYTES # BLD AUTO: 0.02 K/UL (ref 0–0.04)
IMM GRANULOCYTES NFR BLD AUTO: 0.4 % (ref 0–0.5)
LYMPHOCYTES # BLD: 0.58 K/UL (ref 0.8–3.5)
LYMPHOCYTES NFR BLD: 11.8 % (ref 12–49)
MCH RBC QN AUTO: 25.6 PG (ref 26–34)
MCHC RBC AUTO-ENTMCNC: 29.6 G/DL (ref 30–36.5)
MCV RBC AUTO: 86.6 FL (ref 80–99)
MONOCYTES # BLD: 0.84 K/UL (ref 0–1)
MONOCYTES NFR BLD: 17.1 % (ref 5–13)
NEUTS SEG # BLD: 2.97 K/UL (ref 1.8–8)
NEUTS SEG NFR BLD: 60.7 % (ref 32–75)
NRBC # BLD: 0 K/UL (ref 0–0.01)
NRBC BLD-RTO: 0 PER 100 WBC
PLATELET # BLD AUTO: 201 K/UL (ref 150–400)
PMV BLD AUTO: 10.4 FL (ref 8.9–12.9)
POTASSIUM SERPL-SCNC: 3.9 MMOL/L (ref 3.5–5.1)
RBC # BLD AUTO: 3.51 M/UL (ref 4.1–5.7)
RBC MORPH BLD: ABNORMAL
SODIUM SERPL-SCNC: 141 MMOL/L (ref 136–145)
WBC # BLD AUTO: 4.9 K/UL (ref 4.1–11.1)

## 2025-01-21 PROCEDURE — 7100000001 HC PACU RECOVERY - ADDTL 15 MIN: Performed by: OTOLARYNGOLOGY

## 2025-01-21 PROCEDURE — 6370000000 HC RX 637 (ALT 250 FOR IP): Performed by: NURSE PRACTITIONER

## 2025-01-21 PROCEDURE — 2500000003 HC RX 250 WO HCPCS: Performed by: STUDENT IN AN ORGANIZED HEALTH CARE EDUCATION/TRAINING PROGRAM

## 2025-01-21 PROCEDURE — 1100000003 HC PRIVATE W/ TELEMETRY

## 2025-01-21 PROCEDURE — 80048 BASIC METABOLIC PNL TOTAL CA: CPT

## 2025-01-21 PROCEDURE — 2580000003 HC RX 258: Performed by: STUDENT IN AN ORGANIZED HEALTH CARE EDUCATION/TRAINING PROGRAM

## 2025-01-21 PROCEDURE — 1200000000 HC SEMI PRIVATE

## 2025-01-21 PROCEDURE — 6360000002 HC RX W HCPCS: Performed by: STUDENT IN AN ORGANIZED HEALTH CARE EDUCATION/TRAINING PROGRAM

## 2025-01-21 PROCEDURE — 2709999900 HC NON-CHARGEABLE SUPPLY: Performed by: OTOLARYNGOLOGY

## 2025-01-21 PROCEDURE — 6370000000 HC RX 637 (ALT 250 FOR IP): Performed by: STUDENT IN AN ORGANIZED HEALTH CARE EDUCATION/TRAINING PROGRAM

## 2025-01-21 PROCEDURE — 093K8ZZ CONTROL BLEEDING IN NASAL MUCOSA AND SOFT TISSUE, VIA NATURAL OR ARTIFICIAL OPENING ENDOSCOPIC: ICD-10-PCS | Performed by: OTOLARYNGOLOGY

## 2025-01-21 PROCEDURE — 2720000010 HC SURG SUPPLY STERILE: Performed by: OTOLARYNGOLOGY

## 2025-01-21 PROCEDURE — 36415 COLL VENOUS BLD VENIPUNCTURE: CPT

## 2025-01-21 PROCEDURE — 6370000000 HC RX 637 (ALT 250 FOR IP): Performed by: OTOLARYNGOLOGY

## 2025-01-21 PROCEDURE — 85025 COMPLETE CBC W/AUTO DIFF WBC: CPT

## 2025-01-21 PROCEDURE — 31238 NSL/SINS NDSC SRG NSL HEMRRG: CPT | Performed by: OTOLARYNGOLOGY

## 2025-01-21 PROCEDURE — 7100000000 HC PACU RECOVERY - FIRST 15 MIN: Performed by: OTOLARYNGOLOGY

## 2025-01-21 PROCEDURE — 3700000001 HC ADD 15 MINUTES (ANESTHESIA): Performed by: OTOLARYNGOLOGY

## 2025-01-21 PROCEDURE — 3600000002 HC SURGERY LEVEL 2 BASE: Performed by: OTOLARYNGOLOGY

## 2025-01-21 PROCEDURE — 3600000012 HC SURGERY LEVEL 2 ADDTL 15MIN: Performed by: OTOLARYNGOLOGY

## 2025-01-21 PROCEDURE — 3700000000 HC ANESTHESIA ATTENDED CARE: Performed by: OTOLARYNGOLOGY

## 2025-01-21 RX ORDER — MIDAZOLAM HYDROCHLORIDE 2 MG/2ML
2 INJECTION, SOLUTION INTRAMUSCULAR; INTRAVENOUS PRN
Status: DISCONTINUED | OUTPATIENT
Start: 2025-01-21 | End: 2025-01-21 | Stop reason: HOSPADM

## 2025-01-21 RX ORDER — HYDROMORPHONE HYDROCHLORIDE 1 MG/ML
0.5 INJECTION, SOLUTION INTRAMUSCULAR; INTRAVENOUS; SUBCUTANEOUS EVERY 5 MIN PRN
Status: DISCONTINUED | OUTPATIENT
Start: 2025-01-21 | End: 2025-01-21 | Stop reason: HOSPADM

## 2025-01-21 RX ORDER — SODIUM CHLORIDE 0.9 % (FLUSH) 0.9 %
5-40 SYRINGE (ML) INJECTION PRN
Status: DISCONTINUED | OUTPATIENT
Start: 2025-01-21 | End: 2025-01-21 | Stop reason: HOSPADM

## 2025-01-21 RX ORDER — ROCURONIUM BROMIDE 10 MG/ML
INJECTION, SOLUTION INTRAVENOUS
Status: DISCONTINUED | OUTPATIENT
Start: 2025-01-21 | End: 2025-01-21 | Stop reason: SDUPTHER

## 2025-01-21 RX ORDER — HYDRALAZINE HYDROCHLORIDE 20 MG/ML
10 INJECTION INTRAMUSCULAR; INTRAVENOUS
Status: DISCONTINUED | OUTPATIENT
Start: 2025-01-21 | End: 2025-01-21 | Stop reason: HOSPADM

## 2025-01-21 RX ORDER — FENTANYL CITRATE 50 UG/ML
INJECTION, SOLUTION INTRAMUSCULAR; INTRAVENOUS
Status: DISCONTINUED | OUTPATIENT
Start: 2025-01-21 | End: 2025-01-21 | Stop reason: SDUPTHER

## 2025-01-21 RX ORDER — OXYCODONE HYDROCHLORIDE 5 MG/1
5 TABLET ORAL
Status: DISCONTINUED | OUTPATIENT
Start: 2025-01-21 | End: 2025-01-21 | Stop reason: HOSPADM

## 2025-01-21 RX ORDER — LIDOCAINE HYDROCHLORIDE 10 MG/ML
1 INJECTION, SOLUTION EPIDURAL; INFILTRATION; INTRACAUDAL; PERINEURAL
Status: DISCONTINUED | OUTPATIENT
Start: 2025-01-21 | End: 2025-01-21 | Stop reason: HOSPADM

## 2025-01-21 RX ORDER — DEXAMETHASONE SODIUM PHOSPHATE 4 MG/ML
INJECTION, SOLUTION INTRA-ARTICULAR; INTRALESIONAL; INTRAMUSCULAR; INTRAVENOUS; SOFT TISSUE
Status: DISCONTINUED | OUTPATIENT
Start: 2025-01-21 | End: 2025-01-21 | Stop reason: SDUPTHER

## 2025-01-21 RX ORDER — ONDANSETRON 2 MG/ML
4 INJECTION INTRAMUSCULAR; INTRAVENOUS
Status: DISCONTINUED | OUTPATIENT
Start: 2025-01-21 | End: 2025-01-21 | Stop reason: HOSPADM

## 2025-01-21 RX ORDER — SODIUM CHLORIDE, SODIUM LACTATE, POTASSIUM CHLORIDE, CALCIUM CHLORIDE 600; 310; 30; 20 MG/100ML; MG/100ML; MG/100ML; MG/100ML
INJECTION, SOLUTION INTRAVENOUS
Status: DISCONTINUED | OUTPATIENT
Start: 2025-01-21 | End: 2025-01-21 | Stop reason: SDUPTHER

## 2025-01-21 RX ORDER — FENTANYL CITRATE 50 UG/ML
100 INJECTION, SOLUTION INTRAMUSCULAR; INTRAVENOUS
Status: DISCONTINUED | OUTPATIENT
Start: 2025-01-21 | End: 2025-01-21 | Stop reason: HOSPADM

## 2025-01-21 RX ORDER — LIDOCAINE HYDROCHLORIDE 20 MG/ML
INJECTION, SOLUTION EPIDURAL; INFILTRATION; INTRACAUDAL; PERINEURAL
Status: DISCONTINUED | OUTPATIENT
Start: 2025-01-21 | End: 2025-01-21 | Stop reason: SDUPTHER

## 2025-01-21 RX ORDER — SODIUM CHLORIDE 9 MG/ML
INJECTION, SOLUTION INTRAVENOUS PRN
Status: DISCONTINUED | OUTPATIENT
Start: 2025-01-21 | End: 2025-01-21 | Stop reason: HOSPADM

## 2025-01-21 RX ORDER — PROPOFOL 10 MG/ML
INJECTION, EMULSION INTRAVENOUS
Status: DISCONTINUED | OUTPATIENT
Start: 2025-01-21 | End: 2025-01-21 | Stop reason: SDUPTHER

## 2025-01-21 RX ORDER — PHENYLEPHRINE HCL IN 0.9% NACL 0.4MG/10ML
SYRINGE (ML) INTRAVENOUS
Status: DISCONTINUED | OUTPATIENT
Start: 2025-01-21 | End: 2025-01-21 | Stop reason: SDUPTHER

## 2025-01-21 RX ORDER — SODIUM CHLORIDE 0.9 % (FLUSH) 0.9 %
5-40 SYRINGE (ML) INJECTION EVERY 12 HOURS SCHEDULED
Status: DISCONTINUED | OUTPATIENT
Start: 2025-01-21 | End: 2025-01-21 | Stop reason: HOSPADM

## 2025-01-21 RX ORDER — ONDANSETRON 2 MG/ML
INJECTION INTRAMUSCULAR; INTRAVENOUS
Status: DISCONTINUED | OUTPATIENT
Start: 2025-01-21 | End: 2025-01-21 | Stop reason: SDUPTHER

## 2025-01-21 RX ORDER — PROCHLORPERAZINE EDISYLATE 5 MG/ML
5 INJECTION INTRAMUSCULAR; INTRAVENOUS
Status: DISCONTINUED | OUTPATIENT
Start: 2025-01-21 | End: 2025-01-21 | Stop reason: HOSPADM

## 2025-01-21 RX ORDER — ACETAMINOPHEN 500 MG
1000 TABLET ORAL ONCE
Status: DISCONTINUED | OUTPATIENT
Start: 2025-01-21 | End: 2025-01-21 | Stop reason: HOSPADM

## 2025-01-21 RX ORDER — FENTANYL CITRATE 50 UG/ML
25 INJECTION, SOLUTION INTRAMUSCULAR; INTRAVENOUS EVERY 5 MIN PRN
Status: DISCONTINUED | OUTPATIENT
Start: 2025-01-21 | End: 2025-01-21 | Stop reason: HOSPADM

## 2025-01-21 RX ORDER — NALOXONE HYDROCHLORIDE 0.4 MG/ML
INJECTION, SOLUTION INTRAMUSCULAR; INTRAVENOUS; SUBCUTANEOUS PRN
Status: DISCONTINUED | OUTPATIENT
Start: 2025-01-21 | End: 2025-01-21 | Stop reason: HOSPADM

## 2025-01-21 RX ORDER — OXYMETAZOLINE HYDROCHLORIDE 0.05 G/100ML
SPRAY NASAL PRN
Status: DISCONTINUED | OUTPATIENT
Start: 2025-01-21 | End: 2025-01-21 | Stop reason: HOSPADM

## 2025-01-21 RX ORDER — LABETALOL HYDROCHLORIDE 5 MG/ML
10 INJECTION, SOLUTION INTRAVENOUS
Status: DISCONTINUED | OUTPATIENT
Start: 2025-01-21 | End: 2025-01-21 | Stop reason: HOSPADM

## 2025-01-21 RX ORDER — SODIUM CHLORIDE, SODIUM LACTATE, POTASSIUM CHLORIDE, CALCIUM CHLORIDE 600; 310; 30; 20 MG/100ML; MG/100ML; MG/100ML; MG/100ML
INJECTION, SOLUTION INTRAVENOUS CONTINUOUS
Status: DISCONTINUED | OUTPATIENT
Start: 2025-01-21 | End: 2025-01-21 | Stop reason: HOSPADM

## 2025-01-21 RX ORDER — SUCCINYLCHOLINE/SOD CL,ISO/PF 200MG/10ML
SYRINGE (ML) INTRAVENOUS
Status: DISCONTINUED | OUTPATIENT
Start: 2025-01-21 | End: 2025-01-21 | Stop reason: SDUPTHER

## 2025-01-21 RX ADMIN — ONDANSETRON 4 MG: 2 INJECTION INTRAMUSCULAR; INTRAVENOUS at 08:22

## 2025-01-21 RX ADMIN — CEPHALEXIN 500 MG: 500 CAPSULE ORAL at 10:04

## 2025-01-21 RX ADMIN — PROPOFOL 100 MG: 10 INJECTION, EMULSION INTRAVENOUS at 07:43

## 2025-01-21 RX ADMIN — SODIUM CHLORIDE, POTASSIUM CHLORIDE, SODIUM LACTATE AND CALCIUM CHLORIDE: 600; 310; 30; 20 INJECTION, SOLUTION INTRAVENOUS at 08:59

## 2025-01-21 RX ADMIN — LIDOCAINE HYDROCHLORIDE 80 MG: 20 INJECTION, SOLUTION EPIDURAL; INFILTRATION; INTRACAUDAL; PERINEURAL at 07:43

## 2025-01-21 RX ADMIN — DEXAMETHASONE SODIUM PHOSPHATE 4 MG: 4 INJECTION, SOLUTION INTRAMUSCULAR; INTRAVENOUS at 07:53

## 2025-01-21 RX ADMIN — FENTANYL CITRATE 50 MCG: 50 INJECTION INTRAMUSCULAR; INTRAVENOUS at 07:43

## 2025-01-21 RX ADMIN — SODIUM CHLORIDE, PRESERVATIVE FREE 10 ML: 5 INJECTION INTRAVENOUS at 20:44

## 2025-01-21 RX ADMIN — Medication 160 MG: at 07:43

## 2025-01-21 RX ADMIN — SODIUM CHLORIDE, PRESERVATIVE FREE 10 ML: 5 INJECTION INTRAVENOUS at 10:04

## 2025-01-21 RX ADMIN — ACETAMINOPHEN 650 MG: 325 TABLET ORAL at 20:43

## 2025-01-21 RX ADMIN — ROCURONIUM BROMIDE 5 MG: 10 INJECTION, SOLUTION INTRAVENOUS at 07:43

## 2025-01-21 RX ADMIN — SALINE NASAL SPRAY 2 SPRAY: 1.5 SOLUTION NASAL at 13:25

## 2025-01-21 RX ADMIN — CEPHALEXIN 500 MG: 500 CAPSULE ORAL at 20:43

## 2025-01-21 RX ADMIN — Medication 120 MCG: at 07:43

## 2025-01-21 RX ADMIN — Medication 80 MCG: at 08:01

## 2025-01-21 RX ADMIN — SODIUM CHLORIDE, POTASSIUM CHLORIDE, SODIUM LACTATE AND CALCIUM CHLORIDE: 600; 310; 30; 20 INJECTION, SOLUTION INTRAVENOUS at 07:34

## 2025-01-21 RX ADMIN — SALINE NASAL SPRAY 2 SPRAY: 1.5 SOLUTION NASAL at 20:44

## 2025-01-21 ASSESSMENT — PAIN - FUNCTIONAL ASSESSMENT: PAIN_FUNCTIONAL_ASSESSMENT: NONE - DENIES PAIN

## 2025-01-21 ASSESSMENT — PAIN DESCRIPTION - LOCATION: LOCATION: ABDOMEN

## 2025-01-21 ASSESSMENT — PAIN SCALES - GENERAL: PAINLEVEL_OUTOF10: 6

## 2025-01-21 NOTE — ANESTHESIA POSTPROCEDURE EVALUATION
Department of Anesthesiology  Postprocedure Note    Patient: Williams Fitzgerald Jr  MRN: [id number]  YOB: 1940  Date of evaluation: 1/21/2025    Procedure Summary       Date: 01/21/25 Room / Location: [facility] MAIN OR [address] [place] [address] MAIN OR    Anesthesia Start: 0734 Anesthesia Stop: 0900    Procedure: BILATERAL NASAL ENDOSCOPY WITH CONTROL OF EPISTAXIS (Bilateral: Nose) Diagnosis:       HHT (hereditary hemorrhagic telangiectasia) (HCC)      Epistaxis      (HHT (hereditary hemorrhagic telangiectasia) (HCC) [I78.0])      (Epistaxis [R04.0])    Providers: Wyatt Banegas DO Responsible Provider: Donita Philip MD    Anesthesia Type: General ASA Status: 2            Anesthesia Type: General    Meche Phase I: Meche Score: 10    Meche Phase II:      Anesthesia Post Evaluation    No notable events documented.

## 2025-01-21 NOTE — PLAN OF CARE
Problem: Hematologic - Adult  Goal: Maintains hematologic stability  Outcome: Progressing     Problem: Safety - Adult  Goal: Free from fall injury  Outcome: Progressing

## 2025-01-21 NOTE — PERIOP NOTE
When moving patient back to bed after procedure.  IV came out, when IV tegaderm was carefully removed, a 2.5\" x 2.5\" skin tear occurred, right forearm.  Adaptic applied to site and jd dressing applied  Dr. Guadarrama notified. Wound care order placed in PACU and I left a message on wound care.

## 2025-01-21 NOTE — OP NOTE
Otolaryngology Operative Note      Patient: Williams Fitzgerald Jr  YOB: 1940  MRN: [id number]    Date of Procedure: 1/21/2025    Pre-Op Diagnosis Codes:      * HHT (hereditary hemorrhagic telangiectasia) (HCC) [I78.0]     * Epistaxis [R04.0]    Post-Op Diagnosis: Same       Procedure(s):  BILATERAL NASAL ENDOSCOPY WITH CONTROL OF EPISTAXIS    Surgeon(s):  Wyatt Banegas DO    Assistant:   * No surgical staff found *    Anesthesia: General    Estimated Blood Loss (mL): Minimal    Complications: None    Specimens:   * No specimens in log *    Implants:  * No implants in log *      Drains: * No LDAs found *    Findings:  Infection Present At Time Of Surgery (PATOS) (choose all levels that have infection present):  No infection present  Other Findings: Diffuse telangiectasias mostly from mid septum anteriorly and also involving the middle and inferior turbinates. Left side was worse than the right side.    Detailed Description of Procedure:   Once patient was brought to the OR and placed on the OR table in supine position. After adequate anesthesia he was intubated and then draped in the usual fashion. Then the left Rhinorocket was deflated and removed. The left nasal cavity was packed with afrin soaked cottonoids. Then after 2 minutes the nasal cavity was examined using a zero degree endoscope. There were about 5 areas which were cauterized 2 on the septum, middle turbinate and 2 on the inferior turbinate. Then once no further bleeding was seen the packing on the right side was removed and afrin soaked cottonoids were placed. Then using a zero degree endoscope the nasal cavity was examined and 4 areas were identified and cauterized, including the middle turbinate and inferior turbinates. Suction cautery was used for cauterization at the setting of 10 and 15. Bilateral cavities were irrigated with saline. Surgicel fibrillar was placed circumferentially bilaterally. Saline was applied over the surgicel. The

## 2025-01-21 NOTE — WOUND CARE
Wound care:    Consulted for ARRON SMILEY.  Admitted for epistaxis.  Seen in 510B    Cleansed with saline and applied mepitel one xeroform and foam.    Recommend:  RUE: Change xeroform and foam every MWF. Leave mepitel one in place.    Will sign off.  Benita Cason

## 2025-01-21 NOTE — PERIOP NOTE
TRANSFER - IN REPORT:    Verbal report received from JOHN Avila on Williams DAVIS Fitzgerald Jr  being received from 5th floor for routine progression of patient care      Report consisted of patient's Situation, Background, Assessment and   Recommendations(SBAR).     Information from the following report(s) Adult Overview was reviewed with the receiving nurse.    Opportunity for questions and clarification was provided.

## 2025-01-21 NOTE — PROGRESS NOTES
infection  Culture without growth  Continuing cephalexin              Code status: Full  Prophylaxis: SCD  Care Plan discussed with: Patient, nurse, , ENT  Anticipated Disposition: Per my discussion today with ENT, if patient remains stable, possible discharge tomorrow     Principal Problem:    Epistaxis  Resolved Problems:    * No resolved hospital problems. *            Review of Systems:   Pertinent items are noted in HPI.         Vital Signs:    Last 24hrs VS reviewed since prior progress note. Most recent are:  /64   Pulse 77   Temp 97.7 °F (36.5 °C) (Oral)   Resp 14   SpO2 98%        Intake/Output Summary (Last 24 hours) at 1/21/2025 0851  Last data filed at 1/20/2025 2128  Gross per 24 hour   Intake 10 ml   Output --   Net 10 ml        Physical Examination:     I had a face to face encounter with this patient and independently examined them on 1/21/2025 as outlined below:          General : awake, no acute distress,   HEENT: NCAT,  Neck: supple, no JVD,   Chest: CTA  CVS: RRR S1 S2   Abd: NT ND BS+  Neuro/Psych: pleasant mood and affect, JANE EOMI WATSON  Skin: warm              Data Review:    Review and/or order of clinical lab test  Review and/or order of tests in the radiology section of CPT    I have independently reviewed and interpreted patient's lab and all other diagnostic data    Notes reviewed from all clinical/nonclinical/nursing services involved in patient's clinical care. Care coordination discussions were held with appropriate clinical/nonclinical/ nursing providers based on care coordination needs.     Labs:     Recent Labs     01/20/25  0158 01/21/25  0600   WBC 6.0 4.9   HGB 9.1* 9.0*   HCT 29.1* 30.4*    201     Recent Labs     01/18/25  2224 01/20/25  0158 01/21/25  0600    141 141   K 4.1 3.8 3.9   * 111* 109*   CO2 26 24 28   BUN 25* 19 18     Recent Labs     01/18/25  2224   ALT 25   GLOB 4.1*     Recent Labs     01/19/25  0041   INR 1.0   APTT  Min PRN    oxymetazoline (AFRIN) 0.05 % nasal spray    PRN    sodium chloride flush 0.9 % injection 5-40 mL  5-40 mL IntraVENous 2 times per day    sodium chloride flush 0.9 % injection 5-40 mL  5-40 mL IntraVENous PRN    0.9 % sodium chloride infusion   IntraVENous PRN    potassium chloride (KLOR-CON) extended release tablet 40 mEq  40 mEq Oral PRN    Or    potassium bicarb-citric acid (EFFER-K) effervescent tablet 40 mEq  40 mEq Oral PRN    Or    potassium chloride 10 mEq/100 mL IVPB (Peripheral Line)  10 mEq IntraVENous PRN    magnesium sulfate 2000 mg in 50 mL IVPB premix  2,000 mg IntraVENous PRN    polyethylene glycol (GLYCOLAX) packet 17 g  17 g Oral Daily PRN    acetaminophen (TYLENOL) tablet 650 mg  650 mg Oral Q6H PRN    Or    acetaminophen (TYLENOL) suppository 650 mg  650 mg Rectal Q6H PRN    cephALEXin (KEFLEX) capsule 500 mg  500 mg Oral 2 times per day     Facility-Administered Medications Ordered in Other Encounters   Medication Dose Route Frequency    lidocaine PF 2 % injection   IntraVENous Once PRN    rocuronium (ZEMURON) injection   IntraVENous Once PRN    succinylcholine (ANECTINE) injection   IntraVENous Once PRN    fentaNYL (SUBLIMAZE) injection   IntraVENous Once PRN    phenylephrine (OMA-SYNEPHRINE) 40 mcg/mL in 0.9% sodium chloride injection   IntraVENous Once PRN    propofol infusion   IntraVENous Once PRN    lactated ringers infusion   IntraVENous Continuous PRN    ondansetron (ZOFRAN) injection   IntraVENous Once PRN    dexAMETHasone (DECADRON) injection   IntraVENous Once PRN     ______________________________________________________________________  EXPECTED LENGTH OF STAY: Unable to retrieve estimated LOS  ACTUAL LENGTH OF STAY:          1                 MARTÍNEZ Ward - NP

## 2025-01-21 NOTE — ANESTHESIA PRE PROCEDURE
Department of Anesthesiology  Preprocedure Note       Name:  Williams Fitzgerald Jr   Age:  84 y.o.  :  1940                                          MRN:  [id number]         Date:  2025      Surgeon: Surgeon(s):  Wyatt Banegas DO    Procedure: Procedure(s):  BILATERAL NASAL ENDOSCOPY WITH CONTROL OF EPISTAXIS    Medications prior to admission:   Prior to Admission medications    Medication Sig Start Date End Date Taking? Authorizing Provider   cefdinir (OMNICEF) 300 MG capsule Take 1 capsule by mouth 2 times daily for 7 days 25 Yes Jaron Gardner MD   oxymetazoline (12 HOUR NASAL SPRAY) 0.05 % nasal spray 2 sprays by Nasal route 2 times daily 1/11/25 2/10/25 Yes Thang Urbano MD   ascorbic acid (VITAMIN C) 250 MG tablet Take by mouth   Yes Automatic Reconciliation, Ar   dicyclomine (BENTYL) 20 MG tablet TAKE 1 TABLET BY MOUTH 3 TIMES DAILY AS NEEDED FOR ABDOMINAL CRAMPS (NEW DOSE/NEW DIRECTIONS) 22  Yes Automatic Reconciliation, Ar   vitamin B-12 1000 MCG tablet Take 1 tablet by mouth daily for 30 doses 10/25/24 1/17/25  Gail Mendenhall MD   vitamin D 25 MCG (1000 UT) CAPS Take by mouth daily    Automatic Reconciliation, Ar       Current medications:    Current Facility-Administered Medications   Medication Dose Route Frequency Provider Last Rate Last Admin    lidocaine PF 1 % injection 1 mL  1 mL IntraDERmal Once PRN Donita Philip MD        acetaminophen (TYLENOL) tablet 1,000 mg  1,000 mg Oral Once Donita Philip MD        fentaNYL (SUBLIMAZE) injection 100 mcg  100 mcg IntraVENous Once PRN Donita Philip MD        lactated ringers infusion   IntraVENous Continuous Donita Philip MD        sodium chloride flush 0.9 % injection 5-40 mL  5-40 mL IntraVENous 2 times per day Donita Philip MD        sodium chloride flush 0.9 % injection 5-40 mL  5-40 mL IntraVENous PRN Donita Philip MD        0.9 % sodium chloride infusion   IntraVENous PRN

## 2025-01-22 VITALS
OXYGEN SATURATION: 98 % | DIASTOLIC BLOOD PRESSURE: 63 MMHG | SYSTOLIC BLOOD PRESSURE: 113 MMHG | RESPIRATION RATE: 18 BRPM | TEMPERATURE: 97.5 F | HEART RATE: 86 BPM

## 2025-01-22 LAB
ANION GAP SERPL CALC-SCNC: 5 MMOL/L (ref 2–12)
BASOPHILS # BLD: 0.03 K/UL (ref 0–0.1)
BASOPHILS # BLD: 0.03 K/UL (ref 0–0.1)
BASOPHILS NFR BLD: 0.4 % (ref 0–1)
BASOPHILS NFR BLD: 0.5 % (ref 0–1)
BUN SERPL-MCNC: 16 MG/DL (ref 6–20)
BUN/CREAT SERPL: 17 (ref 12–20)
CALCIUM SERPL-MCNC: 8.6 MG/DL (ref 8.5–10.1)
CHLORIDE SERPL-SCNC: 109 MMOL/L (ref 97–108)
CO2 SERPL-SCNC: 24 MMOL/L (ref 21–32)
CREAT SERPL-MCNC: 0.96 MG/DL (ref 0.7–1.3)
DIFFERENTIAL METHOD BLD: ABNORMAL
DIFFERENTIAL METHOD BLD: ABNORMAL
EOSINOPHIL # BLD: 0.04 K/UL (ref 0–0.4)
EOSINOPHIL # BLD: 0.17 K/UL (ref 0–0.4)
EOSINOPHIL NFR BLD: 0.6 % (ref 0–7)
EOSINOPHIL NFR BLD: 2.9 % (ref 0–7)
ERYTHROCYTE [DISTWIDTH] IN BLOOD BY AUTOMATED COUNT: 16.5 % (ref 11.5–14.5)
ERYTHROCYTE [DISTWIDTH] IN BLOOD BY AUTOMATED COUNT: 16.7 % (ref 11.5–14.5)
GLUCOSE SERPL-MCNC: 160 MG/DL (ref 65–100)
HCT VFR BLD AUTO: 26.3 % (ref 36.6–50.3)
HCT VFR BLD AUTO: 29.8 % (ref 36.6–50.3)
HGB BLD-MCNC: 7.9 G/DL (ref 12.1–17)
HGB BLD-MCNC: 8.9 G/DL (ref 12.1–17)
IMM GRANULOCYTES # BLD AUTO: 0.01 K/UL (ref 0–0.04)
IMM GRANULOCYTES # BLD AUTO: 0.02 K/UL (ref 0–0.04)
IMM GRANULOCYTES NFR BLD AUTO: 0.2 % (ref 0–0.5)
IMM GRANULOCYTES NFR BLD AUTO: 0.3 % (ref 0–0.5)
LYMPHOCYTES # BLD: 0.48 K/UL (ref 0.8–3.5)
LYMPHOCYTES # BLD: 0.58 K/UL (ref 0.8–3.5)
LYMPHOCYTES NFR BLD: 6.8 % (ref 12–49)
LYMPHOCYTES NFR BLD: 9.7 % (ref 12–49)
MCH RBC QN AUTO: 25.6 PG (ref 26–34)
MCH RBC QN AUTO: 25.7 PG (ref 26–34)
MCHC RBC AUTO-ENTMCNC: 29.9 G/DL (ref 30–36.5)
MCHC RBC AUTO-ENTMCNC: 30 G/DL (ref 30–36.5)
MCV RBC AUTO: 85.1 FL (ref 80–99)
MCV RBC AUTO: 86.1 FL (ref 80–99)
MONOCYTES # BLD: 0.73 K/UL (ref 0–1)
MONOCYTES # BLD: 0.92 K/UL (ref 0–1)
MONOCYTES NFR BLD: 12.1 % (ref 5–13)
MONOCYTES NFR BLD: 13.1 % (ref 5–13)
NEUTS SEG # BLD: 4.48 K/UL (ref 1.8–8)
NEUTS SEG # BLD: 5.55 K/UL (ref 1.8–8)
NEUTS SEG NFR BLD: 74.6 % (ref 32–75)
NEUTS SEG NFR BLD: 78.8 % (ref 32–75)
NRBC # BLD: 0 K/UL (ref 0–0.01)
NRBC # BLD: 0 K/UL (ref 0–0.01)
NRBC BLD-RTO: 0 PER 100 WBC
NRBC BLD-RTO: 0 PER 100 WBC
PLATELET # BLD AUTO: 188 K/UL (ref 150–400)
PLATELET # BLD AUTO: 200 K/UL (ref 150–400)
PMV BLD AUTO: 10.6 FL (ref 8.9–12.9)
PMV BLD AUTO: 10.7 FL (ref 8.9–12.9)
POTASSIUM SERPL-SCNC: 3.6 MMOL/L (ref 3.5–5.1)
RBC # BLD AUTO: 3.09 M/UL (ref 4.1–5.7)
RBC # BLD AUTO: 3.46 M/UL (ref 4.1–5.7)
RBC MORPH BLD: ABNORMAL
RBC MORPH BLD: ABNORMAL
SODIUM SERPL-SCNC: 138 MMOL/L (ref 136–145)
WBC # BLD AUTO: 6 K/UL (ref 4.1–11.1)
WBC # BLD AUTO: 7 K/UL (ref 4.1–11.1)
WBC MORPH BLD: ABNORMAL

## 2025-01-22 PROCEDURE — 6370000000 HC RX 637 (ALT 250 FOR IP): Performed by: STUDENT IN AN ORGANIZED HEALTH CARE EDUCATION/TRAINING PROGRAM

## 2025-01-22 PROCEDURE — 80048 BASIC METABOLIC PNL TOTAL CA: CPT

## 2025-01-22 PROCEDURE — 2500000003 HC RX 250 WO HCPCS: Performed by: STUDENT IN AN ORGANIZED HEALTH CARE EDUCATION/TRAINING PROGRAM

## 2025-01-22 PROCEDURE — 85025 COMPLETE CBC W/AUTO DIFF WBC: CPT

## 2025-01-22 RX ADMIN — SODIUM CHLORIDE, PRESERVATIVE FREE 10 ML: 5 INJECTION INTRAVENOUS at 08:20

## 2025-01-22 RX ADMIN — CEPHALEXIN 500 MG: 500 CAPSULE ORAL at 08:19

## 2025-01-22 RX ADMIN — SALINE NASAL SPRAY 2 SPRAY: 1.5 SOLUTION NASAL at 06:04

## 2025-01-22 NOTE — PROGRESS NOTES
Spiritual Health History and Assessment/Progress Note  [facility]    Rituals, Rites and Sacraments,  ,  ,      Name: Williams Fitzgerald Jr MRN: [id number]    Age: 84 y.o.     Sex: male   Language: English   Restorationism: Christian   Epistaxis     Date: 1/22/2025            Total Time Calculated: 5 min              Spiritual Assessment continued in [facility] 5E2 SURGICAL UNIT        Referral/Consult From: Clergy/   Encounter Overview/Reason: Rituals, Rites and Sacraments  Service Provided For: Patient    Lurdes, Belief, Meaning:   Patient is connected with a lurdes tradition or spiritual practice  Family/Friends are connected with a lurdes tradition or spiritual practice      Importance and Influence:  Patient has spiritual/personal beliefs that influence decisions regarding their health  Family/Friends have spiritual/personal beliefs that influence decisions regarding the patient's health    Community:  Patient is connected with a spiritual community  Family/Friends are connected with a spiritual community:    Assessment and Plan of Care:     Patient Interventions include: Provided sacramental/Zoroastrian ritual  Family/Friends Interventions include: Provided sacramental/Zoroastrian ritual    Patient Plan of Care: Spiritual Care available upon further referral  Family/Friends Plan of Care: Spiritual Care available upon further referral    Electronically signed by Chaplain MAXIME on 1/22/2025 at 1:16 PM     [facility] visit.  Mr. Fitzgerald is being discharged today.  Prayer and communion offered Mr. & Mr. Fitzgerald.  They expressed their appreciation for the care they received while here.    Chaplain Fierro. DONALD Prieto, RN, ACSW, LCSW   Page:  [phone]

## 2025-01-22 NOTE — DISCHARGE SUMMARY
Discharge Summary       PATIENT ID: Williams Fitzgerald Jr  MRN: [id number]   YOB: 1940    DATE OF ADMISSION: 1/18/2025 11:54 PM    DATE OF DISCHARGE: 1/22/2025   PRIMARY CARE PROVIDER: Braayn Hallman MD     ATTENDING PHYSICIAN: CHELSIE Moyer MD  DISCHARGING PROVIDER: MARTÍNEZ Ward NP    To contact this individual call [phone] and ask the  to page.  If unavailable ask to be transferred the Adult Hospitalist Department.    CONSULTATIONS: IP CONSULT TO HOSPITALIST    PROCEDURES/SURGERIES: Procedure(s):  BILATERAL NASAL ENDOSCOPY WITH CONTROL OF EPISTAXIS     ADMITTING DIAGNOSES & HOSPITAL COURSE:   Per H&P 1/19   Williams Fitzgerald Jr is a 84 y.o. male with hx of afib, Hereditary hemorrhagic, telangiectasia, pulmonary AVMs, prostate cancer, recurrent epistaxis who presented to [facility] ed with complaints of a nose bleed. Had been in his usual state of health until last evening when he developed epistaxis and presented to [facility] ed.  He underwent nasal packing and was transferred to [facility] foe ent availability.  Reports recent dx of uti and on abx.  The patient denies any fever, chills, chest or abdominal pain, nausea, vomiting, cough, congestion, recent illness, palpitations, or dysuria.     Remarkable vitals on ER Presentation: vss  Labs Remarkable for: baseline  ER Images: none  ER Rx: rhinorocket        DISCHARGE DIAGNOSES / PLAN:      Recurrent Epistaxis  Hereditary hemorrhagic telangiectasia / Pulmonary AVMs   currently on trial of avastin and previously on txa tabs  Has been seen by ENT, appreciate recommendations  ENT following, appreciate recommendations  Continuing cephalexin for prophylaxis  Yesterday went to the OR with ENT for removal of packing and bilateral cauterization                 Anemia  Element of chronic anemia generally in the 9 range  Hemoglobin 8.9 this morning          aFib  Heart rate currently controlled  Continue diltiazem  Regarding hypercoagulable state

## 2025-01-22 NOTE — PROGRESS NOTES
Otolaryngology Progress Note    SUBJECTIVE:  Patient feels much better. No epistaxis overnight. Would like to go home today.    OBJECTIVE      Physical  VITALS:  /63   Pulse 86   Temp 97.5 °F (36.4 °C) (Oral)   Resp 18   SpO2 98%   Nose: Bilateral surgicel fibrillars are intact. A bit dry. No epistaxis. No bloody PND.    Data  CBC:   Lab Results   Component Value Date/Time    WBC 6.0 01/22/2025 09:27 AM    WBC Comment 02/24/2021 08:18 AM    RBC 3.46 01/22/2025 09:27 AM    RBC Comment 02/24/2021 08:18 AM    HGB 8.9 01/22/2025 09:27 AM    HCT 29.8 01/22/2025 09:27 AM    MCV 86.1 01/22/2025 09:27 AM    MCH 25.7 01/22/2025 09:27 AM    MCHC 29.9 01/22/2025 09:27 AM    RDW 16.7 01/22/2025 09:27 AM     01/22/2025 09:27 AM    MPV 10.7 01/22/2025 09:27 AM     Current Inpatient Medications  Current Facility-Administered Medications: sodium chloride (OCEAN, BABY AYR) 0.65 % nasal spray 2 spray, 2 spray, Each Nostril, 3 times per day  sodium chloride flush 0.9 % injection 5-40 mL, 5-40 mL, IntraVENous, 2 times per day  sodium chloride flush 0.9 % injection 5-40 mL, 5-40 mL, IntraVENous, PRN  0.9 % sodium chloride infusion, , IntraVENous, PRN  potassium chloride (KLOR-CON) extended release tablet 40 mEq, 40 mEq, Oral, PRN **OR** potassium bicarb-citric acid (EFFER-K) effervescent tablet 40 mEq, 40 mEq, Oral, PRN **OR** potassium chloride 10 mEq/100 mL IVPB (Peripheral Line), 10 mEq, IntraVENous, PRN  magnesium sulfate 2000 mg in 50 mL IVPB premix, 2,000 mg, IntraVENous, PRN  polyethylene glycol (GLYCOLAX) packet 17 g, 17 g, Oral, Daily PRN  acetaminophen (TYLENOL) tablet 650 mg, 650 mg, Oral, Q6H PRN **OR** acetaminophen (TYLENOL) suppository 650 mg, 650 mg, Rectal, Q6H PRN  cephALEXin (KEFLEX) capsule 500 mg, 500 mg, Oral, 2 times per day  ASSESSMENT AND PLAN    HHT  Recurrent Bilateral Epistaxis   POD #1 s/p bilateral nasal endoscopy with electro-cauterization of bilateral epistaxis  Patient doing

## 2025-01-22 NOTE — PROGRESS NOTES
NUTRITION     Nutrition screening referral was triggered based on results obtained during nursing admission assessment for wt loss.    The patient's chart was reviewed and nutrition assessment is not indicated at this time.  Plan to see patient for rescreen as indicated.  Thank you.     Wt Readings from Last 30 Encounters:   01/18/25 79.8 kg (176 lb)   01/15/25 79.8 kg (176 lb)   01/11/25 80.1 kg (176 lb 9.4 oz)   12/26/24 77 kg (169 lb 12.8 oz)   12/26/24 78.8 kg (173 lb 12.8 oz)   12/09/24 82.4 kg (181 lb 10.5 oz)   12/08/24 80.7 kg (178 lb)   11/29/24 80.6 kg (177 lb 12.8 oz)   11/14/24 80.3 kg (177 lb)   11/07/24 81.2 kg (179 lb)   11/07/24 81.2 kg (179 lb)   10/31/24 79 kg (174 lb 3.2 oz)   10/23/24 79.4 kg (175 lb)   10/17/24 81 kg (178 lb 8 oz)   10/03/24 81.6 kg (179 lb 14.4 oz)   10/03/24 81.2 kg (179 lb)   09/28/24 79.8 kg (175 lb 14.8 oz)   09/25/24 81.6 kg (180 lb)   08/28/24 81.8 kg (180 lb 4.8 oz)   07/28/24 81.6 kg (180 lb)   07/17/24 81.6 kg (180 lb)   07/04/24 72.6 kg (160 lb)   06/12/24 82.1 kg (181 lb)   06/05/24 82.9 kg (182 lb 11.2 oz)   05/20/24 82.6 kg (182 lb)   05/15/24 82.6 kg (182 lb)   05/09/24 82.1 kg (181 lb)   04/24/24 79.9 kg (176 lb 3.2 oz)   04/20/24 79.4 kg (175 lb)   04/05/24 81.6 kg (180 lb)       Wendy Brown RD

## 2025-01-22 NOTE — FLOWSHEET NOTE
IV removed with no complications. Discharge instructions reviewed with patient and spouse. No questions or concerns voiced at this time. Patient and spouse walked to front for patient to discharge home with spouse.

## 2025-01-24 ENCOUNTER — TELEPHONE (OUTPATIENT)
Age: 85
End: 2025-01-24

## 2025-01-24 NOTE — TELEPHONE ENCOUNTER
Spoke with patients wife to reschedule his missed avastin appointment, she had no further questions.

## 2025-01-25 ENCOUNTER — APPOINTMENT (OUTPATIENT)
Facility: HOSPITAL | Age: 85
End: 2025-01-25
Payer: MEDICARE

## 2025-01-25 ENCOUNTER — HOSPITAL ENCOUNTER (EMERGENCY)
Facility: HOSPITAL | Age: 85
Discharge: HOME | End: 2025-01-25
Attending: EMERGENCY MEDICINE
Payer: MEDICARE

## 2025-01-25 VITALS
WEIGHT: 165.34 LBS | HEIGHT: 69 IN | DIASTOLIC BLOOD PRESSURE: 65 MMHG | TEMPERATURE: 98.2 F | OXYGEN SATURATION: 98 % | HEART RATE: 91 BPM | SYSTOLIC BLOOD PRESSURE: 138 MMHG | RESPIRATION RATE: 18 BRPM | BODY MASS INDEX: 24.49 KG/M2

## 2025-01-25 DIAGNOSIS — T17.908A ASPIRATION INTO AIRWAY, INITIAL ENCOUNTER: Primary | ICD-10-CM

## 2025-01-25 DIAGNOSIS — R05.9 COUGH, UNSPECIFIED TYPE: ICD-10-CM

## 2025-01-25 LAB
ALBUMIN SERPL-MCNC: 3.3 G/DL (ref 3.5–5)
ALBUMIN/GLOB SERPL: 0.9 (ref 1.1–2.2)
ALP SERPL-CCNC: 82 U/L (ref 45–117)
ALT SERPL-CCNC: 20 U/L (ref 12–78)
ANION GAP SERPL CALC-SCNC: 7 MMOL/L (ref 2–12)
AST SERPL-CCNC: 16 U/L (ref 15–37)
BASOPHILS # BLD: 0.04 K/UL (ref 0–0.1)
BASOPHILS NFR BLD: 0.7 % (ref 0–1)
BILIRUB SERPL-MCNC: 0.3 MG/DL (ref 0.2–1)
BUN SERPL-MCNC: 17 MG/DL (ref 6–20)
BUN/CREAT SERPL: 17 (ref 12–20)
CALCIUM SERPL-MCNC: 8.9 MG/DL (ref 8.5–10.1)
CHLORIDE SERPL-SCNC: 105 MMOL/L (ref 97–108)
CO2 SERPL-SCNC: 27 MMOL/L (ref 21–32)
CREAT SERPL-MCNC: 0.99 MG/DL (ref 0.7–1.3)
DIFFERENTIAL METHOD BLD: ABNORMAL
EOSINOPHIL # BLD: 0.19 K/UL (ref 0–0.4)
EOSINOPHIL NFR BLD: 3.3 % (ref 0–7)
ERYTHROCYTE [DISTWIDTH] IN BLOOD BY AUTOMATED COUNT: 17.3 % (ref 11.5–14.5)
GLOBULIN SER CALC-MCNC: 3.7 G/DL (ref 2–4)
GLUCOSE SERPL-MCNC: 84 MG/DL (ref 65–100)
HCT VFR BLD AUTO: 30.4 % (ref 36.6–50.3)
HGB BLD-MCNC: 8.9 G/DL (ref 12.1–17)
IMM GRANULOCYTES # BLD AUTO: 0.01 K/UL (ref 0–0.04)
IMM GRANULOCYTES NFR BLD AUTO: 0.2 % (ref 0–0.5)
LYMPHOCYTES # BLD: 0.42 K/UL (ref 0.8–3.5)
LYMPHOCYTES NFR BLD: 7.3 % (ref 12–49)
MCH RBC QN AUTO: 26 PG (ref 26–34)
MCHC RBC AUTO-ENTMCNC: 29.3 G/DL (ref 30–36.5)
MCV RBC AUTO: 88.9 FL (ref 80–99)
MONOCYTES # BLD: 0.85 K/UL (ref 0–1)
MONOCYTES NFR BLD: 14.9 % (ref 5–13)
NEUTS SEG # BLD: 4.19 K/UL (ref 1.8–8)
NEUTS SEG NFR BLD: 73.6 % (ref 32–75)
NRBC # BLD: 0 K/UL (ref 0–0.01)
NRBC BLD-RTO: 0 PER 100 WBC
NT PRO BNP: 281 PG/ML (ref 0–450)
PLATELET # BLD AUTO: ABNORMAL K/UL (ref 150–400)
POTASSIUM SERPL-SCNC: 4.2 MMOL/L (ref 3.5–5.1)
PROT SERPL-MCNC: 7 G/DL (ref 6.4–8.2)
RBC # BLD AUTO: 3.42 M/UL (ref 4.1–5.7)
RBC MORPH BLD: ABNORMAL
SODIUM SERPL-SCNC: 139 MMOL/L (ref 136–145)
TROPONIN I SERPL HS-MCNC: 14 NG/L (ref 0–76)
WBC # BLD AUTO: 5.7 K/UL (ref 4.1–11.1)

## 2025-01-25 PROCEDURE — 85025 COMPLETE CBC W/AUTO DIFF WBC: CPT

## 2025-01-25 PROCEDURE — 71045 X-RAY EXAM CHEST 1 VIEW: CPT

## 2025-01-25 PROCEDURE — 36415 COLL VENOUS BLD VENIPUNCTURE: CPT

## 2025-01-25 PROCEDURE — 80053 COMPREHEN METABOLIC PANEL: CPT

## 2025-01-25 PROCEDURE — 71250 CT THORAX DX C-: CPT

## 2025-01-25 PROCEDURE — 99285 EMERGENCY DEPT VISIT HI MDM: CPT

## 2025-01-25 PROCEDURE — 93005 ELECTROCARDIOGRAM TRACING: CPT | Performed by: EMERGENCY MEDICINE

## 2025-01-25 PROCEDURE — 83880 ASSAY OF NATRIURETIC PEPTIDE: CPT

## 2025-01-25 PROCEDURE — 84484 ASSAY OF TROPONIN QUANT: CPT

## 2025-01-25 ASSESSMENT — PAIN - FUNCTIONAL ASSESSMENT: PAIN_FUNCTIONAL_ASSESSMENT: NONE - DENIES PAIN

## 2025-01-25 NOTE — DISCHARGE INSTRUCTIONS
Make sure to eat your food slowly and chew thoroughly.  Take the antibiotics as directed and follow-up with your primary care doctor

## 2025-01-25 NOTE — ED TRIAGE NOTES
Patient arrives with steady gait, with cc productive cough for one week w/o NVD, denies fevers. Denies SOB. Denies CP, back pain.     Endorses nasal congestion.     Patient has been using a saline nasal spray for relief.

## 2025-01-25 NOTE — ED NOTES
Patient tolerating sips of water. States he doesn't feel pain, or as if anything is stuck. Provider aware.

## 2025-01-25 NOTE — ED NOTES
Skin tear to right forearm bandage from 1/21 removed, saturated and soggy. Wound re-dressed with xeroform, non adherent padding and bulky gauze. Patient states he will see his PCP on Monday. Advised to have follow up at that time.

## 2025-01-25 NOTE — ED PROVIDER NOTES
[place] EMERGENCY DEPARTMENT  EMERGENCY DEPARTMENT ENCOUNTER      Pt Name: Williams Fitzgerald Jr  MRN: [id number]  Birthdate 1940  Date of evaluation: 1/25/2025  Provider: Hernandez Berry MD      HISTORY OF PRESENT ILLNESS      HPI        Nursing Notes were reviewed.    REVIEW OF SYSTEMS         Review of Systems        PAST MEDICAL HISTORY     Past Medical History:   Diagnosis Date    HHT (hereditary hemorrhagic telangiectasia) (HCC)     Iron deficiency anemia     Prostate cancer (HCC)     Pulmonary arteriovenous malformation          SURGICAL HISTORY       Past Surgical History:   Procedure Laterality Date    APPENDECTOMY      CHEST SURGERY      excision of AVM    COLONOSCOPY N/A 1/29/2020    COLONOSCOPY performed by Manas Barr MD at [facility] ENDOSCOPY    COLONOSCOPY  2014    due 19    NOSE SURGERY Bilateral 1/21/2025    BILATERAL NASAL ENDOSCOPY WITH CONTROL OF EPISTAXIS performed by Wyatt Banegas DO at [facility] MAIN OR    OTHER SURGICAL HISTORY  07/2018    sclerotheropy    TONSILLECTOMY           CURRENT MEDICATIONS       Previous Medications    ASCORBIC ACID (VITAMIN C) 250 MG TABLET    Take by mouth    DICYCLOMINE (BENTYL) 20 MG TABLET    TAKE 1 TABLET BY MOUTH 3 TIMES DAILY AS NEEDED FOR ABDOMINAL CRAMPS (NEW DOSE/NEW DIRECTIONS)    OXYMETAZOLINE (12 HOUR NASAL SPRAY) 0.05 % NASAL SPRAY    2 sprays by Nasal route 2 times daily    SODIUM CHLORIDE (OCEAN, BABY AYR) 0.65 % NASAL SPRAY    2 sprays by Nasal route every 8 hours for 14 days    VITAMIN B-12 1000 MCG TABLET    Take 1 tablet by mouth daily for 30 doses    VITAMIN D 25 MCG (1000 UT) CAPS    Take by mouth daily       ALLERGIES     Patient has no known allergies.    FAMILY HISTORY       Family History   Problem Relation Age of Onset    Cancer Other         colon, lung    Heart Disease Mother           SOCIAL HISTORY       Social History     Socioeconomic History    Marital status:      Spouse name: None    Number of children: None

## 2025-01-26 LAB
EKG ATRIAL RATE: 88 BPM
EKG DIAGNOSIS: NORMAL
EKG P-R INTERVAL: 122 MS
EKG Q-T INTERVAL: 376 MS
EKG QRS DURATION: 104 MS
EKG QTC CALCULATION (BAZETT): 454 MS
EKG R AXIS: 66 DEGREES
EKG T AXIS: 38 DEGREES
EKG VENTRICULAR RATE: 88 BPM

## 2025-01-26 PROCEDURE — 93010 ELECTROCARDIOGRAM REPORT: CPT | Performed by: INTERNAL MEDICINE

## 2025-01-27 DIAGNOSIS — I78.0 HHT (HEREDITARY HEMORRHAGIC TELANGIECTASIA) (HCC): ICD-10-CM

## 2025-01-27 DIAGNOSIS — I78.0 HHT (HEREDITARY HEMORRHAGIC TELANGIECTASIA) (HCC): Primary | ICD-10-CM

## 2025-01-27 LAB
BASOPHILS # BLD: 0.03 K/UL (ref 0–0.1)
BASOPHILS NFR BLD: 0.5 % (ref 0–1)
DIFFERENTIAL METHOD BLD: ABNORMAL
EOSINOPHIL # BLD: 0.19 K/UL (ref 0–0.4)
EOSINOPHIL NFR BLD: 3.4 % (ref 0–7)
ERYTHROCYTE [DISTWIDTH] IN BLOOD BY AUTOMATED COUNT: 16.9 % (ref 11.5–14.5)
HCT VFR BLD AUTO: 29.6 % (ref 36.6–50.3)
HGB BLD-MCNC: 8.9 G/DL (ref 12.1–17)
IMM GRANULOCYTES # BLD AUTO: 0.01 K/UL (ref 0–0.04)
IMM GRANULOCYTES NFR BLD AUTO: 0.2 % (ref 0–0.5)
LYMPHOCYTES # BLD: 0.46 K/UL (ref 0.8–3.5)
LYMPHOCYTES NFR BLD: 8.3 % (ref 12–49)
MCH RBC QN AUTO: 25.4 PG (ref 26–34)
MCHC RBC AUTO-ENTMCNC: 30.1 G/DL (ref 30–36.5)
MCV RBC AUTO: 84.3 FL (ref 80–99)
MONOCYTES # BLD: 1.01 K/UL (ref 0–1)
MONOCYTES NFR BLD: 18.1 % (ref 5–13)
NEUTS SEG # BLD: 3.9 K/UL (ref 1.8–8)
NEUTS SEG NFR BLD: 69.5 % (ref 32–75)
NRBC # BLD: 0 K/UL (ref 0–0.01)
NRBC BLD-RTO: 0 PER 100 WBC
PLATELET # BLD AUTO: 226 K/UL (ref 150–400)
PMV BLD AUTO: 11.9 FL (ref 8.9–12.9)
RBC # BLD AUTO: 3.51 M/UL (ref 4.1–5.7)
RBC MORPH BLD: ABNORMAL
WBC # BLD AUTO: 5.6 K/UL (ref 4.1–11.1)

## 2025-01-27 RX ORDER — ONDANSETRON 2 MG/ML
8 INJECTION INTRAMUSCULAR; INTRAVENOUS
OUTPATIENT
Start: 2025-01-28

## 2025-01-27 RX ORDER — HYDROCORTISONE SODIUM SUCCINATE 100 MG/2ML
100 INJECTION INTRAMUSCULAR; INTRAVENOUS
OUTPATIENT
Start: 2025-01-28

## 2025-01-27 RX ORDER — SODIUM CHLORIDE 9 MG/ML
5-250 INJECTION, SOLUTION INTRAVENOUS PRN
Status: CANCELLED | OUTPATIENT
Start: 2025-01-28

## 2025-01-27 RX ORDER — EPINEPHRINE 1 MG/ML
0.3 INJECTION, SOLUTION, CONCENTRATE INTRAVENOUS PRN
OUTPATIENT
Start: 2025-01-28

## 2025-01-27 RX ORDER — SODIUM CHLORIDE 9 MG/ML
5-250 INJECTION, SOLUTION INTRAVENOUS PRN
OUTPATIENT
Start: 2025-01-28

## 2025-01-27 RX ORDER — FAMOTIDINE 10 MG/ML
20 INJECTION, SOLUTION INTRAVENOUS
OUTPATIENT
Start: 2025-01-28

## 2025-01-27 RX ORDER — SODIUM CHLORIDE 0.9 % (FLUSH) 0.9 %
5-40 SYRINGE (ML) INJECTION PRN
OUTPATIENT
Start: 2025-01-28

## 2025-01-27 RX ORDER — DIPHENHYDRAMINE HYDROCHLORIDE 50 MG/ML
50 INJECTION INTRAMUSCULAR; INTRAVENOUS
OUTPATIENT
Start: 2025-01-28

## 2025-01-27 RX ORDER — ALBUTEROL SULFATE 90 UG/1
4 INHALANT RESPIRATORY (INHALATION) PRN
OUTPATIENT
Start: 2025-01-28

## 2025-01-27 RX ORDER — SODIUM CHLORIDE 9 MG/ML
INJECTION, SOLUTION INTRAVENOUS CONTINUOUS
OUTPATIENT
Start: 2025-01-28

## 2025-01-27 RX ORDER — HEPARIN SODIUM (PORCINE) LOCK FLUSH IV SOLN 100 UNIT/ML 100 UNIT/ML
500 SOLUTION INTRAVENOUS PRN
OUTPATIENT
Start: 2025-01-28

## 2025-01-27 RX ORDER — ACETAMINOPHEN 325 MG/1
650 TABLET ORAL
OUTPATIENT
Start: 2025-01-28

## 2025-01-27 NOTE — PROGRESS NOTES
Physician Progress Note      PATIENT:               KUN TREVIÑO JR  CSN #:                  [id number]  :                       1940  ADMIT DATE:       1/15/2025 11:44 AM  DISCH DATE:        2025 1:05 PM  RESPONDING  PROVIDER #:        Jaron GARZON Do, MD          QUERY TEXT:    Patient admitted. Noted documentation of UTI on DC summary 25. In order   to support the diagnosis of UTI, please include additional clinical indicators   in your documentation.  Or please document if the diagnosis of UTI has been   ruled out after further study.    The medical record reflects the following:  Risk Factors: 83 yo hx of Pafib, HHT, pulmonary AVMs, epistaxis, iron def   anemia, presented w/ visual hallucinations, UTI.  Brain MRI neg CVA ,   neurology consult cancelled.    Clinical Indicators:  Discharge date and time: 2025 11:26 AM  Active Problems:  Altered behavior  UTI (urinary tract infection)    UTI: Blood trace, Leukocytes +, Bacteria too numerous to count  UA: UC no growth    Genitourinary: noted as WDL in RN assessments for entire admission.    Treatment: Admit, hospitalist consult, neurology consult (cancelled), CT, MRI,   Labs, UA, UC, vitals per unit routine  Options provided:  -- UTI present as evidenced by, Please document evidence.  -- UTI was ruled out  -- Other - I will add my own diagnosis  -- Disagree - Not applicable / Not valid  -- Disagree - Clinically unable to determine / Unknown  -- Refer to Clinical Documentation Reviewer    PROVIDER RESPONSE TEXT:    UTI was ruled out after study.    Query created by: Rilee, Cheryle on 2025 9:46 AM      Electronically signed by:  Jaron GARZON Do, MD 2025 10:16 AM

## 2025-01-27 NOTE — PROGRESS NOTES
Physician Progress Note      PATIENT:               KUN TREVIÑO JR  [facility] #:                  [id number]  :                       1940  ADMIT DATE:       1/15/2025 11:44 AM  DISCH DATE:        2025 1:05 PM  RESPONDING  PROVIDER #:        Jaron GARZON Do, MD          QUERY TEXT:    Patient admitted, Noted documentation of metabolic encephalopathy in H&P   1/15/25. In order to support the diagnosis of metabolic encephalopathy, please   include additional clinical indicators in your documentation.  Or please   document if the diagnosis of metabolic encephalopathy has been ruled out after   further study.    The medical record reflects the following:  Risk Factors: 85 yo hx of Pafib, HHT, pulmonary AVMs, epistaxis, iron def   anemia, presented w/ visual hallucinations, UTI.  Brain MRI neg CVA ,   neurology consult cancelled.    Clinical Indicators:  Discharge Diagnoses:  Principal Diagnosis Acute metabolic encephalopathy  Principal Problem:  Acute metabolic encephalopathy  1) Acute met encephalopathy/visual hallucinations: now resolved.  Likely due   to UTI.  Brain MRI neg for CVA.  Ammonia level normal.  No further w/u.  Active Problems:  Altered behavior    To ED  84-year-old male CC worsening hallucinations/ intermittent confusion   per spouse.    1/15 ED MD PN:  Psychiatric/Behavioral:  Positive for confusion and hallucinations.  Neurological:  General: No focal deficit present.  Mental Status: He is alert.  Sensory: No sensory deficit.  Motor: No weakness.  Comments: Intermittently confused    CT head negative  MRI negative    Metabolic Encephalopathy  Pt. noted as Alert and oriented x 4 for entire admission per RN Neuro   assessments.    Treatment: Admit, hospitalist consult, neurology consult (cancelled), CT, MRI,   Labs, UA, UC, vitals per unit routine  Options provided:  -- metabolic encephalopathy present as evidenced by, Please document evidence.  -- metabolic encephalopathy was ruled out  -- Other - I

## 2025-01-28 ENCOUNTER — HOSPITAL ENCOUNTER (OUTPATIENT)
Facility: HOSPITAL | Age: 85
Setting detail: INFUSION SERIES
Discharge: HOME | End: 2025-01-28
Payer: MEDICARE

## 2025-01-28 ENCOUNTER — OFFICE VISIT (OUTPATIENT)
Age: 85
End: 2025-01-28
Payer: MEDICARE

## 2025-01-28 VITALS
SYSTOLIC BLOOD PRESSURE: 123 MMHG | TEMPERATURE: 98 F | DIASTOLIC BLOOD PRESSURE: 65 MMHG | RESPIRATION RATE: 20 BRPM | HEART RATE: 83 BPM | OXYGEN SATURATION: 97 %

## 2025-01-28 VITALS
WEIGHT: 170.1 LBS | HEART RATE: 78 BPM | TEMPERATURE: 97.7 F | BODY MASS INDEX: 25.2 KG/M2 | HEIGHT: 69 IN | SYSTOLIC BLOOD PRESSURE: 139 MMHG | DIASTOLIC BLOOD PRESSURE: 63 MMHG | RESPIRATION RATE: 18 BRPM

## 2025-01-28 DIAGNOSIS — I78.0 HHT (HEREDITARY HEMORRHAGIC TELANGIECTASIA) (HCC): Primary | ICD-10-CM

## 2025-01-28 DIAGNOSIS — D50.9 IRON DEFICIENCY ANEMIA, UNSPECIFIED IRON DEFICIENCY ANEMIA TYPE: ICD-10-CM

## 2025-01-28 DIAGNOSIS — D50.0 IRON DEFICIENCY ANEMIA DUE TO CHRONIC BLOOD LOSS: ICD-10-CM

## 2025-01-28 PROCEDURE — 6360000002 HC RX W HCPCS: Performed by: INTERNAL MEDICINE

## 2025-01-28 PROCEDURE — 1036F TOBACCO NON-USER: CPT | Performed by: NURSE PRACTITIONER

## 2025-01-28 PROCEDURE — 96367 TX/PROPH/DG ADDL SEQ IV INF: CPT

## 2025-01-28 PROCEDURE — 2580000003 HC RX 258: Performed by: NURSE PRACTITIONER

## 2025-01-28 PROCEDURE — 6360000002 HC RX W HCPCS: Performed by: NURSE PRACTITIONER

## 2025-01-28 PROCEDURE — 1160F RVW MEDS BY RX/DR IN RCRD: CPT | Performed by: NURSE PRACTITIONER

## 2025-01-28 PROCEDURE — G8419 CALC BMI OUT NRM PARAM NOF/U: HCPCS | Performed by: NURSE PRACTITIONER

## 2025-01-28 PROCEDURE — 1159F MED LIST DOCD IN RCRD: CPT | Performed by: NURSE PRACTITIONER

## 2025-01-28 PROCEDURE — 99215 OFFICE O/P EST HI 40 MIN: CPT | Performed by: NURSE PRACTITIONER

## 2025-01-28 PROCEDURE — 1123F ACP DISCUSS/DSCN MKR DOCD: CPT | Performed by: NURSE PRACTITIONER

## 2025-01-28 PROCEDURE — 1111F DSCHRG MED/CURRENT MED MERGE: CPT | Performed by: NURSE PRACTITIONER

## 2025-01-28 PROCEDURE — 2580000003 HC RX 258: Performed by: INTERNAL MEDICINE

## 2025-01-28 PROCEDURE — G8427 DOCREV CUR MEDS BY ELIG CLIN: HCPCS | Performed by: NURSE PRACTITIONER

## 2025-01-28 PROCEDURE — G2211 COMPLEX E/M VISIT ADD ON: HCPCS | Performed by: NURSE PRACTITIONER

## 2025-01-28 PROCEDURE — 1126F AMNT PAIN NOTED NONE PRSNT: CPT | Performed by: NURSE PRACTITIONER

## 2025-01-28 PROCEDURE — 96413 CHEMO IV INFUSION 1 HR: CPT

## 2025-01-28 RX ORDER — HYDROCORTISONE SODIUM SUCCINATE 100 MG/2ML
100 INJECTION INTRAMUSCULAR; INTRAVENOUS
OUTPATIENT
Start: 2025-02-25

## 2025-01-28 RX ORDER — EPINEPHRINE 1 MG/ML
0.3 INJECTION, SOLUTION INTRAMUSCULAR; SUBCUTANEOUS PRN
OUTPATIENT
Start: 2025-02-18

## 2025-01-28 RX ORDER — ALBUTEROL SULFATE 90 UG/1
4 INHALANT RESPIRATORY (INHALATION) PRN
OUTPATIENT
Start: 2025-02-25

## 2025-01-28 RX ORDER — DIPHENHYDRAMINE HYDROCHLORIDE 50 MG/ML
50 INJECTION INTRAMUSCULAR; INTRAVENOUS
OUTPATIENT
Start: 2025-03-25

## 2025-01-28 RX ORDER — ALBUTEROL SULFATE 90 UG/1
4 INHALANT RESPIRATORY (INHALATION) PRN
OUTPATIENT
Start: 2025-03-25

## 2025-01-28 RX ORDER — HEPARIN SODIUM (PORCINE) LOCK FLUSH IV SOLN 100 UNIT/ML 100 UNIT/ML
500 SOLUTION INTRAVENOUS PRN
OUTPATIENT
Start: 2025-03-25

## 2025-01-28 RX ORDER — HEPARIN SODIUM (PORCINE) LOCK FLUSH IV SOLN 100 UNIT/ML 100 UNIT/ML
500 SOLUTION INTRAVENOUS PRN
OUTPATIENT
Start: 2025-02-25

## 2025-01-28 RX ORDER — FAMOTIDINE 10 MG/ML
20 INJECTION, SOLUTION INTRAVENOUS
OUTPATIENT
Start: 2025-02-25

## 2025-01-28 RX ORDER — SODIUM CHLORIDE 0.9 % (FLUSH) 0.9 %
5-40 SYRINGE (ML) INJECTION PRN
OUTPATIENT
Start: 2025-02-18

## 2025-01-28 RX ORDER — SODIUM CHLORIDE 9 MG/ML
5-250 INJECTION, SOLUTION INTRAVENOUS PRN
OUTPATIENT
Start: 2025-02-18

## 2025-01-28 RX ORDER — SODIUM CHLORIDE 0.9 % (FLUSH) 0.9 %
5-40 SYRINGE (ML) INJECTION PRN
OUTPATIENT
Start: 2025-02-25

## 2025-01-28 RX ORDER — HEPARIN 100 UNIT/ML
500 SYRINGE INTRAVENOUS PRN
OUTPATIENT
Start: 2025-02-18

## 2025-01-28 RX ORDER — ONDANSETRON 2 MG/ML
8 INJECTION INTRAMUSCULAR; INTRAVENOUS
OUTPATIENT
Start: 2025-03-25

## 2025-01-28 RX ORDER — SODIUM CHLORIDE 9 MG/ML
5-250 INJECTION, SOLUTION INTRAVENOUS PRN
Status: DISCONTINUED | OUTPATIENT
Start: 2025-01-28 | End: 2025-01-29 | Stop reason: HOSPADM

## 2025-01-28 RX ORDER — HYDROCORTISONE SODIUM SUCCINATE 100 MG/2ML
100 INJECTION INTRAMUSCULAR; INTRAVENOUS
OUTPATIENT
Start: 2025-03-25

## 2025-01-28 RX ORDER — SODIUM CHLORIDE 9 MG/ML
5-250 INJECTION, SOLUTION INTRAVENOUS PRN
OUTPATIENT
Start: 2025-02-25

## 2025-01-28 RX ORDER — SODIUM CHLORIDE 9 MG/ML
5-250 INJECTION, SOLUTION INTRAVENOUS PRN
OUTPATIENT
Start: 2025-03-25

## 2025-01-28 RX ORDER — ONDANSETRON 2 MG/ML
8 INJECTION INTRAMUSCULAR; INTRAVENOUS
OUTPATIENT
Start: 2025-02-25

## 2025-01-28 RX ORDER — ALBUTEROL SULFATE 90 UG/1
4 INHALANT RESPIRATORY (INHALATION) PRN
OUTPATIENT
Start: 2025-02-18

## 2025-01-28 RX ORDER — ONDANSETRON 2 MG/ML
8 INJECTION INTRAMUSCULAR; INTRAVENOUS
OUTPATIENT
Start: 2025-02-18

## 2025-01-28 RX ORDER — SODIUM CHLORIDE 9 MG/ML
INJECTION, SOLUTION INTRAVENOUS CONTINUOUS
OUTPATIENT
Start: 2025-02-18

## 2025-01-28 RX ORDER — FAMOTIDINE 10 MG/ML
20 INJECTION, SOLUTION INTRAVENOUS
OUTPATIENT
Start: 2025-02-18

## 2025-01-28 RX ORDER — FAMOTIDINE 10 MG/ML
20 INJECTION, SOLUTION INTRAVENOUS
OUTPATIENT
Start: 2025-03-25

## 2025-01-28 RX ORDER — DIPHENHYDRAMINE HYDROCHLORIDE 50 MG/ML
50 INJECTION INTRAMUSCULAR; INTRAVENOUS
OUTPATIENT
Start: 2025-02-18

## 2025-01-28 RX ORDER — ACETAMINOPHEN 325 MG/1
650 TABLET ORAL
OUTPATIENT
Start: 2025-03-25

## 2025-01-28 RX ORDER — SODIUM CHLORIDE 0.9 % (FLUSH) 0.9 %
5-40 SYRINGE (ML) INJECTION PRN
OUTPATIENT
Start: 2025-03-25

## 2025-01-28 RX ORDER — SODIUM CHLORIDE 9 MG/ML
INJECTION, SOLUTION INTRAVENOUS CONTINUOUS
OUTPATIENT
Start: 2025-02-25

## 2025-01-28 RX ORDER — EPINEPHRINE 1 MG/ML
0.3 INJECTION, SOLUTION, CONCENTRATE INTRAVENOUS PRN
OUTPATIENT
Start: 2025-03-25

## 2025-01-28 RX ORDER — ACETAMINOPHEN 325 MG/1
650 TABLET ORAL
OUTPATIENT
Start: 2025-02-25

## 2025-01-28 RX ORDER — ACETAMINOPHEN 325 MG/1
650 TABLET ORAL
OUTPATIENT
Start: 2025-02-18

## 2025-01-28 RX ORDER — HYDROCORTISONE SODIUM SUCCINATE 100 MG/2ML
100 INJECTION INTRAMUSCULAR; INTRAVENOUS
OUTPATIENT
Start: 2025-02-18

## 2025-01-28 RX ORDER — SODIUM CHLORIDE 9 MG/ML
INJECTION, SOLUTION INTRAVENOUS CONTINUOUS
OUTPATIENT
Start: 2025-03-25

## 2025-01-28 RX ORDER — EPINEPHRINE 1 MG/ML
0.3 INJECTION, SOLUTION, CONCENTRATE INTRAVENOUS PRN
OUTPATIENT
Start: 2025-02-25

## 2025-01-28 RX ORDER — DIPHENHYDRAMINE HYDROCHLORIDE 50 MG/ML
50 INJECTION INTRAMUSCULAR; INTRAVENOUS
OUTPATIENT
Start: 2025-02-25

## 2025-01-28 RX ADMIN — SODIUM CHLORIDE 25 ML/HR: 9 INJECTION, SOLUTION INTRAVENOUS at 15:32

## 2025-01-28 RX ADMIN — SODIUM CHLORIDE 425 MG: 9 INJECTION, SOLUTION INTRAVENOUS at 16:05

## 2025-01-28 RX ADMIN — FERRIC CARBOXYMALTOSE INJECTION 750 MG: 50 INJECTION, SOLUTION INTRAVENOUS at 15:33

## 2025-01-28 ASSESSMENT — PAIN SCALES - GENERAL: PAINLEVEL_OUTOF10: 0

## 2025-01-28 NOTE — PROGRESS NOTES
[facility] [place] at [facility]  [address], [address] [place] [address]  W: [phone]  F: [phone]      Reason for Visit:   Williams Fitzgerald Jr is a 84 y.o. male who is seen today for evaluation of anemia, HHT.    History of Present Illness:     Hospitalized since last visit at [facility] due to epistaxis. Had cauterization with ENT in surgery. Had blood transfusion while admitted due to dropping Hgb. States he is feeling well overall. No current bleeding noted. Seems to be tolerating Avastin well.     He is accompanied by his wife today.    Review of systems was obtained and pertinent findings reviewed above. Past medical history, social history, family history, medications, and allergies are located in the electronic medical record.    Physical Exam:   Visit Vitals  /65 (Site: Right Upper Arm, Position: Sitting, Cuff Size: Large Adult)   Pulse 83   Temp 98 °F (36.7 °C) (Temporal)   Resp 20   SpO2 97%       General: no distress  Respiratory: normal respiratory effort  CV: no peripheral edema  Skin: no rashes; no ecchymoses; no petechiae    Results:     Lab Results   Component Value Date    WBC 5.6 01/27/2025    HGB 8.9 (L) 01/27/2025    HCT 29.6 (L) 01/27/2025     01/27/2025    MCV 84.3 01/27/2025    NEUTROABS 3.90 01/27/2025     Lab Results   Component Value Date     01/25/2025    K 4.2 01/25/2025     01/25/2025    CO2 27 01/25/2025    GLUCOSE 84 01/25/2025    BUN 17 01/25/2025    CREATININE 0.99 01/25/2025    LABGLOM 75 01/25/2025    CALCIUM 8.9 01/25/2025    MG 2.2 01/16/2025    PHOS 2.9 01/16/2025     Lab Results   Component Value Date    BILITOT 0.3 01/25/2025    ALT 20 01/25/2025    AST 16 01/25/2025    ALKPHOS 82 01/25/2025    GLOB 3.7 01/25/2025     Lab Results   Component Value Date    RETICCTPCT 1.3 02/24/2021    IRON 62 12/09/2024    TIBC 241 (L) 12/09/2024    IRONPERSAT 26 12/09/2024    FERRITIN 201 12/09/2024    [id number] 504 12/09/2024    FOLATE 
Williams RYAN Fitzgerald Jr is a 84 y.o. male follow up for anemia and HHT.    1. Have you been to the ER, urgent care clinic since your last visit?  Hospitalized since your last visit?{no    2. Have you seen or consulted any other health care providers outside of the [facility] since your last visit?  Include any pap smears or colon screening. no  
No

## 2025-01-28 NOTE — PROGRESS NOTES
[facility] Progress Note    Date: January 28, 2025    1330: Mr. Amilcar Ramirez Arrived ambulatory and in stable condition to the [facility] for  C7D1 Avastin Regimen + Injectafer.  Assessment was completed, no new complaints. 24G PIV placed to left arm with positive blood return. Labs drawn yesterday. Went to provider appointment with Medical Oncology. Returned from provider appointment. Labs reviewed. Criteria for treatment was met.    Mr. Amilcar Ramirez's vitals were reviewed.  Patient Vitals for the past 12 hrs:   Temp Pulse Resp BP   01/28/25 1630 -- 78 -- 139/63   01/28/25 1345 97.7 °F (36.5 °C) 80 18 118/66     Medications:  Medications Administered         0.9 % sodium chloride infusion Admin Date  01/28/2025 Action  New Bag Dose  25 mL/hr Rate  25 mL/hr Route  IntraVENous Documented By  Isabel Kenney, RN        bevacizumab-bvzr (ZIRABEV) 425 mg in sodium chloride 0.9 % 100 mL chemo IVPB Admin Date  01/28/2025 Action  New Bag Dose  425 mg Rate  254 mL/hr Route  IntraVENous Documented By  Isabel Kenney RN        ferric carboxymaltose (INJECTAFER) 750 mg in sodium chloride 0.9 % 250 mL IVPB Admin Date  01/28/2025 Action  New Bag Dose  750 mg Rate  870 mL/hr Route  IntraVENous Documented By  Isabel Kenney, JOHN          Two nurses verified prior to administering: Drug name, Drug dose, Infusion volume or drug volume when prepared in a syringe, Rate of administration, Route of administration, Expiration dates and/or times, Appearance and physical integrity of the drugs, Rate set on infusion pump, when used, and Sequencing of drug administration.    Patient tolerated treatment well.  PIV maintained blood return throughout treatment. PIV removed and 2x2 with coban placed. Patient was discharged in stable condition. Patient is aware of next scheduled [facility] appointment on February 19, 2025 at 1400.    Future Appointments   Date Time Provider Department Center   2/19/2025  2:00 PM [facility]   2/19/2025  2:20 PM

## 2025-01-31 ENCOUNTER — HOSPITAL ENCOUNTER (EMERGENCY)
Facility: HOSPITAL | Age: 85
Discharge: HOME | End: 2025-01-31
Attending: EMERGENCY MEDICINE
Payer: MEDICARE

## 2025-01-31 VITALS
RESPIRATION RATE: 14 BRPM | BODY MASS INDEX: 25.1 KG/M2 | TEMPERATURE: 98.4 F | OXYGEN SATURATION: 100 % | WEIGHT: 170 LBS | SYSTOLIC BLOOD PRESSURE: 117 MMHG | DIASTOLIC BLOOD PRESSURE: 52 MMHG | HEART RATE: 69 BPM

## 2025-01-31 DIAGNOSIS — T14.8XXA INFECTED SKIN TEAR: Primary | ICD-10-CM

## 2025-01-31 DIAGNOSIS — L08.9 INFECTED SKIN TEAR: Primary | ICD-10-CM

## 2025-01-31 PROCEDURE — 99283 EMERGENCY DEPT VISIT LOW MDM: CPT

## 2025-01-31 PROCEDURE — 6370000000 HC RX 637 (ALT 250 FOR IP): Performed by: EMERGENCY MEDICINE

## 2025-01-31 RX ORDER — CEPHALEXIN 500 MG/1
500 CAPSULE ORAL 3 TIMES DAILY
Qty: 21 CAPSULE | Refills: 0 | Status: SHIPPED | OUTPATIENT
Start: 2025-01-31 | End: 2025-02-07

## 2025-01-31 RX ORDER — CEPHALEXIN 500 MG/1
500 CAPSULE ORAL 3 TIMES DAILY
Qty: 21 CAPSULE | Refills: 0 | Status: SHIPPED
Start: 2025-01-31 | End: 2025-01-31

## 2025-01-31 RX ORDER — GINSENG 100 MG
CAPSULE ORAL
Status: COMPLETED | OUTPATIENT
Start: 2025-01-31 | End: 2025-01-31

## 2025-01-31 RX ADMIN — BACITRACIN: 500 OINTMENT TOPICAL at 14:39

## 2025-01-31 RX ADMIN — CEPHALEXIN 500 MG: 250 CAPSULE ORAL at 14:39

## 2025-01-31 NOTE — ED TRIAGE NOTES
Pt ambulated to the treatment area with a steady gait. Pt states \"I have a wound on my right forearm it was treated at [facility] I wanted it checked today.\"

## 2025-01-31 NOTE — ED PROVIDER NOTES
[facility] EMERGENCY DEPARTMENT  EMERGENCY DEPARTMENT ENCOUNTER      Pt Name: Williams Fitzgerald Jr  MRN: [id number]  Birthdate 1940  Date of evaluation: 1/31/2025  Provider: Scottie Pizarro DO      HISTORY OF PRESENT ILLNESS      HPI  84-year-old male presents to the emergency department stating that he had a wound on his right forearm that he would like to be checked out.  He states that he was recently at [facility] for evaluation of an aspiration into his airway and a cough.  He states that the wound is dressed currently but he would like it rechecked today.  He is unable to clearly say exactly what happened to cause the injury to his forearm but appears to have a skin tear to the forearm.      Nursing Notes were reviewed.    REVIEW OF SYSTEMS         Review of Systems        PAST MEDICAL HISTORY     Past Medical History:   Diagnosis Date    HHT (hereditary hemorrhagic telangiectasia) (HCC)     Iron deficiency anemia     Prostate cancer (HCC)     Pulmonary arteriovenous malformation          SURGICAL HISTORY       Past Surgical History:   Procedure Laterality Date    APPENDECTOMY      CHEST SURGERY      excision of AVM    COLONOSCOPY N/A 1/29/2020    COLONOSCOPY performed by Manas Barr MD at [facility] ENDOSCOPY    COLONOSCOPY  2014    due 19    NOSE SURGERY Bilateral 1/21/2025    BILATERAL NASAL ENDOSCOPY WITH CONTROL OF EPISTAXIS performed by Wyatt Banegas DO at [facility] MAIN OR    OTHER SURGICAL HISTORY  07/2018    sclerotheropy    TONSILLECTOMY           CURRENT MEDICATIONS       Previous Medications    AMOXICILLIN-CLAVULANATE (AUGMENTIN) 875-125 MG PER TABLET    Take 1 tablet by mouth 2 times daily for 7 days    ASCORBIC ACID (VITAMIN C) 250 MG TABLET    Take by mouth    DICYCLOMINE (BENTYL) 20 MG TABLET    TAKE 1 TABLET BY MOUTH 3 TIMES DAILY AS NEEDED FOR ABDOMINAL CRAMPS (NEW DOSE/NEW DIRECTIONS)    OXYMETAZOLINE (12 HOUR NASAL SPRAY) 0.05 % NASAL SPRAY    2 sprays by Nasal route 2 times daily

## 2025-02-05 ENCOUNTER — APPOINTMENT (OUTPATIENT)
Facility: HOSPITAL | Age: 85
End: 2025-02-05
Payer: MEDICARE

## 2025-02-05 ENCOUNTER — HOSPITAL ENCOUNTER (EMERGENCY)
Facility: HOSPITAL | Age: 85
Discharge: HOME | End: 2025-02-05
Attending: EMERGENCY MEDICINE
Payer: MEDICARE

## 2025-02-05 VITALS
WEIGHT: 170 LBS | HEIGHT: 69 IN | RESPIRATION RATE: 16 BRPM | DIASTOLIC BLOOD PRESSURE: 53 MMHG | HEART RATE: 76 BPM | OXYGEN SATURATION: 100 % | SYSTOLIC BLOOD PRESSURE: 105 MMHG | TEMPERATURE: 97.5 F | BODY MASS INDEX: 25.18 KG/M2

## 2025-02-05 DIAGNOSIS — K40.90 UNILATERAL INGUINAL HERNIA WITHOUT OBSTRUCTION OR GANGRENE, RECURRENCE NOT SPECIFIED: Primary | ICD-10-CM

## 2025-02-05 PROCEDURE — 99284 EMERGENCY DEPT VISIT MOD MDM: CPT

## 2025-02-05 PROCEDURE — 76882 US LMTD JT/FCL EVL NVASC XTR: CPT

## 2025-02-05 ASSESSMENT — PAIN - FUNCTIONAL ASSESSMENT: PAIN_FUNCTIONAL_ASSESSMENT: 0-10

## 2025-02-05 ASSESSMENT — PAIN SCALES - GENERAL: PAINLEVEL_OUTOF10: 0

## 2025-02-05 NOTE — ED TRIAGE NOTES
Pt arrives to the ER for complaints of right sided groin swelling that he noticed last night. Pt reports that the swelling has gone down since last night.     Reports that his PCP, Dr. Hallman, referred him to the ER.     Denies any pain, changes in urination. Pt denies any symptoms at this time.

## 2025-02-05 NOTE — ED PROVIDER NOTES
[facility] EMERGENCY DEPARTMENT  EMERGENCY DEPARTMENT ENCOUNTER      Pt Name: Williams Fitzgerald Jr  MRN: [id number]  Birthdate 1940  Date of evaluation: 2/5/2025  Provider: Brayan Morrison MD    CHIEF COMPLAINT       Chief Complaint   Patient presents with    Groin Swelling         HISTORY OF PRESENT ILLNESS   (Location/Symptom, Timing/Onset, Context/Setting, Quality, Duration, Modifying Factors, Severity)  Note limiting factors.   Mr. Fitzgerald is an 83yo male who presents to the ER with complaints of swelling to his right groin.  He said that he has noticed that intermittently over the last few days.  He notices it when he is been standing up for a while.  When he lays flat, he noticed that the swelling goes away.  He said that it was a little bit painful yesterday.  He does not have any swelling this morning.  He does not have any pain this morning.  He called his doctor.  His doctor will see him on Friday.  However, he said he is probably seen in the emergency room before then.  He denies any fevers or chills.  No nausea or vomiting.  No changes with his urine or bowel movements.  He denies similar symptoms in the past.  He denies any other complaints.            Review of External Medical Records:     Nursing Notes were reviewed.    REVIEW OF SYSTEMS    (2-9 systems for level 4, 10 or more for level 5)     Review of Systems   Genitourinary:         Groin swelling       Except as noted above the remainder of the review of systems was reviewed and negative.       PAST MEDICAL HISTORY     Past Medical History:   Diagnosis Date    HHT (hereditary hemorrhagic telangiectasia) (HCC)     Iron deficiency anemia     Prostate cancer (HCC)     Pulmonary arteriovenous malformation          SURGICAL HISTORY       Past Surgical History:   Procedure Laterality Date    APPENDECTOMY      CHEST SURGERY      excision of AVM    COLONOSCOPY N/A 1/29/2020    COLONOSCOPY performed by Manas Barr MD at [facility]

## 2025-02-15 PROBLEM — N39.0 UTI (URINARY TRACT INFECTION): Status: RESOLVED | Noted: 2025-01-16 | Resolved: 2025-02-15

## 2025-02-17 ENCOUNTER — TELEPHONE (OUTPATIENT)
Age: 85
End: 2025-02-17

## 2025-02-17 ENCOUNTER — HOSPITAL ENCOUNTER (EMERGENCY)
Facility: HOSPITAL | Age: 85
Discharge: HOME | End: 2025-02-17
Attending: STUDENT IN AN ORGANIZED HEALTH CARE EDUCATION/TRAINING PROGRAM
Payer: MEDICARE

## 2025-02-17 VITALS
DIASTOLIC BLOOD PRESSURE: 71 MMHG | OXYGEN SATURATION: 100 % | SYSTOLIC BLOOD PRESSURE: 144 MMHG | TEMPERATURE: 97.7 F | HEART RATE: 80 BPM | BODY MASS INDEX: 22.35 KG/M2 | HEIGHT: 72 IN | RESPIRATION RATE: 18 BRPM | WEIGHT: 165 LBS

## 2025-02-17 DIAGNOSIS — S51.811A SKIN TEAR OF RIGHT FOREARM WITHOUT COMPLICATION, INITIAL ENCOUNTER: Primary | ICD-10-CM

## 2025-02-17 DIAGNOSIS — C61 PROSTATE CANCER (HCC): Primary | ICD-10-CM

## 2025-02-17 PROCEDURE — 99283 EMERGENCY DEPT VISIT LOW MDM: CPT

## 2025-02-17 RX ORDER — DIPHENHYDRAMINE HYDROCHLORIDE 50 MG/ML
50 INJECTION INTRAMUSCULAR; INTRAVENOUS
OUTPATIENT
Start: 2025-03-11

## 2025-02-17 RX ORDER — SODIUM CHLORIDE 9 MG/ML
INJECTION, SOLUTION INTRAVENOUS CONTINUOUS
OUTPATIENT
Start: 2025-03-11

## 2025-02-17 RX ORDER — ALBUTEROL SULFATE 90 UG/1
4 INHALANT RESPIRATORY (INHALATION) PRN
OUTPATIENT
Start: 2025-03-11

## 2025-02-17 RX ORDER — ONDANSETRON 2 MG/ML
8 INJECTION INTRAMUSCULAR; INTRAVENOUS
OUTPATIENT
Start: 2025-03-11

## 2025-02-17 RX ORDER — EPINEPHRINE 1 MG/ML
0.3 INJECTION, SOLUTION, CONCENTRATE INTRAVENOUS PRN
OUTPATIENT
Start: 2025-03-11

## 2025-02-17 RX ORDER — SODIUM CHLORIDE 9 MG/ML
5-250 INJECTION, SOLUTION INTRAVENOUS PRN
OUTPATIENT
Start: 2025-03-11

## 2025-02-17 RX ORDER — HEPARIN SODIUM (PORCINE) LOCK FLUSH IV SOLN 100 UNIT/ML 100 UNIT/ML
500 SOLUTION INTRAVENOUS PRN
OUTPATIENT
Start: 2025-03-11

## 2025-02-17 RX ORDER — ACETAMINOPHEN 325 MG/1
650 TABLET ORAL
OUTPATIENT
Start: 2025-03-11

## 2025-02-17 RX ORDER — CEPHALEXIN 500 MG/1
500 CAPSULE ORAL 3 TIMES DAILY
Qty: 21 CAPSULE | Refills: 0 | Status: SHIPPED | OUTPATIENT
Start: 2025-02-17 | End: 2025-02-24

## 2025-02-17 RX ORDER — FAMOTIDINE 10 MG/ML
20 INJECTION, SOLUTION INTRAVENOUS
OUTPATIENT
Start: 2025-03-11

## 2025-02-17 RX ORDER — HYDROCORTISONE SODIUM SUCCINATE 100 MG/2ML
100 INJECTION INTRAMUSCULAR; INTRAVENOUS
OUTPATIENT
Start: 2025-03-11

## 2025-02-17 RX ORDER — SODIUM CHLORIDE 0.9 % (FLUSH) 0.9 %
5-40 SYRINGE (ML) INJECTION PRN
OUTPATIENT
Start: 2025-03-11

## 2025-02-17 ASSESSMENT — PAIN SCALES - GENERAL: PAINLEVEL_OUTOF10: 0

## 2025-02-17 NOTE — ED NOTES
Wound to right arm noted; new dressing applied and wound care instructions given. Patient and spouse verbalize understanding and agreement.   The patient was discharged home by provider in stable condition. The patient is alert and oriented, in no respiratory distress and discharge vital signs obtained. The patient's diagnosis, condition and treatment were explained. The patient expressed understanding. No prescriptions given. No work/school note given. A discharge plan has been developed. A  was not involved in the process. Aftercare instructions were given.  Pt ambulatory out of the ED with family.

## 2025-02-17 NOTE — TELEPHONE ENCOUNTER
Pt daughter called in requesting to reschedule the pt's upcoming opic and follow up appt to an earlier time due to the weather.     #[phone]

## 2025-02-17 NOTE — ED TRIAGE NOTES
GCS 15. Patient ambulatory to treatment area. Patient states he had a skin tear from adhesive tape on his right forearm.  Patient wanted to make sure that his wound is healing.

## 2025-02-17 NOTE — DISCHARGE INSTRUCTIONS
Take the prescribed antibiotic to treat any skin infection.  Continue the wound care as you previously were.  Apply the bacitracin twice daily, keep the area clean, dry and covered in a nonadherent dressing.  Return to the emergency department if the area worsens, spreads, develop fevers or other symptoms.

## 2025-02-17 NOTE — TELEPHONE ENCOUNTER
Spoke with patients daughter and got his appointments rescheduled to next week, she had no further questions.

## 2025-02-18 NOTE — ED PROVIDER NOTES
[facility] EMERGENCY DEPARTMENT  EMERGENCY DEPARTMENT ENCOUNTER      Pt Name: Williams Fitzgerald Jr  MRN: [id number]  Birthdate 1940  Date of evaluation: 2/17/2025  Provider: Zac Irwin MD    CHIEF COMPLAINT       Chief Complaint   Patient presents with    Wound Check       HISTORY OF PRESENT ILLNESS    HPI    84m hx HHT here for R forearm skin tear. Sustained this skin tear during an admission to the hospital last month, it was from adhseive tape that was on his arm. States has been applying bacitracin and using nonadherent dressings. States it is improving over time but very slowly. Is concerned because it has been nearly a month since injury. HE denies fevers, chills, nausea, vomiting or other constutional symptoms.     Nursing notes reviewed.    REVIEW OF SYSTEMS     Review of Systems  Unless otherwise stated, a complete review of systems was asked of the patient. Pertinent positives are noted in the HPI section.    PAST MEDICAL HISTORY     Past Medical History:   Diagnosis Date    HHT (hereditary hemorrhagic telangiectasia) (HCC)     Iron deficiency anemia     Prostate cancer (HCC)     Pulmonary arteriovenous malformation        SURGICAL HISTORY       Past Surgical History:   Procedure Laterality Date    APPENDECTOMY      CHEST SURGERY      excision of AVM    COLONOSCOPY N/A 1/29/2020    COLONOSCOPY performed by Manas Barr MD at [facility] ENDOSCOPY    COLONOSCOPY  2014    due 19    NOSE SURGERY Bilateral 1/21/2025    BILATERAL NASAL ENDOSCOPY WITH CONTROL OF EPISTAXIS performed by Wyatt Banegas DO at [facility] MAIN OR    OTHER SURGICAL HISTORY  07/2018    sclerotheropy    TONSILLECTOMY         CURRENT MEDICATIONS       Discharge Medication List as of 2/17/2025  2:59 PM        CONTINUE these medications which have NOT CHANGED    Details   sodium chloride (OCEAN, BABY AYR) 0.65 % nasal spray 2 sprays by Nasal route every 8 hours for 14 days, Disp-16.8 mL, R-0Normal      vitamin B-12 1000 MCG tablet

## 2025-02-19 ENCOUNTER — HOSPITAL ENCOUNTER (OUTPATIENT)
Facility: HOSPITAL | Age: 85
Setting detail: INFUSION SERIES
End: 2025-02-19

## 2025-02-24 DIAGNOSIS — I78.0 HHT (HEREDITARY HEMORRHAGIC TELANGIECTASIA): ICD-10-CM

## 2025-02-25 DIAGNOSIS — I78.0 HHT (HEREDITARY HEMORRHAGIC TELANGIECTASIA): Primary | ICD-10-CM

## 2025-02-25 DIAGNOSIS — D50.9 IRON DEFICIENCY ANEMIA, UNSPECIFIED IRON DEFICIENCY ANEMIA TYPE: ICD-10-CM

## 2025-02-25 LAB
BASOPHILS # BLD: 0.07 K/UL (ref 0–0.1)
BASOPHILS NFR BLD: 1.3 % (ref 0–1)
DIFFERENTIAL METHOD BLD: ABNORMAL
EOSINOPHIL # BLD: 0.34 K/UL (ref 0–0.4)
EOSINOPHIL NFR BLD: 6.3 % (ref 0–7)
ERYTHROCYTE [DISTWIDTH] IN BLOOD BY AUTOMATED COUNT: 19.8 % (ref 11.5–14.5)
HCT VFR BLD AUTO: 37.5 % (ref 36.6–50.3)
HGB BLD-MCNC: 11.3 G/DL (ref 12.1–17)
IMM GRANULOCYTES # BLD AUTO: 0.02 K/UL (ref 0–0.04)
IMM GRANULOCYTES NFR BLD AUTO: 0.4 % (ref 0–0.5)
LYMPHOCYTES # BLD: 0.59 K/UL (ref 0.8–3.5)
LYMPHOCYTES NFR BLD: 10.9 % (ref 12–49)
MCH RBC QN AUTO: 27.6 PG (ref 26–34)
MCHC RBC AUTO-ENTMCNC: 30.1 G/DL (ref 30–36.5)
MCV RBC AUTO: 91.5 FL (ref 80–99)
MONOCYTES # BLD: 0.81 K/UL (ref 0–1)
MONOCYTES NFR BLD: 15 % (ref 5–13)
NEUTS SEG # BLD: 3.57 K/UL (ref 1.8–8)
NEUTS SEG NFR BLD: 66.1 % (ref 32–75)
NRBC # BLD: 0 K/UL (ref 0–0.01)
NRBC BLD-RTO: 0 PER 100 WBC
PLATELET # BLD AUTO: 191 K/UL (ref 150–400)
PMV BLD AUTO: 12.3 FL (ref 8.9–12.9)
RBC # BLD AUTO: 4.1 M/UL (ref 4.1–5.7)
RBC MORPH BLD: ABNORMAL
WBC # BLD AUTO: 5.4 K/UL (ref 4.1–11.1)

## 2025-02-25 RX ORDER — SODIUM CHLORIDE 9 MG/ML
5-250 INJECTION, SOLUTION INTRAVENOUS PRN
OUTPATIENT
Start: 2025-03-11

## 2025-02-25 RX ORDER — SODIUM CHLORIDE 0.9 % (FLUSH) 0.9 %
5-40 SYRINGE (ML) INJECTION PRN
OUTPATIENT
Start: 2025-03-11

## 2025-02-25 RX ORDER — SODIUM CHLORIDE 9 MG/ML
INJECTION, SOLUTION INTRAVENOUS CONTINUOUS
OUTPATIENT
Start: 2025-03-11

## 2025-02-25 RX ORDER — ALBUTEROL SULFATE 90 UG/1
4 INHALANT RESPIRATORY (INHALATION) PRN
OUTPATIENT
Start: 2025-03-11

## 2025-02-25 RX ORDER — ONDANSETRON 2 MG/ML
8 INJECTION INTRAMUSCULAR; INTRAVENOUS
OUTPATIENT
Start: 2025-03-11

## 2025-02-25 RX ORDER — ACETAMINOPHEN 325 MG/1
650 TABLET ORAL
OUTPATIENT
Start: 2025-03-11

## 2025-02-25 RX ORDER — EPINEPHRINE 1 MG/ML
0.3 INJECTION, SOLUTION, CONCENTRATE INTRAVENOUS PRN
OUTPATIENT
Start: 2025-03-11

## 2025-02-25 RX ORDER — FAMOTIDINE 10 MG/ML
20 INJECTION, SOLUTION INTRAVENOUS
OUTPATIENT
Start: 2025-03-11

## 2025-02-25 RX ORDER — HEPARIN SODIUM (PORCINE) LOCK FLUSH IV SOLN 100 UNIT/ML 100 UNIT/ML
500 SOLUTION INTRAVENOUS PRN
OUTPATIENT
Start: 2025-03-11

## 2025-02-25 RX ORDER — DIPHENHYDRAMINE HYDROCHLORIDE 50 MG/ML
50 INJECTION INTRAMUSCULAR; INTRAVENOUS
OUTPATIENT
Start: 2025-03-11

## 2025-02-25 RX ORDER — HYDROCORTISONE SODIUM SUCCINATE 100 MG/2ML
100 INJECTION INTRAMUSCULAR; INTRAVENOUS
OUTPATIENT
Start: 2025-03-11

## 2025-02-26 ENCOUNTER — OFFICE VISIT (OUTPATIENT)
Age: 85
End: 2025-02-26
Payer: MEDICARE

## 2025-02-26 VITALS — HEART RATE: 59 BPM | OXYGEN SATURATION: 93 % | TEMPERATURE: 98.1 F | RESPIRATION RATE: 18 BRPM

## 2025-02-26 DIAGNOSIS — I78.0 HHT (HEREDITARY HEMORRHAGIC TELANGIECTASIA): Primary | ICD-10-CM

## 2025-02-26 DIAGNOSIS — C61 PROSTATE CANCER (HCC): ICD-10-CM

## 2025-02-26 DIAGNOSIS — D50.0 IRON DEFICIENCY ANEMIA DUE TO CHRONIC BLOOD LOSS: ICD-10-CM

## 2025-02-26 PROCEDURE — 1126F AMNT PAIN NOTED NONE PRSNT: CPT | Performed by: NURSE PRACTITIONER

## 2025-02-26 PROCEDURE — 1036F TOBACCO NON-USER: CPT | Performed by: NURSE PRACTITIONER

## 2025-02-26 PROCEDURE — 99215 OFFICE O/P EST HI 40 MIN: CPT | Performed by: NURSE PRACTITIONER

## 2025-02-26 PROCEDURE — G8420 CALC BMI NORM PARAMETERS: HCPCS | Performed by: NURSE PRACTITIONER

## 2025-02-26 PROCEDURE — 1123F ACP DISCUSS/DSCN MKR DOCD: CPT | Performed by: NURSE PRACTITIONER

## 2025-02-26 PROCEDURE — G8427 DOCREV CUR MEDS BY ELIG CLIN: HCPCS | Performed by: NURSE PRACTITIONER

## 2025-02-26 PROCEDURE — 1160F RVW MEDS BY RX/DR IN RCRD: CPT | Performed by: NURSE PRACTITIONER

## 2025-02-26 PROCEDURE — 1159F MED LIST DOCD IN RCRD: CPT | Performed by: NURSE PRACTITIONER

## 2025-02-26 NOTE — PROGRESS NOTES
Williams RYAN Fitzgerald Jr is a 84 y.o. male follow up for anemia and HHT.         1. Have you been to the ER, urgent care clinic since your last visit?  Hospitalized since your last visit?{no    2. Have you seen or consulted any other health care providers outside of the [facility] since your last visit?  Include any pap smears or colon screening. no

## 2025-02-26 NOTE — PROGRESS NOTES
Cancer [place] at [facility]  [address], [address] [facility] [address]  W: [phone]  F: [phone]      Reason for Visit:   Williams Fitzgerald Jr is a 84 y.o. male who is seen today for evaluation of anemia, HHT.    History of Present Illness:   States since last Avastin treatment he felt that nose bleedings were better. In the last week he had 2 more nose bleeds that were pretty bad and almost brought him back to the ED. Having nose bleeding currently.     Started PT last week. Feels an upswing of his medical care. Feels better overall.     Seen in ED on 2/18/2025 due to skin tear to right forearm.     He is accompanied by his wife today.    Review of systems was obtained and pertinent findings reviewed above. Past medical history, social history, family history, medications, and allergies are located in the electronic medical record.    Physical Exam:   Visit Vitals  Pulse 59   Temp 98.1 °F (36.7 °C) (Temporal)   Resp 18   SpO2 93%       General: no distress  Respiratory: normal respiratory effort  CV: no peripheral edema  Skin: no rashes; no ecchymoses; no petechiae    Results:     Lab Results   Component Value Date    WBC 5.4 02/24/2025    HGB 11.3 (L) 02/24/2025    HCT 37.5 02/24/2025     02/24/2025    MCV 91.5 02/24/2025    NEUTROABS 3.57 02/24/2025     Lab Results   Component Value Date     01/25/2025    K 4.2 01/25/2025     01/25/2025    CO2 27 01/25/2025    GLUCOSE 84 01/25/2025    BUN 17 01/25/2025    CREATININE 0.99 01/25/2025    LABGLOM 75 01/25/2025    CALCIUM 8.9 01/25/2025    MG 2.2 01/16/2025    PHOS 2.9 01/16/2025     Lab Results   Component Value Date    BILITOT 0.3 01/25/2025    ALT 20 01/25/2025    AST 16 01/25/2025    ALKPHOS 82 01/25/2025    GLOB 3.7 01/25/2025     Lab Results   Component Value Date    RETICCTPCT 1.3 02/24/2021    IRON 62 12/09/2024    TIBC 241 (L) 12/09/2024    IRONPERSAT 26 12/09/2024    FERRITIN 201 12/09/2024

## 2025-03-03 ENCOUNTER — HOSPITAL ENCOUNTER (OUTPATIENT)
Facility: HOSPITAL | Age: 85
Discharge: HOME | End: 2025-03-03
Attending: SURGERY
Payer: MEDICARE

## 2025-03-03 VITALS
RESPIRATION RATE: 16 BRPM | TEMPERATURE: 97.8 F | DIASTOLIC BLOOD PRESSURE: 70 MMHG | HEART RATE: 80 BPM | SYSTOLIC BLOOD PRESSURE: 128 MMHG

## 2025-03-03 DIAGNOSIS — S51.801A OPEN WOUND OF RIGHT FOREARM, INITIAL ENCOUNTER: Primary | ICD-10-CM

## 2025-03-03 PROCEDURE — 99213 OFFICE O/P EST LOW 20 MIN: CPT

## 2025-03-03 PROCEDURE — 99202 OFFICE O/P NEW SF 15 MIN: CPT | Performed by: SURGERY

## 2025-03-03 RX ORDER — GINSENG 100 MG
CAPSULE ORAL PRN
OUTPATIENT
Start: 2025-03-03

## 2025-03-03 RX ORDER — NEOMYCIN/BACITRACIN/POLYMYXINB 3.5-400-5K
OINTMENT (GRAM) TOPICAL PRN
OUTPATIENT
Start: 2025-03-03

## 2025-03-03 RX ORDER — LIDOCAINE HYDROCHLORIDE 20 MG/ML
JELLY TOPICAL PRN
OUTPATIENT
Start: 2025-03-03

## 2025-03-03 RX ORDER — CLOBETASOL PROPIONATE 0.5 MG/G
OINTMENT TOPICAL PRN
OUTPATIENT
Start: 2025-03-03

## 2025-03-03 RX ORDER — LIDOCAINE 50 MG/G
OINTMENT TOPICAL PRN
OUTPATIENT
Start: 2025-03-03

## 2025-03-03 RX ORDER — MUPIROCIN 20 MG/G
OINTMENT TOPICAL PRN
OUTPATIENT
Start: 2025-03-03

## 2025-03-03 RX ORDER — TRIAMCINOLONE ACETONIDE 1 MG/G
OINTMENT TOPICAL PRN
OUTPATIENT
Start: 2025-03-03

## 2025-03-03 RX ORDER — LIDOCAINE HYDROCHLORIDE 40 MG/ML
SOLUTION TOPICAL PRN
OUTPATIENT
Start: 2025-03-03

## 2025-03-03 RX ORDER — GENTAMICIN SULFATE 1 MG/G
OINTMENT TOPICAL PRN
OUTPATIENT
Start: 2025-03-03

## 2025-03-03 RX ORDER — SILVER SULFADIAZINE 10 MG/G
CREAM TOPICAL PRN
OUTPATIENT
Start: 2025-03-03

## 2025-03-03 RX ORDER — BETAMETHASONE DIPROPIONATE 0.5 MG/G
CREAM TOPICAL PRN
OUTPATIENT
Start: 2025-03-03

## 2025-03-03 RX ORDER — BACITRACIN ZINC AND POLYMYXIN B SULFATE 500; 1000 [USP'U]/G; [USP'U]/G
OINTMENT TOPICAL PRN
OUTPATIENT
Start: 2025-03-03

## 2025-03-03 RX ORDER — LIDOCAINE 40 MG/G
CREAM TOPICAL PRN
OUTPATIENT
Start: 2025-03-03

## 2025-03-03 RX ORDER — SODIUM CHLOR/HYPOCHLOROUS ACID 0.033 %
SOLUTION, IRRIGATION IRRIGATION PRN
OUTPATIENT
Start: 2025-03-03

## 2025-03-03 NOTE — PROGRESS NOTES
Wound care    The patient is an 84-year-old man who has history of a skin tear on the right arm at a site where he had an adhesive dressing for an IV.  His wife has been caring for the site utilizing topical bacitracin dressings.    The patient was first seen in the wound care center on 3/3/2025.    The patient does not have history of diabetes mellitus.  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  The patient has history of atrial fibrillation.  The patient has had reduced appetite related to decreased smell and taste.      Reported weight 165 pounds height 6 feet    Physical examination    Vital signs blood pressure 128/70 pulse 80 temperature 97.8 respiration 16    The patient is alert man in no acute distress.    Examination of the right upper extremity revealed 2+ radial pulse.  There is no edema in the right upper extremity.  On the anterior surface of the right forearm there is a wound 1.5 x 1 x 0.1 cm in dimension with clean granulation.  There was some adjacent scarring.        Impression:    Wound of the right forearm.    The patient's wife's been doing an excellent job with this wound care.      Plan:    Dressing ordered: Apply Xeroform to the wound then dry gauze and roll gauze.  Change dressing daily.    The patient can shower daily and have a new dressing applied afterwards.      The patient will follow-up in the wound care center in 2 weeks.        Diagnosis: Open wound of right forearm    S51.801A          Ananda Marin MD

## 2025-03-03 NOTE — FLOWSHEET NOTE
03/03/25 1417   Anesthetic   Anesthetic 4% Lidocaine Liquid Topical   Wound 03/03/25 Arm Right;Lower #1   Date First Assessed/Time First Assessed: 03/03/25 1417   Present on Original Admission: Yes  Primary Wound Type: Skin Tear  Location: Arm  Wound Location Orientation: Right;Lower  Wound Description (Comments): #1   Wound Image    Dressing Status Old drainage noted   Wound Cleansed Cleansed with saline   Wound Length (cm) 1.5 cm   Wound Width (cm) 1 cm   Wound Depth (cm) 0.1 cm   Wound Surface Area (cm^2) 1.5 cm^2   Wound Volume (cm^3) 0.15 cm^3   Wound Assessment Pink/red;Epithelialization   Drainage Amount Small (< 25%)   Drainage Description Serosanguinous   Odor None   Tania-wound Assessment Blanchable erythema;Maceration   Margins Undefined edges   Wound Thickness Description not for Pressure Injury Partial thickness     /70   Pulse 80   Temp 97.8 °F (36.6 °C) (Temporal)   Resp 16

## 2025-03-03 NOTE — PATIENT INSTRUCTIONS
Discharge Instructions for  [facility]  [address] [place] [address]  Telephone: [phone]   FAX [phone]    NAME:  Williams Fitzgerald Jr  YOB: 1940  ACCOUNT NUMBER :  [id number]  DATE:  3/3/2025     : DAMION CORNELIUS RN     WOUND SUPPLIES:     Wound Cleansing:   Do not scrub or use excessive force.  Cleanse wound prior to applying a clean dressing with:      [] Cleanse wound with:     [] May Shower at Discharge     [x] Shower on days of dressing change, remove dressing first    [] Keep Wound Dry in Shower (may purchase a cast cover if needed)     Topical Treatments:  Do not apply lotions, creams, or ointments to wound bed unless directed.     [] Apply moisturizing lotion A&D ointment  to skin surrounding the wound prior to dressing change.  [] Apply antifungal ointment to skin surrounding the wound prior to dressing change.  [] Apply thin film of Zinc barrier ointment to skin immediately around wound.       Dressings:           Wound Location Right forearm     Apply Primary Dressing:       [x] Xeroform     Cover and Secure with:    [x] Gauze   [x] Carlos   [] Kerlix  [] Ace Wrap     [] ABD  [x] Medipore tape  [] tape  [x] Other: Netting   Avoid contact of tape with skin.    Change dressing:   [x] Daily      [] Every Other Day   [] Three times per week  [] Once a week   [] Do Not Change Dressing     [x] Other:  Change on days when taking showers      Dietary:  [x] Increase Protein: examples ( Meat, cheese, eggs, greek yogurt, premier protein drink, fish, nuts )   [] Jered  [] Protien drink supplement   [] Recommended Supplements :      Activity:  Activity as tolerated:    [x] Patient has no activity restrictions       [] Strict Bedrest:   [] Remain off Work:     [] Return to work with restrictions:    [] May return to full duty work:                                              Return Appointment:    [x] Return Appointment: With Dr. Ananda Marin

## 2025-03-03 NOTE — FLOWSHEET NOTE
03/03/25 1448   Wound 03/03/25 Arm Right;Lower #1   Date First Assessed/Time First Assessed: 03/03/25 1417   Present on Original Admission: Yes  Primary Wound Type: Skin Tear  Location: Arm  Wound Location Orientation: Right;Lower  Wound Description (Comments): #1   Dressing/Treatment Xeroform;Gauze dressing/dressing sponge;Roll gauze;Tape/Soft cloth adhesive tape     Discharge Condition: Stable    Pain: 0    Ambulatory Status:Walking    Discharge Destination: Home    Transportation:Car    Accompanied by: Self and Family    Discharge instructions reviewed with Self and Family and copy or written instructions have been provided. All questions/concerns have been addressed at this time.

## 2025-03-06 ENCOUNTER — APPOINTMENT (OUTPATIENT)
Facility: HOSPITAL | Age: 85
End: 2025-03-06
Payer: MEDICARE

## 2025-03-06 DIAGNOSIS — C61 PROSTATE CANCER (HCC): ICD-10-CM

## 2025-03-08 LAB — PSA SERPL DL<=0.01 NG/ML-MCNC: <0.006 NG/ML (ref 0–4)

## 2025-03-10 ENCOUNTER — HOSPITAL ENCOUNTER (OUTPATIENT)
Facility: HOSPITAL | Age: 85
Discharge: HOME | End: 2025-03-13
Payer: MEDICARE

## 2025-03-10 VITALS
BODY MASS INDEX: 23.43 KG/M2 | HEART RATE: 78 BPM | SYSTOLIC BLOOD PRESSURE: 106 MMHG | DIASTOLIC BLOOD PRESSURE: 76 MMHG | HEIGHT: 72 IN | WEIGHT: 173 LBS

## 2025-03-10 DIAGNOSIS — C61 PROSTATE CANCER (HCC): Primary | ICD-10-CM

## 2025-03-10 PROCEDURE — 99212 OFFICE O/P EST SF 10 MIN: CPT

## 2025-03-10 ASSESSMENT — PAIN SCALES - GENERAL: PAINLEVEL_OUTOF10: 0

## 2025-03-10 NOTE — PROGRESS NOTES
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  Interval History   Mr. Amilcar Ramirez arrives today for routine follow up 47 months from end of radiation treatments.    He has continued to rico with sequela of HHT, having recurrent epistaxis and receiving a course of bevacizumab without much effect at the HHT center at [facility].  He recently required an emergent surgery at [facility].    He had a PSA drawn 3/6/25 which was <0.006.     He is here with his wife and daughter.     He notes no significant urinary complaints. He does note some urge incontinence if he has been sitting for awhile and stands up. He does not need to wear any briefs or pads for this, not significant enough. No dysuria or hematuria. No blood in stools. He has occasional loose stools.    IPSS: 2/35  Urinary QOL: 2 - mostly satisfied  DEQUAN: 1/25, not sexually active    Review of Systems:  A complete review of systems was obtained and negative except as described above.    Interval Imaging/Labs   PSA history:  10/25/2017: 2.1  4/5/2019: 2.5  1/31/2020: 2.7  8/28/2020: 2.78  3/10/2021: Started androgen deprivation  --Radiation therapy--  9/22/2021: <0.1 (T<2.5)  11/17/2021: Last 3 mo Lupron  12/13/2021: < 0.014 (T<2.5)  6/9/2022: <0.014 (T 12.56)  12/5/2022: <0.014  6/14/2023: <0.014 (T 46.5)  12/4/23: <0.014 (T 38.8)  3/6/25: <0.006    Above imaging/lab reports were reviewed.  Allergies and Medications   No Known Allergies    Current Outpatient Medications   Medication Instructions    ascorbic acid (VITAMIN C)

## 2025-03-12 ENCOUNTER — HOSPITAL ENCOUNTER (OUTPATIENT)
Facility: HOSPITAL | Age: 85
Setting detail: INFUSION SERIES
End: 2025-03-12
Payer: MEDICARE

## 2025-03-14 ENCOUNTER — HOSPITAL ENCOUNTER (OUTPATIENT)
Facility: HOSPITAL | Age: 85
Setting detail: INFUSION SERIES
Discharge: HOME | End: 2025-03-14
Payer: MEDICARE

## 2025-03-14 VITALS
DIASTOLIC BLOOD PRESSURE: 68 MMHG | OXYGEN SATURATION: 97 % | SYSTOLIC BLOOD PRESSURE: 129 MMHG | HEART RATE: 83 BPM | RESPIRATION RATE: 16 BRPM | TEMPERATURE: 98 F

## 2025-03-14 DIAGNOSIS — I78.0 HHT (HEREDITARY HEMORRHAGIC TELANGIECTASIA): ICD-10-CM

## 2025-03-14 DIAGNOSIS — D50.9 IRON DEFICIENCY ANEMIA, UNSPECIFIED IRON DEFICIENCY ANEMIA TYPE: Primary | ICD-10-CM

## 2025-03-14 LAB
ALBUMIN SERPL-MCNC: 3.4 G/DL (ref 3.5–5)
ALBUMIN/GLOB SERPL: 0.9 (ref 1.1–2.2)
ALP SERPL-CCNC: 82 U/L (ref 45–117)
ALT SERPL-CCNC: 21 U/L (ref 12–78)
ANION GAP SERPL CALC-SCNC: 4 MMOL/L (ref 2–12)
AST SERPL-CCNC: 17 U/L (ref 15–37)
BASOPHILS # BLD: 0.04 K/UL (ref 0–0.1)
BASOPHILS NFR BLD: 0.8 % (ref 0–1)
BILIRUB SERPL-MCNC: 0.5 MG/DL (ref 0.2–1)
BUN SERPL-MCNC: 19 MG/DL (ref 6–20)
BUN/CREAT SERPL: 20 (ref 12–20)
CALCIUM SERPL-MCNC: 9.6 MG/DL (ref 8.5–10.1)
CHLORIDE SERPL-SCNC: 107 MMOL/L (ref 97–108)
CO2 SERPL-SCNC: 29 MMOL/L (ref 21–32)
CREAT SERPL-MCNC: 0.94 MG/DL (ref 0.7–1.3)
DIFFERENTIAL METHOD BLD: ABNORMAL
EOSINOPHIL # BLD: 0.46 K/UL (ref 0–0.4)
EOSINOPHIL NFR BLD: 8.7 % (ref 0–7)
ERYTHROCYTE [DISTWIDTH] IN BLOOD BY AUTOMATED COUNT: 18.4 % (ref 11.5–14.5)
FERRITIN SERPL-MCNC: 45 NG/ML (ref 26–388)
GLOBULIN SER CALC-MCNC: 3.8 G/DL (ref 2–4)
GLUCOSE SERPL-MCNC: 124 MG/DL (ref 65–100)
HCT VFR BLD AUTO: 34.7 % (ref 36.6–50.3)
HGB BLD-MCNC: 11 G/DL (ref 12.1–17)
IMM GRANULOCYTES # BLD AUTO: 0.02 K/UL (ref 0–0.04)
IMM GRANULOCYTES NFR BLD AUTO: 0.4 % (ref 0–0.5)
LYMPHOCYTES # BLD: 0.54 K/UL (ref 0.8–3.5)
LYMPHOCYTES NFR BLD: 10.1 % (ref 12–49)
MCH RBC QN AUTO: 28.2 PG (ref 26–34)
MCHC RBC AUTO-ENTMCNC: 31.7 G/DL (ref 30–36.5)
MCV RBC AUTO: 89 FL (ref 80–99)
MONOCYTES # BLD: 0.71 K/UL (ref 0–1)
MONOCYTES NFR BLD: 13.3 % (ref 5–13)
NEUTS SEG # BLD: 3.53 K/UL (ref 1.8–8)
NEUTS SEG NFR BLD: 66.7 % (ref 32–75)
NRBC # BLD: 0 K/UL (ref 0–0.01)
NRBC BLD-RTO: 0 PER 100 WBC
PHOSPHATE SERPL-MCNC: 3.5 MG/DL (ref 2.6–4.7)
PLATELET # BLD AUTO: 195 K/UL (ref 150–400)
PMV BLD AUTO: 9.5 FL (ref 8.9–12.9)
POTASSIUM SERPL-SCNC: 3.9 MMOL/L (ref 3.5–5.1)
PROT SERPL-MCNC: 7.2 G/DL (ref 6.4–8.2)
PSA SERPL DL<=0.01 NG/ML-MCNC: <0.006 NG/ML (ref 0–4)
RBC # BLD AUTO: 3.9 M/UL (ref 4.1–5.7)
RBC MORPH BLD: ABNORMAL
SODIUM SERPL-SCNC: 140 MMOL/L (ref 136–145)
WBC # BLD AUTO: 5.3 K/UL (ref 4.1–11.1)

## 2025-03-14 PROCEDURE — 85025 COMPLETE CBC W/AUTO DIFF WBC: CPT

## 2025-03-14 PROCEDURE — 6360000002 HC RX W HCPCS: Performed by: NURSE PRACTITIONER

## 2025-03-14 PROCEDURE — 36415 COLL VENOUS BLD VENIPUNCTURE: CPT

## 2025-03-14 PROCEDURE — 96365 THER/PROPH/DIAG IV INF INIT: CPT

## 2025-03-14 PROCEDURE — 80053 COMPREHEN METABOLIC PANEL: CPT

## 2025-03-14 PROCEDURE — 2580000003 HC RX 258: Performed by: NURSE PRACTITIONER

## 2025-03-14 PROCEDURE — 82728 ASSAY OF FERRITIN: CPT

## 2025-03-14 PROCEDURE — 84100 ASSAY OF PHOSPHORUS: CPT

## 2025-03-14 RX ORDER — EPINEPHRINE 1 MG/ML
0.3 INJECTION, SOLUTION INTRAMUSCULAR; SUBCUTANEOUS PRN
OUTPATIENT
Start: 2025-04-25

## 2025-03-14 RX ORDER — SODIUM CHLORIDE 9 MG/ML
5-250 INJECTION, SOLUTION INTRAVENOUS PRN
Status: DISCONTINUED | OUTPATIENT
Start: 2025-03-14 | End: 2025-03-15 | Stop reason: HOSPADM

## 2025-03-14 RX ORDER — ONDANSETRON 2 MG/ML
8 INJECTION INTRAMUSCULAR; INTRAVENOUS
OUTPATIENT
Start: 2025-04-25

## 2025-03-14 RX ORDER — FAMOTIDINE 10 MG/ML
20 INJECTION, SOLUTION INTRAVENOUS
Status: DISCONTINUED | OUTPATIENT
Start: 2025-03-14 | End: 2025-03-15 | Stop reason: HOSPADM

## 2025-03-14 RX ORDER — SODIUM CHLORIDE 9 MG/ML
5-250 INJECTION, SOLUTION INTRAVENOUS PRN
OUTPATIENT
Start: 2025-04-25

## 2025-03-14 RX ORDER — HEPARIN 100 UNIT/ML
500 SYRINGE INTRAVENOUS PRN
OUTPATIENT
Start: 2025-04-25

## 2025-03-14 RX ORDER — EPINEPHRINE 1 MG/ML
0.3 INJECTION, SOLUTION INTRAMUSCULAR; SUBCUTANEOUS PRN
Status: DISCONTINUED | OUTPATIENT
Start: 2025-03-14 | End: 2025-03-15 | Stop reason: HOSPADM

## 2025-03-14 RX ORDER — ACETAMINOPHEN 325 MG/1
650 TABLET ORAL
Status: DISCONTINUED | OUTPATIENT
Start: 2025-03-14 | End: 2025-03-15 | Stop reason: HOSPADM

## 2025-03-14 RX ORDER — SODIUM CHLORIDE 9 MG/ML
INJECTION, SOLUTION INTRAVENOUS CONTINUOUS
OUTPATIENT
Start: 2025-04-25

## 2025-03-14 RX ORDER — DIPHENHYDRAMINE HYDROCHLORIDE 50 MG/ML
50 INJECTION, SOLUTION INTRAMUSCULAR; INTRAVENOUS
Status: DISCONTINUED | OUTPATIENT
Start: 2025-03-14 | End: 2025-03-15 | Stop reason: HOSPADM

## 2025-03-14 RX ORDER — HYDROCORTISONE SODIUM SUCCINATE 100 MG/2ML
100 INJECTION INTRAMUSCULAR; INTRAVENOUS
Status: DISCONTINUED | OUTPATIENT
Start: 2025-03-14 | End: 2025-03-15 | Stop reason: HOSPADM

## 2025-03-14 RX ORDER — SODIUM CHLORIDE 0.9 % (FLUSH) 0.9 %
5-40 SYRINGE (ML) INJECTION PRN
OUTPATIENT
Start: 2025-04-25

## 2025-03-14 RX ORDER — HYDROCORTISONE SODIUM SUCCINATE 100 MG/2ML
100 INJECTION INTRAMUSCULAR; INTRAVENOUS
OUTPATIENT
Start: 2025-04-25

## 2025-03-14 RX ORDER — ALBUTEROL SULFATE 90 UG/1
4 INHALANT RESPIRATORY (INHALATION) PRN
OUTPATIENT
Start: 2025-04-25

## 2025-03-14 RX ORDER — ALBUTEROL SULFATE 90 UG/1
4 INHALANT RESPIRATORY (INHALATION) PRN
Status: DISCONTINUED | OUTPATIENT
Start: 2025-03-14 | End: 2025-03-15 | Stop reason: HOSPADM

## 2025-03-14 RX ORDER — SODIUM CHLORIDE 9 MG/ML
INJECTION, SOLUTION INTRAVENOUS CONTINUOUS
Status: DISCONTINUED | OUTPATIENT
Start: 2025-03-14 | End: 2025-03-15 | Stop reason: HOSPADM

## 2025-03-14 RX ORDER — ACETAMINOPHEN 325 MG/1
650 TABLET ORAL
OUTPATIENT
Start: 2025-04-25

## 2025-03-14 RX ORDER — FAMOTIDINE 10 MG/ML
20 INJECTION, SOLUTION INTRAVENOUS
OUTPATIENT
Start: 2025-04-25

## 2025-03-14 RX ORDER — ONDANSETRON 2 MG/ML
8 INJECTION INTRAMUSCULAR; INTRAVENOUS
Status: DISCONTINUED | OUTPATIENT
Start: 2025-03-14 | End: 2025-03-15 | Stop reason: HOSPADM

## 2025-03-14 RX ORDER — DIPHENHYDRAMINE HYDROCHLORIDE 50 MG/ML
50 INJECTION, SOLUTION INTRAMUSCULAR; INTRAVENOUS
OUTPATIENT
Start: 2025-04-25

## 2025-03-14 RX ADMIN — FERUMOXYTOL 510 MG: 510 INJECTION INTRAVENOUS at 15:11

## 2025-03-14 ASSESSMENT — PAIN SCALES - GENERAL: PAINLEVEL_OUTOF10: 0

## 2025-03-14 NOTE — PROGRESS NOTES
Outpatient [facility]fusion Center Short Visit Progress Note    Mr. Amilcar Ramirez admitted to [facility] for [place] ambulatory in stable condition. Assessment completed. No new concerns voiced.     Vital Signs:  /68   Pulse 83   Temp 98 °F (36.7 °C) (Temporal)   Resp 16   SpO2 97%       24G PIV placed with positive blood return. Labs drawn.  Lab Results:  Recent Results (from the past 12 hours)   CBC with Auto Differential    Collection Time: 03/14/25  2:23 PM   Result Value Ref Range    WBC 5.3 4.1 - 11.1 K/uL    RBC 3.90 (L) 4.10 - 5.70 M/uL    Hemoglobin 11.0 (L) 12.1 - 17.0 g/dL    Hematocrit 34.7 (L) 36.6 - 50.3 %    MCV 89.0 80.0 - 99.0 FL    MCH 28.2 26.0 - 34.0 PG    MCHC 31.7 30.0 - 36.5 g/dL    RDW 18.4 (H) 11.5 - 14.5 %    Platelets 195 150 - 400 K/uL    MPV 9.5 8.9 - 12.9 FL    Nucleated RBCs 0.0 0  WBC    nRBC 0.00 0.00 - 0.01 K/uL    Neutrophils % 66.7 32.0 - 75.0 %    Lymphocytes % 10.1 (L) 12.0 - 49.0 %    Monocytes % 13.3 (H) 5.0 - 13.0 %    Eosinophils % 8.7 (H) 0.0 - 7.0 %    Basophils % 0.8 0.0 - 1.0 %    Immature Granulocytes % 0.4 0.0 - 0.5 %    Neutrophils Absolute 3.53 1.80 - 8.00 K/UL    Lymphocytes Absolute 0.54 (L) 0.80 - 3.50 K/UL    Monocytes Absolute 0.71 0.00 - 1.00 K/UL    Eosinophils Absolute 0.46 (H) 0.00 - 0.40 K/UL    Basophils Absolute 0.04 0.00 - 0.10 K/UL    Immature Granulocytes Absolute 0.02 0.00 - 0.04 K/UL    Differential Type SMEAR SCANNED      RBC Comment ANISOCYTOSIS  1+       Comprehensive Metabolic Panel    Collection Time: 03/14/25  2:23 PM   Result Value Ref Range    Sodium 140 136 - 145 mmol/L    Potassium 3.9 3.5 - 5.1 mmol/L    Chloride 107 97 - 108 mmol/L    CO2 29 21 - 32 mmol/L    Anion Gap 4 2 - 12 mmol/L    Glucose 124 (H) 65 - 100 mg/dL    BUN 19 6 - 20 MG/DL    Creatinine 0.94 0.70 - 1.30 MG/DL    BUN/Creatinine Ratio 20 12 - 20      Est, Glom Filt Rate 79 >60 ml/min/1.73m2    Calcium 9.6 8.5 - 10.1 MG/DL    Total Bilirubin 0.5 0.2 - 1.0 MG/DL    ALT 21 12

## 2025-03-17 ENCOUNTER — HOSPITAL ENCOUNTER (OUTPATIENT)
Facility: HOSPITAL | Age: 85
Discharge: HOME | End: 2025-03-17
Attending: SURGERY
Payer: MEDICARE

## 2025-03-17 VITALS
DIASTOLIC BLOOD PRESSURE: 80 MMHG | HEART RATE: 90 BPM | SYSTOLIC BLOOD PRESSURE: 127 MMHG | RESPIRATION RATE: 16 BRPM | TEMPERATURE: 98 F

## 2025-03-17 DIAGNOSIS — S51.801D OPEN WOUND OF RIGHT FOREARM, SUBSEQUENT ENCOUNTER: Primary | ICD-10-CM

## 2025-03-17 DIAGNOSIS — S51.801A OPEN WOUND OF RIGHT FOREARM, INITIAL ENCOUNTER: ICD-10-CM

## 2025-03-17 PROCEDURE — 99213 OFFICE O/P EST LOW 20 MIN: CPT | Performed by: SURGERY

## 2025-03-17 PROCEDURE — 99212 OFFICE O/P EST SF 10 MIN: CPT

## 2025-03-17 RX ORDER — BETAMETHASONE DIPROPIONATE 0.5 MG/G
CREAM TOPICAL PRN
OUTPATIENT
Start: 2025-03-17

## 2025-03-17 RX ORDER — GINSENG 100 MG
CAPSULE ORAL PRN
OUTPATIENT
Start: 2025-03-17

## 2025-03-17 RX ORDER — MUPIROCIN 20 MG/G
OINTMENT TOPICAL PRN
OUTPATIENT
Start: 2025-03-17

## 2025-03-17 RX ORDER — LIDOCAINE HYDROCHLORIDE 20 MG/ML
JELLY TOPICAL PRN
OUTPATIENT
Start: 2025-03-17

## 2025-03-17 RX ORDER — GENTAMICIN SULFATE 1 MG/G
OINTMENT TOPICAL PRN
OUTPATIENT
Start: 2025-03-17

## 2025-03-17 RX ORDER — TRIAMCINOLONE ACETONIDE 1 MG/G
OINTMENT TOPICAL PRN
OUTPATIENT
Start: 2025-03-17

## 2025-03-17 RX ORDER — SODIUM CHLOR/HYPOCHLOROUS ACID 0.033 %
SOLUTION, IRRIGATION IRRIGATION PRN
OUTPATIENT
Start: 2025-03-17

## 2025-03-17 RX ORDER — LIDOCAINE 50 MG/G
OINTMENT TOPICAL PRN
OUTPATIENT
Start: 2025-03-17

## 2025-03-17 RX ORDER — NEOMYCIN/BACITRACIN/POLYMYXINB 3.5-400-5K
OINTMENT (GRAM) TOPICAL PRN
OUTPATIENT
Start: 2025-03-17

## 2025-03-17 RX ORDER — BACITRACIN ZINC AND POLYMYXIN B SULFATE 500; 1000 [USP'U]/G; [USP'U]/G
OINTMENT TOPICAL PRN
OUTPATIENT
Start: 2025-03-17

## 2025-03-17 RX ORDER — LIDOCAINE HYDROCHLORIDE 40 MG/ML
SOLUTION TOPICAL PRN
OUTPATIENT
Start: 2025-03-17

## 2025-03-17 RX ORDER — CLOBETASOL PROPIONATE 0.5 MG/G
OINTMENT TOPICAL PRN
OUTPATIENT
Start: 2025-03-17

## 2025-03-17 RX ORDER — LIDOCAINE 40 MG/G
CREAM TOPICAL PRN
OUTPATIENT
Start: 2025-03-17

## 2025-03-17 RX ORDER — SILVER SULFADIAZINE 10 MG/G
CREAM TOPICAL PRN
OUTPATIENT
Start: 2025-03-17

## 2025-03-17 NOTE — PROGRESS NOTES
Wound care    The patient is an 84-year-old man who has history of a skin tear on the right arm at a site where he had an adhesive dressing for an IV.  His wife has been caring for the site utilizing topical bacitracin dressings.    The patient was first seen in the wound care center on 3/3/2025.    The patient does not have history of diabetes mellitus.  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  The patient has history of atrial fibrillation.  The patient has had reduced appetite related to decreased smell and taste.    Dressing as of 3/3/2025: Apply Xeroform to the wound then dry gauze and roll gauze.  Change dressing daily.          Reported weight 165 pounds height 6 feet    Physical examination    The patient is alert man in no acute distress.    Examination of the right upper extremity revealed 2+ radial pulse.  There is no edema in the right upper extremity.  On the anterior surface of the right forearm there is a wound 0.3 x 0.4 x 0.1 cm in dimension with clean granulation.  There was some adjacent scarring.        Impression:    Wound of the right forearm.  The wound is improved, not at goal (not healed).    The patient's wife's been doing an excellent job with this wound care.      Plan:    Dressing ordered: Apply Xeroform to the wound then dry gauze and roll gauze.  Change dressing daily.    The patient can shower daily and have a new dressing applied afterwards.      The patient will follow-up in the wound care center in 2 weeks.        Diagnosis: Open wound of right forearm    S51.801D          Ananda Marin MD

## 2025-03-17 NOTE — FLOWSHEET NOTE
03/17/25 1432   Anesthetic   Anesthetic 4% Lidocaine Liquid Topical   Wound 03/03/25 Arm Right;Lower #1   Date First Assessed/Time First Assessed: 03/03/25 1417   Present on Original Admission: Yes  Primary Wound Type: Skin Tear  Location: Arm  Wound Location Orientation: Right;Lower  Wound Description (Comments): #1   Wound Image    Wound Cleansed Cleansed with saline   Wound Length (cm) 0.3 cm   Wound Width (cm) 0.4 cm   Wound Depth (cm) 0.1 cm   Wound Surface Area (cm^2) 0.12 cm^2   Change in Wound Size % (l*w) 92   Wound Volume (cm^3) 0.012 cm^3   Wound Healing % 92   Wound Assessment Pink/red;Slough;Epithelialization   Drainage Amount Scant (moist but unmeasurable)   Drainage Description Serosanguinous   Odor None   Tania-wound Assessment Fragile   Margins Flat/open edges     /80   Pulse 90   Temp 98 °F (36.7 °C) (Temporal)   Resp 16

## 2025-03-17 NOTE — PATIENT INSTRUCTIONS
Discharge Instructions for  [facility]  [address] [place] [address]  Telephone: [phone]   FAX [phone]    NAME:  Williams Fitzgerald Jr  YOB: 1940  ACCOUNT NUMBER :  [id number]  DATE:  3/17/2025     : DAMION CORNELIUS RN     WOUND SUPPLIES:     Wound Cleansing:   Do not scrub or use excessive force.  Cleanse wound prior to applying a clean dressing with:      [] Cleanse wound with:     [] May Shower at Discharge     [x] Shower on days of dressing change, remove dressing first    [] Keep Wound Dry in Shower (may purchase a cast cover if needed)     Topical Treatments:  Do not apply lotions, creams, or ointments to wound bed unless directed.     [] Apply moisturizing lotion A&D ointment  to skin surrounding the wound prior to dressing change.  [] Apply antifungal ointment to skin surrounding the wound prior to dressing change.  [] Apply thin film of Zinc barrier ointment to skin immediately around wound.       Dressings:           Wound Location Right forearm     Apply Primary Dressing:       [x] Xeroform     Cover and Secure with:    [x] Gauze   [x] Carlos   [] Kerlix  [] Ace Wrap     [] ABD  [x] Medipore tape  [] tape  [x] Other: Netting   Avoid contact of tape with skin.    Change dressing:   [x] Daily      [] Every Other Day   [] Three times per week  [] Once a week   [] Do Not Change Dressing     [x] Other:  Change on days when taking showers      Dietary:  [x] Increase Protein: examples ( Meat, cheese, eggs, greek yogurt, premier protein drink, fish, nuts )   [] Jered  [] Protien drink supplement   [] Recommended Supplements :      Activity:  Activity as tolerated:    [x] Patient has no activity restrictions       [] Strict Bedrest:   [] Remain off Work:     [] Return to work with restrictions:    [] May return to full duty work:                                              Return Appointment:    [x] Return Appointment: With Dr. Ananda Ruffin

## 2025-03-17 NOTE — FLOWSHEET NOTE
03/17/25 1445   Wound 03/03/25 Arm Right;Lower #1   Date First Assessed/Time First Assessed: 03/03/25 1417   Present on Original Admission: Yes  Primary Wound Type: Skin Tear  Location: Arm  Wound Location Orientation: Right;Lower  Wound Description (Comments): #1   Dressing/Treatment Xeroform;Gauze dressing/dressing sponge;Roll gauze;Tape/Soft cloth adhesive tape     Discharge Condition: Stable    Pain: 0    Ambulatory Status:Walking    Discharge Destination: Home    Transportation:Car    Accompanied by: Self and Family    Discharge instructions reviewed with Self and Family and copy or written instructions have been provided. All questions/concerns have been addressed at this time.

## 2025-03-20 ENCOUNTER — TRANSCRIBE ORDERS (OUTPATIENT)
Facility: HOSPITAL | Age: 85
End: 2025-03-20

## 2025-03-20 DIAGNOSIS — R41.89 COGNITIVE IMPAIRMENT: Primary | ICD-10-CM

## 2025-03-31 ENCOUNTER — HOSPITAL ENCOUNTER (OUTPATIENT)
Facility: HOSPITAL | Age: 85
Discharge: HOME | End: 2025-03-31
Attending: SURGERY
Payer: MEDICARE

## 2025-03-31 ENCOUNTER — HOSPITAL ENCOUNTER (OUTPATIENT)
Facility: HOSPITAL | Age: 85
Discharge: HOME | End: 2025-04-03
Payer: MEDICARE

## 2025-03-31 VITALS
HEART RATE: 75 BPM | DIASTOLIC BLOOD PRESSURE: 60 MMHG | TEMPERATURE: 98.1 F | SYSTOLIC BLOOD PRESSURE: 113 MMHG | RESPIRATION RATE: 16 BRPM

## 2025-03-31 DIAGNOSIS — R41.89 COGNITIVE IMPAIRMENT: ICD-10-CM

## 2025-03-31 DIAGNOSIS — S51.801D OPEN WOUND OF RIGHT FOREARM, SUBSEQUENT ENCOUNTER: Primary | ICD-10-CM

## 2025-03-31 DIAGNOSIS — S51.801A OPEN WOUND OF RIGHT FOREARM, INITIAL ENCOUNTER: ICD-10-CM

## 2025-03-31 PROCEDURE — 70551 MRI BRAIN STEM W/O DYE: CPT

## 2025-03-31 PROCEDURE — 99212 OFFICE O/P EST SF 10 MIN: CPT

## 2025-03-31 PROCEDURE — 99213 OFFICE O/P EST LOW 20 MIN: CPT | Performed by: SURGERY

## 2025-03-31 RX ORDER — GENTAMICIN SULFATE 1 MG/G
OINTMENT TOPICAL PRN
OUTPATIENT
Start: 2025-03-31

## 2025-03-31 RX ORDER — CLOBETASOL PROPIONATE 0.5 MG/G
OINTMENT TOPICAL PRN
OUTPATIENT
Start: 2025-03-31

## 2025-03-31 RX ORDER — TRIAMCINOLONE ACETONIDE 1 MG/G
OINTMENT TOPICAL PRN
OUTPATIENT
Start: 2025-03-31

## 2025-03-31 RX ORDER — LIDOCAINE HYDROCHLORIDE 20 MG/ML
JELLY TOPICAL PRN
OUTPATIENT
Start: 2025-03-31

## 2025-03-31 RX ORDER — LIDOCAINE HYDROCHLORIDE 40 MG/ML
SOLUTION TOPICAL PRN
OUTPATIENT
Start: 2025-03-31

## 2025-03-31 RX ORDER — LIDOCAINE 50 MG/G
OINTMENT TOPICAL PRN
OUTPATIENT
Start: 2025-03-31

## 2025-03-31 RX ORDER — NEOMYCIN/BACITRACIN/POLYMYXINB 3.5-400-5K
OINTMENT (GRAM) TOPICAL PRN
OUTPATIENT
Start: 2025-03-31

## 2025-03-31 RX ORDER — BACITRACIN ZINC AND POLYMYXIN B SULFATE 500; 1000 [USP'U]/G; [USP'U]/G
OINTMENT TOPICAL PRN
OUTPATIENT
Start: 2025-03-31

## 2025-03-31 RX ORDER — LIDOCAINE 40 MG/G
CREAM TOPICAL PRN
OUTPATIENT
Start: 2025-03-31

## 2025-03-31 RX ORDER — BETAMETHASONE DIPROPIONATE 0.5 MG/G
CREAM TOPICAL PRN
OUTPATIENT
Start: 2025-03-31

## 2025-03-31 RX ORDER — SODIUM CHLOR/HYPOCHLOROUS ACID 0.033 %
SOLUTION, IRRIGATION IRRIGATION PRN
OUTPATIENT
Start: 2025-03-31

## 2025-03-31 RX ORDER — GINSENG 100 MG
CAPSULE ORAL PRN
OUTPATIENT
Start: 2025-03-31

## 2025-03-31 RX ORDER — MUPIROCIN 20 MG/G
OINTMENT TOPICAL PRN
OUTPATIENT
Start: 2025-03-31

## 2025-03-31 RX ORDER — SILVER SULFADIAZINE 10 MG/G
CREAM TOPICAL PRN
OUTPATIENT
Start: 2025-03-31

## 2025-03-31 NOTE — FLOWSHEET NOTE
03/31/25 1419   Anesthetic   Anesthetic 4% Lidocaine Liquid Topical   Wound 03/03/25 Arm Right;Lower #1   Date First Assessed/Time First Assessed: 03/03/25 1417   Present on Original Admission: Yes  Primary Wound Type: Skin Tear  Location: Arm  Wound Location Orientation: Right;Lower  Wound Description (Comments): #1   Wound Image    Wound Cleansed Cleansed with saline   Wound Length (cm) 0.5 cm   Wound Width (cm) 0.5 cm   Wound Depth (cm) 0.1 cm   Wound Surface Area (cm^2) 0.25 cm^2   Change in Wound Size % (l*w) 83.33   Wound Volume (cm^3) 0.025 cm^3   Wound Healing % 83   Wound Assessment Pink/red   Drainage Amount Scant (moist but unmeasurable)   Drainage Description Serosanguinous   Odor None   Tania-wound Assessment Fragile   Margins Flat/open edges   Wound Thickness Description not for Pressure Injury Partial thickness   Pain Assessment   Pain Assessment None - Denies Pain     /60   Pulse 75   Temp 98.1 °F (36.7 °C) (Temporal)   Resp 16     
   03/31/25 1453   Wound 03/03/25 Arm Right;Lower #1   Date First Assessed/Time First Assessed: 03/03/25 1417   Present on Original Admission: Yes  Primary Wound Type: Skin Tear  Location: Arm  Wound Location Orientation: Right;Lower  Wound Description (Comments): #1   Dressing/Treatment Gauze dressing/dressing sponge;Roll gauze;Tape/Soft cloth adhesive tape;Xeroform;Other (comment)  (netting)     Discharge Condition: Stable    Pain: 1    Ambulatory Status: None    Discharge Destination: home    Transportation:car    Accompanied by: SELF    Discharge instructions reviewed with SELF and copy or written instructions have been provided. All questions/concerns have been addressed at this time.   
no

## 2025-03-31 NOTE — PROGRESS NOTES
Wound care    The patient is an 84-year-old man who has history of a skin tear on the right arm at a site where he had an adhesive dressing for an IV.  His wife has been caring for the site utilizing topical bacitracin dressings.    The patient was first seen in the wound care center on 3/3/2025.    The patient does not have history of diabetes mellitus.  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  The patient has history of atrial fibrillation.  The patient has had reduced appetite related to decreased smell and taste.    Dressing as of 3/3/2025: Apply Xeroform to the wound then dry gauze and roll gauze.  Change dressing daily.          Reported weight 165 pounds height 6 feet    Physical examination    The patient is alert man in no acute distress.    Examination of the right upper extremity revealed 2+ radial pulse.  There is no edema in the right upper extremity.  On the anterior surface of the right forearm there is a wound 0.5 x 0.5 x 0.1 cm in dimension with clean slightly exuberant granulation.  There was some adjacent scarring.    Exuberant granulation tissue was treated with silver nitrate.        Impression:    Wound of the right forearm.  The wound is persistent, not at goal (not healed).    The patient's wife's been doing an excellent job with this wound care.      Plan:    Dressing ordered: Apply Xeroform to the wound then dry gauze and roll gauze.  Change dressing daily.    The patient can shower daily without a dressing and have a new dressing applied afterwards.      The patient will follow-up in the wound care center in 2 weeks.        Diagnosis: Open wound of right forearm    S51.801D          Aannda Marin MD

## 2025-04-21 ENCOUNTER — TRANSCRIBE ORDERS (OUTPATIENT)
Facility: HOSPITAL | Age: 85
End: 2025-04-21

## 2025-04-21 DIAGNOSIS — R91.8 PULMONARY NODULES: Primary | ICD-10-CM

## 2025-04-22 ENCOUNTER — TELEPHONE (OUTPATIENT)
Age: 85
End: 2025-04-22

## 2025-04-22 NOTE — TELEPHONE ENCOUNTER
Per Dr Ramirez- outgoing call to daughter to schedule pt as a NP for evaluation of visual and auditory hallucinations. Was going to offer appt of 4/30/25 @ 9AM, arrive at 8:30. Reminder pt can have 1 additional person in the room with him only.   Pt referred to Dr Ramirez by Dr Hallman who is to be faxing notes and referral.   Await call back from daughter.

## 2025-04-22 NOTE — TELEPHONE ENCOUNTER
Anali is calling back. She states she is available to take the appointment on  4/30/2025 @ 9:00 am with 8:30 arrival at . I was unable to schedule due to Dr. Ramirez being marked as unavailable that day, I could not override. I reminded her that the patient can only have 1 additional person in the room with him. She verbalized understanding.

## 2025-04-23 ENCOUNTER — APPOINTMENT (OUTPATIENT)
Facility: HOSPITAL | Age: 85
End: 2025-04-23
Payer: MEDICARE

## 2025-04-30 ENCOUNTER — ANCILLARY PROCEDURE (OUTPATIENT)
Age: 85
End: 2025-04-30
Payer: MEDICARE

## 2025-04-30 ENCOUNTER — OFFICE VISIT (OUTPATIENT)
Age: 85
End: 2025-04-30
Payer: MEDICARE

## 2025-04-30 VITALS
RESPIRATION RATE: 16 BRPM | HEART RATE: 72 BPM | OXYGEN SATURATION: 97 % | SYSTOLIC BLOOD PRESSURE: 139 MMHG | WEIGHT: 178 LBS | BODY MASS INDEX: 24.14 KG/M2 | DIASTOLIC BLOOD PRESSURE: 61 MMHG | TEMPERATURE: 97.8 F

## 2025-04-30 DIAGNOSIS — F01.A2: ICD-10-CM

## 2025-04-30 DIAGNOSIS — R41.3 MEMORY DEFICITS: Primary | ICD-10-CM

## 2025-04-30 DIAGNOSIS — I65.23 CAROTID STENOSIS, BILATERAL: Primary | ICD-10-CM

## 2025-04-30 PROCEDURE — 1159F MED LIST DOCD IN RCRD: CPT | Performed by: PSYCHIATRY & NEUROLOGY

## 2025-04-30 PROCEDURE — G8420 CALC BMI NORM PARAMETERS: HCPCS | Performed by: PSYCHIATRY & NEUROLOGY

## 2025-04-30 PROCEDURE — G8427 DOCREV CUR MEDS BY ELIG CLIN: HCPCS | Performed by: PSYCHIATRY & NEUROLOGY

## 2025-04-30 PROCEDURE — 1036F TOBACCO NON-USER: CPT | Performed by: PSYCHIATRY & NEUROLOGY

## 2025-04-30 PROCEDURE — 99205 OFFICE O/P NEW HI 60 MIN: CPT | Performed by: PSYCHIATRY & NEUROLOGY

## 2025-04-30 PROCEDURE — 95816 EEG AWAKE AND DROWSY: CPT | Performed by: PSYCHIATRY & NEUROLOGY

## 2025-04-30 PROCEDURE — 1123F ACP DISCUSS/DSCN MKR DOCD: CPT | Performed by: PSYCHIATRY & NEUROLOGY

## 2025-04-30 PROCEDURE — 93880 EXTRACRANIAL BILAT STUDY: CPT | Performed by: PSYCHIATRY & NEUROLOGY

## 2025-04-30 RX ORDER — LEVOTHYROXINE SODIUM 50 UG/1
50 TABLET ORAL DAILY
COMMUNITY

## 2025-04-30 RX ORDER — RIVASTIGMINE TARTRATE 1.5 MG/1
1.5 CAPSULE ORAL 2 TIMES DAILY
COMMUNITY

## 2025-04-30 RX ORDER — RISPERIDONE 0.25 MG/1
0.25 TABLET ORAL 2 TIMES DAILY
Qty: 60 TABLET | Refills: 3 | Status: SHIPPED | OUTPATIENT
Start: 2025-04-30

## 2025-04-30 ASSESSMENT — PATIENT HEALTH QUESTIONNAIRE - PHQ9
SUM OF ALL RESPONSES TO PHQ QUESTIONS 1-9: 0
2. FEELING DOWN, DEPRESSED OR HOPELESS: NOT AT ALL
SUM OF ALL RESPONSES TO PHQ QUESTIONS 1-9: 0
1. LITTLE INTEREST OR PLEASURE IN DOING THINGS: NOT AT ALL

## 2025-04-30 NOTE — PATIENT INSTRUCTIONS
review results has not been scheduled, please call us at [phone] for appts at the [facility] in [place] or [phone] for appts at the [facility] in [place].  You may also request an appt via [facility] portal.       PRIOR AUTHORIZATION FOR MEDICATIONS    If you were prescribed medication during your visit, it may require a prior authorization which is a determination of the medication coverage made by your insurance plan.     This process can take 14 business days for most medications prescribed by our Neurologists.      Botox injections (for therapeutic use) can take up to 8 weeks.    If you haven't heard from our office or your pharmacy within the time outlined above, please contact our office.        PRESCRIPTION REFILL POLICY    [facility]   Statement to Patients  April 1, 2014      In an effort to ensure the large volume of patient prescription refills is processed in the most efficient and expeditious manner, we are asking our patients to assist us by calling your Pharmacy for all prescription refills, this will include also your  Mail Order Pharmacy. The pharmacy will contact our office electronically to continue the refill process.    Please do not wait until the last minute to call your pharmacy. We need at least 48 hours (2days) to fill prescriptions. We also encourage you to call your pharmacy before going to  your prescription to make sure it is ready.     With regard to controlled substance prescription refill requests (narcotic refills) that need to be picked up at our office, we ask your cooperation by providing us with at least 72 hours (3days) notice that you will need a refill.    We will not refill narcotic prescription refill requests after 4:00pm on any weekday, Monday through Thursday, or after 2:00pm on Fridays, or on the weekends.      We encourage everyone to explore another way of getting your prescription refill request processed using

## 2025-04-30 NOTE — PROGRESS NOTES
Patient was unable to do the braincheck     
Take 1 tablet by mouth Daily      rivastigmine (EXELON) 1.5 MG capsule Take 1 capsule by mouth 2 times daily      vitamin B-12 1000 MCG tablet Take 1 tablet by mouth daily for 30 doses 30 tablet 0    ascorbic acid (VITAMIN C) 250 MG tablet Take by mouth      vitamin D 25 MCG (1000 UT) CAPS Take by mouth daily       No current facility-administered medications for this visit.      No Known Allergies    Social History     Tobacco Use    Smoking status: Former     Passive exposure: Never    Smokeless tobacco: Never   Vaping Use    Vaping status: Never Used   Substance Use Topics    Alcohol use: Yes     Alcohol/week: 0.0 standard drinks of alcohol    Drug use: No     Family History   Problem Relation Age of Onset    Heart Disease Mother     Memory Loss Mother     Cancer Other         colon, lung     Examination  /61   Pulse 72   Temp 97.8 °F (36.6 °C)   Resp 16   Wt 80.7 kg (178 lb)   SpO2 97%   BMI 24.14 kg/m²     He is appropriately dressed and groomed.  He is awake and alert.  He is unsure of the day.  He says it is 1 May 2025.  Qing is president.  In the news he says the only thing that concerns him is President Qing.  He is initially unsure as to whether on the second over the first floor but he does say we got an elevator so were probably on the second floor.  He knows were in [facility].  Registration 3/3 recall 1/3 even with cues.  He follows commands.  Cranial nerves are intact.  No nystagmus.  No tremor or other abnormal movement.  No cogwheeling.  No pronation or drift.  Strength is full in the upper and lower extremities in all muscle groups to direct testing.  Symmetric reflexes.  No pathologic reflex.  No ataxia.  Steady gait.    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

## 2025-05-01 ENCOUNTER — TELEPHONE (OUTPATIENT)
Age: 85
End: 2025-05-01

## 2025-05-01 NOTE — TELEPHONE ENCOUNTER
Patients daughter called and stated that the patients hemoglobin level has been decent for the past few months. She stated that she wanted to know if he needed to still do his infusion tomorrow or if he could stop them at this point. Requested a call back.     # [phone]

## 2025-05-01 NOTE — TELEPHONE ENCOUNTER
[facility] [place] at [facility]  [phone]    05/01/25- Returned patient's daughter's call, no answer, voicemail left requesting a return call when available.      10:48 AM- Spoke to patient's daughter, stated she'll be coming to patient's appointment tomorrow.  They will plan to go to the infusion center first for lab work, then discuss with Dr. Auguste whether or not to do the infusion.  Stated patient just saw [facility] at [facility] so they can discuss those recommendations as well.  She denies further questions or concerns at this time.

## 2025-05-02 ENCOUNTER — OFFICE VISIT (OUTPATIENT)
Age: 85
End: 2025-05-02
Payer: MEDICARE

## 2025-05-02 ENCOUNTER — HOSPITAL ENCOUNTER (OUTPATIENT)
Facility: HOSPITAL | Age: 85
Setting detail: INFUSION SERIES
Discharge: HOME | End: 2025-05-02
Payer: MEDICARE

## 2025-05-02 VITALS
WEIGHT: 179 LBS | RESPIRATION RATE: 16 BRPM | TEMPERATURE: 97.7 F | HEIGHT: 72 IN | DIASTOLIC BLOOD PRESSURE: 64 MMHG | BODY MASS INDEX: 24.24 KG/M2 | SYSTOLIC BLOOD PRESSURE: 101 MMHG | OXYGEN SATURATION: 78 % | HEART RATE: 78 BPM

## 2025-05-02 VITALS — SYSTOLIC BLOOD PRESSURE: 107 MMHG | DIASTOLIC BLOOD PRESSURE: 55 MMHG

## 2025-05-02 DIAGNOSIS — I78.0 HHT (HEREDITARY HEMORRHAGIC TELANGIECTASIA): ICD-10-CM

## 2025-05-02 DIAGNOSIS — I78.0 HHT (HEREDITARY HEMORRHAGIC TELANGIECTASIA): Primary | ICD-10-CM

## 2025-05-02 DIAGNOSIS — D50.9 IRON DEFICIENCY ANEMIA, UNSPECIFIED IRON DEFICIENCY ANEMIA TYPE: Primary | ICD-10-CM

## 2025-05-02 DIAGNOSIS — D50.0 IRON DEFICIENCY ANEMIA DUE TO CHRONIC BLOOD LOSS: ICD-10-CM

## 2025-05-02 LAB
BASOPHILS # BLD: 0.03 K/UL (ref 0–0.1)
BASOPHILS NFR BLD: 0.8 % (ref 0–1)
DIFFERENTIAL METHOD BLD: ABNORMAL
EOSINOPHIL # BLD: 0.25 K/UL (ref 0–0.4)
EOSINOPHIL NFR BLD: 6.9 % (ref 0–7)
ERYTHROCYTE [DISTWIDTH] IN BLOOD BY AUTOMATED COUNT: 14.6 % (ref 11.5–14.5)
HCT VFR BLD AUTO: 33.7 % (ref 36.6–50.3)
HGB BLD-MCNC: 11 G/DL (ref 12.1–17)
IMM GRANULOCYTES # BLD AUTO: 0.01 K/UL (ref 0–0.04)
IMM GRANULOCYTES NFR BLD AUTO: 0.3 % (ref 0–0.5)
LYMPHOCYTES # BLD: 0.4 K/UL (ref 0.8–3.5)
LYMPHOCYTES NFR BLD: 11 % (ref 12–49)
MCH RBC QN AUTO: 29.4 PG (ref 26–34)
MCHC RBC AUTO-ENTMCNC: 32.6 G/DL (ref 30–36.5)
MCV RBC AUTO: 90.1 FL (ref 80–99)
MONOCYTES # BLD: 0.62 K/UL (ref 0–1)
MONOCYTES NFR BLD: 17.3 % (ref 5–13)
NEUTS SEG # BLD: 2.29 K/UL (ref 1.8–8)
NEUTS SEG NFR BLD: 63.7 % (ref 32–75)
NRBC # BLD: 0 K/UL (ref 0–0.01)
NRBC BLD-RTO: 0 PER 100 WBC
PLATELET # BLD AUTO: 173 K/UL (ref 150–400)
PMV BLD AUTO: 10.2 FL (ref 8.9–12.9)
RBC # BLD AUTO: 3.74 M/UL (ref 4.1–5.7)
RBC MORPH BLD: ABNORMAL
WBC # BLD AUTO: 3.6 K/UL (ref 4.1–11.1)

## 2025-05-02 PROCEDURE — G8420 CALC BMI NORM PARAMETERS: HCPCS | Performed by: INTERNAL MEDICINE

## 2025-05-02 PROCEDURE — 96374 THER/PROPH/DIAG INJ IV PUSH: CPT

## 2025-05-02 PROCEDURE — 1123F ACP DISCUSS/DSCN MKR DOCD: CPT | Performed by: INTERNAL MEDICINE

## 2025-05-02 PROCEDURE — 1036F TOBACCO NON-USER: CPT | Performed by: INTERNAL MEDICINE

## 2025-05-02 PROCEDURE — 36415 COLL VENOUS BLD VENIPUNCTURE: CPT

## 2025-05-02 PROCEDURE — 85025 COMPLETE CBC W/AUTO DIFF WBC: CPT

## 2025-05-02 PROCEDURE — 99214 OFFICE O/P EST MOD 30 MIN: CPT | Performed by: INTERNAL MEDICINE

## 2025-05-02 PROCEDURE — 6360000002 HC RX W HCPCS: Performed by: NURSE PRACTITIONER

## 2025-05-02 PROCEDURE — 1159F MED LIST DOCD IN RCRD: CPT | Performed by: INTERNAL MEDICINE

## 2025-05-02 PROCEDURE — 1160F RVW MEDS BY RX/DR IN RCRD: CPT | Performed by: INTERNAL MEDICINE

## 2025-05-02 PROCEDURE — G8427 DOCREV CUR MEDS BY ELIG CLIN: HCPCS | Performed by: INTERNAL MEDICINE

## 2025-05-02 PROCEDURE — 1126F AMNT PAIN NOTED NONE PRSNT: CPT | Performed by: INTERNAL MEDICINE

## 2025-05-02 PROCEDURE — 2580000003 HC RX 258: Performed by: NURSE PRACTITIONER

## 2025-05-02 RX ORDER — MIRTAZAPINE 15 MG/1
TABLET, FILM COATED ORAL
COMMUNITY

## 2025-05-02 RX ORDER — SODIUM CHLORIDE 9 MG/ML
INJECTION, SOLUTION INTRAVENOUS CONTINUOUS
OUTPATIENT
Start: 2025-06-01

## 2025-05-02 RX ORDER — DICYCLOMINE HCL 20 MG
TABLET ORAL
COMMUNITY

## 2025-05-02 RX ORDER — MIRTAZAPINE 30 MG/1
TABLET, FILM COATED ORAL
COMMUNITY

## 2025-05-02 RX ORDER — FAMOTIDINE 10 MG/ML
20 INJECTION, SOLUTION INTRAVENOUS
OUTPATIENT
Start: 2025-06-01

## 2025-05-02 RX ORDER — HYDROCORTISONE SODIUM SUCCINATE 100 MG/2ML
100 INJECTION INTRAMUSCULAR; INTRAVENOUS
OUTPATIENT
Start: 2025-06-01

## 2025-05-02 RX ORDER — SODIUM CHLORIDE 9 MG/ML
5-250 INJECTION, SOLUTION INTRAVENOUS PRN
OUTPATIENT
Start: 2025-06-01

## 2025-05-02 RX ORDER — SODIUM CHLORIDE 0.9 % (FLUSH) 0.9 %
5-40 SYRINGE (ML) INJECTION PRN
OUTPATIENT
Start: 2025-06-01

## 2025-05-02 RX ORDER — DIPHENHYDRAMINE HYDROCHLORIDE 50 MG/ML
50 INJECTION, SOLUTION INTRAMUSCULAR; INTRAVENOUS
OUTPATIENT
Start: 2025-06-01

## 2025-05-02 RX ORDER — ONDANSETRON 2 MG/ML
8 INJECTION INTRAMUSCULAR; INTRAVENOUS
OUTPATIENT
Start: 2025-06-01

## 2025-05-02 RX ORDER — EPINEPHRINE 1 MG/ML
0.3 INJECTION, SOLUTION INTRAMUSCULAR; SUBCUTANEOUS PRN
OUTPATIENT
Start: 2025-06-01

## 2025-05-02 RX ORDER — LATANOPROST 50 UG/ML
SOLUTION/ DROPS OPHTHALMIC
COMMUNITY

## 2025-05-02 RX ORDER — ACETAMINOPHEN 325 MG/1
650 TABLET ORAL
OUTPATIENT
Start: 2025-06-01

## 2025-05-02 RX ORDER — HEPARIN 100 UNIT/ML
500 SYRINGE INTRAVENOUS PRN
OUTPATIENT
Start: 2025-06-01

## 2025-05-02 RX ORDER — TIMOLOL MALEATE 5 MG/ML
SOLUTION/ DROPS OPHTHALMIC
COMMUNITY
Start: 2025-03-18

## 2025-05-02 RX ORDER — ALBUTEROL SULFATE 90 UG/1
4 INHALANT RESPIRATORY (INHALATION) PRN
OUTPATIENT
Start: 2025-06-01

## 2025-05-02 RX ORDER — SODIUM CHLORIDE 9 MG/ML
5-250 INJECTION, SOLUTION INTRAVENOUS PRN
Status: DISCONTINUED | OUTPATIENT
Start: 2025-05-02 | End: 2025-05-03 | Stop reason: HOSPADM

## 2025-05-02 RX ADMIN — FERUMOXYTOL 510 MG: 510 INJECTION INTRAVENOUS at 13:10

## 2025-05-02 NOTE — PROGRESS NOTES
Outpatient Infusion Center Short Visit Progress Note    Mr. Amilcar Ramirez admitted to [facility] for [facility] ambulatory in stable condition. Assessment completed. No new concerns voiced.     Vital Signs:  BP (!) 107/55       24G PIV placed with positive blood return.   Lab Results:  Recent Results (from the past 12 hours)   CBC with Auto Differential    Collection Time: 05/02/25 11:51 AM   Result Value Ref Range    WBC 3.6 (L) 4.1 - 11.1 K/uL    RBC 3.74 (L) 4.10 - 5.70 M/uL    Hemoglobin 11.0 (L) 12.1 - 17.0 g/dL    Hematocrit 33.7 (L) 36.6 - 50.3 %    MCV 90.1 80.0 - 99.0 FL    MCH 29.4 26.0 - 34.0 PG    MCHC 32.6 30.0 - 36.5 g/dL    RDW 14.6 (H) 11.5 - 14.5 %    Platelets 173 150 - 400 K/uL    MPV 10.2 8.9 - 12.9 FL    Nucleated RBCs 0.0 0  WBC    nRBC 0.00 0.00 - 0.01 K/uL    Neutrophils % 63.7 32.0 - 75.0 %    Lymphocytes % 11.0 (L) 12.0 - 49.0 %    Monocytes % 17.3 (H) 5.0 - 13.0 %    Eosinophils % 6.9 0.0 - 7.0 %    Basophils % 0.8 0.0 - 1.0 %    Immature Granulocytes % 0.3 0.0 - 0.5 %    Neutrophils Absolute 2.29 1.80 - 8.00 K/UL    Lymphocytes Absolute 0.40 (L) 0.80 - 3.50 K/UL    Monocytes Absolute 0.62 0.00 - 1.00 K/UL    Eosinophils Absolute 0.25 0.00 - 0.40 K/UL    Basophils Absolute 0.03 0.00 - 0.10 K/UL    Immature Granulocytes Absolute 0.01 0.00 - 0.04 K/UL    Differential Type SMEAR SCANNED      RBC Comment NORMOCYTIC, NORMOCHROMIC             Medications:  Medications Administered         ferumoxytol (FERAHEME) 510 mg in sodium chloride 0.9 % 100 mL IVPB Admin Date  05/02/2025 Action  New Bag Dose  510 mg Rate  381 mL/hr Route  IntraVENous Documented By  Joshua Alcazar, RN                  Patient tolerated treatment well. PIV removed. Patient discharged from Outpatient Infusion Center ambulatory in no distress. Patient aware of next appointment.    Joshua Alcazar RN  May 2, 2025    Future Appointments   Date Time Provider Department Center   5/12/2025  8:45 AM EEG SCHEDULE [id number]

## 2025-05-02 NOTE — PROGRESS NOTES
Identified pt with two pt identifiers(name and ). Reviewed record in preparation for visit and have obtained necessary documentation. All patient medications has been reviewed.    Chief Complaint   Patient presents with    Follow-up         Vitals:    25 1158   BP: 101/64   BP Site: Left Upper Arm   Patient Position: Sitting   BP Cuff Size: Small Adult   Pulse: 78   Resp: 16   Temp: 97.7 °F (36.5 °C)   TempSrc: Temporal   SpO2: (!) 78%   Weight: 81.2 kg (179 lb)   Height: 1.829 m (6')      .  \"Have you been to the ER, urgent care clinic since your last visit?  Hospitalized since your last visit?\"    NO    “Have you seen or consulted any other health care providers outside our system since your last visit?”    NO         
12/09/2024    FOLATE 9.2 12/09/2024     02/05/2021     09/25/2024    HAPTOGLOBIN 93 09/25/2024    HAPT 144 02/24/2021    TSH 5.23 (H) 01/16/2025    HEPCAB 0.10 09/25/2024    PE6T Not Observed 02/24/2021     Hemoglobin (g/dL)   Date Value   03/14/2025 11.0 (L)   02/24/2025 11.3 (L)   01/27/2025 8.9 (L)   01/25/2025 8.9 (L)   01/22/2025 8.9 (L)   01/22/2025 7.9 (L)   01/21/2025 9.0 (L)   01/20/2025 9.1 (L)   01/19/2025 8.7 (L)   01/19/2025 9.0 (L)   01/16/2025 9.8 (L)   01/15/2025 10.7 (L)   01/11/2025 9.8 (L)   12/26/2024 9.2 (L)   12/26/2024 9.3 (L)   12/09/2024 6.6 (L)   12/09/2024 6.2 (L)   12/09/2024 7.5 (L)   12/04/2024 7.8 (L)   11/29/2024 8.7 (L)   11/14/2024 9.3 (L)   11/07/2024 9.2 (L)   10/31/2024 9.3 (L)   10/24/2024 8.2 (L)   10/23/2024 7.9 (L)   10/23/2024 6.3 (L)   10/22/2024 7.2 (L)   10/17/2024 7.3 (L)   10/03/2024 6.6 (L)   09/28/2024 7.0 (L)   09/26/2024 7.8 (L)   09/26/2024 7.2 (L)   09/25/2024 8.5 (L)   09/23/2024 10.3 (L)   07/28/2024 8.3 (L)   07/25/2024 8.7 (L)   07/05/2024 7.1 (L)   07/05/2024 6.2 (L)   07/04/2024 7.5 (L)   05/31/2024 9.5 (L)   05/15/2024 10.3 (L)   05/06/2024 10.2 (L)   04/22/2024 11.0 (L)   04/03/2024 10.0 (L)   03/11/2024 9.7 (L)   01/19/2024 7.7 (L)   01/16/2024 8.7 (L)   12/04/2023 8.0 (L)   11/27/2023 8.4 (L)   10/20/2023 9.3 (L)   09/22/2023 9.5 (L)   06/26/2023 11.6 (L)   06/15/2023 10.3 (L)   05/31/2023 9.3 (L)   05/05/2023 9.5 (L)   04/28/2023 8.9 (L)   04/20/2023 8.2 (L)   03/21/2023 10.8 (L)   01/15/2023 9.5 (L)   01/14/2023 11.0 (L)   12/27/2022 8.1 (L)   12/22/2022 7.5 (L)   12/20/2022 7.0 (L)   12/18/2022 7.7 (L)   12/14/2022 6.9 (L)   12/06/2022 9.7 (L)   09/09/2022 11.3 (L)   08/03/2022 10.8 (L)   07/27/2022 10.0 (L)   07/20/2022 9.4 (L)   06/06/2022 12.5 (L)   04/27/2022 11.9 (L)   03/08/2022 11.3 (L)   03/03/2022 11.2 (L)   01/24/2022 12.1 (L)   10/04/2021 12.2 (L)   07/19/2021 12.5 (L)   06/16/2021 11.5 (L)   05/12/2021 10.3 (L)   02/24/2021

## 2025-05-09 ENCOUNTER — HOSPITAL ENCOUNTER (EMERGENCY)
Facility: HOSPITAL | Age: 85
Discharge: HOME | End: 2025-05-10
Attending: EMERGENCY MEDICINE
Payer: MEDICARE

## 2025-05-09 DIAGNOSIS — R04.0 ACUTE ANTERIOR EPISTAXIS: Primary | ICD-10-CM

## 2025-05-09 DIAGNOSIS — I78.0 TELANGIECTASIA, HEREDITARY HEMORRHAGIC: ICD-10-CM

## 2025-05-09 DIAGNOSIS — D62 ACUTE BLOOD LOSS ANEMIA: ICD-10-CM

## 2025-05-09 LAB
BASOPHILS # BLD: 0.03 K/UL (ref 0–0.1)
BASOPHILS NFR BLD: 0.3 % (ref 0–1)
DIFFERENTIAL METHOD BLD: ABNORMAL
EOSINOPHIL # BLD: 0.18 K/UL (ref 0–0.4)
EOSINOPHIL NFR BLD: 1.9 % (ref 0–7)
ERYTHROCYTE [DISTWIDTH] IN BLOOD BY AUTOMATED COUNT: 15 % (ref 11.5–14.5)
HCT VFR BLD AUTO: 31.8 % (ref 36.6–50.3)
HGB BLD-MCNC: 10.1 G/DL (ref 12.1–17)
IMM GRANULOCYTES # BLD AUTO: 0.03 K/UL (ref 0–0.04)
IMM GRANULOCYTES NFR BLD AUTO: 0.3 % (ref 0–0.5)
LYMPHOCYTES # BLD: 0.47 K/UL (ref 0.8–3.5)
LYMPHOCYTES NFR BLD: 5.1 % (ref 12–49)
MCH RBC QN AUTO: 30.2 PG (ref 26–34)
MCHC RBC AUTO-ENTMCNC: 31.8 G/DL (ref 30–36.5)
MCV RBC AUTO: 95.2 FL (ref 80–99)
MONOCYTES # BLD: 1.73 K/UL (ref 0–1)
MONOCYTES NFR BLD: 18.6 % (ref 5–13)
NEUTS SEG # BLD: 6.86 K/UL (ref 1.8–8)
NEUTS SEG NFR BLD: 73.8 % (ref 32–75)
NRBC # BLD: 0 K/UL (ref 0–0.01)
NRBC BLD-RTO: 0 PER 100 WBC
PLATELET # BLD AUTO: 161 K/UL (ref 150–400)
PMV BLD AUTO: 10.4 FL (ref 8.9–12.9)
RBC # BLD AUTO: 3.34 M/UL (ref 4.1–5.7)
RBC MORPH BLD: ABNORMAL
WBC # BLD AUTO: 9.3 K/UL (ref 4.1–11.1)

## 2025-05-09 PROCEDURE — 36415 COLL VENOUS BLD VENIPUNCTURE: CPT

## 2025-05-09 PROCEDURE — 86901 BLOOD TYPING SEROLOGIC RH(D): CPT

## 2025-05-09 PROCEDURE — 99284 EMERGENCY DEPT VISIT MOD MDM: CPT

## 2025-05-09 PROCEDURE — 96374 THER/PROPH/DIAG INJ IV PUSH: CPT

## 2025-05-09 PROCEDURE — 2500000003 HC RX 250 WO HCPCS: Performed by: EMERGENCY MEDICINE

## 2025-05-09 PROCEDURE — 86850 RBC ANTIBODY SCREEN: CPT

## 2025-05-09 PROCEDURE — 86900 BLOOD TYPING SEROLOGIC ABO: CPT

## 2025-05-09 PROCEDURE — 80048 BASIC METABOLIC PNL TOTAL CA: CPT

## 2025-05-09 PROCEDURE — 85025 COMPLETE CBC W/AUTO DIFF WBC: CPT

## 2025-05-09 RX ORDER — TRANEXAMIC ACID 100 MG/ML
1000 INJECTION, SOLUTION INTRAVENOUS
Status: COMPLETED | OUTPATIENT
Start: 2025-05-09 | End: 2025-05-09

## 2025-05-09 RX ORDER — ACETAMINOPHEN 500 MG
1000 TABLET ORAL
Status: COMPLETED | OUTPATIENT
Start: 2025-05-09 | End: 2025-05-10

## 2025-05-09 RX ADMIN — TRANEXAMIC ACID 1000 MG: 1 INJECTION, SOLUTION INTRAVENOUS at 23:12

## 2025-05-09 ASSESSMENT — PAIN - FUNCTIONAL ASSESSMENT: PAIN_FUNCTIONAL_ASSESSMENT: NONE - DENIES PAIN

## 2025-05-10 VITALS
BODY MASS INDEX: 24.41 KG/M2 | TEMPERATURE: 98.4 F | OXYGEN SATURATION: 99 % | SYSTOLIC BLOOD PRESSURE: 146 MMHG | HEART RATE: 98 BPM | WEIGHT: 180 LBS | RESPIRATION RATE: 16 BRPM | DIASTOLIC BLOOD PRESSURE: 74 MMHG

## 2025-05-10 LAB
ABO + RH BLD: NORMAL
ANION GAP SERPL CALC-SCNC: 3 MMOL/L (ref 2–12)
BLOOD GROUP ANTIBODIES SERPL: NORMAL
BUN SERPL-MCNC: 24 MG/DL (ref 6–20)
BUN/CREAT SERPL: 21 (ref 12–20)
CALCIUM SERPL-MCNC: 8.9 MG/DL (ref 8.5–10.1)
CHLORIDE SERPL-SCNC: 107 MMOL/L (ref 97–108)
CO2 SERPL-SCNC: 30 MMOL/L (ref 21–32)
CREAT SERPL-MCNC: 1.15 MG/DL (ref 0.7–1.3)
GLUCOSE SERPL-MCNC: 172 MG/DL (ref 65–100)
POTASSIUM SERPL-SCNC: 3.9 MMOL/L (ref 3.5–5.1)
SODIUM SERPL-SCNC: 140 MMOL/L (ref 136–145)
SPECIMEN EXP DATE BLD: NORMAL

## 2025-05-10 PROCEDURE — 6370000000 HC RX 637 (ALT 250 FOR IP): Performed by: EMERGENCY MEDICINE

## 2025-05-10 RX ADMIN — ACETAMINOPHEN 1000 MG: 500 TABLET ORAL at 00:57

## 2025-05-10 ASSESSMENT — PAIN - FUNCTIONAL ASSESSMENT: PAIN_FUNCTIONAL_ASSESSMENT: ACTIVITIES ARE NOT PREVENTED

## 2025-05-10 ASSESSMENT — PAIN DESCRIPTION - LOCATION
LOCATION: NOSE
LOCATION: NOSE

## 2025-05-10 ASSESSMENT — PAIN SCALES - GENERAL
PAINLEVEL_OUTOF10: 5
PAINLEVEL_OUTOF10: 2

## 2025-05-10 ASSESSMENT — PAIN DESCRIPTION - DESCRIPTORS: DESCRIPTORS: ACHING

## 2025-05-10 NOTE — ED TRIAGE NOTES
PT arrives to ED with an active nosebleed that started an hr ago and this is his worst one    Pt sts he has had hx of nosebleeds before and was dx with HHT     Dr Camacho came into triage and placed two rhino rockets    The pt sts the nose clamps don't help    reduced bolus formation slow but adequate mastication Insufficient mastication Poor bolus formation

## 2025-05-10 NOTE — ED NOTES
Discharge paper works given to patient and verbalize understanding. Patient has no active nose bleeding. Patient is ambulatory.

## 2025-05-10 NOTE — ED PROVIDER NOTES
[facility] EMERGENCY DEPARTMENT  EMERGENCY DEPARTMENT ENCOUNTER      Pt Name: Williams Fitzgerald Jr  MRN: [id number]  Birthdate 1940  Date of evaluation: 5/9/2025  Provider: Walker Camacho MD    CHIEF COMPLAINT     No chief complaint on file.      ALLERGIES     Patient has no known allergies.    ENCOUNTER     ***PASTE SAYVANT NOTE HERE***    ED Triage Vitals   BP Systolic BP Percentile Diastolic BP Percentile Temp Temp src Pulse Resp SpO2   -- -- -- -- -- -- -- --      Height Weight         -- --             CURRENT MEDICATIONS       Previous Medications    ASCORBIC ACID (VITAMIN C) 250 MG TABLET    Take by mouth    DICYCLOMINE (BENTYL) 20 MG TABLET    TAKE ONE TABLET BY MOUTH THREE TIMES DAILY AS NEEDED    LATANOPROST (XALATAN) 0.005 % OPHTHALMIC SOLUTION    INSTILL 1 DROP IN EACH EYE EVERY NIGHT AT BEDTIME    LEVOTHYROXINE (SYNTHROID) 50 MCG TABLET    Take 1 tablet by mouth Daily    MIRTAZAPINE (REMERON) 15 MG TABLET    TAKE ONE TABLET BY MOUTH EVERY DAY AT BEDTIME TO HELP IMPROVE APPETITE    MIRTAZAPINE (REMERON) 30 MG TABLET    TAKE ONE TABLET BY MOUTH AT BEDTIME TO HELP IMPROVE APPETITE    RISPERIDONE (RISPERDAL) 0.25 MG TABLET    Take 1 tablet by mouth 2 times daily    RIVASTIGMINE (EXELON) 1.5 MG CAPSULE    Take 1 capsule by mouth 2 times daily    TIMOLOL (TIMOPTIC) 0.5 % OPHTHALMIC SOLUTION    INSTILL 1 DROP IN EACH EYE EVERY DAY    VITAMIN B-12 1000 MCG TABLET    Take 1 tablet by mouth daily for 30 doses    VITAMIN D 25 MCG (1000 UT) CAPS    Take by mouth daily       REASSESSMENT     There were no vitals filed for this visit.       EKG: All EKG's are interpreted by the Emergency Department Physician who either signs or Co-signs this chart in the absence of a cardiologist.      RADIOLOGY:   Non-plain film images such as CT, Ultrasound and MRI are read by the radiologist. Plain radiographic images are visualized and preliminarily interpreted by the emergency physician.    Interpretation

## 2025-05-15 ENCOUNTER — PROCEDURE VISIT (OUTPATIENT)
Age: 85
End: 2025-05-15
Payer: MEDICARE

## 2025-05-15 DIAGNOSIS — R41.0 CONFUSION: Primary | ICD-10-CM

## 2025-05-15 PROCEDURE — 95819 EEG AWAKE AND ASLEEP: CPT | Performed by: PSYCHIATRY & NEUROLOGY

## 2025-06-02 DIAGNOSIS — D50.9 IRON DEFICIENCY ANEMIA, UNSPECIFIED IRON DEFICIENCY ANEMIA TYPE: ICD-10-CM

## 2025-06-02 DIAGNOSIS — I78.0 HHT (HEREDITARY HEMORRHAGIC TELANGIECTASIA): ICD-10-CM

## 2025-06-02 LAB
ALBUMIN SERPL-MCNC: 3.3 G/DL (ref 3.5–5)
ALBUMIN/GLOB SERPL: 1.1 (ref 1.1–2.2)
ALP SERPL-CCNC: 65 U/L (ref 45–117)
ALT SERPL-CCNC: 17 U/L (ref 12–78)
ANION GAP SERPL CALC-SCNC: 6 MMOL/L (ref 2–12)
AST SERPL-CCNC: 9 U/L (ref 15–37)
BASOPHILS # BLD: 0 K/UL (ref 0–0.1)
BASOPHILS NFR BLD: 0 % (ref 0–1)
BILIRUB SERPL-MCNC: 0.4 MG/DL (ref 0.2–1)
BUN SERPL-MCNC: 16 MG/DL (ref 6–20)
BUN/CREAT SERPL: 19 (ref 12–20)
CALCIUM SERPL-MCNC: 8.9 MG/DL (ref 8.5–10.1)
CHLORIDE SERPL-SCNC: 109 MMOL/L (ref 97–108)
CO2 SERPL-SCNC: 27 MMOL/L (ref 21–32)
CREAT SERPL-MCNC: 0.85 MG/DL (ref 0.7–1.3)
DIFFERENTIAL METHOD BLD: ABNORMAL
EOSINOPHIL # BLD: 0.12 K/UL (ref 0–0.4)
EOSINOPHIL NFR BLD: 4 % (ref 0–7)
ERYTHROCYTE [DISTWIDTH] IN BLOOD BY AUTOMATED COUNT: 14.6 % (ref 11.5–14.5)
FERRITIN SERPL-MCNC: 17 NG/ML (ref 26–388)
GLOBULIN SER CALC-MCNC: 3 G/DL (ref 2–4)
GLUCOSE SERPL-MCNC: 95 MG/DL (ref 65–100)
HCT VFR BLD AUTO: 23.8 % (ref 36.6–50.3)
HGB BLD-MCNC: 7 G/DL (ref 12.1–17)
IMM GRANULOCYTES # BLD AUTO: 0 K/UL
IMM GRANULOCYTES NFR BLD AUTO: 0 %
LYMPHOCYTES # BLD: 0.27 K/UL (ref 0.8–3.5)
LYMPHOCYTES NFR BLD: 9 % (ref 12–49)
MCH RBC QN AUTO: 27.2 PG (ref 26–34)
MCHC RBC AUTO-ENTMCNC: 29.4 G/DL (ref 30–36.5)
MCV RBC AUTO: 92.6 FL (ref 80–99)
MONOCYTES # BLD: 0.63 K/UL (ref 0–1)
MONOCYTES NFR BLD: 21 % (ref 5–13)
NEUTS BAND NFR BLD MANUAL: 1 % (ref 0–6)
NEUTS SEG # BLD: 1.98 K/UL (ref 1.8–8)
NEUTS SEG NFR BLD: 65 % (ref 32–75)
NRBC # BLD: 0 K/UL (ref 0–0.01)
NRBC BLD-RTO: 0 PER 100 WBC
PLATELET # BLD AUTO: 171 K/UL (ref 150–400)
PMV BLD AUTO: 11.1 FL (ref 8.9–12.9)
POTASSIUM SERPL-SCNC: 4.1 MMOL/L (ref 3.5–5.1)
PROT SERPL-MCNC: 6.3 G/DL (ref 6.4–8.2)
RBC # BLD AUTO: 2.57 M/UL (ref 4.1–5.7)
RBC MORPH BLD: ABNORMAL
SODIUM SERPL-SCNC: 142 MMOL/L (ref 136–145)
WBC # BLD AUTO: 3 K/UL (ref 4.1–11.1)

## 2025-06-03 ENCOUNTER — TELEPHONE (OUTPATIENT)
Age: 85
End: 2025-06-03

## 2025-06-03 NOTE — TELEPHONE ENCOUNTER
[facility] [place] at [facility]  [phone]    06/03/25- Labs reviewed with Mary Hudson.  Patient doesn't need a blood transfusion until Hgb < 7.  However, he does need iron.  He's currently scheduled for IV iron on 6/13, offered to move that appointment up sooner.  Patient's daughter agreeable, call transferred to Yasmine DONIS to reschedule.   
Pt daughter called and stated the pt's hemoglobin level is 7.She wants to know if the pt can be scheduled for a blood transfusion. Please advise     Cb# [phone]  
Additional Notes: Patient consent was obtained to proceed with the visit and recommended plan of care after discussion of all risks and benefits, including the risks of COVID-19 exposure.\\n
Detail Level: Simple
Render Risk Assessment In Note?: no

## 2025-06-03 NOTE — TELEPHONE ENCOUNTER
Spoke with patients daughter to help change his iron infusion appointment to this week, she had no further questions.

## 2025-06-05 ENCOUNTER — HOSPITAL ENCOUNTER (EMERGENCY)
Facility: HOSPITAL | Age: 85
Discharge: HOME | End: 2025-06-05
Attending: EMERGENCY MEDICINE
Payer: MEDICARE

## 2025-06-05 VITALS
SYSTOLIC BLOOD PRESSURE: 108 MMHG | OXYGEN SATURATION: 100 % | RESPIRATION RATE: 18 BRPM | HEART RATE: 78 BPM | TEMPERATURE: 98.1 F | DIASTOLIC BLOOD PRESSURE: 57 MMHG

## 2025-06-05 DIAGNOSIS — D64.9 ACUTE ON CHRONIC ANEMIA: Primary | ICD-10-CM

## 2025-06-05 LAB
ALBUMIN SERPL-MCNC: 3.3 G/DL (ref 3.5–5)
ALBUMIN/GLOB SERPL: 1 (ref 1.1–2.2)
ALP SERPL-CCNC: 58 U/L (ref 45–117)
ALT SERPL-CCNC: 18 U/L (ref 12–78)
ANION GAP SERPL CALC-SCNC: 3 MMOL/L (ref 2–12)
AST SERPL-CCNC: 12 U/L (ref 15–37)
BASOPHILS # BLD: 0.02 K/UL (ref 0–0.1)
BASOPHILS NFR BLD: 0.6 % (ref 0–1)
BILIRUB SERPL-MCNC: 0.4 MG/DL (ref 0.2–1)
BUN SERPL-MCNC: 21 MG/DL (ref 6–20)
BUN/CREAT SERPL: 22 (ref 12–20)
CALCIUM SERPL-MCNC: 8.7 MG/DL (ref 8.5–10.1)
CHLORIDE SERPL-SCNC: 110 MMOL/L (ref 97–108)
CO2 SERPL-SCNC: 29 MMOL/L (ref 21–32)
CREAT SERPL-MCNC: 0.96 MG/DL (ref 0.7–1.3)
DIFFERENTIAL METHOD BLD: ABNORMAL
EOSINOPHIL # BLD: 0.1 K/UL (ref 0–0.4)
EOSINOPHIL NFR BLD: 2.8 % (ref 0–7)
ERYTHROCYTE [DISTWIDTH] IN BLOOD BY AUTOMATED COUNT: 14.8 % (ref 11.5–14.5)
GLOBULIN SER CALC-MCNC: 3.4 G/DL (ref 2–4)
GLUCOSE SERPL-MCNC: 93 MG/DL (ref 65–100)
HCT VFR BLD AUTO: 22.4 % (ref 36.6–50.3)
HGB BLD-MCNC: 6.6 G/DL (ref 12.1–17)
HISTORY CHECK: NORMAL
IMM GRANULOCYTES # BLD AUTO: 0.01 K/UL (ref 0–0.04)
IMM GRANULOCYTES NFR BLD AUTO: 0.3 % (ref 0–0.5)
LYMPHOCYTES # BLD: 0.36 K/UL (ref 0.8–3.5)
LYMPHOCYTES NFR BLD: 10.1 % (ref 12–49)
MCH RBC QN AUTO: 26.8 PG (ref 26–34)
MCHC RBC AUTO-ENTMCNC: 29.5 G/DL (ref 30–36.5)
MCV RBC AUTO: 91.1 FL (ref 80–99)
MONOCYTES # BLD: 0.49 K/UL (ref 0–1)
MONOCYTES NFR BLD: 13.7 % (ref 5–13)
NEUTS SEG # BLD: 2.62 K/UL (ref 1.8–8)
NEUTS SEG NFR BLD: 72.5 % (ref 32–75)
NRBC # BLD: 0 K/UL (ref 0–0.01)
NRBC BLD-RTO: 0 PER 100 WBC
PLATELET # BLD AUTO: 179 K/UL (ref 150–400)
PMV BLD AUTO: 10.8 FL (ref 8.9–12.9)
POTASSIUM SERPL-SCNC: 3.8 MMOL/L (ref 3.5–5.1)
PROT SERPL-MCNC: 6.7 G/DL (ref 6.4–8.2)
RBC # BLD AUTO: 2.46 M/UL (ref 4.1–5.7)
RBC MORPH BLD: ABNORMAL
SODIUM SERPL-SCNC: 142 MMOL/L (ref 136–145)
WBC # BLD AUTO: 3.6 K/UL (ref 4.1–11.1)

## 2025-06-05 PROCEDURE — 85025 COMPLETE CBC W/AUTO DIFF WBC: CPT

## 2025-06-05 PROCEDURE — 86900 BLOOD TYPING SEROLOGIC ABO: CPT

## 2025-06-05 PROCEDURE — 99285 EMERGENCY DEPT VISIT HI MDM: CPT

## 2025-06-05 PROCEDURE — 86923 COMPATIBILITY TEST ELECTRIC: CPT

## 2025-06-05 PROCEDURE — 80053 COMPREHEN METABOLIC PANEL: CPT

## 2025-06-05 PROCEDURE — 86850 RBC ANTIBODY SCREEN: CPT

## 2025-06-05 PROCEDURE — P9016 RBC LEUKOCYTES REDUCED: HCPCS

## 2025-06-05 PROCEDURE — 86901 BLOOD TYPING SEROLOGIC RH(D): CPT

## 2025-06-05 PROCEDURE — 36415 COLL VENOUS BLD VENIPUNCTURE: CPT

## 2025-06-05 PROCEDURE — 36430 TRANSFUSION BLD/BLD COMPNT: CPT

## 2025-06-05 RX ORDER — SODIUM CHLORIDE 9 MG/ML
INJECTION, SOLUTION INTRAVENOUS PRN
Status: DISCONTINUED | OUTPATIENT
Start: 2025-06-05 | End: 2025-06-05 | Stop reason: HOSPADM

## 2025-06-05 ASSESSMENT — ENCOUNTER SYMPTOMS: SHORTNESS OF BREATH: 0

## 2025-06-05 ASSESSMENT — PAIN SCALES - GENERAL: PAINLEVEL_OUTOF10: 0

## 2025-06-05 NOTE — CONSENT
Informed Consent for Blood Component Transfusion Note    I have discussed with the patient the rationale for blood component transfusion; its benefits in treating or preventing fatigue, organ damage, or death; and its risk which includes mild transfusion reactions, rare risk of blood borne infection, or more serious but rare reactions. I have discussed the alternatives to transfusion, including the risk and consequences of not receiving transfusion. The patient had an opportunity to ask questions and had agreed to proceed with transfusion of blood components.    Electronically signed by Arvind Ortiz MD on 6/5/25 at 12:56 PM EDT

## 2025-06-05 NOTE — ED PROVIDER NOTES
[facility] EMERGENCY DEPARTMENT  EMERGENCY DEPARTMENT ENCOUNTER      Pt Name: Williams Fitzgerald Jr  MRN: [id number]  Birthdate 1940  Date of evaluation: 6/5/2025  Provider: Arvind Ortiz MD      HISTORY OF PRESENT ILLNESS      85-year-old male with a history of HHT presenting to the ER for possible anemia.  Patient reports that last night he had a bad spontaneous nosebleed.  He has been feeling lightheaded today.  Reports his last blood count was in the 7 range with his hematologist.  He presents to the ER to have his blood count rechecked to see if he might need a transfusion after his nosebleed last night.  His nosebleed spontaneously resolved and he has not had any bleeding today.  No particular chest pain, dyspnea or syncopal event.              Nursing Notes were reviewed.    REVIEW OF SYSTEMS         Review of Systems   HENT:  Positive for nosebleeds.    Respiratory:  Negative for shortness of breath.    Cardiovascular:  Negative for chest pain.   Neurological:  Positive for light-headedness.           PAST MEDICAL HISTORY     Past Medical History:   Diagnosis Date    Dementia (HCC)     Was diagnosed with early stages about 3.5 years ago    Hearing loss     HHT (hereditary hemorrhagic telangiectasia)     Iron deficiency anemia     Prostate cancer (HCC)     Pulmonary arteriovenous malformation          SURGICAL HISTORY       Past Surgical History:   Procedure Laterality Date    APPENDECTOMY      CHEST SURGERY      excision of AVM    COLONOSCOPY N/A 1/29/2020    COLONOSCOPY performed by Manas Barr MD at [facility] ENDOSCOPY    COLONOSCOPY  2014    due 18    NOSE SURGERY Bilateral 1/21/2025    BILATERAL NASAL ENDOSCOPY WITH CONTROL OF EPISTAXIS performed by Wyatt Banegas DO at [facility] MAIN OR    OTHER SURGICAL HISTORY  07/2018    sclerotheropy    TONSILLECTOMY           CURRENT MEDICATIONS       Previous Medications    ASCORBIC ACID (VITAMIN C) 250 MG TABLET    Take by mouth     (1/19/2025)    PRAPARE - Transportation     Lack of Transportation (Medical): No     Lack of Transportation (Non-Medical): No   Housing Stability: Low Risk  (1/19/2025)    Housing Stability Vital Sign     Unable to Pay for Housing in the Last Year: No     Number of Times Moved in the Last Year: 1     Homeless in the Last Year: No         PHYSICAL EXAM       ED Triage Vitals [06/05/25 1151]   BP Systolic BP Percentile Diastolic BP Percentile Temp Temp Source Pulse Respirations SpO2   (!) 117/54 -- -- 97.7 °F (36.5 °C) Oral 83 16 100 %      Height Weight         -- --             There is no height or weight on file to calculate BMI.    Physical Exam  Vitals and nursing note reviewed.   Constitutional:       General: He is not in acute distress.     Appearance: Normal appearance.   HENT:      Head: Normocephalic and atraumatic.      Mouth/Throat:      Mouth: Mucous membranes are moist.      Pharynx: Oropharynx is clear. No oropharyngeal exudate.   Eyes:      Extraocular Movements: Extraocular movements intact.      Pupils: Pupils are equal, round, and reactive to light.   Cardiovascular:      Rate and Rhythm: Normal rate and regular rhythm.   Pulmonary:      Effort: Pulmonary effort is normal.      Breath sounds: Normal breath sounds.   Abdominal:      General: Abdomen is flat.      Palpations: Abdomen is soft.      Tenderness: There is no abdominal tenderness.   Musculoskeletal:         General: No swelling or deformity. Normal range of motion.      Cervical back: Normal range of motion.   Skin:     General: Skin is warm and dry.      Findings: No rash.   Neurological:      General: No focal deficit present.      Mental Status: He is alert and oriented to person, place, and time.      Cranial Nerves: No cranial nerve deficit.   Psychiatric:         Mood and Affect: Mood normal.             EMERGENCY DEPARTMENT COURSE and DIFFERENTIAL DIAGNOSIS/MDM:   Vitals:    Vitals:    06/05/25 1151   BP: (!) 117/54   Pulse: 83

## 2025-06-05 NOTE — ED TRIAGE NOTES
Pt arrives to the ER for complaints of a nosebleed that lasted for about a hour last night.     Reports that he does suffer from chronic nose bleeds. States he had routine labs completed last week and his hgb was around 7.     Daughter reports that she was talking to him this morning and he was expressing some confusion along with some lightheadedness.     Reports that they are concerned about possible anemia.     Denies taking any blood thinners.     [facility].

## 2025-06-05 NOTE — ED NOTES
Assumed care of the patient from Arelis RN. Patient noted to be in no acute distress, receiving RBC at 120 ml, tolerating well, increased rate to 150 ml/hr. Patient BP noted to be on the lower side of normal, was informed this is baseline for the patient.   Family at bedside and updated on plan of care.

## 2025-06-06 ENCOUNTER — HOSPITAL ENCOUNTER (OUTPATIENT)
Facility: HOSPITAL | Age: 85
Setting detail: INFUSION SERIES
Discharge: HOME | End: 2025-06-06
Payer: MEDICARE

## 2025-06-06 VITALS
HEART RATE: 78 BPM | RESPIRATION RATE: 16 BRPM | OXYGEN SATURATION: 99 % | WEIGHT: 181.6 LBS | SYSTOLIC BLOOD PRESSURE: 116 MMHG | HEIGHT: 72 IN | TEMPERATURE: 98.3 F | DIASTOLIC BLOOD PRESSURE: 54 MMHG | BODY MASS INDEX: 24.6 KG/M2

## 2025-06-06 DIAGNOSIS — D50.9 IRON DEFICIENCY ANEMIA, UNSPECIFIED IRON DEFICIENCY ANEMIA TYPE: ICD-10-CM

## 2025-06-06 DIAGNOSIS — I78.0 HHT (HEREDITARY HEMORRHAGIC TELANGIECTASIA): Primary | ICD-10-CM

## 2025-06-06 PROCEDURE — 2580000003 HC RX 258: Performed by: NURSE PRACTITIONER

## 2025-06-06 PROCEDURE — 96365 THER/PROPH/DIAG IV INF INIT: CPT

## 2025-06-06 PROCEDURE — 6360000002 HC RX W HCPCS: Performed by: NURSE PRACTITIONER

## 2025-06-06 RX ORDER — ACETAMINOPHEN 325 MG/1
650 TABLET ORAL
OUTPATIENT
Start: 2025-06-09

## 2025-06-06 RX ORDER — ALBUTEROL SULFATE 90 UG/1
4 INHALANT RESPIRATORY (INHALATION) PRN
Status: DISCONTINUED | OUTPATIENT
Start: 2025-06-06 | End: 2025-06-07 | Stop reason: HOSPADM

## 2025-06-06 RX ORDER — SODIUM CHLORIDE 9 MG/ML
5-250 INJECTION, SOLUTION INTRAVENOUS PRN
Status: DISCONTINUED | OUTPATIENT
Start: 2025-06-06 | End: 2025-06-07 | Stop reason: HOSPADM

## 2025-06-06 RX ORDER — SODIUM CHLORIDE 9 MG/ML
INJECTION, SOLUTION INTRAVENOUS CONTINUOUS
Status: DISCONTINUED | OUTPATIENT
Start: 2025-06-06 | End: 2025-06-07 | Stop reason: HOSPADM

## 2025-06-06 RX ORDER — DIPHENHYDRAMINE HYDROCHLORIDE 50 MG/ML
50 INJECTION, SOLUTION INTRAMUSCULAR; INTRAVENOUS
Status: DISCONTINUED | OUTPATIENT
Start: 2025-06-06 | End: 2025-06-07 | Stop reason: HOSPADM

## 2025-06-06 RX ORDER — SODIUM CHLORIDE 9 MG/ML
5-250 INJECTION, SOLUTION INTRAVENOUS PRN
OUTPATIENT
Start: 2025-06-09

## 2025-06-06 RX ORDER — FAMOTIDINE 10 MG/ML
20 INJECTION, SOLUTION INTRAVENOUS
Status: DISCONTINUED | OUTPATIENT
Start: 2025-06-06 | End: 2025-06-07 | Stop reason: HOSPADM

## 2025-06-06 RX ORDER — ACETAMINOPHEN 325 MG/1
650 TABLET ORAL
Status: DISCONTINUED | OUTPATIENT
Start: 2025-06-06 | End: 2025-06-07 | Stop reason: HOSPADM

## 2025-06-06 RX ORDER — ALBUTEROL SULFATE 90 UG/1
4 INHALANT RESPIRATORY (INHALATION) PRN
OUTPATIENT
Start: 2025-06-09

## 2025-06-06 RX ORDER — ONDANSETRON 2 MG/ML
8 INJECTION INTRAMUSCULAR; INTRAVENOUS
Status: DISCONTINUED | OUTPATIENT
Start: 2025-06-06 | End: 2025-06-07 | Stop reason: HOSPADM

## 2025-06-06 RX ORDER — SODIUM CHLORIDE 9 MG/ML
INJECTION, SOLUTION INTRAVENOUS CONTINUOUS
OUTPATIENT
Start: 2025-06-09

## 2025-06-06 RX ORDER — EPINEPHRINE 1 MG/ML
0.3 INJECTION, SOLUTION INTRAMUSCULAR; SUBCUTANEOUS PRN
Status: DISCONTINUED | OUTPATIENT
Start: 2025-06-06 | End: 2025-06-07 | Stop reason: HOSPADM

## 2025-06-06 RX ORDER — HYDROCORTISONE SODIUM SUCCINATE 100 MG/2ML
100 INJECTION INTRAMUSCULAR; INTRAVENOUS
OUTPATIENT
Start: 2025-06-09

## 2025-06-06 RX ORDER — ONDANSETRON 2 MG/ML
8 INJECTION INTRAMUSCULAR; INTRAVENOUS
OUTPATIENT
Start: 2025-06-09

## 2025-06-06 RX ORDER — FAMOTIDINE 10 MG/ML
20 INJECTION, SOLUTION INTRAVENOUS
OUTPATIENT
Start: 2025-06-09

## 2025-06-06 RX ORDER — HYDROCORTISONE SODIUM SUCCINATE 100 MG/2ML
100 INJECTION INTRAMUSCULAR; INTRAVENOUS
Status: DISCONTINUED | OUTPATIENT
Start: 2025-06-06 | End: 2025-06-07 | Stop reason: HOSPADM

## 2025-06-06 RX ORDER — EPINEPHRINE 1 MG/ML
0.3 INJECTION, SOLUTION INTRAMUSCULAR; SUBCUTANEOUS PRN
OUTPATIENT
Start: 2025-06-09

## 2025-06-06 RX ORDER — HEPARIN 100 UNIT/ML
500 SYRINGE INTRAVENOUS PRN
OUTPATIENT
Start: 2025-06-09

## 2025-06-06 RX ORDER — DIPHENHYDRAMINE HYDROCHLORIDE 50 MG/ML
50 INJECTION, SOLUTION INTRAMUSCULAR; INTRAVENOUS
OUTPATIENT
Start: 2025-06-09

## 2025-06-06 RX ORDER — SODIUM CHLORIDE 0.9 % (FLUSH) 0.9 %
5-40 SYRINGE (ML) INJECTION PRN
OUTPATIENT
Start: 2025-06-09

## 2025-06-06 RX ADMIN — SODIUM CHLORIDE 25 ML/HR: 9 INJECTION, SOLUTION INTRAVENOUS at 14:15

## 2025-06-06 RX ADMIN — FERUMOXYTOL 510 MG: 510 INJECTION INTRAVENOUS at 14:17

## 2025-06-06 ASSESSMENT — PAIN SCALES - GENERAL: PAINLEVEL_OUTOF10: 0

## 2025-06-06 NOTE — PROGRESS NOTES
[facility] Progress Note        Date: 25    Name: Williams Fitzgerald Jr    MRN: [id number]         : 1940    MD: Geoff Auguste MD       Mr. Amilcar Ramirez admitted to [facility] for Feraheme ambulatory in stable condition. Assessment completed. No new concerns voiced.  24 gauge peripheral IV obtained in the left forearm after 2 unsuccessful attempts, line flushed and capped. PIV was placed by Casi LOPEZ. No labs ordered for this visit.      ** Patient has received this medication in the past. Patient reports no previous reactions to the medication(s).         Vitals:    25 1346 25 1505   BP: (!) 140/63 (!) 116/54   Pulse: 85 78   Resp: 16    Temp: 98.3 °F (36.8 °C)    TempSrc: Temporal    SpO2: 99%    Weight: 82.4 kg (181 lb 9.6 oz)    Height: 1.829 m (6')              Medications:  MEDICATIONS GIVEN:  Medications Administered         0.9 % sodium chloride infusion Admin Date  2025 Action  New Bag Dose  25 mL/hr Rate  25 mL/hr Route  IntraVENous Documented By  Georgia Palmer RN        ferumoxytol (FERAHEME) 510 mg in sodium chloride 0.9 % 100 mL IVPB Admin Date  2025 Action  New Bag Dose  510 mg Rate  381 mL/hr Route  IntraVENous Documented By  Georgia Palmer RN            Towards the end of the infusion, the PIV infiltrate. Infusion stopped and the patient declined the remainder of the medication. PIV removed, wrapped in coban, and heat pack applied to PIV site. MD notified.       Post-Infusion Vitals:  Vitals:    25 1505   BP: (!) 116/54   Pulse: 78   Resp:    Temp:    SpO2:        Pt tolerated treatment well. D/c home ambulatory in no distress. Patient is aware of next appointment on 25 @ 1130 at the [place] location.      Future Appointments:  Future Appointments   Date Time Provider Department Center   2025  1:40 PM Landen Ruiz MD CAVSF BS [facility]   2025 11:30 AM  CHEMO CHAIR 2 [place]   2025 11:50 AM  Mary Hudson APRN - NP ONCSF BS [facility]   7/30/2025  9:00 AM Tegan Quinteros APRN - NP [id number] [facility]   9/5/2025 11:30 AM  CHEMO CHAIR 10 [facility]   3/12/2026 12:30 PM Rajeev Holt MD [facility]

## 2025-06-12 NOTE — PROGRESS NOTES
CRM # [id number]  Owner: None  Status: Unresolved  Open  Priority: Routine Created on: 2025 09:42 AM By: Terrance Sheridan     Primary Information    Source   Catia Fairbanks (Patient)    Subject   Catia Fairbanks (Patient)    Topic   Appointments - Appointment Confirmation      Summary   Confirming an Appointment   Communication   ..Type:  Patient Requesting Call            Who Called:Catia LOZANO. Magdi      Does the patient know what this is regarding?:need to make an appointment before her colonoscopy on 25      Would the patient rather a call back or a response via [facility]? Call      Best Call Back Number:[phone]      Additional Information:        Patient Information    Patient Name Gender  SSN   Catia Fairbanks Female 1961 [id number]     Contacts    Contact Date/Time Type Contact Identity Validated Phone/Fax   2025 09:41 AM CDT Phone (Incoming) Catia Fairbanks (Self) , Phone, Address [phone]     Routing History     From To Priority   2025 09:44 AM Terrance Sheridan STAFF Routine       25 1:41 PM    Returned pt call and told her to call the office back and we would be happy to help schedule OV before her procedure on 25. anum     Nurse handed patient a copy of discharge instructions which have been read and explained to patient and spouse. New medications were read and explained to patient, patient verbalized understanding. Patient aware that prescriptions have been electronically sent to their pharmacy. Opportunity for questions and clarification offered. Removed patient's IV access with no complications. Vital signs stable. Patient sent with all belongings.

## 2025-06-13 ENCOUNTER — APPOINTMENT (OUTPATIENT)
Facility: HOSPITAL | Age: 85
End: 2025-06-13
Payer: MEDICARE

## 2025-07-09 ENCOUNTER — TELEPHONE (OUTPATIENT)
Age: 85
End: 2025-07-09

## 2025-07-09 DIAGNOSIS — I78.0 HHT (HEREDITARY HEMORRHAGIC TELANGIECTASIA): Primary | ICD-10-CM

## 2025-07-09 RX ORDER — SODIUM CHLORIDE 0.9 % (FLUSH) 0.9 %
5-40 SYRINGE (ML) INJECTION PRN
OUTPATIENT
Start: 2025-10-03

## 2025-07-09 RX ORDER — SODIUM CHLORIDE 9 MG/ML
INJECTION, SOLUTION INTRAVENOUS CONTINUOUS
OUTPATIENT
Start: 2025-10-03

## 2025-07-09 RX ORDER — HEPARIN SODIUM (PORCINE) LOCK FLUSH IV SOLN 100 UNIT/ML 100 UNIT/ML
500 SOLUTION INTRAVENOUS PRN
OUTPATIENT
Start: 2025-08-22

## 2025-07-09 RX ORDER — HYDROCORTISONE SODIUM SUCCINATE 100 MG/2ML
100 INJECTION INTRAMUSCULAR; INTRAVENOUS
OUTPATIENT
Start: 2025-08-22

## 2025-07-09 RX ORDER — DIPHENHYDRAMINE HYDROCHLORIDE 50 MG/ML
50 INJECTION, SOLUTION INTRAMUSCULAR; INTRAVENOUS
OUTPATIENT
Start: 2025-09-05

## 2025-07-09 RX ORDER — EPINEPHRINE 1 MG/ML
0.3 INJECTION, SOLUTION, CONCENTRATE INTRAVENOUS PRN
OUTPATIENT
Start: 2025-08-22

## 2025-07-09 RX ORDER — ONDANSETRON 2 MG/ML
8 INJECTION INTRAMUSCULAR; INTRAVENOUS
OUTPATIENT
Start: 2025-09-05

## 2025-07-09 RX ORDER — FAMOTIDINE 10 MG/ML
20 INJECTION, SOLUTION INTRAVENOUS
OUTPATIENT
Start: 2025-08-08

## 2025-07-09 RX ORDER — EPINEPHRINE 1 MG/ML
0.3 INJECTION, SOLUTION, CONCENTRATE INTRAVENOUS PRN
OUTPATIENT
Start: 2025-10-03

## 2025-07-09 RX ORDER — SODIUM CHLORIDE 0.9 % (FLUSH) 0.9 %
5-40 SYRINGE (ML) INJECTION PRN
OUTPATIENT
Start: 2025-09-05

## 2025-07-09 RX ORDER — DIPHENHYDRAMINE HYDROCHLORIDE 50 MG/ML
50 INJECTION, SOLUTION INTRAMUSCULAR; INTRAVENOUS
OUTPATIENT
Start: 2025-08-08

## 2025-07-09 RX ORDER — DIPHENHYDRAMINE HYDROCHLORIDE 50 MG/ML
50 INJECTION, SOLUTION INTRAMUSCULAR; INTRAVENOUS
OUTPATIENT
Start: 2025-09-19

## 2025-07-09 RX ORDER — ONDANSETRON 2 MG/ML
8 INJECTION INTRAMUSCULAR; INTRAVENOUS
OUTPATIENT
Start: 2025-10-03

## 2025-07-09 RX ORDER — ALBUTEROL SULFATE 90 UG/1
4 INHALANT RESPIRATORY (INHALATION) PRN
OUTPATIENT
Start: 2025-08-22

## 2025-07-09 RX ORDER — ACETAMINOPHEN 325 MG/1
650 TABLET ORAL
OUTPATIENT
Start: 2025-08-08

## 2025-07-09 RX ORDER — SODIUM CHLORIDE 9 MG/ML
5-250 INJECTION, SOLUTION INTRAVENOUS PRN
OUTPATIENT
Start: 2025-09-19

## 2025-07-09 RX ORDER — SODIUM CHLORIDE 9 MG/ML
5-250 INJECTION, SOLUTION INTRAVENOUS PRN
OUTPATIENT
Start: 2025-08-22

## 2025-07-09 RX ORDER — SODIUM CHLORIDE 9 MG/ML
INJECTION, SOLUTION INTRAVENOUS CONTINUOUS
OUTPATIENT
Start: 2025-08-08

## 2025-07-09 RX ORDER — HEPARIN SODIUM (PORCINE) LOCK FLUSH IV SOLN 100 UNIT/ML 100 UNIT/ML
500 SOLUTION INTRAVENOUS PRN
OUTPATIENT
Start: 2025-10-03

## 2025-07-09 RX ORDER — HEPARIN SODIUM (PORCINE) LOCK FLUSH IV SOLN 100 UNIT/ML 100 UNIT/ML
500 SOLUTION INTRAVENOUS PRN
OUTPATIENT
Start: 2025-08-08

## 2025-07-09 RX ORDER — HEPARIN SODIUM (PORCINE) LOCK FLUSH IV SOLN 100 UNIT/ML 100 UNIT/ML
500 SOLUTION INTRAVENOUS PRN
OUTPATIENT
Start: 2025-09-05

## 2025-07-09 RX ORDER — HEPARIN SODIUM (PORCINE) LOCK FLUSH IV SOLN 100 UNIT/ML 100 UNIT/ML
500 SOLUTION INTRAVENOUS PRN
OUTPATIENT
Start: 2025-09-19

## 2025-07-09 RX ORDER — SODIUM CHLORIDE 9 MG/ML
5-250 INJECTION, SOLUTION INTRAVENOUS PRN
OUTPATIENT
Start: 2025-08-08

## 2025-07-09 RX ORDER — ACETAMINOPHEN 325 MG/1
650 TABLET ORAL
OUTPATIENT
Start: 2025-09-19

## 2025-07-09 RX ORDER — SODIUM CHLORIDE 9 MG/ML
5-250 INJECTION, SOLUTION INTRAVENOUS PRN
OUTPATIENT
Start: 2025-09-05

## 2025-07-09 RX ORDER — FAMOTIDINE 10 MG/ML
20 INJECTION, SOLUTION INTRAVENOUS
OUTPATIENT
Start: 2025-09-05

## 2025-07-09 RX ORDER — SODIUM CHLORIDE 0.9 % (FLUSH) 0.9 %
5-40 SYRINGE (ML) INJECTION PRN
OUTPATIENT
Start: 2025-08-08

## 2025-07-09 RX ORDER — ACETAMINOPHEN 325 MG/1
650 TABLET ORAL
OUTPATIENT
Start: 2025-08-22

## 2025-07-09 RX ORDER — EPINEPHRINE 1 MG/ML
0.3 INJECTION, SOLUTION, CONCENTRATE INTRAVENOUS PRN
OUTPATIENT
Start: 2025-08-08

## 2025-07-09 RX ORDER — ALBUTEROL SULFATE 90 UG/1
4 INHALANT RESPIRATORY (INHALATION) PRN
OUTPATIENT
Start: 2025-08-08

## 2025-07-09 RX ORDER — HYDROCORTISONE SODIUM SUCCINATE 100 MG/2ML
100 INJECTION INTRAMUSCULAR; INTRAVENOUS
OUTPATIENT
Start: 2025-08-08

## 2025-07-09 RX ORDER — SODIUM CHLORIDE 0.9 % (FLUSH) 0.9 %
5-40 SYRINGE (ML) INJECTION PRN
OUTPATIENT
Start: 2025-09-19

## 2025-07-09 RX ORDER — ONDANSETRON 2 MG/ML
8 INJECTION INTRAMUSCULAR; INTRAVENOUS
OUTPATIENT
Start: 2025-08-08

## 2025-07-09 RX ORDER — ALBUTEROL SULFATE 90 UG/1
4 INHALANT RESPIRATORY (INHALATION) PRN
OUTPATIENT
Start: 2025-09-19

## 2025-07-09 RX ORDER — ACETAMINOPHEN 325 MG/1
650 TABLET ORAL
OUTPATIENT
Start: 2025-10-03

## 2025-07-09 RX ORDER — FAMOTIDINE 10 MG/ML
20 INJECTION, SOLUTION INTRAVENOUS
OUTPATIENT
Start: 2025-09-19

## 2025-07-09 RX ORDER — SODIUM CHLORIDE 9 MG/ML
INJECTION, SOLUTION INTRAVENOUS CONTINUOUS
OUTPATIENT
Start: 2025-09-19

## 2025-07-09 RX ORDER — HYDROCORTISONE SODIUM SUCCINATE 100 MG/2ML
100 INJECTION INTRAMUSCULAR; INTRAVENOUS
OUTPATIENT
Start: 2025-09-19

## 2025-07-09 RX ORDER — SODIUM CHLORIDE 0.9 % (FLUSH) 0.9 %
5-40 SYRINGE (ML) INJECTION PRN
OUTPATIENT
Start: 2025-08-22

## 2025-07-09 RX ORDER — ONDANSETRON 2 MG/ML
8 INJECTION INTRAMUSCULAR; INTRAVENOUS
OUTPATIENT
Start: 2025-09-19

## 2025-07-09 RX ORDER — ACETAMINOPHEN 325 MG/1
650 TABLET ORAL
OUTPATIENT
Start: 2025-09-05

## 2025-07-09 RX ORDER — SODIUM CHLORIDE 9 MG/ML
INJECTION, SOLUTION INTRAVENOUS CONTINUOUS
OUTPATIENT
Start: 2025-09-05

## 2025-07-09 RX ORDER — EPINEPHRINE 1 MG/ML
0.3 INJECTION, SOLUTION, CONCENTRATE INTRAVENOUS PRN
OUTPATIENT
Start: 2025-09-19

## 2025-07-09 RX ORDER — DIPHENHYDRAMINE HYDROCHLORIDE 50 MG/ML
50 INJECTION, SOLUTION INTRAMUSCULAR; INTRAVENOUS
OUTPATIENT
Start: 2025-08-22

## 2025-07-09 RX ORDER — SODIUM CHLORIDE 9 MG/ML
5-250 INJECTION, SOLUTION INTRAVENOUS PRN
OUTPATIENT
Start: 2025-10-03

## 2025-07-09 RX ORDER — HYDROCORTISONE SODIUM SUCCINATE 100 MG/2ML
100 INJECTION INTRAMUSCULAR; INTRAVENOUS
OUTPATIENT
Start: 2025-10-03

## 2025-07-09 RX ORDER — EPINEPHRINE 1 MG/ML
0.3 INJECTION, SOLUTION, CONCENTRATE INTRAVENOUS PRN
OUTPATIENT
Start: 2025-09-05

## 2025-07-09 RX ORDER — DIPHENHYDRAMINE HYDROCHLORIDE 50 MG/ML
50 INJECTION, SOLUTION INTRAMUSCULAR; INTRAVENOUS
OUTPATIENT
Start: 2025-10-03

## 2025-07-09 RX ORDER — ALBUTEROL SULFATE 90 UG/1
4 INHALANT RESPIRATORY (INHALATION) PRN
OUTPATIENT
Start: 2025-10-03

## 2025-07-09 RX ORDER — HYDROCORTISONE SODIUM SUCCINATE 100 MG/2ML
100 INJECTION INTRAMUSCULAR; INTRAVENOUS
OUTPATIENT
Start: 2025-09-05

## 2025-07-09 RX ORDER — SODIUM CHLORIDE 9 MG/ML
INJECTION, SOLUTION INTRAVENOUS CONTINUOUS
OUTPATIENT
Start: 2025-08-22

## 2025-07-09 RX ORDER — ALBUTEROL SULFATE 90 UG/1
4 INHALANT RESPIRATORY (INHALATION) PRN
OUTPATIENT
Start: 2025-09-05

## 2025-07-09 RX ORDER — ONDANSETRON 2 MG/ML
8 INJECTION INTRAMUSCULAR; INTRAVENOUS
OUTPATIENT
Start: 2025-08-22

## 2025-07-09 RX ORDER — FAMOTIDINE 10 MG/ML
20 INJECTION, SOLUTION INTRAVENOUS
OUTPATIENT
Start: 2025-10-03

## 2025-07-09 RX ORDER — FAMOTIDINE 10 MG/ML
20 INJECTION, SOLUTION INTRAVENOUS
OUTPATIENT
Start: 2025-08-22

## 2025-07-09 NOTE — TELEPHONE ENCOUNTER
Spoke with patients daughter to get him scheduled for avastin + iron this week, she had no further questions.

## 2025-07-11 ENCOUNTER — HOSPITAL ENCOUNTER (OUTPATIENT)
Facility: HOSPITAL | Age: 85
Setting detail: INFUSION SERIES
End: 2025-07-11
Payer: MEDICARE

## 2025-07-11 ENCOUNTER — TELEPHONE (OUTPATIENT)
Age: 85
End: 2025-07-11

## 2025-07-11 ENCOUNTER — HOSPITAL ENCOUNTER (OUTPATIENT)
Facility: HOSPITAL | Age: 85
Setting detail: INFUSION SERIES
Discharge: HOME | End: 2025-07-11
Payer: MEDICARE

## 2025-07-11 VITALS
BODY MASS INDEX: 24.38 KG/M2 | RESPIRATION RATE: 16 BRPM | SYSTOLIC BLOOD PRESSURE: 123 MMHG | OXYGEN SATURATION: 99 % | HEART RATE: 90 BPM | DIASTOLIC BLOOD PRESSURE: 66 MMHG | TEMPERATURE: 98.6 F | HEIGHT: 72 IN | WEIGHT: 180 LBS

## 2025-07-11 DIAGNOSIS — D50.9 IRON DEFICIENCY ANEMIA, UNSPECIFIED IRON DEFICIENCY ANEMIA TYPE: ICD-10-CM

## 2025-07-11 DIAGNOSIS — I78.0 HHT (HEREDITARY HEMORRHAGIC TELANGIECTASIA): Primary | ICD-10-CM

## 2025-07-11 LAB
BASOPHILS # BLD: 0.02 K/UL (ref 0–0.1)
BASOPHILS NFR BLD: 0.5 % (ref 0–1)
DIFFERENTIAL METHOD BLD: ABNORMAL
EOSINOPHIL # BLD: 0.19 K/UL (ref 0–0.4)
EOSINOPHIL NFR BLD: 4.6 % (ref 0–7)
ERYTHROCYTE [DISTWIDTH] IN BLOOD BY AUTOMATED COUNT: 17.2 % (ref 11.5–14.5)
HCT VFR BLD AUTO: 21.8 % (ref 36.6–50.3)
HGB BLD-MCNC: 6.4 G/DL (ref 12.1–17)
IMM GRANULOCYTES # BLD AUTO: 0.01 K/UL (ref 0–0.04)
IMM GRANULOCYTES NFR BLD AUTO: 0.2 % (ref 0–0.5)
LYMPHOCYTES # BLD: 0.38 K/UL (ref 0.8–3.5)
LYMPHOCYTES NFR BLD: 9.2 % (ref 12–49)
MCH RBC QN AUTO: 24.4 PG (ref 26–34)
MCHC RBC AUTO-ENTMCNC: 29.4 G/DL (ref 30–36.5)
MCV RBC AUTO: 83.2 FL (ref 80–99)
MONOCYTES # BLD: 0.68 K/UL (ref 0–1)
MONOCYTES NFR BLD: 16.5 % (ref 5–13)
NEUTS SEG # BLD: 2.83 K/UL (ref 1.8–8)
NEUTS SEG NFR BLD: 69 % (ref 32–75)
NRBC # BLD: 0 K/UL (ref 0–0.01)
NRBC BLD-RTO: 0 PER 100 WBC
PLATELET # BLD AUTO: 167 K/UL (ref 150–400)
PLATELET COMMENT: ABNORMAL
PMV BLD AUTO: 10.9 FL (ref 8.9–12.9)
RBC # BLD AUTO: 2.62 M/UL (ref 4.1–5.7)
RBC MORPH BLD: ABNORMAL
RBC MORPH BLD: ABNORMAL
WBC # BLD AUTO: 4.1 K/UL (ref 4.1–11.1)

## 2025-07-11 PROCEDURE — 6360000002 HC RX W HCPCS: Performed by: NURSE PRACTITIONER

## 2025-07-11 PROCEDURE — 85025 COMPLETE CBC W/AUTO DIFF WBC: CPT

## 2025-07-11 PROCEDURE — 36415 COLL VENOUS BLD VENIPUNCTURE: CPT

## 2025-07-11 PROCEDURE — 96367 TX/PROPH/DG ADDL SEQ IV INF: CPT

## 2025-07-11 PROCEDURE — 96413 CHEMO IV INFUSION 1 HR: CPT

## 2025-07-11 PROCEDURE — 2580000003 HC RX 258: Performed by: NURSE PRACTITIONER

## 2025-07-11 RX ORDER — HEPARIN 100 UNIT/ML
500 SYRINGE INTRAVENOUS PRN
OUTPATIENT
Start: 2025-07-18

## 2025-07-11 RX ORDER — ALBUTEROL SULFATE 90 UG/1
4 INHALANT RESPIRATORY (INHALATION) PRN
OUTPATIENT
Start: 2025-07-18

## 2025-07-11 RX ORDER — SODIUM CHLORIDE 9 MG/ML
5-250 INJECTION, SOLUTION INTRAVENOUS PRN
OUTPATIENT
Start: 2025-07-18

## 2025-07-11 RX ORDER — ONDANSETRON 2 MG/ML
8 INJECTION INTRAMUSCULAR; INTRAVENOUS
OUTPATIENT
Start: 2025-07-18

## 2025-07-11 RX ORDER — FAMOTIDINE 10 MG/ML
20 INJECTION, SOLUTION INTRAVENOUS
OUTPATIENT
Start: 2025-07-18

## 2025-07-11 RX ORDER — SODIUM CHLORIDE 0.9 % (FLUSH) 0.9 %
5-40 SYRINGE (ML) INJECTION PRN
OUTPATIENT
Start: 2025-07-18

## 2025-07-11 RX ORDER — SODIUM CHLORIDE 9 MG/ML
INJECTION, SOLUTION INTRAVENOUS CONTINUOUS
OUTPATIENT
Start: 2025-07-18

## 2025-07-11 RX ORDER — ACETAMINOPHEN 325 MG/1
650 TABLET ORAL
OUTPATIENT
Start: 2025-07-18

## 2025-07-11 RX ORDER — EPINEPHRINE 1 MG/ML
0.3 INJECTION, SOLUTION INTRAMUSCULAR; SUBCUTANEOUS PRN
OUTPATIENT
Start: 2025-07-18

## 2025-07-11 RX ORDER — HYDROCORTISONE SODIUM SUCCINATE 100 MG/2ML
100 INJECTION INTRAMUSCULAR; INTRAVENOUS
OUTPATIENT
Start: 2025-07-18

## 2025-07-11 RX ORDER — SODIUM CHLORIDE 9 MG/ML
5-250 INJECTION, SOLUTION INTRAVENOUS PRN
Status: DISCONTINUED | OUTPATIENT
Start: 2025-07-11 | End: 2025-07-12 | Stop reason: HOSPADM

## 2025-07-11 RX ORDER — DIPHENHYDRAMINE HYDROCHLORIDE 50 MG/ML
50 INJECTION, SOLUTION INTRAMUSCULAR; INTRAVENOUS
OUTPATIENT
Start: 2025-07-18

## 2025-07-11 RX ORDER — SODIUM CHLORIDE 0.9 % (FLUSH) 0.9 %
5-40 SYRINGE (ML) INJECTION PRN
Status: DISCONTINUED | OUTPATIENT
Start: 2025-07-11 | End: 2025-07-12 | Stop reason: HOSPADM

## 2025-07-11 RX ADMIN — SODIUM CHLORIDE 425 MG: 9 INJECTION, SOLUTION INTRAVENOUS at 14:22

## 2025-07-11 RX ADMIN — FERUMOXYTOL 510 MG: 510 INJECTION INTRAVENOUS at 13:47

## 2025-07-11 RX ADMIN — SODIUM CHLORIDE 25 ML/HR: 0.9 INJECTION, SOLUTION INTRAVENOUS at 13:45

## 2025-07-11 ASSESSMENT — PAIN SCALES - GENERAL: PAINLEVEL_OUTOF10: 0

## 2025-07-11 NOTE — TELEPHONE ENCOUNTER
[facility]  [phone]    07/11/25- Informed Anali, pt daughter of BT OPIC appointment for Monday at 11:30 per NP. She verbalized understanding and denied any further questions or concerns.

## 2025-07-11 NOTE — PROGRESS NOTES
[facility] Progress Note    Date: 2025    Name: Williams Fitzgerald Jr    MRN: [id number]         : 1940    Mr. Amilcar Ramirez Arrived ambulatory and in no distress for C9D1 of Avastin/Feraheme Regimen.  Assessment was completed, no acute issues at this time, no new complaints voiced.  24G PIV placed, labs drawn & sent for processing.        Mr. Amilcar Ramirez's vitals were reviewed.  Vitals:    25 1445   BP: 123/66   Pulse:    Resp:    Temp:    SpO2:        Lab results were obtained and reviewed.  Recent Results (from the past 12 hours)   CBC With Auto Differential    Collection Time: 25  1:02 PM   Result Value Ref Range    WBC 4.1 4.1 - 11.1 K/uL    RBC 2.62 (L) 4.10 - 5.70 M/uL    Hemoglobin 6.4 (L) 12.1 - 17.0 g/dL    Hematocrit 21.8 (L) 36.6 - 50.3 %    MCV 83.2 80.0 - 99.0 FL    MCH 24.4 (L) 26.0 - 34.0 PG    MCHC 29.4 (L) 30.0 - 36.5 g/dL    RDW 17.2 (H) 11.5 - 14.5 %    Platelets 167 150 - 400 K/uL    MPV 10.9 8.9 - 12.9 FL    Nucleated RBCs 0.0 0  WBC    nRBC 0.00 0.00 - 0.01 K/uL    Neutrophils % 69.0 32.0 - 75.0 %    Lymphocytes % 9.2 (L) 12.0 - 49.0 %    Monocytes % 16.5 (H) 5.0 - 13.0 %    Eosinophils % 4.6 0.0 - 7.0 %    Basophils % 0.5 0.0 - 1.0 %    Immature Granulocytes % 0.2 0.0 - 0.5 %    Neutrophils Absolute 2.83 1.80 - 8.00 K/UL    Lymphocytes Absolute 0.38 (L) 0.80 - 3.50 K/UL    Monocytes Absolute 0.68 0.00 - 1.00 K/UL    Eosinophils Absolute 0.19 0.00 - 0.40 K/UL    Basophils Absolute 0.02 0.00 - 0.10 K/UL    Immature Granulocytes Absolute 0.01 0.00 - 0.04 K/UL    Differential Type SMEAR SCANNED      Platelet Comment Large Platelets      RBC Comment ANISOCYTOSIS  1+        RBC Comment HYPOCHROMIA  1+           Medications:  Medications Administered         0.9 % sodium chloride infusion Admin Date  2025 Action  New Bag Dose  25 mL/hr Rate  25 mL/hr Route  IntraVENous Documented By  Joshua Alcazar, RN        bevacizumab-bvzr (ZIRABEV) 425 mg in sodium chloride 0.9 % 100 mL

## 2025-07-12 ENCOUNTER — HOSPITAL ENCOUNTER (EMERGENCY)
Facility: HOSPITAL | Age: 85
Discharge: HOME | End: 2025-07-12
Attending: STUDENT IN AN ORGANIZED HEALTH CARE EDUCATION/TRAINING PROGRAM
Payer: MEDICARE

## 2025-07-12 VITALS
OXYGEN SATURATION: 100 % | RESPIRATION RATE: 16 BRPM | TEMPERATURE: 98.2 F | WEIGHT: 180.12 LBS | HEIGHT: 72 IN | DIASTOLIC BLOOD PRESSURE: 64 MMHG | HEART RATE: 72 BPM | SYSTOLIC BLOOD PRESSURE: 128 MMHG | BODY MASS INDEX: 24.4 KG/M2

## 2025-07-12 DIAGNOSIS — R04.0 EPISTAXIS: ICD-10-CM

## 2025-07-12 DIAGNOSIS — D64.9 ANEMIA, UNSPECIFIED TYPE: Primary | ICD-10-CM

## 2025-07-12 LAB
ANION GAP SERPL CALC-SCNC: 4 MMOL/L (ref 2–12)
BASOPHILS # BLD: 0.04 K/UL (ref 0–0.1)
BASOPHILS NFR BLD: 0.7 % (ref 0–1)
BUN SERPL-MCNC: 20 MG/DL (ref 6–20)
BUN/CREAT SERPL: 19 (ref 12–20)
CALCIUM SERPL-MCNC: 8.7 MG/DL (ref 8.5–10.1)
CHLORIDE SERPL-SCNC: 109 MMOL/L (ref 97–108)
CO2 SERPL-SCNC: 26 MMOL/L (ref 21–32)
CREAT SERPL-MCNC: 1.06 MG/DL (ref 0.7–1.3)
DIFFERENTIAL METHOD BLD: ABNORMAL
EOSINOPHIL # BLD: 0.16 K/UL (ref 0–0.4)
EOSINOPHIL NFR BLD: 2.9 % (ref 0–7)
ERYTHROCYTE [DISTWIDTH] IN BLOOD BY AUTOMATED COUNT: 17.2 % (ref 11.5–14.5)
GLUCOSE SERPL-MCNC: 108 MG/DL (ref 65–100)
HCT VFR BLD AUTO: 24.1 % (ref 36.6–50.3)
HGB BLD-MCNC: 6.9 G/DL (ref 12.1–17)
HISTORY CHECK: NORMAL
IMM GRANULOCYTES # BLD AUTO: 0.02 K/UL (ref 0–0.04)
IMM GRANULOCYTES NFR BLD AUTO: 0.4 % (ref 0–0.5)
LYMPHOCYTES # BLD: 0.31 K/UL (ref 0.8–3.5)
LYMPHOCYTES NFR BLD: 5.6 % (ref 12–49)
MCH RBC QN AUTO: 23.7 PG (ref 26–34)
MCHC RBC AUTO-ENTMCNC: 28.6 G/DL (ref 30–36.5)
MCV RBC AUTO: 82.8 FL (ref 80–99)
MONOCYTES # BLD: 0.6 K/UL (ref 0–1)
MONOCYTES NFR BLD: 10.9 % (ref 5–13)
NEUTS SEG # BLD: 4.37 K/UL (ref 1.8–8)
NEUTS SEG NFR BLD: 79.5 % (ref 32–75)
NRBC # BLD: 0 K/UL (ref 0–0.01)
NRBC BLD-RTO: 0 PER 100 WBC
PLATELET # BLD AUTO: 189 K/UL (ref 150–400)
PLATELET COMMENT: ABNORMAL
PMV BLD AUTO: 10.6 FL (ref 8.9–12.9)
POTASSIUM SERPL-SCNC: 4 MMOL/L (ref 3.5–5.1)
RBC # BLD AUTO: 2.91 M/UL (ref 4.1–5.7)
RBC MORPH BLD: ABNORMAL
RBC MORPH BLD: ABNORMAL
SODIUM SERPL-SCNC: 139 MMOL/L (ref 136–145)
WBC # BLD AUTO: 5.5 K/UL (ref 4.1–11.1)

## 2025-07-12 PROCEDURE — 36415 COLL VENOUS BLD VENIPUNCTURE: CPT

## 2025-07-12 PROCEDURE — 86923 COMPATIBILITY TEST ELECTRIC: CPT

## 2025-07-12 PROCEDURE — 86850 RBC ANTIBODY SCREEN: CPT

## 2025-07-12 PROCEDURE — 86901 BLOOD TYPING SEROLOGIC RH(D): CPT

## 2025-07-12 PROCEDURE — 99285 EMERGENCY DEPT VISIT HI MDM: CPT

## 2025-07-12 PROCEDURE — 80048 BASIC METABOLIC PNL TOTAL CA: CPT

## 2025-07-12 PROCEDURE — 86900 BLOOD TYPING SEROLOGIC ABO: CPT

## 2025-07-12 PROCEDURE — 36430 TRANSFUSION BLD/BLD COMPNT: CPT

## 2025-07-12 PROCEDURE — 94761 N-INVAS EAR/PLS OXIMETRY MLT: CPT

## 2025-07-12 PROCEDURE — 85025 COMPLETE CBC W/AUTO DIFF WBC: CPT

## 2025-07-12 PROCEDURE — P9016 RBC LEUKOCYTES REDUCED: HCPCS

## 2025-07-12 RX ORDER — SODIUM CHLORIDE 9 MG/ML
INJECTION, SOLUTION INTRAVENOUS PRN
Status: DISCONTINUED | OUTPATIENT
Start: 2025-07-12 | End: 2025-07-12 | Stop reason: HOSPADM

## 2025-07-12 ASSESSMENT — PAIN SCALES - GENERAL: PAINLEVEL_OUTOF10: 0

## 2025-07-12 NOTE — CONSENT
Informed Consent for Blood Component Transfusion Note    I have discussed with the patient the rationale for blood component transfusion; its benefits in treating or preventing fatigue, organ damage, or death; and its risk which includes mild transfusion reactions, rare risk of blood borne infection, or more serious but rare reactions. I have discussed the alternatives to transfusion, including the risk and consequences of not receiving transfusion. The patient had an opportunity to ask questions and had agreed to proceed with transfusion of blood components.    Electronically signed by Donita Morse DO on 7/12/25 at 9:50 AM EDT

## 2025-07-12 NOTE — ED PROVIDER NOTES
[facility] EMERGENCY DEPARTMENT  EMERGENCY DEPARTMENT ENCOUNTER      Pt Name: Williams Fitzgerald Jr  MRN: [id number]  Birthdate 1940  Date of evaluation: 7/12/2025  Provider: Donita Morse DO    CHIEF COMPLAINT       Chief Complaint   Patient presents with    Anemia         HISTORY OF PRESENT ILLNESS    HPI    Williams Fitzgerald Jr is a 85 y.o. male with a history of HHT, anemia who presents to the emergency department for nosebleed and anemia.  Patient had outpatient labs performed on 7/11 with a hemoglobin of 6.4.  He was planned for a blood transfusion on Monday at the infusion center but last night developed significant nosebleed and called his daughter this morning concerned that his hemoglobin will likely be lower and may require transfusion sooner.  He denies any lightheadedness, dizziness, chest pain or shortness of breath.  He was able to subsequently control the bleeding with measures at home.  No active nosebleed but does have packing in his bilateral nares.    Nursing Notes were reviewed.    REVIEW OF SYSTEMS       Review of Systems   Constitutional:  Negative for fever.   HENT:  Positive for nosebleeds.            PAST MEDICAL HISTORY     Past Medical History:   Diagnosis Date    Dementia (HCC)     Was diagnosed with early stages about 3.5 years ago    Hearing loss     HHT (hereditary hemorrhagic telangiectasia)     Iron deficiency anemia     Prostate cancer (HCC)     Pulmonary arteriovenous malformation          SURGICAL HISTORY       Past Surgical History:   Procedure Laterality Date    APPENDECTOMY      CHEST SURGERY      excision of AVM    COLONOSCOPY N/A 1/29/2020    COLONOSCOPY performed by Manas Barr MD at [facility] ENDOSCOPY    COLONOSCOPY  2014    due 19    NOSE SURGERY Bilateral 1/21/2025    BILATERAL NASAL ENDOSCOPY WITH CONTROL OF EPISTAXIS performed by Wyatt Banegas DO at [facility] MAIN OR    OTHER SURGICAL HISTORY  07/2018    sclerotheropy    TONSILLECTOMY

## 2025-07-12 NOTE — DISCHARGE INSTRUCTIONS
Please follow-up on Monday with your appointment for recheck of your hemoglobin and possible repeat blood transfusion as needed.

## 2025-07-14 ENCOUNTER — HOSPITAL ENCOUNTER (OUTPATIENT)
Facility: HOSPITAL | Age: 85
Setting detail: INFUSION SERIES
Discharge: HOME | End: 2025-07-14
Payer: MEDICARE

## 2025-07-14 VITALS
TEMPERATURE: 97.9 F | SYSTOLIC BLOOD PRESSURE: 124 MMHG | RESPIRATION RATE: 18 BRPM | OXYGEN SATURATION: 94 % | HEART RATE: 71 BPM | DIASTOLIC BLOOD PRESSURE: 68 MMHG

## 2025-07-14 DIAGNOSIS — D62 ACUTE BLOOD LOSS ANEMIA: Primary | ICD-10-CM

## 2025-07-14 LAB
BASOPHILS # BLD: 0.04 K/UL (ref 0–0.1)
BASOPHILS NFR BLD: 0.6 % (ref 0–1)
DIFFERENTIAL METHOD BLD: ABNORMAL
EOSINOPHIL # BLD: 0.13 K/UL (ref 0–0.4)
EOSINOPHIL NFR BLD: 2.1 % (ref 0–7)
ERYTHROCYTE [DISTWIDTH] IN BLOOD BY AUTOMATED COUNT: 17.9 % (ref 11.5–14.5)
HCT VFR BLD AUTO: 24.2 % (ref 36.6–50.3)
HGB BLD-MCNC: 7.1 G/DL (ref 12.1–17)
HISTORY CHECK: NORMAL
IMM GRANULOCYTES # BLD AUTO: 0.02 K/UL (ref 0–0.04)
IMM GRANULOCYTES NFR BLD AUTO: 0.3 % (ref 0–0.5)
LYMPHOCYTES # BLD: 0.44 K/UL (ref 0.8–3.5)
LYMPHOCYTES NFR BLD: 7.1 % (ref 12–49)
MCH RBC QN AUTO: 25.2 PG (ref 26–34)
MCHC RBC AUTO-ENTMCNC: 29.3 G/DL (ref 30–36.5)
MCV RBC AUTO: 85.8 FL (ref 80–99)
MONOCYTES # BLD: 0.82 K/UL (ref 0–1)
MONOCYTES NFR BLD: 13.3 % (ref 5–13)
NEUTS SEG # BLD: 4.75 K/UL (ref 1.8–8)
NEUTS SEG NFR BLD: 76.6 % (ref 32–75)
NRBC # BLD: 0 K/UL (ref 0–0.01)
NRBC BLD-RTO: 0 PER 100 WBC
PLATELET # BLD AUTO: 178 K/UL (ref 150–400)
PMV BLD AUTO: 10.5 FL (ref 8.9–12.9)
RBC # BLD AUTO: 2.82 M/UL (ref 4.1–5.7)
RBC MORPH BLD: ABNORMAL
RBC MORPH BLD: ABNORMAL
WBC # BLD AUTO: 6.2 K/UL (ref 4.1–11.1)

## 2025-07-14 PROCEDURE — 85025 COMPLETE CBC W/AUTO DIFF WBC: CPT

## 2025-07-14 PROCEDURE — 86900 BLOOD TYPING SEROLOGIC ABO: CPT

## 2025-07-14 PROCEDURE — 86850 RBC ANTIBODY SCREEN: CPT

## 2025-07-14 PROCEDURE — 86923 COMPATIBILITY TEST ELECTRIC: CPT

## 2025-07-14 PROCEDURE — 86901 BLOOD TYPING SEROLOGIC RH(D): CPT

## 2025-07-14 PROCEDURE — P9016 RBC LEUKOCYTES REDUCED: HCPCS

## 2025-07-14 PROCEDURE — 36430 TRANSFUSION BLD/BLD COMPNT: CPT

## 2025-07-14 RX ORDER — ALBUTEROL SULFATE 90 UG/1
4 INHALANT RESPIRATORY (INHALATION) PRN
Status: CANCELLED | OUTPATIENT
Start: 2025-07-14

## 2025-07-14 RX ORDER — SODIUM CHLORIDE 9 MG/ML
INJECTION, SOLUTION INTRAVENOUS PRN
OUTPATIENT
Start: 2025-07-14

## 2025-07-14 RX ORDER — SODIUM CHLORIDE 9 MG/ML
20 INJECTION, SOLUTION INTRAVENOUS CONTINUOUS
Status: CANCELLED | OUTPATIENT
Start: 2025-07-14

## 2025-07-14 RX ORDER — SODIUM CHLORIDE 9 MG/ML
25 INJECTION, SOLUTION INTRAVENOUS PRN
Status: CANCELLED | OUTPATIENT
Start: 2025-07-14

## 2025-07-14 RX ORDER — HYDROCORTISONE SODIUM SUCCINATE 100 MG/2ML
100 INJECTION INTRAMUSCULAR; INTRAVENOUS
OUTPATIENT
Start: 2025-07-14

## 2025-07-14 RX ORDER — DIPHENHYDRAMINE HYDROCHLORIDE 50 MG/ML
50 INJECTION, SOLUTION INTRAMUSCULAR; INTRAVENOUS
OUTPATIENT
Start: 2025-07-14

## 2025-07-14 RX ORDER — EPINEPHRINE 1 MG/ML
0.3 INJECTION, SOLUTION, CONCENTRATE INTRAVENOUS PRN
Status: CANCELLED | OUTPATIENT
Start: 2025-07-14

## 2025-07-14 RX ORDER — FAMOTIDINE 10 MG/ML
20 INJECTION, SOLUTION INTRAVENOUS
OUTPATIENT
Start: 2025-07-14

## 2025-07-14 RX ORDER — FAMOTIDINE 10 MG/ML
20 INJECTION, SOLUTION INTRAVENOUS
Status: CANCELLED | OUTPATIENT
Start: 2025-07-14

## 2025-07-14 RX ORDER — SODIUM CHLORIDE 9 MG/ML
25 INJECTION, SOLUTION INTRAVENOUS PRN
Status: DISCONTINUED | OUTPATIENT
Start: 2025-07-14 | End: 2025-07-15 | Stop reason: HOSPADM

## 2025-07-14 RX ORDER — ONDANSETRON 2 MG/ML
8 INJECTION INTRAMUSCULAR; INTRAVENOUS
OUTPATIENT
Start: 2025-07-14

## 2025-07-14 RX ORDER — HYDROCORTISONE SODIUM SUCCINATE 100 MG/2ML
100 INJECTION INTRAMUSCULAR; INTRAVENOUS
Status: CANCELLED | OUTPATIENT
Start: 2025-07-14

## 2025-07-14 RX ORDER — ALBUTEROL SULFATE 90 UG/1
4 INHALANT RESPIRATORY (INHALATION) PRN
OUTPATIENT
Start: 2025-07-14

## 2025-07-14 RX ORDER — SODIUM CHLORIDE 0.9 % (FLUSH) 0.9 %
5-40 SYRINGE (ML) INJECTION PRN
Status: DISCONTINUED | OUTPATIENT
Start: 2025-07-14 | End: 2025-07-15 | Stop reason: HOSPADM

## 2025-07-14 RX ORDER — SODIUM CHLORIDE 0.9 % (FLUSH) 0.9 %
5-40 SYRINGE (ML) INJECTION PRN
Status: CANCELLED | OUTPATIENT
Start: 2025-07-14

## 2025-07-14 RX ORDER — SODIUM CHLORIDE 9 MG/ML
20 INJECTION, SOLUTION INTRAVENOUS CONTINUOUS
Status: DISCONTINUED | OUTPATIENT
Start: 2025-07-14 | End: 2025-07-15 | Stop reason: HOSPADM

## 2025-07-14 RX ORDER — ONDANSETRON 2 MG/ML
8 INJECTION INTRAMUSCULAR; INTRAVENOUS
Status: CANCELLED | OUTPATIENT
Start: 2025-07-14

## 2025-07-14 RX ORDER — ACETAMINOPHEN 325 MG/1
650 TABLET ORAL
OUTPATIENT
Start: 2025-07-14

## 2025-07-14 RX ORDER — EPINEPHRINE 1 MG/ML
0.3 INJECTION, SOLUTION INTRAMUSCULAR; SUBCUTANEOUS PRN
OUTPATIENT
Start: 2025-07-14

## 2025-07-14 RX ORDER — DIPHENHYDRAMINE HYDROCHLORIDE 50 MG/ML
50 INJECTION, SOLUTION INTRAMUSCULAR; INTRAVENOUS
Status: CANCELLED | OUTPATIENT
Start: 2025-07-14

## 2025-07-14 RX ORDER — ACETAMINOPHEN 325 MG/1
650 TABLET ORAL
Status: CANCELLED | OUTPATIENT
Start: 2025-07-14

## 2025-07-14 RX ORDER — SODIUM CHLORIDE 9 MG/ML
INJECTION, SOLUTION INTRAVENOUS PRN
Status: CANCELLED | OUTPATIENT
Start: 2025-07-14

## 2025-07-14 NOTE — CONSENT
Informed Consent for Blood Component Transfusion Note    I have discussed with the patient the rationale for blood component transfusion; its benefits in treating or preventing fatigue, organ damage, or death; and its risk which includes mild transfusion reactions, rare risk of blood borne infection, or more serious but rare reactions. I have discussed the alternatives to transfusion, including the risk and consequences of not receiving transfusion. The patient had an opportunity to ask questions and had agreed to proceed with transfusion of blood components.    Electronically signed by MARTÍNEZ Renee NP on 7/14/25 at 12:34 PM EDT

## 2025-07-14 NOTE — PROGRESS NOTES
[facility] Progress Note    Date: 2025    Name: Williams Fitzgerald Jr    MRN: [id number]         : 1940    Mr. Amilcar Ramirez Arrived ambulatory and in no distress for Blood Transfusion.  Assessment was completed, patient reports active nose bleed this morning as well as last night.  22G PIV placed in the left AC, labs drawn and sent for processing.  Signs/symptoms of adverse blood reaction discussed with pt, voiced understanding.    Consent obtained today.      Mr. Amilcar Ramirez's vitals were reviewed.  Vitals:    25 1327   BP: (!) 105/57   Pulse: 69   Resp: 18   Temp: 97.5 °F (36.4 °C)   SpO2: 91%       Lab results were obtained and reviewed.  Recent Results (from the past 12 hours)   CBC with Auto Differential    Collection Time: 25 11:50 AM   Result Value Ref Range    WBC 6.2 4.1 - 11.1 K/uL    RBC 2.82 (L) 4.10 - 5.70 M/uL    Hemoglobin 7.1 (L) 12.1 - 17.0 g/dL    Hematocrit 24.2 (L) 36.6 - 50.3 %    MCV 85.8 80.0 - 99.0 FL    MCH 25.2 (L) 26.0 - 34.0 PG    MCHC 29.3 (L) 30.0 - 36.5 g/dL    RDW 17.9 (H) 11.5 - 14.5 %    Platelets 178 150 - 400 K/uL    MPV 10.5 8.9 - 12.9 FL    Nucleated RBCs 0.0 0  WBC    nRBC 0.00 0.00 - 0.01 K/uL    Neutrophils % 76.6 (H) 32.0 - 75.0 %    Lymphocytes % 7.1 (L) 12.0 - 49.0 %    Monocytes % 13.3 (H) 5.0 - 13.0 %    Eosinophils % 2.1 0.0 - 7.0 %    Basophils % 0.6 0.0 - 1.0 %    Immature Granulocytes % 0.3 0.0 - 0.5 %    Neutrophils Absolute 4.75 1.80 - 8.00 K/UL    Lymphocytes Absolute 0.44 (L) 0.80 - 3.50 K/UL    Monocytes Absolute 0.82 0.00 - 1.00 K/UL    Eosinophils Absolute 0.13 0.00 - 0.40 K/UL    Basophils Absolute 0.04 0.00 - 0.10 K/UL    Immature Granulocytes Absolute 0.02 0.00 - 0.04 K/UL    Differential Type SMEAR SCANNED      RBC Comment HYPOCHROMIA  1+        RBC Comment ANISOCYTOSIS  1+       TYPE AND SCREEN    Collection Time: 25 11:52 AM   Result Value Ref Range    Crossmatch expiration date 2025,2359     ABO/Rh O POSITIVE     Antibody

## 2025-07-21 ENCOUNTER — OFFICE VISIT (OUTPATIENT)
Age: 85
End: 2025-07-21
Payer: MEDICARE

## 2025-07-21 VITALS
WEIGHT: 173 LBS | OXYGEN SATURATION: 94 % | HEART RATE: 70 BPM | SYSTOLIC BLOOD PRESSURE: 112 MMHG | HEIGHT: 72 IN | BODY MASS INDEX: 23.43 KG/M2 | DIASTOLIC BLOOD PRESSURE: 62 MMHG

## 2025-07-21 DIAGNOSIS — I48.0 PAROXYSMAL ATRIAL FIBRILLATION (HCC): Primary | ICD-10-CM

## 2025-07-21 DIAGNOSIS — I78.0 HHT (HEREDITARY HEMORRHAGIC TELANGIECTASIA): ICD-10-CM

## 2025-07-21 PROCEDURE — G8427 DOCREV CUR MEDS BY ELIG CLIN: HCPCS | Performed by: INTERNAL MEDICINE

## 2025-07-21 PROCEDURE — 1123F ACP DISCUSS/DSCN MKR DOCD: CPT | Performed by: INTERNAL MEDICINE

## 2025-07-21 PROCEDURE — 99214 OFFICE O/P EST MOD 30 MIN: CPT | Performed by: INTERNAL MEDICINE

## 2025-07-21 PROCEDURE — G8420 CALC BMI NORM PARAMETERS: HCPCS | Performed by: INTERNAL MEDICINE

## 2025-07-21 PROCEDURE — 1126F AMNT PAIN NOTED NONE PRSNT: CPT | Performed by: INTERNAL MEDICINE

## 2025-07-21 PROCEDURE — 1159F MED LIST DOCD IN RCRD: CPT | Performed by: INTERNAL MEDICINE

## 2025-07-21 PROCEDURE — 93010 ELECTROCARDIOGRAM REPORT: CPT | Performed by: INTERNAL MEDICINE

## 2025-07-21 PROCEDURE — 1036F TOBACCO NON-USER: CPT | Performed by: INTERNAL MEDICINE

## 2025-07-21 PROCEDURE — 93005 ELECTROCARDIOGRAM TRACING: CPT | Performed by: INTERNAL MEDICINE

## 2025-07-21 NOTE — PROGRESS NOTES
Chief Complaint   Patient presents with    Atrial Fibrillation     Pulmonary Arteriovenous Malformation      Vitals:    07/21/25 1347   BP: 112/62   BP Site: Left Upper Arm   Patient Position: Sitting   Pulse: 70   SpO2: 94%   Weight: 78.5 kg (173 lb)   Height: 1.829 m (6')         Chest pain: DENIED     Recent hospital stays: DENIED     Refills: DENIED

## 2025-07-21 NOTE — PROGRESS NOTES
Office Follow-up    NAME: Williams Fitzgerald Jr   :  1940  MRM:  [id number]    Date:  2025         Assessment and Plan:     No CP, SOB.       Afib: Newly diagnosed in Oct 2024, Normal Echo. Was on Dilt but not on his list. No anticoagulation in past due to bleeding, anemia and background of Hereditary HemorrhagIc telangectasia.   Severe Epistaxis secondary to HHT  Anemia: Needs recurrnet BT.   See Dr. Ruiz in 6 months.       Accompanied by his daughter in office.   His wife is also my patient.                  ATTENTION:   This medical record was transcribed using an electronic medical records/speech recognition system.  Although proofread, it may and can contain electronic, spelling and other errors.  Corrections may be executed at a later time.  Please feel free to contact us for any clarifications as needed.      Subjective:     Williams Fitzgerald Jr, a 85 y.o. year-old who presents for followup.     Exam:     /62 (BP Site: Left Upper Arm, Patient Position: Sitting)   Pulse 70   Ht 1.829 m (6')   Wt 78.5 kg (173 lb)   SpO2 94%   BMI 23.46 kg/m²      General appearance - alert, well appearing, and in no distress  Mental status - affect appropriate to mood  Eyes - sclera anicteric, moist mucous membranes  Neck - supple, no significant adenopathy    Medications:     Current Outpatient Medications   Medication Sig    timolol (TIMOPTIC) 0.5 % ophthalmic solution INSTILL 1 DROP IN EACH EYE EVERY DAY    latanoprost (XALATAN) 0.005 % ophthalmic solution INSTILL 1 DROP IN EACH EYE EVERY NIGHT AT BEDTIME    levothyroxine (SYNTHROID) 50 MCG tablet Take 1 tablet by mouth Daily    rivastigmine (EXELON) 1.5 MG capsule Take 1 capsule by mouth 2 times daily    risperiDONE (RISPERDAL) 0.25 MG tablet Take 1 tablet by mouth 2 times daily    vitamin B-12 1000 MCG tablet Take 1 tablet by mouth daily for 30 doses    ascorbic acid (VITAMIN C) 250 MG tablet Take by mouth    vitamin D 25 MCG (1000 UT) CAPS Take by mouth

## 2025-07-22 ENCOUNTER — TELEPHONE (OUTPATIENT)
Age: 85
End: 2025-07-22

## 2025-07-22 NOTE — TELEPHONE ENCOUNTER
Called pt daughter to inform her the Np will be out of office on 7/25/25 that he only needs to come in for treatment that day and he will see NP on 8/8/25 when he comes in for his opic appt.

## 2025-07-23 ENCOUNTER — HOSPITAL ENCOUNTER (EMERGENCY)
Facility: HOSPITAL | Age: 85
Discharge: HOME | End: 2025-07-24
Attending: EMERGENCY MEDICINE
Payer: MEDICARE

## 2025-07-23 VITALS
RESPIRATION RATE: 20 BRPM | TEMPERATURE: 98.4 F | HEART RATE: 84 BPM | BODY MASS INDEX: 23.38 KG/M2 | WEIGHT: 172.62 LBS | OXYGEN SATURATION: 97 % | DIASTOLIC BLOOD PRESSURE: 60 MMHG | SYSTOLIC BLOOD PRESSURE: 106 MMHG | HEIGHT: 72 IN

## 2025-07-23 DIAGNOSIS — N28.9 RENAL INSUFFICIENCY: ICD-10-CM

## 2025-07-23 DIAGNOSIS — R04.0 EPISTAXIS: Primary | ICD-10-CM

## 2025-07-23 LAB
ABO + RH BLD: NORMAL
ALBUMIN SERPL-MCNC: 3.3 G/DL (ref 3.5–5)
ALBUMIN/GLOB SERPL: 0.8 (ref 1.1–2.2)
ALP SERPL-CCNC: 86 U/L (ref 45–117)
ALT SERPL-CCNC: 19 U/L (ref 12–78)
ANION GAP SERPL CALC-SCNC: 7 MMOL/L (ref 2–12)
AST SERPL-CCNC: 11 U/L (ref 15–37)
BASOPHILS # BLD: 0.04 K/UL (ref 0–0.1)
BASOPHILS NFR BLD: 0.9 % (ref 0–1)
BILIRUB SERPL-MCNC: 0.4 MG/DL (ref 0.2–1)
BLOOD GROUP ANTIBODIES SERPL: NORMAL
BUN SERPL-MCNC: 26 MG/DL (ref 6–20)
BUN/CREAT SERPL: 19 (ref 12–20)
CALCIUM SERPL-MCNC: 8.8 MG/DL (ref 8.5–10.1)
CHLORIDE SERPL-SCNC: 108 MMOL/L (ref 97–108)
CO2 SERPL-SCNC: 25 MMOL/L (ref 21–32)
CREAT SERPL-MCNC: 1.37 MG/DL (ref 0.7–1.3)
DIFFERENTIAL METHOD BLD: ABNORMAL
EOSINOPHIL # BLD: 0.17 K/UL (ref 0–0.4)
EOSINOPHIL NFR BLD: 4 % (ref 0–7)
ERYTHROCYTE [DISTWIDTH] IN BLOOD BY AUTOMATED COUNT: 20.1 % (ref 11.5–14.5)
GLOBULIN SER CALC-MCNC: 4.1 G/DL (ref 2–4)
GLUCOSE SERPL-MCNC: 144 MG/DL (ref 65–100)
HCT VFR BLD AUTO: 29.9 % (ref 36.6–50.3)
HGB BLD-MCNC: 8.8 G/DL (ref 12.1–17)
IMM GRANULOCYTES # BLD AUTO: 0.01 K/UL (ref 0–0.04)
IMM GRANULOCYTES NFR BLD AUTO: 0.2 % (ref 0–0.5)
LYMPHOCYTES # BLD: 0.37 K/UL (ref 0.8–3.5)
LYMPHOCYTES NFR BLD: 8.7 % (ref 12–49)
MCH RBC QN AUTO: 25.7 PG (ref 26–34)
MCHC RBC AUTO-ENTMCNC: 29.4 G/DL (ref 30–36.5)
MCV RBC AUTO: 87.2 FL (ref 80–99)
MONOCYTES # BLD: 0.76 K/UL (ref 0–1)
MONOCYTES NFR BLD: 17.6 % (ref 5–13)
NEUTS SEG # BLD: 2.95 K/UL (ref 1.8–8)
NEUTS SEG NFR BLD: 68.6 % (ref 32–75)
NRBC # BLD: 0 K/UL (ref 0–0.01)
NRBC BLD-RTO: 0 PER 100 WBC
PLATELET # BLD AUTO: 237 K/UL (ref 150–400)
PMV BLD AUTO: 9.6 FL (ref 8.9–12.9)
POTASSIUM SERPL-SCNC: 3.8 MMOL/L (ref 3.5–5.1)
PROT SERPL-MCNC: 7.4 G/DL (ref 6.4–8.2)
RBC # BLD AUTO: 3.43 M/UL (ref 4.1–5.7)
RBC MORPH BLD: ABNORMAL
RBC MORPH BLD: ABNORMAL
SODIUM SERPL-SCNC: 140 MMOL/L (ref 136–145)
SPECIMEN EXP DATE BLD: NORMAL
WBC # BLD AUTO: 4.3 K/UL (ref 4.1–11.1)

## 2025-07-23 PROCEDURE — 99283 EMERGENCY DEPT VISIT LOW MDM: CPT

## 2025-07-23 PROCEDURE — 86850 RBC ANTIBODY SCREEN: CPT

## 2025-07-23 PROCEDURE — 30901 CONTROL OF NOSEBLEED: CPT

## 2025-07-23 PROCEDURE — 86901 BLOOD TYPING SEROLOGIC RH(D): CPT

## 2025-07-23 PROCEDURE — 85025 COMPLETE CBC W/AUTO DIFF WBC: CPT

## 2025-07-23 PROCEDURE — 6370000000 HC RX 637 (ALT 250 FOR IP): Performed by: EMERGENCY MEDICINE

## 2025-07-23 PROCEDURE — 2500000003 HC RX 250 WO HCPCS: Performed by: EMERGENCY MEDICINE

## 2025-07-23 PROCEDURE — 80053 COMPREHEN METABOLIC PANEL: CPT

## 2025-07-23 PROCEDURE — 86900 BLOOD TYPING SEROLOGIC ABO: CPT

## 2025-07-23 PROCEDURE — 36415 COLL VENOUS BLD VENIPUNCTURE: CPT

## 2025-07-23 RX ORDER — TRANEXAMIC ACID 100 MG/ML
100 INJECTION, SOLUTION INTRAVENOUS ONCE
Status: COMPLETED | OUTPATIENT
Start: 2025-07-23 | End: 2025-07-23

## 2025-07-23 RX ORDER — OXYMETAZOLINE HYDROCHLORIDE 0.05 G/100ML
2 SPRAY NASAL
Status: COMPLETED | OUTPATIENT
Start: 2025-07-23 | End: 2025-07-23

## 2025-07-23 RX ADMIN — Medication 3 ML: at 23:21

## 2025-07-23 RX ADMIN — TRANEXAMIC ACID 100 MG: 1 INJECTION, SOLUTION INTRAVENOUS at 23:22

## 2025-07-23 RX ADMIN — OXYMETAZOLINE HYDROCHLORIDE 2 SPRAY: 0.5 SPRAY NASAL at 23:23

## 2025-07-23 ASSESSMENT — PAIN - FUNCTIONAL ASSESSMENT: PAIN_FUNCTIONAL_ASSESSMENT: NONE - DENIES PAIN

## 2025-07-24 ENCOUNTER — HOSPITAL ENCOUNTER (OUTPATIENT)
Facility: HOSPITAL | Age: 85
Discharge: HOME | End: 2025-07-27
Attending: INTERNAL MEDICINE
Payer: MEDICARE

## 2025-07-24 DIAGNOSIS — R91.8 PULMONARY NODULES: ICD-10-CM

## 2025-07-24 LAB
HCT VFR BLD AUTO: 26 % (ref 36.6–50.3)
HGB BLD-MCNC: 7.9 G/DL (ref 12.1–17)

## 2025-07-24 PROCEDURE — 6360000004 HC RX CONTRAST MEDICATION: Performed by: INTERNAL MEDICINE

## 2025-07-24 PROCEDURE — 6370000000 HC RX 637 (ALT 250 FOR IP): Performed by: EMERGENCY MEDICINE

## 2025-07-24 PROCEDURE — 36415 COLL VENOUS BLD VENIPUNCTURE: CPT

## 2025-07-24 PROCEDURE — 71260 CT THORAX DX C+: CPT

## 2025-07-24 PROCEDURE — 85014 HEMATOCRIT: CPT

## 2025-07-24 PROCEDURE — 85018 HEMOGLOBIN: CPT

## 2025-07-24 RX ORDER — IOPAMIDOL 755 MG/ML
100 INJECTION, SOLUTION INTRAVASCULAR
Status: COMPLETED | OUTPATIENT
Start: 2025-07-24 | End: 2025-07-24

## 2025-07-24 RX ADMIN — Medication 1 EACH: at 00:26

## 2025-07-24 RX ADMIN — IOPAMIDOL 100 ML: 755 INJECTION, SOLUTION INTRAVENOUS at 11:58

## 2025-07-24 NOTE — DISCHARGE INSTRUCTIONS
Please follow closely with your ENT and primary care doctor and return to the emergency department for any new or worsening symptoms

## 2025-07-24 NOTE — ED TRIAGE NOTES
Pt arrives to the ED with c/o nosebleed that started abut 30-40 min ago. Denies blood thinner use. Hx frequent nose bleeds.

## 2025-07-24 NOTE — ED PROVIDER NOTES
[facility] EMERGENCY DEPARTMENT  EMERGENCY DEPARTMENT ENCOUNTER      Pt Name: Williams Fitzgerald Jr  MRN: [id number]  Birthdate 1940  Date of evaluation: 7/23/2025  Provider: ABHINAV GAN MD    CHIEF COMPLAINT       Chief Complaint   Patient presents with    Epistaxis         HISTORY OF PRESENT ILLNESS   (Location/Symptom, Timing/Onset, Context/Setting, Quality, Duration, Modifying Factors, Severity)  Note limiting factors.   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  Wife at bedside states that this 1 started around 30 to 40 minutes ago and that he has been having some blood down his throat and from the left side as well.  Per chart patient is followed by ENT and apparently he has been started on Avastin infusions to help with these.  Additionally wife states that he was seen recently at [facility] and had some kind of nasal procedure.  She states she thinks that they cauterized something but also maybe did something else and since then his bleeds have been less frequent.  He has required transfusions fairly frequently in the past for anemia from blood loss.  Per chart patient typically has a nosebleed about once every week week and a half they require management in the ER.    The history is provided by the spouse and the patient.         Review of External Medical Records:     Nursing Notes were reviewed.    REVIEW OF SYSTEMS    (2-9 systems for level 4, 10 or more for level 5)     Review of Systems   Constitutional:  Negative for fever.   HENT:  Positive for nosebleeds.    Neurological:  Negative for syncope.       Except as noted above the remainder of the review of systems was reviewed and negative.       PAST MEDICAL HISTORY     Past Medical History:   Diagnosis Date    Dementia (HCC)     Was diagnosed with early stages about 3.5 years ago

## 2025-07-25 ENCOUNTER — HOSPITAL ENCOUNTER (OUTPATIENT)
Facility: HOSPITAL | Age: 85
Setting detail: INFUSION SERIES
Discharge: HOME | End: 2025-07-25
Payer: MEDICARE

## 2025-07-25 VITALS
OXYGEN SATURATION: 97 % | SYSTOLIC BLOOD PRESSURE: 114 MMHG | TEMPERATURE: 97.7 F | DIASTOLIC BLOOD PRESSURE: 55 MMHG | HEIGHT: 72 IN | RESPIRATION RATE: 18 BRPM | WEIGHT: 173.6 LBS | HEART RATE: 71 BPM | BODY MASS INDEX: 23.51 KG/M2

## 2025-07-25 DIAGNOSIS — I78.0 HHT (HEREDITARY HEMORRHAGIC TELANGIECTASIA): Primary | ICD-10-CM

## 2025-07-25 DIAGNOSIS — D62 ACUTE BLOOD LOSS ANEMIA: ICD-10-CM

## 2025-07-25 LAB
ABO + RH BLD: NORMAL
BASOPHILS # BLD: 0.03 K/UL (ref 0–0.1)
BASOPHILS NFR BLD: 0.8 % (ref 0–1)
BLOOD GROUP ANTIBODIES SERPL: NORMAL
DIFFERENTIAL METHOD BLD: ABNORMAL
EOSINOPHIL # BLD: 0.15 K/UL (ref 0–0.4)
EOSINOPHIL NFR BLD: 3.9 % (ref 0–7)
ERYTHROCYTE [DISTWIDTH] IN BLOOD BY AUTOMATED COUNT: 19.9 % (ref 11.5–14.5)
HCT VFR BLD AUTO: 25.9 % (ref 36.6–50.3)
HGB BLD-MCNC: 7.8 G/DL (ref 12.1–17)
IMM GRANULOCYTES # BLD AUTO: 0.01 K/UL (ref 0–0.04)
IMM GRANULOCYTES NFR BLD AUTO: 0.3 % (ref 0–0.5)
LYMPHOCYTES # BLD: 0.46 K/UL (ref 0.8–3.5)
LYMPHOCYTES NFR BLD: 11.7 % (ref 12–49)
MCH RBC QN AUTO: 26.1 PG (ref 26–34)
MCHC RBC AUTO-ENTMCNC: 30.1 G/DL (ref 30–36.5)
MCV RBC AUTO: 86.6 FL (ref 80–99)
MONOCYTES # BLD: 0.64 K/UL (ref 0–1)
MONOCYTES NFR BLD: 16.3 % (ref 5–13)
NEUTS SEG # BLD: 2.61 K/UL (ref 1.8–8)
NEUTS SEG NFR BLD: 67 % (ref 32–75)
NRBC # BLD: 0 K/UL (ref 0–0.01)
NRBC BLD-RTO: 0 PER 100 WBC
PLATELET # BLD AUTO: 232 K/UL (ref 150–400)
PMV BLD AUTO: 10 FL (ref 8.9–12.9)
RBC # BLD AUTO: 2.99 M/UL (ref 4.1–5.7)
RBC MORPH BLD: ABNORMAL
RBC MORPH BLD: ABNORMAL
SPECIMEN EXP DATE BLD: NORMAL
WBC # BLD AUTO: 3.9 K/UL (ref 4.1–11.1)

## 2025-07-25 PROCEDURE — 2580000003 HC RX 258: Performed by: NURSE PRACTITIONER

## 2025-07-25 PROCEDURE — 36415 COLL VENOUS BLD VENIPUNCTURE: CPT

## 2025-07-25 PROCEDURE — 86901 BLOOD TYPING SEROLOGIC RH(D): CPT

## 2025-07-25 PROCEDURE — 85025 COMPLETE CBC W/AUTO DIFF WBC: CPT

## 2025-07-25 PROCEDURE — 6360000002 HC RX W HCPCS: Performed by: NURSE PRACTITIONER

## 2025-07-25 PROCEDURE — 96413 CHEMO IV INFUSION 1 HR: CPT

## 2025-07-25 PROCEDURE — 86900 BLOOD TYPING SEROLOGIC ABO: CPT

## 2025-07-25 PROCEDURE — 86850 RBC ANTIBODY SCREEN: CPT

## 2025-07-25 RX ORDER — SODIUM CHLORIDE 9 MG/ML
5-250 INJECTION, SOLUTION INTRAVENOUS PRN
Status: DISCONTINUED | OUTPATIENT
Start: 2025-07-25 | End: 2025-07-26 | Stop reason: HOSPADM

## 2025-07-25 RX ADMIN — SODIUM CHLORIDE 425 MG: 9 INJECTION, SOLUTION INTRAVENOUS at 12:55

## 2025-07-25 ASSESSMENT — PAIN SCALES - GENERAL: PAINLEVEL_OUTOF10: 0

## 2025-07-25 NOTE — PROGRESS NOTES
[facility] Progress Note    Date: 2025    Name: Williams Fitzgerald Jr    MRN: [id number]         : 1940    Mr. Amilcar Ramirez Arrived ambulatory and in no distress for Zirabev Infusion.  Assessment was completed, no acute issues at this time, no new complaints voiced.  24 G PIV established to left arm, with + blood return. Labs drawn peripherally and sent for processing.     Mr. Amilcar Ramirez's vitals were reviewed.  Vitals:    25 1145   BP: (!) 112/57   Pulse: 66   Resp: 18   Temp: 97.7 °F (36.5 °C)   SpO2: 97%       Lab results were obtained and reviewed.  Recent Results (from the past 12 hours)   CBC With Auto Differential    Collection Time: 25 12:08 PM   Result Value Ref Range    WBC 3.9 (L) 4.1 - 11.1 K/uL    RBC 2.99 (L) 4.10 - 5.70 M/uL    Hemoglobin 7.8 (L) 12.1 - 17.0 g/dL    Hematocrit 25.9 (L) 36.6 - 50.3 %    MCV 86.6 80.0 - 99.0 FL    MCH 26.1 26.0 - 34.0 PG    MCHC 30.1 30.0 - 36.5 g/dL    RDW 19.9 (H) 11.5 - 14.5 %    Platelets 232 150 - 400 K/uL    MPV 10.0 8.9 - 12.9 FL    Nucleated RBCs 0.0 0  WBC    nRBC 0.00 0.00 - 0.01 K/uL    Neutrophils % PENDING %    Lymphocytes % PENDING %    Monocytes % PENDING %    Eosinophils % PENDING %    Basophils % PENDING %    Immature Granulocytes % PENDING %    Neutrophils Absolute PENDING K/UL    Lymphocytes Absolute PENDING K/UL    Monocytes Absolute PENDING K/UL    Eosinophils Absolute PENDING K/UL    Basophils Absolute PENDING K/UL    Immature Granulocytes Absolute PENDING K/UL    Differential Type PENDING        Medications:  Medications Administered         bevacizumab-bvzr (ZIRABEV) 425 mg in sodium chloride 0.9 % 100 mL chemo IVPB Admin Date  2025 Action  New Bag Dose  425 mg Rate  254 mL/hr Route  IntraVENous Documented By  Jeny Kim RN              Mr. Amilcar Ramirez tolerated treatment well and was [facility] in stable condition.  24G  PIV flushed & removed. Patient is aware of future

## 2025-07-30 ENCOUNTER — OFFICE VISIT (OUTPATIENT)
Age: 85
End: 2025-07-30
Payer: MEDICARE

## 2025-07-30 VITALS
SYSTOLIC BLOOD PRESSURE: 118 MMHG | HEART RATE: 68 BPM | RESPIRATION RATE: 20 BRPM | DIASTOLIC BLOOD PRESSURE: 68 MMHG | OXYGEN SATURATION: 96 %

## 2025-07-30 DIAGNOSIS — F01.A2: Primary | ICD-10-CM

## 2025-07-30 PROCEDURE — 99215 OFFICE O/P EST HI 40 MIN: CPT

## 2025-07-30 PROCEDURE — G8427 DOCREV CUR MEDS BY ELIG CLIN: HCPCS

## 2025-07-30 PROCEDURE — 1123F ACP DISCUSS/DSCN MKR DOCD: CPT

## 2025-07-30 PROCEDURE — 1036F TOBACCO NON-USER: CPT

## 2025-07-30 PROCEDURE — 1160F RVW MEDS BY RX/DR IN RCRD: CPT

## 2025-07-30 PROCEDURE — 1159F MED LIST DOCD IN RCRD: CPT

## 2025-07-30 PROCEDURE — 1126F AMNT PAIN NOTED NONE PRSNT: CPT

## 2025-07-30 PROCEDURE — G8420 CALC BMI NORM PARAMETERS: HCPCS

## 2025-07-30 NOTE — PROGRESS NOTES
Test Results:     Doppler  [place]   EEG     Risperidone- still has some hallucinations but not as frightening as they were before, that has gotten better but still has them   Sees other people in the house, but that often or that bad according to the patient   He doesn't get nervous about seeing them anymore or anxious

## 2025-07-30 NOTE — PROGRESS NOTES
Williams Fitzgerald Jr is a 85 y.o. 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  The patient has had neuropsych testing with Dr. Caballero back in 2022 and is established on rivastigmine 1.5 mg 1 capsule twice a day by PCP as he did not tolerate Aricept.  Per system notes, the patient also felt as if memantine was causing stomach issues and apparently has not been taking that for some time.  The patient had been on Seroquel for agitation but unfortunately had also experienced dizziness and falls shortly after starting it so Dr. Ramirez changed the prescription to Risperdal 0.25 mg every afternoon for about 2 weeks and then if the hallucinations had not resolved increase the dose to twice a day.  The patient reportedly has hallucinations that strangers are in the home.  Dr. Ramirez had the patient do a carotid Doppler scan that showed 16 to 49% bilateral stenosis and an EEG that was normal.    Today the patient tells me that he is taking the Risperdal twice a day and he does feel that his hallucinations are occurring less frequently as they were and he also feels less anxious or nervous when they are occurring.  He is tolerating the medication well but they are worried that an increase in the dose would make him more tired than he already is.  He continues to take rivastigmine 1.5 mg twice a day per PCP.  The patient lives with his wife who also has a mild dementia in their home.  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  He does recognize the importance of needing to eat and is working on trying to increase his free water intake.    Today the patient could answer all of my questions including his date of birth, where he

## 2025-08-01 RX ORDER — SODIUM CHLORIDE 9 MG/ML
5-250 INJECTION, SOLUTION INTRAVENOUS PRN
Status: CANCELLED | OUTPATIENT
Start: 2025-08-01

## 2025-08-04 ENCOUNTER — TRANSCRIBE ORDERS (OUTPATIENT)
Facility: HOSPITAL | Age: 85
End: 2025-08-04

## 2025-08-04 DIAGNOSIS — R91.8 PULMONARY NODULES: Primary | ICD-10-CM

## 2025-08-07 ENCOUNTER — HOSPITAL ENCOUNTER (EMERGENCY)
Facility: HOSPITAL | Age: 85
Discharge: HOME | End: 2025-08-08
Attending: EMERGENCY MEDICINE
Payer: MEDICARE

## 2025-08-07 DIAGNOSIS — I78.0 HHT (HEREDITARY HEMORRHAGIC TELANGIECTASIA): Primary | ICD-10-CM

## 2025-08-07 DIAGNOSIS — R04.0 EPISTAXIS: Primary | ICD-10-CM

## 2025-08-07 PROCEDURE — 30905 CONTROL OF NOSEBLEED: CPT

## 2025-08-07 PROCEDURE — 6370000000 HC RX 637 (ALT 250 FOR IP): Performed by: EMERGENCY MEDICINE

## 2025-08-07 PROCEDURE — 30901 CONTROL OF NOSEBLEED: CPT

## 2025-08-07 PROCEDURE — 99282 EMERGENCY DEPT VISIT SF MDM: CPT

## 2025-08-07 PROCEDURE — 2500000003 HC RX 250 WO HCPCS: Performed by: EMERGENCY MEDICINE

## 2025-08-07 RX ORDER — TRANEXAMIC ACID 100 MG/ML
1000 INJECTION, SOLUTION INTRAVENOUS ONCE
Status: COMPLETED | OUTPATIENT
Start: 2025-08-07 | End: 2025-08-07

## 2025-08-07 RX ORDER — OXYMETAZOLINE HYDROCHLORIDE 0.05 G/100ML
2 SPRAY NASAL
Status: COMPLETED | OUTPATIENT
Start: 2025-08-07 | End: 2025-08-07

## 2025-08-07 RX ADMIN — TRANEXAMIC ACID 1000 MG: 1 INJECTION, SOLUTION INTRAVENOUS at 23:32

## 2025-08-07 RX ADMIN — OXYMETAZOLINE HYDROCHLORIDE 2 SPRAY: 0.5 SPRAY NASAL at 23:15

## 2025-08-08 ENCOUNTER — HOSPITAL ENCOUNTER (OUTPATIENT)
Facility: HOSPITAL | Age: 85
Setting detail: INFUSION SERIES
End: 2025-08-08

## 2025-08-08 VITALS
OXYGEN SATURATION: 100 % | RESPIRATION RATE: 20 BRPM | BODY MASS INDEX: 23.56 KG/M2 | HEART RATE: 95 BPM | TEMPERATURE: 97.9 F | WEIGHT: 173.94 LBS | DIASTOLIC BLOOD PRESSURE: 89 MMHG | SYSTOLIC BLOOD PRESSURE: 131 MMHG | HEIGHT: 72 IN

## 2025-08-08 DIAGNOSIS — I78.0 HHT (HEREDITARY HEMORRHAGIC TELANGIECTASIA): Primary | ICD-10-CM

## 2025-08-08 DIAGNOSIS — D50.9 IRON DEFICIENCY ANEMIA, UNSPECIFIED IRON DEFICIENCY ANEMIA TYPE: ICD-10-CM

## 2025-08-12 DIAGNOSIS — D62 ACUTE BLOOD LOSS ANEMIA: Primary | ICD-10-CM

## 2025-08-12 RX ORDER — SODIUM CHLORIDE 9 MG/ML
INJECTION, SOLUTION INTRAVENOUS PRN
Status: CANCELLED | OUTPATIENT
Start: 2025-08-15

## 2025-08-12 RX ORDER — DIPHENHYDRAMINE HYDROCHLORIDE 50 MG/ML
50 INJECTION, SOLUTION INTRAMUSCULAR; INTRAVENOUS
Status: CANCELLED | OUTPATIENT
Start: 2025-08-15

## 2025-08-12 RX ORDER — SODIUM CHLORIDE 9 MG/ML
25 INJECTION, SOLUTION INTRAVENOUS PRN
Status: CANCELLED | OUTPATIENT
Start: 2025-08-15

## 2025-08-12 RX ORDER — HYDROCORTISONE SODIUM SUCCINATE 100 MG/2ML
100 INJECTION INTRAMUSCULAR; INTRAVENOUS
Status: CANCELLED | OUTPATIENT
Start: 2025-08-15

## 2025-08-12 RX ORDER — FAMOTIDINE 10 MG/ML
20 INJECTION, SOLUTION INTRAVENOUS
Status: CANCELLED | OUTPATIENT
Start: 2025-08-15

## 2025-08-12 RX ORDER — ALBUTEROL SULFATE 90 UG/1
4 INHALANT RESPIRATORY (INHALATION) PRN
Status: CANCELLED | OUTPATIENT
Start: 2025-08-15

## 2025-08-12 RX ORDER — SODIUM CHLORIDE 0.9 % (FLUSH) 0.9 %
5-40 SYRINGE (ML) INJECTION PRN
Status: CANCELLED | OUTPATIENT
Start: 2025-08-15

## 2025-08-12 RX ORDER — ONDANSETRON 2 MG/ML
8 INJECTION INTRAMUSCULAR; INTRAVENOUS
Status: CANCELLED | OUTPATIENT
Start: 2025-08-15

## 2025-08-12 RX ORDER — SODIUM CHLORIDE 9 MG/ML
20 INJECTION, SOLUTION INTRAVENOUS CONTINUOUS
Status: CANCELLED | OUTPATIENT
Start: 2025-08-15

## 2025-08-12 RX ORDER — EPINEPHRINE 1 MG/ML
0.3 INJECTION, SOLUTION, CONCENTRATE INTRAVENOUS PRN
Status: CANCELLED | OUTPATIENT
Start: 2025-08-15

## 2025-08-12 RX ORDER — ACETAMINOPHEN 325 MG/1
650 TABLET ORAL
Status: CANCELLED | OUTPATIENT
Start: 2025-08-15

## 2025-08-15 ENCOUNTER — HOSPITAL ENCOUNTER (OUTPATIENT)
Facility: HOSPITAL | Age: 85
Setting detail: INFUSION SERIES
Discharge: HOME | End: 2025-08-15
Payer: MEDICARE

## 2025-08-15 VITALS
RESPIRATION RATE: 18 BRPM | HEIGHT: 72 IN | WEIGHT: 171 LBS | TEMPERATURE: 98.6 F | BODY MASS INDEX: 23.16 KG/M2 | HEART RATE: 74 BPM | DIASTOLIC BLOOD PRESSURE: 61 MMHG | SYSTOLIC BLOOD PRESSURE: 126 MMHG | OXYGEN SATURATION: 100 %

## 2025-08-15 DIAGNOSIS — D50.9 IRON DEFICIENCY ANEMIA, UNSPECIFIED IRON DEFICIENCY ANEMIA TYPE: ICD-10-CM

## 2025-08-15 DIAGNOSIS — D62 ACUTE BLOOD LOSS ANEMIA: ICD-10-CM

## 2025-08-15 DIAGNOSIS — I78.0 HHT (HEREDITARY HEMORRHAGIC TELANGIECTASIA): Primary | ICD-10-CM

## 2025-08-15 LAB
BASOPHILS # BLD: 0.02 K/UL (ref 0–0.1)
BASOPHILS NFR BLD: 0.4 % (ref 0–1)
DIFFERENTIAL METHOD BLD: ABNORMAL
EOSINOPHIL # BLD: 0.1 K/UL (ref 0–0.4)
EOSINOPHIL NFR BLD: 2.2 % (ref 0–7)
ERYTHROCYTE [DISTWIDTH] IN BLOOD BY AUTOMATED COUNT: 18.3 % (ref 11.5–14.5)
HCT VFR BLD AUTO: 21.5 % (ref 36.6–50.3)
HGB BLD-MCNC: 6.3 G/DL (ref 12.1–17)
HISTORY CHECK: NORMAL
IMM GRANULOCYTES # BLD AUTO: 0.01 K/UL (ref 0–0.04)
IMM GRANULOCYTES NFR BLD AUTO: 0.2 % (ref 0–0.5)
LYMPHOCYTES # BLD: 0.41 K/UL (ref 0.8–3.5)
LYMPHOCYTES NFR BLD: 8.9 % (ref 12–49)
MCH RBC QN AUTO: 23.4 PG (ref 26–34)
MCHC RBC AUTO-ENTMCNC: 29.3 G/DL (ref 30–36.5)
MCV RBC AUTO: 79.9 FL (ref 80–99)
MONOCYTES # BLD: 0.73 K/UL (ref 0–1)
MONOCYTES NFR BLD: 15.8 % (ref 5–13)
NEUTS SEG # BLD: 3.33 K/UL (ref 1.8–8)
NEUTS SEG NFR BLD: 72.5 % (ref 32–75)
NRBC # BLD: 0 K/UL (ref 0–0.01)
NRBC BLD-RTO: 0 PER 100 WBC
PLATELET # BLD AUTO: 211 K/UL (ref 150–400)
PMV BLD AUTO: 10.9 FL (ref 8.9–12.9)
RBC # BLD AUTO: 2.69 M/UL (ref 4.1–5.7)
RBC MORPH BLD: ABNORMAL
WBC # BLD AUTO: 4.6 K/UL (ref 4.1–11.1)

## 2025-08-15 PROCEDURE — 2500000003 HC RX 250 WO HCPCS: Performed by: NURSE PRACTITIONER

## 2025-08-15 PROCEDURE — 86850 RBC ANTIBODY SCREEN: CPT

## 2025-08-15 PROCEDURE — P9016 RBC LEUKOCYTES REDUCED: HCPCS

## 2025-08-15 PROCEDURE — 96413 CHEMO IV INFUSION 1 HR: CPT

## 2025-08-15 PROCEDURE — 86923 COMPATIBILITY TEST ELECTRIC: CPT

## 2025-08-15 PROCEDURE — 86901 BLOOD TYPING SEROLOGIC RH(D): CPT

## 2025-08-15 PROCEDURE — 36430 TRANSFUSION BLD/BLD COMPNT: CPT

## 2025-08-15 PROCEDURE — 85025 COMPLETE CBC W/AUTO DIFF WBC: CPT

## 2025-08-15 PROCEDURE — 86900 BLOOD TYPING SEROLOGIC ABO: CPT

## 2025-08-15 PROCEDURE — 2580000003 HC RX 258: Performed by: NURSE PRACTITIONER

## 2025-08-15 PROCEDURE — 96367 TX/PROPH/DG ADDL SEQ IV INF: CPT

## 2025-08-15 PROCEDURE — 6360000002 HC RX W HCPCS: Performed by: NURSE PRACTITIONER

## 2025-08-15 PROCEDURE — 36415 COLL VENOUS BLD VENIPUNCTURE: CPT

## 2025-08-15 RX ORDER — SODIUM CHLORIDE 9 MG/ML
20 INJECTION, SOLUTION INTRAVENOUS CONTINUOUS
Status: DISCONTINUED | OUTPATIENT
Start: 2025-08-15 | End: 2025-08-16 | Stop reason: HOSPADM

## 2025-08-15 RX ORDER — EPINEPHRINE 1 MG/ML
0.3 INJECTION, SOLUTION INTRAMUSCULAR; SUBCUTANEOUS PRN
OUTPATIENT
Start: 2025-08-15

## 2025-08-15 RX ORDER — SODIUM CHLORIDE 9 MG/ML
20 INJECTION, SOLUTION INTRAVENOUS CONTINUOUS
Status: CANCELLED | OUTPATIENT
Start: 2025-08-15

## 2025-08-15 RX ORDER — HYDROCORTISONE SODIUM SUCCINATE 100 MG/2ML
100 INJECTION INTRAMUSCULAR; INTRAVENOUS
OUTPATIENT
Start: 2025-08-15

## 2025-08-15 RX ORDER — ONDANSETRON 2 MG/ML
8 INJECTION INTRAMUSCULAR; INTRAVENOUS
OUTPATIENT
Start: 2025-08-22

## 2025-08-15 RX ORDER — SODIUM CHLORIDE 9 MG/ML
25 INJECTION, SOLUTION INTRAVENOUS PRN
Status: DISCONTINUED | OUTPATIENT
Start: 2025-08-15 | End: 2025-08-16 | Stop reason: HOSPADM

## 2025-08-15 RX ORDER — SODIUM CHLORIDE 9 MG/ML
INJECTION, SOLUTION INTRAVENOUS CONTINUOUS
OUTPATIENT
Start: 2025-08-22

## 2025-08-15 RX ORDER — HYDROCORTISONE SODIUM SUCCINATE 100 MG/2ML
100 INJECTION INTRAMUSCULAR; INTRAVENOUS
OUTPATIENT
Start: 2025-08-22

## 2025-08-15 RX ORDER — ACETAMINOPHEN 325 MG/1
650 TABLET ORAL
OUTPATIENT
Start: 2025-08-22

## 2025-08-15 RX ORDER — DIPHENHYDRAMINE HYDROCHLORIDE 50 MG/ML
50 INJECTION, SOLUTION INTRAMUSCULAR; INTRAVENOUS
OUTPATIENT
Start: 2025-08-15

## 2025-08-15 RX ORDER — ONDANSETRON 2 MG/ML
8 INJECTION INTRAMUSCULAR; INTRAVENOUS
OUTPATIENT
Start: 2025-08-15

## 2025-08-15 RX ORDER — DIPHENHYDRAMINE HYDROCHLORIDE 50 MG/ML
50 INJECTION, SOLUTION INTRAMUSCULAR; INTRAVENOUS
OUTPATIENT
Start: 2025-08-22

## 2025-08-15 RX ORDER — SODIUM CHLORIDE 0.9 % (FLUSH) 0.9 %
5-40 SYRINGE (ML) INJECTION PRN
OUTPATIENT
Start: 2025-08-22

## 2025-08-15 RX ORDER — HEPARIN 100 UNIT/ML
500 SYRINGE INTRAVENOUS PRN
OUTPATIENT
Start: 2025-08-22

## 2025-08-15 RX ORDER — SODIUM CHLORIDE 0.9 % (FLUSH) 0.9 %
5-40 SYRINGE (ML) INJECTION PRN
Status: CANCELLED | OUTPATIENT
Start: 2025-08-15

## 2025-08-15 RX ORDER — SODIUM CHLORIDE 0.9 % (FLUSH) 0.9 %
5-40 SYRINGE (ML) INJECTION PRN
Status: DISCONTINUED | OUTPATIENT
Start: 2025-08-15 | End: 2025-08-16 | Stop reason: HOSPADM

## 2025-08-15 RX ORDER — SODIUM CHLORIDE 9 MG/ML
25 INJECTION, SOLUTION INTRAVENOUS PRN
Status: CANCELLED | OUTPATIENT
Start: 2025-08-15

## 2025-08-15 RX ORDER — SODIUM CHLORIDE 9 MG/ML
5-250 INJECTION, SOLUTION INTRAVENOUS PRN
OUTPATIENT
Start: 2025-08-22

## 2025-08-15 RX ORDER — ALBUTEROL SULFATE 90 UG/1
4 INHALANT RESPIRATORY (INHALATION) PRN
OUTPATIENT
Start: 2025-08-15

## 2025-08-15 RX ORDER — SODIUM CHLORIDE 9 MG/ML
INJECTION, SOLUTION INTRAVENOUS PRN
OUTPATIENT
Start: 2025-08-15

## 2025-08-15 RX ORDER — ALBUTEROL SULFATE 90 UG/1
4 INHALANT RESPIRATORY (INHALATION) PRN
OUTPATIENT
Start: 2025-08-22

## 2025-08-15 RX ORDER — ACETAMINOPHEN 325 MG/1
650 TABLET ORAL
OUTPATIENT
Start: 2025-08-15

## 2025-08-15 RX ORDER — SODIUM CHLORIDE 9 MG/ML
5-250 INJECTION, SOLUTION INTRAVENOUS PRN
Status: DISCONTINUED | OUTPATIENT
Start: 2025-08-15 | End: 2025-08-16 | Stop reason: HOSPADM

## 2025-08-15 RX ORDER — EPINEPHRINE 1 MG/ML
0.3 INJECTION, SOLUTION INTRAMUSCULAR; SUBCUTANEOUS PRN
OUTPATIENT
Start: 2025-08-22

## 2025-08-15 RX ADMIN — Medication 10 ML: at 12:25

## 2025-08-15 RX ADMIN — SODIUM CHLORIDE 25 ML/HR: 0.9 INJECTION, SOLUTION INTRAVENOUS at 12:27

## 2025-08-15 RX ADMIN — SODIUM CHLORIDE 425 MG: 9 INJECTION, SOLUTION INTRAVENOUS at 12:30

## 2025-08-15 RX ADMIN — FERUMOXYTOL 510 MG: 510 INJECTION INTRAVENOUS at 13:19

## 2025-08-15 ASSESSMENT — PAIN SCALES - GENERAL: PAINLEVEL_OUTOF10: 0

## 2025-08-17 ENCOUNTER — HOSPITAL ENCOUNTER (EMERGENCY)
Facility: HOSPITAL | Age: 85
Discharge: HOME | End: 2025-08-17
Attending: STUDENT IN AN ORGANIZED HEALTH CARE EDUCATION/TRAINING PROGRAM
Payer: MEDICARE

## 2025-08-17 VITALS
TEMPERATURE: 97.8 F | BODY MASS INDEX: 23.23 KG/M2 | DIASTOLIC BLOOD PRESSURE: 73 MMHG | HEART RATE: 76 BPM | OXYGEN SATURATION: 96 % | HEIGHT: 72 IN | RESPIRATION RATE: 16 BRPM | SYSTOLIC BLOOD PRESSURE: 130 MMHG | WEIGHT: 171.52 LBS

## 2025-08-17 DIAGNOSIS — I78.0 HHT (HEREDITARY HEMORRHAGIC TELANGIECTASIA): ICD-10-CM

## 2025-08-17 DIAGNOSIS — R04.0 EPISTAXIS: Primary | ICD-10-CM

## 2025-08-17 LAB
ANION GAP SERPL CALC-SCNC: 10 MMOL/L (ref 2–14)
BASOPHILS # BLD: 0.03 K/UL (ref 0–0.1)
BASOPHILS NFR BLD: 0.4 % (ref 0–1)
BUN SERPL-MCNC: 13 MG/DL (ref 8–23)
BUN/CREAT SERPL: 15 (ref 12–20)
CALCIUM SERPL-MCNC: 8.8 MG/DL (ref 8.8–10.2)
CHLORIDE SERPL-SCNC: 105 MMOL/L (ref 98–107)
CO2 SERPL-SCNC: 25 MMOL/L (ref 20–29)
COMMENT:: NORMAL
CREAT SERPL-MCNC: 0.82 MG/DL (ref 0.7–1.2)
DIFFERENTIAL METHOD BLD: ABNORMAL
EOSINOPHIL # BLD: 0.07 K/UL (ref 0–0.4)
EOSINOPHIL NFR BLD: 1 % (ref 0–7)
ERYTHROCYTE [DISTWIDTH] IN BLOOD BY AUTOMATED COUNT: 18.4 % (ref 11.5–14.5)
GLUCOSE SERPL-MCNC: 112 MG/DL (ref 65–100)
HCT VFR BLD AUTO: 25.4 % (ref 36.6–50.3)
HGB BLD-MCNC: 7.4 G/DL (ref 12.1–17)
IMM GRANULOCYTES # BLD AUTO: 0.05 K/UL (ref 0–0.04)
IMM GRANULOCYTES NFR BLD AUTO: 0.7 % (ref 0–0.5)
LYMPHOCYTES # BLD: 0.36 K/UL (ref 0.8–3.5)
LYMPHOCYTES NFR BLD: 5 % (ref 12–49)
MCH RBC QN AUTO: 23.9 PG (ref 26–34)
MCHC RBC AUTO-ENTMCNC: 29.1 G/DL (ref 30–36.5)
MCV RBC AUTO: 82.2 FL (ref 80–99)
MONOCYTES # BLD: 0.74 K/UL (ref 0–1)
MONOCYTES NFR BLD: 10.4 % (ref 5–13)
NEUTS SEG # BLD: 5.85 K/UL (ref 1.8–8)
NEUTS SEG NFR BLD: 82.5 % (ref 32–75)
NRBC # BLD: 0 K/UL (ref 0–0.01)
NRBC BLD-RTO: 0 PER 100 WBC
PLATELET # BLD AUTO: 248 K/UL (ref 150–400)
PMV BLD AUTO: 11 FL (ref 8.9–12.9)
POTASSIUM SERPL-SCNC: 4 MMOL/L (ref 3.5–5.1)
RBC # BLD AUTO: 3.09 M/UL (ref 4.1–5.7)
RBC MORPH BLD: ABNORMAL
RBC MORPH BLD: ABNORMAL
SODIUM SERPL-SCNC: 140 MMOL/L (ref 136–145)
SPECIMEN HOLD: NORMAL
WBC # BLD AUTO: 7.1 K/UL (ref 4.1–11.1)

## 2025-08-17 PROCEDURE — 80048 BASIC METABOLIC PNL TOTAL CA: CPT

## 2025-08-17 PROCEDURE — 85025 COMPLETE CBC W/AUTO DIFF WBC: CPT

## 2025-08-17 PROCEDURE — 6370000000 HC RX 637 (ALT 250 FOR IP): Performed by: PHYSICIAN ASSISTANT

## 2025-08-17 PROCEDURE — 99283 EMERGENCY DEPT VISIT LOW MDM: CPT

## 2025-08-17 RX ORDER — OXYMETAZOLINE HYDROCHLORIDE 0.05 G/100ML
2 SPRAY NASAL
Status: COMPLETED | OUTPATIENT
Start: 2025-08-17 | End: 2025-08-17

## 2025-08-17 RX ADMIN — OXYMETAZOLINE HYDROCHLORIDE 2 SPRAY: 0.5 SPRAY NASAL at 14:33

## 2025-08-17 ASSESSMENT — PAIN SCALES - GENERAL: PAINLEVEL_OUTOF10: 0

## 2025-08-17 ASSESSMENT — PAIN - FUNCTIONAL ASSESSMENT: PAIN_FUNCTIONAL_ASSESSMENT: 0-10

## 2025-08-21 ENCOUNTER — HOSPITAL ENCOUNTER (OUTPATIENT)
Facility: HOSPITAL | Age: 85
Setting detail: OBSERVATION
Discharge: HOME | DRG: 811 | End: 2025-08-22
Attending: EMERGENCY MEDICINE | Admitting: STUDENT IN AN ORGANIZED HEALTH CARE EDUCATION/TRAINING PROGRAM
Payer: MEDICARE

## 2025-08-21 DIAGNOSIS — R04.0 EPISTAXIS: Primary | ICD-10-CM

## 2025-08-21 DIAGNOSIS — D62 ACUTE BLOOD LOSS ANEMIA: ICD-10-CM

## 2025-08-21 LAB
ALBUMIN SERPL-MCNC: 2.7 G/DL (ref 3.5–5.2)
ALBUMIN/GLOB SERPL: 0.7 (ref 1.1–2.2)
ALP SERPL-CCNC: 70 U/L (ref 40–129)
ALT SERPL-CCNC: 9 U/L (ref 10–50)
ANION GAP SERPL CALC-SCNC: 10 MMOL/L (ref 2–14)
APTT PPP: 26.2 SEC (ref 22.1–31)
AST SERPL-CCNC: 14 U/L (ref 10–50)
BASOPHILS # BLD: 0.03 K/UL (ref 0–0.1)
BASOPHILS NFR BLD: 0.4 % (ref 0–1)
BILIRUB SERPL-MCNC: 0.2 MG/DL (ref 0–1.2)
BUN SERPL-MCNC: 19 MG/DL (ref 8–23)
BUN/CREAT SERPL: 16 (ref 12–20)
CALCIUM SERPL-MCNC: 8.5 MG/DL (ref 8.8–10.2)
CHLORIDE SERPL-SCNC: 105 MMOL/L (ref 98–107)
CO2 SERPL-SCNC: 24 MMOL/L (ref 20–29)
CREAT SERPL-MCNC: 1.16 MG/DL (ref 0.7–1.2)
DIFFERENTIAL METHOD BLD: ABNORMAL
EOSINOPHIL # BLD: 0.03 K/UL (ref 0–0.4)
EOSINOPHIL NFR BLD: 0.4 % (ref 0–7)
ERYTHROCYTE [DISTWIDTH] IN BLOOD BY AUTOMATED COUNT: 22.2 % (ref 11.5–14.5)
GLOBULIN SER CALC-MCNC: 4 G/DL (ref 2–4)
GLUCOSE SERPL-MCNC: 121 MG/DL (ref 65–100)
HCT VFR BLD AUTO: 24.2 % (ref 36.6–50.3)
HCT VFR BLD AUTO: 24.2 % (ref 36.6–50.3)
HGB BLD-MCNC: 7.1 G/DL (ref 12.1–17)
HGB BLD-MCNC: 7.1 G/DL (ref 12.1–17)
IMM GRANULOCYTES # BLD AUTO: 0.04 K/UL (ref 0–0.04)
IMM GRANULOCYTES NFR BLD AUTO: 0.5 % (ref 0–0.5)
INR PPP: 1.1 (ref 0.9–1.1)
LYMPHOCYTES # BLD: 0.27 K/UL (ref 0.8–3.5)
LYMPHOCYTES NFR BLD: 3.6 % (ref 12–49)
MCH RBC QN AUTO: 24.6 PG (ref 26–34)
MCHC RBC AUTO-ENTMCNC: 29.3 G/DL (ref 30–36.5)
MCV RBC AUTO: 83.7 FL (ref 80–99)
MONOCYTES # BLD: 0.99 K/UL (ref 0–1)
MONOCYTES NFR BLD: 13.2 % (ref 5–13)
NEUTS SEG # BLD: 6.14 K/UL (ref 1.8–8)
NEUTS SEG NFR BLD: 81.9 % (ref 32–75)
NRBC # BLD: 0 K/UL (ref 0–0.01)
NRBC BLD-RTO: 0 PER 100 WBC
PLATELET # BLD AUTO: 271 K/UL (ref 150–400)
PMV BLD AUTO: 10.1 FL (ref 8.9–12.9)
POTASSIUM SERPL-SCNC: 4.6 MMOL/L (ref 3.5–5.1)
PROT SERPL-MCNC: 6.7 G/DL (ref 6.4–8.3)
PROTHROMBIN TIME: 11.4 SEC (ref 9.2–11.2)
RBC # BLD AUTO: 2.89 M/UL (ref 4.1–5.7)
RBC MORPH BLD: ABNORMAL
SODIUM SERPL-SCNC: 139 MMOL/L (ref 136–145)
THERAPEUTIC RANGE: NORMAL SECS (ref 58–77)
WBC # BLD AUTO: 7.5 K/UL (ref 4.1–11.1)

## 2025-08-21 PROCEDURE — 2500000003 HC RX 250 WO HCPCS

## 2025-08-21 PROCEDURE — 85610 PROTHROMBIN TIME: CPT

## 2025-08-21 PROCEDURE — 80053 COMPREHEN METABOLIC PANEL: CPT

## 2025-08-21 PROCEDURE — 6370000000 HC RX 637 (ALT 250 FOR IP): Performed by: EMERGENCY MEDICINE

## 2025-08-21 PROCEDURE — 2500000003 HC RX 250 WO HCPCS: Performed by: EMERGENCY MEDICINE

## 2025-08-21 PROCEDURE — 85014 HEMATOCRIT: CPT

## 2025-08-21 PROCEDURE — 86900 BLOOD TYPING SEROLOGIC ABO: CPT

## 2025-08-21 PROCEDURE — 85018 HEMOGLOBIN: CPT

## 2025-08-21 PROCEDURE — 85025 COMPLETE CBC W/AUTO DIFF WBC: CPT

## 2025-08-21 PROCEDURE — 36415 COLL VENOUS BLD VENIPUNCTURE: CPT

## 2025-08-21 PROCEDURE — 86850 RBC ANTIBODY SCREEN: CPT

## 2025-08-21 PROCEDURE — 86901 BLOOD TYPING SEROLOGIC RH(D): CPT

## 2025-08-21 PROCEDURE — 85730 THROMBOPLASTIN TIME PARTIAL: CPT

## 2025-08-21 PROCEDURE — 86923 COMPATIBILITY TEST ELECTRIC: CPT

## 2025-08-21 RX ORDER — FENTANYL CITRATE 50 UG/ML
50 INJECTION, SOLUTION INTRAMUSCULAR; INTRAVENOUS
Refills: 0 | Status: DISCONTINUED | OUTPATIENT
Start: 2025-08-21 | End: 2025-08-22

## 2025-08-21 RX ORDER — OXYMETAZOLINE HYDROCHLORIDE 0.05 G/100ML
2 SPRAY NASAL
Status: COMPLETED | OUTPATIENT
Start: 2025-08-21 | End: 2025-08-21

## 2025-08-21 RX ORDER — SODIUM CHLORIDE 9 MG/ML
INJECTION, SOLUTION INTRAVENOUS PRN
Status: DISCONTINUED | OUTPATIENT
Start: 2025-08-21 | End: 2025-08-22 | Stop reason: HOSPADM

## 2025-08-21 RX ORDER — TRANEXAMIC ACID 100 MG/ML
INJECTION, SOLUTION INTRAVENOUS
Status: COMPLETED
Start: 2025-08-21 | End: 2025-08-21

## 2025-08-21 RX ORDER — TRANEXAMIC ACID 100 MG/ML
1000 INJECTION, SOLUTION INTRAVENOUS ONCE
Status: COMPLETED | OUTPATIENT
Start: 2025-08-21 | End: 2025-08-21

## 2025-08-21 RX ADMIN — TRANEXAMIC ACID 1000 MG: 100 INJECTION, SOLUTION INTRAVENOUS at 23:04

## 2025-08-21 RX ADMIN — TRANEXAMIC ACID 1000 MG: 1 INJECTION, SOLUTION INTRAVENOUS at 23:04

## 2025-08-21 RX ADMIN — TRANEXAMIC ACID 1000 MG: 1 INJECTION, SOLUTION INTRAVENOUS at 21:42

## 2025-08-21 RX ADMIN — OXYMETAZOLINE HYDROCHLORIDE 2 SPRAY: 0.5 SPRAY NASAL at 23:04

## 2025-08-22 ENCOUNTER — APPOINTMENT (OUTPATIENT)
Facility: HOSPITAL | Age: 85
End: 2025-08-22
Payer: MEDICARE

## 2025-08-22 VITALS
SYSTOLIC BLOOD PRESSURE: 154 MMHG | WEIGHT: 172.18 LBS | DIASTOLIC BLOOD PRESSURE: 81 MMHG | HEIGHT: 73 IN | BODY MASS INDEX: 22.82 KG/M2 | TEMPERATURE: 97.2 F | HEART RATE: 92 BPM | RESPIRATION RATE: 16 BRPM | OXYGEN SATURATION: 99 %

## 2025-08-22 LAB
BASOPHILS # BLD: 0.02 K/UL (ref 0–0.1)
BASOPHILS NFR BLD: 0.3 % (ref 0–1)
DIFFERENTIAL METHOD BLD: ABNORMAL
EOSINOPHIL # BLD: 0.04 K/UL (ref 0–0.4)
EOSINOPHIL NFR BLD: 0.6 % (ref 0–7)
ERYTHROCYTE [DISTWIDTH] IN BLOOD BY AUTOMATED COUNT: 21.8 % (ref 11.5–14.5)
HCT VFR BLD AUTO: 23.8 % (ref 36.6–50.3)
HCT VFR BLD AUTO: 28.9 % (ref 36.6–50.3)
HGB BLD-MCNC: 7 G/DL (ref 12.1–17)
HGB BLD-MCNC: 8.8 G/DL (ref 12.1–17)
HISTORY CHECK: NORMAL
IMM GRANULOCYTES # BLD AUTO: 0.03 K/UL (ref 0–0.04)
IMM GRANULOCYTES NFR BLD AUTO: 0.4 % (ref 0–0.5)
LYMPHOCYTES # BLD: 0.43 K/UL (ref 0.8–3.5)
LYMPHOCYTES NFR BLD: 6.1 % (ref 12–49)
MCH RBC QN AUTO: 25.5 PG (ref 26–34)
MCHC RBC AUTO-ENTMCNC: 30.4 G/DL (ref 30–36.5)
MCV RBC AUTO: 83.8 FL (ref 80–99)
MONOCYTES # BLD: 1.07 K/UL (ref 0–1)
MONOCYTES NFR BLD: 15 % (ref 5–13)
NEUTS SEG # BLD: 5.51 K/UL (ref 1.8–8)
NEUTS SEG NFR BLD: 77.6 % (ref 32–75)
NRBC # BLD: 0 K/UL (ref 0–0.01)
NRBC BLD-RTO: 0 PER 100 WBC
PLATELET # BLD AUTO: 258 K/UL (ref 150–400)
RBC # BLD AUTO: 3.45 M/UL (ref 4.1–5.7)
RBC MORPH BLD: ABNORMAL
WBC # BLD AUTO: 7.1 K/UL (ref 4.1–11.1)

## 2025-08-22 PROCEDURE — P9016 RBC LEUKOCYTES REDUCED: HCPCS

## 2025-08-22 PROCEDURE — 6370000000 HC RX 637 (ALT 250 FOR IP): Performed by: EMERGENCY MEDICINE

## 2025-08-22 PROCEDURE — 94761 N-INVAS EAR/PLS OXIMETRY MLT: CPT

## 2025-08-22 PROCEDURE — G0378 HOSPITAL OBSERVATION PER HR: HCPCS

## 2025-08-22 PROCEDURE — 2500000003 HC RX 250 WO HCPCS: Performed by: STUDENT IN AN ORGANIZED HEALTH CARE EDUCATION/TRAINING PROGRAM

## 2025-08-22 PROCEDURE — 30233N1 TRANSFUSION OF NONAUTOLOGOUS RED BLOOD CELLS INTO PERIPHERAL VEIN, PERCUTANEOUS APPROACH: ICD-10-PCS | Performed by: INTERNAL MEDICINE

## 2025-08-22 PROCEDURE — 85014 HEMATOCRIT: CPT

## 2025-08-22 PROCEDURE — 2500000003 HC RX 250 WO HCPCS

## 2025-08-22 PROCEDURE — 96372 THER/PROPH/DIAG INJ SC/IM: CPT

## 2025-08-22 PROCEDURE — 36430 TRANSFUSION BLD/BLD COMPNT: CPT

## 2025-08-22 PROCEDURE — 6360000002 HC RX W HCPCS

## 2025-08-22 PROCEDURE — 85018 HEMOGLOBIN: CPT

## 2025-08-22 PROCEDURE — P9040 RBC LEUKOREDUCED IRRADIATED: HCPCS

## 2025-08-22 PROCEDURE — 6370000000 HC RX 637 (ALT 250 FOR IP): Performed by: STUDENT IN AN ORGANIZED HEALTH CARE EDUCATION/TRAINING PROGRAM

## 2025-08-22 PROCEDURE — 36415 COLL VENOUS BLD VENIPUNCTURE: CPT

## 2025-08-22 PROCEDURE — 85025 COMPLETE CBC W/AUTO DIFF WBC: CPT

## 2025-08-22 PROCEDURE — 95819 EEG AWAKE AND ASLEEP: CPT | Performed by: PSYCHIATRY & NEUROLOGY

## 2025-08-22 RX ORDER — ACETAMINOPHEN 500 MG
1000 TABLET ORAL
Status: COMPLETED | OUTPATIENT
Start: 2025-08-22 | End: 2025-08-22

## 2025-08-22 RX ORDER — POLYETHYLENE GLYCOL 3350 17 G/17G
17 POWDER, FOR SOLUTION ORAL DAILY PRN
Status: DISCONTINUED | OUTPATIENT
Start: 2025-08-22 | End: 2025-08-22 | Stop reason: HOSPADM

## 2025-08-22 RX ORDER — POTASSIUM CHLORIDE 7.45 MG/ML
10 INJECTION INTRAVENOUS PRN
Status: DISCONTINUED | OUTPATIENT
Start: 2025-08-22 | End: 2025-08-22 | Stop reason: HOSPADM

## 2025-08-22 RX ORDER — ACETAMINOPHEN 325 MG/1
650 TABLET ORAL EVERY 6 HOURS PRN
Status: DISCONTINUED | OUTPATIENT
Start: 2025-08-22 | End: 2025-08-22 | Stop reason: HOSPADM

## 2025-08-22 RX ORDER — SODIUM CHLORIDE 0.9 % (FLUSH) 0.9 %
5-40 SYRINGE (ML) INJECTION EVERY 12 HOURS SCHEDULED
Status: DISCONTINUED | OUTPATIENT
Start: 2025-08-22 | End: 2025-08-22 | Stop reason: HOSPADM

## 2025-08-22 RX ORDER — LEVOTHYROXINE SODIUM 25 UG/1
50 TABLET ORAL DAILY
Status: DISCONTINUED | OUTPATIENT
Start: 2025-08-22 | End: 2025-08-22 | Stop reason: HOSPADM

## 2025-08-22 RX ORDER — ONDANSETRON 4 MG/1
4 TABLET, ORALLY DISINTEGRATING ORAL EVERY 8 HOURS PRN
Status: DISCONTINUED | OUTPATIENT
Start: 2025-08-22 | End: 2025-08-22 | Stop reason: HOSPADM

## 2025-08-22 RX ORDER — MIRTAZAPINE 15 MG/1
30 TABLET, FILM COATED ORAL NIGHTLY
Status: DISCONTINUED | OUTPATIENT
Start: 2025-08-22 | End: 2025-08-22

## 2025-08-22 RX ORDER — POTASSIUM CHLORIDE 750 MG/1
40 TABLET, EXTENDED RELEASE ORAL PRN
Status: DISCONTINUED | OUTPATIENT
Start: 2025-08-22 | End: 2025-08-22 | Stop reason: HOSPADM

## 2025-08-22 RX ORDER — ACETAMINOPHEN 650 MG/1
650 SUPPOSITORY RECTAL EVERY 6 HOURS PRN
Status: DISCONTINUED | OUTPATIENT
Start: 2025-08-22 | End: 2025-08-22 | Stop reason: HOSPADM

## 2025-08-22 RX ORDER — TIMOLOL MALEATE 5 MG/ML
1 SOLUTION/ DROPS OPHTHALMIC DAILY
Status: DISCONTINUED | OUTPATIENT
Start: 2025-08-22 | End: 2025-08-22 | Stop reason: HOSPADM

## 2025-08-22 RX ORDER — RIVASTIGMINE TARTRATE 1.5 MG/1
1.5 CAPSULE ORAL 2 TIMES DAILY
Status: DISCONTINUED | OUTPATIENT
Start: 2025-08-22 | End: 2025-08-22

## 2025-08-22 RX ORDER — LATANOPROST 50 UG/ML
1 SOLUTION/ DROPS OPHTHALMIC NIGHTLY
Status: DISCONTINUED | OUTPATIENT
Start: 2025-08-22 | End: 2025-08-22 | Stop reason: HOSPADM

## 2025-08-22 RX ORDER — FENTANYL CITRATE 50 UG/ML
50 INJECTION, SOLUTION INTRAMUSCULAR; INTRAVENOUS
Refills: 0 | Status: ACTIVE | OUTPATIENT
Start: 2025-08-22 | End: 2025-08-22

## 2025-08-22 RX ORDER — SODIUM CHLORIDE 9 MG/ML
INJECTION, SOLUTION INTRAVENOUS PRN
Status: DISCONTINUED | OUTPATIENT
Start: 2025-08-22 | End: 2025-08-22 | Stop reason: HOSPADM

## 2025-08-22 RX ORDER — ONDANSETRON 2 MG/ML
4 INJECTION INTRAMUSCULAR; INTRAVENOUS EVERY 6 HOURS PRN
Status: DISCONTINUED | OUTPATIENT
Start: 2025-08-22 | End: 2025-08-22 | Stop reason: HOSPADM

## 2025-08-22 RX ORDER — RISPERIDONE 0.25 MG/1
0.25 TABLET ORAL 2 TIMES DAILY
Status: DISCONTINUED | OUTPATIENT
Start: 2025-08-22 | End: 2025-08-22 | Stop reason: HOSPADM

## 2025-08-22 RX ORDER — SODIUM CHLORIDE 0.9 % (FLUSH) 0.9 %
5-40 SYRINGE (ML) INJECTION PRN
Status: DISCONTINUED | OUTPATIENT
Start: 2025-08-22 | End: 2025-08-22 | Stop reason: HOSPADM

## 2025-08-22 RX ADMIN — SODIUM CHLORIDE, PRESERVATIVE FREE 10 ML: 5 INJECTION INTRAVENOUS at 08:48

## 2025-08-22 RX ADMIN — ACETAMINOPHEN 1000 MG: 500 TABLET ORAL at 01:21

## 2025-08-22 RX ADMIN — RISPERIDONE 0.25 MG: 0.25 TABLET, FILM COATED ORAL at 08:47

## 2025-08-22 RX ADMIN — LEVOTHYROXINE SODIUM 50 MCG: 0.03 TABLET ORAL at 08:47

## 2025-08-22 RX ADMIN — WATER 5 MG: 1 INJECTION INTRAMUSCULAR; INTRAVENOUS; SUBCUTANEOUS at 04:43

## 2025-08-22 ASSESSMENT — PAIN SCALES - GENERAL
PAINLEVEL_OUTOF10: 0

## 2025-08-23 ENCOUNTER — APPOINTMENT (OUTPATIENT)
Facility: HOSPITAL | Age: 85
DRG: 811 | End: 2025-08-23
Payer: MEDICARE

## 2025-08-23 ENCOUNTER — HOSPITAL ENCOUNTER (INPATIENT)
Facility: HOSPITAL | Age: 85
LOS: 4 days | Discharge: HOME | DRG: 811 | End: 2025-08-27
Attending: EMERGENCY MEDICINE | Admitting: HOSPITALIST
Payer: MEDICARE

## 2025-08-23 DIAGNOSIS — R53.1 GENERAL WEAKNESS: Primary | ICD-10-CM

## 2025-08-23 DIAGNOSIS — R53.81 DEBILITY: ICD-10-CM

## 2025-08-23 PROBLEM — I49.1 PAC (PREMATURE ATRIAL CONTRACTION): Status: ACTIVE | Noted: 2025-08-23

## 2025-08-23 PROBLEM — R04.0 RECURRENT EPISTAXIS: Status: ACTIVE | Noted: 2025-08-23

## 2025-08-23 PROBLEM — G93.40 ACUTE ENCEPHALOPATHY: Status: ACTIVE | Noted: 2025-08-23

## 2025-08-23 PROBLEM — I48.0 PAROXYSMAL ATRIAL FIBRILLATION (HCC): Status: ACTIVE | Noted: 2025-08-23

## 2025-08-23 PROBLEM — D50.0 ANEMIA DUE TO BLOOD LOSS: Status: ACTIVE | Noted: 2025-08-23

## 2025-08-23 LAB
ABO + RH BLD: NORMAL
ALBUMIN SERPL-MCNC: 2.9 G/DL (ref 3.5–5.2)
ALBUMIN/GLOB SERPL: 0.7 (ref 1.1–2.2)
ALP SERPL-CCNC: 74 U/L (ref 40–129)
ALT SERPL-CCNC: 9 U/L (ref 10–50)
AMMONIA PLAS-SCNC: 13 UMOL/L (ref 16–60)
ANION GAP SERPL CALC-SCNC: 10 MMOL/L (ref 2–14)
ANION GAP SERPL CALC-SCNC: 12 MMOL/L (ref 2–14)
APPEARANCE UR: CLEAR
AST SERPL-CCNC: 14 U/L (ref 10–50)
BACTERIA URNS QL MICRO: NEGATIVE /HPF
BASOPHILS # BLD: 0.03 K/UL (ref 0–0.1)
BASOPHILS NFR BLD: 0.3 % (ref 0–1)
BILIRUB SERPL-MCNC: 0.5 MG/DL (ref 0–1.2)
BILIRUB UR QL: NEGATIVE
BLD PROD TYP BPU: NORMAL
BLOOD BANK BLOOD PRODUCT EXPIRATION DATE: NORMAL
BLOOD BANK DISPENSE STATUS: NORMAL
BLOOD BANK ISBT PRODUCT BLOOD TYPE: 5100
BLOOD BANK PRODUCT CODE: NORMAL
BLOOD BANK UNIT TYPE AND RH: NORMAL
BLOOD GROUP ANTIBODIES SERPL: NORMAL
BPU ID: NORMAL
BUN SERPL-MCNC: 18 MG/DL (ref 8–23)
BUN SERPL-MCNC: 20 MG/DL (ref 8–23)
BUN/CREAT SERPL: 17 (ref 12–20)
BUN/CREAT SERPL: 17 (ref 12–20)
CALCIUM SERPL-MCNC: 8.6 MG/DL (ref 8.8–10.2)
CALCIUM SERPL-MCNC: 8.7 MG/DL (ref 8.8–10.2)
CHLORIDE SERPL-SCNC: 102 MMOL/L (ref 98–107)
CHLORIDE SERPL-SCNC: 102 MMOL/L (ref 98–107)
CO2 SERPL-SCNC: 23 MMOL/L (ref 20–29)
CO2 SERPL-SCNC: 25 MMOL/L (ref 20–29)
COLOR UR: ABNORMAL
COMMENT:: NORMAL
CREAT SERPL-MCNC: 1.02 MG/DL (ref 0.7–1.2)
CREAT SERPL-MCNC: 1.13 MG/DL (ref 0.7–1.2)
CROSSMATCH RESULT: NORMAL
DIFFERENTIAL METHOD BLD: ABNORMAL
EKG ATRIAL RATE: 105 BPM
EKG DIAGNOSIS: NORMAL
EKG P-R INTERVAL: 312 MS
EKG Q-T INTERVAL: 298 MS
EKG QRS DURATION: 98 MS
EKG QTC CALCULATION (BAZETT): 393 MS
EKG R AXIS: 15 DEGREES
EKG T AXIS: 36 DEGREES
EKG VENTRICULAR RATE: 105 BPM
EOSINOPHIL # BLD: 0 K/UL (ref 0–0.4)
EOSINOPHIL NFR BLD: 0 % (ref 0–7)
EPITH CASTS URNS QL MICRO: ABNORMAL /LPF
ERYTHROCYTE [DISTWIDTH] IN BLOOD BY AUTOMATED COUNT: 21.9 % (ref 11.5–14.5)
EST. AVERAGE GLUCOSE BLD GHB EST-MCNC: 100 MG/DL
FOLATE SERPL-MCNC: 12.5 NG/ML (ref 4.8–24.2)
GLOBULIN SER CALC-MCNC: 4.2 G/DL (ref 2–4)
GLUCOSE SERPL-MCNC: 117 MG/DL (ref 65–100)
GLUCOSE SERPL-MCNC: 119 MG/DL (ref 65–100)
GLUCOSE UR STRIP.AUTO-MCNC: NEGATIVE MG/DL
HBA1C MFR BLD: 5.1 % (ref 4–5.6)
HCT VFR BLD AUTO: 28.6 % (ref 36.6–50.3)
HGB BLD-MCNC: 8.7 G/DL (ref 12.1–17)
HGB UR QL STRIP: NEGATIVE
HYALINE CASTS URNS QL MICRO: ABNORMAL /LPF (ref 0–2)
IMM GRANULOCYTES # BLD AUTO: 0.03 K/UL (ref 0–0.04)
IMM GRANULOCYTES NFR BLD AUTO: 0.3 % (ref 0–0.5)
KETONES UR QL STRIP.AUTO: NEGATIVE MG/DL
LEUKOCYTE ESTERASE UR QL STRIP.AUTO: ABNORMAL
LYMPHOCYTES # BLD: 0.23 K/UL (ref 0.8–3.5)
LYMPHOCYTES NFR BLD: 2.1 % (ref 12–49)
MCH RBC QN AUTO: 25.4 PG (ref 26–34)
MCHC RBC AUTO-ENTMCNC: 30.4 G/DL (ref 30–36.5)
MCV RBC AUTO: 83.6 FL (ref 80–99)
MONOCYTES # BLD: 1.44 K/UL (ref 0–1)
MONOCYTES NFR BLD: 13 % (ref 5–13)
NEUTS SEG # BLD: 9.36 K/UL (ref 1.8–8)
NEUTS SEG NFR BLD: 84.3 % (ref 32–75)
NITRITE UR QL STRIP.AUTO: NEGATIVE
NRBC # BLD: 0 K/UL (ref 0–0.01)
NRBC BLD-RTO: 0 PER 100 WBC
PH UR STRIP: 7 (ref 5–8)
PLATELET # BLD AUTO: 254 K/UL (ref 150–400)
PMV BLD AUTO: 9.8 FL (ref 8.9–12.9)
POTASSIUM SERPL-SCNC: 3.7 MMOL/L (ref 3.5–5.1)
POTASSIUM SERPL-SCNC: 4.1 MMOL/L (ref 3.5–5.1)
PROT SERPL-MCNC: 7 G/DL (ref 6.4–8.3)
PROT UR STRIP-MCNC: NEGATIVE MG/DL
RBC # BLD AUTO: 3.42 M/UL (ref 4.1–5.7)
RBC #/AREA URNS HPF: ABNORMAL /HPF (ref 0–5)
RBC MORPH BLD: ABNORMAL
RBC MORPH BLD: ABNORMAL
SODIUM SERPL-SCNC: 137 MMOL/L (ref 136–145)
SODIUM SERPL-SCNC: 137 MMOL/L (ref 136–145)
SP GR UR REFRACTOMETRY: 1.01 (ref 1–1.03)
SPECIMEN EXP DATE BLD: NORMAL
SPECIMEN HOLD: NORMAL
T4 FREE SERPL-MCNC: 1 NG/DL (ref 0.9–1.6)
TROPONIN T SERPL HS-MCNC: 32.4 NG/L (ref 0–22)
TROPONIN T SERPL HS-MCNC: 39.9 NG/L (ref 0–22)
TSH, 3RD GENERATION: 0.96 UIU/ML (ref 0.27–4.2)
TSH, 3RD GENERATION: 1.25 UIU/ML (ref 0.27–4.2)
UNIT DIVISION: 0
UNIT ISSUE DATE/TIME: NORMAL
URINE CULTURE IF INDICATED: ABNORMAL
UROBILINOGEN UR QL STRIP.AUTO: 1 EU/DL (ref 0.2–1)
VIT B12 SERPL-MCNC: 1235 PG/ML (ref 232–1245)
WBC # BLD AUTO: 11.1 K/UL (ref 4.1–11.1)
WBC URNS QL MICRO: >100 /HPF (ref 0–4)

## 2025-08-23 PROCEDURE — 82746 ASSAY OF FOLIC ACID SERUM: CPT

## 2025-08-23 PROCEDURE — 87186 SC STD MICRODIL/AGAR DIL: CPT

## 2025-08-23 PROCEDURE — 83036 HEMOGLOBIN GLYCOSYLATED A1C: CPT

## 2025-08-23 PROCEDURE — 2060000000 HC ICU INTERMEDIATE R&B

## 2025-08-23 PROCEDURE — 99223 1ST HOSP IP/OBS HIGH 75: CPT | Performed by: NURSE PRACTITIONER

## 2025-08-23 PROCEDURE — 80061 LIPID PANEL: CPT

## 2025-08-23 PROCEDURE — 81001 URINALYSIS AUTO W/SCOPE: CPT

## 2025-08-23 PROCEDURE — 84484 ASSAY OF TROPONIN QUANT: CPT

## 2025-08-23 PROCEDURE — 87088 URINE BACTERIA CULTURE: CPT

## 2025-08-23 PROCEDURE — 87086 URINE CULTURE/COLONY COUNT: CPT

## 2025-08-23 PROCEDURE — 82607 VITAMIN B-12: CPT

## 2025-08-23 PROCEDURE — 92610 EVALUATE SWALLOWING FUNCTION: CPT

## 2025-08-23 PROCEDURE — 94761 N-INVAS EAR/PLS OXIMETRY MLT: CPT

## 2025-08-23 PROCEDURE — 99285 EMERGENCY DEPT VISIT HI MDM: CPT

## 2025-08-23 PROCEDURE — 2580000003 HC RX 258: Performed by: HOSPITALIST

## 2025-08-23 PROCEDURE — 71045 X-RAY EXAM CHEST 1 VIEW: CPT

## 2025-08-23 PROCEDURE — 85025 COMPLETE CBC W/AUTO DIFF WBC: CPT

## 2025-08-23 PROCEDURE — 93005 ELECTROCARDIOGRAM TRACING: CPT | Performed by: HOSPITALIST

## 2025-08-23 PROCEDURE — 84439 ASSAY OF FREE THYROXINE: CPT

## 2025-08-23 PROCEDURE — 6370000000 HC RX 637 (ALT 250 FOR IP): Performed by: STUDENT IN AN ORGANIZED HEALTH CARE EDUCATION/TRAINING PROGRAM

## 2025-08-23 PROCEDURE — 80053 COMPREHEN METABOLIC PANEL: CPT

## 2025-08-23 PROCEDURE — 84443 ASSAY THYROID STIM HORMONE: CPT

## 2025-08-23 PROCEDURE — 36415 COLL VENOUS BLD VENIPUNCTURE: CPT

## 2025-08-23 PROCEDURE — 99222 1ST HOSP IP/OBS MODERATE 55: CPT | Performed by: HOSPITALIST

## 2025-08-23 PROCEDURE — 6360000002 HC RX W HCPCS: Performed by: HOSPITALIST

## 2025-08-23 PROCEDURE — 93005 ELECTROCARDIOGRAM TRACING: CPT | Performed by: STUDENT IN AN ORGANIZED HEALTH CARE EDUCATION/TRAINING PROGRAM

## 2025-08-23 PROCEDURE — 93010 ELECTROCARDIOGRAM REPORT: CPT | Performed by: HOSPITALIST

## 2025-08-23 PROCEDURE — 70450 CT HEAD/BRAIN W/O DYE: CPT

## 2025-08-23 PROCEDURE — 82140 ASSAY OF AMMONIA: CPT

## 2025-08-23 PROCEDURE — 2500000003 HC RX 250 WO HCPCS: Performed by: HOSPITALIST

## 2025-08-23 PROCEDURE — 6370000000 HC RX 637 (ALT 250 FOR IP): Performed by: HOSPITALIST

## 2025-08-23 PROCEDURE — 2Y41X5Z PACKING OF NASAL REGION USING PACKING MATERIAL: ICD-10-PCS | Performed by: INTERNAL MEDICINE

## 2025-08-23 RX ORDER — ONDANSETRON 2 MG/ML
4 INJECTION INTRAMUSCULAR; INTRAVENOUS EVERY 6 HOURS PRN
Status: DISCONTINUED | OUTPATIENT
Start: 2025-08-23 | End: 2025-08-27 | Stop reason: HOSPADM

## 2025-08-23 RX ORDER — SODIUM CHLORIDE 9 MG/ML
INJECTION, SOLUTION INTRAVENOUS PRN
Status: DISCONTINUED | OUTPATIENT
Start: 2025-08-23 | End: 2025-08-27 | Stop reason: HOSPADM

## 2025-08-23 RX ORDER — LEVOTHYROXINE SODIUM 50 UG/1
50 TABLET ORAL DAILY
Status: DISCONTINUED | OUTPATIENT
Start: 2025-08-23 | End: 2025-08-27 | Stop reason: HOSPADM

## 2025-08-23 RX ORDER — TIMOLOL MALEATE 5 MG/ML
1 SOLUTION/ DROPS OPHTHALMIC 2 TIMES DAILY
Status: DISCONTINUED | OUTPATIENT
Start: 2025-08-23 | End: 2025-08-27 | Stop reason: HOSPADM

## 2025-08-23 RX ORDER — SODIUM CHLORIDE 9 MG/ML
INJECTION, SOLUTION INTRAVENOUS CONTINUOUS
Status: DISCONTINUED | OUTPATIENT
Start: 2025-08-23 | End: 2025-08-27 | Stop reason: HOSPADM

## 2025-08-23 RX ORDER — LATANOPROST 50 UG/ML
1 SOLUTION/ DROPS OPHTHALMIC NIGHTLY
Status: DISCONTINUED | OUTPATIENT
Start: 2025-08-23 | End: 2025-08-27 | Stop reason: HOSPADM

## 2025-08-23 RX ORDER — RIVASTIGMINE TARTRATE 1.5 MG/1
1.5 CAPSULE ORAL 2 TIMES DAILY
Status: DISCONTINUED | OUTPATIENT
Start: 2025-08-23 | End: 2025-08-27 | Stop reason: HOSPADM

## 2025-08-23 RX ORDER — SODIUM CHLORIDE 0.9 % (FLUSH) 0.9 %
5-40 SYRINGE (ML) INJECTION PRN
Status: DISCONTINUED | OUTPATIENT
Start: 2025-08-23 | End: 2025-08-27 | Stop reason: HOSPADM

## 2025-08-23 RX ORDER — ONDANSETRON 4 MG/1
4 TABLET, ORALLY DISINTEGRATING ORAL EVERY 8 HOURS PRN
Status: DISCONTINUED | OUTPATIENT
Start: 2025-08-23 | End: 2025-08-27 | Stop reason: HOSPADM

## 2025-08-23 RX ORDER — POLYETHYLENE GLYCOL 3350 17 G/17G
17 POWDER, FOR SOLUTION ORAL DAILY PRN
Status: DISCONTINUED | OUTPATIENT
Start: 2025-08-23 | End: 2025-08-27 | Stop reason: HOSPADM

## 2025-08-23 RX ORDER — SODIUM CHLORIDE 0.9 % (FLUSH) 0.9 %
5-40 SYRINGE (ML) INJECTION EVERY 12 HOURS SCHEDULED
Status: DISCONTINUED | OUTPATIENT
Start: 2025-08-23 | End: 2025-08-27 | Stop reason: HOSPADM

## 2025-08-23 RX ADMIN — SODIUM CHLORIDE 3000 MG: 9 INJECTION, SOLUTION INTRAVENOUS at 10:20

## 2025-08-23 RX ADMIN — LEVOTHYROXINE SODIUM 50 MCG: 0.05 TABLET ORAL at 12:52

## 2025-08-23 RX ADMIN — SODIUM CHLORIDE 3000 MG: 9 INJECTION, SOLUTION INTRAVENOUS at 04:12

## 2025-08-23 RX ADMIN — TIMOLOL MALEATE 1 DROP: 5 SOLUTION OPHTHALMIC at 20:51

## 2025-08-23 RX ADMIN — SODIUM CHLORIDE, PRESERVATIVE FREE 10 ML: 5 INJECTION INTRAVENOUS at 09:15

## 2025-08-23 RX ADMIN — SODIUM CHLORIDE 3000 MG: 9 INJECTION, SOLUTION INTRAVENOUS at 16:27

## 2025-08-23 RX ADMIN — RIVASTIGMINE TARTRATE 1.5 MG: 1.5 CAPSULE ORAL at 12:52

## 2025-08-23 RX ADMIN — SODIUM CHLORIDE: 0.9 INJECTION, SOLUTION INTRAVENOUS at 19:32

## 2025-08-23 RX ADMIN — SODIUM CHLORIDE 3000 MG: 9 INJECTION, SOLUTION INTRAVENOUS at 21:03

## 2025-08-23 RX ADMIN — SODIUM CHLORIDE: 0.9 INJECTION, SOLUTION INTRAVENOUS at 04:00

## 2025-08-23 RX ADMIN — LATANOPROST 1 DROP: 50 SOLUTION OPHTHALMIC at 20:51

## 2025-08-23 RX ADMIN — RIVASTIGMINE TARTRATE 1.5 MG: 1.5 CAPSULE ORAL at 20:51

## 2025-08-23 RX ADMIN — SODIUM CHLORIDE, PRESERVATIVE FREE 10 ML: 5 INJECTION INTRAVENOUS at 20:51

## 2025-08-23 ASSESSMENT — PAIN SCALES - GENERAL
PAINLEVEL_OUTOF10: 0

## 2025-08-24 PROBLEM — H40.9 GLAUCOMA: Status: ACTIVE | Noted: 2025-08-24

## 2025-08-24 PROBLEM — J32.9 SINUSITIS: Status: ACTIVE | Noted: 2025-08-24

## 2025-08-24 LAB
CHOLEST SERPL-MCNC: 119 MG/DL (ref 0–200)
ERYTHROCYTE [DISTWIDTH] IN BLOOD BY AUTOMATED COUNT: 21.6 % (ref 11.5–14.5)
HCT VFR BLD AUTO: 27.3 % (ref 36.6–50.3)
HDLC SERPL-MCNC: 47 MG/DL (ref 40–60)
HDLC SERPL: 2.5 (ref 0–5)
HGB BLD-MCNC: 8.3 G/DL (ref 12.1–17)
LDLC SERPL CALC-MCNC: 59.6 MG/DL
MCH RBC QN AUTO: 25.5 PG (ref 26–34)
MCHC RBC AUTO-ENTMCNC: 30.4 G/DL (ref 30–36.5)
MCV RBC AUTO: 83.7 FL (ref 80–99)
NRBC # BLD: 0 K/UL (ref 0–0.01)
NRBC BLD-RTO: 0 PER 100 WBC
PLATELET # BLD AUTO: 256 K/UL (ref 150–400)
PMV BLD AUTO: 9.7 FL (ref 8.9–12.9)
RBC # BLD AUTO: 3.26 M/UL (ref 4.1–5.7)
TRIGL SERPL-MCNC: 62 MG/DL (ref 0–150)
VLDLC SERPL CALC-MCNC: 12.4 MG/DL
WBC # BLD AUTO: 10.1 K/UL (ref 4.1–11.1)

## 2025-08-24 PROCEDURE — 97116 GAIT TRAINING THERAPY: CPT

## 2025-08-24 PROCEDURE — 6370000000 HC RX 637 (ALT 250 FOR IP): Performed by: STUDENT IN AN ORGANIZED HEALTH CARE EDUCATION/TRAINING PROGRAM

## 2025-08-24 PROCEDURE — 97530 THERAPEUTIC ACTIVITIES: CPT

## 2025-08-24 PROCEDURE — 97161 PT EVAL LOW COMPLEX 20 MIN: CPT

## 2025-08-24 PROCEDURE — 6360000002 HC RX W HCPCS: Performed by: HOSPITALIST

## 2025-08-24 PROCEDURE — 99232 SBSQ HOSP IP/OBS MODERATE 35: CPT | Performed by: NURSE PRACTITIONER

## 2025-08-24 PROCEDURE — 85027 COMPLETE CBC AUTOMATED: CPT

## 2025-08-24 PROCEDURE — 2500000003 HC RX 250 WO HCPCS: Performed by: HOSPITALIST

## 2025-08-24 PROCEDURE — 97165 OT EVAL LOW COMPLEX 30 MIN: CPT

## 2025-08-24 PROCEDURE — 2700000000 HC OXYGEN THERAPY PER DAY

## 2025-08-24 PROCEDURE — 2060000000 HC ICU INTERMEDIATE R&B

## 2025-08-24 PROCEDURE — 94761 N-INVAS EAR/PLS OXIMETRY MLT: CPT

## 2025-08-24 PROCEDURE — 2580000003 HC RX 258: Performed by: HOSPITALIST

## 2025-08-24 PROCEDURE — 97535 SELF CARE MNGMENT TRAINING: CPT

## 2025-08-24 RX ORDER — MULTIVITAMIN WITH IRON
1000 TABLET ORAL DAILY
Status: DISCONTINUED | OUTPATIENT
Start: 2025-08-24 | End: 2025-08-27 | Stop reason: HOSPADM

## 2025-08-24 RX ORDER — RISPERIDONE 0.25 MG/1
0.25 TABLET ORAL 2 TIMES DAILY
Status: DISCONTINUED | OUTPATIENT
Start: 2025-08-24 | End: 2025-08-27 | Stop reason: HOSPADM

## 2025-08-24 RX ADMIN — SODIUM CHLORIDE: 0.9 INJECTION, SOLUTION INTRAVENOUS at 06:51

## 2025-08-24 RX ADMIN — SODIUM CHLORIDE 3000 MG: 9 INJECTION, SOLUTION INTRAVENOUS at 21:52

## 2025-08-24 RX ADMIN — SODIUM CHLORIDE 3000 MG: 9 INJECTION, SOLUTION INTRAVENOUS at 16:30

## 2025-08-24 RX ADMIN — TIMOLOL MALEATE 1 DROP: 5 SOLUTION OPHTHALMIC at 21:52

## 2025-08-24 RX ADMIN — SODIUM CHLORIDE, PRESERVATIVE FREE 10 ML: 5 INJECTION INTRAVENOUS at 10:37

## 2025-08-24 RX ADMIN — RIVASTIGMINE TARTRATE 1.5 MG: 1.5 CAPSULE ORAL at 21:47

## 2025-08-24 RX ADMIN — RISPERIDONE 0.25 MG: 0.25 TABLET, FILM COATED ORAL at 21:48

## 2025-08-24 RX ADMIN — CYANOCOBALAMIN TAB 500 MCG 1000 MCG: 500 TAB at 10:40

## 2025-08-24 RX ADMIN — LEVOTHYROXINE SODIUM 50 MCG: 0.05 TABLET ORAL at 06:46

## 2025-08-24 RX ADMIN — SODIUM CHLORIDE 3000 MG: 9 INJECTION, SOLUTION INTRAVENOUS at 03:28

## 2025-08-24 RX ADMIN — SODIUM CHLORIDE: 0.9 INJECTION, SOLUTION INTRAVENOUS at 21:51

## 2025-08-24 RX ADMIN — SODIUM CHLORIDE, PRESERVATIVE FREE 10 ML: 5 INJECTION INTRAVENOUS at 21:49

## 2025-08-24 RX ADMIN — SODIUM CHLORIDE 3000 MG: 9 INJECTION, SOLUTION INTRAVENOUS at 10:34

## 2025-08-24 RX ADMIN — LATANOPROST 1 DROP: 50 SOLUTION OPHTHALMIC at 21:52

## 2025-08-24 RX ADMIN — TIMOLOL MALEATE 1 DROP: 5 SOLUTION OPHTHALMIC at 10:38

## 2025-08-24 RX ADMIN — RIVASTIGMINE TARTRATE 1.5 MG: 1.5 CAPSULE ORAL at 10:28

## 2025-08-24 RX ADMIN — RISPERIDONE 0.25 MG: 0.25 TABLET, FILM COATED ORAL at 10:37

## 2025-08-24 ASSESSMENT — PAIN SCALES - GENERAL
PAINLEVEL_OUTOF10: 0

## 2025-08-25 ENCOUNTER — TELEPHONE (OUTPATIENT)
Facility: HOSPITAL | Age: 85
End: 2025-08-25

## 2025-08-25 PROBLEM — F01.A2: Status: ACTIVE | Noted: 2025-08-25

## 2025-08-25 PROBLEM — Z85.46 HISTORY OF PROSTATE CANCER: Status: ACTIVE | Noted: 2025-08-25

## 2025-08-25 LAB
ANION GAP SERPL CALC-SCNC: 9 MMOL/L (ref 2–14)
BACTERIA SPEC CULT: ABNORMAL
BACTERIA SPEC CULT: ABNORMAL
BUN SERPL-MCNC: 12 MG/DL (ref 8–23)
BUN/CREAT SERPL: 13 (ref 12–20)
CALCIUM SERPL-MCNC: 8.1 MG/DL (ref 8.8–10.2)
CC UR VC: ABNORMAL
CHLORIDE SERPL-SCNC: 107 MMOL/L (ref 98–107)
CO2 SERPL-SCNC: 24 MMOL/L (ref 20–29)
CREAT SERPL-MCNC: 0.94 MG/DL (ref 0.7–1.2)
EKG DIAGNOSIS: NORMAL
EKG Q-T INTERVAL: 300 MS
EKG QRS DURATION: 102 MS
EKG QTC CALCULATION (BAZETT): 394 MS
EKG R AXIS: 37 DEGREES
EKG T AXIS: 45 DEGREES
EKG VENTRICULAR RATE: 104 BPM
GLUCOSE SERPL-MCNC: 90 MG/DL (ref 65–100)
POTASSIUM SERPL-SCNC: 3.8 MMOL/L (ref 3.5–5.1)
SERVICE CMNT-IMP: ABNORMAL
SODIUM SERPL-SCNC: 140 MMOL/L (ref 136–145)

## 2025-08-25 PROCEDURE — 2580000003 HC RX 258: Performed by: HOSPITALIST

## 2025-08-25 PROCEDURE — 95957 EEG DIGITAL ANALYSIS: CPT

## 2025-08-25 PROCEDURE — 94761 N-INVAS EAR/PLS OXIMETRY MLT: CPT

## 2025-08-25 PROCEDURE — 6360000002 HC RX W HCPCS: Performed by: HOSPITALIST

## 2025-08-25 PROCEDURE — 80048 BASIC METABOLIC PNL TOTAL CA: CPT

## 2025-08-25 PROCEDURE — 2060000000 HC ICU INTERMEDIATE R&B

## 2025-08-25 PROCEDURE — 87040 BLOOD CULTURE FOR BACTERIA: CPT

## 2025-08-25 PROCEDURE — 6360000002 HC RX W HCPCS: Performed by: INTERNAL MEDICINE

## 2025-08-25 PROCEDURE — 99232 SBSQ HOSP IP/OBS MODERATE 35: CPT | Performed by: NURSE PRACTITIONER

## 2025-08-25 PROCEDURE — 2500000003 HC RX 250 WO HCPCS: Performed by: HOSPITALIST

## 2025-08-25 PROCEDURE — 97535 SELF CARE MNGMENT TRAINING: CPT

## 2025-08-25 PROCEDURE — 36415 COLL VENOUS BLD VENIPUNCTURE: CPT

## 2025-08-25 PROCEDURE — 2580000003 HC RX 258: Performed by: INTERNAL MEDICINE

## 2025-08-25 PROCEDURE — 93010 ELECTROCARDIOGRAM REPORT: CPT | Performed by: SPECIALIST

## 2025-08-25 PROCEDURE — 95819 EEG AWAKE AND ASLEEP: CPT

## 2025-08-25 PROCEDURE — 6370000000 HC RX 637 (ALT 250 FOR IP): Performed by: STUDENT IN AN ORGANIZED HEALTH CARE EDUCATION/TRAINING PROGRAM

## 2025-08-25 PROCEDURE — 97116 GAIT TRAINING THERAPY: CPT

## 2025-08-25 PROCEDURE — 97530 THERAPEUTIC ACTIVITIES: CPT

## 2025-08-25 RX ADMIN — VANCOMYCIN HYDROCHLORIDE 1750 MG: 1.75 INJECTION, POWDER, LYOPHILIZED, FOR SOLUTION INTRAVENOUS at 17:02

## 2025-08-25 RX ADMIN — SODIUM CHLORIDE, PRESERVATIVE FREE 10 ML: 5 INJECTION INTRAVENOUS at 21:40

## 2025-08-25 RX ADMIN — LATANOPROST 1 DROP: 50 SOLUTION OPHTHALMIC at 21:07

## 2025-08-25 RX ADMIN — RISPERIDONE 0.25 MG: 0.25 TABLET, FILM COATED ORAL at 21:07

## 2025-08-25 RX ADMIN — RISPERIDONE 0.25 MG: 0.25 TABLET, FILM COATED ORAL at 08:09

## 2025-08-25 RX ADMIN — SODIUM CHLORIDE 3000 MG: 9 INJECTION, SOLUTION INTRAVENOUS at 09:26

## 2025-08-25 RX ADMIN — SODIUM CHLORIDE 3000 MG: 9 INJECTION, SOLUTION INTRAVENOUS at 16:12

## 2025-08-25 RX ADMIN — SODIUM CHLORIDE: 0.9 INJECTION, SOLUTION INTRAVENOUS at 15:06

## 2025-08-25 RX ADMIN — RIVASTIGMINE TARTRATE 1.5 MG: 1.5 CAPSULE ORAL at 08:09

## 2025-08-25 RX ADMIN — SODIUM CHLORIDE, PRESERVATIVE FREE 10 ML: 5 INJECTION INTRAVENOUS at 08:09

## 2025-08-25 RX ADMIN — TIMOLOL MALEATE 1 DROP: 5 SOLUTION OPHTHALMIC at 08:09

## 2025-08-25 RX ADMIN — RIVASTIGMINE TARTRATE 1.5 MG: 1.5 CAPSULE ORAL at 21:07

## 2025-08-25 RX ADMIN — CYANOCOBALAMIN TAB 500 MCG 1000 MCG: 500 TAB at 08:09

## 2025-08-25 RX ADMIN — TIMOLOL MALEATE 1 DROP: 5 SOLUTION OPHTHALMIC at 21:07

## 2025-08-25 RX ADMIN — SODIUM CHLORIDE 3000 MG: 9 INJECTION, SOLUTION INTRAVENOUS at 04:21

## 2025-08-25 RX ADMIN — LEVOTHYROXINE SODIUM 50 MCG: 0.05 TABLET ORAL at 05:36

## 2025-08-25 RX ADMIN — SODIUM CHLORIDE 3000 MG: 9 INJECTION, SOLUTION INTRAVENOUS at 21:44

## 2025-08-25 ASSESSMENT — PAIN SCALES - GENERAL
PAINLEVEL_OUTOF10: 0

## 2025-08-26 PROBLEM — N30.00 ACUTE CYSTITIS WITHOUT HEMATURIA: Status: ACTIVE | Noted: 2025-08-26

## 2025-08-26 PROBLEM — Z22.322 MRSA (METHICILLIN RESISTANT STAPH AUREUS) CULTURE POSITIVE: Status: ACTIVE | Noted: 2025-08-26

## 2025-08-26 PROBLEM — R82.90 ABNORMAL URINE: Status: ACTIVE | Noted: 2025-08-26

## 2025-08-26 LAB
ALBUMIN SERPL-MCNC: 2.4 G/DL (ref 3.5–5.2)
ALBUMIN/GLOB SERPL: 0.5 (ref 1.1–2.2)
ALP SERPL-CCNC: 72 U/L (ref 40–129)
ALT SERPL-CCNC: 17 U/L (ref 10–50)
ANION GAP SERPL CALC-SCNC: 10 MMOL/L (ref 2–14)
AST SERPL-CCNC: 33 U/L (ref 10–50)
BILIRUB SERPL-MCNC: 0.4 MG/DL (ref 0–1.2)
BUN SERPL-MCNC: 10 MG/DL (ref 8–23)
BUN/CREAT SERPL: 13 (ref 12–20)
CALCIUM SERPL-MCNC: 8.5 MG/DL (ref 8.8–10.2)
CHLORIDE SERPL-SCNC: 107 MMOL/L (ref 98–107)
CO2 SERPL-SCNC: 22 MMOL/L (ref 20–29)
CREAT SERPL-MCNC: 0.81 MG/DL (ref 0.7–1.2)
ERYTHROCYTE [DISTWIDTH] IN BLOOD BY AUTOMATED COUNT: 22.5 % (ref 11.5–14.5)
GLOBULIN SER CALC-MCNC: 4.4 G/DL (ref 2–4)
GLUCOSE SERPL-MCNC: 103 MG/DL (ref 65–100)
HCT VFR BLD AUTO: 29.2 % (ref 36.6–50.3)
HGB BLD-MCNC: 8.7 G/DL (ref 12.1–17)
MAGNESIUM SERPL-MCNC: 2.1 MG/DL (ref 1.6–2.4)
MCH RBC QN AUTO: 25.7 PG (ref 26–34)
MCHC RBC AUTO-ENTMCNC: 29.8 G/DL (ref 30–36.5)
MCV RBC AUTO: 86.1 FL (ref 80–99)
NRBC # BLD: 0 K/UL (ref 0–0.01)
NRBC BLD-RTO: 0 PER 100 WBC
PLATELET # BLD AUTO: 258 K/UL (ref 150–400)
PMV BLD AUTO: 10.5 FL (ref 8.9–12.9)
POTASSIUM SERPL-SCNC: 4.7 MMOL/L (ref 3.5–5.1)
PROT SERPL-MCNC: 6.8 G/DL (ref 6.4–8.3)
RBC # BLD AUTO: 3.39 M/UL (ref 4.1–5.7)
SODIUM SERPL-SCNC: 138 MMOL/L (ref 136–145)
WBC # BLD AUTO: 5.3 K/UL (ref 4.1–11.1)

## 2025-08-26 PROCEDURE — 36415 COLL VENOUS BLD VENIPUNCTURE: CPT

## 2025-08-26 PROCEDURE — 97535 SELF CARE MNGMENT TRAINING: CPT

## 2025-08-26 PROCEDURE — 99223 1ST HOSP IP/OBS HIGH 75: CPT | Performed by: INTERNAL MEDICINE

## 2025-08-26 PROCEDURE — 6370000000 HC RX 637 (ALT 250 FOR IP): Performed by: INTERNAL MEDICINE

## 2025-08-26 PROCEDURE — 94761 N-INVAS EAR/PLS OXIMETRY MLT: CPT

## 2025-08-26 PROCEDURE — 2500000003 HC RX 250 WO HCPCS: Performed by: HOSPITALIST

## 2025-08-26 PROCEDURE — 83735 ASSAY OF MAGNESIUM: CPT

## 2025-08-26 PROCEDURE — 97530 THERAPEUTIC ACTIVITIES: CPT

## 2025-08-26 PROCEDURE — 85027 COMPLETE CBC AUTOMATED: CPT

## 2025-08-26 PROCEDURE — 97116 GAIT TRAINING THERAPY: CPT

## 2025-08-26 PROCEDURE — 2060000000 HC ICU INTERMEDIATE R&B

## 2025-08-26 PROCEDURE — 2580000003 HC RX 258: Performed by: HOSPITALIST

## 2025-08-26 PROCEDURE — 6370000000 HC RX 637 (ALT 250 FOR IP): Performed by: STUDENT IN AN ORGANIZED HEALTH CARE EDUCATION/TRAINING PROGRAM

## 2025-08-26 PROCEDURE — 80053 COMPREHEN METABOLIC PANEL: CPT

## 2025-08-26 PROCEDURE — 6360000002 HC RX W HCPCS: Performed by: HOSPITALIST

## 2025-08-26 RX ORDER — NITROFURANTOIN 25; 75 MG/1; MG/1
100 CAPSULE ORAL EVERY 12 HOURS SCHEDULED
Status: DISCONTINUED | OUTPATIENT
Start: 2025-08-26 | End: 2025-08-27 | Stop reason: HOSPADM

## 2025-08-26 RX ADMIN — SODIUM CHLORIDE, PRESERVATIVE FREE 10 ML: 5 INJECTION INTRAVENOUS at 08:35

## 2025-08-26 RX ADMIN — LEVOTHYROXINE SODIUM 50 MCG: 0.05 TABLET ORAL at 08:35

## 2025-08-26 RX ADMIN — SODIUM CHLORIDE: 0.9 INJECTION, SOLUTION INTRAVENOUS at 23:04

## 2025-08-26 RX ADMIN — RIVASTIGMINE TARTRATE 1.5 MG: 1.5 CAPSULE ORAL at 08:35

## 2025-08-26 RX ADMIN — TIMOLOL MALEATE 1 DROP: 5 SOLUTION OPHTHALMIC at 08:35

## 2025-08-26 RX ADMIN — RISPERIDONE 0.25 MG: 0.25 TABLET, FILM COATED ORAL at 08:35

## 2025-08-26 RX ADMIN — RISPERIDONE 0.25 MG: 0.25 TABLET, FILM COATED ORAL at 20:54

## 2025-08-26 RX ADMIN — RIVASTIGMINE TARTRATE 1.5 MG: 1.5 CAPSULE ORAL at 20:55

## 2025-08-26 RX ADMIN — TIMOLOL MALEATE 1 DROP: 5 SOLUTION OPHTHALMIC at 20:55

## 2025-08-26 RX ADMIN — CYANOCOBALAMIN TAB 500 MCG 1000 MCG: 500 TAB at 08:35

## 2025-08-26 RX ADMIN — SODIUM CHLORIDE 3000 MG: 9 INJECTION, SOLUTION INTRAVENOUS at 04:28

## 2025-08-26 RX ADMIN — SODIUM CHLORIDE: 0.9 INJECTION, SOLUTION INTRAVENOUS at 09:49

## 2025-08-26 RX ADMIN — NITROFURANTOIN (MONOHYDRATE/MACROCRYSTALS) 100 MG: 25; 75 CAPSULE ORAL at 20:54

## 2025-08-26 RX ADMIN — LATANOPROST 1 DROP: 50 SOLUTION OPHTHALMIC at 20:55

## 2025-08-26 RX ADMIN — SODIUM CHLORIDE, PRESERVATIVE FREE 10 ML: 5 INJECTION INTRAVENOUS at 20:55

## 2025-08-26 RX ADMIN — NITROFURANTOIN (MONOHYDRATE/MACROCRYSTALS) 100 MG: 25; 75 CAPSULE ORAL at 11:04

## 2025-08-26 RX ADMIN — SODIUM CHLORIDE 3000 MG: 9 INJECTION, SOLUTION INTRAVENOUS at 09:50

## 2025-08-26 RX ADMIN — AMOXICILLIN AND CLAVULANATE POTASSIUM 1 TABLET: 875; 125 TABLET, FILM COATED ORAL at 21:04

## 2025-08-26 ASSESSMENT — PAIN SCALES - GENERAL: PAINLEVEL_OUTOF10: 0

## 2025-08-27 ENCOUNTER — OFFICE VISIT (OUTPATIENT)
Facility: CLINIC | Age: 85
End: 2025-08-27

## 2025-08-27 VITALS
SYSTOLIC BLOOD PRESSURE: 129 MMHG | OXYGEN SATURATION: 99 % | WEIGHT: 172.18 LBS | HEIGHT: 73 IN | BODY MASS INDEX: 22.82 KG/M2 | HEART RATE: 77 BPM | RESPIRATION RATE: 16 BRPM | DIASTOLIC BLOOD PRESSURE: 71 MMHG | TEMPERATURE: 97.9 F

## 2025-08-27 DIAGNOSIS — E03.9 ADULT HYPOTHYROIDISM: ICD-10-CM

## 2025-08-27 DIAGNOSIS — F01.A2: ICD-10-CM

## 2025-08-27 DIAGNOSIS — D50.8 OTHER IRON DEFICIENCY ANEMIA: ICD-10-CM

## 2025-08-27 DIAGNOSIS — I48.91 ATRIAL FIBRILLATION WITH RVR (HCC): ICD-10-CM

## 2025-08-27 DIAGNOSIS — I78.0 HHT (HEREDITARY HEMORRHAGIC TELANGIECTASIA): ICD-10-CM

## 2025-08-27 DIAGNOSIS — R46.89 ALTERED BEHAVIOR: ICD-10-CM

## 2025-08-27 DIAGNOSIS — D62 ACUTE BLOOD LOSS ANEMIA: ICD-10-CM

## 2025-08-27 DIAGNOSIS — Z71.89 ACP (ADVANCE CARE PLANNING): Primary | ICD-10-CM

## 2025-08-27 DIAGNOSIS — R53.81 DEBILITY: ICD-10-CM

## 2025-08-27 DIAGNOSIS — J32.9 OTHER SINUSITIS, UNSPECIFIED CHRONICITY: ICD-10-CM

## 2025-08-27 DIAGNOSIS — N30.00 ACUTE CYSTITIS WITHOUT HEMATURIA: ICD-10-CM

## 2025-08-27 DIAGNOSIS — R53.1 GENERAL WEAKNESS: ICD-10-CM

## 2025-08-27 DIAGNOSIS — R04.0 RECURRENT EPISTAXIS: ICD-10-CM

## 2025-08-27 PROCEDURE — 6370000000 HC RX 637 (ALT 250 FOR IP): Performed by: INTERNAL MEDICINE

## 2025-08-27 PROCEDURE — 94761 N-INVAS EAR/PLS OXIMETRY MLT: CPT

## 2025-08-27 PROCEDURE — 2500000003 HC RX 250 WO HCPCS: Performed by: HOSPITALIST

## 2025-08-27 PROCEDURE — 6370000000 HC RX 637 (ALT 250 FOR IP): Performed by: STUDENT IN AN ORGANIZED HEALTH CARE EDUCATION/TRAINING PROGRAM

## 2025-08-27 RX ORDER — RIVASTIGMINE TARTRATE 1.5 MG/1
1.5 CAPSULE ORAL 2 TIMES DAILY
Qty: 60 CAPSULE | Refills: 0
Start: 2025-08-27

## 2025-08-27 RX ORDER — NITROFURANTOIN 25; 75 MG/1; MG/1
100 CAPSULE ORAL EVERY 12 HOURS SCHEDULED
Qty: 12 CAPSULE | Refills: 0 | Status: SHIPPED
Start: 2025-08-27 | End: 2025-08-27 | Stop reason: SDUPTHER

## 2025-08-27 RX ORDER — FERROUS SULFATE 325(65) MG
325 TABLET ORAL 2 TIMES DAILY
Qty: 60 TABLET | Refills: 0
Start: 2025-08-27

## 2025-08-27 RX ORDER — NITROFURANTOIN 25; 75 MG/1; MG/1
100 CAPSULE ORAL EVERY 12 HOURS SCHEDULED
Qty: 12 CAPSULE | Refills: 0
Start: 2025-08-27 | End: 2025-09-02

## 2025-08-27 RX ORDER — RISPERIDONE 0.25 MG/1
0.25 TABLET ORAL 2 TIMES DAILY
Qty: 60 TABLET | Refills: 3
Start: 2025-08-27

## 2025-08-27 RX ORDER — B.COAGUL,SUBTILIS/INULIN/VIT C 1B CELL-1G
1 TABLET,CHEWABLE ORAL DAILY
Qty: 30 CAPSULE | Refills: 0 | Status: SHIPPED
Start: 2025-08-27 | End: 2025-09-26

## 2025-08-27 RX ORDER — LEVOTHYROXINE SODIUM 50 UG/1
50 TABLET ORAL DAILY
Qty: 30 TABLET | Refills: 5
Start: 2025-08-27

## 2025-08-27 RX ADMIN — NITROFURANTOIN (MONOHYDRATE/MACROCRYSTALS) 100 MG: 25; 75 CAPSULE ORAL at 09:42

## 2025-08-27 RX ADMIN — SODIUM CHLORIDE, PRESERVATIVE FREE 10 ML: 5 INJECTION INTRAVENOUS at 09:42

## 2025-08-27 RX ADMIN — RIVASTIGMINE TARTRATE 1.5 MG: 1.5 CAPSULE ORAL at 09:42

## 2025-08-27 RX ADMIN — CYANOCOBALAMIN TAB 500 MCG 1000 MCG: 500 TAB at 09:42

## 2025-08-27 RX ADMIN — RISPERIDONE 0.25 MG: 0.25 TABLET, FILM COATED ORAL at 09:42

## 2025-08-27 RX ADMIN — LEVOTHYROXINE SODIUM 50 MCG: 0.05 TABLET ORAL at 06:52

## 2025-08-27 RX ADMIN — AMOXICILLIN AND CLAVULANATE POTASSIUM 1 TABLET: 875; 125 TABLET, FILM COATED ORAL at 09:42

## 2025-08-27 RX ADMIN — TIMOLOL MALEATE 1 DROP: 5 SOLUTION OPHTHALMIC at 09:41

## 2025-08-28 ENCOUNTER — OFFICE VISIT (OUTPATIENT)
Facility: CLINIC | Age: 85
End: 2025-08-28
Payer: MEDICARE

## 2025-08-28 DIAGNOSIS — E03.9 ADULT HYPOTHYROIDISM: ICD-10-CM

## 2025-08-28 DIAGNOSIS — N39.0 URINARY TRACT INFECTION WITHOUT HEMATURIA, SITE UNSPECIFIED: Primary | ICD-10-CM

## 2025-08-28 DIAGNOSIS — R53.81 DEBILITY: ICD-10-CM

## 2025-08-28 DIAGNOSIS — I78.0 HHT (HEREDITARY HEMORRHAGIC TELANGIECTASIA): ICD-10-CM

## 2025-08-28 DIAGNOSIS — D50.8 OTHER IRON DEFICIENCY ANEMIA: ICD-10-CM

## 2025-08-28 DIAGNOSIS — R53.1 GENERAL WEAKNESS: ICD-10-CM

## 2025-08-28 DIAGNOSIS — I48.91 ATRIAL FIBRILLATION WITH RVR (HCC): ICD-10-CM

## 2025-08-28 DIAGNOSIS — R04.0 RECURRENT EPISTAXIS: ICD-10-CM

## 2025-08-28 PROCEDURE — 1123F ACP DISCUSS/DSCN MKR DOCD: CPT

## 2025-08-28 PROCEDURE — 99310 SBSQ NF CARE HIGH MDM 45: CPT

## 2025-08-29 ENCOUNTER — OFFICE VISIT (OUTPATIENT)
Facility: CLINIC | Age: 85
End: 2025-08-29

## 2025-08-29 DIAGNOSIS — I78.0 HHT (HEREDITARY HEMORRHAGIC TELANGIECTASIA): ICD-10-CM

## 2025-08-29 DIAGNOSIS — D50.8 OTHER IRON DEFICIENCY ANEMIA: ICD-10-CM

## 2025-08-29 DIAGNOSIS — R53.1 GENERAL WEAKNESS: ICD-10-CM

## 2025-08-29 DIAGNOSIS — N39.0 URINARY TRACT INFECTION WITHOUT HEMATURIA, SITE UNSPECIFIED: Primary | ICD-10-CM

## 2025-08-29 DIAGNOSIS — R53.81 DEBILITY: ICD-10-CM

## 2025-08-29 DIAGNOSIS — R04.0 RECURRENT EPISTAXIS: ICD-10-CM

## 2025-08-31 LAB
BACTERIA SPEC CULT: NORMAL
BACTERIA SPEC CULT: NORMAL
SERVICE CMNT-IMP: NORMAL
SERVICE CMNT-IMP: NORMAL

## 2025-09-01 ENCOUNTER — OFFICE VISIT (OUTPATIENT)
Facility: CLINIC | Age: 85
End: 2025-09-01
Payer: MEDICARE

## 2025-09-01 DIAGNOSIS — R04.0 RECURRENT EPISTAXIS: ICD-10-CM

## 2025-09-01 DIAGNOSIS — R53.1 GENERAL WEAKNESS: ICD-10-CM

## 2025-09-01 DIAGNOSIS — R46.89 ALTERED BEHAVIOR: ICD-10-CM

## 2025-09-01 DIAGNOSIS — D50.8 OTHER IRON DEFICIENCY ANEMIA: ICD-10-CM

## 2025-09-01 DIAGNOSIS — I48.91 ATRIAL FIBRILLATION WITH RVR (HCC): ICD-10-CM

## 2025-09-01 DIAGNOSIS — J32.9 OTHER SINUSITIS, UNSPECIFIED CHRONICITY: ICD-10-CM

## 2025-09-01 DIAGNOSIS — R53.81 DEBILITY: ICD-10-CM

## 2025-09-01 DIAGNOSIS — E03.9 ADULT HYPOTHYROIDISM: ICD-10-CM

## 2025-09-01 DIAGNOSIS — F01.A2: ICD-10-CM

## 2025-09-01 DIAGNOSIS — N30.00 ACUTE CYSTITIS WITHOUT HEMATURIA: ICD-10-CM

## 2025-09-01 DIAGNOSIS — D62 ACUTE BLOOD LOSS ANEMIA: ICD-10-CM

## 2025-09-01 DIAGNOSIS — I78.0 HHT (HEREDITARY HEMORRHAGIC TELANGIECTASIA): ICD-10-CM

## 2025-09-01 PROCEDURE — 99316 NF DSCHRG MGMT 30 MIN+: CPT

## 2025-09-01 RX ORDER — LEVOTHYROXINE SODIUM 50 UG/1
50 TABLET ORAL DAILY
Qty: 30 TABLET | Refills: 5 | Status: SHIPPED | OUTPATIENT
Start: 2025-09-01

## 2025-09-01 RX ORDER — NITROFURANTOIN 25; 75 MG/1; MG/1
100 CAPSULE ORAL EVERY 12 HOURS SCHEDULED
Qty: 2 CAPSULE | Refills: 0 | Status: SHIPPED | OUTPATIENT
Start: 2025-09-01 | End: 2025-09-02

## 2025-09-01 RX ORDER — TIMOLOL MALEATE 5 MG/ML
1 SOLUTION/ DROPS OPHTHALMIC DAILY
Qty: 3 ML | Refills: 0 | Status: SHIPPED | OUTPATIENT
Start: 2025-09-01

## 2025-09-01 RX ORDER — B.COAGUL,SUBTILIS/INULIN/VIT C 1B CELL-1G
1 TABLET,CHEWABLE ORAL DAILY
Qty: 30 CAPSULE | Refills: 0 | Status: SHIPPED | OUTPATIENT
Start: 2025-09-01 | End: 2025-10-01

## 2025-09-01 RX ORDER — RISPERIDONE 0.25 MG/1
0.25 TABLET ORAL 2 TIMES DAILY
Qty: 60 TABLET | Refills: 0 | Status: SHIPPED | OUTPATIENT
Start: 2025-09-01

## 2025-09-01 RX ORDER — RIVASTIGMINE TARTRATE 1.5 MG/1
1.5 CAPSULE ORAL 2 TIMES DAILY
Qty: 60 CAPSULE | Refills: 0 | Status: SHIPPED | OUTPATIENT
Start: 2025-09-01

## 2025-09-01 RX ORDER — LATANOPROST 50 UG/ML
1 SOLUTION/ DROPS OPHTHALMIC DAILY
Qty: 3 ML | Refills: 0 | Status: SHIPPED | OUTPATIENT
Start: 2025-09-01

## (undated) DEVICE — MINOR BASIN -SMH: Brand: MEDLINE INDUSTRIES, INC.

## (undated) DEVICE — X-RAY DETECTABLE SPONGES,16 PLY: Brand: VISTEC

## (undated) DEVICE — REFLEX ULTRA 45 WITH INTEGRATED CABLE: Brand: COBLATION

## (undated) DEVICE — SPONGE,NEURO,0.5"X3",XR,STRL,LF,10/PK: Brand: MEDLINE

## (undated) DEVICE — GARMENT,MEDLINE,DVT,INT,CALF,MED, GEN2: Brand: MEDLINE

## (undated) DEVICE — AGENT HEMSTAT W4XL4IN OXIDIZED REGENERATED CELOS ABSRB SFT

## (undated) DEVICE — SOLUTION IRRIG 1000ML 0.9% SOD CHL USP POUR PLAS BTL

## (undated) DEVICE — INTENT OT USE PROVIDES A STERILE INTERFACE BETWEEN THE OPERATING ROOM SURGICAL LAMPS (NON-STERILE) AND THE SURGEON OR STAFF WORKING IN THE STERILE FIELD.: Brand: ASPEN® ALC PLUS LIGHT HANDLE COVER

## (undated) DEVICE — TUBING, SUCTION, 1/4" X 12', STRAIGHT: Brand: MEDLINE

## (undated) DEVICE — FORCEPS BX L240CM JAW DIA2.8MM L CAP W/ NDL MIC MESH TOOTH

## (undated) DEVICE — SYRINGE IRRIG 60ML SFT PLIABLE BLB EZ TO GRP 1 HND USE W/

## (undated) DEVICE — COAGULATOR SUCT 10FR L6IN HND FT SWCH VALLEYLAB

## (undated) DEVICE — DRAPE,EENT,SPLIT,STERILE: Brand: MEDLINE

## (undated) DEVICE — STERILE POLYISOPRENE POWDER-FREE SURGICAL GLOVES: Brand: PROTEXIS